# Patient Record
Sex: FEMALE | Race: WHITE | NOT HISPANIC OR LATINO | URBAN - METROPOLITAN AREA
[De-identification: names, ages, dates, MRNs, and addresses within clinical notes are randomized per-mention and may not be internally consistent; named-entity substitution may affect disease eponyms.]

---

## 2017-02-02 ENCOUNTER — OUTPATIENT (OUTPATIENT)
Dept: OUTPATIENT SERVICES | Facility: HOSPITAL | Age: 72
LOS: 1 days | Discharge: HOME | End: 2017-02-02

## 2017-03-07 ENCOUNTER — INPATIENT (INPATIENT)
Facility: HOSPITAL | Age: 72
LOS: 9 days | Discharge: NURSING, REHAB OR LONG-TERM CARE | End: 2017-03-17

## 2017-03-24 ENCOUNTER — APPOINTMENT (OUTPATIENT)
Dept: CARDIOLOGY | Facility: CLINIC | Age: 72
End: 2017-03-24

## 2017-03-24 VITALS — HEIGHT: 64 IN | WEIGHT: 114.9 LBS | BODY MASS INDEX: 19.62 KG/M2

## 2017-03-24 VITALS — SYSTOLIC BLOOD PRESSURE: 99 MMHG | HEART RATE: 103 BPM | DIASTOLIC BLOOD PRESSURE: 66 MMHG | OXYGEN SATURATION: 95 %

## 2017-03-24 VITALS — HEIGHT: 64 IN | BODY MASS INDEX: 19.62 KG/M2 | WEIGHT: 114.9 LBS

## 2017-03-24 DIAGNOSIS — Z00.00 ENCOUNTER FOR GENERAL ADULT MEDICAL EXAMINATION W/OUT ABNORMAL FINDINGS: ICD-10-CM

## 2017-03-24 DIAGNOSIS — Z87.891 PERSONAL HISTORY OF NICOTINE DEPENDENCE: ICD-10-CM

## 2017-03-24 DIAGNOSIS — Z78.9 OTHER SPECIFIED HEALTH STATUS: ICD-10-CM

## 2017-03-24 DIAGNOSIS — F15.90 OTHER STIMULANT USE, UNSPECIFIED, UNCOMPLICATED: ICD-10-CM

## 2017-03-24 DIAGNOSIS — I31.3 PERICARDIAL EFFUSION (NONINFLAMMATORY): ICD-10-CM

## 2017-03-24 RX ORDER — ROSUVASTATIN CALCIUM 5 MG/1
5 TABLET, FILM COATED ORAL
Qty: 90 | Refills: 0 | Status: ACTIVE | COMMUNITY
Start: 2016-07-08

## 2017-03-24 RX ORDER — CLONAZEPAM 0.5 MG/1
0.5 TABLET ORAL
Qty: 90 | Refills: 0 | Status: ACTIVE | COMMUNITY
Start: 2016-11-22

## 2017-03-24 RX ORDER — TIOTROPIUM BROMIDE INHALATION SPRAY 3.12 UG/1
2.5 SPRAY, METERED RESPIRATORY (INHALATION)
Qty: 4 | Refills: 0 | Status: ACTIVE | COMMUNITY
Start: 2017-03-21

## 2017-04-05 ENCOUNTER — OUTPATIENT (OUTPATIENT)
Dept: OUTPATIENT SERVICES | Facility: HOSPITAL | Age: 72
LOS: 1 days | Discharge: HOME | End: 2017-04-05

## 2017-04-08 ENCOUNTER — TRANSCRIPTION ENCOUNTER (OUTPATIENT)
Age: 72
End: 2017-04-08

## 2017-04-28 ENCOUNTER — APPOINTMENT (OUTPATIENT)
Dept: CARDIOLOGY | Facility: CLINIC | Age: 72
End: 2017-04-28

## 2017-04-28 VITALS — HEART RATE: 87 BPM | DIASTOLIC BLOOD PRESSURE: 74 MMHG | OXYGEN SATURATION: 96 % | SYSTOLIC BLOOD PRESSURE: 119 MMHG

## 2017-05-05 ENCOUNTER — INPATIENT (INPATIENT)
Facility: HOSPITAL | Age: 72
LOS: 4 days | Discharge: HOME | End: 2017-05-10

## 2017-06-08 ENCOUNTER — OUTPATIENT (OUTPATIENT)
Dept: OUTPATIENT SERVICES | Facility: HOSPITAL | Age: 72
LOS: 1 days | Discharge: HOME | End: 2017-06-08

## 2017-06-08 DIAGNOSIS — I33.0 ACUTE AND SUBACUTE INFECTIVE ENDOCARDITIS: ICD-10-CM

## 2017-06-08 DIAGNOSIS — I48.91 UNSPECIFIED ATRIAL FIBRILLATION: ICD-10-CM

## 2017-06-08 DIAGNOSIS — J44.1 CHRONIC OBSTRUCTIVE PULMONARY DISEASE WITH (ACUTE) EXACERBATION: ICD-10-CM

## 2017-06-08 DIAGNOSIS — I31.3 PERICARDIAL EFFUSION (NONINFLAMMATORY): ICD-10-CM

## 2017-06-08 DIAGNOSIS — R78.81 BACTEREMIA: ICD-10-CM

## 2017-06-22 ENCOUNTER — OUTPATIENT (OUTPATIENT)
Dept: OUTPATIENT SERVICES | Facility: HOSPITAL | Age: 72
LOS: 1 days | Discharge: HOME | End: 2017-06-22

## 2017-06-22 DIAGNOSIS — J44.1 CHRONIC OBSTRUCTIVE PULMONARY DISEASE WITH (ACUTE) EXACERBATION: ICD-10-CM

## 2017-06-22 DIAGNOSIS — R78.81 BACTEREMIA: ICD-10-CM

## 2017-06-22 DIAGNOSIS — I31.3 PERICARDIAL EFFUSION (NONINFLAMMATORY): ICD-10-CM

## 2017-06-22 DIAGNOSIS — I33.0 ACUTE AND SUBACUTE INFECTIVE ENDOCARDITIS: ICD-10-CM

## 2017-06-22 DIAGNOSIS — I48.91 UNSPECIFIED ATRIAL FIBRILLATION: ICD-10-CM

## 2017-06-28 DIAGNOSIS — I48.91 UNSPECIFIED ATRIAL FIBRILLATION: ICD-10-CM

## 2017-06-28 DIAGNOSIS — Z79.01 LONG TERM (CURRENT) USE OF ANTICOAGULANTS: ICD-10-CM

## 2017-07-06 ENCOUNTER — OUTPATIENT (OUTPATIENT)
Dept: OUTPATIENT SERVICES | Facility: HOSPITAL | Age: 72
LOS: 1 days | Discharge: HOME | End: 2017-07-06

## 2017-07-06 DIAGNOSIS — J44.1 CHRONIC OBSTRUCTIVE PULMONARY DISEASE WITH (ACUTE) EXACERBATION: ICD-10-CM

## 2017-07-06 DIAGNOSIS — I33.0 ACUTE AND SUBACUTE INFECTIVE ENDOCARDITIS: ICD-10-CM

## 2017-07-06 DIAGNOSIS — I31.3 PERICARDIAL EFFUSION (NONINFLAMMATORY): ICD-10-CM

## 2017-07-06 DIAGNOSIS — I48.91 UNSPECIFIED ATRIAL FIBRILLATION: ICD-10-CM

## 2017-07-06 DIAGNOSIS — R78.81 BACTEREMIA: ICD-10-CM

## 2017-07-06 DIAGNOSIS — Z79.01 LONG TERM (CURRENT) USE OF ANTICOAGULANTS: ICD-10-CM

## 2017-07-11 DIAGNOSIS — J44.1 CHRONIC OBSTRUCTIVE PULMONARY DISEASE WITH (ACUTE) EXACERBATION: ICD-10-CM

## 2017-07-11 DIAGNOSIS — Z87.891 PERSONAL HISTORY OF NICOTINE DEPENDENCE: ICD-10-CM

## 2017-07-11 DIAGNOSIS — Z79.01 LONG TERM (CURRENT) USE OF ANTICOAGULANTS: ICD-10-CM

## 2017-07-11 DIAGNOSIS — J96.10 CHRONIC RESPIRATORY FAILURE, UNSPECIFIED WHETHER WITH HYPOXIA OR HYPERCAPNIA: ICD-10-CM

## 2017-07-11 DIAGNOSIS — Z85.118 PERSONAL HISTORY OF OTHER MALIGNANT NEOPLASM OF BRONCHUS AND LUNG: ICD-10-CM

## 2017-07-11 DIAGNOSIS — I48.1 PERSISTENT ATRIAL FIBRILLATION: ICD-10-CM

## 2017-07-11 DIAGNOSIS — F41.9 ANXIETY DISORDER, UNSPECIFIED: ICD-10-CM

## 2017-07-11 DIAGNOSIS — Z95.0 PRESENCE OF CARDIAC PACEMAKER: ICD-10-CM

## 2017-07-11 DIAGNOSIS — I31.3 PERICARDIAL EFFUSION (NONINFLAMMATORY): ICD-10-CM

## 2017-07-11 DIAGNOSIS — E78.5 HYPERLIPIDEMIA, UNSPECIFIED: ICD-10-CM

## 2017-07-14 DIAGNOSIS — I08.0 RHEUMATIC DISORDERS OF BOTH MITRAL AND AORTIC VALVES: ICD-10-CM

## 2017-07-14 DIAGNOSIS — E78.5 HYPERLIPIDEMIA, UNSPECIFIED: ICD-10-CM

## 2017-07-14 DIAGNOSIS — Z85.118 PERSONAL HISTORY OF OTHER MALIGNANT NEOPLASM OF BRONCHUS AND LUNG: ICD-10-CM

## 2017-07-14 DIAGNOSIS — K21.9 GASTRO-ESOPHAGEAL REFLUX DISEASE WITHOUT ESOPHAGITIS: ICD-10-CM

## 2017-07-14 DIAGNOSIS — K29.70 GASTRITIS, UNSPECIFIED, WITHOUT BLEEDING: ICD-10-CM

## 2017-07-14 DIAGNOSIS — Z00.6 ENCOUNTER FOR EXAMINATION FOR NORMAL COMPARISON AND CONTROL IN CLINICAL RESEARCH PROGRAM: ICD-10-CM

## 2017-07-14 DIAGNOSIS — D15.1 BENIGN NEOPLASM OF HEART: ICD-10-CM

## 2017-07-14 DIAGNOSIS — J44.9 CHRONIC OBSTRUCTIVE PULMONARY DISEASE, UNSPECIFIED: ICD-10-CM

## 2017-07-14 DIAGNOSIS — Z92.21 PERSONAL HISTORY OF ANTINEOPLASTIC CHEMOTHERAPY: ICD-10-CM

## 2017-07-14 DIAGNOSIS — Z87.891 PERSONAL HISTORY OF NICOTINE DEPENDENCE: ICD-10-CM

## 2017-07-14 DIAGNOSIS — I48.91 UNSPECIFIED ATRIAL FIBRILLATION: ICD-10-CM

## 2017-07-14 DIAGNOSIS — I31.3 PERICARDIAL EFFUSION (NONINFLAMMATORY): ICD-10-CM

## 2017-07-14 DIAGNOSIS — Z79.01 LONG TERM (CURRENT) USE OF ANTICOAGULANTS: ICD-10-CM

## 2017-07-14 DIAGNOSIS — F41.9 ANXIETY DISORDER, UNSPECIFIED: ICD-10-CM

## 2017-07-14 DIAGNOSIS — Z92.3 PERSONAL HISTORY OF IRRADIATION: ICD-10-CM

## 2017-07-14 DIAGNOSIS — Z66 DO NOT RESUSCITATE: ICD-10-CM

## 2017-07-14 DIAGNOSIS — Z90.2 ACQUIRED ABSENCE OF LUNG [PART OF]: ICD-10-CM

## 2017-07-20 ENCOUNTER — OUTPATIENT (OUTPATIENT)
Dept: OUTPATIENT SERVICES | Facility: HOSPITAL | Age: 72
LOS: 1 days | Discharge: HOME | End: 2017-07-20

## 2017-07-20 DIAGNOSIS — I48.91 UNSPECIFIED ATRIAL FIBRILLATION: ICD-10-CM

## 2017-07-20 DIAGNOSIS — I31.3 PERICARDIAL EFFUSION (NONINFLAMMATORY): ICD-10-CM

## 2017-07-20 DIAGNOSIS — I33.0 ACUTE AND SUBACUTE INFECTIVE ENDOCARDITIS: ICD-10-CM

## 2017-07-20 DIAGNOSIS — R78.81 BACTEREMIA: ICD-10-CM

## 2017-07-20 DIAGNOSIS — Z79.01 LONG TERM (CURRENT) USE OF ANTICOAGULANTS: ICD-10-CM

## 2017-07-20 DIAGNOSIS — J44.1 CHRONIC OBSTRUCTIVE PULMONARY DISEASE WITH (ACUTE) EXACERBATION: ICD-10-CM

## 2017-08-03 ENCOUNTER — OUTPATIENT (OUTPATIENT)
Dept: OUTPATIENT SERVICES | Facility: HOSPITAL | Age: 72
LOS: 1 days | Discharge: HOME | End: 2017-08-03

## 2017-08-03 DIAGNOSIS — I48.91 UNSPECIFIED ATRIAL FIBRILLATION: ICD-10-CM

## 2017-08-03 DIAGNOSIS — J44.1 CHRONIC OBSTRUCTIVE PULMONARY DISEASE WITH (ACUTE) EXACERBATION: ICD-10-CM

## 2017-08-03 DIAGNOSIS — I31.3 PERICARDIAL EFFUSION (NONINFLAMMATORY): ICD-10-CM

## 2017-08-03 DIAGNOSIS — I33.0 ACUTE AND SUBACUTE INFECTIVE ENDOCARDITIS: ICD-10-CM

## 2017-08-03 DIAGNOSIS — Z79.01 LONG TERM (CURRENT) USE OF ANTICOAGULANTS: ICD-10-CM

## 2017-08-03 DIAGNOSIS — R78.81 BACTEREMIA: ICD-10-CM

## 2017-08-23 ENCOUNTER — OUTPATIENT (OUTPATIENT)
Dept: OUTPATIENT SERVICES | Facility: HOSPITAL | Age: 72
LOS: 1 days | Discharge: HOME | End: 2017-08-23

## 2017-08-23 DIAGNOSIS — R78.81 BACTEREMIA: ICD-10-CM

## 2017-08-23 DIAGNOSIS — I33.0 ACUTE AND SUBACUTE INFECTIVE ENDOCARDITIS: ICD-10-CM

## 2017-08-23 DIAGNOSIS — I48.91 UNSPECIFIED ATRIAL FIBRILLATION: ICD-10-CM

## 2017-08-23 DIAGNOSIS — I31.3 PERICARDIAL EFFUSION (NONINFLAMMATORY): ICD-10-CM

## 2017-08-23 DIAGNOSIS — J44.1 CHRONIC OBSTRUCTIVE PULMONARY DISEASE WITH (ACUTE) EXACERBATION: ICD-10-CM

## 2017-08-23 DIAGNOSIS — Z79.01 LONG TERM (CURRENT) USE OF ANTICOAGULANTS: ICD-10-CM

## 2017-08-25 ENCOUNTER — APPOINTMENT (OUTPATIENT)
Dept: CARDIOLOGY | Facility: CLINIC | Age: 72
End: 2017-08-25

## 2017-08-25 ENCOUNTER — INPATIENT (INPATIENT)
Facility: HOSPITAL | Age: 72
LOS: 7 days | Discharge: HOME | End: 2017-09-02
Attending: HOSPITALIST

## 2017-08-25 VITALS
HEART RATE: 138 BPM | DIASTOLIC BLOOD PRESSURE: 76 MMHG | SYSTOLIC BLOOD PRESSURE: 116 MMHG | OXYGEN SATURATION: 87 % | WEIGHT: 114 LBS | HEIGHT: 64 IN | BODY MASS INDEX: 19.46 KG/M2

## 2017-08-25 DIAGNOSIS — J44.1 CHRONIC OBSTRUCTIVE PULMONARY DISEASE WITH (ACUTE) EXACERBATION: ICD-10-CM

## 2017-08-25 DIAGNOSIS — R00.1 BRADYCARDIA, UNSPECIFIED: ICD-10-CM

## 2017-08-25 DIAGNOSIS — I48.91 UNSPECIFIED ATRIAL FIBRILLATION: ICD-10-CM

## 2017-08-25 DIAGNOSIS — I33.0 ACUTE AND SUBACUTE INFECTIVE ENDOCARDITIS: ICD-10-CM

## 2017-08-25 DIAGNOSIS — R78.81 BACTEREMIA: ICD-10-CM

## 2017-08-25 DIAGNOSIS — I31.3 PERICARDIAL EFFUSION (NONINFLAMMATORY): ICD-10-CM

## 2017-08-25 RX ORDER — WARFARIN SODIUM 1 MG/1
1 TABLET ORAL
Qty: 180 | Refills: 0 | Status: ACTIVE | COMMUNITY
Start: 2017-01-18

## 2017-09-04 ENCOUNTER — INPATIENT (INPATIENT)
Facility: HOSPITAL | Age: 72
LOS: 3 days | Discharge: LEFT BEFORE DISCHARGE | End: 2017-09-08
Attending: INTERNAL MEDICINE

## 2017-09-04 DIAGNOSIS — J44.1 CHRONIC OBSTRUCTIVE PULMONARY DISEASE WITH (ACUTE) EXACERBATION: ICD-10-CM

## 2017-09-04 DIAGNOSIS — I48.91 UNSPECIFIED ATRIAL FIBRILLATION: ICD-10-CM

## 2017-09-04 DIAGNOSIS — R78.81 BACTEREMIA: ICD-10-CM

## 2017-09-04 DIAGNOSIS — I33.0 ACUTE AND SUBACUTE INFECTIVE ENDOCARDITIS: ICD-10-CM

## 2017-09-04 DIAGNOSIS — I31.3 PERICARDIAL EFFUSION (NONINFLAMMATORY): ICD-10-CM

## 2017-09-05 DIAGNOSIS — T38.0X5A ADVERSE EFFECT OF GLUCOCORTICOIDS AND SYNTHETIC ANALOGUES, INITIAL ENCOUNTER: ICD-10-CM

## 2017-09-05 DIAGNOSIS — R91.1 SOLITARY PULMONARY NODULE: ICD-10-CM

## 2017-09-05 DIAGNOSIS — F41.9 ANXIETY DISORDER, UNSPECIFIED: ICD-10-CM

## 2017-09-05 DIAGNOSIS — E11.65 TYPE 2 DIABETES MELLITUS WITH HYPERGLYCEMIA: ICD-10-CM

## 2017-09-05 DIAGNOSIS — I48.91 UNSPECIFIED ATRIAL FIBRILLATION: ICD-10-CM

## 2017-09-05 DIAGNOSIS — Z79.01 LONG TERM (CURRENT) USE OF ANTICOAGULANTS: ICD-10-CM

## 2017-09-05 DIAGNOSIS — Z85.118 PERSONAL HISTORY OF OTHER MALIGNANT NEOPLASM OF BRONCHUS AND LUNG: ICD-10-CM

## 2017-09-05 DIAGNOSIS — Z99.81 DEPENDENCE ON SUPPLEMENTAL OXYGEN: ICD-10-CM

## 2017-09-05 DIAGNOSIS — Z87.891 PERSONAL HISTORY OF NICOTINE DEPENDENCE: ICD-10-CM

## 2017-09-05 DIAGNOSIS — F32.9 MAJOR DEPRESSIVE DISORDER, SINGLE EPISODE, UNSPECIFIED: ICD-10-CM

## 2017-09-05 DIAGNOSIS — I44.2 ATRIOVENTRICULAR BLOCK, COMPLETE: ICD-10-CM

## 2017-09-05 DIAGNOSIS — J44.9 CHRONIC OBSTRUCTIVE PULMONARY DISEASE, UNSPECIFIED: ICD-10-CM

## 2017-09-05 DIAGNOSIS — Z95.0 PRESENCE OF CARDIAC PACEMAKER: ICD-10-CM

## 2017-09-05 DIAGNOSIS — J96.10 CHRONIC RESPIRATORY FAILURE, UNSPECIFIED WHETHER WITH HYPOXIA OR HYPERCAPNIA: ICD-10-CM

## 2017-09-05 DIAGNOSIS — E78.5 HYPERLIPIDEMIA, UNSPECIFIED: ICD-10-CM

## 2017-09-05 DIAGNOSIS — Z79.4 LONG TERM (CURRENT) USE OF INSULIN: ICD-10-CM

## 2017-09-05 DIAGNOSIS — Z90.2 ACQUIRED ABSENCE OF LUNG [PART OF]: ICD-10-CM

## 2017-09-05 DIAGNOSIS — I48.0 PAROXYSMAL ATRIAL FIBRILLATION: ICD-10-CM

## 2017-09-10 ENCOUNTER — INPATIENT (INPATIENT)
Facility: HOSPITAL | Age: 72
LOS: 2 days | Discharge: HOME | End: 2017-09-13
Attending: INTERNAL MEDICINE

## 2017-09-10 DIAGNOSIS — I31.3 PERICARDIAL EFFUSION (NONINFLAMMATORY): ICD-10-CM

## 2017-09-10 DIAGNOSIS — I33.0 ACUTE AND SUBACUTE INFECTIVE ENDOCARDITIS: ICD-10-CM

## 2017-09-10 DIAGNOSIS — J44.1 CHRONIC OBSTRUCTIVE PULMONARY DISEASE WITH (ACUTE) EXACERBATION: ICD-10-CM

## 2017-09-10 DIAGNOSIS — I48.91 UNSPECIFIED ATRIAL FIBRILLATION: ICD-10-CM

## 2017-09-10 DIAGNOSIS — R78.81 BACTEREMIA: ICD-10-CM

## 2017-09-11 DIAGNOSIS — E78.5 HYPERLIPIDEMIA, UNSPECIFIED: ICD-10-CM

## 2017-09-11 DIAGNOSIS — Z87.891 PERSONAL HISTORY OF NICOTINE DEPENDENCE: ICD-10-CM

## 2017-09-11 DIAGNOSIS — I31.3 PERICARDIAL EFFUSION (NONINFLAMMATORY): ICD-10-CM

## 2017-09-11 DIAGNOSIS — I49.3 VENTRICULAR PREMATURE DEPOLARIZATION: ICD-10-CM

## 2017-09-11 DIAGNOSIS — J44.9 CHRONIC OBSTRUCTIVE PULMONARY DISEASE, UNSPECIFIED: ICD-10-CM

## 2017-09-11 DIAGNOSIS — I48.91 UNSPECIFIED ATRIAL FIBRILLATION: ICD-10-CM

## 2017-09-11 DIAGNOSIS — F41.9 ANXIETY DISORDER, UNSPECIFIED: ICD-10-CM

## 2017-09-11 DIAGNOSIS — Z95.0 PRESENCE OF CARDIAC PACEMAKER: ICD-10-CM

## 2017-09-11 DIAGNOSIS — R07.9 CHEST PAIN, UNSPECIFIED: ICD-10-CM

## 2017-09-11 DIAGNOSIS — Z98.890 OTHER SPECIFIED POSTPROCEDURAL STATES: ICD-10-CM

## 2017-09-11 DIAGNOSIS — E11.9 TYPE 2 DIABETES MELLITUS WITHOUT COMPLICATIONS: ICD-10-CM

## 2017-09-11 DIAGNOSIS — Z90.2 ACQUIRED ABSENCE OF LUNG [PART OF]: ICD-10-CM

## 2017-09-11 DIAGNOSIS — R78.81 BACTEREMIA: ICD-10-CM

## 2017-09-11 DIAGNOSIS — R79.89 OTHER SPECIFIED ABNORMAL FINDINGS OF BLOOD CHEMISTRY: ICD-10-CM

## 2017-09-11 DIAGNOSIS — Z85.118 PERSONAL HISTORY OF OTHER MALIGNANT NEOPLASM OF BRONCHUS AND LUNG: ICD-10-CM

## 2017-09-11 DIAGNOSIS — B95.2 ENTEROCOCCUS AS THE CAUSE OF DISEASES CLASSIFIED ELSEWHERE: ICD-10-CM

## 2017-09-11 DIAGNOSIS — I38 ENDOCARDITIS, VALVE UNSPECIFIED: ICD-10-CM

## 2017-09-11 DIAGNOSIS — Z79.01 LONG TERM (CURRENT) USE OF ANTICOAGULANTS: ICD-10-CM

## 2017-09-14 DIAGNOSIS — Z88.2 ALLERGY STATUS TO SULFONAMIDES: ICD-10-CM

## 2017-09-14 DIAGNOSIS — D68.9 COAGULATION DEFECT, UNSPECIFIED: ICD-10-CM

## 2017-09-14 DIAGNOSIS — Z95.0 PRESENCE OF CARDIAC PACEMAKER: ICD-10-CM

## 2017-09-14 DIAGNOSIS — R78.81 BACTEREMIA: ICD-10-CM

## 2017-09-14 DIAGNOSIS — E78.5 HYPERLIPIDEMIA, UNSPECIFIED: ICD-10-CM

## 2017-09-14 DIAGNOSIS — E11.9 TYPE 2 DIABETES MELLITUS WITHOUT COMPLICATIONS: ICD-10-CM

## 2017-09-14 DIAGNOSIS — F41.9 ANXIETY DISORDER, UNSPECIFIED: ICD-10-CM

## 2017-09-14 DIAGNOSIS — J96.11 CHRONIC RESPIRATORY FAILURE WITH HYPOXIA: ICD-10-CM

## 2017-09-14 DIAGNOSIS — Z90.2 ACQUIRED ABSENCE OF LUNG [PART OF]: ICD-10-CM

## 2017-09-14 DIAGNOSIS — Z88.8 ALLERGY STATUS TO OTHER DRUGS, MEDICAMENTS AND BIOLOGICAL SUBSTANCES: ICD-10-CM

## 2017-09-14 DIAGNOSIS — Z79.01 LONG TERM (CURRENT) USE OF ANTICOAGULANTS: ICD-10-CM

## 2017-09-14 DIAGNOSIS — Z85.118 PERSONAL HISTORY OF OTHER MALIGNANT NEOPLASM OF BRONCHUS AND LUNG: ICD-10-CM

## 2017-09-14 DIAGNOSIS — J96.10 CHRONIC RESPIRATORY FAILURE, UNSPECIFIED WHETHER WITH HYPOXIA OR HYPERCAPNIA: ICD-10-CM

## 2017-09-14 DIAGNOSIS — J44.9 CHRONIC OBSTRUCTIVE PULMONARY DISEASE, UNSPECIFIED: ICD-10-CM

## 2017-09-20 DIAGNOSIS — B95.2 ENTEROCOCCUS AS THE CAUSE OF DISEASES CLASSIFIED ELSEWHERE: ICD-10-CM

## 2017-09-20 DIAGNOSIS — Z87.891 PERSONAL HISTORY OF NICOTINE DEPENDENCE: ICD-10-CM

## 2017-09-21 ENCOUNTER — OUTPATIENT (OUTPATIENT)
Dept: OUTPATIENT SERVICES | Facility: HOSPITAL | Age: 72
LOS: 1 days | Discharge: HOME | End: 2017-09-21

## 2017-09-21 DIAGNOSIS — I31.3 PERICARDIAL EFFUSION (NONINFLAMMATORY): ICD-10-CM

## 2017-09-21 DIAGNOSIS — I48.91 UNSPECIFIED ATRIAL FIBRILLATION: ICD-10-CM

## 2017-09-21 DIAGNOSIS — R78.81 BACTEREMIA: ICD-10-CM

## 2017-09-21 DIAGNOSIS — Z79.01 LONG TERM (CURRENT) USE OF ANTICOAGULANTS: ICD-10-CM

## 2017-09-21 DIAGNOSIS — I33.0 ACUTE AND SUBACUTE INFECTIVE ENDOCARDITIS: ICD-10-CM

## 2017-09-21 DIAGNOSIS — J44.1 CHRONIC OBSTRUCTIVE PULMONARY DISEASE WITH (ACUTE) EXACERBATION: ICD-10-CM

## 2017-09-25 ENCOUNTER — OUTPATIENT (OUTPATIENT)
Dept: OUTPATIENT SERVICES | Facility: HOSPITAL | Age: 72
LOS: 1 days | Discharge: HOME | End: 2017-09-25

## 2017-09-25 DIAGNOSIS — R74.8 ABNORMAL LEVELS OF OTHER SERUM ENZYMES: ICD-10-CM

## 2017-09-25 DIAGNOSIS — I31.3 PERICARDIAL EFFUSION (NONINFLAMMATORY): ICD-10-CM

## 2017-09-25 DIAGNOSIS — J44.1 CHRONIC OBSTRUCTIVE PULMONARY DISEASE WITH (ACUTE) EXACERBATION: ICD-10-CM

## 2017-09-25 DIAGNOSIS — R68.89 OTHER GENERAL SYMPTOMS AND SIGNS: ICD-10-CM

## 2017-09-25 DIAGNOSIS — E78.4 OTHER HYPERLIPIDEMIA: ICD-10-CM

## 2017-09-25 DIAGNOSIS — Z02.89 ENCOUNTER FOR OTHER ADMINISTRATIVE EXAMINATIONS: ICD-10-CM

## 2017-09-25 DIAGNOSIS — R78.81 BACTEREMIA: ICD-10-CM

## 2017-09-25 DIAGNOSIS — E55.9 VITAMIN D DEFICIENCY, UNSPECIFIED: ICD-10-CM

## 2017-09-25 DIAGNOSIS — R79.82 ELEVATED C-REACTIVE PROTEIN (CRP): ICD-10-CM

## 2017-09-25 DIAGNOSIS — I48.91 UNSPECIFIED ATRIAL FIBRILLATION: ICD-10-CM

## 2017-09-25 DIAGNOSIS — Z79.01 LONG TERM (CURRENT) USE OF ANTICOAGULANTS: ICD-10-CM

## 2017-09-25 DIAGNOSIS — I33.0 ACUTE AND SUBACUTE INFECTIVE ENDOCARDITIS: ICD-10-CM

## 2017-09-25 DIAGNOSIS — R73.09 OTHER ABNORMAL GLUCOSE: ICD-10-CM

## 2017-09-25 DIAGNOSIS — E78.1 PURE HYPERGLYCERIDEMIA: ICD-10-CM

## 2017-10-05 ENCOUNTER — OUTPATIENT (OUTPATIENT)
Dept: OUTPATIENT SERVICES | Facility: HOSPITAL | Age: 72
LOS: 1 days | Discharge: HOME | End: 2017-10-05

## 2017-10-05 DIAGNOSIS — I33.0 ACUTE AND SUBACUTE INFECTIVE ENDOCARDITIS: ICD-10-CM

## 2017-10-05 DIAGNOSIS — Z79.01 LONG TERM (CURRENT) USE OF ANTICOAGULANTS: ICD-10-CM

## 2017-10-05 DIAGNOSIS — I31.3 PERICARDIAL EFFUSION (NONINFLAMMATORY): ICD-10-CM

## 2017-10-05 DIAGNOSIS — R78.81 BACTEREMIA: ICD-10-CM

## 2017-10-05 DIAGNOSIS — J44.1 CHRONIC OBSTRUCTIVE PULMONARY DISEASE WITH (ACUTE) EXACERBATION: ICD-10-CM

## 2017-10-05 DIAGNOSIS — I48.91 UNSPECIFIED ATRIAL FIBRILLATION: ICD-10-CM

## 2017-10-05 DIAGNOSIS — B97.89 OTHER VIRAL AGENTS AS THE CAUSE OF DISEASES CLASSIFIED ELSEWHERE: ICD-10-CM

## 2017-10-20 ENCOUNTER — OUTPATIENT (OUTPATIENT)
Dept: OUTPATIENT SERVICES | Facility: HOSPITAL | Age: 72
LOS: 1 days | Discharge: HOME | End: 2017-10-20

## 2017-10-20 DIAGNOSIS — I31.3 PERICARDIAL EFFUSION (NONINFLAMMATORY): ICD-10-CM

## 2017-10-20 DIAGNOSIS — I33.0 ACUTE AND SUBACUTE INFECTIVE ENDOCARDITIS: ICD-10-CM

## 2017-10-20 DIAGNOSIS — I48.91 UNSPECIFIED ATRIAL FIBRILLATION: ICD-10-CM

## 2017-10-20 DIAGNOSIS — R78.81 BACTEREMIA: ICD-10-CM

## 2017-10-20 DIAGNOSIS — Z79.01 LONG TERM (CURRENT) USE OF ANTICOAGULANTS: ICD-10-CM

## 2017-10-20 DIAGNOSIS — J44.1 CHRONIC OBSTRUCTIVE PULMONARY DISEASE WITH (ACUTE) EXACERBATION: ICD-10-CM

## 2017-10-30 ENCOUNTER — OUTPATIENT (OUTPATIENT)
Dept: OUTPATIENT SERVICES | Facility: HOSPITAL | Age: 72
LOS: 1 days | Discharge: HOME | End: 2017-10-30

## 2017-10-30 DIAGNOSIS — I31.3 PERICARDIAL EFFUSION (NONINFLAMMATORY): ICD-10-CM

## 2017-10-30 DIAGNOSIS — R78.81 BACTEREMIA: ICD-10-CM

## 2017-10-30 DIAGNOSIS — I33.0 ACUTE AND SUBACUTE INFECTIVE ENDOCARDITIS: ICD-10-CM

## 2017-10-30 DIAGNOSIS — J44.1 CHRONIC OBSTRUCTIVE PULMONARY DISEASE WITH (ACUTE) EXACERBATION: ICD-10-CM

## 2017-10-30 DIAGNOSIS — I48.91 UNSPECIFIED ATRIAL FIBRILLATION: ICD-10-CM

## 2017-10-30 DIAGNOSIS — Z79.01 LONG TERM (CURRENT) USE OF ANTICOAGULANTS: ICD-10-CM

## 2017-11-03 ENCOUNTER — OUTPATIENT (OUTPATIENT)
Dept: OUTPATIENT SERVICES | Facility: HOSPITAL | Age: 72
LOS: 1 days | Discharge: HOME | End: 2017-11-03

## 2017-11-03 DIAGNOSIS — I48.91 UNSPECIFIED ATRIAL FIBRILLATION: ICD-10-CM

## 2017-11-03 DIAGNOSIS — R78.81 BACTEREMIA: ICD-10-CM

## 2017-11-03 DIAGNOSIS — J44.1 CHRONIC OBSTRUCTIVE PULMONARY DISEASE WITH (ACUTE) EXACERBATION: ICD-10-CM

## 2017-11-03 DIAGNOSIS — Z79.01 LONG TERM (CURRENT) USE OF ANTICOAGULANTS: ICD-10-CM

## 2017-11-03 DIAGNOSIS — I31.3 PERICARDIAL EFFUSION (NONINFLAMMATORY): ICD-10-CM

## 2017-11-03 DIAGNOSIS — I33.0 ACUTE AND SUBACUTE INFECTIVE ENDOCARDITIS: ICD-10-CM

## 2017-11-09 ENCOUNTER — APPOINTMENT (OUTPATIENT)
Dept: CARDIOLOGY | Facility: CLINIC | Age: 72
End: 2017-11-09

## 2017-11-09 VITALS
WEIGHT: 130 LBS | OXYGEN SATURATION: 89 % | DIASTOLIC BLOOD PRESSURE: 71 MMHG | SYSTOLIC BLOOD PRESSURE: 126 MMHG | BODY MASS INDEX: 22.2 KG/M2 | HEART RATE: 80 BPM | HEIGHT: 64 IN

## 2017-11-17 ENCOUNTER — OUTPATIENT (OUTPATIENT)
Dept: OUTPATIENT SERVICES | Facility: HOSPITAL | Age: 72
LOS: 1 days | Discharge: HOME | End: 2017-11-17

## 2017-11-17 DIAGNOSIS — I31.3 PERICARDIAL EFFUSION (NONINFLAMMATORY): ICD-10-CM

## 2017-11-17 DIAGNOSIS — J44.1 CHRONIC OBSTRUCTIVE PULMONARY DISEASE WITH (ACUTE) EXACERBATION: ICD-10-CM

## 2017-11-17 DIAGNOSIS — I48.91 UNSPECIFIED ATRIAL FIBRILLATION: ICD-10-CM

## 2017-11-17 DIAGNOSIS — R78.81 BACTEREMIA: ICD-10-CM

## 2017-11-17 DIAGNOSIS — Z79.01 LONG TERM (CURRENT) USE OF ANTICOAGULANTS: ICD-10-CM

## 2017-11-17 DIAGNOSIS — I33.0 ACUTE AND SUBACUTE INFECTIVE ENDOCARDITIS: ICD-10-CM

## 2017-12-06 ENCOUNTER — OUTPATIENT (OUTPATIENT)
Dept: OUTPATIENT SERVICES | Facility: HOSPITAL | Age: 72
LOS: 1 days | Discharge: HOME | End: 2017-12-06

## 2017-12-06 DIAGNOSIS — I31.3 PERICARDIAL EFFUSION (NONINFLAMMATORY): ICD-10-CM

## 2017-12-06 DIAGNOSIS — I48.91 UNSPECIFIED ATRIAL FIBRILLATION: ICD-10-CM

## 2017-12-06 DIAGNOSIS — J44.1 CHRONIC OBSTRUCTIVE PULMONARY DISEASE WITH (ACUTE) EXACERBATION: ICD-10-CM

## 2017-12-06 DIAGNOSIS — R78.81 BACTEREMIA: ICD-10-CM

## 2017-12-06 DIAGNOSIS — I33.0 ACUTE AND SUBACUTE INFECTIVE ENDOCARDITIS: ICD-10-CM

## 2017-12-06 DIAGNOSIS — Z79.01 LONG TERM (CURRENT) USE OF ANTICOAGULANTS: ICD-10-CM

## 2017-12-29 ENCOUNTER — OUTPATIENT (OUTPATIENT)
Dept: OUTPATIENT SERVICES | Facility: HOSPITAL | Age: 72
LOS: 1 days | Discharge: HOME | End: 2017-12-29

## 2017-12-29 DIAGNOSIS — R78.81 BACTEREMIA: ICD-10-CM

## 2017-12-29 DIAGNOSIS — I33.0 ACUTE AND SUBACUTE INFECTIVE ENDOCARDITIS: ICD-10-CM

## 2017-12-29 DIAGNOSIS — I48.91 UNSPECIFIED ATRIAL FIBRILLATION: ICD-10-CM

## 2017-12-29 DIAGNOSIS — J44.1 CHRONIC OBSTRUCTIVE PULMONARY DISEASE WITH (ACUTE) EXACERBATION: ICD-10-CM

## 2017-12-29 DIAGNOSIS — Z79.01 LONG TERM (CURRENT) USE OF ANTICOAGULANTS: ICD-10-CM

## 2017-12-29 DIAGNOSIS — I31.3 PERICARDIAL EFFUSION (NONINFLAMMATORY): ICD-10-CM

## 2018-01-17 ENCOUNTER — OUTPATIENT (OUTPATIENT)
Dept: OUTPATIENT SERVICES | Facility: HOSPITAL | Age: 73
LOS: 1 days | Discharge: HOME | End: 2018-01-17

## 2018-01-17 DIAGNOSIS — Z79.01 LONG TERM (CURRENT) USE OF ANTICOAGULANTS: ICD-10-CM

## 2018-01-17 DIAGNOSIS — I48.91 UNSPECIFIED ATRIAL FIBRILLATION: ICD-10-CM

## 2018-01-17 DIAGNOSIS — I33.0 ACUTE AND SUBACUTE INFECTIVE ENDOCARDITIS: ICD-10-CM

## 2018-01-17 DIAGNOSIS — J44.1 CHRONIC OBSTRUCTIVE PULMONARY DISEASE WITH (ACUTE) EXACERBATION: ICD-10-CM

## 2018-01-17 DIAGNOSIS — R78.81 BACTEREMIA: ICD-10-CM

## 2018-01-17 DIAGNOSIS — I31.3 PERICARDIAL EFFUSION (NONINFLAMMATORY): ICD-10-CM

## 2018-02-05 ENCOUNTER — OUTPATIENT (OUTPATIENT)
Dept: OUTPATIENT SERVICES | Facility: HOSPITAL | Age: 73
LOS: 1 days | Discharge: HOME | End: 2018-02-05

## 2018-02-05 DIAGNOSIS — I48.91 UNSPECIFIED ATRIAL FIBRILLATION: ICD-10-CM

## 2018-02-05 DIAGNOSIS — Z79.01 LONG TERM (CURRENT) USE OF ANTICOAGULANTS: ICD-10-CM

## 2018-02-22 ENCOUNTER — INPATIENT (INPATIENT)
Facility: HOSPITAL | Age: 73
LOS: 1 days | Discharge: HOME | End: 2018-02-24
Attending: INTERNAL MEDICINE

## 2018-02-22 VITALS
SYSTOLIC BLOOD PRESSURE: 174 MMHG | TEMPERATURE: 96 F | RESPIRATION RATE: 20 BRPM | DIASTOLIC BLOOD PRESSURE: 93 MMHG | HEART RATE: 66 BPM | OXYGEN SATURATION: 96 %

## 2018-02-22 RX ORDER — SODIUM CHLORIDE 9 MG/ML
3 INJECTION INTRAMUSCULAR; INTRAVENOUS; SUBCUTANEOUS EVERY 8 HOURS
Qty: 0 | Refills: 0 | Status: DISCONTINUED | OUTPATIENT
Start: 2018-02-22 | End: 2018-02-24

## 2018-02-22 RX ORDER — SODIUM CHLORIDE 9 MG/ML
1000 INJECTION INTRAMUSCULAR; INTRAVENOUS; SUBCUTANEOUS
Qty: 0 | Refills: 0 | Status: DISCONTINUED | OUTPATIENT
Start: 2018-02-22 | End: 2018-02-24

## 2018-02-22 NOTE — ED ADULT NURSE NOTE - OBJECTIVE STATEMENT
Pt reports one episode of confusion earlier, unable to read the magazine. Pt reports she is able to read, but having trouble speaking and coming up with words. Pt denies any numbness/tingling/weakness in any extremities. Facial droop symmetrical. Pt also reports Nausea and abdominal discomfort.

## 2018-02-22 NOTE — ED ADULT NURSE NOTE - PMH
Anxiety    High cholesterol    Pacemaker Afib    Anxiety    COPD (chronic obstructive pulmonary disease)    High cholesterol    Lung cancer    Pacemaker

## 2018-02-22 NOTE — ED ADULT TRIAGE NOTE - CHIEF COMPLAINT QUOTE
CATHIE with complaints of brief period of confusion, unable to comprehend what she's reading, now resolved

## 2018-02-23 DIAGNOSIS — Z95.0 PRESENCE OF CARDIAC PACEMAKER: Chronic | ICD-10-CM

## 2018-02-23 DIAGNOSIS — Z90.2 ACQUIRED ABSENCE OF LUNG [PART OF]: Chronic | ICD-10-CM

## 2018-02-23 DIAGNOSIS — Z98.890 OTHER SPECIFIED POSTPROCEDURAL STATES: Chronic | ICD-10-CM

## 2018-02-23 DIAGNOSIS — Z90.49 ACQUIRED ABSENCE OF OTHER SPECIFIED PARTS OF DIGESTIVE TRACT: Chronic | ICD-10-CM

## 2018-02-23 LAB
ANION GAP SERPL CALC-SCNC: 10 MMOL/L — SIGNIFICANT CHANGE UP (ref 7–14)
APTT BLD: 43.9 SEC — HIGH (ref 27–39.2)
BASOPHILS # BLD AUTO: 0.03 K/UL — SIGNIFICANT CHANGE UP (ref 0–0.2)
BASOPHILS NFR BLD AUTO: 0.4 % — SIGNIFICANT CHANGE UP (ref 0–1)
BUN SERPL-MCNC: 13 MG/DL — SIGNIFICANT CHANGE UP (ref 10–20)
CALCIUM SERPL-MCNC: 9.1 MG/DL — SIGNIFICANT CHANGE UP (ref 8.5–10.1)
CHLORIDE SERPL-SCNC: 102 MMOL/L — SIGNIFICANT CHANGE UP (ref 98–110)
CO2 SERPL-SCNC: 27 MMOL/L — SIGNIFICANT CHANGE UP (ref 17–32)
CREAT SERPL-MCNC: 0.9 MG/DL — SIGNIFICANT CHANGE UP (ref 0.7–1.5)
EOSINOPHIL # BLD AUTO: 0.17 K/UL — SIGNIFICANT CHANGE UP (ref 0–0.7)
EOSINOPHIL NFR BLD AUTO: 2.5 % — SIGNIFICANT CHANGE UP (ref 0–8)
GLUCOSE SERPL-MCNC: 134 MG/DL — HIGH (ref 70–110)
HCT VFR BLD CALC: 32.7 % — LOW (ref 37–47)
HGB BLD-MCNC: 10.6 G/DL — LOW (ref 14–18)
IMM GRANULOCYTES NFR BLD AUTO: 0.3 % — SIGNIFICANT CHANGE UP (ref 0.1–0.3)
INR BLD: 4.13 RATIO — HIGH (ref 0.65–1.3)
LYMPHOCYTES # BLD AUTO: 1.14 K/UL — LOW (ref 1.2–3.4)
LYMPHOCYTES # BLD AUTO: 17 % — LOW (ref 20.5–51.1)
MCHC RBC-ENTMCNC: 26.3 PG — LOW (ref 27–31)
MCHC RBC-ENTMCNC: 32.4 G/DL — SIGNIFICANT CHANGE UP (ref 32–37)
MCV RBC AUTO: 81.1 FL — SIGNIFICANT CHANGE UP (ref 81–91)
MONOCYTES # BLD AUTO: 0.49 K/UL — SIGNIFICANT CHANGE UP (ref 0.1–0.6)
MONOCYTES NFR BLD AUTO: 7.3 % — SIGNIFICANT CHANGE UP (ref 1.7–9.3)
NEUTROPHILS # BLD AUTO: 4.84 K/UL — SIGNIFICANT CHANGE UP (ref 1.4–6.5)
NEUTROPHILS NFR BLD AUTO: 72.5 % — SIGNIFICANT CHANGE UP (ref 42.2–75.2)
NRBC # BLD: 0 /100 WBCS — SIGNIFICANT CHANGE UP (ref 0–0)
PLATELET # BLD AUTO: 195 K/UL — SIGNIFICANT CHANGE UP (ref 130–400)
POTASSIUM SERPL-MCNC: 3.7 MMOL/L — SIGNIFICANT CHANGE UP (ref 3.5–5)
POTASSIUM SERPL-SCNC: 3.7 MMOL/L — SIGNIFICANT CHANGE UP (ref 3.5–5)
PROTHROM AB SERPL-ACNC: >40 SEC — HIGH (ref 9.95–12.87)
RBC # BLD: 4.03 M/UL — LOW (ref 4.2–5.4)
RBC # FLD: 14.7 % — HIGH (ref 11.5–14.5)
SODIUM SERPL-SCNC: 139 MMOL/L — SIGNIFICANT CHANGE UP (ref 135–146)
WBC # BLD: 6.69 K/UL — SIGNIFICANT CHANGE UP (ref 4.8–10.8)
WBC # FLD AUTO: 6.69 K/UL — SIGNIFICANT CHANGE UP (ref 4.8–10.8)

## 2018-02-23 RX ORDER — PANTOPRAZOLE SODIUM 20 MG/1
TABLET, DELAYED RELEASE ORAL
Qty: 0 | Refills: 0 | Status: DISCONTINUED | OUTPATIENT
Start: 2018-02-23 | End: 2018-02-24

## 2018-02-23 RX ORDER — CLONAZEPAM 1 MG
0 TABLET ORAL
Qty: 0 | Refills: 0 | COMMUNITY

## 2018-02-23 RX ORDER — ACETAMINOPHEN 500 MG
650 TABLET ORAL EVERY 6 HOURS
Qty: 0 | Refills: 0 | Status: DISCONTINUED | OUTPATIENT
Start: 2018-02-23 | End: 2018-02-24

## 2018-02-23 RX ORDER — METOCLOPRAMIDE HCL 10 MG
10 TABLET ORAL ONCE
Qty: 0 | Refills: 0 | Status: COMPLETED | OUTPATIENT
Start: 2018-02-23 | End: 2018-02-23

## 2018-02-23 RX ORDER — PANTOPRAZOLE SODIUM 20 MG/1
40 TABLET, DELAYED RELEASE ORAL EVERY 12 HOURS
Qty: 0 | Refills: 0 | Status: DISCONTINUED | OUTPATIENT
Start: 2018-02-23 | End: 2018-02-24

## 2018-02-23 RX ORDER — HEPARIN SODIUM 5000 [USP'U]/ML
5000 INJECTION INTRAVENOUS; SUBCUTANEOUS EVERY 8 HOURS
Qty: 0 | Refills: 0 | Status: DISCONTINUED | OUTPATIENT
Start: 2018-02-23 | End: 2018-02-23

## 2018-02-23 RX ORDER — PROCHLORPERAZINE MALEATE 5 MG
5 TABLET ORAL
Qty: 0 | Refills: 0 | Status: DISCONTINUED | OUTPATIENT
Start: 2018-02-23 | End: 2018-02-24

## 2018-02-23 RX ORDER — PANTOPRAZOLE SODIUM 20 MG/1
40 TABLET, DELAYED RELEASE ORAL ONCE
Qty: 0 | Refills: 0 | Status: COMPLETED | OUTPATIENT
Start: 2018-02-23 | End: 2018-02-23

## 2018-02-23 RX ORDER — ATORVASTATIN CALCIUM 80 MG/1
20 TABLET, FILM COATED ORAL AT BEDTIME
Qty: 0 | Refills: 0 | Status: DISCONTINUED | OUTPATIENT
Start: 2018-02-23 | End: 2018-02-24

## 2018-02-23 RX ORDER — TIOTROPIUM BROMIDE 18 UG/1
1 CAPSULE ORAL; RESPIRATORY (INHALATION) DAILY
Qty: 0 | Refills: 0 | Status: DISCONTINUED | OUTPATIENT
Start: 2018-02-23 | End: 2018-02-24

## 2018-02-23 RX ORDER — CLONAZEPAM 1 MG
0.5 TABLET ORAL
Qty: 0 | Refills: 0 | Status: DISCONTINUED | OUTPATIENT
Start: 2018-02-23 | End: 2018-02-24

## 2018-02-23 RX ORDER — ONDANSETRON 8 MG/1
4 TABLET, FILM COATED ORAL ONCE
Qty: 0 | Refills: 0 | Status: COMPLETED | OUTPATIENT
Start: 2018-02-23 | End: 2018-02-23

## 2018-02-23 RX ORDER — METOCLOPRAMIDE HCL 10 MG
5 TABLET ORAL EVERY 8 HOURS
Qty: 0 | Refills: 0 | Status: DISCONTINUED | OUTPATIENT
Start: 2018-02-23 | End: 2018-02-23

## 2018-02-23 RX ADMIN — Medication 10 MILLIGRAM(S): at 14:53

## 2018-02-23 RX ADMIN — PANTOPRAZOLE SODIUM 40 MILLIGRAM(S): 20 TABLET, DELAYED RELEASE ORAL at 14:54

## 2018-02-23 RX ADMIN — SODIUM CHLORIDE 75 MILLILITER(S): 9 INJECTION INTRAMUSCULAR; INTRAVENOUS; SUBCUTANEOUS at 00:07

## 2018-02-23 RX ADMIN — SODIUM CHLORIDE 3 MILLILITER(S): 9 INJECTION INTRAMUSCULAR; INTRAVENOUS; SUBCUTANEOUS at 14:51

## 2018-02-23 RX ADMIN — TIOTROPIUM BROMIDE 1 CAPSULE(S): 18 CAPSULE ORAL; RESPIRATORY (INHALATION) at 22:45

## 2018-02-23 RX ADMIN — ONDANSETRON 4 MILLIGRAM(S): 8 TABLET, FILM COATED ORAL at 09:38

## 2018-02-23 RX ADMIN — PANTOPRAZOLE SODIUM 40 MILLIGRAM(S): 20 TABLET, DELAYED RELEASE ORAL at 21:28

## 2018-02-23 RX ADMIN — SODIUM CHLORIDE 75 MILLILITER(S): 9 INJECTION INTRAMUSCULAR; INTRAVENOUS; SUBCUTANEOUS at 12:21

## 2018-02-23 RX ADMIN — SODIUM CHLORIDE 3 MILLILITER(S): 9 INJECTION INTRAMUSCULAR; INTRAVENOUS; SUBCUTANEOUS at 00:06

## 2018-02-23 RX ADMIN — Medication 5 MILLIGRAM(S): at 16:35

## 2018-02-23 RX ADMIN — SODIUM CHLORIDE 75 MILLILITER(S): 9 INJECTION INTRAMUSCULAR; INTRAVENOUS; SUBCUTANEOUS at 22:45

## 2018-02-23 RX ADMIN — Medication 0.5 MILLIGRAM(S): at 12:21

## 2018-02-23 RX ADMIN — ATORVASTATIN CALCIUM 20 MILLIGRAM(S): 80 TABLET, FILM COATED ORAL at 21:28

## 2018-02-23 RX ADMIN — Medication 0.5 MILLIGRAM(S): at 21:28

## 2018-02-23 RX ADMIN — SODIUM CHLORIDE 3 MILLILITER(S): 9 INJECTION INTRAMUSCULAR; INTRAVENOUS; SUBCUTANEOUS at 21:25

## 2018-02-23 NOTE — CONSULT NOTE ADULT - ASSESSMENT
71 yo right handed female with PMH of afib on coumadin ,PPM, benign colon polyps, copd, lung ca 8/22/11 s/p chemo and radiation anxiety depression p.w acute onset transient Alexia.  The entire episode lasted for approx 10min duration. Denies associated motor sensory  visual deficits . PPM was interrogated 2/22 which was a scheduled appointment pta to ed, showed no events rate was adjusted.    -Repeat HCT in 12-24 hrs  -follow up INR   -ECHO  -Carotid duplex

## 2018-02-23 NOTE — H&P ADULT - NSHPLABSRESULTS_GEN_ALL_CORE
10.6   6.69  )-----------( 195      ( 23 Feb 2018 00:12 )             32.7         139  |  102  |  13  ----------------------------<  134<H>  3.7   |  27  |  0.9    Ca    9.1            - PT/INR - ( 23 Feb 2018 00:12 )   PT: >40.00 sec;   INR: 4.13 ratio         - PTT - ( 23 Feb 2018 00:12 )  PTT:43.9 sec      - 12 Lead ECG:   ms  Ventricular-paced rhythm     - CTH:  No CT evidence of acute intracranial pathology.  Chronic microvascular ischemic changes and chronic left basal ganglia   lacunar infarcts..    - Xray Chest  No radiographic evidence of acute cardiopulmonary disease. Stable left   hemithorax and mediastinal changes.

## 2018-02-23 NOTE — CONSULT NOTE ADULT - SUBJECTIVE AND OBJECTIVE BOX
CC: transient problems reading    HPI:71 yo right handed female with PMH of afib on coumadin ,PPM, benign colon polyps, copd, lung ca 8/22/11 s/p chemo and radiation anxiety depression p.w acute onset transient alexia. Patient states that she was having a usual day and around 5pm she was reading a magazine when she suddenly was unable to read ,she  states  that she tried several times but was unable. Family called 911 and patient was baseline on arrival to ed. The entire episode lasted for approx 10min duration. Denies associated motor sensory  visual deficits ,,headache n/v or dizziness. PPM was interrogated 2/22 which was a scheduled appointment pta to ed, showed no events rate was adjusted.    Home medication  -crestor 5 mg hs  -coumadin   -protonix  -spiriva  -klonopin 0.5mgprn  -biotin  -vitamin d3    social history  -ex- smoker quit 4/15/05 (40 pack yr )  -denies alcohol and drug abuse      Neuro Exam:  Orientation: oriented to person, place time and situation  Language: no difficulty naming common objects, no difficulty repeating a phrase, no difficulty writing a sentence, fluency intact, comprehension intact and reading intact.   Cranial Nerves: visual acuity intact bilaterally, visual fields full to confrontation, pupils equal round and reactive to light, extraocular motion intact, facial sensation intact symmetrically, face symmetrical, hearing was intact bilaterally, tongue and palate midline, head turning and shoulder shrug symmetric and there was no tongue deviation with protrusion reading intact   Motor: muscle 5/5 throughout   Sensory exam: intact and symmetric   Coordination:. no dysmetria or limb ataxia  gait: deferred    NIHSS: 0     Allergies    bacitracin (Other)  carboplatin (Other)  sulfa drugs (Unknown)  sulfonamides (Unknown)  Xanax (Other)    Intolerances      MEDICATIONS  (STANDING):  sodium chloride 0.9% lock flush 3 milliLiter(s) IV Push every 8 hours  sodium chloride 0.9%. 1000 milliLiter(s) (75 mL/Hr) IV Continuous <Continuous>    MEDICATIONS  (PRN):      LABS:                        10.6   6.69  )-----------( 195      ( 23 Feb 2018 00:12 )             32.7     02-23    139  |  102  |  13  ----------------------------<  134<H>  3.7   |  27  |  0.9    Ca    9.1      23 Feb 2018 00:12      PT/INR - ( 23 Feb 2018 00:12 )   PT: >40.00 sec;   INR: 4.13 ratio         PTT - ( 23 Feb 2018 00:12 )  PTT:43.9 sec        Neuro Imaging:  < from: CT Head No Cont (02.23.18 @ 02:09) >  No CT evidence of acute intracranial pathology.    Chronic microvascular ischemic changes.      < end of copied text >      EEG:     Echo:   Carotid Doppler: N/A  Telemetry:

## 2018-02-23 NOTE — ED PROVIDER NOTE - PHYSICAL EXAMINATION
VITAL SIGNS: I have reviewed nursing notes and confirm.  CONSTITUTIONAL: Well-developed; well-nourished; in no acute distress.  SKIN: Skin exam is warm and dry, no acute rash.  HEAD: Normocephalic; atraumatic.  EYES: PERRL, EOM intact; conjunctiva and sclera clear.  ENT: No nasal discharge; airway clear. TMs clear.  NECK: Supple; non tender.  CARD: S1, S2 normal; no murmurs, gallops, or rubs. Regular rate and rhythm.  RESP: No wheezes, rales or rhonchi.  ABD: Normal bowel sounds; soft; non-distended; non-tender; no hepatosplenomegaly.  EXT: Normal ROM. No clubbing, cyanosis or edema.  LYMPH: No acute cervical adenopathy.  NEURO: Alert, oriented. Grossly unremarkable. No focal deficits.  nml coord, finger to nose, romberg, gait. nml CNs.  PSYCH: Cooperative, appropriate.

## 2018-02-23 NOTE — ED PROVIDER NOTE - PMH
Afib    Anxiety    COPD (chronic obstructive pulmonary disease)    High cholesterol    Lung cancer    Pacemaker

## 2018-02-23 NOTE — H&P ADULT - ASSESSMENT
71 yo F with PMHx of Afib on coumadin s/p failed ablation, PPM for cardiac pauses, benign colon polyps, copd, lung ca in remission s/p chemo and radiation, anxiety and depression p/w acute onset transient alexia     # Transient Alexia  likely 2/2 TIA vs anxiety  tele monitoring   CTH negative for acute changes  fu repeat CTH on 2/24  fu carotid duplex and 2d echo as recommended by neurology 73 yo F with PMHx of Afib on coumadin s/p failed ablation, PPM for cardiac pauses, benign colon polyps, copd, lung ca in remission s/p chemo and radiation, anxiety and depression p/w acute onset transient alexia     # Transient Alexia  likely 2/2 TIA vs anxiety  tele monitoring   CTH negative for acute changes  fu repeat CTH on 2/24  fu carotid duplex and 2d echo as recommended by neurology  PPM interrogation  fu neurology recs     # Persistent nausea and reflux symptoms  likely 2/2 gastritis   PPI BID for now  fu GI recommendations (dr vásquez)  nausea control with compazine (prolonged QTc)  bland anti reflux diet     # Afib/ sinus sick syndrome  s/p PPM          s/p failed ablation ( 2x in 2017)  Hold coumadin for tonight. INR supratherapeutic. no active bleeding  recheck PT in am and dose coumadin accordingly     # COPD and lung Ca s/p resection (in remission)  no active wheezing  c/w spiriva     # Anxiety  c/w clonazepam bid.     # GI/ DVT ppx  protonix as mentioned above/ on coumadin     # full code   fully functional    from home

## 2018-02-23 NOTE — CONSULT NOTE ADULT - ATTENDING COMMENTS
Patient seen and examined agree with above except as noted.  Patient does not recall episode from yesterday about not being able ot read.  She is very focused on her severe nausea which has been ongoing with few months that she is following with Dr. Chavis.    Currently she can read and is able to name and repeat  No focal findings on exam except for loss of vibration in b/l LE and absent reflexes in legs (likely related to chemotherapy 6 years ago)      A/P  Patient with transient episode of inability to read and almost continuous nausea for few months.  Unclear if episode was related to TIA or medical condition.  Given the fact she has chronic lacunar infarcts will work her up for possible TIA  1. Repeat CTH  2. Carotid dopplers or obtain CTA of H+N  3. ECHO  4. GI evaluation  5. Send LFT's, albumin, TP, u/a u/cx  6. Call with qeustions

## 2018-02-23 NOTE — ED PROVIDER NOTE - PROGRESS NOTE DETAILS
pt with ?tia. no indication for tpa. sx minimal and resolved pta. NIHSS 0 on arrival. case dajuan Barrera from neuro, rec tele and they will eval pt.   case endorsed to Dr Corado.

## 2018-02-23 NOTE — H&P ADULT - HISTORY OF PRESENT ILLNESS
71 yo F with PMHx of Afib on coumadin s/p failed ablation, PPM for cardiac pauses, benign colon polyps, copd, lung ca in remission s/p chemo and radiation, anxiety and depression p/w acute onset transient alexia. Patient states that she was having a usual day yesterday and around 5pm she was reading a magazine when she suddenly was unable to understand what she was reading. The episode lasted for 10-15 minutes and then self resolved. pt denies any associated palpitations, focal weakness, blurry vision, dizziness, abnormal sensations or aphasia.   pt also complains of heartburn, persistent nausea and bitter taste in her mouth for past few months. this issue has been addressed multiple times with Dr Langford who performed an EGD 2 months ago and was consistent with gastritis. pt reports no improvement with pepcid/ protonix. she is so anxious about her symptoms and requesting GI evaluation  To note PPM was interrogated 1 day PTP and no events were noted as per pt.

## 2018-02-23 NOTE — ED PROVIDER NOTE - OBJECTIVE STATEMENT
pt sts about 5pm today, she was home reading a magazine and felt like the words didn't make sense and she couldn't comprehend what she was reading. it lasted for a few minutes and she thought it was just her anxiety.  denies weakness, vis or speech changes, numbness, imbalance, dizziness, lightheadedness. feels back to normal now. no cp, palps.

## 2018-02-23 NOTE — H&P ADULT - PSH
Artificial cardiac pacemaker    H/O atrioventricular kacie ablation    History of tonsillectomy    S/P appendectomy    S/P lobectomy of lung  left

## 2018-02-24 ENCOUNTER — TRANSCRIPTION ENCOUNTER (OUTPATIENT)
Age: 73
End: 2018-02-24

## 2018-02-24 VITALS
HEART RATE: 71 BPM | RESPIRATION RATE: 18 BRPM | SYSTOLIC BLOOD PRESSURE: 116 MMHG | DIASTOLIC BLOOD PRESSURE: 58 MMHG | TEMPERATURE: 96 F

## 2018-02-24 LAB
ALBUMIN SERPL ELPH-MCNC: 3.2 G/DL — SIGNIFICANT CHANGE UP (ref 3–5.5)
ALP SERPL-CCNC: 63 U/L — SIGNIFICANT CHANGE UP (ref 30–115)
ALT FLD-CCNC: 12 U/L — SIGNIFICANT CHANGE UP (ref 0–41)
ANION GAP SERPL CALC-SCNC: 5 MMOL/L — LOW (ref 7–14)
AST SERPL-CCNC: 16 U/L — SIGNIFICANT CHANGE UP (ref 0–41)
BASOPHILS # BLD AUTO: 0.03 K/UL — SIGNIFICANT CHANGE UP (ref 0–0.2)
BASOPHILS NFR BLD AUTO: 0.6 % — SIGNIFICANT CHANGE UP (ref 0–1)
BILIRUB SERPL-MCNC: 0.8 MG/DL — SIGNIFICANT CHANGE UP (ref 0.2–1.2)
BUN SERPL-MCNC: 7 MG/DL — LOW (ref 10–20)
CALCIUM SERPL-MCNC: 8.8 MG/DL — SIGNIFICANT CHANGE UP (ref 8.5–10.1)
CHLORIDE SERPL-SCNC: 105 MMOL/L — SIGNIFICANT CHANGE UP (ref 98–110)
CO2 SERPL-SCNC: 31 MMOL/L — SIGNIFICANT CHANGE UP (ref 17–32)
CREAT SERPL-MCNC: 0.9 MG/DL — SIGNIFICANT CHANGE UP (ref 0.7–1.5)
EOSINOPHIL # BLD AUTO: 0.23 K/UL — SIGNIFICANT CHANGE UP (ref 0–0.7)
EOSINOPHIL NFR BLD AUTO: 4.9 % — SIGNIFICANT CHANGE UP (ref 0–8)
GLUCOSE SERPL-MCNC: 131 MG/DL — HIGH (ref 70–110)
HCT VFR BLD CALC: 32.4 % — LOW (ref 37–47)
HGB BLD-MCNC: 10 G/DL — LOW (ref 14–18)
IMM GRANULOCYTES NFR BLD AUTO: 0.4 % — HIGH (ref 0.1–0.3)
INR BLD: 3.3 RATIO — HIGH (ref 0.65–1.3)
LYMPHOCYTES # BLD AUTO: 0.7 K/UL — LOW (ref 1.2–3.4)
LYMPHOCYTES # BLD AUTO: 14.9 % — LOW (ref 20.5–51.1)
MCHC RBC-ENTMCNC: 26 PG — LOW (ref 27–31)
MCHC RBC-ENTMCNC: 30.9 G/DL — LOW (ref 32–37)
MCV RBC AUTO: 84.2 FL — SIGNIFICANT CHANGE UP (ref 81–91)
MONOCYTES # BLD AUTO: 0.33 K/UL — SIGNIFICANT CHANGE UP (ref 0.1–0.6)
MONOCYTES NFR BLD AUTO: 7 % — SIGNIFICANT CHANGE UP (ref 1.7–9.3)
NEUTROPHILS # BLD AUTO: 3.4 K/UL — SIGNIFICANT CHANGE UP (ref 1.4–6.5)
NEUTROPHILS NFR BLD AUTO: 72.2 % — SIGNIFICANT CHANGE UP (ref 42.2–75.2)
PLATELET # BLD AUTO: 162 K/UL — SIGNIFICANT CHANGE UP (ref 130–400)
POTASSIUM SERPL-MCNC: 4.3 MMOL/L — SIGNIFICANT CHANGE UP (ref 3.5–5)
POTASSIUM SERPL-SCNC: 4.3 MMOL/L — SIGNIFICANT CHANGE UP (ref 3.5–5)
PROT SERPL-MCNC: 5.5 G/DL — LOW (ref 6–8)
PROTHROM AB SERPL-ACNC: 36.5 SEC — HIGH (ref 9.95–12.87)
RBC # BLD: 3.85 M/UL — LOW (ref 4.2–5.4)
RBC # FLD: 14.7 % — HIGH (ref 11.5–14.5)
SODIUM SERPL-SCNC: 141 MMOL/L — SIGNIFICANT CHANGE UP (ref 135–146)
WBC # BLD: 4.71 K/UL — LOW (ref 4.8–10.8)
WBC # FLD AUTO: 4.71 K/UL — LOW (ref 4.8–10.8)

## 2018-02-24 RX ORDER — ACETAMINOPHEN 500 MG
2 TABLET ORAL
Qty: 0 | Refills: 0 | DISCHARGE
Start: 2018-02-24

## 2018-02-24 RX ORDER — WARFARIN SODIUM 2.5 MG/1
1 TABLET ORAL
Qty: 0 | Refills: 0 | COMMUNITY

## 2018-02-24 RX ORDER — ACETAMINOPHEN 500 MG
2 TABLET ORAL
Qty: 0 | Refills: 0 | COMMUNITY
Start: 2018-02-24

## 2018-02-24 RX ADMIN — Medication 5 MILLIGRAM(S): at 11:17

## 2018-02-24 RX ADMIN — Medication 5 MILLIGRAM(S): at 06:17

## 2018-02-24 RX ADMIN — SODIUM CHLORIDE 3 MILLILITER(S): 9 INJECTION INTRAMUSCULAR; INTRAVENOUS; SUBCUTANEOUS at 14:47

## 2018-02-24 RX ADMIN — PANTOPRAZOLE SODIUM 40 MILLIGRAM(S): 20 TABLET, DELAYED RELEASE ORAL at 05:16

## 2018-02-24 RX ADMIN — Medication 5 MILLIGRAM(S): at 03:58

## 2018-02-24 RX ADMIN — SODIUM CHLORIDE 3 MILLILITER(S): 9 INJECTION INTRAMUSCULAR; INTRAVENOUS; SUBCUTANEOUS at 05:18

## 2018-02-24 RX ADMIN — TIOTROPIUM BROMIDE 1 CAPSULE(S): 18 CAPSULE ORAL; RESPIRATORY (INHALATION) at 08:02

## 2018-02-24 RX ADMIN — SODIUM CHLORIDE 75 MILLILITER(S): 9 INJECTION INTRAMUSCULAR; INTRAVENOUS; SUBCUTANEOUS at 05:16

## 2018-02-24 NOTE — CONSULT NOTE ADULT - SUBJECTIVE AND OBJECTIVE BOX
Patient is a 72y old  Female who presents with a chief complaint of confusion and nausea    HPI:  73 yo F with PMHx of Afib on coumadin s/p failed ablation 2 x in 2017, PPM for cardiac pauses, benign colon polyps, copd, lung ca in remission s/p chemo and radiation, anxiety and depression p/w acute onset transient dyslexia for 10-15 minutes and then self resolved. To note PPM was interrogated 1 day PTP and no events were noted as per pt.     pt also complains of heartburn, persistent nausea and bitter taste in her mouth for past few months. this issue has been addressed multiple times with Dr Langford who performed an EGD 2 months ago and was consistent with gastritis. pt reports no improvement with pepcid/ protonix.       Colono 2016 by Dr Chavis for surveillance (prior colono 3 yrs prio) good prep, int hemorrhoids, mod diverticulosis. 8 multiple flat 5-10 mm polyps AC, DC, sigmoid and rectum (3 TA, 2 hyperplastic)    ROS wnl      PAST MEDICAL & SURGICAL HISTORY:  COPD (chronic obstructive pulmonary disease)  Afib  Lung cancer  Pacemaker  Anxiety  High cholesterol  S/P appendectomy  History of tonsillectomy  S/P lobectomy of lung: left  H/O atrioventricular kacie ablation  Artificial cardiac pacemaker      Home Medications:  biotin 5000 mcg oral tablet, disintegratin tab(s) orally once a day (2018 13:38)  Coumadin 1 mg oral tablet: 1 tab(s) orally once a day (2018 13:38)  Crestor 5 mg oral tablet: 1 tab(s) orally once a day (at bedtime) (2018 13:38)  KlonoPIN 0.5 mg oral tablet: 1  orally 2 times a day, As Needed (2018 13:38)  Protonix 40 mg oral delayed release tablet: 1 tab(s) orally once a day (2018 13:38)  Spiriva 18 mcg inhalation capsule: 1 cap(s) inhaled once a day (2018 13:38)  Vitamin D3 2000 intl units oral tablet: 1 tab(s) orally once a day (2018 13:38)      MEDICATIONS  (STANDING):  atorvastatin 20 milliGRAM(s) Oral at bedtime  pantoprazole  Injectable      pantoprazole  Injectable 40 milliGRAM(s) IV Push every 12 hours  sodium chloride 0.9% lock flush 3 milliLiter(s) IV Push every 8 hours  sodium chloride 0.9%. 1000 milliLiter(s) (75 mL/Hr) IV Continuous <Continuous>  tiotropium 18 MICROgram(s) Capsule 1 Capsule(s) Inhalation daily    MEDICATIONS  (PRN):  acetaminophen   Tablet. 650 milliGRAM(s) Oral every 6 hours PRN Mild Pain (1 - 3)  clonazePAM Tablet 0.5 milliGRAM(s) Oral two times a day PRN anxiety  prochlorperazine   Tablet 5 milliGRAM(s) Oral four times a day PRN nausea      Allergies    bacitracin (Other)  carboplatin (Other)  sulfa drugs (Unknown)  sulfonamides (Unknown)  Xanax (Other)    Intolerances    FAMILY HISTORY:  No pertinent family history in first degree relatives      SOCIAL    REVIEW OF SYSTEMS    Vital Signs Last 24 Hrs  T(C): 35.7 (2018 06:10), Max: 35.8 (2018 20:19)  T(F): 96.3 (2018 06:10), Max: 96.4 (2018 20:19)  HR: 68 (2018 06:10) (68 - 90)  BP: 125/61 (2018 06:10) (120/58 - 125/61)  BP(mean): --  RR: 18 (2018 06:10) (18 - 18)  SpO2: 96% (2018 20:19) (96% - 96%)    GENERAL:  no distress  HEENT:  NC/AT,  anicteric  CHEST:   no increased effort, breath sounds clear  HEART:  Regular rhythm  ABDOMEN:  Soft, non-tender, non-distended, normoactive bowel sounds,  no masses ,no hepato-splenomegaly, no signs of chronic liver disease  EXTEREMITIES:  no cyanosis      CBC Full  -  ( 2018 06:38 )  WBC Count : 4.71 K/uL  Hemoglobin : 10.0 g/dL  Hematocrit : 32.4 %  Platelet Count - Automated : 162 K/uL  Mean Cell Volume : 84.2 fL  Mean Cell Hemoglobin : 26.0 pg  Mean Cell Hemoglobin Concentration : 30.9 g/dL  Auto Neutrophil # : 3.40 K/uL  Auto Lymphocyte # : 0.70 K/uL  Auto Monocyte # : 0.33 K/uL  Auto Eosinophil # : 0.23 K/uL  Auto Basophil # : 0.03 K/uL  Auto Neutrophil % : 72.2 %  Auto Lymphocyte % : 14.9 %  Auto Monocyte % : 7.0 %  Auto Eosinophil % : 4.9 %  Auto Basophil % : 0.6 %      Aspartate Aminotransferase (AST/SGOT): 16 U/L (18 @ 06:38)  Bilirubin Total, Serum: 0.8 mg/dL (18 @ 06:38)  Alanine Aminotransferase (ALT/SGPT): 12 U/L (18 @ 06:38)  Alkaline Phosphatase, Serum: 63 U/L (18 @ 06:38)  Hemoglobin: 10.0 g/dL (18 @ 06:38)  INR: 3.30 ratio (18 @ 06:38)  Hemoglobin: 10.6 g/dL (18 @ 00:12)  INR: 4.13 ratio (18 @ 00:12)      PT/INR - ( 2018 06:38 )   PT: 36.50 sec;   INR: 3.30 ratio         PTT - ( 2018 00:12 )  PTT:43.9 sec        141  |  105  |  7<L>  ----------------------------<  131<H>  4.3   |  31  |  0.9    Ca    8.8      2018 06:38    TPro  5.5<L>  /  Alb  3.2  /  TBili  0.8  /  DBili  x   /  AST  16  /  ALT  12  /  AlkPhos  63                RADIOLOGY Patient is a 72y old  Female who presents with a chief complaint of confusion and nausea    HPI:  73 yo F with PMHx of Afib on coumadin s/p failed ablation 2 x in 2017, PPM for cardiac pauses, benign colon polyps, copd, lung ca in remission s/p chemo and radiation, anxiety and depression p/w acute onset transient dyslexia for 10-15 minutes and then self resolved. To note PPM was interrogated 1 day PTP and no events were noted as per pt.     pt also complains persistent nausea and bitter taste in her mouth for past year. this issue has been addressed multiple times with Dr Langford who performed an EGD 2 months ago and was consistent with gastritis as per patient. pt reports no improvement with pepcid/ protonix but improved partially on zofran / tigan. She notes that angelika nausea is continuous, awakening from sleep, occurring even in early morning.     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      Colono 2016 by Dr Chavis for surveillance (prior colono 3 yrs prio) good prep, int hemorrhoids, mod diverticulosis. 8 multiple flat 5-10 mm polyps AC, DC, sigmoid and rectum (3 TA, 2 hyperplastic)    ROS wnl      PAST MEDICAL & SURGICAL HISTORY:  COPD (chronic obstructive pulmonary disease)  Afib  Lung cancer  Pacemaker  Anxiety  High cholesterol  S/P appendectomy  History of tonsillectomy  S/P lobectomy of lung: left  H/O atrioventricular kacie ablation  Artificial cardiac pacemaker      Home Medications:  biotin 5000 mcg oral tablet, disintegratin tab(s) orally once a day (2018 13:38)  Coumadin 1 mg oral tablet: 1 tab(s) orally once a day (2018 13:38)  Crestor 5 mg oral tablet: 1 tab(s) orally once a day (at bedtime) (2018 13:38)  KlonoPIN 0.5 mg oral tablet: 1  orally 2 times a day, As Needed (2018 13:38)  Protonix 40 mg oral delayed release tablet: 1 tab(s) orally once a day (2018 13:38)  Spiriva 18 mcg inhalation capsule: 1 cap(s) inhaled once a day (2018 13:38)  Vitamin D3 2000 intl units oral tablet: 1 tab(s) orally once a day (2018 13:38)      MEDICATIONS  (STANDING):  atorvastatin 20 milliGRAM(s) Oral at bedtime  pantoprazole  Injectable      pantoprazole  Injectable 40 milliGRAM(s) IV Push every 12 hours  sodium chloride 0.9% lock flush 3 milliLiter(s) IV Push every 8 hours  sodium chloride 0.9%. 1000 milliLiter(s) (75 mL/Hr) IV Continuous <Continuous>  tiotropium 18 MICROgram(s) Capsule 1 Capsule(s) Inhalation daily    MEDICATIONS  (PRN):  acetaminophen   Tablet. 650 milliGRAM(s) Oral every 6 hours PRN Mild Pain (1 - 3)  clonazePAM Tablet 0.5 milliGRAM(s) Oral two times a day PRN anxiety  prochlorperazine   Tablet 5 milliGRAM(s) Oral four times a day PRN nausea      Allergies    bacitracin (Other)  carboplatin (Other)  sulfa drugs (Unknown)  sulfonamides (Unknown)  Xanax (Other)    Intolerances    FAMILY HISTORY:  No pertinent family history in first degree relatives      SOCIAL    REVIEW OF SYSTEMS    Vital Signs Last 24 Hrs  T(C): 35.7 (2018 06:10), Max: 35.8 (2018 20:19)  T(F): 96.3 (2018 06:10), Max: 96.4 (2018 20:19)  HR: 68 (2018 06:10) (68 - 90)  BP: 125/61 (2018 06:10) (120/58 - 125/61)  BP(mean): --  RR: 18 (2018 06:10) (18 - 18)  SpO2: 96% (2018 20:19) (96% - 96%)    GENERAL:  no distress  HEENT:  NC/AT,  anicteric  CHEST:   no increased effort, breath sounds clear  HEART:  Regular rhythm  ABDOMEN:  Soft, non-tender, non-distended, normoactive bowel sounds,  no masses ,no hepato-splenomegaly, no signs of chronic liver disease  EXTEREMITIES:  no cyanosis      CBC Full  -  ( 2018 06:38 )  WBC Count : 4.71 K/uL  Hemoglobin : 10.0 g/dL  Hematocrit : 32.4 %  Platelet Count - Automated : 162 K/uL  Mean Cell Volume : 84.2 fL  Mean Cell Hemoglobin : 26.0 pg  Mean Cell Hemoglobin Concentration : 30.9 g/dL  Auto Neutrophil # : 3.40 K/uL  Auto Lymphocyte # : 0.70 K/uL  Auto Monocyte # : 0.33 K/uL  Auto Eosinophil # : 0.23 K/uL  Auto Basophil # : 0.03 K/uL  Auto Neutrophil % : 72.2 %  Auto Lymphocyte % : 14.9 %  Auto Monocyte % : 7.0 %  Auto Eosinophil % : 4.9 %  Auto Basophil % : 0.6 %      Aspartate Aminotransferase (AST/SGOT): 16 U/L (18 @ 06:38)  Bilirubin Total, Serum: 0.8 mg/dL (18 @ 06:38)  Alanine Aminotransferase (ALT/SGPT): 12 U/L (18 @ 06:38)  Alkaline Phosphatase, Serum: 63 U/L (18 @ 06:38)  Hemoglobin: 10.0 g/dL (18 @ 06:38)  INR: 3.30 ratio (18 @ 06:38)  Hemoglobin: 10.6 g/dL (18 @ 00:12)  INR: 4.13 ratio (18 @ 00:12)      PT/INR - ( 2018 06:38 )   PT: 36.50 sec;   INR: 3.30 ratio         PTT - ( 2018 00:12 )  PTT:43.9 sec        141  |  105  |  7<L>  ----------------------------<  131<H>  4.3   |  31  |  0.9    Ca    8.8      2018 06:38    TPro  5.5<L>  /  Alb  3.2  /  TBili  0.8  /  DBili  x   /  AST  16  /  ALT  12  /  AlkPhos  63                RADIOLOGY

## 2018-02-24 NOTE — PROGRESS NOTE ADULT - SUBJECTIVE AND OBJECTIVE BOX
SUBJECTIVE:    Patient is a 72y old  Female who presents with a chief complaint of confusion and nausea (23 Feb 2018 13:21)    Currently admitted to medicine with the primary diagnosis of TIA (transient ischemic attack)     Today is hospital day 1d. This morning she is resting comfortably in bed and reports no new issues or overnight events.     PAST MEDICAL & SURGICAL HISTORY  PAST MEDICAL & SURGICAL HISTORY:  COPD (chronic obstructive pulmonary disease)  Afib  Lung cancer  Pacemaker  Anxiety  High cholesterol  S/P appendectomy  History of tonsillectomy  S/P lobectomy of lung: left  H/O atrioventricular kacie ablation  Artificial cardiac pacemaker    SOCIAL HISTORY:    ALLERGIES:  bacitracin (Other)  carboplatin (Other)  sulfa drugs (Unknown)  sulfonamides (Unknown)  Xanax (Other)    MEDICATIONS:  STANDING MEDICATIONS  atorvastatin 20 milliGRAM(s) Oral at bedtime  pantoprazole  Injectable      pantoprazole  Injectable 40 milliGRAM(s) IV Push every 12 hours  sodium chloride 0.9% lock flush 3 milliLiter(s) IV Push every 8 hours  sodium chloride 0.9%. 1000 milliLiter(s) IV Continuous <Continuous>  tiotropium 18 MICROgram(s) Capsule 1 Capsule(s) Inhalation daily    PRN MEDICATIONS  acetaminophen   Tablet. 650 milliGRAM(s) Oral every 6 hours PRN  clonazePAM Tablet 0.5 milliGRAM(s) Oral two times a day PRN  prochlorperazine   Tablet 5 milliGRAM(s) Oral four times a day PRN    VITALS:   T(F): 96.3  HR: 68  BP: 125/61  RR: 18  SpO2: 96%    LABS:                        10.6   6.69  )-----------( 195      ( 23 Feb 2018 00:12 )             32.7     02-23    139  |  102  |  13  ----------------------------<  134<H>  3.7   |  27  |  0.9    Ca    9.1      23 Feb 2018 00:12      PT/INR - ( 23 Feb 2018 00:12 )   PT: >40.00 sec;   INR: 4.13 ratio         PTT - ( 23 Feb 2018 00:12 )  PTT:43.9 sec              RADIOLOGY:  No CT evidence of acute intracranial pathology.     Chronic microvascular ischemic changes and chronic left basal ganglia   lacunar infarcts..  CT 2/23/2018  repeat CTH       PHYSICAL EXAM:    neuro: AAOx3  Resp: CTA b/l  abd: NT, ND, +bowel sounds in all 4 quandrants  general appearance: WNWD F NAD  ext: no edema SUBJECTIVE:    Patient is a 72y old  Female who presents with a chief complaint of confusion and nausea (23 Feb 2018 13:21)    Currently admitted to medicine with the primary diagnosis of TIA (transient ischemic attack)     Today is hospital day 1d. This morning she is resting comfortably in bed and reports no new issues or overnight events.     pt is tolerating diet but has reflux symptoms    PAST MEDICAL & SURGICAL HISTORY  PAST MEDICAL & SURGICAL HISTORY:  COPD (chronic obstructive pulmonary disease)  Afib  Lung cancer  Pacemaker  Anxiety  High cholesterol  S/P appendectomy  History of tonsillectomy  S/P lobectomy of lung: left  H/O atrioventricular kacie ablation  Artificial cardiac pacemaker    SOCIAL HISTORY:    ALLERGIES:  bacitracin (Other)  carboplatin (Other)  sulfa drugs (Unknown)  sulfonamides (Unknown)  Xanax (Other)    MEDICATIONS:  STANDING MEDICATIONS  atorvastatin 20 milliGRAM(s) Oral at bedtime  pantoprazole  Injectable      pantoprazole  Injectable 40 milliGRAM(s) IV Push every 12 hours  sodium chloride 0.9% lock flush 3 milliLiter(s) IV Push every 8 hours  sodium chloride 0.9%. 1000 milliLiter(s) IV Continuous <Continuous>  tiotropium 18 MICROgram(s) Capsule 1 Capsule(s) Inhalation daily    PRN MEDICATIONS  acetaminophen   Tablet. 650 milliGRAM(s) Oral every 6 hours PRN  clonazePAM Tablet 0.5 milliGRAM(s) Oral two times a day PRN  prochlorperazine   Tablet 5 milliGRAM(s) Oral four times a day PRN    VITALS:   T(F): 96.3  HR: 68  BP: 125/61  RR: 18  SpO2: 96%    LABS:                        10.6   6.69  )-----------( 195      ( 23 Feb 2018 00:12 )             32.7     02-23    139  |  102  |  13  ----------------------------<  134<H>  3.7   |  27  |  0.9    Ca    9.1      23 Feb 2018 00:12      PT/INR - ( 23 Feb 2018 00:12 )   PT: >40.00 sec;   INR: 4.13 ratio         PTT - ( 23 Feb 2018 00:12 )  PTT:43.9 sec              RADIOLOGY:  No CT evidence of acute intracranial pathology.     Chronic microvascular ischemic changes and chronic left basal ganglia   lacunar infarcts..  [facility] 2/23/2018  repeat CTH       PHYSICAL EXAM:    neuro: AAOx3  Resp: CTA b/l  abd: NT, ND, +bowel sounds in all 4 quandrants  general appearance: WNWD F NAD  ext: no edema

## 2018-02-24 NOTE — DISCHARGE NOTE ADULT - MEDICATION SUMMARY - MEDICATIONS TO TAKE
I will START or STAY ON the medications listed below when I get home from the hospital:    acetaminophen 325 mg oral tablet  -- 2 tab(s) by mouth every 6 hours, As needed, Mild Pain (1 - 3)  -- Indication: For Pain    Coumadin 1 mg oral tablet  -- Please hold today (sat) & tomorrow (sun) dose. Check your INR at coumadin clinic on Mon and based on that INR dose coumadin accordingly  -- Indication: For Afib    KlonoPIN 0.5 mg oral tablet  -- 1  by mouth 2 times a day, As Needed  -- Indication: For Anxiety    Tigan 300 mg oral capsule  -- 1 cap(s) by mouth 3 times a day, As Needed  -- Indication: For Nausea     Crestor 5 mg oral tablet  -- 1 tab(s) by mouth once a day (at bedtime)  -- Indication: For High cholesterol    Spiriva 18 mcg inhalation capsule  -- 1 cap(s) inhaled once a day  -- Indication: For COPD (chronic obstructive pulmonary disease)    Protonix 40 mg oral delayed release tablet  -- 1 tab(s) by mouth once a day  -- Indication: For Gastritis     Vitamin D3 2000 intl units oral tablet  -- 1 tab(s) by mouth once a day  -- Indication: For General    biotin 5000 mcg oral tablet, disintegrating  -- 1 tab(s) by mouth once a day  -- Indication: For General

## 2018-02-24 NOTE — DISCHARGE NOTE ADULT - PATIENT PORTAL LINK FT
You can access the [facility] Patient Portal, offered by [facility], by registering with the following website: [email or web address]

## 2018-02-24 NOTE — DISCHARGE NOTE ADULT - CARE PROVIDER_API CALL
JENNIFER Rubio,   Barbara  Phone: (   )    -  Fax: (   )    -    Rivrea Chavis), Gastroenterology; Internal Medicine  [address] [place] [address]  Phone: [phone]  Fax: [phone]

## 2018-02-24 NOTE — DISCHARGE NOTE ADULT - PROVIDER TOKENS
FREE:[LAST:[JENNIFER Rubio],PHONE:[(   [phone]    -],FAX:[(   [phone]    -],ADDRESS:[[place]]],TOKEN:'[id number]'

## 2018-02-24 NOTE — DISCHARGE NOTE ADULT - CARE PLAN
Principal Discharge DX:	Alexia and dyslexia  Goal:	Rule out TIA  Assessment and plan of treatment:	NO signs and symptoms of acute CVA , continue with meds as prescribed and instructed patient to come back to ER if symptoms recur  Secondary Diagnosis:	Gastritis without bleeding, unspecified chronicity, unspecified gastritis type  Goal:	Resolution of symptoms  Assessment and plan of treatment:	rec continue to take protonix every day in the morning 30 minutes before breakfast, rec antireflux, bland diet. follow up with   Secondary Diagnosis:	Atrial fibrillation, unspecified type  Goal:	Maintain INR in therapeutic range  Assessment and plan of treatment:	Instructed patient to hold coumadin today and tomorrow and get INR checked on monday , dose coumadin then accordingly.

## 2018-02-24 NOTE — DISCHARGE NOTE ADULT - SECONDARY DIAGNOSIS.
Gastritis without bleeding, unspecified chronicity, unspecified gastritis type Atrial fibrillation, unspecified type

## 2018-02-24 NOTE — PROGRESS NOTE ADULT - ASSESSMENT
71 yo F with PMHx of Afib on coumadin s/p failed ablation, PPM for cardiac pauses, benign colon polyps, copd, lung ca in remission s/p chemo and radiation, anxiety and depression p/w acute onset transient alexia     # Transient Alexia  likely 2/2 TIA vs anxiety  tele monitoring   CTH negative for acute changes, f/u repeat CTH results from radiology which was performed yesterday  carotid duplex done but not read, echo not done yet  PPM interrogation  f/u neurology      # Persistent nausea and reflux symptoms  likely 2/2 gastritis   PPI BID for now  fu GI recommendations   nausea control with compazine (prolonged QTc)  bland anti reflux diet     # Afib/ sinus sick syndrome  s/p PPM s/p failed ablation ( 2x in 2017)   INR supratherapeutic. no active bleeding, coumadin held yesterday  recheck PT in am and dose coumadin accordingly     # COPD and lung Ca s/p resection (in remission)  no active wheezing  c/w spiriva     # Anxiety  c/w clonazepam bid.     # GI/ DVT ppx  protonix as mentioned above/ on coumadin     # full code   fully functional    from home

## 2018-02-24 NOTE — PROGRESS NOTE ADULT - ATTENDING COMMENTS
Patient seen and examined independently. Agree with resident note with exceptions as follows. repeat Ct unremarkable for acute findings only shows chronic abnormality. pt has no signs of her TIA but still has reflux symptoms that is her main concern.  advised her regarding her reflux and avoiding lying down after eating .   pt's work up regarding her possible TIA is negative, rest of work up can be done out pt. May dc pt home. pt agrees. spoke with chief resident , he will dc pt.

## 2018-02-24 NOTE — CONSULT NOTE ADULT - ASSESSMENT
73 yo F with PMHx of Afib on coumadin s/p failed ablation 2 x in 2017, PPM for cardiac pauses, benign colon polyps, copd, lung ca in remission s/p chemo and radiation, anxiety and depression p/w transient dyslexia likely tia vs anxiety c/o uncontrolled gerd     *Uncontrolled gerd   egd 2 months ago by dr oliver as per pt   anti reflux diet  lifestyle modifications   PPI q12h 73 yo F with PMHx of Afib on coumadin s/p failed ablation 2 x in 2017, PPM for cardiac pauses, benign colon polyps, copd, lung ca in remission s/p chemo and radiation, anxiety and depression p/w transient dyslexia likely tia vs anxiety c/o persistent nausea     *Persistent nausea   not food related   likely to be central not gi related   egd 2 months ago by dr chavis as per pt   c/w tigan prn   Consider Brain MRI / cns investigations     *Chronic normocytic anemia  hb 12 in 2016  check iron studies for now   Get records from Dr Chavis s office

## 2018-02-24 NOTE — DISCHARGE NOTE ADULT - HOSPITAL COURSE
73 yo right handed female with PMH of afib on coumadin ,PPM, benign colon polyps, copd, lung ca 8/22/11 s/p chemo and radiation, anxiety depression initially came to ER with acute onset transient alexia. she suddenly was unable to read ,she  states  that she tried several times but was unable. The entire episode lasted for approx 10min.  By the time she came to ER her CNS symptoms got resolved and she is back to her baseline. She also complained of severe nausea which has been ongoing with few months that she is following with Dr. Chavis. Her initial CT head resulted as < from: CT Head No Cont (02.23.18 @ 02:09) >  No CT evidence of acute intracranial pathology.Chronic microvascular ischemic changes and chronic left basal ganglia lacunar infarcts..< end of copied text >  She was seen by neurology and given the fact she has chronic lacunar infarcts she was worked her up for possible TIA. Repeat CTH resulted as < from: CT Head No Cont (02.23.18 @ 18:26) >  1.  No evidence of acute intracranial hemorrhage or large territory   infarct. Stable exam since earlier the same day.  2.  Stable mild chronic microvascular changes and left basal ganglia   lacunar infarcts which are likely chronic.  < end of copied text >  Her carotid doppler resulted as  < from: [facility] Duplex Carotid, Bilat (02.23.18 @ 16:12) >  20-39% stenosis of the right internal carotid artery.  20-39% stenosis of the left internal carotid artery.  < end of copied text >     Thus acute CVA was ruled out but her GI symptoms , nausea and decreased PO intake persisted , she was seen by GI who rec continuing Protonix and tigan for gastritis assc with nausea.    Since admission her INR was supratherapeutic  with no signs and symptoms of bleeding so coumadin was held and on the day of discharge her INR was 3.4 so rec pt to hold her coumadin today and tmrw , get INR checked on monday and dose coumadin accordingly.    Thus pt will be discharged in stable condition.

## 2018-02-24 NOTE — DISCHARGE NOTE ADULT - PLAN OF CARE
Rule out TIA NO signs and symptoms of acute CVA , continue with meds as prescribed and instructed patient to come back to ER if symptoms recur Resolution of symptoms rec continue to take protonix every day in the morning 30 minutes before breakfast, rec antireflux, bland diet. follow up with  Maintain INR in therapeutic range Instructed patient to hold coumadin today and tomorrow and get INR checked on monday , dose coumadin then accordingly.

## 2018-02-27 ENCOUNTER — OUTPATIENT (OUTPATIENT)
Dept: OUTPATIENT SERVICES | Facility: HOSPITAL | Age: 73
LOS: 1 days | Discharge: HOME | End: 2018-02-27

## 2018-02-27 DIAGNOSIS — Z98.890 OTHER SPECIFIED POSTPROCEDURAL STATES: Chronic | ICD-10-CM

## 2018-02-27 DIAGNOSIS — Z90.49 ACQUIRED ABSENCE OF OTHER SPECIFIED PARTS OF DIGESTIVE TRACT: Chronic | ICD-10-CM

## 2018-02-27 DIAGNOSIS — I48.91 UNSPECIFIED ATRIAL FIBRILLATION: ICD-10-CM

## 2018-02-27 DIAGNOSIS — Z95.0 PRESENCE OF CARDIAC PACEMAKER: Chronic | ICD-10-CM

## 2018-02-27 DIAGNOSIS — Z90.2 ACQUIRED ABSENCE OF LUNG [PART OF]: Chronic | ICD-10-CM

## 2018-02-27 DIAGNOSIS — Z79.01 LONG TERM (CURRENT) USE OF ANTICOAGULANTS: ICD-10-CM

## 2018-02-28 DIAGNOSIS — R48.0 DYSLEXIA AND ALEXIA: ICD-10-CM

## 2018-02-28 DIAGNOSIS — E78.5 HYPERLIPIDEMIA, UNSPECIFIED: ICD-10-CM

## 2018-02-28 DIAGNOSIS — F41.9 ANXIETY DISORDER, UNSPECIFIED: ICD-10-CM

## 2018-02-28 DIAGNOSIS — K29.70 GASTRITIS, UNSPECIFIED, WITHOUT BLEEDING: ICD-10-CM

## 2018-02-28 DIAGNOSIS — Z90.2 ACQUIRED ABSENCE OF LUNG [PART OF]: ICD-10-CM

## 2018-02-28 DIAGNOSIS — Z79.01 LONG TERM (CURRENT) USE OF ANTICOAGULANTS: ICD-10-CM

## 2018-02-28 DIAGNOSIS — I49.5 SICK SINUS SYNDROME: ICD-10-CM

## 2018-02-28 DIAGNOSIS — I48.2 CHRONIC ATRIAL FIBRILLATION: ICD-10-CM

## 2018-02-28 DIAGNOSIS — J44.9 CHRONIC OBSTRUCTIVE PULMONARY DISEASE, UNSPECIFIED: ICD-10-CM

## 2018-02-28 DIAGNOSIS — F32.9 MAJOR DEPRESSIVE DISORDER, SINGLE EPISODE, UNSPECIFIED: ICD-10-CM

## 2018-02-28 DIAGNOSIS — Z92.21 PERSONAL HISTORY OF ANTINEOPLASTIC CHEMOTHERAPY: ICD-10-CM

## 2018-02-28 DIAGNOSIS — Z85.118 PERSONAL HISTORY OF OTHER MALIGNANT NEOPLASM OF BRONCHUS AND LUNG: ICD-10-CM

## 2018-02-28 DIAGNOSIS — Z92.3 PERSONAL HISTORY OF IRRADIATION: ICD-10-CM

## 2018-02-28 DIAGNOSIS — Z87.891 PERSONAL HISTORY OF NICOTINE DEPENDENCE: ICD-10-CM

## 2018-02-28 DIAGNOSIS — Z95.0 PRESENCE OF CARDIAC PACEMAKER: ICD-10-CM

## 2018-02-28 DIAGNOSIS — K63.5 POLYP OF COLON: ICD-10-CM

## 2018-02-28 PROBLEM — I48.91 UNSPECIFIED ATRIAL FIBRILLATION: Chronic | Status: ACTIVE | Noted: 2018-02-23

## 2018-02-28 PROBLEM — E78.00 PURE HYPERCHOLESTEROLEMIA, UNSPECIFIED: Chronic | Status: ACTIVE | Noted: 2018-02-22

## 2018-02-28 PROBLEM — C34.90 MALIGNANT NEOPLASM OF UNSPECIFIED PART OF UNSPECIFIED BRONCHUS OR LUNG: Chronic | Status: ACTIVE | Noted: 2018-02-23

## 2018-03-06 ENCOUNTER — OUTPATIENT (OUTPATIENT)
Dept: OUTPATIENT SERVICES | Facility: HOSPITAL | Age: 73
LOS: 1 days | Discharge: HOME | End: 2018-03-06

## 2018-03-06 DIAGNOSIS — Z95.0 PRESENCE OF CARDIAC PACEMAKER: Chronic | ICD-10-CM

## 2018-03-06 DIAGNOSIS — Z98.890 OTHER SPECIFIED POSTPROCEDURAL STATES: Chronic | ICD-10-CM

## 2018-03-06 DIAGNOSIS — Z90.2 ACQUIRED ABSENCE OF LUNG [PART OF]: Chronic | ICD-10-CM

## 2018-03-06 DIAGNOSIS — I48.91 UNSPECIFIED ATRIAL FIBRILLATION: ICD-10-CM

## 2018-03-06 DIAGNOSIS — Z79.01 LONG TERM (CURRENT) USE OF ANTICOAGULANTS: ICD-10-CM

## 2018-03-06 DIAGNOSIS — Z90.49 ACQUIRED ABSENCE OF OTHER SPECIFIED PARTS OF DIGESTIVE TRACT: Chronic | ICD-10-CM

## 2018-03-20 ENCOUNTER — OUTPATIENT (OUTPATIENT)
Dept: OUTPATIENT SERVICES | Facility: HOSPITAL | Age: 73
LOS: 1 days | Discharge: HOME | End: 2018-03-20

## 2018-03-20 DIAGNOSIS — Z95.0 PRESENCE OF CARDIAC PACEMAKER: Chronic | ICD-10-CM

## 2018-03-20 DIAGNOSIS — Z90.49 ACQUIRED ABSENCE OF OTHER SPECIFIED PARTS OF DIGESTIVE TRACT: Chronic | ICD-10-CM

## 2018-03-20 DIAGNOSIS — Z98.890 OTHER SPECIFIED POSTPROCEDURAL STATES: Chronic | ICD-10-CM

## 2018-03-20 DIAGNOSIS — Z90.2 ACQUIRED ABSENCE OF LUNG [PART OF]: Chronic | ICD-10-CM

## 2018-03-20 DIAGNOSIS — Z79.01 LONG TERM (CURRENT) USE OF ANTICOAGULANTS: ICD-10-CM

## 2018-03-20 DIAGNOSIS — I48.91 UNSPECIFIED ATRIAL FIBRILLATION: ICD-10-CM

## 2018-04-05 ENCOUNTER — OUTPATIENT (OUTPATIENT)
Dept: OUTPATIENT SERVICES | Facility: HOSPITAL | Age: 73
LOS: 1 days | Discharge: HOME | End: 2018-04-05

## 2018-04-05 DIAGNOSIS — Z98.890 OTHER SPECIFIED POSTPROCEDURAL STATES: Chronic | ICD-10-CM

## 2018-04-05 DIAGNOSIS — I48.91 UNSPECIFIED ATRIAL FIBRILLATION: ICD-10-CM

## 2018-04-05 DIAGNOSIS — Z79.01 LONG TERM (CURRENT) USE OF ANTICOAGULANTS: ICD-10-CM

## 2018-04-05 DIAGNOSIS — Z95.0 PRESENCE OF CARDIAC PACEMAKER: Chronic | ICD-10-CM

## 2018-04-05 DIAGNOSIS — Z90.2 ACQUIRED ABSENCE OF LUNG [PART OF]: Chronic | ICD-10-CM

## 2018-04-05 DIAGNOSIS — Z90.49 ACQUIRED ABSENCE OF OTHER SPECIFIED PARTS OF DIGESTIVE TRACT: Chronic | ICD-10-CM

## 2018-04-30 ENCOUNTER — OUTPATIENT (OUTPATIENT)
Dept: OUTPATIENT SERVICES | Facility: HOSPITAL | Age: 73
LOS: 1 days | Discharge: HOME | End: 2018-04-30

## 2018-04-30 DIAGNOSIS — Z90.2 ACQUIRED ABSENCE OF LUNG [PART OF]: Chronic | ICD-10-CM

## 2018-04-30 DIAGNOSIS — Z95.0 PRESENCE OF CARDIAC PACEMAKER: Chronic | ICD-10-CM

## 2018-04-30 DIAGNOSIS — Z98.890 OTHER SPECIFIED POSTPROCEDURAL STATES: Chronic | ICD-10-CM

## 2018-04-30 DIAGNOSIS — Z79.01 LONG TERM (CURRENT) USE OF ANTICOAGULANTS: ICD-10-CM

## 2018-04-30 DIAGNOSIS — I48.91 UNSPECIFIED ATRIAL FIBRILLATION: ICD-10-CM

## 2018-04-30 DIAGNOSIS — Z90.49 ACQUIRED ABSENCE OF OTHER SPECIFIED PARTS OF DIGESTIVE TRACT: Chronic | ICD-10-CM

## 2018-05-10 ENCOUNTER — OUTPATIENT (OUTPATIENT)
Dept: OUTPATIENT SERVICES | Facility: HOSPITAL | Age: 73
LOS: 1 days | Discharge: HOME | End: 2018-05-10

## 2018-05-10 DIAGNOSIS — Z98.890 OTHER SPECIFIED POSTPROCEDURAL STATES: Chronic | ICD-10-CM

## 2018-05-10 DIAGNOSIS — F33.2 MAJOR DEPRESSIVE DISORDER, RECURRENT SEVERE WITHOUT PSYCHOTIC FEATURES: ICD-10-CM

## 2018-05-10 DIAGNOSIS — Z90.2 ACQUIRED ABSENCE OF LUNG [PART OF]: Chronic | ICD-10-CM

## 2018-05-10 DIAGNOSIS — Z90.49 ACQUIRED ABSENCE OF OTHER SPECIFIED PARTS OF DIGESTIVE TRACT: Chronic | ICD-10-CM

## 2018-05-10 DIAGNOSIS — Z95.0 PRESENCE OF CARDIAC PACEMAKER: Chronic | ICD-10-CM

## 2018-05-10 DIAGNOSIS — F41.1 GENERALIZED ANXIETY DISORDER: ICD-10-CM

## 2018-05-22 ENCOUNTER — OUTPATIENT (OUTPATIENT)
Dept: OUTPATIENT SERVICES | Facility: HOSPITAL | Age: 73
LOS: 1 days | Discharge: HOME | End: 2018-05-22

## 2018-05-22 DIAGNOSIS — Z95.0 PRESENCE OF CARDIAC PACEMAKER: Chronic | ICD-10-CM

## 2018-05-22 DIAGNOSIS — I48.91 UNSPECIFIED ATRIAL FIBRILLATION: ICD-10-CM

## 2018-05-22 DIAGNOSIS — Z90.2 ACQUIRED ABSENCE OF LUNG [PART OF]: Chronic | ICD-10-CM

## 2018-05-22 DIAGNOSIS — Z79.01 LONG TERM (CURRENT) USE OF ANTICOAGULANTS: ICD-10-CM

## 2018-05-22 DIAGNOSIS — Z90.49 ACQUIRED ABSENCE OF OTHER SPECIFIED PARTS OF DIGESTIVE TRACT: Chronic | ICD-10-CM

## 2018-05-22 DIAGNOSIS — Z98.890 OTHER SPECIFIED POSTPROCEDURAL STATES: Chronic | ICD-10-CM

## 2018-05-31 ENCOUNTER — OUTPATIENT (OUTPATIENT)
Dept: OUTPATIENT SERVICES | Facility: HOSPITAL | Age: 73
LOS: 1 days | Discharge: HOME | End: 2018-05-31

## 2018-05-31 DIAGNOSIS — Z90.49 ACQUIRED ABSENCE OF OTHER SPECIFIED PARTS OF DIGESTIVE TRACT: Chronic | ICD-10-CM

## 2018-05-31 DIAGNOSIS — Z98.890 OTHER SPECIFIED POSTPROCEDURAL STATES: Chronic | ICD-10-CM

## 2018-05-31 DIAGNOSIS — Z95.0 PRESENCE OF CARDIAC PACEMAKER: Chronic | ICD-10-CM

## 2018-05-31 DIAGNOSIS — I48.91 UNSPECIFIED ATRIAL FIBRILLATION: ICD-10-CM

## 2018-05-31 DIAGNOSIS — Z90.2 ACQUIRED ABSENCE OF LUNG [PART OF]: Chronic | ICD-10-CM

## 2018-05-31 DIAGNOSIS — Z79.01 LONG TERM (CURRENT) USE OF ANTICOAGULANTS: ICD-10-CM

## 2018-06-04 ENCOUNTER — OUTPATIENT (OUTPATIENT)
Dept: OUTPATIENT SERVICES | Facility: HOSPITAL | Age: 73
LOS: 1 days | Discharge: HOME | End: 2018-06-04

## 2018-06-04 DIAGNOSIS — Z95.0 PRESENCE OF CARDIAC PACEMAKER: Chronic | ICD-10-CM

## 2018-06-04 DIAGNOSIS — Z90.49 ACQUIRED ABSENCE OF OTHER SPECIFIED PARTS OF DIGESTIVE TRACT: Chronic | ICD-10-CM

## 2018-06-04 DIAGNOSIS — Z98.890 OTHER SPECIFIED POSTPROCEDURAL STATES: Chronic | ICD-10-CM

## 2018-06-04 DIAGNOSIS — F41.1 GENERALIZED ANXIETY DISORDER: ICD-10-CM

## 2018-06-04 DIAGNOSIS — Z90.2 ACQUIRED ABSENCE OF LUNG [PART OF]: Chronic | ICD-10-CM

## 2018-06-04 DIAGNOSIS — F33.2 MAJOR DEPRESSIVE DISORDER, RECURRENT SEVERE WITHOUT PSYCHOTIC FEATURES: ICD-10-CM

## 2018-06-08 ENCOUNTER — OUTPATIENT (OUTPATIENT)
Dept: OUTPATIENT SERVICES | Facility: HOSPITAL | Age: 73
LOS: 1 days | Discharge: HOME | End: 2018-06-08

## 2018-06-08 DIAGNOSIS — Z90.49 ACQUIRED ABSENCE OF OTHER SPECIFIED PARTS OF DIGESTIVE TRACT: Chronic | ICD-10-CM

## 2018-06-08 DIAGNOSIS — Z79.01 LONG TERM (CURRENT) USE OF ANTICOAGULANTS: ICD-10-CM

## 2018-06-08 DIAGNOSIS — Z95.0 PRESENCE OF CARDIAC PACEMAKER: Chronic | ICD-10-CM

## 2018-06-08 DIAGNOSIS — Z98.890 OTHER SPECIFIED POSTPROCEDURAL STATES: Chronic | ICD-10-CM

## 2018-06-08 DIAGNOSIS — Z90.2 ACQUIRED ABSENCE OF LUNG [PART OF]: Chronic | ICD-10-CM

## 2018-06-08 DIAGNOSIS — R79.1 ABNORMAL COAGULATION PROFILE: ICD-10-CM

## 2018-06-22 ENCOUNTER — OUTPATIENT (OUTPATIENT)
Dept: OUTPATIENT SERVICES | Facility: HOSPITAL | Age: 73
LOS: 1 days | Discharge: HOME | End: 2018-06-22

## 2018-06-22 DIAGNOSIS — Z98.890 OTHER SPECIFIED POSTPROCEDURAL STATES: Chronic | ICD-10-CM

## 2018-06-22 DIAGNOSIS — Z95.0 PRESENCE OF CARDIAC PACEMAKER: Chronic | ICD-10-CM

## 2018-06-22 DIAGNOSIS — Z90.49 ACQUIRED ABSENCE OF OTHER SPECIFIED PARTS OF DIGESTIVE TRACT: Chronic | ICD-10-CM

## 2018-06-22 DIAGNOSIS — Z90.2 ACQUIRED ABSENCE OF LUNG [PART OF]: Chronic | ICD-10-CM

## 2018-06-22 DIAGNOSIS — R79.1 ABNORMAL COAGULATION PROFILE: ICD-10-CM

## 2018-06-22 DIAGNOSIS — Z79.01 LONG TERM (CURRENT) USE OF ANTICOAGULANTS: ICD-10-CM

## 2018-07-10 ENCOUNTER — OUTPATIENT (OUTPATIENT)
Dept: OUTPATIENT SERVICES | Facility: HOSPITAL | Age: 73
LOS: 1 days | Discharge: HOME | End: 2018-07-10

## 2018-07-10 DIAGNOSIS — Z98.890 OTHER SPECIFIED POSTPROCEDURAL STATES: Chronic | ICD-10-CM

## 2018-07-10 DIAGNOSIS — Z90.49 ACQUIRED ABSENCE OF OTHER SPECIFIED PARTS OF DIGESTIVE TRACT: Chronic | ICD-10-CM

## 2018-07-10 DIAGNOSIS — Z90.2 ACQUIRED ABSENCE OF LUNG [PART OF]: Chronic | ICD-10-CM

## 2018-07-10 DIAGNOSIS — R79.1 ABNORMAL COAGULATION PROFILE: ICD-10-CM

## 2018-07-10 DIAGNOSIS — Z79.01 LONG TERM (CURRENT) USE OF ANTICOAGULANTS: ICD-10-CM

## 2018-07-10 DIAGNOSIS — Z95.0 PRESENCE OF CARDIAC PACEMAKER: Chronic | ICD-10-CM

## 2018-07-19 ENCOUNTER — OUTPATIENT (OUTPATIENT)
Dept: OUTPATIENT SERVICES | Facility: HOSPITAL | Age: 73
LOS: 1 days | Discharge: HOME | End: 2018-07-19

## 2018-07-19 DIAGNOSIS — F41.1 GENERALIZED ANXIETY DISORDER: ICD-10-CM

## 2018-07-19 DIAGNOSIS — Z90.49 ACQUIRED ABSENCE OF OTHER SPECIFIED PARTS OF DIGESTIVE TRACT: Chronic | ICD-10-CM

## 2018-07-19 DIAGNOSIS — Z90.2 ACQUIRED ABSENCE OF LUNG [PART OF]: Chronic | ICD-10-CM

## 2018-07-19 DIAGNOSIS — Z98.890 OTHER SPECIFIED POSTPROCEDURAL STATES: Chronic | ICD-10-CM

## 2018-07-19 DIAGNOSIS — F33.2 MAJOR DEPRESSIVE DISORDER, RECURRENT SEVERE WITHOUT PSYCHOTIC FEATURES: ICD-10-CM

## 2018-07-19 DIAGNOSIS — Z95.0 PRESENCE OF CARDIAC PACEMAKER: Chronic | ICD-10-CM

## 2018-07-24 ENCOUNTER — OUTPATIENT (OUTPATIENT)
Dept: OUTPATIENT SERVICES | Facility: HOSPITAL | Age: 73
LOS: 1 days | Discharge: HOME | End: 2018-07-24

## 2018-07-24 DIAGNOSIS — Z90.49 ACQUIRED ABSENCE OF OTHER SPECIFIED PARTS OF DIGESTIVE TRACT: Chronic | ICD-10-CM

## 2018-07-24 DIAGNOSIS — Z79.01 LONG TERM (CURRENT) USE OF ANTICOAGULANTS: ICD-10-CM

## 2018-07-24 DIAGNOSIS — Z98.890 OTHER SPECIFIED POSTPROCEDURAL STATES: Chronic | ICD-10-CM

## 2018-07-24 DIAGNOSIS — Z95.0 PRESENCE OF CARDIAC PACEMAKER: Chronic | ICD-10-CM

## 2018-07-24 DIAGNOSIS — R79.1 ABNORMAL COAGULATION PROFILE: ICD-10-CM

## 2018-07-24 DIAGNOSIS — Z90.2 ACQUIRED ABSENCE OF LUNG [PART OF]: Chronic | ICD-10-CM

## 2018-07-28 PROBLEM — Z78.9 ALCOHOL USE: Status: ACTIVE | Noted: 2017-03-24

## 2018-08-13 ENCOUNTER — INPATIENT (INPATIENT)
Facility: HOSPITAL | Age: 73
LOS: 6 days | Discharge: NURSING, REHAB OR LONG-TERM CARE | End: 2018-08-20
Attending: HOSPITALIST | Admitting: HOSPITALIST

## 2018-08-13 VITALS
RESPIRATION RATE: 18 BRPM | SYSTOLIC BLOOD PRESSURE: 119 MMHG | DIASTOLIC BLOOD PRESSURE: 64 MMHG | OXYGEN SATURATION: 98 % | HEART RATE: 72 BPM | TEMPERATURE: 99 F

## 2018-08-13 DIAGNOSIS — Z98.890 OTHER SPECIFIED POSTPROCEDURAL STATES: Chronic | ICD-10-CM

## 2018-08-13 DIAGNOSIS — Z95.0 PRESENCE OF CARDIAC PACEMAKER: Chronic | ICD-10-CM

## 2018-08-13 DIAGNOSIS — Z90.49 ACQUIRED ABSENCE OF OTHER SPECIFIED PARTS OF DIGESTIVE TRACT: Chronic | ICD-10-CM

## 2018-08-13 DIAGNOSIS — Z90.2 ACQUIRED ABSENCE OF LUNG [PART OF]: Chronic | ICD-10-CM

## 2018-08-13 LAB
ALBUMIN SERPL ELPH-MCNC: 4.6 G/DL — SIGNIFICANT CHANGE UP (ref 3.5–5.2)
ALP SERPL-CCNC: 104 U/L — SIGNIFICANT CHANGE UP (ref 30–115)
ALT FLD-CCNC: 13 U/L — SIGNIFICANT CHANGE UP (ref 0–41)
ANION GAP SERPL CALC-SCNC: 12 MMOL/L — SIGNIFICANT CHANGE UP (ref 7–14)
APTT BLD: 41.4 SEC — HIGH (ref 27–39.2)
AST SERPL-CCNC: 21 U/L — SIGNIFICANT CHANGE UP (ref 0–41)
BASOPHILS # BLD AUTO: 0.03 K/UL — SIGNIFICANT CHANGE UP (ref 0–0.2)
BASOPHILS NFR BLD AUTO: 0.4 % — SIGNIFICANT CHANGE UP (ref 0–1)
BILIRUB SERPL-MCNC: 0.3 MG/DL — SIGNIFICANT CHANGE UP (ref 0.2–1.2)
BUN SERPL-MCNC: 16 MG/DL — SIGNIFICANT CHANGE UP (ref 10–20)
CALCIUM SERPL-MCNC: 10.2 MG/DL — HIGH (ref 8.5–10.1)
CHLORIDE SERPL-SCNC: 99 MMOL/L — SIGNIFICANT CHANGE UP (ref 98–110)
CO2 SERPL-SCNC: 30 MMOL/L — SIGNIFICANT CHANGE UP (ref 17–32)
CREAT SERPL-MCNC: 0.9 MG/DL — SIGNIFICANT CHANGE UP (ref 0.7–1.5)
EOSINOPHIL # BLD AUTO: 0.1 K/UL — SIGNIFICANT CHANGE UP (ref 0–0.7)
EOSINOPHIL NFR BLD AUTO: 1.3 % — SIGNIFICANT CHANGE UP (ref 0–8)
GLUCOSE SERPL-MCNC: 155 MG/DL — HIGH (ref 70–99)
HCT VFR BLD CALC: 33.8 % — LOW (ref 37–47)
HGB BLD-MCNC: 11.1 G/DL — LOW (ref 12–16)
IMM GRANULOCYTES NFR BLD AUTO: 0.4 % — HIGH (ref 0.1–0.3)
INR BLD: 2.48 RATIO — HIGH (ref 0.65–1.3)
LACTATE SERPL-SCNC: 1.2 MMOL/L — SIGNIFICANT CHANGE UP (ref 0.5–2.2)
LYMPHOCYTES # BLD AUTO: 0.7 K/UL — LOW (ref 1.2–3.4)
LYMPHOCYTES # BLD AUTO: 9 % — LOW (ref 20.5–51.1)
MAGNESIUM SERPL-MCNC: 1.8 MG/DL — SIGNIFICANT CHANGE UP (ref 1.8–2.4)
MCHC RBC-ENTMCNC: 27 PG — SIGNIFICANT CHANGE UP (ref 27–31)
MCHC RBC-ENTMCNC: 32.8 G/DL — SIGNIFICANT CHANGE UP (ref 32–37)
MCV RBC AUTO: 82.2 FL — SIGNIFICANT CHANGE UP (ref 81–99)
MONOCYTES # BLD AUTO: 0.37 K/UL — SIGNIFICANT CHANGE UP (ref 0.1–0.6)
MONOCYTES NFR BLD AUTO: 4.7 % — SIGNIFICANT CHANGE UP (ref 1.7–9.3)
NEUTROPHILS # BLD AUTO: 6.58 K/UL — HIGH (ref 1.4–6.5)
NEUTROPHILS NFR BLD AUTO: 84.2 % — HIGH (ref 42.2–75.2)
NRBC # BLD: 0 /100 WBCS — SIGNIFICANT CHANGE UP (ref 0–0)
PLATELET # BLD AUTO: 211 K/UL — SIGNIFICANT CHANGE UP (ref 130–400)
POTASSIUM SERPL-MCNC: 4.6 MMOL/L — SIGNIFICANT CHANGE UP (ref 3.5–5)
POTASSIUM SERPL-SCNC: 4.6 MMOL/L — SIGNIFICANT CHANGE UP (ref 3.5–5)
PROT SERPL-MCNC: 7.1 G/DL — SIGNIFICANT CHANGE UP (ref 6–8)
PROTHROM AB SERPL-ACNC: 26.5 SEC — HIGH (ref 9.95–12.87)
RBC # BLD: 4.11 M/UL — LOW (ref 4.2–5.4)
RBC # FLD: 13.7 % — SIGNIFICANT CHANGE UP (ref 11.5–14.5)
SODIUM SERPL-SCNC: 141 MMOL/L — SIGNIFICANT CHANGE UP (ref 135–146)
WBC # BLD: 7.81 K/UL — SIGNIFICANT CHANGE UP (ref 4.8–10.8)
WBC # FLD AUTO: 7.81 K/UL — SIGNIFICANT CHANGE UP (ref 4.8–10.8)

## 2018-08-13 RX ORDER — ACETAMINOPHEN 500 MG
650 TABLET ORAL ONCE
Qty: 0 | Refills: 0 | Status: COMPLETED | OUTPATIENT
Start: 2018-08-13 | End: 2018-08-13

## 2018-08-13 RX ORDER — MORPHINE SULFATE 50 MG/1
2 CAPSULE, EXTENDED RELEASE ORAL
Qty: 0 | Refills: 0 | Status: DISCONTINUED | OUTPATIENT
Start: 2018-08-13 | End: 2018-08-15

## 2018-08-13 RX ORDER — ACETAMINOPHEN 500 MG
650 TABLET ORAL EVERY 6 HOURS
Qty: 0 | Refills: 0 | Status: DISCONTINUED | OUTPATIENT
Start: 2018-08-13 | End: 2018-08-15

## 2018-08-13 RX ORDER — CLONAZEPAM 1 MG
0.5 TABLET ORAL
Qty: 0 | Refills: 0 | Status: DISCONTINUED | OUTPATIENT
Start: 2018-08-13 | End: 2018-08-15

## 2018-08-13 RX ORDER — DOCUSATE SODIUM 100 MG
100 CAPSULE ORAL THREE TIMES A DAY
Qty: 0 | Refills: 0 | Status: DISCONTINUED | OUTPATIENT
Start: 2018-08-13 | End: 2018-08-15

## 2018-08-13 RX ORDER — SENNA PLUS 8.6 MG/1
2 TABLET ORAL AT BEDTIME
Qty: 0 | Refills: 0 | Status: DISCONTINUED | OUTPATIENT
Start: 2018-08-13 | End: 2018-08-15

## 2018-08-13 RX ORDER — TIOTROPIUM BROMIDE 18 UG/1
1 CAPSULE ORAL; RESPIRATORY (INHALATION) DAILY
Qty: 0 | Refills: 0 | Status: DISCONTINUED | OUTPATIENT
Start: 2018-08-13 | End: 2018-08-15

## 2018-08-13 RX ORDER — PANTOPRAZOLE SODIUM 20 MG/1
40 TABLET, DELAYED RELEASE ORAL
Qty: 0 | Refills: 0 | Status: DISCONTINUED | OUTPATIENT
Start: 2018-08-13 | End: 2018-08-15

## 2018-08-13 RX ORDER — ONDANSETRON 8 MG/1
4 TABLET, FILM COATED ORAL EVERY 6 HOURS
Qty: 0 | Refills: 0 | Status: DISCONTINUED | OUTPATIENT
Start: 2018-08-13 | End: 2018-08-15

## 2018-08-13 RX ORDER — ATORVASTATIN CALCIUM 80 MG/1
20 TABLET, FILM COATED ORAL AT BEDTIME
Qty: 0 | Refills: 0 | Status: DISCONTINUED | OUTPATIENT
Start: 2018-08-13 | End: 2018-08-15

## 2018-08-13 RX ADMIN — Medication 650 MILLIGRAM(S): at 23:58

## 2018-08-13 RX ADMIN — Medication 0.5 MILLIGRAM(S): at 21:17

## 2018-08-13 RX ADMIN — Medication 650 MILLIGRAM(S): at 22:19

## 2018-08-13 RX ADMIN — MORPHINE SULFATE 2 MILLIGRAM(S): 50 CAPSULE, EXTENDED RELEASE ORAL at 23:48

## 2018-08-13 RX ADMIN — Medication 100 MILLIGRAM(S): at 21:17

## 2018-08-13 RX ADMIN — ATORVASTATIN CALCIUM 20 MILLIGRAM(S): 80 TABLET, FILM COATED ORAL at 21:17

## 2018-08-13 RX ADMIN — Medication 650 MILLIGRAM(S): at 14:29

## 2018-08-13 NOTE — ED ADULT TRIAGE NOTE - CHIEF COMPLAINT QUOTE
BIBA fell from standing position, c/o right leg/hip/groin pain, denies any LOC or head trauma on coumadin

## 2018-08-13 NOTE — CONSULT NOTE ADULT - ASSESSMENT
72 F with right VI FN fx    medicine/cardio evaluation for possible surgery tomorrow  NPO after MN  hold coumadin tonight  repeat inr, cbc tomorrow  bedrest  pain control  dvt ppx  w/d with att

## 2018-08-13 NOTE — ED PROVIDER NOTE - PHYSICAL EXAMINATION
CONSTITUTIONAL: Well-developed; well-nourished; in no acute distress.   SKIN: Ecchymoses over R knee and R posterior thigh.   HEAD: Normocephalic; atraumatic.  EYES: PERRL, EOMI, normal sclera and conjunctiva   ENT: No nasal discharge; airway clear.  NECK: Supple; non tender. No midline c spine tenderness.   CARD: S1, S2 normal; no murmurs, gallops, or rubs. Regular rate and rhythm.   RESP: No wheezes, rales or rhonchi.  ABD: soft ntnd  EXT: Not able to ambulate or bear weight, but able to range R hip. TTP over R hip. Full ROM of R shoulder with ttp diffusely over R shoulder. No pain over L side or L extremities.   LYMPH: No acute cervical adenopathy.  NEURO: Alert, oriented, grossly unremarkable  PSYCH: Cooperative, appropriate.

## 2018-08-13 NOTE — ED ADULT NURSE NOTE - NSIMPLEMENTINTERV_GEN_ALL_ED
Implemented All Fall with Harm Risk Interventions:  [place] to call system. Call bell, personal items and telephone within reach. Instruct patient to call for assistance. Room bathroom lighting operational. Non-slip footwear when patient is off stretcher. Physically safe environment: no spills, clutter or unnecessary equipment. Stretcher in lowest position, wheels locked, appropriate side rails in place. Provide visual cue, wrist band, yellow gown, etc. Monitor gait and stability. Monitor for mental status changes and reorient to person, place, and time. Review medications for side effects contributing to fall risk. Reinforce activity limits and safety measures with patient and family. Provide visual clues: red socks.

## 2018-08-13 NOTE — H&P ADULT - NSHPPHYSICALEXAM_GEN_ALL_CORE
T(F): 98.8  HR: 72  BP: 119/64  RR: 18  SpO2: 98%      PHYSICAL EXAM:  GENERAL: NAD, well-developed  HEAD:  Atraumatic, Normocephalic  EYES: EOMI, PERRLA, conjunctiva and sclera clear  NECK: Supple, No JVD  CHEST/LUNG: Clear to auscultation bilaterally; No wheeze  HEART: Regular rate and rhythm; No murmurs, rubs, or gallops  ABDOMEN: Soft, Nontender, Nondistended; Bowel sounds present  EXTREMITIES:  2+ Peripheral Pulses, No clubbing, cyanosis, or edema  PSYCH: AAOx3  NEUROLOGY: non-focal  SKIN: No rashes or lesions

## 2018-08-13 NOTE — H&P ADULT - ATTENDING COMMENTS
73 y/o F with PMH of DLD, Atrial fibrillation on coumadin, GERD, COPD on home O2 2.5-3L, Lung Cancer s/p left upper lobectomy in 2011, chemo and radiation, complicated by radiation scarring of lungs presented with right hip and shoulder pain after mechanical fall today. Pt had the first mechanical fall 2 weeks ago after pt become out of balance while kicking the oxygen tubing and fell down hard with her right side on floor. Since then, pt was having throbbing pain on right leg which travels down the leg worse with movement, has been taking tylenol and NSAIDs to relieve the pain associated with limping of right leg. Pt is independently ambulatory prior to this event. On the day of presentation, pt lost balance while using cane and fell on her right knee which worsened the pain. Pt states that the initial fall is likely cause of the injury. Otherwise, pt denies head trauma or LOC, fever, chills, chest pain, palpitations, cough, sputum production, wheezing, abdominal pain, diarrhea, dysuria but admits to chronic shortness of breath.    ROS: All other review of systems negative, except as noted in HPI.    PE:  GENERAL: NAD, well-groomed, well-developed.  HEAD:  Atraumatic, Normocephalic.  EYES: EOMI, PERRLA, conjunctiva and sclera clear.  ENMT: Moist mucous membranes, No lesions.  NERVOUS SYSTEM:  Alert & Oriented X3, Good concentration; No limb weakness or numbness.   RESPIRATORY: Clear to percussion bilaterally; No rales, rhonchi, wheezing, or rubs.  CARDIOVASCULAR: Irregular. No murmur.  GASTROINTESTINAL: Soft, Nontender, Nondistended; Bowel sounds present.  MUSCULOSKELETAL: Move all extremities. Limited right leg movement 2/2 pain.   EXTREMITIES: No clubbing, cyanosis, or edema.  LYMPH: No lymphadenopathy noted.  SKIN: No rashes or lesions.    # Subcapital right femoral neck fracture  - NPO after midnight for possible OR, follow up Ortho  - pain control, bowel regimen    # Cardiac Pulmonary risk assessment for orthopedic procedure  * Pt denies prior history of heart attacks, MI or other arrythmia other than Afib. Pt had h/o atrial fibrillation uncontrolled on medications, therefore, pt subsequently, had wireless pacemaker known as micra transcatheter pacing system placed and AV kacie ablation done in 2017.   * Pt was diagnosed with lung cancer in 2011, underwent left upper lobectomy followed by chemotherapy and radiation until 2011. Pt also has known CT evidence of radiation injury to lung. At baseline, pt uses 2.5-3L oxygen via nasal cannula and pulse oxygen usually runs at 98%. Otherwise, pt's activity level is limited due to chronic shortness of breath.  * Previous old medical records reviewed noted, 3/2/2016 Cardiac Cath - normal coronaries, Last echo 09/12/2017 EF 50-60%, mild MVR, mild-mod TVR, mildly elevated pulmonary artery pressure  - METS<4, moderate cardiac risk, however, pt has high pulmonary risk for complications due to underlying low lung reserve; otherwise, currently optimized medically, stable cardio and pulmonary status  - use of spirometry, restart anticoagulation post-procedure promptly when cleared by ortho, start DVT prophylaxis for INR<2  - follow up further Pulmonary and Cardio input regarding medical kyle-operative management    # Atrial fibrillation s/p AV kacie ablation and ventricular paced, rate controlled  - will hold coumadin tonight for possible OR in AM  - check INR in am    # DLD   - c/w statin    #COPD, stable  - c/w oxygen supplementation  - c/w spiriva    # Anxiety  - c/w klonopin prn, hold on the day of surgery    # GERD  - c/w protonix    # DVT  - currently therapeutic INR    All labs, radiologic studies, vitals, orders and medications list reviewed. Patient is seen and examined at bedside. Case discussed with medical team. 73 y/o F with PMH of DLD, Atrial fibrillation on coumadin, GERD, COPD on home O2 2.5-3L, Lung Cancer s/p left upper lobectomy in 2011, chemo and radiation, complicated by radiation scarring of lungs presented with right hip and shoulder pain after mechanical fall today. Pt had the first mechanical fall 2 weeks ago after pt become out of balance while kicking the oxygen tubing and fell down hard with her right side on floor. Since then, pt was having throbbing pain on right leg which travels down the leg worse with movement, has been taking tylenol and NSAIDs to relieve the pain associated with limping of right leg. Pt is independently ambulatory prior to this event. On the day of presentation, pt lost balance while using cane and fell on her right knee which worsened the pain. Pt states that the initial fall is likely cause of the injury. Otherwise, pt denies head trauma or LOC, fever, chills, chest pain, palpitations, cough, sputum production, wheezing, abdominal pain, diarrhea, dysuria but admits to chronic shortness of breath.    ROS: All other review of systems negative, except as noted in HPI.    PE:  GENERAL: NAD, well-groomed, well-developed.  HEAD:  Atraumatic, Normocephalic.  EYES: EOMI, PERRLA, conjunctiva and sclera clear.  ENMT: Moist mucous membranes, No lesions.  NERVOUS SYSTEM:  Alert & Oriented X3, Good concentration; No limb weakness or numbness.   RESPIRATORY: Clear to percussion bilaterally; No rales, rhonchi, wheezing, or rubs.  CARDIOVASCULAR: Irregular. No murmur.  GASTROINTESTINAL: Soft, Nontender, Nondistended; Bowel sounds present.  MUSCULOSKELETAL: Move all extremities. Limited right leg movement 2/2 pain.   EXTREMITIES: No clubbing, cyanosis, or edema.  LYMPH: No lymphadenopathy noted.  SKIN: No rashes or lesions.    # Subcapital right femoral neck fracture  - NPO after midnight for possible OR, follow up Ortho  - pain control, bowel regimen    # Cardiac Pulmonary risk assessment for orthopedic procedure  * Pt denies prior history of heart attacks, MI or other arrythmia other than Afib. Pt had h/o atrial fibrillation uncontrolled on medications, therefore, pt subsequently, had wireless pacemaker known as micra transcatheter pacing system placed and AV kacie ablation done in 2017. Since then, she has no further symptoms or issues with device.  * Pt was diagnosed with lung cancer in 2011, underwent left upper lobectomy followed by chemotherapy and radiation until 2011. Pt also has known CT evidence of radiation injury to lung. At baseline, pt uses 2.5-3L oxygen via nasal cannula and pulse oxygen usually runs at 98%. Otherwise, pt's activity level is limited due to chronic shortness of breath.  * Previous old medical records reviewed noted, 3/2/2016 Cardiac Cath - normal coronaries, Last echo 09/12/2017 EF 50-60%, mild MVR, mild-mod TVR, mildly elevated pulmonary artery pressure  - METS<4, moderate cardiac risk, however, pt has high pulmonary risk for complications due to underlying low lung reserve; otherwise, currently optimized medically, stable cardio and pulmonary status  - use of spirometry, restart anticoagulation post-procedure promptly when cleared by ortho, start DVT prophylaxis for INR<2  - follow up further Pulmonary and Cardio input regarding medical kyle-operative management such as need for demand pacing?    # Atrial fibrillation s/p AV kacie ablation and ventricular paced at a rate of 70, rate controlled  - will hold coumadin tonight for possible OR in AM  - check INR in am    # DLD   - c/w statin    # COPD, stable  - c/w oxygen supplementation  - c/w spiriva    # Anxiety  - c/w klonopin prn, hold on the day of surgery    # GERD  - c/w protonix    # DVT  - currently therapeutic INR    All labs, radiologic studies, vitals, orders and medications list reviewed. Patient is seen and examined at bedside. Case discussed with medical team. 73 y/o F with PMH of DLD, Atrial fibrillation on coumadin, GERD, COPD on home O2 2.5-3L, Lung Cancer s/p left upper lobectomy in 2011, chemo and radiation, complicated by radiation scarring of lungs presented with right hip and shoulder pain after mechanical fall today. Pt had the first mechanical fall 2 weeks ago after pt become out of balance while kicking the oxygen tubing and fell down hard with her right side on floor. Since then, pt was having throbbing pain on right leg which travels down the leg worse with movement, has been taking tylenol and NSAIDs to relieve the pain associated with limping of right leg. Pt is independently ambulatory prior to this event. On the day of presentation, pt lost balance while using cane and fell on her right knee which worsened the pain. Pt states that the initial fall is likely cause of the injury. Otherwise, pt denies head trauma or LOC, fever, chills, chest pain, palpitations, cough, sputum production, wheezing, abdominal pain, diarrhea, dysuria but admits to chronic shortness of breath.    ROS: All other review of systems negative, except as noted in HPI.    PE:  GENERAL: NAD, well-groomed, well-developed.  HEAD:  Atraumatic, Normocephalic.  EYES: EOMI, PERRLA, conjunctiva and sclera clear.  ENMT: Moist mucous membranes, No lesions.  NERVOUS SYSTEM:  Alert & Oriented X3, Good concentration; No limb weakness or numbness.   RESPIRATORY: Clear to percussion bilaterally; No rales, rhonchi, wheezing, or rubs.  CARDIOVASCULAR: Irregular. No murmur.  GASTROINTESTINAL: Soft, Nontender, Nondistended; Bowel sounds present.  MUSCULOSKELETAL: Move all extremities. Limited right leg movement 2/2 pain.   EXTREMITIES: No clubbing, cyanosis, or edema.  LYMPH: No lymphadenopathy noted.  SKIN: No rashes or lesions.    # Subcapital right femoral neck fracture  - NPO after midnight for possible OR, follow up Ortho  - pain control, bowel regimen    # Cardiac Pulmonary risk assessment for orthopedic procedure  * Pt denies prior history of heart attacks, MI or other arrythmia other than Afib. Pt had h/o atrial fibrillation uncontrolled on medications, therefore, pt subsequently, had wireless pacemaker known as micra transcatheter pacing system placed and AV kacie ablation done in 2017. Since then, she has no further symptoms or issues with device.  * Pt was diagnosed with lung cancer in 2011, underwent left upper lobectomy followed by chemotherapy and radiation until 2012. Pt also has known CT evidence of radiation injury to lung. At baseline, pt uses 2.5-3L oxygen via nasal cannula and pulse oxygen usually runs at 98%. Otherwise, pt's activity level is limited due to chronic shortness of breath.  * Previous old medical records reviewed noted, 3/2/2016 Cardiac Cath - normal coronaries, Last echo 09/12/2017 EF 50-60%, mild MVR, mild-mod TVR, mildly elevated pulmonary artery pressure  - METS<4, moderate cardiac risk, however, pt has high pulmonary risk for complications due to underlying low lung reserve; otherwise, currently optimized medically, stable cardio and pulmonary status  - use of spirometry, restart anticoagulation post-procedure promptly when cleared by ortho, start DVT prophylaxis for INR<2  - follow up further Pulmonary and Cardio input regarding medical kyle-operative management such as need for demand pacing?    # Atrial fibrillation s/p AV kacie ablation and ventricular paced at a rate of 70, rate controlled  - will hold coumadin tonight for possible OR in AM  - check INR in am    # DLD   - c/w statin    # COPD, stable  - c/w oxygen supplementation  - c/w spiriva    # Anxiety  - c/w klonopin prn, hold on the day of surgery    # GERD  - c/w protonix    # DVT  - currently therapeutic INR    All labs, radiologic studies, vitals, orders and medications list reviewed. Patient is seen and examined at bedside. Case discussed with medical team.

## 2018-08-13 NOTE — ED ADULT NURSE NOTE - OBJECTIVE STATEMENT
pt biba s/p fall at home. -LOC, denies hitting head. pt also reports fall 2 weeks ago fell on right knee and right side trying to move oxygen extension. hx lung ca and copd. on 3L @ home.

## 2018-08-13 NOTE — H&P ADULT - NSHPSOCIALHISTORY_GEN_ALL_CORE
Former smoker , no h/o alcohol or drugs use .  Lives at home , ambulates using a cane. Former smoker 40 pack years, quit 4/14/2005 , no h/o alcohol or drugs use .  Lives at home, ambulate independently prior to first fall

## 2018-08-13 NOTE — CONSULT NOTE ADULT - SUBJECTIVE AND OBJECTIVE BOX
72 F presents with right hip fx s/p fall, she fell 2 weeks ago, was able to minimally ambulate with pain and fell again today. Pt was not able to bear weight after fall.    PAST MEDICAL & SURGICAL HISTORY:  COPD (chronic obstructive pulmonary disease)  Afib  Lung cancer  Pacemaker  Anxiety  High cholesterol  S/P appendectomy  History of tonsillectomy  S/P lobectomy of lung: left  H/O atrioventricular kacie ablation  Artificial cardiac pacemaker    Home Medications:  acetaminophen 325 mg oral tablet: 2 tab(s) orally every 6 hours, As needed, Mild Pain (1 - 3) (2018 12:51)  biotin 5000 mcg oral tablet, disintegratin tab(s) orally once a day (2018 13:38)  Coumadin 1 mg oral tablet: Please hold today (sat) &amp; tomorrow (sun) dose. Check your INR at coumadin clinic on Mon and based on that INR dose coumadin accordingly (2018 12:51)  Crestor 5 mg oral tablet: 1 tab(s) orally once a day (at bedtime) (2018 13:38)  KlonoPIN 0.5 mg oral tablet: 1  orally 2 times a day, As Needed (2018 13:38)  Protonix 40 mg oral delayed release tablet: 1 tab(s) orally once a day (2018 13:38)  Spiriva 18 mcg inhalation capsule: 1 cap(s) inhaled once a day (2018 13:38)  Tigan 300 mg oral capsule: 1 cap(s) orally 3 times a day, As Needed (2018 12:54)  Vitamin D3 2000 intl units oral tablet: 1 tab(s) orally once a day (2018 13:38)      Allergies    bacitracin (Other)  carboplatin (Other)  sulfa drugs (Unknown)  sulfonamides (Unknown)  Xanax (Other)    Intolerances        Pt s/e at bedside    Vital Signs Last 24 Hrs  T(C): 37.1 (13 Aug 2018 13:41), Max: 37.1 (13 Aug 2018 13:41)  T(F): 98.8 (13 Aug 2018 13:41), Max: 98.8 (13 Aug 2018 13:41)  HR: 72 (13 Aug 2018 13:41) (72 - 72)  BP: 119/64 (13 Aug 2018 13:41) (119/64 - 119/64)  BP(mean): --  RR: 18 (13 Aug 2018 13:41) (18 - 18)  SpO2: 98% (13 Aug 2018 13:41) (98% - 98%)      PE: right groin ttp, pain on straight leg raise, no pain on log roll or axial load, ankle/foot motor function intact, sensation grossly intact, well perfused, palpable pulses                          11.1   7.81  )-----------( 211      ( 13 Aug 2018 14:00 )             33.8     08    141  |  99  |  16  ----------------------------<  155<H>  4.6   |  30  |  0.9    Ca    10.2<H>      13 Aug 2018 14:00  Mg     1.8         TPro  7.1  /  Alb  4.6  /  TBili  0.3  /  DBili  x   /  AST  21  /  ALT  13  /  AlkPhos  104  08-13      PT/INR - ( 13 Aug 2018 14:00 )   PT: 26.50 sec;   INR: 2.48 ratio         PTT - ( 13 Aug 2018 14:00 )  PTT:41.4 sec    xray- valgus impacted R femoral neck fx

## 2018-08-13 NOTE — ED PROVIDER NOTE - NS ED ROS FT
Eyes:  No visual changes, eye pain or discharge.  ENMT:  No hearing changes, pain, discharge or infections. No neck pain or stiffness.  Cardiac:  No chest pain, SOB or edema.   Respiratory:  No cough or respiratory distress.   GI:  No nausea, vomiting, diarrhea or abdominal pain.  :  No dysuria, frequency or burning.  MS:  R hip and R shoulder pain. No myalgia, muscle weakness, or back pain.  Neuro:  No headache or weakness.  No LOC.  Skin:  Ecchymoses over R knee and R posterior thigh.   Endocrine: No history of thyroid disease or diabetes.

## 2018-08-13 NOTE — H&P ADULT - ASSESSMENT
72 Y O f with pmh of DLD , afib on coumadin , COPD on home O2 , GERD , and lung ca s/p lobectomy in 2011 presented to ER s/p fall this morning.     # RIGHT FEMORAL NECK FRACTURE    - admit to medicine.  - Ortho eval appreciated .  - medicine , cardiology and pulmonary clearance for OR .  - NPO after midnight for possible OR in am.  - pain control .  - bowel regimen.       # AFIB :  - will hold coumadin tonight , as pt might go to  OR .  - check INR in am.     # DLD ;    - will c/w home meds.    #COPD:    - stable , c/w O2 by NC , c/w spiriva.  - pulmonary eval .    # DVT :  - ppx not indicated at this moment as pt has therapeutic INR .    # DISPO:    - Full code from home.

## 2018-08-13 NOTE — ED PROVIDER NOTE - PROGRESS NOTE DETAILS
dajuan diaz who will assess patient + r hip fx 2/2 mechanical fall. will admit, patient agreeable to plan

## 2018-08-13 NOTE — H&P ADULT - HISTORY OF PRESENT ILLNESS
72 Y O f with pmh of DLD , afib on coumadin , COPD on home O2 , GERD , and lung ca s/p lobectomy in 2011 presented to ER s/p fall this morning. As per pt she had 2 falls , first was 2 wks ago when she fell on her right side hitting right side of her body , shoulder and hip. Pt reports that she had excruciating pain after that , no head trauma or LOC reported . Pt reports that she took alleve and motrin afterwards for pain , and stopped taking her coumadin for few days . Pt reports that she fell again today while trying to get her cane , again on same side but reports she did not hit her head or hip , just right shoulder this time. Again no trauma or LOC reported . Pt was c/o pain afterwards so was brought to ER . In ER pt was found to have fracture of right hip.

## 2018-08-13 NOTE — H&P ADULT - FAMILY HISTORY
No pertinent family history in first degree relatives Father  Still living? Unknown  No significant family history, Age at diagnosis: Age Unknown     Mother  Still living? Unknown  No significant family history, Age at diagnosis: Age Unknown     Sibling  Still living? Unknown  No significant family history, Age at diagnosis: Age Unknown

## 2018-08-13 NOTE — H&P ADULT - NSHPLABSRESULTS_GEN_ALL_CORE
11.1   7.81  )-----------( 211      ( 13 Aug 2018 14:00 )             33.8       08-13    141  |  99  |  16  ----------------------------<  155<H>  4.6   |  30  |  0.9    Ca    10.2<H>      13 Aug 2018 14:00  Mg     1.8     08-13    TPro  7.1  /  Alb  4.6  /  TBili  0.3  /  DBili  x   /  AST  21  /  ALT  13  /  AlkPhos  104  08-13                  PT/INR - ( 13 Aug 2018 14:00 )   PT: 26.50 sec;   INR: 2.48 ratio         PTT - ( 13 Aug 2018 14:00 )  PTT:41.4 sec    Lactate Trend  08-13 @ 14:00 Lactate:1.2             CAPILLARY BLOOD GLUCOSE

## 2018-08-13 NOTE — ED PROVIDER NOTE - ATTENDING CONTRIBUTION TO CARE
72y f hx COPD on home O2, afib on coumadin, pacemaker, HLD, GERD presents w 2 falls. First one 2 weeks ago, mechanical fall, hit R shoulder, no head injury, no LOC, able to ambulate subsequently. Fell again today, tripped while walking with cane, hit R hip, now unable to ambulate. No head injury, no LOC. No neck pain. No CP, SOB. No abdominal pain. No n/v. No numbness or paresthesias. Exam: WDWN NAD comfortable appearing and conversing appropriately. NCAT neck FROM no midline ttp, back no ttp. pelvis stable, + R posterior thigh ecchymosis with overlying ttp, no gross bony abnormality. FROM of R hip, knee, ankle. Normal strength and sensation. 2+ DP pulses. Skin warm and dry. HEENT WNL, MMM. S1S2 RRR, equal pulses b/l, lungs CTAB, no w/r/r, abdomen soft NTND, no CVAT, no suprapubic ttp. A&O, normal strength and sensation, neuro nonfocal. A/P - fall on coumadin, unable to ambulate - labs, imaging, analgesia, reassess. 72y f hx COPD on home O2, afib on coumadin, pacemaker, HLD, GERD presents w 2 falls. First one 2 weeks ago, mechanical fall, hit R shoulder, no head injury, no LOC, able to ambulate subsequently. Fell again today, tripped while walking with cane, hit R hip, now unable to ambulate. No head injury, no LOC. No neck pain. No CP, SOB. No abdominal pain. No n/v. No numbness or paresthesias. Exam: WDWN NAD comfortable appearing and conversing appropriately. NCAT neck FROM no midline ttp, back no ttp. pelvis stable, + R posterior thigh ecchymosis with overlying ttp, no expanding hematoma, no gross bony abnormality. FROM of R hip, knee, ankle. Normal strength and sensation. 2+ DP pulses. Skin warm and dry. HEENT WNL, MMM. S1S2 RRR, equal pulses b/l, lungs CTAB, no w/r/r, abdomen soft NTND, no CVAT, no suprapubic ttp. A&O, normal strength and sensation, neuro nonfocal. A/P - fall on coumadin, unable to ambulate - labs, imaging, analgesia, reassess.

## 2018-08-13 NOTE — ED PROVIDER NOTE - OBJECTIVE STATEMENT
72 y f pmh copd on home o2, afib on coumadin, lung CA not currently chemo, anxiety, pacemaker, hpl pw fall. Mechanical fall onto her R side. Currently c/o R hip pain, R shoulder pain. No head trauma, no LOC. Denies neck pain, back pain, LLE pain, LUE pain, cp, sob, palpitations, dizziness.

## 2018-08-14 LAB
ANION GAP SERPL CALC-SCNC: 10 MMOL/L — SIGNIFICANT CHANGE UP (ref 7–14)
BASE EXCESS BLDA CALC-SCNC: 5.5 MMOL/L — HIGH (ref -2–2)
BASOPHILS # BLD AUTO: 0.04 K/UL — SIGNIFICANT CHANGE UP (ref 0–0.2)
BASOPHILS NFR BLD AUTO: 0.8 % — SIGNIFICANT CHANGE UP (ref 0–1)
BUN SERPL-MCNC: 16 MG/DL — SIGNIFICANT CHANGE UP (ref 10–20)
CALCIUM SERPL-MCNC: 9.1 MG/DL — SIGNIFICANT CHANGE UP (ref 8.5–10.1)
CHLORIDE SERPL-SCNC: 100 MMOL/L — SIGNIFICANT CHANGE UP (ref 98–110)
CO2 SERPL-SCNC: 32 MMOL/L — SIGNIFICANT CHANGE UP (ref 17–32)
CREAT SERPL-MCNC: 0.8 MG/DL — SIGNIFICANT CHANGE UP (ref 0.7–1.5)
EOSINOPHIL # BLD AUTO: 0.22 K/UL — SIGNIFICANT CHANGE UP (ref 0–0.7)
EOSINOPHIL NFR BLD AUTO: 4.4 % — SIGNIFICANT CHANGE UP (ref 0–8)
GLUCOSE SERPL-MCNC: 118 MG/DL — HIGH (ref 70–99)
HCO3 BLDA-SCNC: 30 MMOL/L — HIGH (ref 23–27)
HCT VFR BLD CALC: 33 % — LOW (ref 37–47)
HGB BLD-MCNC: 10.6 G/DL — LOW (ref 12–16)
HOROWITZ INDEX BLDA+IHG-RTO: 27 — SIGNIFICANT CHANGE UP
IMM GRANULOCYTES NFR BLD AUTO: 0.4 % — HIGH (ref 0.1–0.3)
INR BLD: 2.54 RATIO — HIGH (ref 0.65–1.3)
LYMPHOCYTES # BLD AUTO: 0.9 K/UL — LOW (ref 1.2–3.4)
LYMPHOCYTES # BLD AUTO: 17.9 % — LOW (ref 20.5–51.1)
MCHC RBC-ENTMCNC: 27 PG — SIGNIFICANT CHANGE UP (ref 27–31)
MCHC RBC-ENTMCNC: 32.1 G/DL — SIGNIFICANT CHANGE UP (ref 32–37)
MCV RBC AUTO: 84.2 FL — SIGNIFICANT CHANGE UP (ref 81–99)
MONOCYTES # BLD AUTO: 0.36 K/UL — SIGNIFICANT CHANGE UP (ref 0.1–0.6)
MONOCYTES NFR BLD AUTO: 7.1 % — SIGNIFICANT CHANGE UP (ref 1.7–9.3)
NEUTROPHILS # BLD AUTO: 3.5 K/UL — SIGNIFICANT CHANGE UP (ref 1.4–6.5)
NEUTROPHILS NFR BLD AUTO: 69.4 % — SIGNIFICANT CHANGE UP (ref 42.2–75.2)
PCO2 BLDA: 45 MMHG — HIGH (ref 38–42)
PH BLDA: 7.44 — HIGH (ref 7.38–7.42)
PLATELET # BLD AUTO: 186 K/UL — SIGNIFICANT CHANGE UP (ref 130–400)
PO2 BLDA: 112 MMHG — HIGH (ref 78–95)
POTASSIUM SERPL-MCNC: 4.5 MMOL/L — SIGNIFICANT CHANGE UP (ref 3.5–5)
POTASSIUM SERPL-SCNC: 4.5 MMOL/L — SIGNIFICANT CHANGE UP (ref 3.5–5)
PROTHROM AB SERPL-ACNC: 27.2 SEC — HIGH (ref 9.95–12.87)
RBC # BLD: 3.92 M/UL — LOW (ref 4.2–5.4)
RBC # FLD: 13.7 % — SIGNIFICANT CHANGE UP (ref 11.5–14.5)
SAO2 % BLDA: 99 % — HIGH (ref 94–98)
SODIUM SERPL-SCNC: 142 MMOL/L — SIGNIFICANT CHANGE UP (ref 135–146)
WBC # BLD: 5.04 K/UL — SIGNIFICANT CHANGE UP (ref 4.8–10.8)
WBC # FLD AUTO: 5.04 K/UL — SIGNIFICANT CHANGE UP (ref 4.8–10.8)

## 2018-08-14 RX ORDER — GABAPENTIN 400 MG/1
100 CAPSULE ORAL THREE TIMES A DAY
Qty: 0 | Refills: 0 | Status: DISCONTINUED | OUTPATIENT
Start: 2018-08-14 | End: 2018-08-15

## 2018-08-14 RX ORDER — CHLORHEXIDINE GLUCONATE 213 G/1000ML
1 SOLUTION TOPICAL
Qty: 0 | Refills: 0 | Status: DISCONTINUED | OUTPATIENT
Start: 2018-08-14 | End: 2018-08-15

## 2018-08-14 RX ORDER — PHYTONADIONE (VIT K1) 5 MG
2.5 TABLET ORAL AT BEDTIME
Qty: 0 | Refills: 0 | Status: COMPLETED | OUTPATIENT
Start: 2018-08-14 | End: 2018-08-14

## 2018-08-14 RX ADMIN — PANTOPRAZOLE SODIUM 40 MILLIGRAM(S): 20 TABLET, DELAYED RELEASE ORAL at 04:31

## 2018-08-14 RX ADMIN — Medication 100 MILLIGRAM(S): at 22:05

## 2018-08-14 RX ADMIN — ATORVASTATIN CALCIUM 20 MILLIGRAM(S): 80 TABLET, FILM COATED ORAL at 22:05

## 2018-08-14 RX ADMIN — GABAPENTIN 100 MILLIGRAM(S): 400 CAPSULE ORAL at 22:05

## 2018-08-14 RX ADMIN — MORPHINE SULFATE 2 MILLIGRAM(S): 50 CAPSULE, EXTENDED RELEASE ORAL at 00:05

## 2018-08-14 RX ADMIN — Medication 40 MILLIGRAM(S): at 17:28

## 2018-08-14 RX ADMIN — Medication 2.5 MILLIGRAM(S): at 16:23

## 2018-08-14 RX ADMIN — GABAPENTIN 100 MILLIGRAM(S): 400 CAPSULE ORAL at 16:23

## 2018-08-14 RX ADMIN — TIOTROPIUM BROMIDE 1 CAPSULE(S): 18 CAPSULE ORAL; RESPIRATORY (INHALATION) at 09:24

## 2018-08-14 RX ADMIN — Medication 0.5 MILLIGRAM(S): at 10:55

## 2018-08-14 NOTE — CONSULT NOTE ADULT - SUBJECTIVE AND OBJECTIVE BOX
Patient is a 72y old  Female who presents with a chief complaint of Right hip pain (13 Aug 2018 23:16)      HPI:  72 Y O f with pmh of DLD , afib on coumadin , COPD on home O2 , GERD , and lung ca s/p lobectomy in 2011 presented to ER s/p fall this morning. As per pt she had 2 falls , first was 2 wks ago when she fell on her right side hitting right side of her body , shoulder and hip. Pt reports that she had excruciating pain after that , no head trauma or LOC reported . Pt reports that she took alleve and motrin afterwards for pain , and stopped taking her coumadin for few days . Pt reports that she fell again today while trying to get her cane , again on same side but reports she did not hit her head or hip , just right shoulder this time. Again no trauma or LOC reported . Pt was c/o pain afterwards so was brought to ER . In ER pt was found to have fracture of right hip. (13 Aug 2018 19:14)      PAST MEDICAL & SURGICAL HISTORY:  COPD (chronic obstructive pulmonary disease)  Afib  Lung cancer  Pacemaker  Anxiety  High cholesterol  S/P appendectomy  History of tonsillectomy  S/P lobectomy of lung: left  H/O atrioventricular kacie ablation  Artificial cardiac pacemaker      SOCIAL HX:   Smoking - 1 PPD x 40 yrs, quit 13 yrs ago                  ETOH                            Other    FAMILY HISTORY:  No significant family history (Father, Mother, Sibling): known to patient as patient was adopted  .  No cardiovascular or pulmonary family history     Review of System:  See HPI    Allergies    bacitracin (Other)  carboplatin (Other)  sulfa drugs (Unknown)  sulfonamides (Unknown)  Xanax (Other)    Intolerances      PHYSICAL EXAM  Vital Signs Last 24 Hrs  T(C): 36.6 (14 Aug 2018 07:40), Max: 37.1 (13 Aug 2018 13:41)  T(F): 97.8 (14 Aug 2018 07:40), Max: 98.8 (13 Aug 2018 13:41)  HR: 69 (14 Aug 2018 07:40) (69 - 72)  BP: 125/74 (14 Aug 2018 07:40) (113/57 - 135/70)  BP(mean): --  RR: 18 (14 Aug 2018 07:40) (18 - 18)  SpO2: 97% (14 Aug 2018 04:01) (97% - 99%)    General: In NAD  HEENT: LUZMARIA             Lymphatic system: No cervical LN     Lungs: Decreased L sided BS. No wheezes, rales.  Cardiovascular: Regular  Gastrointestinal: Soft.  + BS   Musculoskeletal: No Clubbing.  Full range of motion.. Moves all extremities  Skin: Warm.  Intact  Neurological: No motor or sensory deficit       LABS:                          10.6   5.04  )-----------( 186      ( 14 Aug 2018 08:09 )             33.0                                               08-14    142  |  100  |  16  ----------------------------<  118<H>  4.5   |  32  |  0.8    Ca    9.1      14 Aug 2018 08:09  Mg     1.8     08-13    TPro  7.1  /  Alb  4.6  /  TBili  0.3  /  DBili  x   /  AST  21  /  ALT  13  /  AlkPhos  104  08-13      PT/INR - ( 14 Aug 2018 08:09 )   PT: 27.20 sec;   INR: 2.54 ratio         PTT - ( 13 Aug 2018 14:00 )  PTT:41.4 sec                                                                                     LIVER FUNCTIONS - ( 13 Aug 2018 14:00 )  Alb: 4.6 g/dL / Pro: 7.1 g/dL / ALK PHOS: 104 U/L / ALT: 13 U/L / AST: 21 U/L / GGT: x                                                                                                MEDICATIONS  (STANDING):  atorvastatin 20 milliGRAM(s) Oral at bedtime  docusate sodium 100 milliGRAM(s) Oral three times a day  pantoprazole    Tablet 40 milliGRAM(s) Oral before breakfast  tiotropium 18 MICROgram(s) Capsule 1 Capsule(s) Inhalation daily    MEDICATIONS  (PRN):  acetaminophen   Tablet. 650 milliGRAM(s) Oral every 6 hours PRN Moderate Pain (4 - 6)  aluminum hydroxide/magnesium hydroxide/simethicone Suspension 30 milliLiter(s) Oral every 4 hours PRN Dyspepsia  chlorhexidine 4% Liquid 1 Application(s) Topical <User Schedule> PRN as needed  clonazePAM  Oral Tab/Cap - Peds 0.5 milliGRAM(s) Oral two times a day PRN anxiety  morphine  - Injectable 2 milliGRAM(s) IV Push four times a day PRN Severe Pain (7 - 10)  ondansetron Injectable 4 milliGRAM(s) IV Push every 6 hours PRN Nausea  senna 2 Tablet(s) Oral at bedtime PRN Constipation

## 2018-08-14 NOTE — PROGRESS NOTE ADULT - SUBJECTIVE AND OBJECTIVE BOX
CHIEF COMPLAINT:    Patient is a 72y old Female who presents with a chief complaint of Right hip pain (13 Aug 2018 23:16)    Currently admitted to medicine with the primary diagnosis of Hip fracture     Today is hospital day 1d. This morning she is resting comfortably in bed and reports no new issues or overnight events.     PAST MEDICAL & SURGICAL HISTORY  COPD (chronic obstructive pulmonary disease)  Afib  Lung cancer  Pacemaker  Anxiety  High cholesterol  S/P appendectomy  History of tonsillectomy  S/P lobectomy of lung: left  H/O atrioventricular kacie ablation  Artificial cardiac pacemaker    SOCIAL HISTORY:  Negative for smoking/alcohol/drug use.     ALLERGIES:  bacitracin (Other)  carboplatin (Other)  sulfa drugs (Unknown)  sulfonamides (Unknown)  Xanax (Other)    MEDICATIONS:  STANDING MEDICATIONS  atorvastatin 20 milliGRAM(s) Oral at bedtime  docusate sodium 100 milliGRAM(s) Oral three times a day  gabapentin 100 milliGRAM(s) Oral three times a day  pantoprazole    Tablet 40 milliGRAM(s) Oral before breakfast  tiotropium 18 MICROgram(s) Capsule 1 Capsule(s) Inhalation daily    PRN MEDICATIONS  acetaminophen   Tablet. 650 milliGRAM(s) Oral every 6 hours PRN  aluminum hydroxide/magnesium hydroxide/simethicone Suspension 30 milliLiter(s) Oral every 4 hours PRN  chlorhexidine 4% Liquid 1 Application(s) Topical <User Schedule> PRN  clonazePAM  Oral Tab/Cap - Peds 0.5 milliGRAM(s) Oral two times a day PRN  morphine  - Injectable 2 milliGRAM(s) IV Push four times a day PRN  ondansetron Injectable 4 milliGRAM(s) IV Push every 6 hours PRN  senna 2 Tablet(s) Oral at bedtime PRN    VITALS:   T(F): 97.8  HR: 69  BP: 125/74  RR: 18  SpO2: 97%    LABS:                        10.6   5.04  )-----------( 186      ( 14 Aug 2018 08:09 )             33.0     08-14    142  |  100  |  16  ----------------------------<  118<H>  4.5   |  32  |  0.8    Ca    9.1      14 Aug 2018 08:09  Mg     1.8     08-13    TPro  7.1  /  Alb  4.6  /  TBili  0.3  /  DBili  x   /  AST  21  /  ALT  13  /  AlkPhos  104  08-13    PT/INR - ( 14 Aug 2018 08:09 )   PT: 27.20 sec;   INR: 2.54 ratio    PTT - ( 13 Aug 2018 14:00 )  PTT:41.4 sec    Lactate, Blood: 1.2 mmol/L (08-13-18 @ 14:00)    RADIOLOGY:  < from: CT Pelvis Bony Only No Cont (08.13.18 @ 16:21) >  1. Valgus impacted subcapital right femoral neck fracture.  2. Degenerative change as above.  < end of copied text >    < from: CT Head No Cont (08.13.18 @ 15:43) >  1.  No evidence of acute intracranial pathology.  Stable exam since 2/23/2018.    2.  Stable moderate chronic microvascular changes and chronic lacunar infarcts within the left basal ganglia.  < end of copied text >    < from: Xray Shoulder 2 Views, Right (08.13.18 @ 14:56) >  No acute fracture with glenohumeral and AC joint degenerative change. Greater tubercle sclerosis. No soft tissue calcifications  < end of copied text >    < from: Xray Chest 1 View AP/PA (08.13.18 @ 14:55) >  No radiographic evidence of acute cardiopulmonary disease. Stable left hemithorax and mediastinal changes.  < end of copied text >    < from: Xray Knee 3 Views, Right (08.13.18 @ 16:54) >  No evidence of acute fracture or dislocation. Osteoarthritis involving patellofemoral joint and medial articular joint. There is no evidence of suprapatellar effusion.  < end of copied text >            PHYSICAL EXAM:  GEN: No acute distress  PULM: Clear to auscultation bilaterally   CARD: S1/S2 present. RRR.   GI: Soft, non-tender, non-distended. Bowel sounds present  MSK: NC/NC/NE/2+PP/BOONE  NEURO: [id number]

## 2018-08-14 NOTE — CONSULT NOTE ADULT - SUBJECTIVE AND OBJECTIVE BOX
Patient is a 72y old  Female who presents with a chief complaint of Right hip pain     HPI: Patient is a 73 y/o F with PMH of atrial fibrillation s/p //V node ablation s/p Micratrans cathether placement on coumadin, DLD, COPD on 2.5 L - 3L home O2 , GERD , and lung ca s// lobectomy, chemo and radiation in 2011 presented to ER 08/13/18 s/p fall. Per pt she had 2 falls , first was 2 wks ago when she fell on her right side hitting right side of her body , shoulder and hip with subsequent excruciating pain, treated with aleve and motrin, and stopped taking coumadin for a few days - denied head trauma or LOC and stopped taking coumadin. Pt fell again 08/13/18 while trying to get her cane (which she started using after the first fall), injuring her right shoulder (denied trauma or LOC).     In ED, XR of R femur showed R sided subcapital fracture of the femur - mid and distal femur is unremarkable. The femoral head is slightly rotated externally. Patient is scheduled for a R hip closed reduction percutaneous pinning.     Patient denies hx of MI, CHF, DM or CKD. Hx of symptomatic atrial fib (palpitations, SOB) with AV node ablation and micratrans placement 2017 - reports marked improvement of symptoms since then. At baseline patient uses 2.5 L - 3L of home oxygen, which she only removes intermittently, and can walk half a block at best without shortness of breath, which she attributes to her COPD and lobectomy. Denies current or prior chest pain. Patient had a cath 2016 which showed normal coronary arteries. EF 09/2017 - 50-60%    PAST MEDICAL & SURGICAL HISTORY:  COPD (chronic obstructive pulmonary disease) on 2.5-3L home oxygen  Afib s/p AV kacie ablation and pacemaker (2017)  Lung cancer - s/p ARABELLA lobectomy, chemo and radiation  Pacemaker  Anxiety  High cholesterol  S/P appendectomy  History of tonsillectomy  S/P lobectomy of lung: left  H/O atrioventricular kacie ablation  Artificial cardiac pacemaker      MEDICATIONS  (STANDING):  atorvastatin 20 milliGRAM(s) Oral at bedtime  docusate sodium 100 milliGRAM(s) Oral three times a day  gabapentin 100 milliGRAM(s) Oral three times a day  pantoprazole    Tablet 40 milliGRAM(s) Oral before breakfast  tiotropium 18 MICROgram(s) Capsule 1 Capsule(s) Inhalation daily    MEDICATIONS  (PRN):  acetaminophen   Tablet. 650 milliGRAM(s) Oral every 6 hours PRN Moderate Pain (4 - 6)  aluminum hydroxide/magnesium hydroxide/simethicone Suspension 30 milliLiter(s) Oral every 4 hours PRN Dyspepsia  chlorhexidine 4% Liquid 1 Application(s) Topical <User Schedule> PRN as needed/  clonazePAM  Oral Tab/Cap - Peds 0.5 milliGRAM(s) Oral two times a day PRN anxiety  morphine  - Injectable 2 milliGRAM(s) IV Push four times a day PRN Severe Pain (7 - 10)  ondansetron Injectable 4 milliGRAM(s) IV Push every 6 hours PRN Nausea  senna 2 Tablet(s) Oral at bedtime PRN Constipation    SH: previous 40 pack year smoker - quit 2005.     FH: Non contributory    ICU Vital Signs Last 24 Hrs  T(C): 36.6 (14 Aug 2018 07:40), Max: 36.6 (14 Aug 2018 07:40)  T(F): 97.8 (14 Aug 2018 07:40), Max: 97.8 (14 Aug 2018 07:40)  HR: 69 (14 Aug 2018 07:40) (69 - 72)  BP: 125/74 (14 Aug 2018 07:40) (113/57 - 135/70)  BP(mean): --  ABP: --  ABP(mean): --  RR: 18 (14 Aug 2018 07:40) (18 - 18)  SpO2: 97% (14 Aug 2018 04:01) (97% - 99%)    PHYSICAL EXAM:    Constitutional: Sitting comfortably in bed in no acute distress  Eyes: No conjunctival pallor, no scleral icterus  Neck: Supple. No JVD  Respiratory: No increased work of breathing. Good air entry bilaterally. Bibasilar wheezes  Cardiovascular: Regular rate and rhythm, no murmurs, gallops, rubs. S1, S2.  Gastrointestinal: Soft, non tender non distended. +BS  Extremities: No leg edema bilaterally.  Neurological: A&O X3  Musculoskeletal: +5/5 in upper and lower extremities.  Psychiatric: Appropriate mood and affect.      08-14    142  |  100  |  16  ----------------------------<  118<H>  4.5   |  32  |  0.8    Ca    9.1      14 Aug 2018 08:09  Mg     1.8     08-13    TPro  7.1  /  Alb  4.6  /  TBili  0.3  /  DBili  x   /  AST  21  /  ALT  13  /  AlkPhos  104  08-13                            10.6   5.04  )-----------( 186      ( 14 Aug 2018 08:09 )             33.0   PT/INR - ( 14 Aug 2018 08:09 )   PT: 27.20 sec;   INR: 2.54 ratio         PTT - ( 13 Aug 2018 14:00 )  PTT:41.4 sec Patient is a 72y old  Female who presents with a chief complaint of Right hip pain     HPI: Patient is a 73 y/o F with PMH of atrial fibrillation s/p AV node ablation s/p Micratrans cathether pacemaker placement on coumadin, DLD, COPD on 2.5 L - 3L home O2 , GERD , and lung ca s// lobectomy, chemo and radiation in 2011 presented to ER 08/13/18 s/p fall. Per pt she had 2 falls , first was 2 wks ago when she fell on her right side hitting right side of her body , shoulder and hip with subsequent excruciating pain, treated with aleve and motrin, and stopped taking coumadin for a few days - denied head trauma or LOC and stopped taking coumadin. Pt fell again 08/13/18 while trying to get her cane (which she started using after the first fall), injuring her right shoulder (denied trauma or LOC).     In ED, XR of R femur showed R sided subcapital fracture of the femur - mid and distal femur is unremarkable, the femoral head is slightly rotated externally. Patient is scheduled for a R hip closed reduction percutaneous pinning.     Patient denies hx of MI, CHF, DM or CKD. Hx of symptomatic atrial fib (palpitations, SOB) with AV node ablation and micratrans placement 2017 - reports marked improvement of symptoms since then. At baseline patient uses 2.5 L - 3L of home oxygen, which she only removes intermittently, and can walk half a block at best without shortness of breath, which she attributes to her COPD and lobectomy. Denies current or prior chest pain. Patient had a cath 2016 which showed normal coronary arteries. EF 09/2017 - 50-60%,  mild MVR, mild-mod TVR, mildly elevated pulmonary artery pressure. Not an active smoker.    PAST MEDICAL & SURGICAL HISTORY:  COPD (chronic obstructive pulmonary disease) on 2.5-3L home oxygen  Afib s/p AV kacie ablation and pacemaker (2017)  Lung cancer - s/p ARABELLA lobectomy, chemo and radiation  Pacemaker  Anxiety  High cholesterol  S/P appendectomy  History of tonsillectomy  S/P lobectomy of lung: left  H/O atrioventricular kacie ablation  Artificial cardiac pacemaker      MEDICATIONS  (STANDING):  atorvastatin 20 milliGRAM(s) Oral at bedtime  docusate sodium 100 milliGRAM(s) Oral three times a day  gabapentin 100 milliGRAM(s) Oral three times a day  pantoprazole    Tablet 40 milliGRAM(s) Oral before breakfast  tiotropium 18 MICROgram(s) Capsule 1 Capsule(s) Inhalation daily    MEDICATIONS  (PRN):  acetaminophen   Tablet. 650 milliGRAM(s) Oral every 6 hours PRN Moderate Pain (4 - 6)  aluminum hydroxide/magnesium hydroxide/simethicone Suspension 30 milliLiter(s) Oral every 4 hours PRN Dyspepsia  chlorhexidine 4% Liquid 1 Application(s) Topical <User Schedule> PRN as needed/  clonazePAM  Oral Tab/Cap - Peds 0.5 milliGRAM(s) Oral two times a day PRN anxiety  morphine  - Injectable 2 milliGRAM(s) IV Push four times a day PRN Severe Pain (7 - 10)  ondansetron Injectable 4 milliGRAM(s) IV Push every 6 hours PRN Nausea  senna 2 Tablet(s) Oral at bedtime PRN Constipation    SH: previous 40 pack year smoker - quit 2005.     FH: Non contributory    ICU Vital Signs Last 24 Hrs  T(C): 36.6 (14 Aug 2018 07:40), Max: 36.6 (14 Aug 2018 07:40)  T(F): 97.8 (14 Aug 2018 07:40), Max: 97.8 (14 Aug 2018 07:40)  HR: 69 (14 Aug 2018 07:40) (69 - 72)  BP: 125/74 (14 Aug 2018 07:40) (113/57 - 135/70)  BP(mean): --  ABP: --  ABP(mean): --  RR: 18 (14 Aug 2018 07:40) (18 - 18)  SpO2: 97% (14 Aug 2018 04:01) (97% - 99%)    PHYSICAL EXAM:    Constitutional: Sitting comfortably in bed in no acute distress  Eyes: No conjunctival pallor, no scleral icterus  Neck: Supple. No JVD  Respiratory: No increased work of breathing. Good air entry bilaterally. Bibasilar wheezes  Cardiovascular: Regular rate and rhythm, no murmurs, gallops, rubs. S1, S2.  Gastrointestinal: Soft, non tender non distended. +BS  Extremities: No leg edema bilaterally.  Neurological: A&O X3  Musculoskeletal: +5/5 in upper and lower extremities.  Psychiatric: Appropriate mood and affect.      08-14    142  |  100  |  16  ----------------------------<  118<H>  4.5   |  32  |  0.8    Ca    9.1      14 Aug 2018 08:09  Mg     1.8     08-13    TPro  7.1  /  Alb  4.6  /  TBili  0.3  /  DBili  x   /  AST  21  /  ALT  13  /  AlkPhos  104  08-13                            10.6   5.04  )-----------( 186      ( 14 Aug 2018 08:09 )             33.0   PT/INR - ( 14 Aug 2018 08:09 )   PT: 27.20 sec;   INR: 2.54 ratio         PTT - ( 13 Aug 2018 14:00 )  PTT:41.4 sec

## 2018-08-14 NOTE — CONSULT NOTE ADULT - ASSESSMENT
#Cardiac clearance  - low to intermittent risk procedure  - No prior hx of MI, CHF, DM, CKD (Cr 0.8)  - Hx of A. fib s/p AV kacie ablation s/p pacemaker - ECG shows ventricular paced rhythm.  -METS <4  -Intermittent risk patient. #Cardiac clearance  - low to intermittent risk procedure  - No prior hx of MI, CHF, DM, CKD (Cr 0.8)  - Hx of A. fib s/p AV kacie ablation s/p micratrans catheter pacemaker on Coumadin- symptoms controlled - ECG shows ventricular paced rhythm.  - METS <4      COPD on 2.5-3L home oxygen  -Intermittent risk patient.    -Intermittent risk for intra and post op cardiac events.  -Continue medical management

## 2018-08-14 NOTE — CONSULT NOTE ADULT - ASSESSMENT
71 y/o F with PMHx of DLD, afib on coumadin, GERD, COPD on home O2, and lung ca s/p lobectomy in 2011 presented to ER s/p fall. Pt found to have R hip fx.    IMPRESSION:  - low risk of post-op pulmonary complications    PLAN:  - Sx with ortho tomorrow

## 2018-08-14 NOTE — PROGRESS NOTE ADULT - ASSESSMENT
71 y/o F with PMH of DLD, Atrial fibrillation on coumadin, GERD, COPD on home O2 2.5-3L, Lung Cancer s/p left upper lobectomy in 2011, chemo and radiation, complicated by radiation scarring of lungs presented with right hip and shoulder pain after mechanical fall today. Pt had the first mechanical fall 2 weeks ago after pt become out of balance while kicking the oxygen tubing and fell down hard with her right side on floor. Since then, pt was having throbbing pain on right leg which travels down the leg worse with movement, has been taking tylenol and NSAIDs to relieve the pain associated with limping of right leg. Pt is independently ambulatory prior to this event. On the day of presentation, pt lost balance while using cane and fell on her right knee which worsened the pain. Pt states that the initial fall is likely cause of the injury. Otherwise, pt denies head trauma or LOC, fever, chills, chest pain, palpitations, cough, sputum production, wheezing, abdominal pain, diarrhea, dysuria but admits to chronic shortness of breath.    # Subcapital right femoral neck fracture  - NPO after midnight for possible OR, follow up Ortho - delayed due to high INR and awaiting clearance  - pain control, bowel regimen    # Cardiac Pulmonary risk assessment for orthopedic procedure  * Pt denies prior history of heart attacks, MI or other arrythmia other than Afib. Pt had h/o atrial fibrillation uncontrolled on medications, therefore, pt subsequently, had wireless pacemaker known as micra transcatheter pacing system placed and AV kacie ablation done in 2017. Since then, she has no further symptoms or issues with device.  * Pt was diagnosed with lung cancer in 2011, underwent left upper lobectomy followed by chemotherapy and radiation until 2012. Pt also has known CT evidence of radiation injury to lung. At baseline, pt uses 2.5-3L oxygen via nasal cannula and pulse oxygen usually runs at 98%. Otherwise, pt's activity level is limited due to chronic shortness of breath.  * Previous old medical records reviewed noted, 3/2/2016 Cardiac Cath - normal coronaries, Last echo 09/12/2017 EF 50-60%, mild MVR, mild-mod TVR, mildly elevated pulmonary artery pressure  - METS<4, moderate cardiac risk, however, pt has high pulmonary risk for complications due to underlying low lung reserve; otherwise, currently optimized medically, stable cardio and pulmonary status  - use of spirometry, restart anticoagulation post-procedure promptly when cleared by ortho, start DVT prophylaxis for INR<2  - follow up further Pulmonary and Cardio input regarding medical kyle-operative management    # Atrial fibrillation s/p AV kacie ablation and ventricular paced at a rate of 70, rate controlled  - will hold coumadin tonight for possible OR in AM  - check INR in am    # DLD   - c/w statin    # COPD, stable  - c/w oxygen supplementation  - c/w spiriva    # Anxiety  - c/w klonopin prn, hold on the day of surgery    # GERD  - c/w protonix    # DVT  - currently therapeutic INR

## 2018-08-14 NOTE — CONSULT NOTE ADULT - ASSESSMENT
71 y/o F with PMHx of DLD, afib on coumadin, GERD, COPD on home O2, and lung ca s/p lobectomy in 2011 presented to ER s/p fall. Pt found to have R hip fx.    IMPRESSION:  - low risk of post-op pulmonary complications    PLAN:  - Sx with ortho tomorrow  - solumedrol 40 IV x 2 dose  - check ABG to evaluate for hypercapnia IMPRESSION:  - COPD stable  - RO hypercapnia  - Chronic hypoxemic respiratory failure on home O2  - At the present time from the pulmonary stand point, the patient is stable for her surgery.     PLAN:  - solumedrol 40 IV QD x 2 doses  - check ABG to evaluate for hypercapnia  - Albuterol PRN  - General pulmonary post operative care in particular aggressive DVT prophylaxis   - Continue Spiriva

## 2018-08-15 LAB
ANION GAP SERPL CALC-SCNC: 13 MMOL/L — SIGNIFICANT CHANGE UP (ref 7–14)
APTT BLD: 31.4 SEC — SIGNIFICANT CHANGE UP (ref 27–39.2)
BLD GP AB SCN SERPL QL: SIGNIFICANT CHANGE UP
BUN SERPL-MCNC: 19 MG/DL — SIGNIFICANT CHANGE UP (ref 10–20)
CALCIUM SERPL-MCNC: 9.7 MG/DL — SIGNIFICANT CHANGE UP (ref 8.5–10.1)
CHLORIDE SERPL-SCNC: 98 MMOL/L — SIGNIFICANT CHANGE UP (ref 98–110)
CO2 SERPL-SCNC: 29 MMOL/L — SIGNIFICANT CHANGE UP (ref 17–32)
CREAT SERPL-MCNC: 0.8 MG/DL — SIGNIFICANT CHANGE UP (ref 0.7–1.5)
GLUCOSE SERPL-MCNC: 182 MG/DL — HIGH (ref 70–99)
HCT VFR BLD CALC: 33.1 % — LOW (ref 37–47)
HGB BLD-MCNC: 10.8 G/DL — LOW (ref 12–16)
INR BLD: 1.38 RATIO — HIGH (ref 0.65–1.3)
MCHC RBC-ENTMCNC: 26.9 PG — LOW (ref 27–31)
MCHC RBC-ENTMCNC: 32.6 G/DL — SIGNIFICANT CHANGE UP (ref 32–37)
MCV RBC AUTO: 82.5 FL — SIGNIFICANT CHANGE UP (ref 81–99)
NRBC # BLD: 0 /100 WBCS — SIGNIFICANT CHANGE UP (ref 0–0)
PLATELET # BLD AUTO: 217 K/UL — SIGNIFICANT CHANGE UP (ref 130–400)
POTASSIUM SERPL-MCNC: 4.8 MMOL/L — SIGNIFICANT CHANGE UP (ref 3.5–5)
POTASSIUM SERPL-SCNC: 4.8 MMOL/L — SIGNIFICANT CHANGE UP (ref 3.5–5)
PROTHROM AB SERPL-ACNC: 14.9 SEC — HIGH (ref 9.95–12.87)
RBC # BLD: 4.01 M/UL — LOW (ref 4.2–5.4)
RBC # FLD: 13.6 % — SIGNIFICANT CHANGE UP (ref 11.5–14.5)
SODIUM SERPL-SCNC: 140 MMOL/L — SIGNIFICANT CHANGE UP (ref 135–146)
TYPE + AB SCN PNL BLD: SIGNIFICANT CHANGE UP
WBC # BLD: 6.89 K/UL — SIGNIFICANT CHANGE UP (ref 4.8–10.8)
WBC # FLD AUTO: 6.89 K/UL — SIGNIFICANT CHANGE UP (ref 4.8–10.8)

## 2018-08-15 RX ORDER — GABAPENTIN 400 MG/1
100 CAPSULE ORAL THREE TIMES A DAY
Qty: 0 | Refills: 0 | Status: DISCONTINUED | OUTPATIENT
Start: 2018-08-15 | End: 2018-08-20

## 2018-08-15 RX ORDER — MORPHINE SULFATE 50 MG/1
2 CAPSULE, EXTENDED RELEASE ORAL
Qty: 0 | Refills: 0 | Status: DISCONTINUED | OUTPATIENT
Start: 2018-08-15 | End: 2018-08-20

## 2018-08-15 RX ORDER — CLONAZEPAM 1 MG
0.5 TABLET ORAL
Qty: 0 | Refills: 0 | Status: DISCONTINUED | OUTPATIENT
Start: 2018-08-15 | End: 2018-08-19

## 2018-08-15 RX ORDER — DOCUSATE SODIUM 100 MG
100 CAPSULE ORAL THREE TIMES A DAY
Qty: 0 | Refills: 0 | Status: DISCONTINUED | OUTPATIENT
Start: 2018-08-15 | End: 2018-08-20

## 2018-08-15 RX ORDER — ENOXAPARIN SODIUM 100 MG/ML
40 INJECTION SUBCUTANEOUS DAILY
Qty: 0 | Refills: 0 | Status: DISCONTINUED | OUTPATIENT
Start: 2018-08-15 | End: 2018-08-16

## 2018-08-15 RX ORDER — TIOTROPIUM BROMIDE 18 UG/1
1 CAPSULE ORAL; RESPIRATORY (INHALATION) DAILY
Qty: 0 | Refills: 0 | Status: DISCONTINUED | OUTPATIENT
Start: 2018-08-15 | End: 2018-08-20

## 2018-08-15 RX ORDER — ENOXAPARIN SODIUM 100 MG/ML
40 INJECTION SUBCUTANEOUS DAILY
Qty: 0 | Refills: 0 | Status: CANCELLED | OUTPATIENT
Start: 2018-08-16 | End: 2018-08-15

## 2018-08-15 RX ORDER — PANTOPRAZOLE SODIUM 20 MG/1
40 TABLET, DELAYED RELEASE ORAL
Qty: 0 | Refills: 0 | Status: DISCONTINUED | OUTPATIENT
Start: 2018-08-15 | End: 2018-08-20

## 2018-08-15 RX ORDER — SODIUM CHLORIDE 9 MG/ML
1000 INJECTION, SOLUTION INTRAVENOUS
Qty: 0 | Refills: 0 | Status: DISCONTINUED | OUTPATIENT
Start: 2018-08-15 | End: 2018-08-15

## 2018-08-15 RX ORDER — SENNA PLUS 8.6 MG/1
2 TABLET ORAL AT BEDTIME
Qty: 0 | Refills: 0 | Status: DISCONTINUED | OUTPATIENT
Start: 2018-08-15 | End: 2018-08-20

## 2018-08-15 RX ORDER — ATORVASTATIN CALCIUM 80 MG/1
20 TABLET, FILM COATED ORAL AT BEDTIME
Qty: 0 | Refills: 0 | Status: DISCONTINUED | OUTPATIENT
Start: 2018-08-15 | End: 2018-08-20

## 2018-08-15 RX ORDER — ONDANSETRON 8 MG/1
4 TABLET, FILM COATED ORAL EVERY 6 HOURS
Qty: 0 | Refills: 0 | Status: DISCONTINUED | OUTPATIENT
Start: 2018-08-15 | End: 2018-08-20

## 2018-08-15 RX ORDER — ACETAMINOPHEN 500 MG
650 TABLET ORAL EVERY 6 HOURS
Qty: 0 | Refills: 0 | Status: DISCONTINUED | OUTPATIENT
Start: 2018-08-15 | End: 2018-08-20

## 2018-08-15 RX ORDER — MEPERIDINE HYDROCHLORIDE 50 MG/ML
12.5 INJECTION INTRAMUSCULAR; INTRAVENOUS; SUBCUTANEOUS
Qty: 0 | Refills: 0 | Status: DISCONTINUED | OUTPATIENT
Start: 2018-08-15 | End: 2018-08-15

## 2018-08-15 RX ORDER — SODIUM CHLORIDE 9 MG/ML
1000 INJECTION, SOLUTION INTRAVENOUS
Qty: 0 | Refills: 0 | Status: DISCONTINUED | OUTPATIENT
Start: 2018-08-15 | End: 2018-08-16

## 2018-08-15 RX ORDER — MORPHINE SULFATE 50 MG/1
4 CAPSULE, EXTENDED RELEASE ORAL
Qty: 0 | Refills: 0 | Status: DISCONTINUED | OUTPATIENT
Start: 2018-08-15 | End: 2018-08-15

## 2018-08-15 RX ORDER — CEFAZOLIN SODIUM 1 G
1000 VIAL (EA) INJECTION EVERY 8 HOURS
Qty: 0 | Refills: 0 | Status: COMPLETED | OUTPATIENT
Start: 2018-08-15 | End: 2018-08-16

## 2018-08-15 RX ORDER — ONDANSETRON 8 MG/1
4 TABLET, FILM COATED ORAL ONCE
Qty: 0 | Refills: 0 | Status: DISCONTINUED | OUTPATIENT
Start: 2018-08-15 | End: 2018-08-15

## 2018-08-15 RX ORDER — CHLORHEXIDINE GLUCONATE 213 G/1000ML
1 SOLUTION TOPICAL
Qty: 0 | Refills: 0 | Status: DISCONTINUED | OUTPATIENT
Start: 2018-08-15 | End: 2018-08-20

## 2018-08-15 RX ORDER — ENOXAPARIN SODIUM 100 MG/ML
40 INJECTION SUBCUTANEOUS ONCE
Qty: 0 | Refills: 0 | Status: COMPLETED | OUTPATIENT
Start: 2018-08-15 | End: 2018-08-15

## 2018-08-15 RX ADMIN — SODIUM CHLORIDE 100 MILLILITER(S): 9 INJECTION, SOLUTION INTRAVENOUS at 14:37

## 2018-08-15 RX ADMIN — Medication 40 MILLIGRAM(S): at 05:58

## 2018-08-15 RX ADMIN — MORPHINE SULFATE 4 MILLIGRAM(S): 50 CAPSULE, EXTENDED RELEASE ORAL at 15:22

## 2018-08-15 RX ADMIN — MORPHINE SULFATE 4 MILLIGRAM(S): 50 CAPSULE, EXTENDED RELEASE ORAL at 15:40

## 2018-08-15 RX ADMIN — Medication 100 MILLIGRAM(S): at 21:40

## 2018-08-15 RX ADMIN — Medication 650 MILLIGRAM(S): at 02:06

## 2018-08-15 RX ADMIN — MORPHINE SULFATE 2 MILLIGRAM(S): 50 CAPSULE, EXTENDED RELEASE ORAL at 21:15

## 2018-08-15 RX ADMIN — SODIUM CHLORIDE 75 MILLILITER(S): 9 INJECTION, SOLUTION INTRAVENOUS at 04:31

## 2018-08-15 RX ADMIN — GABAPENTIN 100 MILLIGRAM(S): 400 CAPSULE ORAL at 05:58

## 2018-08-15 RX ADMIN — Medication 100 MILLIGRAM(S): at 05:58

## 2018-08-15 RX ADMIN — PANTOPRAZOLE SODIUM 40 MILLIGRAM(S): 20 TABLET, DELAYED RELEASE ORAL at 05:58

## 2018-08-15 RX ADMIN — ENOXAPARIN SODIUM 40 MILLIGRAM(S): 100 INJECTION SUBCUTANEOUS at 11:13

## 2018-08-15 RX ADMIN — GABAPENTIN 100 MILLIGRAM(S): 400 CAPSULE ORAL at 21:40

## 2018-08-15 RX ADMIN — MORPHINE SULFATE 4 MILLIGRAM(S): 50 CAPSULE, EXTENDED RELEASE ORAL at 16:12

## 2018-08-15 RX ADMIN — MORPHINE SULFATE 4 MILLIGRAM(S): 50 CAPSULE, EXTENDED RELEASE ORAL at 16:25

## 2018-08-15 RX ADMIN — Medication 0.5 MILLIGRAM(S): at 21:52

## 2018-08-15 RX ADMIN — Medication 1 TABLET(S): at 21:43

## 2018-08-15 RX ADMIN — MORPHINE SULFATE 2 MILLIGRAM(S): 50 CAPSULE, EXTENDED RELEASE ORAL at 21:42

## 2018-08-15 RX ADMIN — Medication 0.5 MILLIGRAM(S): at 02:04

## 2018-08-15 RX ADMIN — SODIUM CHLORIDE 75 MILLILITER(S): 9 INJECTION, SOLUTION INTRAVENOUS at 16:00

## 2018-08-15 RX ADMIN — ATORVASTATIN CALCIUM 20 MILLIGRAM(S): 80 TABLET, FILM COATED ORAL at 21:40

## 2018-08-15 NOTE — PROGRESS NOTE ADULT - ASSESSMENT
73 y/o F with PMH of DLD, Atrial fibrillation on coumadin, GERD, COPD on home O2 2.5-3L, Lung Cancer s/p left upper lobectomy in 2011, chemo and radiation, complicated by radiation scarring of lungs presented with right hip and shoulder pain after mechanical fall today. Pt had the first mechanical fall 2 weeks ago after pt become out of balance while kicking the oxygen tubing and fell down hard with her right side on floor. Since then, pt was having throbbing pain on right leg which travels down the leg worse with movement, has been taking tylenol and NSAIDs to relieve the pain associated with limping of right leg. Pt is independently ambulatory prior to this event. On the day of presentation, pt lost balance while using cane and fell on her right knee which worsened the pain. Pt states that the initial fall is likely cause of the injury. Otherwise, pt denies head trauma or LOC, fever, chills, chest pain, palpitations, cough, sputum production, wheezing, abdominal pain, diarrhea, dysuria but admits to chronic shortness of breath.    # Subcapital right femoral neck fracture. Cleared by card and pulm.  - NPO for OR, per ortho, procedure today  - pain control, bowel regimen    # Atrial fibrillation s/p AV kacie ablation and ventricular paced at a rate of 70, rate controlled  - will hold coumadin tonight for possible OR in AM  - check INR in am    # DLD   - c/w statin    # COPD, stable. s/p solumedrol 40 IV QD x 2 doses. ABG results most likely consistent with compensated respiratory acidosis. On home O2 2.5-3L.  - c/w oxygen supplementation  - c/w spiriva    # Anxiety  - c/w klonopin prn, hold on the day of surgery    # GERD  - c/w protonix    # DVT  - restart following procedure 73 y/o F with PMH of DLD, Atrial fibrillation on coumadin, GERD, COPD on home O2 2.5-3L, Lung Cancer s/p left upper lobectomy in 2011, chemo and radiation, complicated by radiation scarring of lungs presented with right hip and shoulder pain after mechanical fall today. Pt had the first mechanical fall 2 weeks ago after pt become out of balance while kicking the oxygen tubing and fell down hard with her right side on floor. Since then, pt was having throbbing pain on right leg which travels down the leg worse with movement, has been taking tylenol and NSAIDs to relieve the pain associated with limping of right leg. Pt is independently ambulatory prior to this event. On the day of presentation, pt lost balance while using cane and fell on her right knee which worsened the pain. Pt states that the initial fall is likely cause of the injury. Otherwise, pt denies head trauma or LOC, fever, chills, chest pain, palpitations, cough, sputum production, wheezing, abdominal pain, diarrhea, dysuria but admits to chronic shortness of breath.    # Subcapital right femoral neck fracture. Cleared by card and pulm.  - NPO for OR, per ortho, procedure today  - pain control, bowel regimen    # Atrial fibrillation s/p AV kacie ablation and ventricular paced at a rate of 70, rate controlled  - will hold coumadin tonight for OR  - restart following    # DLD   - c/w statin    # COPD, stable. s/p solumedrol 40 IV QD x 2 doses. ABG results most likely consistent with compensated respiratory acidosis. On home O2 2.5-3L.  - c/w oxygen supplementation  - c/w spiriva    # Anxiety  - c/w klonopin prn, hold on the day of surgery    # GERD  - c/w protonix    # DVT  - lovenox qd

## 2018-08-15 NOTE — PROGRESS NOTE ADULT - ASSESSMENT
IMPRESSION:  - COPD stable  - Chronic hypercapnic/hypoxic respiratory failure on home oxygen  - At the present time from the pulmonary stand point, the patient is stable for her surgery.     PLAN:  - solumedrol 40 IV QD x 2 doses  - Albuterol PRN  - General pulmonary post operative care in particular aggressive DVT prophylaxis   - Continue Spiriva   - bipap post-op and qhs IMPRESSION:  - COPD on home oxygen - stable  - ABG no evidence of hypercapnia - Metabolic alkalosis with respiratory compensation  - At the present time from the pulmonary stand point, the patient is stable for her surgery.     PLAN:  - solumedrol 40 IV QD x 2 doses  - Albuterol PRN  - General pulmonary post operative care in particular aggressive DVT prophylaxis   - Continue Spiriva   - no need for post op or qhs bipap IMPRESSION:  - COPD on home oxygen - stable  - At the present time from the pulmonary stand point, the patient remains stable for her surgery.     PLAN:  - solumedrol 40 IV QD x 2 doses  - Albuterol PRN  - General pulmonary post operative care in particular aggressive DVT prophylaxis   - Continue Spiriva   - Recall PRN p

## 2018-08-15 NOTE — PROGRESS NOTE ADULT - SUBJECTIVE AND OBJECTIVE BOX
CHIEF COMPLAINT:    Patient is a 72y old Female who presents with a chief complaint of Right hip pain (13 Aug 2018 23:16)    Currently admitted to medicine with the primary diagnosis of Hip fracture     Today is hospital day 2d. This morning she is resting comfortably in bed and reports no new issues or overnight events.     PAST MEDICAL & SURGICAL HISTORY  COPD (chronic obstructive pulmonary disease)  Afib  Lung cancer  Pacemaker  Anxiety  High cholesterol  S/P appendectomy  History of tonsillectomy  S/P lobectomy of lung: left  H/O atrioventricular kacie ablation  Artificial cardiac pacemaker    SOCIAL HISTORY:  Negative for smoking/alcohol/drug use.     ALLERGIES:  bacitracin (Other)  carboplatin (Other)  sulfa drugs (Unknown)  sulfonamides (Unknown)  Xanax (Other)    MEDICATIONS:  STANDING MEDICATIONS  atorvastatin 20 milliGRAM(s) Oral at bedtime  dextrose 5% + sodium chloride 0.9%. 1000 milliLiter(s) IV Continuous <Continuous>  docusate sodium 100 milliGRAM(s) Oral three times a day  gabapentin 100 milliGRAM(s) Oral three times a day  pantoprazole    Tablet 40 milliGRAM(s) Oral before breakfast  tiotropium 18 MICROgram(s) Capsule 1 Capsule(s) Inhalation daily    PRN MEDICATIONS  acetaminophen   Tablet. 650 milliGRAM(s) Oral every 6 hours PRN  aluminum hydroxide/magnesium hydroxide/simethicone Suspension 30 milliLiter(s) Oral every 4 hours PRN  chlorhexidine 4% Liquid 1 Application(s) Topical <User Schedule> PRN  clonazePAM  Oral Tab/Cap - Peds 0.5 milliGRAM(s) Oral two times a day PRN  morphine  - Injectable 2 milliGRAM(s) IV Push four times a day PRN  ondansetron Injectable 4 milliGRAM(s) IV Push every 6 hours PRN  senna 2 Tablet(s) Oral at bedtime PRN    VITALS:   T(F): 97.8  HR: 71  BP: 107/61  RR: 18  SpO2: --    LABS:                 10.8   6.89  )-----------( 217      ( 15 Aug 2018 06:53 )             33.1     08-15    140  |  98  |  19  ----------------------------<  182<H>  4.8   |  29  |  0.8    Ca    9.7      15 Aug 2018 06:53  Mg     1.8     08-13    TPro  7.1  /  Alb  4.6  /  TBili  0.3  /  DBili  x   /  AST  21  /  ALT  13  /  AlkPhos  104  08-13    PT/INR - ( 15 Aug 2018 06:53 )   PT: 14.90 sec;   INR: 1.38 ratio    PTT - ( 15 Aug 2018 06:53 )  PTT:31.4 sec    ABG - ( 14 Aug 2018 19:18 )  pH, Arterial: 7.44  pH, Blood: x     /  pCO2: 45    /  pO2: 112   / HCO3: 30    / Base Excess: 5.5   /  SaO2: 99        RADIOLOGY:  < from: CT Pelvis Bony Only No Cont (08.13.18 @ 16:21) >  1. Valgus impacted subcapital right femoral neck fracture.  2. Degenerative change as above.  < end of copied text >    < from: CT Head No Cont (08.13.18 @ 15:43) >  1.  No evidence of acute intracranial pathology.  Stable exam since 2/23/2018.    2.  Stable moderate chronic microvascular changes and chronic lacunar infarcts within the left basal ganglia.  < end of copied text >    < from: Xray Shoulder 2 Views, Right (08.13.18 @ 14:56) >  No acute fracture with glenohumeral and AC joint degenerative change. Greater tubercle sclerosis. No soft tissue calcifications  < end of copied text >    < from: Xray Chest 1 View AP/PA (08.13.18 @ 14:55) >  No radiographic evidence of acute cardiopulmonary disease. Stable left hemithorax and mediastinal changes.  < end of copied text >    < from: Xray Knee 3 Views, Right (08.13.18 @ 16:54) >  No evidence of acute fracture or dislocation. Osteoarthritis involving patellofemoral joint and medial articular joint. There is no evidence of suprapatellar effusion.  < end of copied text >      PHYSICAL EXAM:  GEN: No acute distress  PULM: Clear to auscultation bilaterally   CARD: S1/S2 present. RRR.   GI: Soft, non-tender, non-distended. Bowel sounds present  NEURO: AAOX3

## 2018-08-15 NOTE — BRIEF OPERATIVE NOTE - OPERATION/FINDINGS
valgus impacted right subcapital femur fracture;  post op - WBAT; ABx and DVT and ppx per protocol  Dict:  Implants: Synthes 3 x 7.3mm cannulated screws valgus impacted right subcapital femur fracture;  post op - WBAT; ABx and DVT and ppx per protocol  Dict:[id number]  Implants: Synthes 3 x 7.3mm cannulated screws

## 2018-08-15 NOTE — PROGRESS NOTE ADULT - SUBJECTIVE AND OBJECTIVE BOX
Patient is a 72y old  Female who presents with a chief complaint of Right hip pain (13 Aug 2018 23:16)        SUBJECTIVE:      REVIEW OF SYSTEMS:  See HPI    PHYSICAL EXAM  Vital Signs Last 24 Hrs  T(C): 37.1 (15 Aug 2018 00:11), Max: 37.1 (15 Aug 2018 00:11)  T(F): 98.8 (15 Aug 2018 00:11), Max: 98.8 (15 Aug 2018 00:11)  HR: 70 (15 Aug 2018 00:11) (69 - 70)  BP: 106/62 (15 Aug 2018 00:11) (102/56 - 125/74)  BP(mean): --  RR: 18 (15 Aug 2018 00:11) (18 - 18)  SpO2: --    General: In NAD  HEENT: LUZMARIA               Lymphatic system: No Cervical LN    Respiratory: Petey BS  Cardiovascular: Regular  Gastrointestinal: Soft. + BS  Musculoskeletal: No clubbing.  moves all extremities.  Full range of motion    Skin: Warm.  Intact  Neurological: No motor or sensory deficit      08-14-18 @ 07:01  -  08-15-18 @ 07:00  --------------------------------------------------------  IN:    Oral Fluid: 260 mL  Total IN: 260 mL    OUT:    Voided: 500 mL  Total OUT: 500 mL    Total NET: -240 mL          LABS:                          10.6   5.04  )-----------( 186      ( 14 Aug 2018 08:09 )             33.0                                               08-14    142  |  100  |  16  ----------------------------<  118<H>  4.5   |  32  |  0.8    Ca    9.1      14 Aug 2018 08:09  Mg     1.8     08-13    TPro  7.1  /  Alb  4.6  /  TBili  0.3  /  DBili  x   /  AST  21  /  ALT  13  /  AlkPhos  104  08-13      PT/INR - ( 14 Aug 2018 08:09 )   PT: 27.20 sec;   INR: 2.54 ratio         PTT - ( 13 Aug 2018 14:00 )  PTT:41.4 sec                                                                                     LIVER FUNCTIONS - ( 13 Aug 2018 14:00 )  Alb: 4.6 g/dL / Pro: 7.1 g/dL / ALK PHOS: 104 U/L / ALT: 13 U/L / AST: 21 U/L / GGT: x                                                                                            ABG - ( 14 Aug 2018 19:18 )  pH, Arterial: 7.44  pH, Blood: x     /  pCO2: 45    /  pO2: 112   / HCO3: 30    / Base Excess: 5.5   /  SaO2: 99                  MEDICATIONS  (STANDING):  atorvastatin 20 milliGRAM(s) Oral at bedtime  dextrose 5% + sodium chloride 0.9%. 1000 milliLiter(s) (75 mL/Hr) IV Continuous <Continuous>  docusate sodium 100 milliGRAM(s) Oral three times a day  gabapentin 100 milliGRAM(s) Oral three times a day  pantoprazole    Tablet 40 milliGRAM(s) Oral before breakfast  tiotropium 18 MICROgram(s) Capsule 1 Capsule(s) Inhalation daily    MEDICATIONS  (PRN):  acetaminophen   Tablet. 650 milliGRAM(s) Oral every 6 hours PRN Moderate Pain (4 - 6)  aluminum hydroxide/magnesium hydroxide/simethicone Suspension 30 milliLiter(s) Oral every 4 hours PRN Dyspepsia  chlorhexidine 4% Liquid 1 Application(s) Topical <User Schedule> PRN as needed  clonazePAM  Oral Tab/Cap - Peds 0.5 milliGRAM(s) Oral two times a day PRN anxiety  morphine  - Injectable 2 milliGRAM(s) IV Push four times a day PRN Severe Pain (7 - 10)  ondansetron Injectable 4 milliGRAM(s) IV Push every 6 hours PRN Nausea  senna 2 Tablet(s) Oral at bedtime PRN Constipation Patient is a 72y old  Female who presents with a chief complaint of Right hip pain (13 Aug 2018 23:16)        SUBJECTIVE:    no overnight issues, no complaints this morning      PHYSICAL EXAM  Vital Signs Last 24 Hrs  T(C): 37.1 (15 Aug 2018 00:11), Max: 37.1 (15 Aug 2018 00:11)  T(F): 98.8 (15 Aug 2018 00:11), Max: 98.8 (15 Aug 2018 00:11)  HR: 70 (15 Aug 2018 00:11) (69 - 70)  BP: 106/62 (15 Aug 2018 00:11) (102/56 - 125/74)  BP(mean): --  RR: 18 (15 Aug 2018 00:11) (18 - 18)  SpO2: --    General: In NAD  HEENT: LUZMARIA               Lymphatic system: No Cervical LN    Respiratory: Petey BS no wheeze or crackles  Cardiovascular: Regular RR  Gastrointestinal: Soft. + BS  Musculoskeletal: No clubbing.  moves all extremities.  Full range of motion  no leg edema  Skin: Warm.  Intact  Neurological: No motor or sensory deficit      08-14-18 @ 07:01  -  08-15-18 @ 07:00  --------------------------------------------------------  IN:    Oral Fluid: 260 mL  Total IN: 260 mL    OUT:    Voided: 500 mL  Total OUT: 500 mL    Total NET: -240 mL          LABS:                          10.6   5.04  )-----------( 186      ( 14 Aug 2018 08:09 )             33.0                                               08-14    142  |  100  |  16  ----------------------------<  118<H>  4.5   |  32  |  0.8    Ca    9.1      14 Aug 2018 08:09  Mg     1.8     08-13    TPro  7.1  /  Alb  4.6  /  TBili  0.3  /  DBili  x   /  AST  21  /  ALT  13  /  AlkPhos  104  08-13      PT/INR - ( 14 Aug 2018 08:09 )   PT: 27.20 sec;   INR: 2.54 ratio         PTT - ( 13 Aug 2018 14:00 )  PTT:41.4 sec                                                                                     LIVER FUNCTIONS - ( 13 Aug 2018 14:00 )  Alb: 4.6 g/dL / Pro: 7.1 g/dL / ALK PHOS: 104 U/L / ALT: 13 U/L / AST: 21 U/L / GGT: x                                                                                            ABG - ( 14 Aug 2018 19:18 )  pH, Arterial: 7.44  pH, Blood: x     /  pCO2: 45    /  pO2: 112   / HCO3: 30    / Base Excess: 5.5   /  SaO2: 99                  MEDICATIONS  (STANDING):  atorvastatin 20 milliGRAM(s) Oral at bedtime  dextrose 5% + sodium chloride 0.9%. 1000 milliLiter(s) (75 mL/Hr) IV Continuous <Continuous>  docusate sodium 100 milliGRAM(s) Oral three times a day  gabapentin 100 milliGRAM(s) Oral three times a day  pantoprazole    Tablet 40 milliGRAM(s) Oral before breakfast  tiotropium 18 MICROgram(s) Capsule 1 Capsule(s) Inhalation daily    MEDICATIONS  (PRN):  acetaminophen   Tablet. 650 milliGRAM(s) Oral every 6 hours PRN Moderate Pain (4 - 6)  aluminum hydroxide/magnesium hydroxide/simethicone Suspension 30 milliLiter(s) Oral every 4 hours PRN Dyspepsia  chlorhexidine 4% Liquid 1 Application(s) Topical <User Schedule> PRN as needed  clonazePAM  Oral Tab/Cap - Peds 0.5 milliGRAM(s) Oral two times a day PRN anxiety  morphine  - Injectable 2 milliGRAM(s) IV Push four times a day PRN Severe Pain (7 - 10)  ondansetron Injectable 4 milliGRAM(s) IV Push every 6 hours PRN Nausea  senna 2 Tablet(s) Oral at bedtime PRN Constipation

## 2018-08-15 NOTE — PRE-OP CHECKLIST - PATIENT SENT TO
Problem: Patient Care Overview  Goal: Plan of Care/Patient Progress Review  Outcome: Completed Date Met: 02/03/18  VSS, lungs clear, HR regular, pain well controlled with tylenol and PRN hydrocodone, right hip incision dressing CDI, tolerating PO well, voiding spontaneously, planning to discharge to TCU at 1 PM this afternoon.  IV removed.       operating room

## 2018-08-15 NOTE — BRIEF OPERATIVE NOTE - PROCEDURE
<<-----Click on this checkbox to enter Procedure Closed reduction and internal fixation (CRIF) of fracture of hip  08/15/2018  CPT code 73757  Active  [email or web address]

## 2018-08-15 NOTE — PRE-ANESTHESIA EVALUATION ADULT - NSANTHOSAYNRD_GEN_A_CORE
No. MARIMAR screening performed.  STOP BANG Legend: 0-2 = LOW Risk; 3-4 = INTERMEDIATE Risk; 5-8 = HIGH Risk

## 2018-08-15 NOTE — CHART NOTE - NSCHARTNOTEFT_GEN_A_CORE
PACU ANESTHESIA ADMISSION NOTE      Procedure: Closed reduction and internal fixation (CRIF) of fracture of hip: CPT code 65059    Post op diagnosis:  Closed subcapital fracture of right femur, initial encounter      ____  Intubated  TV:______       Rate: ______      FiO2: ______    __x__  Patent Airway    __x  Full return of protective reflexes    ____  Full recovery from anesthesia / back to baseline     Vitals:   T:   98.5        R:         18         BP:       109/58           Sat:     99              P: 70      Mental Status:  __x__ Awake   _____ Alert   _____ Drowsy   _____ Sedated    Nausea/Vomiting:  x____ NO  ______Yes,   See Post - Op Orders          Pain Scale (0-10):  __5___    Treatment: _yes _ None    ____ See Post - Op/PCA Orders    Post - Operative Fluids:   ____ Oral   _x___ See Post - Op Orders    Plan: Discharge:   ____Home       __x___Floor     _____Critical Care    _____  Other:_________________    Comments: Pt awake, VS stable, no anesthesia complications.

## 2018-08-16 LAB
ANION GAP SERPL CALC-SCNC: 12 MMOL/L — SIGNIFICANT CHANGE UP (ref 7–14)
BUN SERPL-MCNC: 16 MG/DL — SIGNIFICANT CHANGE UP (ref 10–20)
CALCIUM SERPL-MCNC: 9.9 MG/DL — SIGNIFICANT CHANGE UP (ref 8.5–10.1)
CHLORIDE SERPL-SCNC: 98 MMOL/L — SIGNIFICANT CHANGE UP (ref 98–110)
CO2 SERPL-SCNC: 30 MMOL/L — SIGNIFICANT CHANGE UP (ref 17–32)
CREAT SERPL-MCNC: 0.8 MG/DL — SIGNIFICANT CHANGE UP (ref 0.7–1.5)
GLUCOSE SERPL-MCNC: 145 MG/DL — HIGH (ref 70–99)
HCT VFR BLD CALC: 30.3 % — LOW (ref 37–47)
HGB BLD-MCNC: 9.6 G/DL — LOW (ref 12–16)
MCHC RBC-ENTMCNC: 27.1 PG — SIGNIFICANT CHANGE UP (ref 27–31)
MCHC RBC-ENTMCNC: 31.7 G/DL — LOW (ref 32–37)
MCV RBC AUTO: 85.6 FL — SIGNIFICANT CHANGE UP (ref 81–99)
NRBC # BLD: 0 /100 WBCS — SIGNIFICANT CHANGE UP (ref 0–0)
PLATELET # BLD AUTO: 215 K/UL — SIGNIFICANT CHANGE UP (ref 130–400)
POTASSIUM SERPL-MCNC: 4.7 MMOL/L — SIGNIFICANT CHANGE UP (ref 3.5–5)
POTASSIUM SERPL-SCNC: 4.7 MMOL/L — SIGNIFICANT CHANGE UP (ref 3.5–5)
RBC # BLD: 3.54 M/UL — LOW (ref 4.2–5.4)
RBC # FLD: 13.8 % — SIGNIFICANT CHANGE UP (ref 11.5–14.5)
SODIUM SERPL-SCNC: 140 MMOL/L — SIGNIFICANT CHANGE UP (ref 135–146)
WBC # BLD: 11.45 K/UL — HIGH (ref 4.8–10.8)
WBC # FLD AUTO: 11.45 K/UL — HIGH (ref 4.8–10.8)

## 2018-08-16 RX ORDER — WARFARIN SODIUM 2.5 MG/1
1 TABLET ORAL ONCE
Qty: 0 | Refills: 0 | Status: COMPLETED | OUTPATIENT
Start: 2018-08-16 | End: 2018-08-16

## 2018-08-16 RX ORDER — ENOXAPARIN SODIUM 100 MG/ML
40 INJECTION SUBCUTANEOUS
Qty: 0 | Refills: 0 | Status: DISCONTINUED | OUTPATIENT
Start: 2018-08-16 | End: 2018-08-20

## 2018-08-16 RX ADMIN — Medication 1 TABLET(S): at 21:47

## 2018-08-16 RX ADMIN — MORPHINE SULFATE 2 MILLIGRAM(S): 50 CAPSULE, EXTENDED RELEASE ORAL at 09:20

## 2018-08-16 RX ADMIN — MORPHINE SULFATE 2 MILLIGRAM(S): 50 CAPSULE, EXTENDED RELEASE ORAL at 06:41

## 2018-08-16 RX ADMIN — GABAPENTIN 100 MILLIGRAM(S): 400 CAPSULE ORAL at 21:47

## 2018-08-16 RX ADMIN — GABAPENTIN 100 MILLIGRAM(S): 400 CAPSULE ORAL at 14:17

## 2018-08-16 RX ADMIN — SODIUM CHLORIDE 75 MILLILITER(S): 9 INJECTION, SOLUTION INTRAVENOUS at 06:39

## 2018-08-16 RX ADMIN — Medication 1 TABLET(S): at 05:44

## 2018-08-16 RX ADMIN — ATORVASTATIN CALCIUM 20 MILLIGRAM(S): 80 TABLET, FILM COATED ORAL at 21:47

## 2018-08-16 RX ADMIN — Medication 100 MILLIGRAM(S): at 21:47

## 2018-08-16 RX ADMIN — PANTOPRAZOLE SODIUM 40 MILLIGRAM(S): 20 TABLET, DELAYED RELEASE ORAL at 05:44

## 2018-08-16 RX ADMIN — Medication 100 MILLIGRAM(S): at 05:45

## 2018-08-16 RX ADMIN — Medication 1 TABLET(S): at 15:27

## 2018-08-16 RX ADMIN — Medication 100 MILLIGRAM(S): at 14:17

## 2018-08-16 RX ADMIN — MORPHINE SULFATE 2 MILLIGRAM(S): 50 CAPSULE, EXTENDED RELEASE ORAL at 14:16

## 2018-08-16 RX ADMIN — Medication 1 TABLET(S): at 12:09

## 2018-08-16 RX ADMIN — Medication 100 MILLIGRAM(S): at 12:09

## 2018-08-16 RX ADMIN — WARFARIN SODIUM 1 MILLIGRAM(S): 2.5 TABLET ORAL at 21:46

## 2018-08-16 RX ADMIN — ENOXAPARIN SODIUM 40 MILLIGRAM(S): 100 INJECTION SUBCUTANEOUS at 17:38

## 2018-08-16 RX ADMIN — Medication 100 MILLIGRAM(S): at 05:44

## 2018-08-16 RX ADMIN — GABAPENTIN 100 MILLIGRAM(S): 400 CAPSULE ORAL at 05:44

## 2018-08-16 NOTE — CONSULT NOTE ADULT - ASSESSMENT
IMPRESSION: Rehab of R Hip Fx [facility]    PRECAUTIONS: [   x ] Cardiac  [  x  ] Respiratory  [    ] Seizures [    ] Contact Isolation  [    ] Droplet Isolation  [    ] Other    Weight Bearing Status: WBAT RLE    RECOMMENDATION:    Out of Bed to Chair     DVT/Decubiti Prophylaxis    REHAB PLAN:     [    x ] Bedside P/T 3-5 times a week   [     ] Bedside O/T  2-3 times a week   [     ] No Rehab Therapy Indicated   [     ]  Speech Therapy   Conditioning/ROM                                 ADL  Bed Mobility                                            Conditioning/ROM  Transfers                                                  Bed Mobility  Sitting /Standing Balance                      Transfers                                        Gait Training                                            Sitting/Standing Balance  Stair Training [   ]Applicable                 Home equipment Eval                                                                     Splinting  [   ] Only      GOALS:   ADL   [    ]   Independent         Transfers  [  x  ] Independent            Ambulation  [  x   ] Independent     [ x    ] With device                            [    ]  CG                                               [    ]  CG                                                    [     ] CG                            [    ] Min A                                          [    ] Min A                                                [     ] Min  A          DISCHARGE PLAN:   [     ]  Good candidate for Intensive Rehabilitation/Hospital based-4A [facility]                                             Will tolerate 3hrs Intensive Rehab Daily                                       [    x  ]  Short Term Rehab in Skilled Nursing Facility                              VS                                     [ x     ]  Home with Outpatient or VN services                                         [      ]  Possible Candidate for Intensive Hospital based Rehab

## 2018-08-16 NOTE — PROGRESS NOTE ADULT - SUBJECTIVE AND OBJECTIVE BOX
CHIEF COMPLAINT:    Patient is a 72y old Female who presents with a chief complaint of Right hip pain (13 Aug 2018 23:16)    Currently admitted to medicine with the primary diagnosis of Hip fracture     Today is hospital day 3d. This morning she is resting comfortably in bed and reports no new issues or overnight events.     PAST MEDICAL & SURGICAL HISTORY  COPD (chronic obstructive pulmonary disease)  Afib  Lung cancer  Pacemaker  Anxiety  High cholesterol  S/P appendectomy  History of tonsillectomy  S/P lobectomy of lung: left  H/O atrioventricular kacie ablation  Artificial cardiac pacemaker    SOCIAL HISTORY:  Negative for smoking/alcohol/drug use.     ALLERGIES:  bacitracin (Other)  carboplatin (Other)  sulfa drugs (Unknown)  sulfonamides (Unknown)  Xanax (Other)    MEDICATIONS:  STANDING MEDICATIONS  atorvastatin 20 milliGRAM(s) Oral at bedtime  calcium carbonate 1250 mG  + Vitamin D (OsCal 500 + D) 1 Tablet(s) Oral three times a day  ceFAZolin   IVPB 1000 milliGRAM(s) IV Intermittent every 8 hours  docusate sodium 100 milliGRAM(s) Oral three times a day  enoxaparin Injectable 40 milliGRAM(s) SubCutaneous two times a day  gabapentin 100 milliGRAM(s) Oral three times a day  lactated ringers. 1000 milliLiter(s) IV Continuous <Continuous>  multivitamin 1 Tablet(s) Oral daily  pantoprazole    Tablet 40 milliGRAM(s) Oral before breakfast  tiotropium 18 MICROgram(s) Capsule 1 Capsule(s) Inhalation daily  warfarin 1 milliGRAM(s) Oral once    PRN MEDICATIONS  acetaminophen   Tablet. 650 milliGRAM(s) Oral every 6 hours PRN  aluminum hydroxide/magnesium hydroxide/simethicone Suspension 30 milliLiter(s) Oral every 4 hours PRN  chlorhexidine 4% Liquid 1 Application(s) Topical <User Schedule> PRN  clonazePAM  Oral Tab/Cap - Peds 0.5 milliGRAM(s) Oral two times a day PRN  morphine  - Injectable 2 milliGRAM(s) IV Push four times a day PRN  ondansetron Injectable 4 milliGRAM(s) IV Push every 6 hours PRN  senna 2 Tablet(s) Oral at bedtime PRN    VITALS:   T(F): 96  HR: 69  BP: 102/58  RR: 18  SpO2: 98%    LABS:                        9.6    11.45 )-----------( 215      ( 16 Aug 2018 05:57 )             30.3     08-16    140  |  98  |  16  ----------------------------<  145<H>  4.7   |  30  |  0.8    Ca    9.9      16 Aug 2018 05:57    PT/INR - ( 15 Aug 2018 06:53 )   PT: 14.90 sec;   INR: 1.38 ratio    PTT - ( 15 Aug 2018 06:53 )  PTT:31.4 sec    ABG - ( 14 Aug 2018 19:18 )  pH, Arterial: 7.44  pH, Blood: x     /  pCO2: 45    /  pO2: 112   / HCO3: 30    / Base Excess: 5.5   /  SaO2: 99        RADIOLOGY:  < from: CT Pelvis Bony Only No Cont (08.13.18 @ 16:21) >  1. Valgus impacted subcapital right femoral neck fracture.  2. Degenerative change as above.  < end of copied text >    < from: CT Head No Cont (08.13.18 @ 15:43) >  1.  No evidence of acute intracranial pathology.  Stable exam since 2/23/2018.    2.  Stable moderate chronic microvascular changes and chronic lacunar infarcts within the left basal ganglia.  < end of copied text >    < from: Xray Shoulder 2 Views, Right (08.13.18 @ 14:56) >  No acute fracture with glenohumeral and AC joint degenerative change. Greater tubercle sclerosis. No soft tissue calcifications  < end of copied text >    < from: Xray Chest 1 View AP/PA (08.13.18 @ 14:55) >  No radiographic evidence of acute cardiopulmonary disease. Stable left hemithorax and mediastinal changes.  < end of copied text >    < from: Xray Knee 3 Views, Right (08.13.18 @ 16:54) >  No evidence of acute fracture or dislocation. Osteoarthritis involving patellofemoral joint and medial articular joint. There is no evidence of suprapatellar effusion.  < end of copied text >    PHYSICAL EXAM:  GEN: No acute distress  PULM: Clear to auscultation bilaterally   CARD: S1/S2 present. RRR.   GI: Soft, non-tender, non-distended. Bowel sounds present  MSK: NC/NC/NE/2+PP/BOONE  NEURO: AAOX3

## 2018-08-16 NOTE — OCCUPATIONAL THERAPY INITIAL EVALUATION ADULT - GENERAL OBSERVATIONS, REHAB EVAL
pt received seated in b/s chair in NAD agreeable to OT evaluation, + 2L O2 via NC (PRN per RN), + IV lock, left seated in b/s chair

## 2018-08-16 NOTE — PROGRESS NOTE ADULT - ASSESSMENT
73 y/o F with PMH of DLD, Atrial fibrillation on coumadin, GERD, COPD on home O2 2.5-3L, Lung Cancer s/p left upper lobectomy in 2011, chemo and radiation, complicated by radiation scarring of lungs presented with right hip and shoulder pain after mechanical fall today. Pt had the first mechanical fall 2 weeks ago after pt become out of balance while kicking the oxygen tubing and fell down hard with her right side on floor. Since then, pt was having throbbing pain on right leg which travels down the leg worse with movement, has been taking tylenol and NSAIDs to relieve the pain associated with limping of right leg. Pt is independently ambulatory prior to this event. On the day of presentation, pt lost balance while using cane and fell on her right knee which worsened the pain. Pt states that the initial fall is likely cause of the injury. Otherwise, pt denies head trauma or LOC, fever, chills, chest pain, palpitations, cough, sputum production, wheezing, abdominal pain, diarrhea, dysuria but admits to chronic shortness of breath.    # Subcapital right femoral neck fracture. Cleared by card and pulm. POD#1 Closed Reduction and Percutaneous Pinning.  - ambulate as tolerated  - encourage  incentive spyrometry  - f/u PT/Rehab  - f/u ortho recs    # Atrial fibrillation s/p AV kacie ablation and ventricular paced at a rate of 70, rate controlled  - lovenox for bridging  - restarting coumadin  - f/u am INR    # DLD   - c/w statin    # COPD, stable. s/p solumedrol 40 IV QD x 2 doses. ABG results most likely consistent with compensated respiratory acidosis. On home O2 2.5-3L.  - c/w oxygen supplementation  - c/w spiriva    # Anxiety  - c/w klonopin prn, hold on the day of surgery    # GERD  - c/w protonix    # DVT  - lovenox qd

## 2018-08-16 NOTE — OCCUPATIONAL THERAPY INITIAL EVALUATION ADULT - PLANNED THERAPY INTERVENTIONS, OT EVAL
ADL retraining/bed mobility training/ROM/strengthening/stretching/balance training/parent/caregiver training.../transfer training

## 2018-08-16 NOTE — PROGRESS NOTE ADULT - SUBJECTIVE AND OBJECTIVE BOX
ORTHOPEDIC POST OP CHECK      POD0 s/p crpp after R femoral neck fx.  S/e at bedside.  Doing well, pain controlled. Denies f/ch, n/t, n/v. No other complaints.      ICU Vital Signs Last 24 Hrs  T(C): 36.1 (16 Aug 2018 00:00), Max: 36.9 (15 Aug 2018 14:37)  T(F): 97 (16 Aug 2018 00:00), Max: 98.5 (15 Aug 2018 14:37)  HR: 72 (16 Aug 2018 00:00) (68 - 72)  BP: 125/60 (16 Aug 2018 00:00) (92/53 - 125/60)  BP(mean): --  ABP: --  ABP(mean): --  RR: 18 (16 Aug 2018 00:00) (14 - 29)  SpO2: 98% (15 Aug 2018 16:35) (97% - 100%)      NAD, AAO x3, unlabored breathing  RLE  dressing c/d/i  SILT throughout   motor intact   compartments soft and compressible  DP palpable, toes wwp, cap ref wnl                               10.8   6.89  )-----------( 217      ( 15 Aug 2018 06:53 )             33.1     08-15    140  |  98  |  19  ----------------------------<  182<H>  4.8   |  29  |  0.8    Ca    9.7      15 Aug 2018 06:53    INR    72F POD0 s/p crpp after R femoral neck fx doing well.  - RLE WBAT  - pain control  - postop abx  - chem DVT prophylaxis and SCD  - IS encouraged  - AM labs  - PT/rehab  - D/c planning

## 2018-08-16 NOTE — PHYSICAL THERAPY INITIAL EVALUATION ADULT - GENERAL OBSERVATIONS, REHAB EVAL
9:40-10:00; Pt encountered in bed, agreeable to PT. IVL by HUBER Santana. Pt rec;d pain meds prior to PT. + O2

## 2018-08-16 NOTE — CONSULT NOTE ADULT - SUBJECTIVE AND OBJECTIVE BOX
Patient is a 72y old  Female who presents with a chief complaint of Right hip pain (13 Aug 2018 23:16)    HPI:  72 Y O f with pmh of DLD , afib on coumadin , COPD on home O2 , GERD , and lung ca s/p lobectomy in 2011 presented to ER s/p fall this morning. As per pt she had 2 falls , first was 2 wks ago when she fell on her right side hitting right side of her body , shoulder and hip. Pt reports that she had excruciating pain after that , no head trauma or LOC reported . Pt reports that she took alleve and motrin afterwards for pain , and stopped taking her coumadin for few days . Pt reports that she fell again today while trying to get her cane , again on same side but reports she did not hit her head or hip , just right shoulder this time. Again no trauma or LOC reported . Pt was c/o pain afterwards so was brought to ER . In ER pt was found to have fracture of right hip. (13 Aug 2018 19:14)      PAST MEDICAL & SURGICAL HISTORY:  COPD (chronic obstructive pulmonary disease)  Afib  Lung cancer  Pacemaker  Anxiety  High cholesterol  S/P appendectomy  History of tonsillectomy  S/P lobectomy of lung: left  H/O atrioventricular kacie ablation  Artificial cardiac pacemaker      Hospital Course: seen by Ortho [facility] on 8/15    TODAY'S SUBJECTIVE & REVIEW OF SYMPTOMS:     Constitutional WNL   Cardio WNL   Resp Sob   GI WNL  Heme WNL  Endo WNL  Skin WNL  MSK WNL  Neuro WNL  Cognitive WNL  Psych WNL      MEDICATIONS  (STANDING):  atorvastatin 20 milliGRAM(s) Oral at bedtime  calcium carbonate 1250 mG  + Vitamin D (OsCal 500 + D) 1 Tablet(s) Oral three times a day  docusate sodium 100 milliGRAM(s) Oral three times a day  enoxaparin Injectable 40 milliGRAM(s) SubCutaneous two times a day  gabapentin 100 milliGRAM(s) Oral three times a day  multivitamin 1 Tablet(s) Oral daily  pantoprazole    Tablet 40 milliGRAM(s) Oral before breakfast  tiotropium 18 MICROgram(s) Capsule 1 Capsule(s) Inhalation daily  warfarin 1 milliGRAM(s) Oral once    MEDICATIONS  (PRN):  acetaminophen   Tablet. 650 milliGRAM(s) Oral every 6 hours PRN Moderate Pain (4 - 6)  aluminum hydroxide/magnesium hydroxide/simethicone Suspension 30 milliLiter(s) Oral every 4 hours PRN Dyspepsia  chlorhexidine 4% Liquid 1 Application(s) Topical <User Schedule> PRN as needed  clonazePAM  Oral Tab/Cap - Peds 0.5 milliGRAM(s) Oral two times a day PRN anxiety  morphine  - Injectable 2 milliGRAM(s) IV Push four times a day PRN Severe Pain (7 - 10)  ondansetron Injectable 4 milliGRAM(s) IV Push every 6 hours PRN Nausea  senna 2 Tablet(s) Oral at bedtime PRN Constipation      FAMILY HISTORY:  No significant family history (Father, Mother, Sibling): known to patient as patient was adopted      Allergies    bacitracin (Other)  carboplatin (Other)  sulfa drugs (Unknown)  sulfonamides (Unknown)  Xanax (Other)    Intolerances        SOCIAL HISTORY:    [    ] Etoh  [    ] Smoking  [    ] Substance abuse     Home Environment:  [    ] Home Alone  [   x ] Lives with Family  [    ] Home Health Aid    Dwelling:  [    ] Apartment  [  x  ] Private House  [    ] Adult Home  [    ] Skilled Nursing Facility      [    ] Short Term  [    ] Long Term  [  x  ] Stairs     outside                      [    ] Elevator     FUNCTIONAL STATUS PTA: (Check all that apply)  Ambulation: [ x    ]Independent    [    ] Dependent     [    ] Non-Ambulatory  Assistive Device: [    ] SA Cane  [    ]  Q Cane  [    ] Walker  [    ]  Wheelchair  ADL : [   x ] Independent  [    ]  Dependent   o2 dependent    Vital Signs Last 24 Hrs  T(C): 35.6 (16 Aug 2018 07:50), Max: 36.9 (15 Aug 2018 14:37)  T(F): 96 (16 Aug 2018 07:50), Max: 98.5 (15 Aug 2018 14:37)  HR: 69 (16 Aug 2018 07:50) (68 - 72)  BP: 102/58 (16 Aug 2018 07:50) (92/53 - 125/60)  BP(mean): --  RR: 18 (16 Aug 2018 07:50) (14 - 29)  SpO2: 98% (15 Aug 2018 16:35) (98% - 100%)      PHYSICAL EXAM: Alert & Oriented X3  GENERAL: NAD, well-groomed, well-developed  HEAD:  Atraumatic, Normocephalic  EYES: EOMI, PERRLA, conjunctiva and sclera clear  NECK: Supple, No JVD, Normal thyroid  CHEST/LUNG: diminished BS Petey  HEART: S1s2 RRR  ABDOMEN: Soft, Nontender, Nondistended; Bowel sounds present  EXTREMITIES:  2+ Peripheral Pulses, No clubbing, cyanosis, or edema  R hip incision bandaged  NERVOUS SYSTEM:  Cranial Nerves 2-12 intact [   x ] Abnormal  [    ]  ROM: WFL all extremities [  x  ]  Abnormal [     ]  Motor Strength: WFL all extremities  [   x ]  Abnormal [    ]  Sensation: intact to light touch [  x  ] Abnormal [    ]  Minimal pain R hip    FUNCTIONAL STATUS:  Bed Mobility: [   ]  Independent [    ]  Supervision [ x   ]  Needs Assistance [  ]  N/A  Transfers: [    ]  Independent [    ]  Supervision [ x   ]  Needs Assistance [    ]  N/A    Ambulation:  [    ]  Independent [    ]  Supervision [  x  ]  Needs Assistance [    ]  N/A   ADL:  [    ]   Independent [  x  ] Requires Assistance [    ] N/A   AMBULATED ONLY 5' WITH PT BUT WENT TO BATHROOM WITH NURSING STAFF USING WALKER  CLAIMS FUNCTIONING BETTER NOW THAN PAST TWO WEEKS WHEN FALL OCCURRED    LABS:                        9.6    11.45 )-----------( 215      ( 16 Aug 2018 05:57 )             30.3     08-16    140  |  98  |  16  ----------------------------<  145<H>  4.7   |  30  |  0.8    Ca    9.9      16 Aug 2018 05:57      PT/INR - ( 15 Aug 2018 06:53 )   PT: 14.90 sec;   INR: 1.38 ratio         PTT - ( 15 Aug 2018 06:53 )  PTT:31.4 sec      RADIOLOGY & ADDITIONAL STUDIES:

## 2018-08-17 LAB
ANION GAP SERPL CALC-SCNC: 6 MMOL/L — LOW (ref 7–14)
APTT BLD: 31.9 SEC — SIGNIFICANT CHANGE UP (ref 27–39.2)
BUN SERPL-MCNC: 16 MG/DL — SIGNIFICANT CHANGE UP (ref 10–20)
CALCIUM SERPL-MCNC: 9.7 MG/DL — SIGNIFICANT CHANGE UP (ref 8.5–10.1)
CHLORIDE SERPL-SCNC: 99 MMOL/L — SIGNIFICANT CHANGE UP (ref 98–110)
CO2 SERPL-SCNC: 35 MMOL/L — HIGH (ref 17–32)
CREAT SERPL-MCNC: 0.8 MG/DL — SIGNIFICANT CHANGE UP (ref 0.7–1.5)
GLUCOSE SERPL-MCNC: 119 MG/DL — HIGH (ref 70–99)
HCT VFR BLD CALC: 26.6 % — LOW (ref 37–47)
HGB BLD-MCNC: 8.3 G/DL — LOW (ref 12–16)
INR BLD: 1.24 RATIO — SIGNIFICANT CHANGE UP (ref 0.65–1.3)
MCHC RBC-ENTMCNC: 26.9 PG — LOW (ref 27–31)
MCHC RBC-ENTMCNC: 31.2 G/DL — LOW (ref 32–37)
MCV RBC AUTO: 86.1 FL — SIGNIFICANT CHANGE UP (ref 81–99)
NRBC # BLD: 0 /100 WBCS — SIGNIFICANT CHANGE UP (ref 0–0)
PLATELET # BLD AUTO: 169 K/UL — SIGNIFICANT CHANGE UP (ref 130–400)
POTASSIUM SERPL-MCNC: 4.5 MMOL/L — SIGNIFICANT CHANGE UP (ref 3.5–5)
POTASSIUM SERPL-SCNC: 4.5 MMOL/L — SIGNIFICANT CHANGE UP (ref 3.5–5)
PROTHROM AB SERPL-ACNC: 13.4 SEC — HIGH (ref 9.95–12.87)
RBC # BLD: 3.09 M/UL — LOW (ref 4.2–5.4)
RBC # FLD: 14.2 % — SIGNIFICANT CHANGE UP (ref 11.5–14.5)
SODIUM SERPL-SCNC: 140 MMOL/L — SIGNIFICANT CHANGE UP (ref 135–146)
WBC # BLD: 6.38 K/UL — SIGNIFICANT CHANGE UP (ref 4.8–10.8)
WBC # FLD AUTO: 6.38 K/UL — SIGNIFICANT CHANGE UP (ref 4.8–10.8)

## 2018-08-17 RX ORDER — WARFARIN SODIUM 2.5 MG/1
2 TABLET ORAL ONCE
Qty: 0 | Refills: 0 | Status: COMPLETED | OUTPATIENT
Start: 2018-08-17 | End: 2018-08-17

## 2018-08-17 RX ADMIN — GABAPENTIN 100 MILLIGRAM(S): 400 CAPSULE ORAL at 14:51

## 2018-08-17 RX ADMIN — ONDANSETRON 4 MILLIGRAM(S): 8 TABLET, FILM COATED ORAL at 12:49

## 2018-08-17 RX ADMIN — MORPHINE SULFATE 2 MILLIGRAM(S): 50 CAPSULE, EXTENDED RELEASE ORAL at 05:41

## 2018-08-17 RX ADMIN — GABAPENTIN 100 MILLIGRAM(S): 400 CAPSULE ORAL at 21:15

## 2018-08-17 RX ADMIN — Medication 100 MILLIGRAM(S): at 05:35

## 2018-08-17 RX ADMIN — Medication 650 MILLIGRAM(S): at 07:51

## 2018-08-17 RX ADMIN — ATORVASTATIN CALCIUM 20 MILLIGRAM(S): 80 TABLET, FILM COATED ORAL at 21:15

## 2018-08-17 RX ADMIN — Medication 1 TABLET(S): at 12:49

## 2018-08-17 RX ADMIN — WARFARIN SODIUM 2 MILLIGRAM(S): 2.5 TABLET ORAL at 21:15

## 2018-08-17 RX ADMIN — Medication 1 TABLET(S): at 05:35

## 2018-08-17 RX ADMIN — MORPHINE SULFATE 2 MILLIGRAM(S): 50 CAPSULE, EXTENDED RELEASE ORAL at 06:01

## 2018-08-17 RX ADMIN — PANTOPRAZOLE SODIUM 40 MILLIGRAM(S): 20 TABLET, DELAYED RELEASE ORAL at 07:54

## 2018-08-17 RX ADMIN — MORPHINE SULFATE 2 MILLIGRAM(S): 50 CAPSULE, EXTENDED RELEASE ORAL at 16:16

## 2018-08-17 RX ADMIN — ENOXAPARIN SODIUM 40 MILLIGRAM(S): 100 INJECTION SUBCUTANEOUS at 05:36

## 2018-08-17 RX ADMIN — GABAPENTIN 100 MILLIGRAM(S): 400 CAPSULE ORAL at 05:36

## 2018-08-17 RX ADMIN — MORPHINE SULFATE 2 MILLIGRAM(S): 50 CAPSULE, EXTENDED RELEASE ORAL at 21:55

## 2018-08-17 RX ADMIN — Medication 650 MILLIGRAM(S): at 07:52

## 2018-08-17 RX ADMIN — Medication 100 MILLIGRAM(S): at 14:51

## 2018-08-17 RX ADMIN — Medication 1 TABLET(S): at 21:16

## 2018-08-17 RX ADMIN — Medication 1 TABLET(S): at 14:52

## 2018-08-17 RX ADMIN — Medication 100 MILLIGRAM(S): at 21:16

## 2018-08-17 RX ADMIN — ENOXAPARIN SODIUM 40 MILLIGRAM(S): 100 INJECTION SUBCUTANEOUS at 18:06

## 2018-08-17 RX ADMIN — MORPHINE SULFATE 2 MILLIGRAM(S): 50 CAPSULE, EXTENDED RELEASE ORAL at 21:35

## 2018-08-17 NOTE — PROGRESS NOTE ADULT - ASSESSMENT
71 y/o F with PMH of DLD, Atrial fibrillation on coumadin, GERD, COPD on home O2 2.5-3L, Lung Cancer s/p left upper lobectomy in 2011, chemo and radiation, complicated by radiation scarring of lungs presented with right hip and shoulder pain after mechanical fall today. Pt had the first mechanical fall 2 weeks ago after pt become out of balance while kicking the oxygen tubing and fell down hard with her right side on floor. Since then, pt was having throbbing pain on right leg which travels down the leg worse with movement, has been taking tylenol and NSAIDs to relieve the pain associated with limping of right leg. Pt is independently ambulatory prior to this event. On the day of presentation, pt lost balance while using cane and fell on her right knee which worsened the pain. Pt states that the initial fall is likely cause of the injury. Otherwise, pt denies head trauma or LOC, fever, chills, chest pain, palpitations, cough, sputum production, wheezing, abdominal pain, diarrhea, dysuria but admits to chronic shortness of breath.    # Subcapital right femoral neck fracture. Cleared by card and pulm. POD#2 Closed Reduction and Percutaneous Pinning.  - ambulate as tolerated  - incentive spyrometry  - PT/Rehab => to SNF vs VNS  - f/u ortho recs    # Atrial fibrillation s/p AV kacie ablation and ventricular paced at a rate of 70, rate controlled  - c/w coumadin    # DLD   - c/w statin    # COPD, stable. s/p solumedrol 40 IV QD x 2 doses. ABG results most likely consistent with compensated respiratory acidosis. On home O2 2.5-3L.  - c/w oxygen supplementation  - c/w spiriva    # Anxiety  - c/w klonopin prn, hold on the day of surgery    # GERD  - c/w protonix    # DVT  - lovenox qd    Dispo: Anticipated.

## 2018-08-17 NOTE — PROGRESS NOTE ADULT - SUBJECTIVE AND OBJECTIVE BOX
Patient is a 72y old  Female who presents with a chief complaint of Right hip pain (13 Aug 2018 23:16)        Over Night Events: SP ORIF.  Did well.  No SOB.  Resting Comfortable in bed        ROS:  See HPI    PHYSICAL EXAM    ICU Vital Signs Last 24 Hrs  T(C): 36.6 (17 Aug 2018 07:50), Max: 36.9 (16 Aug 2018 15:55)  T(F): 97.8 (17 Aug 2018 07:50), Max: 98.5 (16 Aug 2018 15:55)  HR: 79 (17 Aug 2018 07:50) (72 - 91)  BP: 105/55 (17 Aug 2018 07:50) (105/55 - 128/75)  BP(mean): --  ABP: --  ABP(mean): --  RR: 18 (17 Aug 2018 07:50) (18 - 18)  SpO2: --      General: in NAD   HEENT: LUZMARIA             Lymphatic system: No cervical LN   Lungs: Bilateral BS  Cardiovascular: Regular   Gastrointestinal: Soft, Positive BS  Extremities: No clubbing.  Moves extremities.  Full Range of motion   Skin: Warm, intact  Neurological: No motor or sensory deficit       08-16-18 @ 07:01  -  08-17-18 @ 07:00  --------------------------------------------------------  IN:    Oral Fluid: 500 mL  Total IN: 500 mL    OUT:  Total OUT: 0 mL    Total NET: 500 mL          LABS:                            8.3    6.38  )-----------( 169      ( 17 Aug 2018 07:12 )             26.6                                               08-17    140  |  99  |  16  ----------------------------<  119<H>  4.5   |  35<H>  |  0.8    Ca    9.7      17 Aug 2018 07:12        PT/INR - ( 17 Aug 2018 07:12 )   PT: 13.40 sec;   INR: 1.24 ratio         PTT - ( 17 Aug 2018 07:12 )  PTT:31.9 sec                                                                                                                                                                                                                   MEDICATIONS  (STANDING):  atorvastatin 20 milliGRAM(s) Oral at bedtime  calcium carbonate 1250 mG  + Vitamin D (OsCal 500 + D) 1 Tablet(s) Oral three times a day  docusate sodium 100 milliGRAM(s) Oral three times a day  enoxaparin Injectable 40 milliGRAM(s) SubCutaneous two times a day  gabapentin 100 milliGRAM(s) Oral three times a day  multivitamin 1 Tablet(s) Oral daily  pantoprazole    Tablet 40 milliGRAM(s) Oral before breakfast  tiotropium 18 MICROgram(s) Capsule 1 Capsule(s) Inhalation daily    MEDICATIONS  (PRN):  acetaminophen   Tablet. 650 milliGRAM(s) Oral every 6 hours PRN Moderate Pain (4 - 6)  aluminum hydroxide/magnesium hydroxide/simethicone Suspension 30 milliLiter(s) Oral every 4 hours PRN Dyspepsia  chlorhexidine 4% Liquid 1 Application(s) Topical <User Schedule> PRN as needed  clonazePAM  Oral Tab/Cap - Peds 0.5 milliGRAM(s) Oral two times a day PRN anxiety  morphine  - Injectable 2 milliGRAM(s) IV Push four times a day PRN Severe Pain (7 - 10)  ondansetron Injectable 4 milliGRAM(s) IV Push every 6 hours PRN Nausea  senna 2 Tablet(s) Oral at bedtime PRN Constipation      Xrays:                                                                                     ECHO

## 2018-08-17 NOTE — PROGRESS NOTE ADULT - ASSESSMENT
IMPRESSION:  - COPD on home oxygen - stable  - SP ORIF.     PLAN:    - Albuterol PRN  - Continue General pulmonary post operative care   - Continue Spiriva   - Recall PRN

## 2018-08-17 NOTE — PROGRESS NOTE ADULT - SUBJECTIVE AND OBJECTIVE BOX
PM&R ATTENDING PROGRESS NOTE    Patient now agreeable to 4a intensive Rehab  On Nasal O2    Min/ Mod A LE ADL  Min A Transfers with impaired balance  Ambulates 3-5' RW min A with PT    Good 4a candidate- will tolerate 3 hrs intensive rehab daily

## 2018-08-17 NOTE — PROGRESS NOTE ADULT - SUBJECTIVE AND OBJECTIVE BOX
ORTHO PROGRESS NOTE       72yFemale POD #   2   S/P right hip crpp    Patient seen and examined at bedside . The patient is awake and alert in NAD. No complaints of chest pain, SOB, N/V.    Vital Signs Last 24 Hrs  T(C): 36.9 (16 Aug 2018 23:27), Max: 36.9 (16 Aug 2018 15:55)  T(F): 98.4 (16 Aug 2018 23:27), Max: 98.5 (16 Aug 2018 15:55)  HR: 72 (16 Aug 2018 23:27) (72 - 91)  BP: 109/55 (16 Aug 2018 23:27) (109/55 - 128/75)  BP(mean): --  RR: 18 (16 Aug 2018 23:27) (18 - 18)  SpO2: --                          8.3    6.38  )-----------( 169      ( 17 Aug 2018 07:12 )             26.6     08-17    140  |  99  |  16  ----------------------------<  119<H>  4.5   |  35<H>  |  0.8    Ca    9.7      17 Aug 2018 07:12      PT/INR - ( 17 Aug 2018 07:12 )   PT: 13.40 sec;   INR: 1.24 ratio         PTT - ( 17 Aug 2018 07:12 )  PTT:31.9 sec      DVT ppx : lovenox    MEDICATIONS  (STANDING):  atorvastatin 20 milliGRAM(s) Oral at bedtime  calcium carbonate 1250 mG  + Vitamin D (OsCal 500 + D) 1 Tablet(s) Oral three times a day  docusate sodium 100 milliGRAM(s) Oral three times a day  enoxaparin Injectable 40 milliGRAM(s) SubCutaneous two times a day  gabapentin 100 milliGRAM(s) Oral three times a day  multivitamin 1 Tablet(s) Oral daily  pantoprazole    Tablet 40 milliGRAM(s) Oral before breakfast  tiotropium 18 MICROgram(s) Capsule 1 Capsule(s) Inhalation daily    MEDICATIONS  (PRN):  acetaminophen   Tablet. 650 milliGRAM(s) Oral every 6 hours PRN Moderate Pain (4 - 6)  aluminum hydroxide/magnesium hydroxide/simethicone Suspension 30 milliLiter(s) Oral every 4 hours PRN Dyspepsia  chlorhexidine 4% Liquid 1 Application(s) Topical <User Schedule> PRN as needed  clonazePAM  Oral Tab/Cap - Peds 0.5 milliGRAM(s) Oral two times a day PRN anxiety  morphine  - Injectable 2 milliGRAM(s) IV Push four times a day PRN Severe Pain (7 - 10)  ondansetron Injectable 4 milliGRAM(s) IV Push every 6 hours PRN Nausea  senna 2 Tablet(s) Oral at bedtime PRN Constipation      PE:   right hip dressing C/D/I          Compartments soft         NVI, SILT           A/P: 72yFemale POD # 2    S/P right hip crpp            OOB to Chair            Physical Therapy           Pain control            Incentive Spirometry            DVT Prophylaxis            Discharge planning

## 2018-08-18 ENCOUNTER — TRANSCRIPTION ENCOUNTER (OUTPATIENT)
Age: 73
End: 2018-08-18

## 2018-08-18 LAB
ANION GAP SERPL CALC-SCNC: 7 MMOL/L — SIGNIFICANT CHANGE UP (ref 7–14)
APTT BLD: 35 SEC — SIGNIFICANT CHANGE UP (ref 27–39.2)
BUN SERPL-MCNC: 17 MG/DL — SIGNIFICANT CHANGE UP (ref 10–20)
CALCIUM SERPL-MCNC: 9.3 MG/DL — SIGNIFICANT CHANGE UP (ref 8.5–10.1)
CHLORIDE SERPL-SCNC: 96 MMOL/L — LOW (ref 98–110)
CO2 SERPL-SCNC: 36 MMOL/L — HIGH (ref 17–32)
CREAT SERPL-MCNC: 0.9 MG/DL — SIGNIFICANT CHANGE UP (ref 0.7–1.5)
GLUCOSE SERPL-MCNC: 171 MG/DL — HIGH (ref 70–99)
HCT VFR BLD CALC: 25.9 % — LOW (ref 37–47)
HGB BLD-MCNC: 8.2 G/DL — LOW (ref 12–16)
INR BLD: 1.33 RATIO — HIGH (ref 0.65–1.3)
MCHC RBC-ENTMCNC: 27 PG — SIGNIFICANT CHANGE UP (ref 27–31)
MCHC RBC-ENTMCNC: 31.7 G/DL — LOW (ref 32–37)
MCV RBC AUTO: 85.2 FL — SIGNIFICANT CHANGE UP (ref 81–99)
NRBC # BLD: 0 /100 WBCS — SIGNIFICANT CHANGE UP (ref 0–0)
PLATELET # BLD AUTO: 167 K/UL — SIGNIFICANT CHANGE UP (ref 130–400)
POTASSIUM SERPL-MCNC: 4.2 MMOL/L — SIGNIFICANT CHANGE UP (ref 3.5–5)
POTASSIUM SERPL-SCNC: 4.2 MMOL/L — SIGNIFICANT CHANGE UP (ref 3.5–5)
PROTHROM AB SERPL-ACNC: 14.3 SEC — HIGH (ref 9.95–12.87)
RBC # BLD: 3.04 M/UL — LOW (ref 4.2–5.4)
RBC # FLD: 14.1 % — SIGNIFICANT CHANGE UP (ref 11.5–14.5)
SODIUM SERPL-SCNC: 139 MMOL/L — SIGNIFICANT CHANGE UP (ref 135–146)
WBC # BLD: 6.93 K/UL — SIGNIFICANT CHANGE UP (ref 4.8–10.8)
WBC # FLD AUTO: 6.93 K/UL — SIGNIFICANT CHANGE UP (ref 4.8–10.8)

## 2018-08-18 RX ORDER — TRAMADOL HYDROCHLORIDE 50 MG/1
50 TABLET ORAL EVERY 6 HOURS
Qty: 0 | Refills: 0 | Status: DISCONTINUED | OUTPATIENT
Start: 2018-08-18 | End: 2018-08-20

## 2018-08-18 RX ORDER — WARFARIN SODIUM 2.5 MG/1
5 TABLET ORAL ONCE
Qty: 0 | Refills: 0 | Status: COMPLETED | OUTPATIENT
Start: 2018-08-18 | End: 2018-08-18

## 2018-08-18 RX ORDER — WARFARIN SODIUM 2.5 MG/1
1 TABLET ORAL
Qty: 0 | Refills: 0 | COMMUNITY

## 2018-08-18 RX ORDER — CLONAZEPAM 1 MG
0.5 TABLET ORAL AT BEDTIME
Qty: 0 | Refills: 0 | Status: DISCONTINUED | OUTPATIENT
Start: 2018-08-18 | End: 2018-08-20

## 2018-08-18 RX ADMIN — Medication 1 TABLET(S): at 11:44

## 2018-08-18 RX ADMIN — GABAPENTIN 100 MILLIGRAM(S): 400 CAPSULE ORAL at 05:05

## 2018-08-18 RX ADMIN — MORPHINE SULFATE 2 MILLIGRAM(S): 50 CAPSULE, EXTENDED RELEASE ORAL at 11:11

## 2018-08-18 RX ADMIN — Medication 100 MILLIGRAM(S): at 21:30

## 2018-08-18 RX ADMIN — Medication 0.5 MILLIGRAM(S): at 01:56

## 2018-08-18 RX ADMIN — ENOXAPARIN SODIUM 40 MILLIGRAM(S): 100 INJECTION SUBCUTANEOUS at 05:05

## 2018-08-18 RX ADMIN — TRAMADOL HYDROCHLORIDE 50 MILLIGRAM(S): 50 TABLET ORAL at 23:54

## 2018-08-18 RX ADMIN — Medication 1 TABLET(S): at 14:10

## 2018-08-18 RX ADMIN — Medication 100 MILLIGRAM(S): at 14:10

## 2018-08-18 RX ADMIN — TRAMADOL HYDROCHLORIDE 50 MILLIGRAM(S): 50 TABLET ORAL at 15:57

## 2018-08-18 RX ADMIN — ENOXAPARIN SODIUM 40 MILLIGRAM(S): 100 INJECTION SUBCUTANEOUS at 19:18

## 2018-08-18 RX ADMIN — TRAMADOL HYDROCHLORIDE 50 MILLIGRAM(S): 50 TABLET ORAL at 16:18

## 2018-08-18 RX ADMIN — MORPHINE SULFATE 2 MILLIGRAM(S): 50 CAPSULE, EXTENDED RELEASE ORAL at 07:29

## 2018-08-18 RX ADMIN — Medication 1 TABLET(S): at 21:30

## 2018-08-18 RX ADMIN — MORPHINE SULFATE 2 MILLIGRAM(S): 50 CAPSULE, EXTENDED RELEASE ORAL at 07:18

## 2018-08-18 RX ADMIN — ATORVASTATIN CALCIUM 20 MILLIGRAM(S): 80 TABLET, FILM COATED ORAL at 21:30

## 2018-08-18 RX ADMIN — Medication 1 TABLET(S): at 05:05

## 2018-08-18 RX ADMIN — PANTOPRAZOLE SODIUM 40 MILLIGRAM(S): 20 TABLET, DELAYED RELEASE ORAL at 05:05

## 2018-08-18 RX ADMIN — MORPHINE SULFATE 2 MILLIGRAM(S): 50 CAPSULE, EXTENDED RELEASE ORAL at 02:15

## 2018-08-18 RX ADMIN — Medication 100 MILLIGRAM(S): at 05:05

## 2018-08-18 RX ADMIN — Medication 0.5 MILLIGRAM(S): at 21:30

## 2018-08-18 RX ADMIN — WARFARIN SODIUM 5 MILLIGRAM(S): 2.5 TABLET ORAL at 21:30

## 2018-08-18 RX ADMIN — MORPHINE SULFATE 2 MILLIGRAM(S): 50 CAPSULE, EXTENDED RELEASE ORAL at 01:55

## 2018-08-18 RX ADMIN — GABAPENTIN 100 MILLIGRAM(S): 400 CAPSULE ORAL at 21:30

## 2018-08-18 RX ADMIN — GABAPENTIN 100 MILLIGRAM(S): 400 CAPSULE ORAL at 14:10

## 2018-08-18 RX ADMIN — TRAMADOL HYDROCHLORIDE 50 MILLIGRAM(S): 50 TABLET ORAL at 23:34

## 2018-08-18 NOTE — DISCHARGE NOTE ADULT - PATIENT PORTAL LINK FT
You can access the [email or web address], offered by [facility], by registering with the following website: [email or web address]

## 2018-08-18 NOTE — DISCHARGE NOTE ADULT - CARE PLAN
Principal Discharge DX:	Closed fracture of neck of right femur, initial encounter  Goal:	Surgical management and outpatient follow-up  Assessment and plan of treatment:	Please continue to take medication as prescribed. Arrange follow-up with your orthopedic surgeon for within two weeks. If you experience any worrying symptoms such as difficulty breathing, chest pain, syncope, fevers, or chills, please contact emergency personnel as soon as possible.  Secondary Diagnosis:	Atrial fibrillation, unspecified type  Goal:	Medical management and outpatient follow-up  Assessment and plan of treatment:	Please continue to take medication as prescribed. Take 5mg of coumadin tonight (8/18). Arrange follow-up with the coumadin clinic and your primary care provider for within two weeks. Principal Discharge DX:	Closed fracture of neck of right femur, initial encounter  Goal:	Surgical management and outpatient follow-up  Assessment and plan of treatment:	Please continue to take medication as prescribed. Arrange follow-up with your orthopedic surgeon for within two weeks. If you experience any worrying symptoms such as difficulty breathing, chest pain, syncope, fevers, or chills, please contact emergency personnel as soon as possible.  Secondary Diagnosis:	Atrial fibrillation, unspecified type  Goal:	Medical management and outpatient follow-up  Assessment and plan of treatment:	Please continue to take medication as prescribed. Arrange follow-up with the coumadin clinic and your primary care provider for within two weeks.

## 2018-08-18 NOTE — DISCHARGE NOTE ADULT - CARE PROVIDER_API CALL
Jace Kaba), Orthopaedic Surgery; Sports Medicine  [address] [place] [address]  Phone: [phone]  Fax: [phone]    Carmine Spencer), Cardiovascular Disease; Interventional Cardiology  [address] [place] [address]  Phone: [phone]  Fax: [phone]    Ky Sinha), Geriatric Medicine; Internal Medicine; Pulmonary Disease  [address] [place] [address]  Phone: [phone]  Fax: [phone]

## 2018-08-18 NOTE — DISCHARGE NOTE ADULT - HOSPITAL COURSE
71 y/o F with PMH of DLD, Atrial fibrillation on coumadin, GERD, COPD on home O2 2.5-3L, Lung Cancer s/p left upper lobectomy in 2011, chemo and radiation, complicated by radiation scarring of lungs presented with right hip and shoulder pain after mechanical fall today. Pt had the first mechanical fall 2 weeks ago after pt become out of balance while kicking the oxygen tubing and fell down hard with her right side on floor. Since then, pt was having throbbing pain on right leg which travels down the leg worse with movement, has been taking tylenol and NSAIDs to relieve the pain associated with limping of right leg. Pt is independently ambulatory prior to this event. On the day of presentation, pt lost balance while using cane and fell on her right knee which worsened the pain. Pt states that the initial fall is likely cause of the injury. Otherwise, pt denies head trauma or LOC, fever, chills, chest pain, palpitations, cough, sputum production, wheezing, abdominal pain, diarrhea, dysuria but admits to chronic shortness of breath.    On admission, XR showed subcapital right femoral neck fracture. Was cleared by cardiology and pulmonology for OR. Underwent Closed Reduction and Percutaneous Pinning. Uncomplicated stay and discharged for rehab.

## 2018-08-18 NOTE — DISCHARGE NOTE ADULT - CARE PROVIDERS DIRECT ADDRESSES
[email or web address],DirectAddress_Unknown

## 2018-08-18 NOTE — DISCHARGE NOTE ADULT - PLAN OF CARE
Surgical management and outpatient follow-up Please continue to take medication as prescribed. Arrange follow-up with your orthopedic surgeon for within two weeks. If you experience any worrying symptoms such as difficulty breathing, chest pain, syncope, fevers, or chills, please contact emergency personnel as soon as possible. Medical management and outpatient follow-up Please continue to take medication as prescribed. Take 5mg of coumadin tonight (8/18). Arrange follow-up with the coumadin clinic and your primary care provider for within two weeks. Please continue to take medication as prescribed. Arrange follow-up with the coumadin clinic and your primary care provider for within two weeks.

## 2018-08-18 NOTE — PROGRESS NOTE ADULT - ATTENDING COMMENTS
Orthopedic Attending    Patient seen and examined.  Laboratory work-up and consults reviewed.   Agree with findings, assessment and plan.
Patient seen and examined and d/w house staff.   Stable to D/C to SNF or Acute rehab
Patient seen and examined at the bed side. Not in distress.   Agree with above note.   patient is clinically and hemodynamically stable to d/c to rehab
Patient seen and examined independently.

## 2018-08-18 NOTE — PROGRESS NOTE ADULT - ASSESSMENT
73 y/o F with PMH of DLD, Atrial fibrillation on coumadin, GERD, COPD on home O2 2.5-3L, Lung Cancer s/p left upper lobectomy in 2011, chemo and radiation, complicated by radiation scarring of lungs presented with right hip and shoulder pain after mechanical fall today. Pt had the first mechanical fall 2 weeks ago after pt become out of balance while kicking the oxygen tubing and fell down hard with her right side on floor. Since then, pt was having throbbing pain on right leg which travels down the leg worse with movement, has been taking tylenol and NSAIDs to relieve the pain associated with limping of right leg. Pt is independently ambulatory prior to this event. On the day of presentation, pt lost balance while using cane and fell on her right knee which worsened the pain. Pt states that the initial fall is likely cause of the injury. Otherwise, pt denies head trauma or LOC, fever, chills, chest pain, palpitations, cough, sputum production, wheezing, abdominal pain, diarrhea, dysuria but admits to chronic shortness of breath.    # Subcapital right femoral neck fracture. Cleared by card and pulm. POD#3. Closed Reduction and Percutaneous Pinning.  - pain control with tramadol q6h PRN  - ambulate as tolerated  - incentive spyrometry  - PT/Rehab => to 4A  - f/u ortho recs    # Atrial fibrillation s/p AV kacie ablation and ventricular paced at a rate of 70, rate controlled  - c/w coumadin  - f/u AM PT/INR and adjust accordingly    # DLD   - c/w statin    # COPD, stable. s/p solumedrol 40 IV QD x 2 doses. ABG results most likely consistent with compensated respiratory acidosis. On home O2 2.5-3L.  - c/w oxygen supplementation  - c/w spiriva    # Anxiety  - c/w klonopin prn    # GERD  - c/w protonix    # DVT  - lovenox qd    Dispo: Anticipated. 73 y/o F with PMH of DLD, Atrial fibrillation on coumadin, GERD, COPD on home O2 2.5-3L, Lung Cancer s/p left upper lobectomy in 2011, chemo and radiation, complicated by radiation scarring of lungs presented with right hip and shoulder pain after mechanical fall today. Pt had the first mechanical fall 2 weeks ago after pt become out of balance while kicking the oxygen tubing and fell down hard with her right side on floor. Since then, pt was having throbbing pain on right leg which travels down the leg worse with movement, has been taking tylenol and NSAIDs to relieve the pain associated with limping of right leg. Pt is independently ambulatory prior to this event. On the day of presentation, pt lost balance while using cane and fell on her right knee which worsened the pain. Pt states that the initial fall is likely cause of the injury. Otherwise, pt denies head trauma or LOC, fever, chills, chest pain, palpitations, cough, sputum production, wheezing, abdominal pain, diarrhea, dysuria but admits to chronic shortness of breath.    # Subcapital right femoral neck fracture. POD#3. Closed Reduction and Percutaneous Pinning.  - pain control with tramadol q6h PRN  - ambulate as tolerated  - incentive spyrometry  - PT/Rehab => to SNF  - f/u ortho recs    # Atrial fibrillation s/p AV kacie ablation and ventricular paced at a rate of 70, rate controlled  - c/w coumadin  - f/u AM PT/INR and adjust accordingly    # DLD   - c/w statin    # chronic respiratory failure- stable. s/p solumedrol 40 IV QD x 2 doses. ABG results most likely consistent with compensated respiratory acidosis. On home O2 2.5-3L.  - c/w oxygen supplementation  - c/w spiriva    # Anxiety  - c/w klonopin prn    # GERD  - c/w protonix    # DVT  - lovenox qd    Dispo: Anticipated.

## 2018-08-18 NOTE — DISCHARGE NOTE ADULT - OTHER SIGNIFICANT FINDINGS
< from: CT Pelvis Bony Only No Cont (08.13.18 @ 16:21) >  1. Valgus impacted subcapital right femoral neck fracture.  2. Degenerative change as above.  < end of copied text >    < from: CT Head No Cont (08.13.18 @ 15:43) >  1.  No evidence of acute intracranial pathology.  Stable exam since 2/23/2018.    2.  Stable moderate chronic microvascular changes and chronic lacunar infarcts within the left basal ganglia.  < end of copied text >    < from: Xray Shoulder 2 Views, Right (08.13.18 @ 14:56) >  No acute fracture with glenohumeral and AC joint degenerative change. Greater tubercle sclerosis. No soft tissue calcifications  < end of copied text >    < from: Xray Chest 1 View AP/PA (08.13.18 @ 14:55) >  No radiographic evidence of acute cardiopulmonary disease. Stable left hemithorax and mediastinal changes.  < end of copied text >    < from: Xray Knee 3 Views, Right (08.13.18 @ 16:54) >  No evidence of acute fracture or dislocation. Osteoarthritis involving patellofemoral joint and medial articular joint. There is no evidence of suprapatellar effusion.  < end of copied text >

## 2018-08-18 NOTE — PROGRESS NOTE ADULT - SUBJECTIVE AND OBJECTIVE BOX
ORTHO PROGRESS NOTE       72yFemale POD #   3   S/P right hip crpp    Patient seen and examined at bedside . The patient is awake and alert in NAD. No complaints of chest pain, SOB, N/V.    Vital Signs Last 24 Hrs    T(C): 36.9 (18 Aug 2018 07:54), Max: 37.8 (18 Aug 2018 00:00)  T(F): 98.4 (18 Aug 2018 07:54), Max: 100 (18 Aug 2018 00:00)  HR: 69 (18 Aug 2018 07:54) (69 - 89)  BP: 95/63 (18 Aug 2018 07:54) (95/63 - 124/60)  BP(mean): --                              8.3    6.38  )-----------( 169      ( 17 Aug 2018 07:12 )             26.6     08-17    140  |  99  |  16  ----------------------------<  119<H>  4.5   |  35<H>  |  0.8    Ca    9.7      17 Aug 2018 07:12      PT/INR - ( 17 Aug 2018 07:12 )   PT: 13.40 sec;   INR: 1.24 ratio         PTT - ( 17 Aug 2018 07:12 )  PTT:31.9 sec      DVT ppx : lovenox        PE:   right thigh dressing C/D/I          Compartments soft         NVI, SILT           A/P: 72yFemale POD # 3    S/P right hip crpp            OOB to Chair            Physical Therapy           Pain control            Incentive Spirometry            DVT Prophylaxis            Discharge planning

## 2018-08-18 NOTE — DISCHARGE NOTE ADULT - MEDICATION SUMMARY - MEDICATIONS TO TAKE
I will START or STAY ON the medications listed below when I get home from the hospital:    acetaminophen 325 mg oral tablet  -- 2 tab(s) by mouth every 6 hours, As needed, Mild Pain (1 - 3)  -- Indication: For pain    Coumadin 1 mg oral tablet  -- Take 5mg tonight (8/18) and check PT/INR tomorrow am  -- Indication: For afib    KlonoPIN 0.5 mg oral tablet  -- 1  by mouth 2 times a day, As Needed  -- Indication: For anxiety    Tigan 300 mg oral capsule  -- 1 cap(s) by mouth 3 times a day, As Needed  -- Indication: For vertigo    Crestor 5 mg oral tablet  -- 1 tab(s) by mouth once a day (at bedtime)  -- Indication: For DLD    Spiriva 18 mcg inhalation capsule  -- 1 cap(s) inhaled once a day  -- Indication: For COPD    Protonix 40 mg oral delayed release tablet  -- 1 tab(s) by mouth once a day  -- Indication: For GERD    Vitamin D3 2000 intl units oral tablet  -- 1 tab(s) by mouth once a day  -- Indication: For Health    biotin 5000 mcg oral tablet, disintegrating  -- 1 tab(s) by mouth once a day  -- Indication: For Health I will START or STAY ON the medications listed below when I get home from the hospital:    acetaminophen 325 mg oral tablet  -- 2 tab(s) by mouth every 6 hours, As needed, Mild Pain (1 - 3)  -- Indication: For pain    traMADol 50 mg oral tablet  -- 1 tab(s) by mouth every 6 hours, As needed, Moderate Pain (4 - 6)  -- Indication: For pain    Coumadin 1 mg oral tablet  -- 1 tab(s) by mouth once a day (at bedtime)  -- Indication: For Afib    KlonoPIN 0.5 mg oral tablet  -- 1  by mouth 2 times a day, As Needed  -- Indication: For anxiety    Tigan 300 mg oral capsule  -- 1 cap(s) by mouth 3 times a day, As Needed  -- Indication: For vertigo    Crestor 5 mg oral tablet  -- 1 tab(s) by mouth once a day (at bedtime)  -- Indication: For DLD    Spiriva 18 mcg inhalation capsule  -- 1 cap(s) inhaled once a day  -- Indication: For COPD    Protonix 40 mg oral delayed release tablet  -- 1 tab(s) by mouth once a day  -- Indication: For GERD    Vitamin D3 2000 intl units oral tablet  -- 1 tab(s) by mouth once a day  -- Indication: For Health    biotin 5000 mcg oral tablet, disintegrating  -- 1 tab(s) by mouth once a day  -- Indication: For Health

## 2018-08-18 NOTE — PROGRESS NOTE ADULT - SUBJECTIVE AND OBJECTIVE BOX
CHIEF COMPLAINT:  Patient is a 72y old Female who presents with a chief complaint of Right hip pain (18 Aug 2018 10:52)    Currently admitted to medicine with the primary diagnosis of Closed fracture of neck of right femur, initial encounter     Today is hospital day 5d. This morning she is resting comfortably in bed and reports no new issues or overnight events.     PAST MEDICAL & SURGICAL HISTORY  COPD (chronic obstructive pulmonary disease)  Afib  Lung cancer  Pacemaker  Anxiety  High cholesterol  S/P appendectomy  History of tonsillectomy  S/P lobectomy of lung: left  H/O atrioventricular kacie ablation  Artificial cardiac pacemaker    SOCIAL HISTORY:  Negative for smoking/alcohol/drug use.     ALLERGIES:  bacitracin (Other)  carboplatin (Other)  sulfa drugs (Unknown)  sulfonamides (Unknown)  Xanax (Other)    MEDICATIONS:  STANDING MEDICATIONS  atorvastatin 20 milliGRAM(s) Oral at bedtime  calcium carbonate 1250 mG  + Vitamin D (OsCal 500 + D) 1 Tablet(s) Oral three times a day  docusate sodium 100 milliGRAM(s) Oral three times a day  enoxaparin Injectable 40 milliGRAM(s) SubCutaneous two times a day  gabapentin 100 milliGRAM(s) Oral three times a day  multivitamin 1 Tablet(s) Oral daily  pantoprazole    Tablet 40 milliGRAM(s) Oral before breakfast  tiotropium 18 MICROgram(s) Capsule 1 Capsule(s) Inhalation daily  warfarin 5 milliGRAM(s) Oral once    PRN MEDICATIONS  acetaminophen   Tablet. 650 milliGRAM(s) Oral every 6 hours PRN  aluminum hydroxide/magnesium hydroxide/simethicone Suspension 30 milliLiter(s) Oral every 4 hours PRN  chlorhexidine 4% Liquid 1 Application(s) Topical <User Schedule> PRN  clonazePAM  Oral Tab/Cap - Peds 0.5 milliGRAM(s) Oral two times a day PRN  morphine  - Injectable 2 milliGRAM(s) IV Push four times a day PRN  ondansetron Injectable 4 milliGRAM(s) IV Push every 6 hours PRN  senna 2 Tablet(s) Oral at bedtime PRN  traMADol 50 milliGRAM(s) Oral every 6 hours PRN    VITALS:   T(F): 98.4  HR: 69  BP: 95/63  RR: 18  SpO2: 99%    LABS:                        8.2    6.93  )-----------( 167      ( 18 Aug 2018 09:46 )             25.9     08-18    139  |  96<L>  |  17  ----------------------------<  171<H>  4.2   |  36<H>  |  0.9    Ca    9.3      18 Aug 2018 09:46    PT/INR - ( 18 Aug 2018 09:46 )   PT: 14.30 sec;   INR: 1.33 ratio    PTT - ( 18 Aug 2018 09:46 )  PTT:35.0 sec    RADIOLOGY:  < from: CT Pelvis Bony Only No Cont (08.13.18 @ 16:21) >  1. Valgus impacted subcapital right femoral neck fracture.  2. Degenerative change as above.  < end of copied text >    < from: CT Head No Cont (08.13.18 @ 15:43) >  1.  No evidence of acute intracranial pathology.  Stable exam since 2/23/2018.    2.  Stable moderate chronic microvascular changes and chronic lacunar infarcts within the left basal ganglia.  < end of copied text >    < from: Xray Shoulder 2 Views, Right (08.13.18 @ 14:56) >  No acute fracture with glenohumeral and AC joint degenerative change. Greater tubercle sclerosis. No soft tissue calcifications  < end of copied text >    < from: Xray Chest 1 View AP/PA (08.13.18 @ 14:55) >  No radiographic evidence of acute cardiopulmonary disease. Stable left hemithorax and mediastinal changes.  < end of copied text >    < from: Xray Knee 3 Views, Right (08.13.18 @ 16:54) >  No evidence of acute fracture or dislocation. Osteoarthritis involving patellofemoral joint and medial articular joint. There is no evidence of suprapatellar effusion.  < end of copied text >    PHYSICAL EXAM:  GEN: No acute distress  PULM: Clear to auscultation bilaterally   CARD: S1/S2 present. RRR.   GI: Soft, non-tender, non-distended. Bowel sounds present  SKIN: Surgical access clean and dry  NEURO: [id number] CHIEF COMPLAINT:  Patient is a 72y old Female who presents with a chief complaint of Right hip pain (18 Aug 2018 10:52)    Currently admitted to medicine with the primary diagnosis of Closed fracture of neck of right femur, initial encounter     Today is hospital day 5d. This morning she is resting comfortably in bed and reports no new issues or overnight events.     PAST MEDICAL & SURGICAL HISTORY  COPD (chronic obstructive pulmonary disease)  Afib  Lung cancer  Pacemaker  Anxiety  High cholesterol  S/P appendectomy  History of tonsillectomy  S/P lobectomy of lung: left  H/O atrioventricular kacie ablation  Artificial cardiac pacemaker      ALLERGIES:  bacitracin (Other)  carboplatin (Other)  sulfa drugs (Unknown)  sulfonamides (Unknown)  Xanax (Other)    MEDICATIONS:  STANDING MEDICATIONS  atorvastatin 20 milliGRAM(s) Oral at bedtime  calcium carbonate 1250 mG  + Vitamin D (OsCal 500 + D) 1 Tablet(s) Oral three times a day  docusate sodium 100 milliGRAM(s) Oral three times a day  enoxaparin Injectable 40 milliGRAM(s) SubCutaneous two times a day  gabapentin 100 milliGRAM(s) Oral three times a day  multivitamin 1 Tablet(s) Oral daily  pantoprazole    Tablet 40 milliGRAM(s) Oral before breakfast  tiotropium 18 MICROgram(s) Capsule 1 Capsule(s) Inhalation daily  warfarin 5 milliGRAM(s) Oral once    PRN MEDICATIONS  acetaminophen   Tablet. 650 milliGRAM(s) Oral every 6 hours PRN  aluminum hydroxide/magnesium hydroxide/simethicone Suspension 30 milliLiter(s) Oral every 4 hours PRN  chlorhexidine 4% Liquid 1 Application(s) Topical <User Schedule> PRN  clonazePAM  Oral Tab/Cap - Peds 0.5 milliGRAM(s) Oral two times a day PRN  morphine  - Injectable 2 milliGRAM(s) IV Push four times a day PRN  ondansetron Injectable 4 milliGRAM(s) IV Push every 6 hours PRN  senna 2 Tablet(s) Oral at bedtime PRN  traMADol 50 milliGRAM(s) Oral every 6 hours PRN    VITALS:   T(F): 98.4  HR: 69  BP: 95/63  RR: 18  SpO2: 99%    LABS:                        8.2    6.93  )-----------( 167      ( 18 Aug 2018 09:46 )             25.9     08-18    139  |  96<L>  |  17  ----------------------------<  171<H>  4.2   |  36<H>  |  0.9    Ca    9.3      18 Aug 2018 09:46    PT/INR - ( 18 Aug 2018 09:46 )   PT: 14.30 sec;   INR: 1.33 ratio    PTT - ( 18 Aug 2018 09:46 )  PTT:35.0 sec    RADIOLOGY:  < from: CT Pelvis Bony Only No Cont (08.13.18 @ 16:21) >  1. Valgus impacted subcapital right femoral neck fracture.  2. Degenerative change as above.  < end of copied text >    < from: CT Head No Cont (08.13.18 @ 15:43) >  1.  No evidence of acute intracranial pathology.  Stable exam since 2/23/2018.    2.  Stable moderate chronic microvascular changes and chronic lacunar infarcts within the left basal ganglia.  < end of copied text >    < from: Xray Shoulder 2 Views, Right (08.13.18 @ 14:56) >  No acute fracture with glenohumeral and AC joint degenerative change. Greater tubercle sclerosis. No soft tissue calcifications  < end of copied text >    < from: Xray Chest 1 View AP/PA (08.13.18 @ 14:55) >  No radiographic evidence of acute cardiopulmonary disease. Stable left hemithorax and mediastinal changes.  < end of copied text >    < from: Xray Knee 3 Views, Right (08.13.18 @ 16:54) >  No evidence of acute fracture or dislocation. Osteoarthritis involving patellofemoral joint and medial articular joint. There is no evidence of suprapatellar effusion.  < end of copied text >    PHYSICAL EXAM:  GEN: No acute distress  PULM: Clear to auscultation bilaterally   CARD: S1/S2 present. RRR.   GI: Soft, non-tender, non-distended. Bowel sounds present  SKIN: Surgical access clean and dry  NEURO: AAOX3

## 2018-08-19 LAB
ANION GAP SERPL CALC-SCNC: 13 MMOL/L — SIGNIFICANT CHANGE UP (ref 7–14)
APTT BLD: 34.7 SEC — SIGNIFICANT CHANGE UP (ref 27–39.2)
BUN SERPL-MCNC: 17 MG/DL — SIGNIFICANT CHANGE UP (ref 10–20)
CALCIUM SERPL-MCNC: 9.3 MG/DL — SIGNIFICANT CHANGE UP (ref 8.5–10.1)
CHLORIDE SERPL-SCNC: 93 MMOL/L — LOW (ref 98–110)
CO2 SERPL-SCNC: 32 MMOL/L — SIGNIFICANT CHANGE UP (ref 17–32)
CREAT SERPL-MCNC: 0.7 MG/DL — SIGNIFICANT CHANGE UP (ref 0.7–1.5)
GLUCOSE SERPL-MCNC: 119 MG/DL — HIGH (ref 70–99)
HCT VFR BLD CALC: 25 % — LOW (ref 37–47)
HGB BLD-MCNC: 8.2 G/DL — LOW (ref 12–16)
INR BLD: 1.52 RATIO — HIGH (ref 0.65–1.3)
MCHC RBC-ENTMCNC: 27.5 PG — SIGNIFICANT CHANGE UP (ref 27–31)
MCHC RBC-ENTMCNC: 32.8 G/DL — SIGNIFICANT CHANGE UP (ref 32–37)
MCV RBC AUTO: 83.9 FL — SIGNIFICANT CHANGE UP (ref 81–99)
NRBC # BLD: 0 /100 WBCS — SIGNIFICANT CHANGE UP (ref 0–0)
PLATELET # BLD AUTO: 171 K/UL — SIGNIFICANT CHANGE UP (ref 130–400)
POTASSIUM SERPL-MCNC: 4.3 MMOL/L — SIGNIFICANT CHANGE UP (ref 3.5–5)
POTASSIUM SERPL-SCNC: 4.3 MMOL/L — SIGNIFICANT CHANGE UP (ref 3.5–5)
PROTHROM AB SERPL-ACNC: 16.3 SEC — HIGH (ref 9.95–12.87)
RBC # BLD: 2.98 M/UL — LOW (ref 4.2–5.4)
RBC # FLD: 13.8 % — SIGNIFICANT CHANGE UP (ref 11.5–14.5)
SODIUM SERPL-SCNC: 138 MMOL/L — SIGNIFICANT CHANGE UP (ref 135–146)
WBC # BLD: 6.49 K/UL — SIGNIFICANT CHANGE UP (ref 4.8–10.8)
WBC # FLD AUTO: 6.49 K/UL — SIGNIFICANT CHANGE UP (ref 4.8–10.8)

## 2018-08-19 RX ORDER — WARFARIN SODIUM 2.5 MG/1
2 TABLET ORAL ONCE
Qty: 0 | Refills: 0 | Status: COMPLETED | OUTPATIENT
Start: 2018-08-19 | End: 2018-08-19

## 2018-08-19 RX ADMIN — ENOXAPARIN SODIUM 40 MILLIGRAM(S): 100 INJECTION SUBCUTANEOUS at 05:32

## 2018-08-19 RX ADMIN — TRAMADOL HYDROCHLORIDE 50 MILLIGRAM(S): 50 TABLET ORAL at 11:30

## 2018-08-19 RX ADMIN — Medication 0.5 MILLIGRAM(S): at 21:08

## 2018-08-19 RX ADMIN — TRAMADOL HYDROCHLORIDE 50 MILLIGRAM(S): 50 TABLET ORAL at 21:27

## 2018-08-19 RX ADMIN — Medication 1 TABLET(S): at 21:06

## 2018-08-19 RX ADMIN — TRAMADOL HYDROCHLORIDE 50 MILLIGRAM(S): 50 TABLET ORAL at 10:12

## 2018-08-19 RX ADMIN — Medication 100 MILLIGRAM(S): at 13:58

## 2018-08-19 RX ADMIN — TRAMADOL HYDROCHLORIDE 50 MILLIGRAM(S): 50 TABLET ORAL at 21:07

## 2018-08-19 RX ADMIN — Medication 1 TABLET(S): at 13:58

## 2018-08-19 RX ADMIN — PANTOPRAZOLE SODIUM 40 MILLIGRAM(S): 20 TABLET, DELAYED RELEASE ORAL at 05:32

## 2018-08-19 RX ADMIN — GABAPENTIN 100 MILLIGRAM(S): 400 CAPSULE ORAL at 21:05

## 2018-08-19 RX ADMIN — GABAPENTIN 100 MILLIGRAM(S): 400 CAPSULE ORAL at 05:32

## 2018-08-19 RX ADMIN — Medication 100 MILLIGRAM(S): at 05:32

## 2018-08-19 RX ADMIN — TRAMADOL HYDROCHLORIDE 50 MILLIGRAM(S): 50 TABLET ORAL at 06:31

## 2018-08-19 RX ADMIN — Medication 1 TABLET(S): at 05:32

## 2018-08-19 RX ADMIN — ATORVASTATIN CALCIUM 20 MILLIGRAM(S): 80 TABLET, FILM COATED ORAL at 21:06

## 2018-08-19 RX ADMIN — Medication 100 MILLIGRAM(S): at 21:05

## 2018-08-19 RX ADMIN — GABAPENTIN 100 MILLIGRAM(S): 400 CAPSULE ORAL at 13:58

## 2018-08-19 RX ADMIN — Medication 1 TABLET(S): at 12:15

## 2018-08-19 RX ADMIN — ONDANSETRON 4 MILLIGRAM(S): 8 TABLET, FILM COATED ORAL at 06:53

## 2018-08-19 RX ADMIN — TIOTROPIUM BROMIDE 1 CAPSULE(S): 18 CAPSULE ORAL; RESPIRATORY (INHALATION) at 14:04

## 2018-08-19 RX ADMIN — ENOXAPARIN SODIUM 40 MILLIGRAM(S): 100 INJECTION SUBCUTANEOUS at 17:51

## 2018-08-19 RX ADMIN — TRAMADOL HYDROCHLORIDE 50 MILLIGRAM(S): 50 TABLET ORAL at 05:35

## 2018-08-19 RX ADMIN — WARFARIN SODIUM 2 MILLIGRAM(S): 2.5 TABLET ORAL at 21:06

## 2018-08-19 NOTE — PROGRESS NOTE ADULT - SUBJECTIVE AND OBJECTIVE BOX
72yFemale POD #   4   S/P right hip crpp    Patient seen and examined at bedside . The patient is awake and alert in NAD. No complaints of chest pain, SOB, N/V.    Vital Signs Last 24 Hrs  T(C): 36.2 (19 Aug 2018 15:00), Max: 37.4 (19 Aug 2018 00:22)  T(F): 97.1 (19 Aug 2018 15:00), Max: 99.3 (19 Aug 2018 00:22)  HR: 71 (19 Aug 2018 15:00) (69 - 87)  BP: 110/59 (19 Aug 2018 15:00) (108/55 - 142/66)  BP(mean): --  RR: 18 (19 Aug 2018 15:00) (18 - 18)  SpO2: --                            8.2    6.49  )-----------( 171      ( 19 Aug 2018 07:01 )             25.0       08-19    138  |  93<L>  |  17  ----------------------------<  119<H>  4.3   |  32  |  0.7    Ca    9.3      19 Aug 2018 07:01        DVT ppx : lovenox        PE:   right thigh dressing C/D/I          Compartments soft         NVI, SILT         A/P: 72yFemale POD # 4 S/P right hip crpp.           OOB to Chair            Physical Therapy           Pain control            Incentive Spirometry            DVT Prophylaxis            PT recs SNF

## 2018-08-19 NOTE — PROGRESS NOTE ADULT - SUBJECTIVE AND OBJECTIVE BOX
Patient is a 72y old  Female who presents with a chief complaint of Right hip pain (18 Aug 2018 10:52)    Patient was seen and examined.  Right hip pain well controlled on oral pain meds  Denies any complaints    PAST MEDICAL & SURGICAL HISTORY:  COPD (chronic obstructive pulmonary disease)  Afib  Lung cancer  Pacemaker  Anxiety  High cholesterol  S/P appendectomy  History of tonsillectomy  S/P lobectomy of lung: left  H/O atrioventricular kacie ablation  Artificial cardiac pacemaker    Allergies  bacitracin (Other)  carboplatin (Other)  sulfa drugs (Unknown)  sulfonamides (Unknown)  Xanax (Other)    MEDICATIONS  (STANDING):  atorvastatin 20 milliGRAM(s) Oral at bedtime  calcium carbonate 1250 mG  + Vitamin D (OsCal 500 + D) 1 Tablet(s) Oral three times a day  docusate sodium 100 milliGRAM(s) Oral three times a day  enoxaparin Injectable 40 milliGRAM(s) SubCutaneous two times a day  gabapentin 100 milliGRAM(s) Oral three times a day  multivitamin 1 Tablet(s) Oral daily  pantoprazole    Tablet 40 milliGRAM(s) Oral before breakfast  tiotropium 18 MICROgram(s) Capsule 1 Capsule(s) Inhalation daily  warfarin 2 milliGRAM(s) Oral once    MEDICATIONS  (PRN):  acetaminophen   Tablet. 650 milliGRAM(s) Oral every 6 hours PRN Moderate Pain (4 - 6)  aluminum hydroxide/magnesium hydroxide/simethicone Suspension 30 milliLiter(s) Oral every 4 hours PRN Dyspepsia  chlorhexidine 4% Liquid 1 Application(s) Topical <User Schedule> PRN as needed  clonazePAM Tablet 0.5 milliGRAM(s) Oral at bedtime PRN anxiety  morphine  - Injectable 2 milliGRAM(s) IV Push four times a day PRN Severe Pain (7 - 10)  ondansetron Injectable 4 milliGRAM(s) IV Push every 6 hours PRN Nausea  senna 2 Tablet(s) Oral at bedtime PRN Constipation  traMADol 50 milliGRAM(s) Oral every 6 hours PRN Moderate Pain (4 - 6)    Vital Signs Last 24 Hrs  T(C): 36.2  T(F): 97.1  HR: 71  BP: 110/59  BP(mean): --  RR: 18  SpO2: --  O/E:  Awake, alert, not in distress.  HEENT: atraumatic, EOMI.  Chest: clear.  CVS: SIS2 +, no murmur.  P/A: Soft, BS+  CNS: non focal.  Ext: no edema feet.  Skin: no rash, no ulcers.  All systems reviewed positive findings as above.                                8.2<L>  6.49  )-----------( 171      ( 19 Aug 2018 07:01 )             25.0<L>                        8.2<L>  6.93  )-----------( 167      ( 18 Aug 2018 09:46 )             25.9<L>    08-19    138  |  93<L>  |  17  ----------------------------<  119<H>  4.3   |  32  |  0.7  08-18    139  |  96<L>  |  17  ----------------------------<  171<H>  4.2   |  36<H>  |  0.9    Ca    9.3      19 Aug 2018 07:01  Ca    9.3      18 Aug 2018 09:46            PT/INR - ( 19 Aug 2018 07:01 )   PT: 16.30 sec;   INR: 1.52 ratio         PTT - ( 19 Aug 2018 07:01 )  PTT:34.7 sec

## 2018-08-19 NOTE — PROGRESS NOTE ADULT - ASSESSMENT
73 y/o F with PMH of DLD, Atrial fibrillation on coumadin, GERD, COPD on home O2 2.5-3L, Lung Cancer s/p left upper lobectomy in 2011, chemo and radiation, complicated by radiation scarring of lungs presented with right hip and shoulder pain after mechanical fall today. Pt had the first mechanical fall 2 weeks ago after pt become out of balance while kicking the oxygen tubing and fell down hard with her right side on floor. Since then, pt was having throbbing pain on right leg which travels down the leg worse with movement, has been taking tylenol and NSAIDs to relieve the pain associated with limping of right leg. Pt is independently ambulatory prior to this event. On the day of presentation, pt lost balance while using cane and fell on her right knee which worsened the pain. Pt states that the initial fall is likely cause of the injury. Otherwise, pt denies head trauma or LOC, fever, chills, chest pain, palpitations, cough, sputum production, wheezing, abdominal pain, diarrhea, dysuria but admits to chronic shortness of breath.    # Subcapital right femoral neck fracture.  - S/p  Closed Reduction and Percutaneous Pinning.  - pain control with tramadol q6h PRN  - ambulate as tolerated  - incentive spirometry  - PT/Rehab => to SNF  - f/u ortho recs    # Atrial fibrillation s/p AV kacie ablation and ventricular paced at a rate of 70, rate controlled  - c/w coumadin  - f/u AM PT/INR and adjust accordingly    # DLD   - c/w statin    # chronic respiratory failure- stable. s/p solumedrol 40 IV QD x 2 doses. ABG results most likely consistent with compensated respiratory acidosis. On home O2 2.5-3L.  - c/w oxygen supplementation  - c/w spiriva    # Anxiety  - c/w klonopin prn    # GERD  - c/w protonix    # DVT  - lovenox qd    Pt planned for discharge to SNF/ she is requesting 4A -  [place].

## 2018-08-20 ENCOUNTER — INPATIENT (INPATIENT)
Facility: HOSPITAL | Age: 73
LOS: 15 days | Discharge: HOME | End: 2018-09-05
Attending: PHYSICAL MEDICINE & REHABILITATION | Admitting: PHYSICAL MEDICINE & REHABILITATION

## 2018-08-20 VITALS
RESPIRATION RATE: 18 BRPM | HEART RATE: 70 BPM | TEMPERATURE: 97 F | SYSTOLIC BLOOD PRESSURE: 104 MMHG | DIASTOLIC BLOOD PRESSURE: 57 MMHG

## 2018-08-20 DIAGNOSIS — Z90.49 ACQUIRED ABSENCE OF OTHER SPECIFIED PARTS OF DIGESTIVE TRACT: Chronic | ICD-10-CM

## 2018-08-20 DIAGNOSIS — Z95.0 PRESENCE OF CARDIAC PACEMAKER: Chronic | ICD-10-CM

## 2018-08-20 DIAGNOSIS — Z98.890 OTHER SPECIFIED POSTPROCEDURAL STATES: Chronic | ICD-10-CM

## 2018-08-20 DIAGNOSIS — Z90.2 ACQUIRED ABSENCE OF LUNG [PART OF]: Chronic | ICD-10-CM

## 2018-08-20 LAB
ANION GAP SERPL CALC-SCNC: 11 MMOL/L — SIGNIFICANT CHANGE UP (ref 7–14)
APTT BLD: 33.8 SEC — SIGNIFICANT CHANGE UP (ref 27–39.2)
BUN SERPL-MCNC: 16 MG/DL — SIGNIFICANT CHANGE UP (ref 10–20)
CALCIUM SERPL-MCNC: 9.3 MG/DL — SIGNIFICANT CHANGE UP (ref 8.5–10.1)
CHLORIDE SERPL-SCNC: 90 MMOL/L — LOW (ref 98–110)
CO2 SERPL-SCNC: 35 MMOL/L — HIGH (ref 17–32)
CREAT SERPL-MCNC: 0.8 MG/DL — SIGNIFICANT CHANGE UP (ref 0.7–1.5)
GLUCOSE SERPL-MCNC: 168 MG/DL — HIGH (ref 70–99)
HCT VFR BLD CALC: 25.7 % — LOW (ref 37–47)
HGB BLD-MCNC: 8.4 G/DL — LOW (ref 12–16)
INR BLD: 2.21 RATIO — HIGH (ref 0.65–1.3)
MCHC RBC-ENTMCNC: 27.5 PG — SIGNIFICANT CHANGE UP (ref 27–31)
MCHC RBC-ENTMCNC: 32.7 G/DL — SIGNIFICANT CHANGE UP (ref 32–37)
MCV RBC AUTO: 84 FL — SIGNIFICANT CHANGE UP (ref 81–99)
NRBC # BLD: 0 /100 WBCS — SIGNIFICANT CHANGE UP (ref 0–0)
PLATELET # BLD AUTO: 212 K/UL — SIGNIFICANT CHANGE UP (ref 130–400)
POTASSIUM SERPL-MCNC: 4.3 MMOL/L — SIGNIFICANT CHANGE UP (ref 3.5–5)
POTASSIUM SERPL-SCNC: 4.3 MMOL/L — SIGNIFICANT CHANGE UP (ref 3.5–5)
PROTHROM AB SERPL-ACNC: 23.7 SEC — HIGH (ref 9.95–12.87)
RBC # BLD: 3.06 M/UL — LOW (ref 4.2–5.4)
RBC # FLD: 13.9 % — SIGNIFICANT CHANGE UP (ref 11.5–14.5)
SODIUM SERPL-SCNC: 136 MMOL/L — SIGNIFICANT CHANGE UP (ref 135–146)
WBC # BLD: 7.79 K/UL — SIGNIFICANT CHANGE UP (ref 4.8–10.8)
WBC # FLD AUTO: 7.79 K/UL — SIGNIFICANT CHANGE UP (ref 4.8–10.8)

## 2018-08-20 RX ORDER — IPRATROPIUM/ALBUTEROL SULFATE 18-103MCG
3 AEROSOL WITH ADAPTER (GRAM) INHALATION EVERY 6 HOURS
Qty: 0 | Refills: 0 | Status: DISCONTINUED | OUTPATIENT
Start: 2018-08-20 | End: 2018-09-03

## 2018-08-20 RX ORDER — DOCUSATE SODIUM 100 MG
100 CAPSULE ORAL THREE TIMES A DAY
Qty: 0 | Refills: 0 | Status: DISCONTINUED | OUTPATIENT
Start: 2018-08-20 | End: 2018-09-05

## 2018-08-20 RX ORDER — MAGNESIUM HYDROXIDE 400 MG/1
30 TABLET, CHEWABLE ORAL ONCE
Qty: 0 | Refills: 0 | Status: COMPLETED | OUTPATIENT
Start: 2018-08-20 | End: 2018-08-20

## 2018-08-20 RX ORDER — MAGNESIUM HYDROXIDE 400 MG/1
30 TABLET, CHEWABLE ORAL DAILY
Qty: 0 | Refills: 0 | Status: DISCONTINUED | OUTPATIENT
Start: 2018-08-20 | End: 2018-09-05

## 2018-08-20 RX ORDER — WARFARIN SODIUM 2.5 MG/1
1 TABLET ORAL
Qty: 0 | Refills: 0 | COMMUNITY

## 2018-08-20 RX ORDER — TRAMADOL HYDROCHLORIDE 50 MG/1
1 TABLET ORAL
Qty: 0 | Refills: 0 | COMMUNITY
Start: 2018-08-20

## 2018-08-20 RX ORDER — TRAMADOL HYDROCHLORIDE 50 MG/1
50 TABLET ORAL EVERY 6 HOURS
Qty: 0 | Refills: 0 | Status: DISCONTINUED | OUTPATIENT
Start: 2018-08-20 | End: 2018-08-24

## 2018-08-20 RX ORDER — CLONAZEPAM 1 MG
0.5 TABLET ORAL AT BEDTIME
Qty: 0 | Refills: 0 | Status: DISCONTINUED | OUTPATIENT
Start: 2018-08-20 | End: 2018-08-21

## 2018-08-20 RX ORDER — WARFARIN SODIUM 2.5 MG/1
1 TABLET ORAL ONCE
Qty: 0 | Refills: 0 | Status: COMPLETED | OUTPATIENT
Start: 2018-08-20 | End: 2018-08-20

## 2018-08-20 RX ORDER — PANTOPRAZOLE SODIUM 20 MG/1
40 TABLET, DELAYED RELEASE ORAL
Qty: 0 | Refills: 0 | Status: DISCONTINUED | OUTPATIENT
Start: 2018-08-20 | End: 2018-08-27

## 2018-08-20 RX ORDER — SENNA PLUS 8.6 MG/1
2 TABLET ORAL AT BEDTIME
Qty: 0 | Refills: 0 | Status: DISCONTINUED | OUTPATIENT
Start: 2018-08-20 | End: 2018-09-05

## 2018-08-20 RX ORDER — POLYETHYLENE GLYCOL 3350 17 G/17G
17 POWDER, FOR SOLUTION ORAL DAILY
Qty: 0 | Refills: 0 | Status: DISCONTINUED | OUTPATIENT
Start: 2018-08-20 | End: 2018-08-29

## 2018-08-20 RX ORDER — TIOTROPIUM BROMIDE 18 UG/1
1 CAPSULE ORAL; RESPIRATORY (INHALATION) DAILY
Qty: 0 | Refills: 0 | Status: DISCONTINUED | OUTPATIENT
Start: 2018-08-20 | End: 2018-09-05

## 2018-08-20 RX ORDER — ACETAMINOPHEN 500 MG
650 TABLET ORAL EVERY 4 HOURS
Qty: 0 | Refills: 0 | Status: DISCONTINUED | OUTPATIENT
Start: 2018-08-20 | End: 2018-09-05

## 2018-08-20 RX ORDER — ATORVASTATIN CALCIUM 80 MG/1
20 TABLET, FILM COATED ORAL AT BEDTIME
Qty: 0 | Refills: 0 | Status: DISCONTINUED | OUTPATIENT
Start: 2018-08-20 | End: 2018-09-05

## 2018-08-20 RX ORDER — GABAPENTIN 400 MG/1
100 CAPSULE ORAL EVERY 8 HOURS
Qty: 0 | Refills: 0 | Status: DISCONTINUED | OUTPATIENT
Start: 2018-08-20 | End: 2018-09-05

## 2018-08-20 RX ORDER — WARFARIN SODIUM 2.5 MG/1
1 TABLET ORAL ONCE
Qty: 0 | Refills: 0 | Status: DISCONTINUED | OUTPATIENT
Start: 2018-08-20 | End: 2018-08-20

## 2018-08-20 RX ADMIN — TRAMADOL HYDROCHLORIDE 50 MILLIGRAM(S): 50 TABLET ORAL at 22:02

## 2018-08-20 RX ADMIN — Medication 0.5 MILLIGRAM(S): at 22:02

## 2018-08-20 RX ADMIN — ATORVASTATIN CALCIUM 20 MILLIGRAM(S): 80 TABLET, FILM COATED ORAL at 21:59

## 2018-08-20 RX ADMIN — TRAMADOL HYDROCHLORIDE 50 MILLIGRAM(S): 50 TABLET ORAL at 05:43

## 2018-08-20 RX ADMIN — TIOTROPIUM BROMIDE 1 CAPSULE(S): 18 CAPSULE ORAL; RESPIRATORY (INHALATION) at 08:06

## 2018-08-20 RX ADMIN — SENNA PLUS 2 TABLET(S): 8.6 TABLET ORAL at 21:58

## 2018-08-20 RX ADMIN — GABAPENTIN 100 MILLIGRAM(S): 400 CAPSULE ORAL at 14:21

## 2018-08-20 RX ADMIN — GABAPENTIN 100 MILLIGRAM(S): 400 CAPSULE ORAL at 05:35

## 2018-08-20 RX ADMIN — Medication 100 MILLIGRAM(S): at 14:21

## 2018-08-20 RX ADMIN — Medication 1 TABLET(S): at 14:21

## 2018-08-20 RX ADMIN — Medication 100 MILLIGRAM(S): at 21:59

## 2018-08-20 RX ADMIN — ENOXAPARIN SODIUM 40 MILLIGRAM(S): 100 INJECTION SUBCUTANEOUS at 05:43

## 2018-08-20 RX ADMIN — PANTOPRAZOLE SODIUM 40 MILLIGRAM(S): 20 TABLET, DELAYED RELEASE ORAL at 05:35

## 2018-08-20 RX ADMIN — Medication 1 TABLET(S): at 21:59

## 2018-08-20 RX ADMIN — Medication 1 TABLET(S): at 14:23

## 2018-08-20 RX ADMIN — Medication 1 TABLET(S): at 05:42

## 2018-08-20 RX ADMIN — TRAMADOL HYDROCHLORIDE 50 MILLIGRAM(S): 50 TABLET ORAL at 06:39

## 2018-08-20 RX ADMIN — Medication 100 MILLIGRAM(S): at 05:35

## 2018-08-20 RX ADMIN — WARFARIN SODIUM 1 MILLIGRAM(S): 2.5 TABLET ORAL at 21:58

## 2018-08-20 RX ADMIN — GABAPENTIN 100 MILLIGRAM(S): 400 CAPSULE ORAL at 21:59

## 2018-08-20 NOTE — PROGRESS NOTE ADULT - ASSESSMENT
A/P: 72yFemale POD # 5 S/P right hip crpp.           OOB to Chair            WBAT           Physical Therapy           Pain control            Incentive Spirometry            DVT Prophylaxis            PT recs SNF

## 2018-08-20 NOTE — DIETITIAN INITIAL EVALUATION ADULT. - MD RECOMMEND
Please order ENSURE ENLIVE q12hr to current DASH/TLC diet order. (2) Consider adjusting for BOWEL REGIMEN, pt c/o no BM for 7 days.

## 2018-08-20 NOTE — PROGRESS NOTE ADULT - SUBJECTIVE AND OBJECTIVE BOX
Patient is a 72y old  Female who presents with a chief complaint of Right hip pain (18 Aug 2018 10:52)    Patient was seen and examined.  Right hip pain well controlled on oral pain meds  Denies any complaints    PAST MEDICAL & SURGICAL HISTORY:  COPD (chronic obstructive pulmonary disease)  Afib  Lung cancer  Pacemaker  Anxiety  High cholesterol  S/P appendectomy  History of tonsillectomy  S/P lobectomy of lung: left  H/O atrioventricular kacie ablation  Artificial cardiac pacemaker    Allergies  bacitracin (Other)  carboplatin (Other)  sulfa drugs (Unknown)  sulfonamides (Unknown)  Xanax (Other)    MEDICATIONS  (STANDING):  atorvastatin 20 milliGRAM(s) Oral at bedtime  calcium carbonate 1250 mG  + Vitamin D (OsCal 500 + D) 1 Tablet(s) Oral three times a day  docusate sodium 100 milliGRAM(s) Oral three times a day  enoxaparin Injectable 40 milliGRAM(s) SubCutaneous two times a day  gabapentin 100 milliGRAM(s) Oral three times a day  multivitamin 1 Tablet(s) Oral daily  pantoprazole    Tablet 40 milliGRAM(s) Oral before breakfast  tiotropium 18 MICROgram(s) Capsule 1 Capsule(s) Inhalation daily  warfarin 2 milliGRAM(s) Oral once    MEDICATIONS  (PRN):  acetaminophen   Tablet. 650 milliGRAM(s) Oral every 6 hours PRN Moderate Pain (4 - 6)  aluminum hydroxide/magnesium hydroxide/simethicone Suspension 30 milliLiter(s) Oral every 4 hours PRN Dyspepsia  chlorhexidine 4% Liquid 1 Application(s) Topical <User Schedule> PRN as needed  clonazePAM Tablet 0.5 milliGRAM(s) Oral at bedtime PRN anxiety  morphine  - Injectable 2 milliGRAM(s) IV Push four times a day PRN Severe Pain (7 - 10)  ondansetron Injectable 4 milliGRAM(s) IV Push every 6 hours PRN Nausea  senna 2 Tablet(s) Oral at bedtime PRN Constipation  traMADol 50 milliGRAM(s) Oral every 6 hours PRN Moderate Pain (4 - 6)    Vital Signs Last 24 Hrs  Vital Signs Last 24 Hrs  T(C): 37.2 (20 Aug 2018 07:30), Max: 37.3 (20 Aug 2018 00:01)  T(F): 99 (20 Aug 2018 07:30), Max: 99.2 (20 Aug 2018 00:01)  HR: 81 (20 Aug 2018 07:30) (71 - 81)  BP: 110/53 (20 Aug 2018 07:30) (101/55 - 110/59)    RR: 18 (20 Aug 2018 07:30) (18 - 18)  SpO2: 77% (20 Aug 2018 08:57) (77% - 94%)  O/E:  Awake, alert, not in distress.      HEENT: atraumatic, EOMI.  Chest: clear.  CVS: SIS2 +, no murmur.  P/A: Soft, BS+  CNS: non focal.  Ext: no edema feet.  Skin: no rash, no ulcers.  All systems reviewed positive findings as above.                                8.2<L>  6.49  )-----------( 171      ( 19 Aug 2018 07:01 )             25.0<L>                        8.2<L>  6.93  )-----------( 167      ( 18 Aug 2018 09:46 )             25.9<L>    08-19    138  |  93<L>  |  17  ----------------------------<  119<H>  4.3   |  32  |  0.7  08-18    139  |  96<L>  |  17  ----------------------------<  171<H>  4.2   |  36<H>  |  0.9    Ca    9.3      19 Aug 2018 07:01  Ca    9.3      18 Aug 2018 09:46            PT/INR - ( 19 Aug 2018 07:01 )   PT: 16.30 sec;   INR: 1.52 ratio         PTT - ( 19 Aug 2018 07:01 )  PTT:34.7 sec

## 2018-08-20 NOTE — DIETITIAN INITIAL EVALUATION ADULT. - DIET TYPE
Pt reported eating 50% of meals. Sometimes she eats way more if she likes the food. and vice versa. Agreed for a supplement/DASH/TLC (sodium and cholesterol restricted diet)

## 2018-08-20 NOTE — PROGRESS NOTE ADULT - PROVIDER SPECIALTY LIST ADULT
Hospitalist
Hospitalist
Internal Medicine
Orthopedics
Physiatry
Physiatry
Pulmonology
Pulmonology
Internal Medicine

## 2018-08-20 NOTE — DIETITIAN INITIAL EVALUATION ADULT. - ORAL INTAKE PTA
good/PTA pt eats well, tries to eat healthy. No supplement but take biotin, d3, ensure vanilla at home. NKFA.

## 2018-08-20 NOTE — DIETITIAN INITIAL EVALUATION ADULT. - PERTINENT MEDS FT
tramadol, acetaminophen, atorvastatin, ashley carbonate, docusate, morphine, MVI, ondansetron, protonix, senna

## 2018-08-20 NOTE — DIETITIAN INITIAL EVALUATION ADULT. - PHYSICAL APPEARANCE
well nourished/BMI is 23.4 (UBW reported to be around 135#. Suspecting some weight loss since she has been stress about her health lately.

## 2018-08-20 NOTE — PROGRESS NOTE ADULT - ASSESSMENT
71 y/o F with PMH of DLD, Atrial fibrillation on coumadin, GERD, COPD on home O2 2.5-3L, Lung Cancer s/p left upper lobectomy in 2011, chemo and radiation, complicated by radiation scarring of lungs presented with right hip and shoulder pain after mechanical fall today. Pt had the first mechanical fall 2 weeks ago after pt become out of balance while kicking the oxygen tubing and fell down hard with her right side on floor. Since then, pt was having throbbing pain on right leg which travels down the leg worse with movement, has been taking tylenol and NSAIDs to relieve the pain associated with limping of right leg. Pt is independently ambulatory prior to this event. On the day of presentation, pt lost balance while using cane and fell on her right knee which worsened the pain. Pt states that the initial fall is likely cause of the injury. Otherwise, pt denies head trauma or LOC, fever, chills, chest pain, palpitations, cough, sputum production, wheezing, abdominal pain, diarrhea, dysuria but admits to chronic shortness of breath.    # Subcapital right femoral neck fracture.  - S/p  Closed Reduction and Percutaneous Pinning.  - pain control with tramadol q6h PRN  - ambulate as tolerated  - incentive spirometry  - PT/Rehab => to SNF  - f/u ortho recs    # Atrial fibrillation s/p AV kacie ablation and ventricular paced at a rate of 70, rate controlled  - c/w coumadin  - f/u AM PT/INR and adjust accordingly    # DLD   - c/w statin    # chronic respiratory failure- stable. s/p solumedrol 40 IV QD x 2 doses. ABG results most likely consistent with compensated respiratory acidosis. On home O2 2.5-3L.  - c/w oxygen supplementation  - c/w spiriva    # Anxiety  - c/w klonopin prn    # GERD  - c/w protonix    # DVT  - lovenox qd    Pt planned for discharge to SNF/ she is requesting [facility] -  [facility].  Patient is medically stable to d/c

## 2018-08-20 NOTE — DIETITIAN INITIAL EVALUATION ADULT. - PT NOT SOURCE
other (specify)/LOS- pt was previously reported with good PO. Today pt c/o not having BM for 7 days. Eating about 50% of the tray, sometimes she likes and dislike the foods. No edema. sugical incision.

## 2018-08-20 NOTE — PROGRESS NOTE ADULT - SUBJECTIVE AND OBJECTIVE BOX
72yFemale POD #   5   S/P right hip crpp    PT rec for SNF.   Patient seen and examined at bedside . The patient is awake and alert in NAD. No complaints of chest pain, SOB, N/V.  Upset that she is recommended for SNF vs acute rehab    Vital Signs Last 24 Hrs  ICU Vital Signs Last 24 Hrs  T(C): 37.2 (20 Aug 2018 07:30), Max: 37.3 (20 Aug 2018 00:01)  T(F): 99 (20 Aug 2018 07:30), Max: 99.2 (20 Aug 2018 00:01)  HR: 81 (20 Aug 2018 07:30) (71 - 81)  BP: 110/53 (20 Aug 2018 07:30) (101/55 - 110/59)                            8.4    7.79  )-----------( 212      ( 20 Aug 2018 06:36 )             25.7           DVT ppx : lovenox        PE:   right thigh dressing C/D/I          Compartments soft         NVI, SILT

## 2018-08-21 LAB
ALBUMIN SERPL ELPH-MCNC: 3.6 G/DL — SIGNIFICANT CHANGE UP (ref 3.5–5.2)
ALP SERPL-CCNC: 82 U/L — SIGNIFICANT CHANGE UP (ref 30–115)
ALT FLD-CCNC: 7 U/L — SIGNIFICANT CHANGE UP (ref 0–41)
ANION GAP SERPL CALC-SCNC: 11 MMOL/L — SIGNIFICANT CHANGE UP (ref 7–14)
AST SERPL-CCNC: 15 U/L — SIGNIFICANT CHANGE UP (ref 0–41)
BILIRUB SERPL-MCNC: 0.7 MG/DL — SIGNIFICANT CHANGE UP (ref 0.2–1.2)
BUN SERPL-MCNC: 17 MG/DL — SIGNIFICANT CHANGE UP (ref 10–20)
CALCIUM SERPL-MCNC: 9.2 MG/DL — SIGNIFICANT CHANGE UP (ref 8.5–10.1)
CHLORIDE SERPL-SCNC: 95 MMOL/L — LOW (ref 98–110)
CO2 SERPL-SCNC: 33 MMOL/L — HIGH (ref 17–32)
CREAT SERPL-MCNC: 0.8 MG/DL — SIGNIFICANT CHANGE UP (ref 0.7–1.5)
GLUCOSE SERPL-MCNC: 146 MG/DL — HIGH (ref 70–99)
INR BLD: 2.25 RATIO — HIGH (ref 0.65–1.3)
MAGNESIUM SERPL-MCNC: 1.6 MG/DL — LOW (ref 1.8–2.4)
POTASSIUM SERPL-MCNC: 4.3 MMOL/L — SIGNIFICANT CHANGE UP (ref 3.5–5)
POTASSIUM SERPL-SCNC: 4.3 MMOL/L — SIGNIFICANT CHANGE UP (ref 3.5–5)
PROT SERPL-MCNC: 5.9 G/DL — LOW (ref 6–8)
PROTHROM AB SERPL-ACNC: 24.1 SEC — HIGH (ref 9.95–12.87)
SODIUM SERPL-SCNC: 139 MMOL/L — SIGNIFICANT CHANGE UP (ref 135–146)

## 2018-08-21 RX ORDER — LACTULOSE 10 G/15ML
20 SOLUTION ORAL
Qty: 0 | Refills: 0 | Status: DISCONTINUED | OUTPATIENT
Start: 2018-08-23 | End: 2018-09-05

## 2018-08-21 RX ORDER — CLONAZEPAM 1 MG
0.5 TABLET ORAL AT BEDTIME
Qty: 0 | Refills: 0 | Status: DISCONTINUED | OUTPATIENT
Start: 2018-08-21 | End: 2018-08-28

## 2018-08-21 RX ORDER — SALICYLIC ACID 0.5 %
1 CLEANSER (GRAM) TOPICAL
Qty: 0 | Refills: 0 | Status: DISCONTINUED | OUTPATIENT
Start: 2018-08-21 | End: 2018-09-05

## 2018-08-21 RX ORDER — METHYLPREDNISOLONE 4 MG
500 TABLET ORAL
Qty: 0 | Refills: 0 | Status: COMPLETED | OUTPATIENT
Start: 2018-08-21 | End: 2018-08-28

## 2018-08-21 RX ORDER — FERROUS SULFATE 325(65) MG
325 TABLET ORAL DAILY
Qty: 0 | Refills: 0 | Status: DISCONTINUED | OUTPATIENT
Start: 2018-08-21 | End: 2018-08-29

## 2018-08-21 RX ORDER — CLONAZEPAM 1 MG
0.5 TABLET ORAL DAILY
Qty: 0 | Refills: 0 | Status: DISCONTINUED | OUTPATIENT
Start: 2018-08-21 | End: 2018-08-26

## 2018-08-21 RX ORDER — WARFARIN SODIUM 2.5 MG/1
2 TABLET ORAL ONCE
Qty: 0 | Refills: 0 | Status: COMPLETED | OUTPATIENT
Start: 2018-08-21 | End: 2018-08-21

## 2018-08-21 RX ORDER — LACTULOSE 10 G/15ML
20 SOLUTION ORAL
Qty: 0 | Refills: 0 | Status: COMPLETED | OUTPATIENT
Start: 2018-08-21 | End: 2018-08-23

## 2018-08-21 RX ORDER — ASCORBIC ACID 60 MG
500 TABLET,CHEWABLE ORAL DAILY
Qty: 0 | Refills: 0 | Status: DISCONTINUED | OUTPATIENT
Start: 2018-08-21 | End: 2018-08-29

## 2018-08-21 RX ORDER — WARFARIN SODIUM 2.5 MG/1
1 TABLET ORAL
Qty: 0 | Refills: 0 | COMMUNITY

## 2018-08-21 RX ADMIN — Medication 650 MILLIGRAM(S): at 21:15

## 2018-08-21 RX ADMIN — PANTOPRAZOLE SODIUM 40 MILLIGRAM(S): 20 TABLET, DELAYED RELEASE ORAL at 06:25

## 2018-08-21 RX ADMIN — SENNA PLUS 2 TABLET(S): 8.6 TABLET ORAL at 21:02

## 2018-08-21 RX ADMIN — GABAPENTIN 100 MILLIGRAM(S): 400 CAPSULE ORAL at 11:37

## 2018-08-21 RX ADMIN — GABAPENTIN 100 MILLIGRAM(S): 400 CAPSULE ORAL at 06:25

## 2018-08-21 RX ADMIN — Medication 1 TABLET(S): at 11:37

## 2018-08-21 RX ADMIN — WARFARIN SODIUM 2 MILLIGRAM(S): 2.5 TABLET ORAL at 21:02

## 2018-08-21 RX ADMIN — ATORVASTATIN CALCIUM 20 MILLIGRAM(S): 80 TABLET, FILM COATED ORAL at 21:02

## 2018-08-21 RX ADMIN — POLYETHYLENE GLYCOL 3350 17 GRAM(S): 17 POWDER, FOR SOLUTION ORAL at 11:37

## 2018-08-21 RX ADMIN — Medication 30 MILLILITER(S): at 16:34

## 2018-08-21 RX ADMIN — Medication 100 MILLIGRAM(S): at 21:02

## 2018-08-21 RX ADMIN — Medication 1 TABLET(S): at 11:38

## 2018-08-21 RX ADMIN — Medication 1 TABLET(S): at 06:25

## 2018-08-21 RX ADMIN — GABAPENTIN 100 MILLIGRAM(S): 400 CAPSULE ORAL at 21:02

## 2018-08-21 RX ADMIN — Medication 3 MILLILITER(S): at 20:12

## 2018-08-21 RX ADMIN — Medication 0.5 MILLIGRAM(S): at 21:12

## 2018-08-21 RX ADMIN — LACTULOSE 20 GRAM(S): 10 SOLUTION ORAL at 17:38

## 2018-08-21 RX ADMIN — Medication 100 MILLIGRAM(S): at 06:25

## 2018-08-21 RX ADMIN — Medication 1 APPLICATION(S): at 17:38

## 2018-08-21 RX ADMIN — Medication 100 MILLIGRAM(S): at 11:37

## 2018-08-21 RX ADMIN — TRAMADOL HYDROCHLORIDE 50 MILLIGRAM(S): 50 TABLET ORAL at 14:45

## 2018-08-22 LAB
24R-OH-CALCIDIOL SERPL-MCNC: 45 NG/ML — SIGNIFICANT CHANGE UP (ref 30–80)
CALCIUM SERPL-MCNC: 9.1 MG/DL — SIGNIFICANT CHANGE UP (ref 8.4–10.5)
INR BLD: 2.44 RATIO — HIGH (ref 0.65–1.3)
PROTHROM AB SERPL-ACNC: 26.1 SEC — HIGH (ref 9.95–12.87)
PTH-INTACT FLD-MCNC: 33 PG/ML — SIGNIFICANT CHANGE UP (ref 15–65)
TSH SERPL-MCNC: 1.62 UIU/ML — SIGNIFICANT CHANGE UP (ref 0.27–4.2)
VIT D25+D1,25 OH+D1,25 PNL SERPL-MCNC: 24.8 PG/ML — SIGNIFICANT CHANGE UP (ref 19.9–79.3)

## 2018-08-22 RX ORDER — WARFARIN SODIUM 2.5 MG/1
2 TABLET ORAL ONCE
Qty: 0 | Refills: 0 | Status: COMPLETED | OUTPATIENT
Start: 2018-08-22 | End: 2018-08-22

## 2018-08-22 RX ADMIN — ATORVASTATIN CALCIUM 20 MILLIGRAM(S): 80 TABLET, FILM COATED ORAL at 21:07

## 2018-08-22 RX ADMIN — Medication 325 MILLIGRAM(S): at 12:04

## 2018-08-22 RX ADMIN — Medication 0.5 MILLIGRAM(S): at 09:53

## 2018-08-22 RX ADMIN — Medication 0.5 MILLIGRAM(S): at 21:07

## 2018-08-22 RX ADMIN — Medication 1 APPLICATION(S): at 05:13

## 2018-08-22 RX ADMIN — Medication 100 MILLIGRAM(S): at 05:13

## 2018-08-22 RX ADMIN — GABAPENTIN 100 MILLIGRAM(S): 400 CAPSULE ORAL at 21:07

## 2018-08-22 RX ADMIN — LACTULOSE 20 GRAM(S): 10 SOLUTION ORAL at 05:13

## 2018-08-22 RX ADMIN — GABAPENTIN 100 MILLIGRAM(S): 400 CAPSULE ORAL at 12:04

## 2018-08-22 RX ADMIN — Medication 3 MILLILITER(S): at 13:33

## 2018-08-22 RX ADMIN — SENNA PLUS 2 TABLET(S): 8.6 TABLET ORAL at 21:09

## 2018-08-22 RX ADMIN — WARFARIN SODIUM 2 MILLIGRAM(S): 2.5 TABLET ORAL at 22:44

## 2018-08-22 RX ADMIN — TRAMADOL HYDROCHLORIDE 50 MILLIGRAM(S): 50 TABLET ORAL at 21:07

## 2018-08-22 RX ADMIN — PANTOPRAZOLE SODIUM 40 MILLIGRAM(S): 20 TABLET, DELAYED RELEASE ORAL at 06:41

## 2018-08-22 RX ADMIN — Medication 500 MILLIGRAM(S): at 12:04

## 2018-08-22 RX ADMIN — GABAPENTIN 100 MILLIGRAM(S): 400 CAPSULE ORAL at 05:13

## 2018-08-22 RX ADMIN — TRAMADOL HYDROCHLORIDE 50 MILLIGRAM(S): 50 TABLET ORAL at 21:15

## 2018-08-22 RX ADMIN — Medication 100 MILLIGRAM(S): at 12:04

## 2018-08-22 RX ADMIN — Medication 1 TABLET(S): at 12:04

## 2018-08-22 RX ADMIN — Medication 100 MILLIGRAM(S): at 21:07

## 2018-08-22 RX ADMIN — POLYETHYLENE GLYCOL 3350 17 GRAM(S): 17 POWDER, FOR SOLUTION ORAL at 12:04

## 2018-08-22 RX ADMIN — Medication 500 MILLIGRAM(S): at 17:20

## 2018-08-22 RX ADMIN — Medication 1 APPLICATION(S): at 17:20

## 2018-08-23 LAB
INR BLD: 2.85 RATIO — HIGH (ref 0.65–1.3)
PROTHROM AB SERPL-ACNC: 30.5 SEC — HIGH (ref 9.95–12.87)

## 2018-08-23 RX ORDER — WARFARIN SODIUM 2.5 MG/1
1 TABLET ORAL ONCE
Qty: 0 | Refills: 0 | Status: COMPLETED | OUTPATIENT
Start: 2018-08-23 | End: 2018-08-23

## 2018-08-23 RX ADMIN — GABAPENTIN 100 MILLIGRAM(S): 400 CAPSULE ORAL at 04:20

## 2018-08-23 RX ADMIN — Medication 1 APPLICATION(S): at 17:06

## 2018-08-23 RX ADMIN — Medication 1 TABLET(S): at 12:38

## 2018-08-23 RX ADMIN — TRAMADOL HYDROCHLORIDE 50 MILLIGRAM(S): 50 TABLET ORAL at 12:37

## 2018-08-23 RX ADMIN — Medication 325 MILLIGRAM(S): at 12:38

## 2018-08-23 RX ADMIN — GABAPENTIN 100 MILLIGRAM(S): 400 CAPSULE ORAL at 22:03

## 2018-08-23 RX ADMIN — Medication 3 MILLILITER(S): at 13:11

## 2018-08-23 RX ADMIN — Medication 0.5 MILLIGRAM(S): at 22:03

## 2018-08-23 RX ADMIN — TRAMADOL HYDROCHLORIDE 50 MILLIGRAM(S): 50 TABLET ORAL at 13:30

## 2018-08-23 RX ADMIN — WARFARIN SODIUM 1 MILLIGRAM(S): 2.5 TABLET ORAL at 22:03

## 2018-08-23 RX ADMIN — Medication 500 MILLIGRAM(S): at 14:33

## 2018-08-23 RX ADMIN — Medication 1 APPLICATION(S): at 04:20

## 2018-08-23 RX ADMIN — GABAPENTIN 100 MILLIGRAM(S): 400 CAPSULE ORAL at 12:40

## 2018-08-23 RX ADMIN — ATORVASTATIN CALCIUM 20 MILLIGRAM(S): 80 TABLET, FILM COATED ORAL at 22:03

## 2018-08-23 RX ADMIN — Medication 500 MILLIGRAM(S): at 12:38

## 2018-08-23 RX ADMIN — TIOTROPIUM BROMIDE 1 CAPSULE(S): 18 CAPSULE ORAL; RESPIRATORY (INHALATION) at 08:31

## 2018-08-23 RX ADMIN — PANTOPRAZOLE SODIUM 40 MILLIGRAM(S): 20 TABLET, DELAYED RELEASE ORAL at 07:38

## 2018-08-23 RX ADMIN — Medication 100 MILLIGRAM(S): at 22:03

## 2018-08-23 RX ADMIN — TRAMADOL HYDROCHLORIDE 50 MILLIGRAM(S): 50 TABLET ORAL at 22:05

## 2018-08-23 RX ADMIN — Medication 100 MILLIGRAM(S): at 04:20

## 2018-08-23 RX ADMIN — TRAMADOL HYDROCHLORIDE 50 MILLIGRAM(S): 50 TABLET ORAL at 04:19

## 2018-08-23 RX ADMIN — Medication 500 MILLIGRAM(S): at 17:06

## 2018-08-23 RX ADMIN — TRAMADOL HYDROCHLORIDE 50 MILLIGRAM(S): 50 TABLET ORAL at 22:04

## 2018-08-23 RX ADMIN — TRAMADOL HYDROCHLORIDE 50 MILLIGRAM(S): 50 TABLET ORAL at 04:21

## 2018-08-23 RX ADMIN — Medication 100 MILLIGRAM(S): at 12:40

## 2018-08-24 LAB
ANION GAP SERPL CALC-SCNC: 11 MMOL/L — SIGNIFICANT CHANGE UP (ref 7–14)
BASOPHILS # BLD AUTO: 0.01 K/UL — SIGNIFICANT CHANGE UP (ref 0–0.2)
BASOPHILS NFR BLD AUTO: 0.2 % — SIGNIFICANT CHANGE UP (ref 0–1)
BUN SERPL-MCNC: 14 MG/DL — SIGNIFICANT CHANGE UP (ref 10–20)
CALCIUM SERPL-MCNC: 9.1 MG/DL — SIGNIFICANT CHANGE UP (ref 8.5–10.1)
CHLORIDE SERPL-SCNC: 97 MMOL/L — LOW (ref 98–110)
CO2 SERPL-SCNC: 31 MMOL/L — SIGNIFICANT CHANGE UP (ref 17–32)
CREAT SERPL-MCNC: 0.7 MG/DL — SIGNIFICANT CHANGE UP (ref 0.7–1.5)
EOSINOPHIL # BLD AUTO: 0.26 K/UL — SIGNIFICANT CHANGE UP (ref 0–0.7)
EOSINOPHIL NFR BLD AUTO: 5.2 % — SIGNIFICANT CHANGE UP (ref 0–8)
GLUCOSE SERPL-MCNC: 127 MG/DL — HIGH (ref 70–99)
HCT VFR BLD CALC: 26.9 % — LOW (ref 37–47)
HGB BLD-MCNC: 8.3 G/DL — LOW (ref 12–16)
IMM GRANULOCYTES NFR BLD AUTO: 0.2 % — SIGNIFICANT CHANGE UP (ref 0.1–0.3)
INR BLD: 2.97 RATIO — HIGH (ref 0.65–1.3)
LYMPHOCYTES # BLD AUTO: 0.77 K/UL — LOW (ref 1.2–3.4)
LYMPHOCYTES # BLD AUTO: 15.4 % — LOW (ref 20.5–51.1)
MAGNESIUM SERPL-MCNC: 1.9 MG/DL — SIGNIFICANT CHANGE UP (ref 1.8–2.4)
MCHC RBC-ENTMCNC: 26.9 PG — LOW (ref 27–31)
MCHC RBC-ENTMCNC: 30.9 G/DL — LOW (ref 32–37)
MCV RBC AUTO: 87.3 FL — SIGNIFICANT CHANGE UP (ref 81–99)
MONOCYTES # BLD AUTO: 0.37 K/UL — SIGNIFICANT CHANGE UP (ref 0.1–0.6)
MONOCYTES NFR BLD AUTO: 7.4 % — SIGNIFICANT CHANGE UP (ref 1.7–9.3)
NEUTROPHILS # BLD AUTO: 3.58 K/UL — SIGNIFICANT CHANGE UP (ref 1.4–6.5)
NEUTROPHILS NFR BLD AUTO: 71.6 % — SIGNIFICANT CHANGE UP (ref 42.2–75.2)
NRBC # BLD: 0 /100 WBCS — SIGNIFICANT CHANGE UP (ref 0–0)
PLATELET # BLD AUTO: 214 K/UL — SIGNIFICANT CHANGE UP (ref 130–400)
POTASSIUM SERPL-MCNC: 4.3 MMOL/L — SIGNIFICANT CHANGE UP (ref 3.5–5)
POTASSIUM SERPL-SCNC: 4.3 MMOL/L — SIGNIFICANT CHANGE UP (ref 3.5–5)
PROTHROM AB SERPL-ACNC: 31.7 SEC — HIGH (ref 9.95–12.87)
RBC # BLD: 3.08 M/UL — LOW (ref 4.2–5.4)
RBC # FLD: 14.7 % — HIGH (ref 11.5–14.5)
SODIUM SERPL-SCNC: 139 MMOL/L — SIGNIFICANT CHANGE UP (ref 135–146)
WBC # BLD: 5 K/UL — SIGNIFICANT CHANGE UP (ref 4.8–10.8)
WBC # FLD AUTO: 5 K/UL — SIGNIFICANT CHANGE UP (ref 4.8–10.8)

## 2018-08-24 RX ORDER — TRAMADOL HYDROCHLORIDE 50 MG/1
50 TABLET ORAL EVERY 6 HOURS
Qty: 0 | Refills: 0 | Status: DISCONTINUED | OUTPATIENT
Start: 2018-08-27 | End: 2018-08-31

## 2018-08-24 RX ORDER — WARFARIN SODIUM 2.5 MG/1
1 TABLET ORAL ONCE
Qty: 0 | Refills: 0 | Status: COMPLETED | OUTPATIENT
Start: 2018-08-24 | End: 2018-08-24

## 2018-08-24 RX ORDER — ONDANSETRON 8 MG/1
4 TABLET, FILM COATED ORAL EVERY 8 HOURS
Qty: 0 | Refills: 0 | Status: DISCONTINUED | OUTPATIENT
Start: 2018-08-24 | End: 2018-08-29

## 2018-08-24 RX ADMIN — GABAPENTIN 100 MILLIGRAM(S): 400 CAPSULE ORAL at 05:46

## 2018-08-24 RX ADMIN — PANTOPRAZOLE SODIUM 40 MILLIGRAM(S): 20 TABLET, DELAYED RELEASE ORAL at 07:55

## 2018-08-24 RX ADMIN — Medication 1 TABLET(S): at 07:56

## 2018-08-24 RX ADMIN — SENNA PLUS 2 TABLET(S): 8.6 TABLET ORAL at 22:12

## 2018-08-24 RX ADMIN — Medication 325 MILLIGRAM(S): at 07:56

## 2018-08-24 RX ADMIN — Medication 1 APPLICATION(S): at 17:22

## 2018-08-24 RX ADMIN — Medication 500 MILLIGRAM(S): at 17:21

## 2018-08-24 RX ADMIN — Medication 0.5 MILLIGRAM(S): at 22:13

## 2018-08-24 RX ADMIN — Medication 100 MILLIGRAM(S): at 22:13

## 2018-08-24 RX ADMIN — GABAPENTIN 100 MILLIGRAM(S): 400 CAPSULE ORAL at 13:30

## 2018-08-24 RX ADMIN — TRAMADOL HYDROCHLORIDE 50 MILLIGRAM(S): 50 TABLET ORAL at 21:29

## 2018-08-24 RX ADMIN — Medication 0.5 MILLIGRAM(S): at 13:35

## 2018-08-24 RX ADMIN — Medication 1 APPLICATION(S): at 06:09

## 2018-08-24 RX ADMIN — Medication 500 MILLIGRAM(S): at 07:56

## 2018-08-24 RX ADMIN — Medication 100 MILLIGRAM(S): at 05:46

## 2018-08-24 RX ADMIN — ATORVASTATIN CALCIUM 20 MILLIGRAM(S): 80 TABLET, FILM COATED ORAL at 22:13

## 2018-08-24 RX ADMIN — TIOTROPIUM BROMIDE 1 CAPSULE(S): 18 CAPSULE ORAL; RESPIRATORY (INHALATION) at 08:02

## 2018-08-24 RX ADMIN — GABAPENTIN 100 MILLIGRAM(S): 400 CAPSULE ORAL at 22:13

## 2018-08-24 RX ADMIN — WARFARIN SODIUM 1 MILLIGRAM(S): 2.5 TABLET ORAL at 22:12

## 2018-08-24 RX ADMIN — Medication 3 MILLILITER(S): at 14:52

## 2018-08-24 RX ADMIN — ONDANSETRON 4 MILLIGRAM(S): 8 TABLET, FILM COATED ORAL at 23:31

## 2018-08-25 LAB
INR BLD: 2.91 RATIO — HIGH (ref 0.65–1.3)
PROTHROM AB SERPL-ACNC: 31.1 SEC — HIGH (ref 9.95–12.87)

## 2018-08-25 RX ORDER — WARFARIN SODIUM 2.5 MG/1
1 TABLET ORAL ONCE
Qty: 0 | Refills: 0 | Status: COMPLETED | OUTPATIENT
Start: 2018-08-25 | End: 2018-08-25

## 2018-08-25 RX ADMIN — Medication 500 MILLIGRAM(S): at 17:55

## 2018-08-25 RX ADMIN — Medication 650 MILLIGRAM(S): at 10:55

## 2018-08-25 RX ADMIN — Medication 100 MILLIGRAM(S): at 21:41

## 2018-08-25 RX ADMIN — Medication 1 APPLICATION(S): at 03:58

## 2018-08-25 RX ADMIN — Medication 1 APPLICATION(S): at 17:56

## 2018-08-25 RX ADMIN — GABAPENTIN 100 MILLIGRAM(S): 400 CAPSULE ORAL at 21:42

## 2018-08-25 RX ADMIN — ATORVASTATIN CALCIUM 20 MILLIGRAM(S): 80 TABLET, FILM COATED ORAL at 21:41

## 2018-08-25 RX ADMIN — WARFARIN SODIUM 1 MILLIGRAM(S): 2.5 TABLET ORAL at 21:41

## 2018-08-25 RX ADMIN — Medication 0.5 MILLIGRAM(S): at 21:40

## 2018-08-25 RX ADMIN — Medication 1 TABLET(S): at 10:55

## 2018-08-25 RX ADMIN — Medication 3 MILLILITER(S): at 15:11

## 2018-08-25 RX ADMIN — Medication 650 MILLIGRAM(S): at 03:57

## 2018-08-25 RX ADMIN — PANTOPRAZOLE SODIUM 40 MILLIGRAM(S): 20 TABLET, DELAYED RELEASE ORAL at 10:56

## 2018-08-25 RX ADMIN — Medication 1 TABLET(S): at 10:56

## 2018-08-25 RX ADMIN — Medication 500 MILLIGRAM(S): at 10:56

## 2018-08-25 RX ADMIN — GABAPENTIN 100 MILLIGRAM(S): 400 CAPSULE ORAL at 14:21

## 2018-08-25 RX ADMIN — Medication 500 MILLIGRAM(S): at 10:55

## 2018-08-25 RX ADMIN — GABAPENTIN 100 MILLIGRAM(S): 400 CAPSULE ORAL at 04:00

## 2018-08-25 RX ADMIN — ONDANSETRON 4 MILLIGRAM(S): 8 TABLET, FILM COATED ORAL at 23:18

## 2018-08-26 LAB
INR BLD: 2.83 RATIO — HIGH (ref 0.65–1.3)
PROTHROM AB SERPL-ACNC: 30.3 SEC — HIGH (ref 9.95–12.87)

## 2018-08-26 RX ORDER — WARFARIN SODIUM 2.5 MG/1
1 TABLET ORAL ONCE
Qty: 0 | Refills: 0 | Status: COMPLETED | OUTPATIENT
Start: 2018-08-26 | End: 2018-08-26

## 2018-08-26 RX ADMIN — Medication 1 APPLICATION(S): at 06:10

## 2018-08-26 RX ADMIN — GABAPENTIN 100 MILLIGRAM(S): 400 CAPSULE ORAL at 21:48

## 2018-08-26 RX ADMIN — SENNA PLUS 2 TABLET(S): 8.6 TABLET ORAL at 21:48

## 2018-08-26 RX ADMIN — Medication 100 MILLIGRAM(S): at 21:48

## 2018-08-26 RX ADMIN — TIOTROPIUM BROMIDE 1 CAPSULE(S): 18 CAPSULE ORAL; RESPIRATORY (INHALATION) at 12:45

## 2018-08-26 RX ADMIN — Medication 1 TABLET(S): at 13:44

## 2018-08-26 RX ADMIN — Medication 0.5 MILLIGRAM(S): at 13:44

## 2018-08-26 RX ADMIN — Medication 500 MILLIGRAM(S): at 18:46

## 2018-08-26 RX ADMIN — GABAPENTIN 100 MILLIGRAM(S): 400 CAPSULE ORAL at 06:10

## 2018-08-26 RX ADMIN — Medication 1 APPLICATION(S): at 18:47

## 2018-08-26 RX ADMIN — GABAPENTIN 100 MILLIGRAM(S): 400 CAPSULE ORAL at 13:45

## 2018-08-26 RX ADMIN — Medication 500 MILLIGRAM(S): at 13:45

## 2018-08-26 RX ADMIN — Medication 1 TABLET(S): at 13:45

## 2018-08-26 RX ADMIN — Medication 0.5 MILLIGRAM(S): at 21:48

## 2018-08-26 RX ADMIN — Medication 500 MILLIGRAM(S): at 13:44

## 2018-08-26 RX ADMIN — ATORVASTATIN CALCIUM 20 MILLIGRAM(S): 80 TABLET, FILM COATED ORAL at 21:48

## 2018-08-27 LAB
ALBUMIN SERPL ELPH-MCNC: 3.7 G/DL — SIGNIFICANT CHANGE UP (ref 3.5–5.2)
ALP SERPL-CCNC: 96 U/L — SIGNIFICANT CHANGE UP (ref 30–115)
ALT FLD-CCNC: 12 U/L — SIGNIFICANT CHANGE UP (ref 0–41)
ANION GAP SERPL CALC-SCNC: 11 MMOL/L — SIGNIFICANT CHANGE UP (ref 7–14)
APPEARANCE UR: ABNORMAL
AST SERPL-CCNC: 16 U/L — SIGNIFICANT CHANGE UP (ref 0–41)
BACTERIA # UR AUTO: ABNORMAL /HPF
BASOPHILS # BLD AUTO: 0.02 K/UL — SIGNIFICANT CHANGE UP (ref 0–0.2)
BASOPHILS NFR BLD AUTO: 0.4 % — SIGNIFICANT CHANGE UP (ref 0–1)
BILIRUB SERPL-MCNC: 0.4 MG/DL — SIGNIFICANT CHANGE UP (ref 0.2–1.2)
BILIRUB UR-MCNC: NEGATIVE — SIGNIFICANT CHANGE UP
BUN SERPL-MCNC: 13 MG/DL — SIGNIFICANT CHANGE UP (ref 10–20)
CALCIUM SERPL-MCNC: 9.3 MG/DL — SIGNIFICANT CHANGE UP (ref 8.5–10.1)
CHLORIDE SERPL-SCNC: 100 MMOL/L — SIGNIFICANT CHANGE UP (ref 98–110)
CO2 SERPL-SCNC: 32 MMOL/L — SIGNIFICANT CHANGE UP (ref 17–32)
COLOR SPEC: YELLOW — SIGNIFICANT CHANGE UP
CREAT SERPL-MCNC: 0.8 MG/DL — SIGNIFICANT CHANGE UP (ref 0.7–1.5)
DIFF PNL FLD: NEGATIVE — SIGNIFICANT CHANGE UP
EOSINOPHIL # BLD AUTO: 0.34 K/UL — SIGNIFICANT CHANGE UP (ref 0–0.7)
EOSINOPHIL NFR BLD AUTO: 6 % — SIGNIFICANT CHANGE UP (ref 0–8)
EPI CELLS # UR: ABNORMAL /HPF
GLUCOSE SERPL-MCNC: 113 MG/DL — HIGH (ref 70–99)
GLUCOSE UR QL: NEGATIVE MG/DL — SIGNIFICANT CHANGE UP
HCT VFR BLD CALC: 27 % — LOW (ref 37–47)
HGB BLD-MCNC: 8.3 G/DL — LOW (ref 12–16)
IMM GRANULOCYTES NFR BLD AUTO: 0.4 % — HIGH (ref 0.1–0.3)
INR BLD: 2.52 RATIO — HIGH (ref 0.65–1.3)
KETONES UR-MCNC: NEGATIVE — SIGNIFICANT CHANGE UP
LEUKOCYTE ESTERASE UR-ACNC: NEGATIVE — SIGNIFICANT CHANGE UP
LYMPHOCYTES # BLD AUTO: 0.85 K/UL — LOW (ref 1.2–3.4)
LYMPHOCYTES # BLD AUTO: 15.1 % — LOW (ref 20.5–51.1)
MAGNESIUM SERPL-MCNC: 1.9 MG/DL — SIGNIFICANT CHANGE UP (ref 1.8–2.4)
MCHC RBC-ENTMCNC: 27 PG — SIGNIFICANT CHANGE UP (ref 27–31)
MCHC RBC-ENTMCNC: 30.7 G/DL — LOW (ref 32–37)
MCV RBC AUTO: 87.9 FL — SIGNIFICANT CHANGE UP (ref 81–99)
MONOCYTES # BLD AUTO: 0.41 K/UL — SIGNIFICANT CHANGE UP (ref 0.1–0.6)
MONOCYTES NFR BLD AUTO: 7.3 % — SIGNIFICANT CHANGE UP (ref 1.7–9.3)
NEUTROPHILS # BLD AUTO: 3.98 K/UL — SIGNIFICANT CHANGE UP (ref 1.4–6.5)
NEUTROPHILS NFR BLD AUTO: 70.8 % — SIGNIFICANT CHANGE UP (ref 42.2–75.2)
NITRITE UR-MCNC: NEGATIVE — SIGNIFICANT CHANGE UP
NRBC # BLD: 0 /100 WBCS — SIGNIFICANT CHANGE UP (ref 0–0)
PH UR: 8 — SIGNIFICANT CHANGE UP (ref 5–8)
PLATELET # BLD AUTO: 218 K/UL — SIGNIFICANT CHANGE UP (ref 130–400)
POTASSIUM SERPL-MCNC: 4.6 MMOL/L — SIGNIFICANT CHANGE UP (ref 3.5–5)
POTASSIUM SERPL-SCNC: 4.6 MMOL/L — SIGNIFICANT CHANGE UP (ref 3.5–5)
PROT SERPL-MCNC: 6 G/DL — SIGNIFICANT CHANGE UP (ref 6–8)
PROT UR-MCNC: ABNORMAL MG/DL
PROTHROM AB SERPL-ACNC: 27 SEC — HIGH (ref 9.95–12.87)
RBC # BLD: 3.07 M/UL — LOW (ref 4.2–5.4)
RBC # FLD: 15.5 % — HIGH (ref 11.5–14.5)
SODIUM SERPL-SCNC: 143 MMOL/L — SIGNIFICANT CHANGE UP (ref 135–146)
SP GR SPEC: 1.01 — SIGNIFICANT CHANGE UP (ref 1.01–1.03)
UROBILINOGEN FLD QL: 0.2 MG/DL — SIGNIFICANT CHANGE UP (ref 0.2–0.2)
WBC # BLD: 5.62 K/UL — SIGNIFICANT CHANGE UP (ref 4.8–10.8)
WBC # FLD AUTO: 5.62 K/UL — SIGNIFICANT CHANGE UP (ref 4.8–10.8)
WBC UR QL: SIGNIFICANT CHANGE UP /HPF

## 2018-08-27 RX ORDER — WARFARIN SODIUM 2.5 MG/1
2 TABLET ORAL ONCE
Qty: 0 | Refills: 0 | Status: COMPLETED | OUTPATIENT
Start: 2018-08-27 | End: 2018-08-27

## 2018-08-27 RX ORDER — CLONAZEPAM 1 MG
0.5 TABLET ORAL AT BEDTIME
Qty: 0 | Refills: 0 | Status: DISCONTINUED | OUTPATIENT
Start: 2018-08-29 | End: 2018-09-03

## 2018-08-27 RX ORDER — PANTOPRAZOLE SODIUM 20 MG/1
40 TABLET, DELAYED RELEASE ORAL
Qty: 0 | Refills: 0 | Status: DISCONTINUED | OUTPATIENT
Start: 2018-08-27 | End: 2018-08-31

## 2018-08-27 RX ORDER — TAMSULOSIN HYDROCHLORIDE 0.4 MG/1
0.4 CAPSULE ORAL AT BEDTIME
Qty: 0 | Refills: 0 | Status: DISCONTINUED | OUTPATIENT
Start: 2018-08-27 | End: 2018-09-05

## 2018-08-27 RX ORDER — CIPROFLOXACIN LACTATE 400MG/40ML
500 VIAL (ML) INTRAVENOUS EVERY 12 HOURS
Qty: 0 | Refills: 0 | Status: DISCONTINUED | OUTPATIENT
Start: 2018-08-27 | End: 2018-08-28

## 2018-08-27 RX ORDER — CLONAZEPAM 1 MG
0.5 TABLET ORAL DAILY
Qty: 0 | Refills: 0 | Status: DISCONTINUED | OUTPATIENT
Start: 2018-08-29 | End: 2018-09-04

## 2018-08-27 RX ADMIN — TAMSULOSIN HYDROCHLORIDE 0.4 MILLIGRAM(S): 0.4 CAPSULE ORAL at 21:57

## 2018-08-27 RX ADMIN — Medication 500 MILLIGRAM(S): at 13:01

## 2018-08-27 RX ADMIN — TIOTROPIUM BROMIDE 1 CAPSULE(S): 18 CAPSULE ORAL; RESPIRATORY (INHALATION) at 07:20

## 2018-08-27 RX ADMIN — Medication 325 MILLIGRAM(S): at 13:01

## 2018-08-27 RX ADMIN — GABAPENTIN 100 MILLIGRAM(S): 400 CAPSULE ORAL at 21:57

## 2018-08-27 RX ADMIN — PANTOPRAZOLE SODIUM 40 MILLIGRAM(S): 20 TABLET, DELAYED RELEASE ORAL at 21:57

## 2018-08-27 RX ADMIN — GABAPENTIN 100 MILLIGRAM(S): 400 CAPSULE ORAL at 13:01

## 2018-08-27 RX ADMIN — PANTOPRAZOLE SODIUM 40 MILLIGRAM(S): 20 TABLET, DELAYED RELEASE ORAL at 06:00

## 2018-08-27 RX ADMIN — Medication 1 APPLICATION(S): at 05:59

## 2018-08-27 RX ADMIN — WARFARIN SODIUM 1 MILLIGRAM(S): 2.5 TABLET ORAL at 00:58

## 2018-08-27 RX ADMIN — Medication 100 MILLIGRAM(S): at 13:01

## 2018-08-27 RX ADMIN — Medication 1 TABLET(S): at 13:01

## 2018-08-27 RX ADMIN — GABAPENTIN 100 MILLIGRAM(S): 400 CAPSULE ORAL at 05:58

## 2018-08-27 RX ADMIN — Medication 100 MILLIGRAM(S): at 21:57

## 2018-08-27 RX ADMIN — Medication 100 MILLIGRAM(S): at 05:59

## 2018-08-27 RX ADMIN — Medication 500 MILLIGRAM(S): at 17:24

## 2018-08-27 RX ADMIN — Medication 1 APPLICATION(S): at 17:23

## 2018-08-27 RX ADMIN — ATORVASTATIN CALCIUM 20 MILLIGRAM(S): 80 TABLET, FILM COATED ORAL at 21:57

## 2018-08-27 RX ADMIN — WARFARIN SODIUM 2 MILLIGRAM(S): 2.5 TABLET ORAL at 21:58

## 2018-08-27 RX ADMIN — Medication 0.5 MILLIGRAM(S): at 22:03

## 2018-08-27 RX ADMIN — Medication 500 MILLIGRAM(S): at 17:23

## 2018-08-28 LAB
INR BLD: 3.4 RATIO — HIGH (ref 0.65–1.3)
PROTHROM AB SERPL-ACNC: 36.3 SEC — HIGH (ref 9.95–12.87)

## 2018-08-28 RX ORDER — CIPROFLOXACIN LACTATE 400MG/40ML
500 VIAL (ML) INTRAVENOUS
Qty: 0 | Refills: 0 | Status: COMPLETED | OUTPATIENT
Start: 2018-08-28 | End: 2018-09-03

## 2018-08-28 RX ORDER — SUCRALFATE 1 G
1 TABLET ORAL
Qty: 0 | Refills: 0 | Status: DISCONTINUED | OUTPATIENT
Start: 2018-08-28 | End: 2018-09-05

## 2018-08-28 RX ADMIN — PANTOPRAZOLE SODIUM 40 MILLIGRAM(S): 20 TABLET, DELAYED RELEASE ORAL at 06:30

## 2018-08-28 RX ADMIN — Medication 325 MILLIGRAM(S): at 12:31

## 2018-08-28 RX ADMIN — PANTOPRAZOLE SODIUM 40 MILLIGRAM(S): 20 TABLET, DELAYED RELEASE ORAL at 22:23

## 2018-08-28 RX ADMIN — Medication 100 MILLIGRAM(S): at 12:31

## 2018-08-28 RX ADMIN — Medication 500 MILLIGRAM(S): at 22:22

## 2018-08-28 RX ADMIN — GABAPENTIN 100 MILLIGRAM(S): 400 CAPSULE ORAL at 12:33

## 2018-08-28 RX ADMIN — Medication 500 MILLIGRAM(S): at 17:32

## 2018-08-28 RX ADMIN — Medication 500 MILLIGRAM(S): at 12:32

## 2018-08-28 RX ADMIN — ATORVASTATIN CALCIUM 20 MILLIGRAM(S): 80 TABLET, FILM COATED ORAL at 22:23

## 2018-08-28 RX ADMIN — TAMSULOSIN HYDROCHLORIDE 0.4 MILLIGRAM(S): 0.4 CAPSULE ORAL at 22:24

## 2018-08-28 RX ADMIN — ONDANSETRON 4 MILLIGRAM(S): 8 TABLET, FILM COATED ORAL at 07:27

## 2018-08-28 RX ADMIN — Medication 100 MILLIGRAM(S): at 05:39

## 2018-08-28 RX ADMIN — TIOTROPIUM BROMIDE 1 CAPSULE(S): 18 CAPSULE ORAL; RESPIRATORY (INHALATION) at 08:06

## 2018-08-28 RX ADMIN — Medication 1 TABLET(S): at 12:32

## 2018-08-28 RX ADMIN — GABAPENTIN 100 MILLIGRAM(S): 400 CAPSULE ORAL at 22:23

## 2018-08-28 RX ADMIN — Medication 100 MILLIGRAM(S): at 22:23

## 2018-08-28 RX ADMIN — Medication 1 GRAM(S): at 22:23

## 2018-08-28 RX ADMIN — Medication 1 APPLICATION(S): at 05:38

## 2018-08-28 RX ADMIN — GABAPENTIN 100 MILLIGRAM(S): 400 CAPSULE ORAL at 05:39

## 2018-08-28 RX ADMIN — Medication 500 MILLIGRAM(S): at 05:39

## 2018-08-28 RX ADMIN — Medication 1 TABLET(S): at 12:31

## 2018-08-28 RX ADMIN — Medication 1 APPLICATION(S): at 17:32

## 2018-08-28 RX ADMIN — Medication 0.5 MILLIGRAM(S): at 10:46

## 2018-08-29 LAB
-  AMIKACIN: SIGNIFICANT CHANGE UP
-  AMOXICILLIN/CLAVULANIC ACID: SIGNIFICANT CHANGE UP
-  AMPICILLIN/SULBACTAM: SIGNIFICANT CHANGE UP
-  AMPICILLIN: SIGNIFICANT CHANGE UP
-  AZTREONAM: SIGNIFICANT CHANGE UP
-  CEFAZOLIN: SIGNIFICANT CHANGE UP
-  CEFEPIME: SIGNIFICANT CHANGE UP
-  CEFOXITIN: SIGNIFICANT CHANGE UP
-  CEFTRIAXONE: SIGNIFICANT CHANGE UP
-  CIPROFLOXACIN: SIGNIFICANT CHANGE UP
-  ERTAPENEM: SIGNIFICANT CHANGE UP
-  GENTAMICIN: SIGNIFICANT CHANGE UP
-  IMIPENEM: SIGNIFICANT CHANGE UP
-  LEVOFLOXACIN: SIGNIFICANT CHANGE UP
-  MEROPENEM: SIGNIFICANT CHANGE UP
-  NITROFURANTOIN: SIGNIFICANT CHANGE UP
-  PIPERACILLIN/TAZOBACTAM: SIGNIFICANT CHANGE UP
-  TIGECYCLINE: SIGNIFICANT CHANGE UP
-  TOBRAMYCIN: SIGNIFICANT CHANGE UP
-  TRIMETHOPRIM/SULFAMETHOXAZOLE: SIGNIFICANT CHANGE UP
CULTURE RESULTS: SIGNIFICANT CHANGE UP
INR BLD: 3.28 RATIO — HIGH (ref 0.65–1.3)
METHOD TYPE: SIGNIFICANT CHANGE UP
ORGANISM # SPEC MICROSCOPIC CNT: SIGNIFICANT CHANGE UP
ORGANISM # SPEC MICROSCOPIC CNT: SIGNIFICANT CHANGE UP
PROTHROM AB SERPL-ACNC: 35 SEC — HIGH (ref 9.95–12.87)
SPECIMEN SOURCE: SIGNIFICANT CHANGE UP

## 2018-08-29 RX ORDER — POLYETHYLENE GLYCOL 3350 17 G/17G
17 POWDER, FOR SOLUTION ORAL DAILY
Qty: 0 | Refills: 0 | Status: DISCONTINUED | OUTPATIENT
Start: 2018-08-29 | End: 2018-09-05

## 2018-08-29 RX ORDER — FERROUS SULFATE 325(65) MG
325 TABLET ORAL
Qty: 0 | Refills: 0 | Status: DISCONTINUED | OUTPATIENT
Start: 2018-08-29 | End: 2018-09-05

## 2018-08-29 RX ORDER — ONDANSETRON 8 MG/1
4 TABLET, FILM COATED ORAL EVERY 6 HOURS
Qty: 0 | Refills: 0 | Status: DISCONTINUED | OUTPATIENT
Start: 2018-08-29 | End: 2018-09-05

## 2018-08-29 RX ORDER — ASCORBIC ACID 60 MG
500 TABLET,CHEWABLE ORAL
Qty: 0 | Refills: 0 | Status: DISCONTINUED | OUTPATIENT
Start: 2018-08-29 | End: 2018-09-05

## 2018-08-29 RX ADMIN — GABAPENTIN 100 MILLIGRAM(S): 400 CAPSULE ORAL at 21:28

## 2018-08-29 RX ADMIN — Medication 1 TABLET(S): at 12:05

## 2018-08-29 RX ADMIN — GABAPENTIN 100 MILLIGRAM(S): 400 CAPSULE ORAL at 13:43

## 2018-08-29 RX ADMIN — Medication 1 GRAM(S): at 12:04

## 2018-08-29 RX ADMIN — Medication 500 MILLIGRAM(S): at 09:46

## 2018-08-29 RX ADMIN — TAMSULOSIN HYDROCHLORIDE 0.4 MILLIGRAM(S): 0.4 CAPSULE ORAL at 21:27

## 2018-08-29 RX ADMIN — SENNA PLUS 2 TABLET(S): 8.6 TABLET ORAL at 21:27

## 2018-08-29 RX ADMIN — Medication 1 GRAM(S): at 05:34

## 2018-08-29 RX ADMIN — Medication 1 GRAM(S): at 21:28

## 2018-08-29 RX ADMIN — Medication 100 MILLIGRAM(S): at 13:43

## 2018-08-29 RX ADMIN — GABAPENTIN 100 MILLIGRAM(S): 400 CAPSULE ORAL at 05:34

## 2018-08-29 RX ADMIN — Medication 500 MILLIGRAM(S): at 17:35

## 2018-08-29 RX ADMIN — ONDANSETRON 4 MILLIGRAM(S): 8 TABLET, FILM COATED ORAL at 12:11

## 2018-08-29 RX ADMIN — Medication 1 GRAM(S): at 17:36

## 2018-08-29 RX ADMIN — Medication 500 MILLIGRAM(S): at 21:27

## 2018-08-29 RX ADMIN — TIOTROPIUM BROMIDE 1 CAPSULE(S): 18 CAPSULE ORAL; RESPIRATORY (INHALATION) at 07:10

## 2018-08-29 RX ADMIN — PANTOPRAZOLE SODIUM 40 MILLIGRAM(S): 20 TABLET, DELAYED RELEASE ORAL at 21:27

## 2018-08-29 RX ADMIN — Medication 325 MILLIGRAM(S): at 21:27

## 2018-08-29 RX ADMIN — Medication 0.5 MILLIGRAM(S): at 13:43

## 2018-08-29 RX ADMIN — ATORVASTATIN CALCIUM 20 MILLIGRAM(S): 80 TABLET, FILM COATED ORAL at 21:27

## 2018-08-29 RX ADMIN — Medication 100 MILLIGRAM(S): at 21:28

## 2018-08-29 RX ADMIN — Medication 1 APPLICATION(S): at 05:33

## 2018-08-29 RX ADMIN — Medication 0.5 MILLIGRAM(S): at 21:31

## 2018-08-29 RX ADMIN — PANTOPRAZOLE SODIUM 40 MILLIGRAM(S): 20 TABLET, DELAYED RELEASE ORAL at 11:07

## 2018-08-29 RX ADMIN — Medication 1 APPLICATION(S): at 17:37

## 2018-08-30 DIAGNOSIS — J96.11 CHRONIC RESPIRATORY FAILURE WITH HYPOXIA: ICD-10-CM

## 2018-08-30 DIAGNOSIS — F41.9 ANXIETY DISORDER, UNSPECIFIED: ICD-10-CM

## 2018-08-30 DIAGNOSIS — Z88.2 ALLERGY STATUS TO SULFONAMIDES: ICD-10-CM

## 2018-08-30 DIAGNOSIS — K21.9 GASTRO-ESOPHAGEAL REFLUX DISEASE WITHOUT ESOPHAGITIS: ICD-10-CM

## 2018-08-30 DIAGNOSIS — Z79.01 LONG TERM (CURRENT) USE OF ANTICOAGULANTS: ICD-10-CM

## 2018-08-30 DIAGNOSIS — J44.9 CHRONIC OBSTRUCTIVE PULMONARY DISEASE, UNSPECIFIED: ICD-10-CM
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
## 2018-08-30 DIAGNOSIS — Z85.118 PERSONAL HISTORY OF OTHER MALIGNANT NEOPLASM OF BRONCHUS AND LUNG: ICD-10-CM
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
## 2018-08-30 DIAGNOSIS — I48.91 UNSPECIFIED ATRIAL FIBRILLATION: ICD-10-CM

## 2018-08-30 DIAGNOSIS — E78.00 PURE HYPERCHOLESTEROLEMIA, UNSPECIFIED: ICD-10-CM

## 2018-08-30 DIAGNOSIS — Z88.8 ALLERGY STATUS TO OTHER DRUGS, MEDICAMENTS AND BIOLOGICAL SUBSTANCES STATUS: ICD-10-CM

## 2018-08-30 DIAGNOSIS — M25.551 PAIN IN RIGHT HIP: ICD-10-CM
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
## 2018-08-30 DIAGNOSIS — S72.011A UNSPECIFIED INTRACAPSULAR FRACTURE OF RIGHT FEMUR, INITIAL ENCOUNTER FOR CLOSED FRACTURE: ICD-10-CM

## 2018-08-30 DIAGNOSIS — Z99.81 DEPENDENCE ON SUPPLEMENTAL OXYGEN: ICD-10-CM

## 2018-08-30 LAB
INR BLD: 2.56 RATIO — HIGH (ref 0.65–1.3)
PROTHROM AB SERPL-ACNC: 27.4 SEC — HIGH (ref 9.95–12.87)

## 2018-08-30 RX ORDER — WARFARIN SODIUM 2.5 MG/1
1 TABLET ORAL ONCE
Qty: 0 | Refills: 0 | Status: COMPLETED | OUTPATIENT
Start: 2018-08-30 | End: 2018-08-30

## 2018-08-30 RX ADMIN — TIOTROPIUM BROMIDE 1 CAPSULE(S): 18 CAPSULE ORAL; RESPIRATORY (INHALATION) at 09:01

## 2018-08-30 RX ADMIN — ATORVASTATIN CALCIUM 20 MILLIGRAM(S): 80 TABLET, FILM COATED ORAL at 21:15

## 2018-08-30 RX ADMIN — ONDANSETRON 4 MILLIGRAM(S): 8 TABLET, FILM COATED ORAL at 09:26

## 2018-08-30 RX ADMIN — Medication 1 GRAM(S): at 06:13

## 2018-08-30 RX ADMIN — PANTOPRAZOLE SODIUM 40 MILLIGRAM(S): 20 TABLET, DELAYED RELEASE ORAL at 06:13

## 2018-08-30 RX ADMIN — Medication 1 APPLICATION(S): at 18:36

## 2018-08-30 RX ADMIN — GABAPENTIN 100 MILLIGRAM(S): 400 CAPSULE ORAL at 14:28

## 2018-08-30 RX ADMIN — Medication 100 MILLIGRAM(S): at 21:13

## 2018-08-30 RX ADMIN — WARFARIN SODIUM 1 MILLIGRAM(S): 2.5 TABLET ORAL at 21:17

## 2018-08-30 RX ADMIN — Medication 1 GRAM(S): at 21:14

## 2018-08-30 RX ADMIN — SENNA PLUS 2 TABLET(S): 8.6 TABLET ORAL at 21:16

## 2018-08-30 RX ADMIN — Medication 500 MILLIGRAM(S): at 09:25

## 2018-08-30 RX ADMIN — PANTOPRAZOLE SODIUM 40 MILLIGRAM(S): 20 TABLET, DELAYED RELEASE ORAL at 21:14

## 2018-08-30 RX ADMIN — Medication 500 MILLIGRAM(S): at 18:36

## 2018-08-30 RX ADMIN — GABAPENTIN 100 MILLIGRAM(S): 400 CAPSULE ORAL at 21:17

## 2018-08-30 RX ADMIN — Medication 0.5 MILLIGRAM(S): at 09:30

## 2018-08-30 RX ADMIN — Medication 1 APPLICATION(S): at 06:11

## 2018-08-30 RX ADMIN — TAMSULOSIN HYDROCHLORIDE 0.4 MILLIGRAM(S): 0.4 CAPSULE ORAL at 21:16

## 2018-08-30 RX ADMIN — Medication 0.5 MILLIGRAM(S): at 21:13

## 2018-08-30 RX ADMIN — Medication 1 GRAM(S): at 14:29

## 2018-08-30 RX ADMIN — Medication 1 GRAM(S): at 18:34

## 2018-08-30 RX ADMIN — ONDANSETRON 4 MILLIGRAM(S): 8 TABLET, FILM COATED ORAL at 18:37

## 2018-08-30 RX ADMIN — Medication 100 MILLIGRAM(S): at 06:13

## 2018-08-30 RX ADMIN — Medication 1 TABLET(S): at 14:28

## 2018-08-30 RX ADMIN — GABAPENTIN 100 MILLIGRAM(S): 400 CAPSULE ORAL at 06:13

## 2018-08-30 RX ADMIN — Medication 325 MILLIGRAM(S): at 18:36

## 2018-08-31 LAB
ANION GAP SERPL CALC-SCNC: 7 MMOL/L — SIGNIFICANT CHANGE UP (ref 7–14)
BASOPHILS # BLD AUTO: 0.03 K/UL — SIGNIFICANT CHANGE UP (ref 0–0.2)
BASOPHILS NFR BLD AUTO: 0.6 % — SIGNIFICANT CHANGE UP (ref 0–1)
BUN SERPL-MCNC: 12 MG/DL — SIGNIFICANT CHANGE UP (ref 10–20)
CALCIUM SERPL-MCNC: 9.3 MG/DL — SIGNIFICANT CHANGE UP (ref 8.5–10.1)
CHLORIDE SERPL-SCNC: 99 MMOL/L — SIGNIFICANT CHANGE UP (ref 98–110)
CO2 SERPL-SCNC: 35 MMOL/L — HIGH (ref 17–32)
CREAT SERPL-MCNC: 0.9 MG/DL — SIGNIFICANT CHANGE UP (ref 0.7–1.5)
EOSINOPHIL # BLD AUTO: 0.34 K/UL — SIGNIFICANT CHANGE UP (ref 0–0.7)
EOSINOPHIL NFR BLD AUTO: 6.4 % — SIGNIFICANT CHANGE UP (ref 0–8)
GLUCOSE SERPL-MCNC: 140 MG/DL — HIGH (ref 70–99)
HCT VFR BLD CALC: 29.4 % — LOW (ref 37–47)
HGB BLD-MCNC: 9.2 G/DL — LOW (ref 12–16)
IMM GRANULOCYTES NFR BLD AUTO: 0.4 % — HIGH (ref 0.1–0.3)
INR BLD: 1.76 RATIO — HIGH (ref 0.65–1.3)
LYMPHOCYTES # BLD AUTO: 0.82 K/UL — LOW (ref 1.2–3.4)
LYMPHOCYTES # BLD AUTO: 15.5 % — LOW (ref 20.5–51.1)
MAGNESIUM SERPL-MCNC: 1.9 MG/DL — SIGNIFICANT CHANGE UP (ref 1.8–2.4)
MCHC RBC-ENTMCNC: 27.5 PG — SIGNIFICANT CHANGE UP (ref 27–31)
MCHC RBC-ENTMCNC: 31.3 G/DL — LOW (ref 32–37)
MCV RBC AUTO: 88 FL — SIGNIFICANT CHANGE UP (ref 81–99)
MONOCYTES # BLD AUTO: 0.38 K/UL — SIGNIFICANT CHANGE UP (ref 0.1–0.6)
MONOCYTES NFR BLD AUTO: 7.2 % — SIGNIFICANT CHANGE UP (ref 1.7–9.3)
NEUTROPHILS # BLD AUTO: 3.71 K/UL — SIGNIFICANT CHANGE UP (ref 1.4–6.5)
NEUTROPHILS NFR BLD AUTO: 69.9 % — SIGNIFICANT CHANGE UP (ref 42.2–75.2)
NRBC # BLD: 0 /100 WBCS — SIGNIFICANT CHANGE UP (ref 0–0)
PLATELET # BLD AUTO: 201 K/UL — SIGNIFICANT CHANGE UP (ref 130–400)
POTASSIUM SERPL-MCNC: 4.4 MMOL/L — SIGNIFICANT CHANGE UP (ref 3.5–5)
POTASSIUM SERPL-SCNC: 4.4 MMOL/L — SIGNIFICANT CHANGE UP (ref 3.5–5)
PROTHROM AB SERPL-ACNC: 18.9 SEC — HIGH (ref 9.95–12.87)
RBC # BLD: 3.34 M/UL — LOW (ref 4.2–5.4)
RBC # FLD: 15.9 % — HIGH (ref 11.5–14.5)
SODIUM SERPL-SCNC: 141 MMOL/L — SIGNIFICANT CHANGE UP (ref 135–146)
WBC # BLD: 5.3 K/UL — SIGNIFICANT CHANGE UP (ref 4.8–10.8)
WBC # FLD AUTO: 5.3 K/UL — SIGNIFICANT CHANGE UP (ref 4.8–10.8)

## 2018-08-31 RX ORDER — WARFARIN SODIUM 2.5 MG/1
2 TABLET ORAL ONCE
Qty: 0 | Refills: 0 | Status: COMPLETED | OUTPATIENT
Start: 2018-08-31 | End: 2018-08-31

## 2018-08-31 RX ORDER — TRAMADOL HYDROCHLORIDE 50 MG/1
50 TABLET ORAL EVERY 6 HOURS
Qty: 0 | Refills: 0 | Status: DISCONTINUED | OUTPATIENT
Start: 2018-08-31 | End: 2018-09-05

## 2018-08-31 RX ORDER — PANTOPRAZOLE SODIUM 20 MG/1
80 TABLET, DELAYED RELEASE ORAL
Qty: 0 | Refills: 0 | Status: DISCONTINUED | OUTPATIENT
Start: 2018-09-01 | End: 2018-09-05

## 2018-08-31 RX ORDER — PANTOPRAZOLE SODIUM 20 MG/1
40 TABLET, DELAYED RELEASE ORAL ONCE
Qty: 0 | Refills: 0 | Status: COMPLETED | OUTPATIENT
Start: 2018-08-31 | End: 2018-08-31

## 2018-08-31 RX ORDER — TRAMADOL HYDROCHLORIDE 50 MG/1
50 TABLET ORAL EVERY 6 HOURS
Qty: 0 | Refills: 0 | Status: DISCONTINUED | OUTPATIENT
Start: 2018-09-04 | End: 2018-09-05

## 2018-08-31 RX ADMIN — Medication 325 MILLIGRAM(S): at 17:15

## 2018-08-31 RX ADMIN — Medication 1 GRAM(S): at 12:16

## 2018-08-31 RX ADMIN — Medication 1 TABLET(S): at 12:17

## 2018-08-31 RX ADMIN — Medication 500 MILLIGRAM(S): at 17:15

## 2018-08-31 RX ADMIN — Medication 1 APPLICATION(S): at 17:16

## 2018-08-31 RX ADMIN — PANTOPRAZOLE SODIUM 40 MILLIGRAM(S): 20 TABLET, DELAYED RELEASE ORAL at 05:58

## 2018-08-31 RX ADMIN — WARFARIN SODIUM 2 MILLIGRAM(S): 2.5 TABLET ORAL at 22:57

## 2018-08-31 RX ADMIN — GABAPENTIN 100 MILLIGRAM(S): 400 CAPSULE ORAL at 13:44

## 2018-08-31 RX ADMIN — PANTOPRAZOLE SODIUM 40 MILLIGRAM(S): 20 TABLET, DELAYED RELEASE ORAL at 17:15

## 2018-08-31 RX ADMIN — GABAPENTIN 100 MILLIGRAM(S): 400 CAPSULE ORAL at 22:57

## 2018-08-31 RX ADMIN — Medication 100 MILLIGRAM(S): at 05:56

## 2018-08-31 RX ADMIN — ATORVASTATIN CALCIUM 20 MILLIGRAM(S): 80 TABLET, FILM COATED ORAL at 22:56

## 2018-08-31 RX ADMIN — Medication 0.5 MILLIGRAM(S): at 12:16

## 2018-08-31 RX ADMIN — Medication 1 GRAM(S): at 17:15

## 2018-08-31 RX ADMIN — GABAPENTIN 100 MILLIGRAM(S): 400 CAPSULE ORAL at 05:58

## 2018-08-31 RX ADMIN — Medication 1 APPLICATION(S): at 05:57

## 2018-08-31 RX ADMIN — Medication 1 GRAM(S): at 05:56

## 2018-08-31 RX ADMIN — Medication 1 GRAM(S): at 22:57

## 2018-08-31 RX ADMIN — TAMSULOSIN HYDROCHLORIDE 0.4 MILLIGRAM(S): 0.4 CAPSULE ORAL at 22:57

## 2018-08-31 RX ADMIN — TIOTROPIUM BROMIDE 1 CAPSULE(S): 18 CAPSULE ORAL; RESPIRATORY (INHALATION) at 07:15

## 2018-08-31 RX ADMIN — Medication 100 MILLIGRAM(S): at 22:56

## 2018-08-31 RX ADMIN — Medication 500 MILLIGRAM(S): at 07:32

## 2018-08-31 RX ADMIN — ONDANSETRON 4 MILLIGRAM(S): 8 TABLET, FILM COATED ORAL at 07:32

## 2018-08-31 RX ADMIN — Medication 650 MILLIGRAM(S): at 20:37

## 2018-09-01 LAB
INR BLD: 1.5 RATIO — HIGH (ref 0.65–1.3)
PROTHROM AB SERPL-ACNC: 16.1 SEC — HIGH (ref 9.95–12.87)

## 2018-09-01 RX ORDER — WARFARIN SODIUM 2.5 MG/1
2.5 TABLET ORAL ONCE
Qty: 0 | Refills: 0 | Status: COMPLETED | OUTPATIENT
Start: 2018-09-01 | End: 2018-09-01

## 2018-09-01 RX ADMIN — ATORVASTATIN CALCIUM 20 MILLIGRAM(S): 80 TABLET, FILM COATED ORAL at 21:17

## 2018-09-01 RX ADMIN — PANTOPRAZOLE SODIUM 80 MILLIGRAM(S): 20 TABLET, DELAYED RELEASE ORAL at 18:29

## 2018-09-01 RX ADMIN — Medication 1 GRAM(S): at 18:30

## 2018-09-01 RX ADMIN — Medication 1 APPLICATION(S): at 18:33

## 2018-09-01 RX ADMIN — Medication 1 APPLICATION(S): at 06:11

## 2018-09-01 RX ADMIN — Medication 1 TABLET(S): at 12:54

## 2018-09-01 RX ADMIN — Medication 500 MILLIGRAM(S): at 18:29

## 2018-09-01 RX ADMIN — GABAPENTIN 100 MILLIGRAM(S): 400 CAPSULE ORAL at 06:11

## 2018-09-01 RX ADMIN — GABAPENTIN 100 MILLIGRAM(S): 400 CAPSULE ORAL at 12:54

## 2018-09-01 RX ADMIN — Medication 100 MILLIGRAM(S): at 12:53

## 2018-09-01 RX ADMIN — TIOTROPIUM BROMIDE 1 CAPSULE(S): 18 CAPSULE ORAL; RESPIRATORY (INHALATION) at 08:03

## 2018-09-01 RX ADMIN — Medication 100 MILLIGRAM(S): at 21:17

## 2018-09-01 RX ADMIN — Medication 500 MILLIGRAM(S): at 08:34

## 2018-09-01 RX ADMIN — Medication 1 GRAM(S): at 12:54

## 2018-09-01 RX ADMIN — WARFARIN SODIUM 2.5 MILLIGRAM(S): 2.5 TABLET ORAL at 21:17

## 2018-09-01 RX ADMIN — Medication 1 GRAM(S): at 06:12

## 2018-09-01 RX ADMIN — GABAPENTIN 100 MILLIGRAM(S): 400 CAPSULE ORAL at 21:17

## 2018-09-01 RX ADMIN — Medication 0.5 MILLIGRAM(S): at 00:05

## 2018-09-01 RX ADMIN — TAMSULOSIN HYDROCHLORIDE 0.4 MILLIGRAM(S): 0.4 CAPSULE ORAL at 21:17

## 2018-09-01 RX ADMIN — Medication 100 MILLIGRAM(S): at 06:11

## 2018-09-01 RX ADMIN — ONDANSETRON 4 MILLIGRAM(S): 8 TABLET, FILM COATED ORAL at 08:37

## 2018-09-01 RX ADMIN — Medication 0.5 MILLIGRAM(S): at 21:18

## 2018-09-01 RX ADMIN — Medication 650 MILLIGRAM(S): at 23:36

## 2018-09-01 RX ADMIN — Medication 325 MILLIGRAM(S): at 18:29

## 2018-09-01 RX ADMIN — SENNA PLUS 2 TABLET(S): 8.6 TABLET ORAL at 21:19

## 2018-09-01 RX ADMIN — Medication 1 GRAM(S): at 21:17

## 2018-09-02 LAB
INR BLD: 1.72 RATIO — HIGH (ref 0.65–1.3)
PROTHROM AB SERPL-ACNC: 18.5 SEC — HIGH (ref 9.95–12.87)

## 2018-09-02 RX ORDER — WARFARIN SODIUM 2.5 MG/1
2.5 TABLET ORAL ONCE
Qty: 0 | Refills: 0 | Status: COMPLETED | OUTPATIENT
Start: 2018-09-02 | End: 2018-09-02

## 2018-09-02 RX ORDER — ENOXAPARIN SODIUM 100 MG/ML
60 INJECTION SUBCUTANEOUS
Qty: 0 | Refills: 0 | Status: DISCONTINUED | OUTPATIENT
Start: 2018-09-02 | End: 2018-09-03

## 2018-09-02 RX ADMIN — Medication 500 MILLIGRAM(S): at 08:10

## 2018-09-02 RX ADMIN — ENOXAPARIN SODIUM 60 MILLIGRAM(S): 100 INJECTION SUBCUTANEOUS at 17:12

## 2018-09-02 RX ADMIN — ONDANSETRON 4 MILLIGRAM(S): 8 TABLET, FILM COATED ORAL at 08:17

## 2018-09-02 RX ADMIN — Medication 500 MILLIGRAM(S): at 16:45

## 2018-09-02 RX ADMIN — Medication 0.5 MILLIGRAM(S): at 21:52

## 2018-09-02 RX ADMIN — Medication 100 MILLIGRAM(S): at 21:48

## 2018-09-02 RX ADMIN — TAMSULOSIN HYDROCHLORIDE 0.4 MILLIGRAM(S): 0.4 CAPSULE ORAL at 21:48

## 2018-09-02 RX ADMIN — Medication 1 APPLICATION(S): at 05:26

## 2018-09-02 RX ADMIN — Medication 0.5 MILLIGRAM(S): at 10:35

## 2018-09-02 RX ADMIN — Medication 100 MILLIGRAM(S): at 05:26

## 2018-09-02 RX ADMIN — GABAPENTIN 100 MILLIGRAM(S): 400 CAPSULE ORAL at 05:26

## 2018-09-02 RX ADMIN — PANTOPRAZOLE SODIUM 80 MILLIGRAM(S): 20 TABLET, DELAYED RELEASE ORAL at 16:41

## 2018-09-02 RX ADMIN — GABAPENTIN 100 MILLIGRAM(S): 400 CAPSULE ORAL at 21:53

## 2018-09-02 RX ADMIN — Medication 325 MILLIGRAM(S): at 16:47

## 2018-09-02 RX ADMIN — Medication 1 APPLICATION(S): at 18:30

## 2018-09-02 RX ADMIN — Medication 1 GRAM(S): at 10:36

## 2018-09-02 RX ADMIN — Medication 1 GRAM(S): at 21:47

## 2018-09-02 RX ADMIN — Medication 1 TABLET(S): at 16:39

## 2018-09-02 RX ADMIN — Medication 500 MILLIGRAM(S): at 18:32

## 2018-09-02 RX ADMIN — SENNA PLUS 2 TABLET(S): 8.6 TABLET ORAL at 21:48

## 2018-09-02 RX ADMIN — Medication 1 GRAM(S): at 05:26

## 2018-09-02 RX ADMIN — GABAPENTIN 100 MILLIGRAM(S): 400 CAPSULE ORAL at 16:39

## 2018-09-02 RX ADMIN — ATORVASTATIN CALCIUM 20 MILLIGRAM(S): 80 TABLET, FILM COATED ORAL at 21:48

## 2018-09-02 RX ADMIN — Medication 1 GRAM(S): at 16:40

## 2018-09-02 RX ADMIN — Medication 100 MILLIGRAM(S): at 16:38

## 2018-09-02 RX ADMIN — WARFARIN SODIUM 2.5 MILLIGRAM(S): 2.5 TABLET ORAL at 21:48

## 2018-09-02 RX ADMIN — ONDANSETRON 4 MILLIGRAM(S): 8 TABLET, FILM COATED ORAL at 18:33

## 2018-09-03 LAB
ALBUMIN SERPL ELPH-MCNC: 3.8 G/DL — SIGNIFICANT CHANGE UP (ref 3.5–5.2)
ALP SERPL-CCNC: 125 U/L — HIGH (ref 30–115)
ALT FLD-CCNC: 11 U/L — SIGNIFICANT CHANGE UP (ref 0–41)
ANION GAP SERPL CALC-SCNC: 14 MMOL/L — SIGNIFICANT CHANGE UP (ref 7–14)
AST SERPL-CCNC: 17 U/L — SIGNIFICANT CHANGE UP (ref 0–41)
BASOPHILS # BLD AUTO: 0.03 K/UL — SIGNIFICANT CHANGE UP (ref 0–0.2)
BASOPHILS NFR BLD AUTO: 0.6 % — SIGNIFICANT CHANGE UP (ref 0–1)
BILIRUB SERPL-MCNC: 0.3 MG/DL — SIGNIFICANT CHANGE UP (ref 0.2–1.2)
BUN SERPL-MCNC: 12 MG/DL — SIGNIFICANT CHANGE UP (ref 10–20)
CALCIUM SERPL-MCNC: 9.2 MG/DL — SIGNIFICANT CHANGE UP (ref 8.5–10.1)
CHLORIDE SERPL-SCNC: 97 MMOL/L — LOW (ref 98–110)
CO2 SERPL-SCNC: 28 MMOL/L — SIGNIFICANT CHANGE UP (ref 17–32)
CREAT SERPL-MCNC: 0.9 MG/DL — SIGNIFICANT CHANGE UP (ref 0.7–1.5)
EOSINOPHIL # BLD AUTO: 0.19 K/UL — SIGNIFICANT CHANGE UP (ref 0–0.7)
EOSINOPHIL NFR BLD AUTO: 3.5 % — SIGNIFICANT CHANGE UP (ref 0–8)
GLUCOSE SERPL-MCNC: 122 MG/DL — HIGH (ref 70–99)
HCT VFR BLD CALC: 29.1 % — LOW (ref 37–47)
HGB BLD-MCNC: 9.2 G/DL — LOW (ref 12–16)
IMM GRANULOCYTES NFR BLD AUTO: 0.6 % — HIGH (ref 0.1–0.3)
INR BLD: 2.07 RATIO — HIGH (ref 0.65–1.3)
LYMPHOCYTES # BLD AUTO: 0.78 K/UL — LOW (ref 1.2–3.4)
LYMPHOCYTES # BLD AUTO: 14.4 % — LOW (ref 20.5–51.1)
MAGNESIUM SERPL-MCNC: 1.9 MG/DL — SIGNIFICANT CHANGE UP (ref 1.8–2.4)
MCHC RBC-ENTMCNC: 28 PG — SIGNIFICANT CHANGE UP (ref 27–31)
MCHC RBC-ENTMCNC: 31.6 G/DL — LOW (ref 32–37)
MCV RBC AUTO: 88.4 FL — SIGNIFICANT CHANGE UP (ref 81–99)
MONOCYTES # BLD AUTO: 0.41 K/UL — SIGNIFICANT CHANGE UP (ref 0.1–0.6)
MONOCYTES NFR BLD AUTO: 7.6 % — SIGNIFICANT CHANGE UP (ref 1.7–9.3)
NEUTROPHILS # BLD AUTO: 3.99 K/UL — SIGNIFICANT CHANGE UP (ref 1.4–6.5)
NEUTROPHILS NFR BLD AUTO: 73.3 % — SIGNIFICANT CHANGE UP (ref 42.2–75.2)
NRBC # BLD: 0 /100 WBCS — SIGNIFICANT CHANGE UP (ref 0–0)
PLATELET # BLD AUTO: 185 K/UL — SIGNIFICANT CHANGE UP (ref 130–400)
POTASSIUM SERPL-MCNC: 4.5 MMOL/L — SIGNIFICANT CHANGE UP (ref 3.5–5)
POTASSIUM SERPL-SCNC: 4.5 MMOL/L — SIGNIFICANT CHANGE UP (ref 3.5–5)
PROT SERPL-MCNC: 5.8 G/DL — LOW (ref 6–8)
PROTHROM AB SERPL-ACNC: 22.2 SEC — HIGH (ref 9.95–12.87)
RBC # BLD: 3.29 M/UL — LOW (ref 4.2–5.4)
RBC # FLD: 16.2 % — HIGH (ref 11.5–14.5)
SODIUM SERPL-SCNC: 139 MMOL/L — SIGNIFICANT CHANGE UP (ref 135–146)
WBC # BLD: 5.43 K/UL — SIGNIFICANT CHANGE UP (ref 4.8–10.8)
WBC # FLD AUTO: 5.43 K/UL — SIGNIFICANT CHANGE UP (ref 4.8–10.8)

## 2018-09-03 RX ORDER — WARFARIN SODIUM 2.5 MG/1
2.5 TABLET ORAL ONCE
Qty: 0 | Refills: 0 | Status: COMPLETED | OUTPATIENT
Start: 2018-09-03 | End: 2018-09-03

## 2018-09-03 RX ADMIN — Medication 100 MILLIGRAM(S): at 21:11

## 2018-09-03 RX ADMIN — ONDANSETRON 4 MILLIGRAM(S): 8 TABLET, FILM COATED ORAL at 12:23

## 2018-09-03 RX ADMIN — Medication 500 MILLIGRAM(S): at 17:15

## 2018-09-03 RX ADMIN — Medication 0.5 MILLIGRAM(S): at 21:11

## 2018-09-03 RX ADMIN — ENOXAPARIN SODIUM 60 MILLIGRAM(S): 100 INJECTION SUBCUTANEOUS at 05:35

## 2018-09-03 RX ADMIN — WARFARIN SODIUM 2.5 MILLIGRAM(S): 2.5 TABLET ORAL at 21:11

## 2018-09-03 RX ADMIN — Medication 1 TABLET(S): at 12:31

## 2018-09-03 RX ADMIN — ATORVASTATIN CALCIUM 20 MILLIGRAM(S): 80 TABLET, FILM COATED ORAL at 21:11

## 2018-09-03 RX ADMIN — TAMSULOSIN HYDROCHLORIDE 0.4 MILLIGRAM(S): 0.4 CAPSULE ORAL at 21:12

## 2018-09-03 RX ADMIN — Medication 1 APPLICATION(S): at 17:16

## 2018-09-03 RX ADMIN — GABAPENTIN 100 MILLIGRAM(S): 400 CAPSULE ORAL at 21:12

## 2018-09-03 RX ADMIN — PANTOPRAZOLE SODIUM 80 MILLIGRAM(S): 20 TABLET, DELAYED RELEASE ORAL at 17:16

## 2018-09-03 RX ADMIN — TIOTROPIUM BROMIDE 1 CAPSULE(S): 18 CAPSULE ORAL; RESPIRATORY (INHALATION) at 07:27

## 2018-09-03 RX ADMIN — Medication 500 MILLIGRAM(S): at 12:21

## 2018-09-03 RX ADMIN — Medication 100 MILLIGRAM(S): at 05:35

## 2018-09-03 RX ADMIN — Medication 1 GRAM(S): at 21:11

## 2018-09-03 RX ADMIN — GABAPENTIN 100 MILLIGRAM(S): 400 CAPSULE ORAL at 14:49

## 2018-09-03 RX ADMIN — Medication 650 MILLIGRAM(S): at 04:09

## 2018-09-03 RX ADMIN — GABAPENTIN 100 MILLIGRAM(S): 400 CAPSULE ORAL at 06:14

## 2018-09-03 RX ADMIN — Medication 1 GRAM(S): at 17:15

## 2018-09-03 RX ADMIN — Medication 325 MILLIGRAM(S): at 17:15

## 2018-09-03 RX ADMIN — SENNA PLUS 2 TABLET(S): 8.6 TABLET ORAL at 21:12

## 2018-09-03 RX ADMIN — Medication 1 GRAM(S): at 05:34

## 2018-09-03 RX ADMIN — Medication 1 APPLICATION(S): at 05:36

## 2018-09-04 ENCOUNTER — TRANSCRIPTION ENCOUNTER (OUTPATIENT)
Age: 73
End: 2018-09-04

## 2018-09-04 LAB
INR BLD: 2.48 RATIO — HIGH (ref 0.65–1.3)
PROTHROM AB SERPL-ACNC: 26.5 SEC — HIGH (ref 9.95–12.87)

## 2018-09-04 RX ORDER — CLONAZEPAM 1 MG
0.5 TABLET ORAL AT BEDTIME
Qty: 0 | Refills: 0 | Status: DISCONTINUED | OUTPATIENT
Start: 2018-09-06 | End: 2018-09-05

## 2018-09-04 RX ORDER — WARFARIN SODIUM 2.5 MG/1
2 TABLET ORAL ONCE
Qty: 0 | Refills: 0 | Status: COMPLETED | OUTPATIENT
Start: 2018-09-04 | End: 2018-09-04

## 2018-09-04 RX ORDER — METOCLOPRAMIDE HCL 10 MG
5 TABLET ORAL
Qty: 0 | Refills: 0 | Status: DISCONTINUED | OUTPATIENT
Start: 2018-09-04 | End: 2018-09-05

## 2018-09-04 RX ORDER — CLONAZEPAM 1 MG
0.5 TABLET ORAL DAILY
Qty: 0 | Refills: 0 | Status: DISCONTINUED | OUTPATIENT
Start: 2018-09-06 | End: 2018-09-05

## 2018-09-04 RX ADMIN — Medication 0.5 MILLIGRAM(S): at 19:41

## 2018-09-04 RX ADMIN — SENNA PLUS 2 TABLET(S): 8.6 TABLET ORAL at 21:19

## 2018-09-04 RX ADMIN — Medication 1 GRAM(S): at 10:54

## 2018-09-04 RX ADMIN — WARFARIN SODIUM 2 MILLIGRAM(S): 2.5 TABLET ORAL at 21:19

## 2018-09-04 RX ADMIN — Medication 1 GRAM(S): at 06:30

## 2018-09-04 RX ADMIN — Medication 100 MILLIGRAM(S): at 13:53

## 2018-09-04 RX ADMIN — TAMSULOSIN HYDROCHLORIDE 0.4 MILLIGRAM(S): 0.4 CAPSULE ORAL at 21:19

## 2018-09-04 RX ADMIN — Medication 1 GRAM(S): at 17:04

## 2018-09-04 RX ADMIN — GABAPENTIN 100 MILLIGRAM(S): 400 CAPSULE ORAL at 06:30

## 2018-09-04 RX ADMIN — Medication 100 MILLIGRAM(S): at 21:19

## 2018-09-04 RX ADMIN — Medication 325 MILLIGRAM(S): at 17:03

## 2018-09-04 RX ADMIN — Medication 5 MILLIGRAM(S): at 17:03

## 2018-09-04 RX ADMIN — GABAPENTIN 100 MILLIGRAM(S): 400 CAPSULE ORAL at 21:20

## 2018-09-04 RX ADMIN — Medication 5 MILLIGRAM(S): at 10:54

## 2018-09-04 RX ADMIN — Medication 500 MILLIGRAM(S): at 17:03

## 2018-09-04 RX ADMIN — GABAPENTIN 100 MILLIGRAM(S): 400 CAPSULE ORAL at 13:54

## 2018-09-04 RX ADMIN — Medication 1 GRAM(S): at 21:20

## 2018-09-04 RX ADMIN — ATORVASTATIN CALCIUM 20 MILLIGRAM(S): 80 TABLET, FILM COATED ORAL at 21:19

## 2018-09-04 RX ADMIN — Medication 1 APPLICATION(S): at 06:21

## 2018-09-04 RX ADMIN — Medication 1 TABLET(S): at 13:34

## 2018-09-04 RX ADMIN — PANTOPRAZOLE SODIUM 80 MILLIGRAM(S): 20 TABLET, DELAYED RELEASE ORAL at 17:04

## 2018-09-04 RX ADMIN — TIOTROPIUM BROMIDE 1 CAPSULE(S): 18 CAPSULE ORAL; RESPIRATORY (INHALATION) at 07:36

## 2018-09-04 RX ADMIN — Medication 100 MILLIGRAM(S): at 06:30

## 2018-09-04 NOTE — DISCHARGE NOTE ADULT - CARE PLAN
Principal Discharge DX:	Hip fracture, right  Goal:	healing of injury, regain independent function without pain or discomfort  Assessment and plan of treatment:	Continue physical therapy at home. Follow up with Dr. Lynch (the orthopedic surgeon) in two weeks. Do not drive until Dr. Lynch says that you are able to drive again.  Secondary Diagnosis:	Afib  Goal:	healthy heart  Assessment and plan of treatment:	Continue your medications and follow heart healthy diet. The lab will come to your home to draw PT/INR and Dr. Kidd will monitor the level and Coumadin doses, until you are well enough to travel to the Coumadin clinic again.  Secondary Diagnosis:	COPD (chronic obstructive pulmonary disease)  Goal:	be able to breathe easily  Assessment and plan of treatment:	Continue oxygen and Spiriva, follow up with Dr. Sinha. Avoid smoke, including second hand smoke, and avoid people who have respiratory infections.  Secondary Diagnosis:	Lung cancer  Goal:	remain in remission  Assessment and plan of treatment:	Follow up regularly with your oncologist and pulmonary doctors, avoid smoking, including second-hand smoke  Secondary Diagnosis:	GERD (gastroesophageal reflux disease)  Goal:	to be symptom-free  Assessment and plan of treatment:	Continue diet and medications; never lie flat, wait at least 3 hours before lying down after meals; avoid foods that aggravate your symptoms; follow up with Dr. Chavis.  Secondary Diagnosis:	UTI (urinary tract infection)  Goal:	prevent recurrence  Assessment and plan of treatment:	You had a mays catheter for retention, which was removed and you are taking flomax (tamsulosin) to help you pass urine. You were treated with Cipro for the urinary tract infection. Keep well-hydrated and empty your bladder often to help prevent another infection. Also keep the periurethral area clean by showering daily and always wipe from front to back. Follow up with the urologist Dr. Osuna in 1 to 2 weeks. Principal Discharge DX:	Hip fracture, right  Goal:	healing of injury, regain independent function without pain or discomfort  Assessment and plan of treatment:	Continue physical therapy at home. Follow up with Dr. Lynch (the orthopedic surgeon) in two weeks. Do not drive until Dr. Lynch says that you are able to drive again.  Secondary Diagnosis:	Afib  Goal:	healthy heart, prevent a stroke  Assessment and plan of treatment:	Continue your medications and follow heart healthy diet. The lab will come to your home to draw PT/INR and Dr. Kidd will monitor the level and Coumadin doses, until you are well enough to travel to the Coumadin clinic again. Also follow up regularly with your cardiologist Dr. Spencer, and with your EPS cardiologist Dr. Garsia, for pacemaker interrogations.  Secondary Diagnosis:	COPD (chronic obstructive pulmonary disease)  Goal:	be able to breathe easily  Assessment and plan of treatment:	Continue oxygen and Spiriva, follow up with Dr. Sinha. Avoid smoke, including second hand smoke, and avoid people who have respiratory infections.  Secondary Diagnosis:	Lung cancer  Goal:	remain in remission  Assessment and plan of treatment:	Follow up regularly with your oncologist and pulmonary doctors, avoid smoking, including second-hand smoke  Secondary Diagnosis:	GERD (gastroesophageal reflux disease)  Goal:	to be symptom-free  Assessment and plan of treatment:	Continue diet (it might be better to eat 5 or 6 small meals instead of 3 large meals) and medications; never lie flat, wait at least 3 hours before lying down after meals; avoid foods that aggravate your symptoms; follow up with Dr. Chavis.  Secondary Diagnosis:	UTI (urinary tract infection)  Goal:	prevent recurrence  Assessment and plan of treatment:	You had a mays catheter for retention, which was removed and you are taking flomax (tamsulosin) to help you pass urine. You were treated with Cipro for the urinary tract infection. Keep well-hydrated and empty your bladder often (every 2 to 3 hours) to help prevent accidents and another infection. Also keep the periurethral area clean by showering daily and always wipe from front to back. Follow up with the urologist Dr. Osuna in 1 to 2 weeks.

## 2018-09-04 NOTE — DISCHARGE NOTE ADULT - REASON FOR ADMISSION
71 yo lady with a fib (on Coumadin 2mg q M, Tu, Wed and 1mg q Th, Fri, Sat, Sun; goes to Coumadin clinic [facility]), PPM (was interrogated this admission), left lung ca dx 2011 (surgery x2 -partial left lobectomy + chemo +RT, oncologist f/up at [facility]), former smoker quit in 2005, she fell at home and fx right hip, her  called 911. She underwent CRPP right hip on 8/15/18 by Dr. Lynch-[facility] RLE. She needed a mays catheter from 8/27 due to retention, she also had a UTI (Proteus vulgaris) and was successfully treated with po Cipro. She was also started on flomax as per  recommendation. She passed the voiding trial on 9/2/18. She has COPD--is O2 dependent, uses spiriva, the other inhalers never helped her, and she also suffers from GERD, which is exacerbated due to prior radiation to her chest for the lung cancer. Protonix was increased and carafate was added, she was seen by the hospital GI physicians (Dr. Chavis is on vacation), and will need to follow up with Dr. Chavis when he returns, for a possible EGD. Last EGD was about one year ago, showed esophagitis/gastritis as per the patient. She is being discharged home with home care on 9/5/18. She usually goes to Coumadin clinic, but since she is having difficulty getting around due to the hip fracture, she will have home blood draws arranged by her PCP, Dr. Taty Kidd, for the next few weeks, until she is cleared by Dr. Lynch to drive again. In addition to her PCP, Orthopedist, and GI doctors, she will also follow up with her Pulmonary, oncology, cardiology and EPS physicians.

## 2018-09-04 NOTE — DISCHARGE NOTE ADULT - PATIENT PORTAL LINK FT
You can access the [facility] Patient Portal, offered by [facility], by registering with the following website: [email or web address]

## 2018-09-04 NOTE — DISCHARGE NOTE ADULT - CARE PROVIDER_API CALL
Ky Sinha), Geriatric Medicine; Internal Medicine; Pulmonary Disease  [address]  [address]  [place] [address]  Phone: [phone]  Fax: [phone]    Carmine Spencer), Cardiovascular Disease; Interventional Cardiology  [address]  [address]  [place] [address]  Phone: [phone]  Fax: [phone]    Ravindra Osuna), Urology  [address] [place] [address] [address]  [place] [address]  Phone: [phone]  Fax: [phone]    Roque Lynch), Orthopaedic Surgery  [address] [place] [address]  Phone: [phone]  Fax: [phone]

## 2018-09-04 NOTE — DISCHARGE NOTE ADULT - INSTRUCTIONS
Heart healthy anti-reflux (low fat low cholesterol no added salt; avoid fatty, fried foods, avoid cigarettes, alcohol, caffeine, peppermint, and any other foods that aggravate your symptoms); you may find it helps to eat 5 or 6 smaller meals instead of 3 large meals Heart healthy anti-reflux (low fat low cholesterol no added salt, high fiber; avoid fatty, fried foods, avoid cigarettes, alcohol, caffeine, peppermint, and any other foods that aggravate your symptoms); you may find it helps to eat 5 or 6 smaller meals instead of 3 large meals

## 2018-09-04 NOTE — DISCHARGE NOTE ADULT - MEDICATION SUMMARY - MEDICATIONS TO TAKE
I will START or STAY ON the medications listed below when I get home from the hospital:    acetaminophen 325 mg oral tablet  -- 2 tab(s) by mouth every 6 hours, As needed, for pain or fever  -- Indication: For (Tylenol) take if needed for pain or fever    aluminum hydroxide-magnesium hydroxide 200 mg-200 mg/5 mL oral suspension  -- 30 milliliter(s) by mouth every 6 hours, As needed, Dyspepsia  -- Indication: For (Maalox or Mylanta) take if needed for indigestion, heartburn, upset stomach    tamsulosin 0.4 mg oral capsule  -- 1 cap(s) by mouth once a day (at bedtime)  -- Indication: For (Flomax) helps you to pass urine and empty your bladder; do not stop this medication unless Dr. Osuna tells you to stop it    warfarin 1 mg oral tablet  -- 1 tab(s) by mouth once a day (at bedtime)  -- Indication: For (Coumadin) blood-thinner     gabapentin 100 mg oral capsule  -- 1 cap(s) by mouth every 8 hours (3 times a day)  -- Indication: For Helps to relieve neuropathy pain    KlonoPIN 0.5 mg oral tablet  -- 1  by mouth 2 times a day, As Needed  -- Indication: For (clonazepam) take if needed for anxiety    ondansetron 4 mg oral tablet, disintegrating  -- 1 or 2  tab(s) orally, dissolve on tongue, every 6 hours, As Needed -Nausea and/or Vomiting   -- Indication: For (Zofran) dissolve 1 or 2 tablets on your tongue if needed to relieve nausea    Crestor 5 mg oral tablet  -- 1 tab(s) by mouth once a day (at bedtime)  -- Indication: For Lowers your cholesterol    Spiriva 18 mcg inhalation capsule  -- 1 cap(s) inhaled once a day  -- Indication: For maintenance inhaler for COPD (lungs)    ferrous sulfate 325 mg (65 mg elemental iron) oral tablet  -- 1  by mouth once a day (before a meal), with dinner  -- Indication: For iron supplement to treat post-op anemia, take with vitamin C 500mg to help absorb the iron and help it to work more effectively    polyethylene glycol 3350 oral powder for reconstitution  -- 17 gram(s) by mouth once a day, As needed, Constipation  -- Indication: For (MIraLax) dissolve in 8 ounces of liquid and take daily if needed for constipation    Protonix 40 mg oral delayed release tablet  -- 2 tab(s) by mouth once a day (at bedtime)  -- Indication: For pantoprazole; to relieve GERD symptoms    Multiple Vitamins oral tablet  -- 1 tab(s) by mouth once a day  -- Indication: For vitamins    ascorbic acid 500 mg oral tablet  -- 1 tab(s) by mouth once a day, take together with iron (ferrous sulfate)  -- Indication: For vitamin C, take together with iron to help the iron to work more effectively    Vitamin D3 2000 intl units oral tablet  -- 1 tab(s) by mouth once a day  -- Indication: For vitamin D supplement    biotin 5000 mcg oral tablet, disintegrating  -- 1 tab(s) by mouth once a day  -- Indication: For vitamin B supplement

## 2018-09-04 NOTE — DISCHARGE NOTE ADULT - SECONDARY DIAGNOSIS.
Afib COPD (chronic obstructive pulmonary disease) Lung cancer GERD (gastroesophageal reflux disease) UTI (urinary tract infection)

## 2018-09-04 NOTE — DISCHARGE NOTE ADULT - PLAN OF CARE
healing of injury, regain independent function without pain or discomfort Continue physical therapy at home. Follow up with Dr. Lynch (the orthopedic surgeon) in two weeks. Do not drive until Dr. Lynch says that you are able to drive again. healthy heart Continue your medications and follow heart healthy diet. The lab will come to your home to draw PT/INR and Dr. Kidd will monitor the level and Coumadin doses, until you are well enough to travel to the Coumadin clinic again. be able to breathe easily Continue oxygen and Spiriva, follow up with Dr. Sinha. Avoid smoke, including second hand smoke, and avoid people who have respiratory infections. remain in remission Follow up regularly with your oncologist and pulmonary doctors, avoid smoking, including second-hand smoke to be symptom-free Continue diet and medications; never lie flat, wait at least 3 hours before lying down after meals; avoid foods that aggravate your symptoms; follow up with Dr. Chavis. prevent recurrence You had a mays catheter for retention, which was removed and you are taking flomax (tamsulosin) to help you pass urine. You were treated with Cipro for the urinary tract infection. Keep well-hydrated and empty your bladder often to help prevent another infection. Also keep the periurethral area clean by showering daily and always wipe from front to back. Follow up with the urologist Dr. Osuna in 1 to 2 weeks. healthy heart, prevent a stroke Continue your medications and follow heart healthy diet. The lab will come to your home to draw PT/INR and Dr. Kidd will monitor the level and Coumadin doses, until you are well enough to travel to the Coumadin clinic again. Also follow up regularly with your cardiologist Dr. Spencer, and with your EPS cardiologist Dr. Garsia, for pacemaker interrogations. Continue diet (it might be better to eat 5 or 6 small meals instead of 3 large meals) and medications; never lie flat, wait at least 3 hours before lying down after meals; avoid foods that aggravate your symptoms; follow up with Dr. Chavis. You had a mays catheter for retention, which was removed and you are taking flomax (tamsulosin) to help you pass urine. You were treated with Cipro for the urinary tract infection. Keep well-hydrated and empty your bladder often (every 2 to 3 hours) to help prevent accidents and another infection. Also keep the periurethral area clean by showering daily and always wipe from front to back. Follow up with the urologist Dr. Osuna in 1 to 2 weeks.

## 2018-09-04 NOTE — DISCHARGE NOTE ADULT - CARE PROVIDERS DIRECT ADDRESSES
,DirectAddress_Unknown,DirectAddress_Unknown,DirectAddress_Unknown,[email or web address]

## 2018-09-04 NOTE — DISCHARGE NOTE ADULT - ADDITIONAL INSTRUCTIONS
**Please show these discharge papers to all doctors and caregivers**    **Follow up with your PCP (primary care provider) Dr. Taty Kidd, in 1 to 2 weeks, also with Dr. Chavis, and with the orthopedic surgeon, Dr. Lynch, in 2 weeks; and with your Cardiologist Dr. Spencer, EPS Dr. Garsia, Pulmonary Dr. Sinha, Urologist Dr. Osuna, and your oncologist.

## 2018-09-04 NOTE — DISCHARGE NOTE ADULT - NS AS ACTIVITY OBS
No Heavy lifting/straining/Do not drive or operate machinery/Ambulate as tolerated with rolling walker, weight-bearing as tolerated right lower extremity, ambulate with supervision for your safety/Showering allowed

## 2018-09-05 LAB
INR BLD: 3 RATIO — HIGH (ref 0.65–1.3)
PROTHROM AB SERPL-ACNC: 32.1 SEC — HIGH (ref 9.95–12.87)

## 2018-09-05 RX ORDER — TAMSULOSIN HYDROCHLORIDE 0.4 MG/1
1 CAPSULE ORAL
Qty: 30 | Refills: 0
Start: 2018-09-05 | End: 2018-10-04

## 2018-09-05 RX ORDER — WARFARIN SODIUM 2.5 MG/1
1 TABLET ORAL ONCE
Qty: 0 | Refills: 0 | Status: DISCONTINUED | OUTPATIENT
Start: 2018-09-05 | End: 2018-09-05

## 2018-09-05 RX ORDER — ONDANSETRON 8 MG/1
1 TABLET, FILM COATED ORAL
Qty: 28 | Refills: 0
Start: 2018-09-05 | End: 2018-09-11

## 2018-09-05 RX ORDER — PANTOPRAZOLE SODIUM 20 MG/1
2 TABLET, DELAYED RELEASE ORAL
Qty: 60 | Refills: 0
Start: 2018-09-05 | End: 2018-10-04

## 2018-09-05 RX ORDER — GABAPENTIN 400 MG/1
1 CAPSULE ORAL
Qty: 90 | Refills: 0
Start: 2018-09-05 | End: 2018-10-04

## 2018-09-05 RX ORDER — ASCORBIC ACID 60 MG
1 TABLET,CHEWABLE ORAL
Qty: 0 | Refills: 0 | DISCHARGE
Start: 2018-09-05

## 2018-09-05 RX ORDER — POLYETHYLENE GLYCOL 3350 17 G/17G
17 POWDER, FOR SOLUTION ORAL
Qty: 0 | Refills: 0 | DISCHARGE
Start: 2018-09-05

## 2018-09-05 RX ORDER — PANTOPRAZOLE SODIUM 20 MG/1
1 TABLET, DELAYED RELEASE ORAL
Qty: 0 | Refills: 0 | COMMUNITY

## 2018-09-05 RX ORDER — FERROUS SULFATE 325(65) MG
1 TABLET ORAL
Qty: 30 | Refills: 0
Start: 2018-09-05 | End: 2018-10-04

## 2018-09-05 RX ORDER — WARFARIN SODIUM 2.5 MG/1
1 TABLET ORAL
Qty: 0 | Refills: 0 | DISCHARGE
Start: 2018-09-05

## 2018-09-05 RX ADMIN — Medication 1 APPLICATION(S): at 06:08

## 2018-09-05 RX ADMIN — Medication 100 MILLIGRAM(S): at 06:10

## 2018-09-05 RX ADMIN — GABAPENTIN 100 MILLIGRAM(S): 400 CAPSULE ORAL at 12:17

## 2018-09-05 RX ADMIN — GABAPENTIN 100 MILLIGRAM(S): 400 CAPSULE ORAL at 06:10

## 2018-09-05 RX ADMIN — ONDANSETRON 4 MILLIGRAM(S): 8 TABLET, FILM COATED ORAL at 06:50

## 2018-09-05 RX ADMIN — Medication 1 TABLET(S): at 12:17

## 2018-09-05 RX ADMIN — TIOTROPIUM BROMIDE 1 CAPSULE(S): 18 CAPSULE ORAL; RESPIRATORY (INHALATION) at 07:55

## 2018-09-05 RX ADMIN — Medication 100 MILLIGRAM(S): at 12:17

## 2018-09-08 DIAGNOSIS — S72.011D UNSPECIFIED INTRACAPSULAR FRACTURE OF RIGHT FEMUR, SUBSEQUENT ENCOUNTER FOR CLOSED FRACTURE WITH ROUTINE HEALING: ICD-10-CM

## 2018-09-08 DIAGNOSIS — Z95.0 PRESENCE OF CARDIAC PACEMAKER: ICD-10-CM

## 2018-09-08 DIAGNOSIS — Z92.3 PERSONAL HISTORY OF IRRADIATION: ICD-10-CM

## 2018-09-08 DIAGNOSIS — J44.9 CHRONIC OBSTRUCTIVE PULMONARY DISEASE, UNSPECIFIED: ICD-10-CM

## 2018-09-08 DIAGNOSIS — I48.91 UNSPECIFIED ATRIAL FIBRILLATION: ICD-10-CM

## 2018-09-08 DIAGNOSIS — Z85.118 PERSONAL HISTORY OF OTHER MALIGNANT NEOPLASM OF BRONCHUS AND LUNG: ICD-10-CM

## 2018-09-08 DIAGNOSIS — R26.89 OTHER ABNORMALITIES OF GAIT AND MOBILITY: ICD-10-CM

## 2018-09-08 DIAGNOSIS — R33.8 OTHER RETENTION OF URINE: ICD-10-CM
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
## 2018-09-08 DIAGNOSIS — Z90.2 ACQUIRED ABSENCE OF LUNG [PART OF]: ICD-10-CM

## 2018-09-08 DIAGNOSIS — K21.9 GASTRO-ESOPHAGEAL REFLUX DISEASE WITHOUT ESOPHAGITIS: ICD-10-CM

## 2018-09-08 DIAGNOSIS — N39.0 URINARY TRACT INFECTION, SITE NOT SPECIFIED: ICD-10-CM

## 2018-09-08 DIAGNOSIS — D62 ACUTE POSTHEMORRHAGIC ANEMIA: ICD-10-CM

## 2018-09-08 DIAGNOSIS — Z92.21 PERSONAL HISTORY OF ANTINEOPLASTIC CHEMOTHERAPY: ICD-10-CM

## 2018-09-08 DIAGNOSIS — Z79.01 LONG TERM (CURRENT) USE OF ANTICOAGULANTS: ICD-10-CM

## 2018-09-08 DIAGNOSIS — F41.9 ANXIETY DISORDER, UNSPECIFIED: ICD-10-CM

## 2018-09-08 DIAGNOSIS — Z87.891 PERSONAL HISTORY OF NICOTINE DEPENDENCE: ICD-10-CM

## 2018-09-08 DIAGNOSIS — Z98.890 OTHER SPECIFIED POSTPROCEDURAL STATES: ICD-10-CM

## 2018-09-08 DIAGNOSIS — Z99.81 DEPENDENCE ON SUPPLEMENTAL OXYGEN: ICD-10-CM
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
## 2018-09-13 DIAGNOSIS — I48.0 PAROXYSMAL ATRIAL FIBRILLATION: ICD-10-CM

## 2018-09-13 DIAGNOSIS — Z51.89 ENCOUNTER FOR OTHER SPECIFIED AFTERCARE: ICD-10-CM

## 2018-09-13 DIAGNOSIS — E78.5 HYPERLIPIDEMIA, UNSPECIFIED: ICD-10-CM

## 2018-09-13 DIAGNOSIS — D64.9 ANEMIA, UNSPECIFIED: ICD-10-CM

## 2018-10-16 ENCOUNTER — OUTPATIENT (OUTPATIENT)
Dept: OUTPATIENT SERVICES | Facility: HOSPITAL | Age: 73
LOS: 1 days | Discharge: HOME | End: 2018-10-16

## 2018-10-16 DIAGNOSIS — Z98.890 OTHER SPECIFIED POSTPROCEDURAL STATES: Chronic | ICD-10-CM

## 2018-10-16 DIAGNOSIS — Z79.01 LONG TERM (CURRENT) USE OF ANTICOAGULANTS: ICD-10-CM

## 2018-10-16 DIAGNOSIS — Z95.0 PRESENCE OF CARDIAC PACEMAKER: Chronic | ICD-10-CM

## 2018-10-16 DIAGNOSIS — Z90.2 ACQUIRED ABSENCE OF LUNG [PART OF]: Chronic | ICD-10-CM

## 2018-10-16 DIAGNOSIS — Z90.49 ACQUIRED ABSENCE OF OTHER SPECIFIED PARTS OF DIGESTIVE TRACT: Chronic | ICD-10-CM

## 2018-10-16 DIAGNOSIS — R79.1 ABNORMAL COAGULATION PROFILE: ICD-10-CM

## 2018-10-16 LAB
POCT INR: 1.8 RATIO — HIGH (ref 0.9–1.2)
POCT PT: 21.1 SEC — HIGH (ref 10–13.4)

## 2018-11-01 ENCOUNTER — OUTPATIENT (OUTPATIENT)
Dept: OUTPATIENT SERVICES | Facility: HOSPITAL | Age: 73
LOS: 1 days | Discharge: HOME | End: 2018-11-01

## 2018-11-01 DIAGNOSIS — R79.1 ABNORMAL COAGULATION PROFILE: ICD-10-CM

## 2018-11-01 DIAGNOSIS — Z90.2 ACQUIRED ABSENCE OF LUNG [PART OF]: Chronic | ICD-10-CM

## 2018-11-01 DIAGNOSIS — Z79.01 LONG TERM (CURRENT) USE OF ANTICOAGULANTS: ICD-10-CM

## 2018-11-01 DIAGNOSIS — Z90.49 ACQUIRED ABSENCE OF OTHER SPECIFIED PARTS OF DIGESTIVE TRACT: Chronic | ICD-10-CM

## 2018-11-01 DIAGNOSIS — Z95.0 PRESENCE OF CARDIAC PACEMAKER: Chronic | ICD-10-CM

## 2018-11-01 DIAGNOSIS — Z98.890 OTHER SPECIFIED POSTPROCEDURAL STATES: Chronic | ICD-10-CM

## 2018-11-01 LAB
POCT INR: 1.3 RATIO — HIGH (ref 0.9–1.2)
POCT PT: 15.6 SEC — HIGH (ref 10–13.4)

## 2018-11-08 ENCOUNTER — OUTPATIENT (OUTPATIENT)
Dept: OUTPATIENT SERVICES | Facility: HOSPITAL | Age: 73
LOS: 1 days | Discharge: HOME | End: 2018-11-08

## 2018-11-08 DIAGNOSIS — Z98.890 OTHER SPECIFIED POSTPROCEDURAL STATES: Chronic | ICD-10-CM

## 2018-11-08 DIAGNOSIS — Z95.0 PRESENCE OF CARDIAC PACEMAKER: Chronic | ICD-10-CM

## 2018-11-08 DIAGNOSIS — R79.1 ABNORMAL COAGULATION PROFILE: ICD-10-CM

## 2018-11-08 DIAGNOSIS — Z90.49 ACQUIRED ABSENCE OF OTHER SPECIFIED PARTS OF DIGESTIVE TRACT: Chronic | ICD-10-CM

## 2018-11-08 DIAGNOSIS — Z79.01 LONG TERM (CURRENT) USE OF ANTICOAGULANTS: ICD-10-CM

## 2018-11-08 DIAGNOSIS — Z90.2 ACQUIRED ABSENCE OF LUNG [PART OF]: Chronic | ICD-10-CM

## 2018-11-08 LAB
POCT INR: 1.8 RATIO — HIGH (ref 0.9–1.2)
POCT PT: 21.1 SEC — HIGH (ref 10–13.4)

## 2018-11-20 ENCOUNTER — OUTPATIENT (OUTPATIENT)
Dept: OUTPATIENT SERVICES | Facility: HOSPITAL | Age: 73
LOS: 1 days | Discharge: HOME | End: 2018-11-20

## 2018-11-20 DIAGNOSIS — Z90.2 ACQUIRED ABSENCE OF LUNG [PART OF]: Chronic | ICD-10-CM

## 2018-11-20 DIAGNOSIS — R79.1 ABNORMAL COAGULATION PROFILE: ICD-10-CM

## 2018-11-20 DIAGNOSIS — Z98.890 OTHER SPECIFIED POSTPROCEDURAL STATES: Chronic | ICD-10-CM

## 2018-11-20 DIAGNOSIS — Z90.49 ACQUIRED ABSENCE OF OTHER SPECIFIED PARTS OF DIGESTIVE TRACT: Chronic | ICD-10-CM

## 2018-11-20 DIAGNOSIS — Z79.01 LONG TERM (CURRENT) USE OF ANTICOAGULANTS: ICD-10-CM

## 2018-11-20 DIAGNOSIS — Z95.0 PRESENCE OF CARDIAC PACEMAKER: Chronic | ICD-10-CM

## 2018-11-20 LAB
POCT INR: 2.3 RATIO — HIGH (ref 0.9–1.2)
POCT PT: 27.2 SEC — HIGH (ref 10–13.4)

## 2018-12-06 ENCOUNTER — OUTPATIENT (OUTPATIENT)
Dept: OUTPATIENT SERVICES | Facility: HOSPITAL | Age: 73
LOS: 1 days | Discharge: HOME | End: 2018-12-06

## 2018-12-06 DIAGNOSIS — Z90.49 ACQUIRED ABSENCE OF OTHER SPECIFIED PARTS OF DIGESTIVE TRACT: Chronic | ICD-10-CM

## 2018-12-06 DIAGNOSIS — Z90.2 ACQUIRED ABSENCE OF LUNG [PART OF]: Chronic | ICD-10-CM

## 2018-12-06 DIAGNOSIS — Z79.01 LONG TERM (CURRENT) USE OF ANTICOAGULANTS: ICD-10-CM

## 2018-12-06 DIAGNOSIS — Z95.0 PRESENCE OF CARDIAC PACEMAKER: Chronic | ICD-10-CM

## 2018-12-06 DIAGNOSIS — Z98.890 OTHER SPECIFIED POSTPROCEDURAL STATES: Chronic | ICD-10-CM

## 2018-12-06 DIAGNOSIS — R79.1 ABNORMAL COAGULATION PROFILE: ICD-10-CM

## 2018-12-06 LAB
POCT INR: 2.2 RATIO — HIGH (ref 0.9–1.2)
POCT PT: 26.1 SEC — HIGH (ref 10–13.4)

## 2018-12-27 ENCOUNTER — OUTPATIENT (OUTPATIENT)
Dept: OUTPATIENT SERVICES | Facility: HOSPITAL | Age: 73
LOS: 1 days | Discharge: HOME | End: 2018-12-27

## 2018-12-27 DIAGNOSIS — Z95.0 PRESENCE OF CARDIAC PACEMAKER: Chronic | ICD-10-CM

## 2018-12-27 DIAGNOSIS — Z90.49 ACQUIRED ABSENCE OF OTHER SPECIFIED PARTS OF DIGESTIVE TRACT: Chronic | ICD-10-CM

## 2018-12-27 DIAGNOSIS — Z98.890 OTHER SPECIFIED POSTPROCEDURAL STATES: Chronic | ICD-10-CM

## 2018-12-27 DIAGNOSIS — R79.1 ABNORMAL COAGULATION PROFILE: ICD-10-CM

## 2018-12-27 DIAGNOSIS — Z90.2 ACQUIRED ABSENCE OF LUNG [PART OF]: Chronic | ICD-10-CM

## 2018-12-27 DIAGNOSIS — Z79.01 LONG TERM (CURRENT) USE OF ANTICOAGULANTS: ICD-10-CM

## 2018-12-27 LAB
POCT INR: 2.7 RATIO — HIGH (ref 0.9–1.2)
POCT PT: 32.5 SEC — HIGH (ref 10–13.4)

## 2019-01-17 ENCOUNTER — OUTPATIENT (OUTPATIENT)
Dept: OUTPATIENT SERVICES | Facility: HOSPITAL | Age: 74
LOS: 1 days | Discharge: HOME | End: 2019-01-17

## 2019-01-17 DIAGNOSIS — R79.1 ABNORMAL COAGULATION PROFILE: ICD-10-CM

## 2019-01-17 DIAGNOSIS — Z90.49 ACQUIRED ABSENCE OF OTHER SPECIFIED PARTS OF DIGESTIVE TRACT: Chronic | ICD-10-CM

## 2019-01-17 DIAGNOSIS — Z98.890 OTHER SPECIFIED POSTPROCEDURAL STATES: Chronic | ICD-10-CM

## 2019-01-17 DIAGNOSIS — Z79.01 LONG TERM (CURRENT) USE OF ANTICOAGULANTS: ICD-10-CM

## 2019-01-17 DIAGNOSIS — Z90.2 ACQUIRED ABSENCE OF LUNG [PART OF]: Chronic | ICD-10-CM

## 2019-01-17 DIAGNOSIS — Z95.0 PRESENCE OF CARDIAC PACEMAKER: Chronic | ICD-10-CM

## 2019-01-17 LAB
POCT INR: 2.3 RATIO — HIGH (ref 0.9–1.2)
POCT PT: 27 SEC — HIGH (ref 10–13.4)

## 2019-01-29 ENCOUNTER — OUTPATIENT (OUTPATIENT)
Dept: OUTPATIENT SERVICES | Facility: HOSPITAL | Age: 74
LOS: 1 days | Discharge: HOME | End: 2019-01-29

## 2019-01-29 DIAGNOSIS — J43.0 UNILATERAL PULMONARY EMPHYSEMA [MACLEOD'S SYNDROME]: ICD-10-CM

## 2019-01-29 DIAGNOSIS — Z90.2 ACQUIRED ABSENCE OF LUNG [PART OF]: Chronic | ICD-10-CM

## 2019-01-29 DIAGNOSIS — Z98.890 OTHER SPECIFIED POSTPROCEDURAL STATES: Chronic | ICD-10-CM

## 2019-01-29 DIAGNOSIS — Z90.49 ACQUIRED ABSENCE OF OTHER SPECIFIED PARTS OF DIGESTIVE TRACT: Chronic | ICD-10-CM

## 2019-01-29 DIAGNOSIS — Z95.0 PRESENCE OF CARDIAC PACEMAKER: Chronic | ICD-10-CM

## 2019-02-15 ENCOUNTER — OUTPATIENT (OUTPATIENT)
Dept: OUTPATIENT SERVICES | Facility: HOSPITAL | Age: 74
LOS: 1 days | Discharge: HOME | End: 2019-02-15

## 2019-02-15 DIAGNOSIS — Z79.01 LONG TERM (CURRENT) USE OF ANTICOAGULANTS: ICD-10-CM

## 2019-02-15 DIAGNOSIS — Z98.890 OTHER SPECIFIED POSTPROCEDURAL STATES: Chronic | ICD-10-CM

## 2019-02-15 DIAGNOSIS — Z95.0 PRESENCE OF CARDIAC PACEMAKER: Chronic | ICD-10-CM

## 2019-02-15 DIAGNOSIS — Z90.2 ACQUIRED ABSENCE OF LUNG [PART OF]: Chronic | ICD-10-CM

## 2019-02-15 DIAGNOSIS — Z90.49 ACQUIRED ABSENCE OF OTHER SPECIFIED PARTS OF DIGESTIVE TRACT: Chronic | ICD-10-CM

## 2019-02-15 DIAGNOSIS — R79.1 ABNORMAL COAGULATION PROFILE: ICD-10-CM

## 2019-02-28 ENCOUNTER — OUTPATIENT (OUTPATIENT)
Dept: OUTPATIENT SERVICES | Facility: HOSPITAL | Age: 74
LOS: 1 days | Discharge: HOME | End: 2019-02-28

## 2019-02-28 DIAGNOSIS — Z98.890 OTHER SPECIFIED POSTPROCEDURAL STATES: Chronic | ICD-10-CM

## 2019-02-28 DIAGNOSIS — Z79.01 LONG TERM (CURRENT) USE OF ANTICOAGULANTS: ICD-10-CM

## 2019-02-28 DIAGNOSIS — Z95.0 PRESENCE OF CARDIAC PACEMAKER: Chronic | ICD-10-CM

## 2019-02-28 DIAGNOSIS — R79.1 ABNORMAL COAGULATION PROFILE: ICD-10-CM

## 2019-02-28 DIAGNOSIS — Z90.49 ACQUIRED ABSENCE OF OTHER SPECIFIED PARTS OF DIGESTIVE TRACT: Chronic | ICD-10-CM

## 2019-02-28 DIAGNOSIS — Z90.2 ACQUIRED ABSENCE OF LUNG [PART OF]: Chronic | ICD-10-CM

## 2019-03-27 ENCOUNTER — OUTPATIENT (OUTPATIENT)
Dept: OUTPATIENT SERVICES | Facility: HOSPITAL | Age: 74
LOS: 1 days | Discharge: HOME | End: 2019-03-27

## 2019-03-27 DIAGNOSIS — Z98.890 OTHER SPECIFIED POSTPROCEDURAL STATES: Chronic | ICD-10-CM

## 2019-03-27 DIAGNOSIS — Z90.49 ACQUIRED ABSENCE OF OTHER SPECIFIED PARTS OF DIGESTIVE TRACT: Chronic | ICD-10-CM

## 2019-03-27 DIAGNOSIS — Z79.01 LONG TERM (CURRENT) USE OF ANTICOAGULANTS: ICD-10-CM

## 2019-03-27 DIAGNOSIS — R79.1 ABNORMAL COAGULATION PROFILE: ICD-10-CM

## 2019-03-27 DIAGNOSIS — Z90.2 ACQUIRED ABSENCE OF LUNG [PART OF]: Chronic | ICD-10-CM

## 2019-03-27 DIAGNOSIS — Z95.0 PRESENCE OF CARDIAC PACEMAKER: Chronic | ICD-10-CM

## 2019-03-27 LAB
POCT INR: 2.1 RATIO — HIGH (ref 0.9–1.2)
POCT PT: 24.8 SEC — HIGH (ref 10–13.4)

## 2019-04-24 ENCOUNTER — OUTPATIENT (OUTPATIENT)
Dept: OUTPATIENT SERVICES | Facility: HOSPITAL | Age: 74
LOS: 1 days | Discharge: HOME | End: 2019-04-24

## 2019-04-24 DIAGNOSIS — Z95.0 PRESENCE OF CARDIAC PACEMAKER: Chronic | ICD-10-CM

## 2019-04-24 DIAGNOSIS — R79.1 ABNORMAL COAGULATION PROFILE: ICD-10-CM

## 2019-04-24 DIAGNOSIS — Z90.2 ACQUIRED ABSENCE OF LUNG [PART OF]: Chronic | ICD-10-CM

## 2019-04-24 DIAGNOSIS — Z98.890 OTHER SPECIFIED POSTPROCEDURAL STATES: Chronic | ICD-10-CM

## 2019-04-24 DIAGNOSIS — Z90.49 ACQUIRED ABSENCE OF OTHER SPECIFIED PARTS OF DIGESTIVE TRACT: Chronic | ICD-10-CM

## 2019-04-24 DIAGNOSIS — Z79.01 LONG TERM (CURRENT) USE OF ANTICOAGULANTS: ICD-10-CM

## 2019-04-24 LAB
POCT INR: 2.4 RATIO — HIGH (ref 0.9–1.2)
POCT PT: 29 SEC — HIGH (ref 10–13.4)

## 2019-05-24 ENCOUNTER — OUTPATIENT (OUTPATIENT)
Dept: OUTPATIENT SERVICES | Facility: HOSPITAL | Age: 74
LOS: 1 days | Discharge: HOME | End: 2019-05-24

## 2019-05-24 DIAGNOSIS — Z98.890 OTHER SPECIFIED POSTPROCEDURAL STATES: Chronic | ICD-10-CM

## 2019-05-24 DIAGNOSIS — Z90.49 ACQUIRED ABSENCE OF OTHER SPECIFIED PARTS OF DIGESTIVE TRACT: Chronic | ICD-10-CM

## 2019-05-24 DIAGNOSIS — R79.1 ABNORMAL COAGULATION PROFILE: ICD-10-CM

## 2019-05-24 DIAGNOSIS — Z95.0 PRESENCE OF CARDIAC PACEMAKER: Chronic | ICD-10-CM

## 2019-05-24 DIAGNOSIS — Z79.01 LONG TERM (CURRENT) USE OF ANTICOAGULANTS: ICD-10-CM

## 2019-05-24 DIAGNOSIS — Z90.2 ACQUIRED ABSENCE OF LUNG [PART OF]: Chronic | ICD-10-CM

## 2019-06-26 ENCOUNTER — OUTPATIENT (OUTPATIENT)
Dept: OUTPATIENT SERVICES | Facility: HOSPITAL | Age: 74
LOS: 1 days | Discharge: HOME | End: 2019-06-26

## 2019-06-26 DIAGNOSIS — R79.1 ABNORMAL COAGULATION PROFILE: ICD-10-CM

## 2019-06-26 DIAGNOSIS — Z98.890 OTHER SPECIFIED POSTPROCEDURAL STATES: Chronic | ICD-10-CM

## 2019-06-26 DIAGNOSIS — Z90.2 ACQUIRED ABSENCE OF LUNG [PART OF]: Chronic | ICD-10-CM

## 2019-06-26 DIAGNOSIS — Z95.0 PRESENCE OF CARDIAC PACEMAKER: Chronic | ICD-10-CM

## 2019-06-26 DIAGNOSIS — Z79.01 LONG TERM (CURRENT) USE OF ANTICOAGULANTS: ICD-10-CM

## 2019-06-26 DIAGNOSIS — Z90.49 ACQUIRED ABSENCE OF OTHER SPECIFIED PARTS OF DIGESTIVE TRACT: Chronic | ICD-10-CM

## 2019-06-26 LAB
POCT INR: 4 RATIO — HIGH (ref 0.9–1.2)
POCT PT: 47.4 SEC — HIGH (ref 10–13.4)

## 2019-06-28 ENCOUNTER — OUTPATIENT (OUTPATIENT)
Dept: OUTPATIENT SERVICES | Facility: HOSPITAL | Age: 74
LOS: 1 days | Discharge: HOME | End: 2019-06-28

## 2019-06-28 VITALS
DIASTOLIC BLOOD PRESSURE: 66 MMHG | SYSTOLIC BLOOD PRESSURE: 117 MMHG | HEIGHT: 64 IN | WEIGHT: 128.09 LBS | TEMPERATURE: 96 F | RESPIRATION RATE: 17 BRPM | HEART RATE: 70 BPM | OXYGEN SATURATION: 97 %

## 2019-06-28 VITALS
DIASTOLIC BLOOD PRESSURE: 63 MMHG | RESPIRATION RATE: 18 BRPM | HEART RATE: 70 BPM | OXYGEN SATURATION: 96 % | SYSTOLIC BLOOD PRESSURE: 125 MMHG

## 2019-06-28 DIAGNOSIS — Z90.49 ACQUIRED ABSENCE OF OTHER SPECIFIED PARTS OF DIGESTIVE TRACT: Chronic | ICD-10-CM

## 2019-06-28 DIAGNOSIS — Z98.890 OTHER SPECIFIED POSTPROCEDURAL STATES: Chronic | ICD-10-CM

## 2019-06-28 DIAGNOSIS — Z90.2 ACQUIRED ABSENCE OF LUNG [PART OF]: Chronic | ICD-10-CM

## 2019-06-28 DIAGNOSIS — Z95.0 PRESENCE OF CARDIAC PACEMAKER: Chronic | ICD-10-CM

## 2019-06-28 RX ORDER — ACETAMINOPHEN 500 MG
650 TABLET ORAL ONCE
Refills: 0 | Status: DISCONTINUED | OUTPATIENT
Start: 2019-06-28 | End: 2019-07-13

## 2019-06-28 RX ORDER — ONDANSETRON 8 MG/1
4 TABLET, FILM COATED ORAL ONCE
Refills: 0 | Status: DISCONTINUED | OUTPATIENT
Start: 2019-06-28 | End: 2019-07-13

## 2019-07-01 ENCOUNTER — OUTPATIENT (OUTPATIENT)
Dept: OUTPATIENT SERVICES | Facility: HOSPITAL | Age: 74
LOS: 1 days | Discharge: HOME | End: 2019-07-01

## 2019-07-01 DIAGNOSIS — R79.1 ABNORMAL COAGULATION PROFILE: ICD-10-CM

## 2019-07-01 DIAGNOSIS — Z95.0 PRESENCE OF CARDIAC PACEMAKER: Chronic | ICD-10-CM

## 2019-07-01 DIAGNOSIS — Z98.890 OTHER SPECIFIED POSTPROCEDURAL STATES: Chronic | ICD-10-CM

## 2019-07-01 DIAGNOSIS — Z90.2 ACQUIRED ABSENCE OF LUNG [PART OF]: Chronic | ICD-10-CM

## 2019-07-01 DIAGNOSIS — Z90.49 ACQUIRED ABSENCE OF OTHER SPECIFIED PARTS OF DIGESTIVE TRACT: Chronic | ICD-10-CM

## 2019-07-01 DIAGNOSIS — Z79.01 LONG TERM (CURRENT) USE OF ANTICOAGULANTS: ICD-10-CM

## 2019-07-01 LAB
POCT INR: 1.1 RATIO — SIGNIFICANT CHANGE UP (ref 0.9–1.2)
POCT PT: 13.5 SEC — HIGH (ref 10–13.4)

## 2019-07-02 DIAGNOSIS — Z98.890 OTHER SPECIFIED POSTPROCEDURAL STATES: ICD-10-CM

## 2019-07-02 DIAGNOSIS — Z90.49 ACQUIRED ABSENCE OF OTHER SPECIFIED PARTS OF DIGESTIVE TRACT: ICD-10-CM

## 2019-07-02 DIAGNOSIS — I48.91 UNSPECIFIED ATRIAL FIBRILLATION: ICD-10-CM

## 2019-07-02 DIAGNOSIS — C34.90 MALIGNANT NEOPLASM OF UNSPECIFIED PART OF UNSPECIFIED BRONCHUS OR LUNG: ICD-10-CM

## 2019-07-02 DIAGNOSIS — E78.00 PURE HYPERCHOLESTEROLEMIA, UNSPECIFIED: ICD-10-CM

## 2019-07-02 DIAGNOSIS — J44.9 CHRONIC OBSTRUCTIVE PULMONARY DISEASE, UNSPECIFIED: ICD-10-CM

## 2019-07-02 DIAGNOSIS — F41.9 ANXIETY DISORDER, UNSPECIFIED: ICD-10-CM

## 2019-07-02 DIAGNOSIS — Z90.2 ACQUIRED ABSENCE OF LUNG [PART OF]: ICD-10-CM

## 2019-07-02 DIAGNOSIS — H25.11 AGE-RELATED NUCLEAR CATARACT, RIGHT EYE: ICD-10-CM

## 2019-07-02 DIAGNOSIS — Z95.0 PRESENCE OF CARDIAC PACEMAKER: ICD-10-CM

## 2019-07-02 DIAGNOSIS — Z88.2 ALLERGY STATUS TO SULFONAMIDES: ICD-10-CM

## 2019-07-05 ENCOUNTER — OUTPATIENT (OUTPATIENT)
Dept: OUTPATIENT SERVICES | Facility: HOSPITAL | Age: 74
LOS: 1 days | Discharge: HOME | End: 2019-07-05

## 2019-07-05 VITALS
DIASTOLIC BLOOD PRESSURE: 70 MMHG | OXYGEN SATURATION: 96 % | HEART RATE: 74 BPM | RESPIRATION RATE: 15 BRPM | WEIGHT: 128.09 LBS | TEMPERATURE: 96 F | HEIGHT: 64 IN | SYSTOLIC BLOOD PRESSURE: 147 MMHG

## 2019-07-05 VITALS — RESPIRATION RATE: 17 BRPM | DIASTOLIC BLOOD PRESSURE: 70 MMHG | HEART RATE: 63 BPM | SYSTOLIC BLOOD PRESSURE: 125 MMHG

## 2019-07-05 DIAGNOSIS — Z90.49 ACQUIRED ABSENCE OF OTHER SPECIFIED PARTS OF DIGESTIVE TRACT: Chronic | ICD-10-CM

## 2019-07-05 DIAGNOSIS — Z98.890 OTHER SPECIFIED POSTPROCEDURAL STATES: Chronic | ICD-10-CM

## 2019-07-05 DIAGNOSIS — Z90.2 ACQUIRED ABSENCE OF LUNG [PART OF]: Chronic | ICD-10-CM

## 2019-07-05 DIAGNOSIS — Z95.0 PRESENCE OF CARDIAC PACEMAKER: Chronic | ICD-10-CM

## 2019-07-05 DIAGNOSIS — H26.9 UNSPECIFIED CATARACT: Chronic | ICD-10-CM

## 2019-07-05 NOTE — ASU PATIENT PROFILE, ADULT - PSH
Artificial cardiac pacemaker    Cataract  RIGHT  6/17 AND  LEFT  7/5/19  H/O atrioventricular kacie ablation    History of tonsillectomy    S/P appendectomy    S/P lobectomy of lung  left

## 2019-07-08 DIAGNOSIS — H25.12 AGE-RELATED NUCLEAR CATARACT, LEFT EYE: ICD-10-CM

## 2019-07-10 ENCOUNTER — OUTPATIENT (OUTPATIENT)
Dept: OUTPATIENT SERVICES | Facility: HOSPITAL | Age: 74
LOS: 1 days | Discharge: HOME | End: 2019-07-10

## 2019-07-10 DIAGNOSIS — Z95.0 PRESENCE OF CARDIAC PACEMAKER: Chronic | ICD-10-CM

## 2019-07-10 DIAGNOSIS — Z98.890 OTHER SPECIFIED POSTPROCEDURAL STATES: Chronic | ICD-10-CM

## 2019-07-10 DIAGNOSIS — R79.1 ABNORMAL COAGULATION PROFILE: ICD-10-CM

## 2019-07-10 DIAGNOSIS — Z90.49 ACQUIRED ABSENCE OF OTHER SPECIFIED PARTS OF DIGESTIVE TRACT: Chronic | ICD-10-CM

## 2019-07-10 DIAGNOSIS — Z79.01 LONG TERM (CURRENT) USE OF ANTICOAGULANTS: ICD-10-CM

## 2019-07-10 DIAGNOSIS — H26.9 UNSPECIFIED CATARACT: Chronic | ICD-10-CM

## 2019-07-10 DIAGNOSIS — Z90.2 ACQUIRED ABSENCE OF LUNG [PART OF]: Chronic | ICD-10-CM

## 2019-07-10 LAB
POCT INR: 2.3 RATIO — HIGH (ref 0.9–1.2)
POCT PT: 27.6 SEC — HIGH (ref 10–13.4)

## 2019-07-29 ENCOUNTER — OUTPATIENT (OUTPATIENT)
Dept: OUTPATIENT SERVICES | Facility: HOSPITAL | Age: 74
LOS: 1 days | Discharge: HOME | End: 2019-07-29

## 2019-07-29 DIAGNOSIS — R79.1 ABNORMAL COAGULATION PROFILE: ICD-10-CM

## 2019-07-29 DIAGNOSIS — Z90.2 ACQUIRED ABSENCE OF LUNG [PART OF]: Chronic | ICD-10-CM

## 2019-07-29 DIAGNOSIS — Z90.49 ACQUIRED ABSENCE OF OTHER SPECIFIED PARTS OF DIGESTIVE TRACT: Chronic | ICD-10-CM

## 2019-07-29 DIAGNOSIS — Z79.01 LONG TERM (CURRENT) USE OF ANTICOAGULANTS: ICD-10-CM

## 2019-07-29 DIAGNOSIS — Z98.890 OTHER SPECIFIED POSTPROCEDURAL STATES: Chronic | ICD-10-CM

## 2019-07-29 DIAGNOSIS — H26.9 UNSPECIFIED CATARACT: Chronic | ICD-10-CM

## 2019-07-29 DIAGNOSIS — Z95.0 PRESENCE OF CARDIAC PACEMAKER: Chronic | ICD-10-CM

## 2019-07-29 LAB
POCT INR: 2.3 RATIO — HIGH (ref 0.9–1.2)
POCT PT: 27.6 SEC — HIGH (ref 10–13.4)

## 2019-08-07 ENCOUNTER — OUTPATIENT (OUTPATIENT)
Dept: OUTPATIENT SERVICES | Facility: HOSPITAL | Age: 74
LOS: 1 days | Discharge: HOME | End: 2019-08-07
Payer: MEDICARE

## 2019-08-07 DIAGNOSIS — Z90.2 ACQUIRED ABSENCE OF LUNG [PART OF]: Chronic | ICD-10-CM

## 2019-08-07 DIAGNOSIS — Z95.0 PRESENCE OF CARDIAC PACEMAKER: Chronic | ICD-10-CM

## 2019-08-07 DIAGNOSIS — Z98.890 OTHER SPECIFIED POSTPROCEDURAL STATES: Chronic | ICD-10-CM

## 2019-08-07 DIAGNOSIS — H26.9 UNSPECIFIED CATARACT: Chronic | ICD-10-CM

## 2019-08-07 DIAGNOSIS — Z90.49 ACQUIRED ABSENCE OF OTHER SPECIFIED PARTS OF DIGESTIVE TRACT: Chronic | ICD-10-CM

## 2019-08-07 DIAGNOSIS — Z12.31 ENCOUNTER FOR SCREENING MAMMOGRAM FOR MALIGNANT NEOPLASM OF BREAST: ICD-10-CM

## 2019-08-07 PROCEDURE — 77063 BREAST TOMOSYNTHESIS BI: CPT | Mod: 26

## 2019-08-07 PROCEDURE — 77067 SCR MAMMO BI INCL CAD: CPT | Mod: 26

## 2019-08-08 DIAGNOSIS — M89.9 DISORDER OF BONE, UNSPECIFIED: ICD-10-CM

## 2019-08-08 DIAGNOSIS — Z87.310 PERSONAL HISTORY OF (HEALED) OSTEOPOROSIS FRACTURE: ICD-10-CM

## 2019-08-08 DIAGNOSIS — Z13.820 ENCOUNTER FOR SCREENING FOR OSTEOPOROSIS: ICD-10-CM

## 2019-08-08 DIAGNOSIS — Z78.0 ASYMPTOMATIC MENOPAUSAL STATE: ICD-10-CM

## 2019-08-20 ENCOUNTER — OUTPATIENT (OUTPATIENT)
Dept: OUTPATIENT SERVICES | Facility: HOSPITAL | Age: 74
LOS: 1 days | Discharge: HOME | End: 2019-08-20
Payer: MEDICARE

## 2019-08-20 ENCOUNTER — OUTPATIENT (OUTPATIENT)
Dept: OUTPATIENT SERVICES | Facility: HOSPITAL | Age: 74
LOS: 1 days | Discharge: HOME | End: 2019-08-20

## 2019-08-20 DIAGNOSIS — Z98.890 OTHER SPECIFIED POSTPROCEDURAL STATES: Chronic | ICD-10-CM

## 2019-08-20 DIAGNOSIS — H26.9 UNSPECIFIED CATARACT: Chronic | ICD-10-CM

## 2019-08-20 DIAGNOSIS — R79.1 ABNORMAL COAGULATION PROFILE: ICD-10-CM

## 2019-08-20 DIAGNOSIS — Z90.2 ACQUIRED ABSENCE OF LUNG [PART OF]: Chronic | ICD-10-CM

## 2019-08-20 DIAGNOSIS — Z95.0 PRESENCE OF CARDIAC PACEMAKER: Chronic | ICD-10-CM

## 2019-08-20 DIAGNOSIS — Z90.49 ACQUIRED ABSENCE OF OTHER SPECIFIED PARTS OF DIGESTIVE TRACT: Chronic | ICD-10-CM

## 2019-08-20 DIAGNOSIS — R92.2 INCONCLUSIVE MAMMOGRAM: ICD-10-CM

## 2019-08-20 DIAGNOSIS — Z79.01 LONG TERM (CURRENT) USE OF ANTICOAGULANTS: ICD-10-CM

## 2019-08-20 LAB
POCT INR: 4.4 RATIO — HIGH (ref 0.9–1.2)
POCT PT: 52.7 SEC — HIGH (ref 10–13.4)

## 2019-08-20 PROCEDURE — 76641 ULTRASOUND BREAST COMPLETE: CPT | Mod: 26,50

## 2019-08-28 ENCOUNTER — OUTPATIENT (OUTPATIENT)
Dept: OUTPATIENT SERVICES | Facility: HOSPITAL | Age: 74
LOS: 1 days | Discharge: HOME | End: 2019-08-28

## 2019-08-28 DIAGNOSIS — Z79.01 LONG TERM (CURRENT) USE OF ANTICOAGULANTS: ICD-10-CM

## 2019-08-28 DIAGNOSIS — Z95.0 PRESENCE OF CARDIAC PACEMAKER: Chronic | ICD-10-CM

## 2019-08-28 DIAGNOSIS — Z90.49 ACQUIRED ABSENCE OF OTHER SPECIFIED PARTS OF DIGESTIVE TRACT: Chronic | ICD-10-CM

## 2019-08-28 DIAGNOSIS — Z98.890 OTHER SPECIFIED POSTPROCEDURAL STATES: Chronic | ICD-10-CM

## 2019-08-28 DIAGNOSIS — R79.1 ABNORMAL COAGULATION PROFILE: ICD-10-CM

## 2019-08-28 DIAGNOSIS — H26.9 UNSPECIFIED CATARACT: Chronic | ICD-10-CM

## 2019-08-28 DIAGNOSIS — Z90.2 ACQUIRED ABSENCE OF LUNG [PART OF]: Chronic | ICD-10-CM

## 2019-08-28 LAB
POCT INR: 2.3 RATIO — HIGH (ref 0.9–1.2)
POCT PT: 27.8 SEC — HIGH (ref 10–13.4)

## 2019-09-11 ENCOUNTER — OUTPATIENT (OUTPATIENT)
Dept: OUTPATIENT SERVICES | Facility: HOSPITAL | Age: 74
LOS: 1 days | Discharge: HOME | End: 2019-09-11

## 2019-09-11 DIAGNOSIS — R79.1 ABNORMAL COAGULATION PROFILE: ICD-10-CM

## 2019-09-11 DIAGNOSIS — Z98.890 OTHER SPECIFIED POSTPROCEDURAL STATES: Chronic | ICD-10-CM

## 2019-09-11 DIAGNOSIS — Z95.0 PRESENCE OF CARDIAC PACEMAKER: Chronic | ICD-10-CM

## 2019-09-11 DIAGNOSIS — Z90.49 ACQUIRED ABSENCE OF OTHER SPECIFIED PARTS OF DIGESTIVE TRACT: Chronic | ICD-10-CM

## 2019-09-11 DIAGNOSIS — Z79.01 LONG TERM (CURRENT) USE OF ANTICOAGULANTS: ICD-10-CM

## 2019-09-11 DIAGNOSIS — H26.9 UNSPECIFIED CATARACT: Chronic | ICD-10-CM

## 2019-09-11 DIAGNOSIS — Z90.2 ACQUIRED ABSENCE OF LUNG [PART OF]: Chronic | ICD-10-CM

## 2019-09-11 LAB
POCT INR: 3.5 RATIO — HIGH (ref 0.9–1.2)
POCT PT: 41.8 SEC — HIGH (ref 10–13.4)

## 2019-09-25 ENCOUNTER — OUTPATIENT (OUTPATIENT)
Dept: OUTPATIENT SERVICES | Facility: HOSPITAL | Age: 74
LOS: 1 days | Discharge: HOME | End: 2019-09-25

## 2019-09-25 DIAGNOSIS — Z90.49 ACQUIRED ABSENCE OF OTHER SPECIFIED PARTS OF DIGESTIVE TRACT: Chronic | ICD-10-CM

## 2019-09-25 DIAGNOSIS — Z98.890 OTHER SPECIFIED POSTPROCEDURAL STATES: Chronic | ICD-10-CM

## 2019-09-25 DIAGNOSIS — R79.1 ABNORMAL COAGULATION PROFILE: ICD-10-CM

## 2019-09-25 DIAGNOSIS — Z95.0 PRESENCE OF CARDIAC PACEMAKER: Chronic | ICD-10-CM

## 2019-09-25 DIAGNOSIS — H26.9 UNSPECIFIED CATARACT: Chronic | ICD-10-CM

## 2019-09-25 DIAGNOSIS — Z79.01 LONG TERM (CURRENT) USE OF ANTICOAGULANTS: ICD-10-CM

## 2019-09-25 DIAGNOSIS — Z90.2 ACQUIRED ABSENCE OF LUNG [PART OF]: Chronic | ICD-10-CM

## 2019-09-25 LAB
POCT INR: 2.6 RATIO — HIGH (ref 0.9–1.2)
POCT PT: 31.2 SEC — HIGH (ref 10–13.4)

## 2019-10-17 ENCOUNTER — OUTPATIENT (OUTPATIENT)
Dept: OUTPATIENT SERVICES | Facility: HOSPITAL | Age: 74
LOS: 1 days | Discharge: HOME | End: 2019-10-17

## 2019-10-17 DIAGNOSIS — R79.1 ABNORMAL COAGULATION PROFILE: ICD-10-CM

## 2019-10-17 DIAGNOSIS — Z95.0 PRESENCE OF CARDIAC PACEMAKER: Chronic | ICD-10-CM

## 2019-10-17 DIAGNOSIS — Z79.01 LONG TERM (CURRENT) USE OF ANTICOAGULANTS: ICD-10-CM

## 2019-10-17 DIAGNOSIS — Z98.890 OTHER SPECIFIED POSTPROCEDURAL STATES: Chronic | ICD-10-CM

## 2019-10-17 DIAGNOSIS — Z90.49 ACQUIRED ABSENCE OF OTHER SPECIFIED PARTS OF DIGESTIVE TRACT: Chronic | ICD-10-CM

## 2019-10-17 DIAGNOSIS — Z90.2 ACQUIRED ABSENCE OF LUNG [PART OF]: Chronic | ICD-10-CM

## 2019-10-17 DIAGNOSIS — H26.9 UNSPECIFIED CATARACT: Chronic | ICD-10-CM

## 2019-10-17 LAB
POCT INR: 2.7 RATIO — HIGH (ref 0.9–1.2)
POCT PT: 32.4 SEC — HIGH (ref 10–13.4)

## 2019-11-25 ENCOUNTER — OUTPATIENT (OUTPATIENT)
Dept: OUTPATIENT SERVICES | Facility: HOSPITAL | Age: 74
LOS: 1 days | Discharge: HOME | End: 2019-11-25

## 2019-11-25 DIAGNOSIS — R79.1 ABNORMAL COAGULATION PROFILE: ICD-10-CM

## 2019-11-25 DIAGNOSIS — Z98.890 OTHER SPECIFIED POSTPROCEDURAL STATES: Chronic | ICD-10-CM

## 2019-11-25 DIAGNOSIS — H26.9 UNSPECIFIED CATARACT: Chronic | ICD-10-CM

## 2019-11-25 DIAGNOSIS — Z95.0 PRESENCE OF CARDIAC PACEMAKER: Chronic | ICD-10-CM

## 2019-11-25 DIAGNOSIS — Z90.49 ACQUIRED ABSENCE OF OTHER SPECIFIED PARTS OF DIGESTIVE TRACT: Chronic | ICD-10-CM

## 2019-11-25 DIAGNOSIS — Z90.2 ACQUIRED ABSENCE OF LUNG [PART OF]: Chronic | ICD-10-CM

## 2019-11-25 DIAGNOSIS — Z79.01 LONG TERM (CURRENT) USE OF ANTICOAGULANTS: ICD-10-CM

## 2019-11-25 LAB
POCT INR: 2 RATIO — HIGH (ref 0.9–1.2)
POCT PT: 23.7 SEC — HIGH (ref 10–13.4)

## 2019-12-30 ENCOUNTER — OUTPATIENT (OUTPATIENT)
Dept: OUTPATIENT SERVICES | Facility: HOSPITAL | Age: 74
LOS: 1 days | Discharge: HOME | End: 2019-12-30

## 2019-12-30 DIAGNOSIS — Z90.2 ACQUIRED ABSENCE OF LUNG [PART OF]: Chronic | ICD-10-CM

## 2019-12-30 DIAGNOSIS — H26.9 UNSPECIFIED CATARACT: Chronic | ICD-10-CM

## 2019-12-30 DIAGNOSIS — Z98.890 OTHER SPECIFIED POSTPROCEDURAL STATES: Chronic | ICD-10-CM

## 2019-12-30 DIAGNOSIS — Z95.0 PRESENCE OF CARDIAC PACEMAKER: Chronic | ICD-10-CM

## 2019-12-30 DIAGNOSIS — R79.1 ABNORMAL COAGULATION PROFILE: ICD-10-CM

## 2019-12-30 DIAGNOSIS — Z79.01 LONG TERM (CURRENT) USE OF ANTICOAGULANTS: ICD-10-CM

## 2019-12-30 DIAGNOSIS — Z90.49 ACQUIRED ABSENCE OF OTHER SPECIFIED PARTS OF DIGESTIVE TRACT: Chronic | ICD-10-CM

## 2019-12-30 LAB
POCT INR: 2.2 RATIO — HIGH (ref 0.9–1.2)
POCT PT: 26.8 SEC — HIGH (ref 10–13.4)

## 2020-01-01 ENCOUNTER — APPOINTMENT (OUTPATIENT)
Dept: MEDICATION MANAGEMENT | Facility: CLINIC | Age: 75
End: 2020-01-01

## 2020-01-01 ENCOUNTER — OUTPATIENT (OUTPATIENT)
Dept: OUTPATIENT SERVICES | Facility: HOSPITAL | Age: 75
LOS: 1 days | Discharge: HOME | End: 2020-01-01

## 2020-01-01 VITALS — OXYGEN SATURATION: 98 % | HEART RATE: 68 BPM | RESPIRATION RATE: 16 BRPM

## 2020-01-01 VITALS
RESPIRATION RATE: 16 BRPM | OXYGEN SATURATION: 96 % | HEART RATE: 72 BPM | RESPIRATION RATE: 16 BRPM | OXYGEN SATURATION: 99 % | HEART RATE: 72 BPM

## 2020-01-01 VITALS — HEART RATE: 68 BPM | RESPIRATION RATE: 16 BRPM | OXYGEN SATURATION: 98 %

## 2020-01-01 VITALS — OXYGEN SATURATION: 98 % | RESPIRATION RATE: 16 BRPM | HEART RATE: 66 BPM

## 2020-01-01 DIAGNOSIS — Z98.890 OTHER SPECIFIED POSTPROCEDURAL STATES: Chronic | ICD-10-CM

## 2020-01-01 DIAGNOSIS — R79.1 ABNORMAL COAGULATION PROFILE: ICD-10-CM

## 2020-01-01 DIAGNOSIS — Z90.49 ACQUIRED ABSENCE OF OTHER SPECIFIED PARTS OF DIGESTIVE TRACT: ICD-10-CM

## 2020-01-01 DIAGNOSIS — Z95.0 PRESENCE OF CARDIAC PACEMAKER: Chronic | ICD-10-CM

## 2020-01-01 DIAGNOSIS — Z90.2 ACQUIRED ABSENCE OF LUNG [PART OF]: Chronic | ICD-10-CM

## 2020-01-01 DIAGNOSIS — Z79.01 LONG TERM (CURRENT) USE OF ANTICOAGULANTS: ICD-10-CM

## 2020-01-01 DIAGNOSIS — Z90.49 ACQUIRED ABSENCE OF OTHER SPECIFIED PARTS OF DIGESTIVE TRACT: Chronic | ICD-10-CM

## 2020-01-01 DIAGNOSIS — H26.9 UNSPECIFIED CATARACT: Chronic | ICD-10-CM

## 2020-01-01 LAB
INR PPP: 2.1 RATIO
INR PPP: 2.4 RATIO
INR PPP: 2.7 RATIO
INR PPP: 2.9 RATIO
INR PPP: 3 RATIO
INR PPP: 5.4 RATIO
POCT-PROTHROMBIN TIME: 24.5 SECS
POCT-PROTHROMBIN TIME: 28.3 SECS
POCT-PROTHROMBIN TIME: 32.3 SECS
POCT-PROTHROMBIN TIME: 34.5 SECS
POCT-PROTHROMBIN TIME: 35.4 SECS
POCT-PROTHROMBIN TIME: 64.7 SECS
QUALITY CONTROL: YES

## 2020-01-06 ENCOUNTER — OUTPATIENT (OUTPATIENT)
Dept: OUTPATIENT SERVICES | Facility: HOSPITAL | Age: 75
LOS: 1 days | Discharge: HOME | End: 2020-01-06

## 2020-01-06 DIAGNOSIS — Z95.0 PRESENCE OF CARDIAC PACEMAKER: Chronic | ICD-10-CM

## 2020-01-06 DIAGNOSIS — Z90.49 ACQUIRED ABSENCE OF OTHER SPECIFIED PARTS OF DIGESTIVE TRACT: Chronic | ICD-10-CM

## 2020-01-06 DIAGNOSIS — Z98.890 OTHER SPECIFIED POSTPROCEDURAL STATES: Chronic | ICD-10-CM

## 2020-01-06 DIAGNOSIS — Z90.2 ACQUIRED ABSENCE OF LUNG [PART OF]: Chronic | ICD-10-CM

## 2020-01-06 DIAGNOSIS — Z79.01 LONG TERM (CURRENT) USE OF ANTICOAGULANTS: ICD-10-CM

## 2020-01-06 DIAGNOSIS — R79.1 ABNORMAL COAGULATION PROFILE: ICD-10-CM

## 2020-01-06 DIAGNOSIS — H26.9 UNSPECIFIED CATARACT: Chronic | ICD-10-CM

## 2020-01-06 LAB
POCT INR: 3.2 RATIO — HIGH (ref 0.9–1.2)
POCT PT: 38.3 SEC — HIGH (ref 10–13.4)

## 2020-01-28 ENCOUNTER — OUTPATIENT (OUTPATIENT)
Dept: OUTPATIENT SERVICES | Facility: HOSPITAL | Age: 75
LOS: 1 days | Discharge: HOME | End: 2020-01-28

## 2020-01-28 DIAGNOSIS — Z90.2 ACQUIRED ABSENCE OF LUNG [PART OF]: Chronic | ICD-10-CM

## 2020-01-28 DIAGNOSIS — Z98.890 OTHER SPECIFIED POSTPROCEDURAL STATES: Chronic | ICD-10-CM

## 2020-01-28 DIAGNOSIS — Z95.0 PRESENCE OF CARDIAC PACEMAKER: Chronic | ICD-10-CM

## 2020-01-28 DIAGNOSIS — Z79.01 LONG TERM (CURRENT) USE OF ANTICOAGULANTS: ICD-10-CM

## 2020-01-28 DIAGNOSIS — H26.9 UNSPECIFIED CATARACT: Chronic | ICD-10-CM

## 2020-01-28 DIAGNOSIS — R79.1 ABNORMAL COAGULATION PROFILE: ICD-10-CM

## 2020-01-28 DIAGNOSIS — Z90.49 ACQUIRED ABSENCE OF OTHER SPECIFIED PARTS OF DIGESTIVE TRACT: Chronic | ICD-10-CM

## 2020-01-28 LAB
INR PPP: 2.4 RATIO
POCT INR: 2.4 RATIO — HIGH (ref 0.9–1.2)
POCT PT: 28.4 SEC — HIGH (ref 10–13.4)
POCT-PROTHROMBIN TIME: 28.4 SECS

## 2020-02-18 ENCOUNTER — OUTPATIENT (OUTPATIENT)
Dept: OUTPATIENT SERVICES | Facility: HOSPITAL | Age: 75
LOS: 1 days | Discharge: HOME | End: 2020-02-18

## 2020-02-18 DIAGNOSIS — H26.9 UNSPECIFIED CATARACT: Chronic | ICD-10-CM

## 2020-02-18 DIAGNOSIS — Z90.2 ACQUIRED ABSENCE OF LUNG [PART OF]: Chronic | ICD-10-CM

## 2020-02-18 DIAGNOSIS — Z90.49 ACQUIRED ABSENCE OF OTHER SPECIFIED PARTS OF DIGESTIVE TRACT: Chronic | ICD-10-CM

## 2020-02-18 DIAGNOSIS — Z79.01 LONG TERM (CURRENT) USE OF ANTICOAGULANTS: ICD-10-CM

## 2020-02-18 DIAGNOSIS — Z98.890 OTHER SPECIFIED POSTPROCEDURAL STATES: Chronic | ICD-10-CM

## 2020-02-18 DIAGNOSIS — Z95.0 PRESENCE OF CARDIAC PACEMAKER: Chronic | ICD-10-CM

## 2020-02-18 DIAGNOSIS — R79.1 ABNORMAL COAGULATION PROFILE: ICD-10-CM

## 2020-02-18 LAB
INR PPP: 2 RATIO
POCT INR: 2 RATIO — HIGH (ref 0.9–1.2)
POCT PT: 23.7 SEC — HIGH (ref 10–13.4)
POCT-PROTHROMBIN TIME: 23.7 SECS

## 2020-03-16 ENCOUNTER — OUTPATIENT (OUTPATIENT)
Dept: OUTPATIENT SERVICES | Facility: HOSPITAL | Age: 75
LOS: 1 days | Discharge: HOME | End: 2020-03-16

## 2020-03-16 DIAGNOSIS — Z79.01 LONG TERM (CURRENT) USE OF ANTICOAGULANTS: ICD-10-CM

## 2020-03-16 DIAGNOSIS — H26.9 UNSPECIFIED CATARACT: Chronic | ICD-10-CM

## 2020-03-16 DIAGNOSIS — Z95.0 PRESENCE OF CARDIAC PACEMAKER: Chronic | ICD-10-CM

## 2020-03-16 DIAGNOSIS — R79.1 ABNORMAL COAGULATION PROFILE: ICD-10-CM

## 2020-03-16 DIAGNOSIS — Z90.49 ACQUIRED ABSENCE OF OTHER SPECIFIED PARTS OF DIGESTIVE TRACT: Chronic | ICD-10-CM

## 2020-03-16 DIAGNOSIS — Z90.2 ACQUIRED ABSENCE OF LUNG [PART OF]: Chronic | ICD-10-CM

## 2020-03-16 DIAGNOSIS — Z98.890 OTHER SPECIFIED POSTPROCEDURAL STATES: Chronic | ICD-10-CM

## 2020-03-16 LAB
INR PPP: 3.4 RATIO
POCT INR: 3.4 RATIO — HIGH (ref 0.9–1.2)
POCT PT: 40.5 SEC — HIGH (ref 10–13.4)
POCT-PROTHROMBIN TIME: 40.5 SECS

## 2020-04-22 ENCOUNTER — OUTPATIENT (OUTPATIENT)
Dept: OUTPATIENT SERVICES | Facility: HOSPITAL | Age: 75
LOS: 1 days | Discharge: HOME | End: 2020-04-22

## 2020-04-22 DIAGNOSIS — Z98.890 OTHER SPECIFIED POSTPROCEDURAL STATES: Chronic | ICD-10-CM

## 2020-04-22 DIAGNOSIS — Z79.01 LONG TERM (CURRENT) USE OF ANTICOAGULANTS: ICD-10-CM

## 2020-04-22 DIAGNOSIS — Z90.2 ACQUIRED ABSENCE OF LUNG [PART OF]: Chronic | ICD-10-CM

## 2020-04-22 DIAGNOSIS — H26.9 UNSPECIFIED CATARACT: Chronic | ICD-10-CM

## 2020-04-22 DIAGNOSIS — Z90.49 ACQUIRED ABSENCE OF OTHER SPECIFIED PARTS OF DIGESTIVE TRACT: Chronic | ICD-10-CM

## 2020-04-22 DIAGNOSIS — R79.1 ABNORMAL COAGULATION PROFILE: ICD-10-CM

## 2020-04-22 DIAGNOSIS — Z95.0 PRESENCE OF CARDIAC PACEMAKER: Chronic | ICD-10-CM

## 2020-04-22 LAB
INR PPP: 1.7 RATIO
POCT INR: 1.7 RATIO — HIGH (ref 0.9–1.2)
POCT PT: 20.6 SEC — HIGH (ref 10–13.4)
POCT-PROTHROMBIN TIME: 20.6 SECS
QUALITY CONTROL: YES

## 2020-05-24 ENCOUNTER — INPATIENT (INPATIENT)
Facility: HOSPITAL | Age: 75
LOS: 2 days | Discharge: HOME HEALTH | End: 2020-05-27
Attending: INTERNAL MEDICINE | Admitting: INTERNAL MEDICINE
Payer: MEDICARE

## 2020-05-24 VITALS
DIASTOLIC BLOOD PRESSURE: 62 MMHG | TEMPERATURE: 97 F | SYSTOLIC BLOOD PRESSURE: 129 MMHG | HEART RATE: 73 BPM | RESPIRATION RATE: 18 BRPM | OXYGEN SATURATION: 100 %

## 2020-05-24 DIAGNOSIS — Z95.0 PRESENCE OF CARDIAC PACEMAKER: Chronic | ICD-10-CM

## 2020-05-24 DIAGNOSIS — Z98.890 OTHER SPECIFIED POSTPROCEDURAL STATES: Chronic | ICD-10-CM

## 2020-05-24 DIAGNOSIS — Z90.2 ACQUIRED ABSENCE OF LUNG [PART OF]: Chronic | ICD-10-CM

## 2020-05-24 DIAGNOSIS — H26.9 UNSPECIFIED CATARACT: Chronic | ICD-10-CM

## 2020-05-24 DIAGNOSIS — Z90.49 ACQUIRED ABSENCE OF OTHER SPECIFIED PARTS OF DIGESTIVE TRACT: Chronic | ICD-10-CM

## 2020-05-24 PROCEDURE — 99285 EMERGENCY DEPT VISIT HI MDM: CPT

## 2020-05-24 PROCEDURE — 93010 ELECTROCARDIOGRAM REPORT: CPT

## 2020-05-24 NOTE — ED PROVIDER NOTE - OBJECTIVE STATEMENT
73 y/o F with PMH of anxiety, COPD, AFib on Coumadin with pacemaker in place, Lung CA s/p chemo radiation, L lobectomy 10 years ago, anxiety, and HLD presents to ED c/o intermittent, moderate, SOB and palpitations, worse with exertion x 3 mo. States she was intermittently on 3L of Home O2 but, since SX have worsened, has been on it daily without improvement; additionally notes taking .5gm of Klonopin without improvement. SX worsened over the last few days prompting evaluation. Additionally notes some urinary frequency over this time. Denies fever, cough, CP, syncope, n/v, black/bloody stools, leg pain/swelling, or HX of DVT/PE.

## 2020-05-24 NOTE — ED PROVIDER NOTE - CLINICAL SUMMARY MEDICAL DECISION MAKING FREE TEXT BOX
Pt with h/o COPD, in ER with c/o worsening SOB/WHITAKER for few months, now with O2 sats dropping to 80's with exertion. decreased bs b/l.  cxr - s/p lobectomy. labs reviewed.  trop neg.  bnp 1K.  d-dimer neg. covid swab sent.  pt given steroids, albuterol inhaler.  admitted for continued care.

## 2020-05-24 NOTE — ED PROVIDER NOTE - CARE PLAN
Principal Discharge DX:	Shortness of breath  Secondary Diagnosis:	Elevated brain natriuretic peptide (BNP) level

## 2020-05-24 NOTE — ED PROVIDER NOTE - PHYSICAL EXAMINATION
VITALS:  I have reviewed the initial vital signs.  GENERAL: Well-developed, well-nourished, in no acute distress. Nontoxic.  HEENT: Sclera clear. No conjunctival pallor or injection. EOMI, PERRLA. Mucous membranes moist, oropharynx nonerythematous without swelling or lesions. Tonsils 2+ bilaterally and w/o exudate. Uvula midline and without edema. TM's clear b/l without bulging or erythema. Nasal turbinates clear and w/o discharge. No sinus ttp.  NECK: supple w FROM. No cervical adenopathy. No JVD. No nuchal rigidity or meningismus. No paraspinal muscle ttp. No midline cervical spinous tenderness, step offs, or deformity.  CARDIO: RRR, nl S1 and S2. No murmurs, rubs, or gallops. No peripheral edema. 2+ radial and pedal pulses bilaterally. No chest wall tenderness.  PULM: Speaking in full sentences without difficulty. Normal effort. No tachypnea or retractions. CTA b/l without wheezes, rales, or rhonchi.  MSK: No leg pain, swelling, warmth or tenderness  : No CVA tenderness b/l.  SKIN: Warm, dry. No pallor, jaundice, or rashes. Capillary refill <2 seconds.  NEURO: A&Ox3. Speech clear. CN II-XII intact. 5/5 strength to upper and lower extremities b/l. Sensation intact and equal throughout. No pronator drift. Finger to nose intact. Normal heel to shin.   PSYCH: Normal affect, judgment, and thought content. Calm and cooperative. VITALS:  I have reviewed the initial vital signs.  GENERAL: Well-developed, well-nourished, in no acute distress. Nontoxic.  HEENT: Sclera clear. No conjunctival pallor or injection. EOMI, PERRLA. Mucous membranes moist.  NECK: supple w FROM. No cervical adenopathy. No JVD.   CARDIO: RRR, nl S1 and S2. No murmurs, rubs, or gallops. No peripheral edema. 2+ radial and pedal pulses bilaterally. No chest wall tenderness.  PULM: Speaking in full sentences without difficulty. Normal effort. No tachypnea or retractions. CTA b/l without wheezes, rales, or rhonchi.  MSK: No leg pain, swelling, warmth or tenderness  : No CVA tenderness b/l.  SKIN: Warm, dry. No pallor or rash. Capillary refill <2 seconds.  NEURO: A&Ox3. Speech clear. No gross motor/sensory deficits.  PSYCH: Calm and cooperative.

## 2020-05-24 NOTE — ED PROVIDER NOTE - NS ED MD DISPO ISOLATION TYPES
"Chief Complaint   Patient presents with     RECHECK     Panel Management     Mychart, pneumovax, flu, statin on med list, eye exam, foot exam       Initial /84 (BP Location: Right arm, Patient Position: Chair, Cuff Size: Adult Regular)  Pulse 75  Temp 98.2  F (36.8  C) (Oral)  Resp 18  Ht 5' 9\" (1.753 m)  Wt 255 lb (115.7 kg)  SpO2 98%  BMI 37.66 kg/m2 Estimated body mass index is 37.66 kg/(m^2) as calculated from the following:    Height as of this encounter: 5' 9\" (1.753 m).    Weight as of this encounter: 255 lb (115.7 kg).  Medication Reconciliation: complete   Missy Chi CMA (AAMA)      " Airborne/Contact

## 2020-05-24 NOTE — ED PROVIDER NOTE - NS ED ROS FT
CONSTITUTIONAL: (-) fevers, (-) chills, (-) fatigue, (-) malaise, (-) unintentional weight change, (-) decreased appetite, (-) diaphoresis  EYES: (-) vision changes, (-) blurry vision, (-) double vision, (-) photophobia, (-) eye pain, (-) eye redness, (-) eye discharge, (-) tearing  ENT: (-) tinnitus, (-) ear pulling, (-) ear pain, (-) ear drainage, (-) congestion, (-) rhinorrhea, (-) sinus pain, (-) sore throat, (-) difficulty swallowing, (-) tooth pain, (-) facial swelling  NECK: (-) neck pain, (-) neck stiffness, (-) lymphadenopathy  CARDIO: (-) chest pain, (+) palpitations, (-) edema, (-) cyanosis  PULM: (-) cough, (-) sputum, (+) shortness of breath, (-) wheezing, (-) hemoptysis, (-) stridor  GI: (-) nausea, (-) vomiting, (-) diarrhea, (-) constipation, (-) abdominal pain, (-) melena, (-) hematochezia, (-) rectal pain, (-) rectal bleeding, (-) bowel incontinence/retention  : (-) dysuria, (-) hematuria, (+) frequency, (-) urgency, (-) incontinence, (-) difficulty urinating, (-) urinary retention, (-) flank pain, (-) vaginal/penile discharge, (-) abnormal vaginal bleeding, (-) dyspareunia  ENDO: (-) polyuria, (-) polydipsia, (-) polyphagia  HEME: (-) easy bruising, (-) easy bleeding  MSK: (-) back pain, (-) myalgias, (-) joint swelling, (-) joint stiffness, (-) gait difficulty  SKIN: (-) rashes, (-) wounds, (-) pallor, (-) ecchymosis, (-) jaundice  NEURO: (-) headache, (-) head injury, (-) LOC, (-) dizziness, (-) lightheadedness, (-) syncope, (-) speech changes, (-) confusion, (-) numbness, (-) weakness, (-) paresthesias, (-) seizures  PSYCH: (-) suicidal ideation, (-) homicidal ideation, (-) depression, (-) anxiety, (-) visual hallucinations, (-) auditory hallucinations, (-) agitation    *all other systems negative except as documented above and in the HPI* CONSTITUTIONAL: (-) fevers, (-) chills  ENT: (-) congestion, (-) rhinorrhea, (-) sore throat  CARDIO: (-) chest pain, (+) palpitations, (-) edema  PULM: (-) cough, (-) sputum, (+) shortness of breath, (-) wheezing, (-) hemoptysis, (-) stridor  GI: (-) nausea, (-) vomiting, (-) diarrhea, (-) constipation, (-) abdominal pain, (-) melena, (-) hematochezia, (-) rectal bleeding  : (-) dysuria, (-) hematuria, (+) frequency, (-) urgency, (-) flank pain  MSK: (-) back pain, (-) myalgias  SKIN: (-) rashes, (-) pallor  NEURO: (-) headache, (-) dizziness, (-) lightheadedness, (-) syncope, (-) weakness    *all other systems negative except as documented above and in the HPI*

## 2020-05-24 NOTE — ED ADULT TRIAGE NOTE - CHIEF COMPLAINT QUOTE
I feel SOB and heart pounding, I'm having anxiety - patient    Patient on home oxygen, denies COVID symptoms or exposure

## 2020-05-24 NOTE — ED PROVIDER NOTE - ATTENDING CONTRIBUTION TO CARE
73 y/o female with h/o COPD on prn home O2, afib on coumadin, htn, hld, lung CA 2011 s/o lobectomy/chemo/radiation, in ER with c/o SOB.  Pt states she has chronic SOB, but for the past few months - weeks has been getting worse.  hasn't wanted to come to hospital due to covid concerns.  Pt states she's been feeling very anxious about getting covid-19, wakes up at night extremely anxious, has been taking klonopin. States for past week or so her WHITAKER getting worse, her O2 sat drops to 80's with exertion.  No CP. no f/c.  no cough.  no known covid contacts, states she hasn't left her house in ~ 6 weeks.  no abd pain.  no n/v/d.  no le pain/swelling. no ha/dizziness/syncope/near syncope.   PE - nad, nc/at, eomi, perrl, op - clear, mmm, neck supple, no resp distress, speaking full sentences, + decreased bs b/l, no w/r/r, rrr, abd- soft, nt/nd, nabs, from x 4, no LE swelling/tenderness, A&O x 3, no focal neuro deficits.  -albuterol, steroids, check labs, cxr

## 2020-05-24 NOTE — ED PROVIDER NOTE - CHPI ED SYMPTOMS NEG
no cough/no vomiting/no fever/no nausea/no syncope/no chills/no back pain/no chest pain/no dizziness/no diaphoresis

## 2020-05-25 LAB
ALBUMIN SERPL ELPH-MCNC: 3.9 G/DL — SIGNIFICANT CHANGE UP (ref 3.5–5.2)
ALBUMIN SERPL ELPH-MCNC: 4.3 G/DL — SIGNIFICANT CHANGE UP (ref 3.5–5.2)
ALP SERPL-CCNC: 101 U/L — SIGNIFICANT CHANGE UP (ref 30–115)
ALP SERPL-CCNC: 90 U/L — SIGNIFICANT CHANGE UP (ref 30–115)
ALT FLD-CCNC: 14 U/L — SIGNIFICANT CHANGE UP (ref 0–41)
ALT FLD-CCNC: 15 U/L — SIGNIFICANT CHANGE UP (ref 0–41)
ANION GAP SERPL CALC-SCNC: 10 MMOL/L — SIGNIFICANT CHANGE UP (ref 7–14)
ANION GAP SERPL CALC-SCNC: 14 MMOL/L — SIGNIFICANT CHANGE UP (ref 7–14)
APTT BLD: 34.6 SEC — SIGNIFICANT CHANGE UP (ref 27–39.2)
AST SERPL-CCNC: 21 U/L — SIGNIFICANT CHANGE UP (ref 0–41)
AST SERPL-CCNC: 21 U/L — SIGNIFICANT CHANGE UP (ref 0–41)
BASOPHILS # BLD AUTO: 0.01 K/UL — SIGNIFICANT CHANGE UP (ref 0–0.2)
BASOPHILS # BLD AUTO: 0.04 K/UL — SIGNIFICANT CHANGE UP (ref 0–0.2)
BASOPHILS NFR BLD AUTO: 0.2 % — SIGNIFICANT CHANGE UP (ref 0–1)
BASOPHILS NFR BLD AUTO: 0.6 % — SIGNIFICANT CHANGE UP (ref 0–1)
BILIRUB SERPL-MCNC: 0.3 MG/DL — SIGNIFICANT CHANGE UP (ref 0.2–1.2)
BILIRUB SERPL-MCNC: 0.6 MG/DL — SIGNIFICANT CHANGE UP (ref 0.2–1.2)
BUN SERPL-MCNC: 13 MG/DL — SIGNIFICANT CHANGE UP (ref 10–20)
BUN SERPL-MCNC: 13 MG/DL — SIGNIFICANT CHANGE UP (ref 10–20)
CALCIUM SERPL-MCNC: 9.1 MG/DL — SIGNIFICANT CHANGE UP (ref 8.5–10.1)
CALCIUM SERPL-MCNC: 9.3 MG/DL — SIGNIFICANT CHANGE UP (ref 8.5–10.1)
CHLORIDE SERPL-SCNC: 102 MMOL/L — SIGNIFICANT CHANGE UP (ref 98–110)
CHLORIDE SERPL-SCNC: 98 MMOL/L — SIGNIFICANT CHANGE UP (ref 98–110)
CO2 SERPL-SCNC: 26 MMOL/L — SIGNIFICANT CHANGE UP (ref 17–32)
CO2 SERPL-SCNC: 29 MMOL/L — SIGNIFICANT CHANGE UP (ref 17–32)
CREAT SERPL-MCNC: 0.8 MG/DL — SIGNIFICANT CHANGE UP (ref 0.7–1.5)
CREAT SERPL-MCNC: 0.9 MG/DL — SIGNIFICANT CHANGE UP (ref 0.7–1.5)
D DIMER BLD IA.RAPID-MCNC: 144 NG/ML DDU — SIGNIFICANT CHANGE UP (ref 0–230)
D DIMER BLD IA.RAPID-MCNC: 9 NG/ML DDU — SIGNIFICANT CHANGE UP (ref 0–230)
EOSINOPHIL # BLD AUTO: 0 K/UL — SIGNIFICANT CHANGE UP (ref 0–0.7)
EOSINOPHIL # BLD AUTO: 0.11 K/UL — SIGNIFICANT CHANGE UP (ref 0–0.7)
EOSINOPHIL NFR BLD AUTO: 0 % — SIGNIFICANT CHANGE UP (ref 0–8)
EOSINOPHIL NFR BLD AUTO: 1.7 % — SIGNIFICANT CHANGE UP (ref 0–8)
GLUCOSE SERPL-MCNC: 157 MG/DL — HIGH (ref 70–99)
GLUCOSE SERPL-MCNC: 279 MG/DL — HIGH (ref 70–99)
HCT VFR BLD CALC: 33 % — LOW (ref 37–47)
HCT VFR BLD CALC: 34.6 % — LOW (ref 37–47)
HGB BLD-MCNC: 10.2 G/DL — LOW (ref 12–16)
HGB BLD-MCNC: 11.3 G/DL — LOW (ref 12–16)
IMM GRANULOCYTES NFR BLD AUTO: 0.3 % — SIGNIFICANT CHANGE UP (ref 0.1–0.3)
IMM GRANULOCYTES NFR BLD AUTO: 0.6 % — HIGH (ref 0.1–0.3)
INR BLD: 1.7 RATIO — HIGH (ref 0.65–1.3)
INR BLD: 1.77 RATIO — HIGH (ref 0.65–1.3)
IRON SATN MFR SERPL: 22 % — SIGNIFICANT CHANGE UP (ref 15–50)
IRON SATN MFR SERPL: 70 UG/DL — SIGNIFICANT CHANGE UP (ref 35–150)
LYMPHOCYTES # BLD AUTO: 0.23 K/UL — LOW (ref 1.2–3.4)
LYMPHOCYTES # BLD AUTO: 0.54 K/UL — LOW (ref 1.2–3.4)
LYMPHOCYTES # BLD AUTO: 4.6 % — LOW (ref 20.5–51.1)
LYMPHOCYTES # BLD AUTO: 8.4 % — LOW (ref 20.5–51.1)
MAGNESIUM SERPL-MCNC: 1.7 MG/DL — LOW (ref 1.8–2.4)
MCHC RBC-ENTMCNC: 26.8 PG — LOW (ref 27–31)
MCHC RBC-ENTMCNC: 28.3 PG — SIGNIFICANT CHANGE UP (ref 27–31)
MCHC RBC-ENTMCNC: 30.9 G/DL — LOW (ref 32–37)
MCHC RBC-ENTMCNC: 32.7 G/DL — SIGNIFICANT CHANGE UP (ref 32–37)
MCV RBC AUTO: 86.5 FL — SIGNIFICANT CHANGE UP (ref 81–99)
MCV RBC AUTO: 86.6 FL — SIGNIFICANT CHANGE UP (ref 81–99)
MONOCYTES # BLD AUTO: 0.02 K/UL — LOW (ref 0.1–0.6)
MONOCYTES # BLD AUTO: 0.32 K/UL — SIGNIFICANT CHANGE UP (ref 0.1–0.6)
MONOCYTES NFR BLD AUTO: 0.4 % — LOW (ref 1.7–9.3)
MONOCYTES NFR BLD AUTO: 5 % — SIGNIFICANT CHANGE UP (ref 1.7–9.3)
NEUTROPHILS # BLD AUTO: 4.66 K/UL — SIGNIFICANT CHANGE UP (ref 1.4–6.5)
NEUTROPHILS # BLD AUTO: 5.43 K/UL — SIGNIFICANT CHANGE UP (ref 1.4–6.5)
NEUTROPHILS NFR BLD AUTO: 84 % — HIGH (ref 42.2–75.2)
NEUTROPHILS NFR BLD AUTO: 94.2 % — HIGH (ref 42.2–75.2)
NRBC # BLD: 0 /100 WBCS — SIGNIFICANT CHANGE UP (ref 0–0)
NRBC # BLD: 0 /100 WBCS — SIGNIFICANT CHANGE UP (ref 0–0)
NT-PROBNP SERPL-SCNC: 1049 PG/ML — HIGH (ref 0–300)
PLATELET # BLD AUTO: 160 K/UL — SIGNIFICANT CHANGE UP (ref 130–400)
PLATELET # BLD AUTO: 162 K/UL — SIGNIFICANT CHANGE UP (ref 130–400)
POTASSIUM SERPL-MCNC: 3.9 MMOL/L — SIGNIFICANT CHANGE UP (ref 3.5–5)
POTASSIUM SERPL-MCNC: 4.2 MMOL/L — SIGNIFICANT CHANGE UP (ref 3.5–5)
POTASSIUM SERPL-SCNC: 3.9 MMOL/L — SIGNIFICANT CHANGE UP (ref 3.5–5)
POTASSIUM SERPL-SCNC: 4.2 MMOL/L — SIGNIFICANT CHANGE UP (ref 3.5–5)
PROT SERPL-MCNC: 6.1 G/DL — SIGNIFICANT CHANGE UP (ref 6–8)
PROT SERPL-MCNC: 6.7 G/DL — SIGNIFICANT CHANGE UP (ref 6–8)
PROTHROM AB SERPL-ACNC: 19.6 SEC — HIGH (ref 9.95–12.87)
PROTHROM AB SERPL-ACNC: 20.4 SEC — HIGH (ref 9.95–12.87)
RBC # BLD: 3.81 M/UL — LOW (ref 4.2–5.4)
RBC # BLD: 4 M/UL — LOW (ref 4.2–5.4)
RBC # FLD: 13.7 % — SIGNIFICANT CHANGE UP (ref 11.5–14.5)
RBC # FLD: 13.9 % — SIGNIFICANT CHANGE UP (ref 11.5–14.5)
SARS-COV-2 RNA SPEC QL NAA+PROBE: SIGNIFICANT CHANGE UP
SODIUM SERPL-SCNC: 138 MMOL/L — SIGNIFICANT CHANGE UP (ref 135–146)
SODIUM SERPL-SCNC: 141 MMOL/L — SIGNIFICANT CHANGE UP (ref 135–146)
TIBC SERPL-MCNC: 324 UG/DL — SIGNIFICANT CHANGE UP (ref 220–430)
TROPONIN T SERPL-MCNC: <0.01 NG/ML — SIGNIFICANT CHANGE UP
UIBC SERPL-MCNC: 254 UG/DL — SIGNIFICANT CHANGE UP (ref 110–370)
WBC # BLD: 4.95 K/UL — SIGNIFICANT CHANGE UP (ref 4.8–10.8)
WBC # BLD: 6.46 K/UL — SIGNIFICANT CHANGE UP (ref 4.8–10.8)
WBC # FLD AUTO: 4.95 K/UL — SIGNIFICANT CHANGE UP (ref 4.8–10.8)
WBC # FLD AUTO: 6.46 K/UL — SIGNIFICANT CHANGE UP (ref 4.8–10.8)

## 2020-05-25 PROCEDURE — 99223 1ST HOSP IP/OBS HIGH 75: CPT | Mod: AI

## 2020-05-25 PROCEDURE — 71045 X-RAY EXAM CHEST 1 VIEW: CPT | Mod: 26

## 2020-05-25 RX ORDER — ALBUTEROL 90 UG/1
2 AEROSOL, METERED ORAL ONCE
Refills: 0 | Status: COMPLETED | OUTPATIENT
Start: 2020-05-25 | End: 2020-05-25

## 2020-05-25 RX ORDER — PANTOPRAZOLE SODIUM 20 MG/1
40 TABLET, DELAYED RELEASE ORAL
Refills: 0 | Status: DISCONTINUED | OUTPATIENT
Start: 2020-05-25 | End: 2020-05-27

## 2020-05-25 RX ORDER — TIOTROPIUM BROMIDE 18 UG/1
1 CAPSULE ORAL; RESPIRATORY (INHALATION) DAILY
Refills: 0 | Status: DISCONTINUED | OUTPATIENT
Start: 2020-05-25 | End: 2020-05-27

## 2020-05-25 RX ORDER — ATORVASTATIN CALCIUM 80 MG/1
20 TABLET, FILM COATED ORAL AT BEDTIME
Refills: 0 | Status: DISCONTINUED | OUTPATIENT
Start: 2020-05-25 | End: 2020-05-27

## 2020-05-25 RX ORDER — CLONAZEPAM 1 MG
0.5 TABLET ORAL
Refills: 0 | Status: DISCONTINUED | OUTPATIENT
Start: 2020-05-25 | End: 2020-05-27

## 2020-05-25 RX ORDER — BUDESONIDE AND FORMOTEROL FUMARATE DIHYDRATE 160; 4.5 UG/1; UG/1
2 AEROSOL RESPIRATORY (INHALATION)
Refills: 0 | Status: DISCONTINUED | OUTPATIENT
Start: 2020-05-25 | End: 2020-05-27

## 2020-05-25 RX ORDER — ALBUTEROL 90 UG/1
2 AEROSOL, METERED ORAL EVERY 6 HOURS
Refills: 0 | Status: DISCONTINUED | OUTPATIENT
Start: 2020-05-25 | End: 2020-05-26

## 2020-05-25 RX ORDER — CHLORHEXIDINE GLUCONATE 213 G/1000ML
1 SOLUTION TOPICAL
Refills: 0 | Status: DISCONTINUED | OUTPATIENT
Start: 2020-05-25 | End: 2020-05-27

## 2020-05-25 RX ORDER — GABAPENTIN 400 MG/1
200 CAPSULE ORAL
Refills: 0 | Status: DISCONTINUED | OUTPATIENT
Start: 2020-05-25 | End: 2020-05-27

## 2020-05-25 RX ORDER — WARFARIN SODIUM 2.5 MG/1
2 TABLET ORAL ONCE
Refills: 0 | Status: COMPLETED | OUTPATIENT
Start: 2020-05-25 | End: 2020-05-25

## 2020-05-25 RX ADMIN — Medication 0.5 MILLIGRAM(S): at 17:37

## 2020-05-25 RX ADMIN — Medication 110 MILLIGRAM(S): at 06:42

## 2020-05-25 RX ADMIN — Medication 40 MILLIGRAM(S): at 06:42

## 2020-05-25 RX ADMIN — WARFARIN SODIUM 2 MILLIGRAM(S): 2.5 TABLET ORAL at 06:44

## 2020-05-25 RX ADMIN — Medication 125 MILLIGRAM(S): at 01:27

## 2020-05-25 RX ADMIN — Medication 0.5 MILLIGRAM(S): at 04:06

## 2020-05-25 RX ADMIN — BUDESONIDE AND FORMOTEROL FUMARATE DIHYDRATE 2 PUFF(S): 160; 4.5 AEROSOL RESPIRATORY (INHALATION) at 23:15

## 2020-05-25 RX ADMIN — ALBUTEROL 2 PUFF(S): 90 AEROSOL, METERED ORAL at 01:27

## 2020-05-25 RX ADMIN — ALBUTEROL 2 PUFF(S): 90 AEROSOL, METERED ORAL at 06:48

## 2020-05-25 RX ADMIN — PANTOPRAZOLE SODIUM 40 MILLIGRAM(S): 20 TABLET, DELAYED RELEASE ORAL at 06:42

## 2020-05-25 RX ADMIN — ATORVASTATIN CALCIUM 20 MILLIGRAM(S): 80 TABLET, FILM COATED ORAL at 21:29

## 2020-05-25 RX ADMIN — Medication 40 MILLIGRAM(S): at 17:36

## 2020-05-25 RX ADMIN — Medication 110 MILLIGRAM(S): at 17:31

## 2020-05-25 RX ADMIN — GABAPENTIN 200 MILLIGRAM(S): 400 CAPSULE ORAL at 06:42

## 2020-05-25 RX ADMIN — GABAPENTIN 200 MILLIGRAM(S): 400 CAPSULE ORAL at 17:36

## 2020-05-25 NOTE — ED ADULT NURSE NOTE - NSIMPLEMENTINTERV_GEN_ALL_ED
Implemented All Universal Safety Interventions:  Freer to call system. Call bell, personal items and telephone within reach. Instruct patient to call for assistance. Room bathroom lighting operational. Non-slip footwear when patient is off stretcher. Physically safe environment: no spills, clutter or unnecessary equipment. Stretcher in lowest position, wheels locked, appropriate side rails in place.

## 2020-05-25 NOTE — CHART NOTE - NSCHARTNOTEFT_GEN_A_CORE
day hospitalist follow up  ICU Vital Signs Last 24 Hrs  T(C): 36.9 (25 May 2020 09:41), Max: 36.9 (25 May 2020 09:41)  T(F): 98.4 (25 May 2020 09:41), Max: 98.4 (25 May 2020 09:41)  HR: 69 (25 May 2020 09:41) (69 - 73)  BP: 149/72 (25 May 2020 09:41) (129/62 - 149/72)  BP(mean): --  ABP: --  ABP(mean): --  RR: 22 (25 May 2020 09:41) (18 - 22)  SpO2: 98% (25 May 2020 09:41) (98% - 100%)  reviewed chart , covid neg , will continue with treatment for chf exacerbation

## 2020-05-25 NOTE — H&P ADULT - NSICDXPASTSURGICALHX_GEN_ALL_CORE_FT
PAST SURGICAL HISTORY:  Artificial cardiac pacemaker     Cataract RIGHT  6/17 AND  LEFT  7/5/19    H/O atrioventricular kacie ablation     History of tonsillectomy     S/P appendectomy     S/P lobectomy of lung left

## 2020-05-25 NOTE — H&P ADULT - HISTORY OF PRESENT ILLNESS
Ms. Joseph is a 75 yo female patient with PMH of Afib (on Coumadin) s/p ablation and Micra PPM (was interrogated this admission), COPD on home O2 as needed, left lung ca diagnosed in 2011 (surgery x2 -partial left lobectomy + chemo +RT, oncologist f/up at [facility]), former smoker quit in 2005, anxiety, presents to ED tonight complaining of intermittent, moderate, SOB and palpitations, worse with exertion x 3 mo.    Patient states she was intermittently on 3L of Home O2 but, since her symptoms have worsened, has been on it daily without improvement; additionally notes taking 0.5gm of Klonopin without improvement. Her symptoms worsened over the last few days prompting evaluation. Additionally notes some urinary frequency over this time. Denies fever, cough, chest pain, syncope, nausea, vomiting, black/bloody stools, leg pain/swelling.    In ED, patient is afebrile, saturating low 80s initially on room air. Chest Xray showed, left blunting of costophrenic angle, s/p left upper lobectomy, no consolidation. COVID PCR sent. Patient was given methylprednisolone 120 mg once and albuterol once. Patient is saturating 100% on 3L nasal canula. Patient will be admitted to medicine for management. Ms. Joseph is a 73 yo female patient with PMH of Afib (on Coumadin) s/p ablation and Micra PPM, COPD on home O2 as needed, left lung ca diagnosed in 2011 (surgery x2 -partial left lobectomy + chemo +RT, oncologist f/up at [facility]), former smoker quit in 2005, anxiety, presents to ED tonight complaining of intermittent, moderate, SOB and palpitations, worse with exertion x 3 mo.    Patient states she was intermittently on 3L of Home O2 but, since her symptoms have worsened, has been on it daily without improvement; additionally notes taking 0.5gm of Klonopin without improvement. Her symptoms worsened over the last few days prompting evaluation. Additionally notes some urinary frequency over this time. Denies fever, cough, chest pain, syncope, nausea, vomiting, black/bloody stools, leg pain/swelling.    In ED, patient is afebrile, saturating low 80s initially on room air. Chest Xray showed, left blunting of costophrenic angle, s/p left upper lobectomy, no consolidation. COVID PCR sent. Patient was given methylprednisolone 120 mg once and albuterol once. Patient is saturating 100% on 3L nasal canula. Patient will be admitted to medicine for management.

## 2020-05-25 NOTE — H&P ADULT - ASSESSMENT
Ms. Joseph is a 75 yo female patient with PMH of Afib (on Coumadin) s/p ablation and Micra PPM (was interrogated this admission), COPD on home O2 as needed, left lung ca diagnosed in 2011 (surgery x2 -partial left lobectomy + chemo +RT, oncologist f/up at [facility]), former smoker quit in 2005, anxiety, presents to ED tonight complaining of intermittent, moderate, SOB and palpitations, worse with exertion x 3 mo. Ms. Joseph is a 73 yo female patient with PMH of Afib (on Coumadin) s/p ablation and Micra PPM , COPD on home O2 as needed, left lung ca diagnosed in 2011 (surgery x2 -partial left lobectomy + chemo +RT, oncologist f/up at [facility]), former smoker quit in 2005, anxiety, presents to ED tonight complaining of intermittent, moderate, SOB and palpitations, worse with exertion x 3 mo.    # Suspected COPD exacerbation (wheezing on exam)  # Suspected exacerbation of heart failure (elevated BNP)  # History of lung cancer diagnosed in 2011  - Desaturation on room air, saturation 100% on 3L./min nasal canula  - Symbicort + albuterol + spiriva  - methylprednisolone 40 mg IV Q 12 hours  - Doxycycline 100 mg IV Q 12 hours ordered.   - COVID PCR sent.   - TTE ordered.   - Patient is not in volume overload at this time. Holding off Lasix pending TTE  - Evaluation by Dr. Sinha requested     # Afib s/p ablation and Micra PPM  - ECG: LBBB, rate 70  - On warfarin 1 mg daily, except once per week 2 mg  - INR subtherapeutic, will give 2 mg once    # Anxiety  - On Clonopin 0.5 mg BID PRN

## 2020-05-25 NOTE — PROGRESS NOTE ADULT - SUBJECTIVE AND OBJECTIVE BOX
Patient is a 74y old  Female who presents with a chief complaint of Shortness of breath (25 May 2020 04:02)      HPI:  Ms. Joseph is a 75 yo female patient with PMH of Afib (on Coumadin) s/p ablation and Micra PPM, COPD on home O2 as needed, left lung ca diagnosed in 2011 (surgery x2 -partial left lobectomy + chemo +RT, oncologist f/up at [facility]), former smoker quit in 2005, anxiety, presents to ED tonight complaining of intermittent, moderate, SOB and palpitations, worse with exertion x 3 mo.    Patient states she was intermittently on 3L of Home O2 but, since her symptoms have worsened, has been on it daily without improvement; additionally notes taking 0.5gm of Klonopin without improvement. Her symptoms worsened over the last few days prompting evaluation. Additionally notes some urinary frequency over this time. Denies fever, cough, chest pain, syncope, nausea, vomiting, black/bloody stools, leg pain/swelling.    In ED, patient is afebrile, saturating low 80s initially on room air. Chest Xray showed, left blunting of costophrenic angle, s/p left upper lobectomy, no consolidation. COVID PCR sent. Patient was given methylprednisolone 120 mg once and albuterol once. Patient is saturating 100% on 3L nasal canula. Patient will be admitted to medicine for management. (25 May 2020 04:02)    Currently pt is in mild distress, wheezing. Off supllemental O2 saturating 86 on RA. Minimal cough. No sick contacts   at home is on symbicort and spiriva.       PAST MEDICAL & SURGICAL HISTORY:  COPD (chronic obstructive pulmonary disease)  Afib  Lung cancer  Pacemaker  Anxiety  High cholesterol  Cataract: RIGHT  6/17 AND  LEFT  7/5/19  S/P appendectomy  History of tonsillectomy  S/P lobectomy of lung: left  H/O atrioventricular kacie ablation  Artificial cardiac pacemaker      SOCIAL HX:   Smoking     quit in 2005                    ETOH                            Other    FAMILY HISTORY:  No significant family history (Father, Mother, Sibling): known to patient as patient was adopted      Allergies    bacitracin (Other)  carboplatin (Other)  sulfa drugs (Unknown)  sulfonamides (Unknown)  Xanax (Other)    Intolerances          PHYSICAL EXAM  Vital Signs Last 24 Hrs  T(C): 36.9 (25 May 2020 09:41), Max: 36.9 (25 May 2020 09:41)  T(F): 98.4 (25 May 2020 09:41), Max: 98.4 (25 May 2020 09:41)  HR: 69 (25 May 2020 09:41) (69 - 73)  BP: 149/72 (25 May 2020 09:41) (129/62 - 149/72)  BP(mean): --  RR: 22 (25 May 2020 09:41) (18 - 22)  SpO2: 98% (25 May 2020 09:41) (98% - 100%)  I&O's Summary      General: Comfortable in bed  HEENT:  On NC  Lymph node: No palpable LN             Lungs: CTA, bilateral expiratory wheezing   Cardiovascular: RRR, S1S2  Abdomen: BS+ve; soft non tender  Extremities: No LE edema  Skin:  No evident Rash  Neurological:  AAOx3; No focal deficit      LABS:                          10.2   6.46  )-----------( 162      ( 24 May 2020 23:32 )             33.0                                               05-24    141  |  102  |  13  ----------------------------<  157<H>  3.9   |  29  |  0.9    Ca    9.1      24 May 2020 23:32    TPro  6.1  /  Alb  3.9  /  TBili  0.3  /  DBili  x   /  AST  21  /  ALT  14  /  AlkPhos  90  05-24      PT/INR - ( 24 May 2020 23:32 )   PT: 19.60 sec;   INR: 1.70 ratio         PTT - ( 24 May 2020 23:32 )  PTT:34.6 sec                                           CARDIAC MARKERS ( 24 May 2020 23:32 )  x     / <0.01 ng/mL / x     / x     / x                                                LIVER FUNCTIONS - ( 24 May 2020 23:32 )  Alb: 3.9 g/dL / Pro: 6.1 g/dL / ALK PHOS: 90 U/L / ALT: 14 U/L / AST: 21 U/L / GGT: x                                                                                                Serum Pro-Brain Natriuretic Peptide: 1049 pg/mL (05-24-20 @ 23:32)          MEDICATIONS  (STANDING):  atorvastatin 20 milliGRAM(s) Oral at bedtime  budesonide 160 MICROgram(s)/formoterol 4.5 MICROgram(s) Inhaler 2 Puff(s) Inhalation two times a day  chlorhexidine 4% Liquid 1 Application(s) Topical <User Schedule>  doxycycline IVPB 100 milliGRAM(s) IV Intermittent every 12 hours  gabapentin 200 milliGRAM(s) Oral two times a day  methylPREDNISolone sodium succinate Injectable 40 milliGRAM(s) IV Push every 12 hours  pantoprazole    Tablet 40 milliGRAM(s) Oral before breakfast  tiotropium 18 MICROgram(s) Capsule 1 Capsule(s) Inhalation daily    MEDICATIONS  (PRN):  ALBUTerol    90 MICROgram(s) HFA Inhaler 2 Puff(s) Inhalation every 6 hours PRN Shortness of Breath and/or Wheezing  clonazePAM  Tablet 0.5 milliGRAM(s) Oral two times a day PRN anxiety        CXR : No infiltrate, emphysematous changes, Left upper lobe lobectomy

## 2020-05-25 NOTE — H&P ADULT - NSICDXFAMILYHX_GEN_ALL_CORE_FT
FAMILY HISTORY:  Father  Still living? Unknown  No significant family history, Age at diagnosis: Age Unknown    Mother  Still living? Unknown  No significant family history, Age at diagnosis: Age Unknown    Sibling  Still living? Unknown  No significant family history, Age at diagnosis: Age Unknown

## 2020-05-25 NOTE — H&P ADULT - NSICDXPASTMEDICALHX_GEN_ALL_CORE_FT
PAST MEDICAL HISTORY:  Afib     Anxiety     COPD (chronic obstructive pulmonary disease)     High cholesterol     Lung cancer     Pacemaker

## 2020-05-25 NOTE — PROGRESS NOTE ADULT - ASSESSMENT
Impression  >COPD exacerbation  >R/O covid   >Lung cancer s/p resection     Recommendations    Oxygen supplementation Keep sats > 92%   Albuterol ipratropium MDI if COVID +ve, if not nebulization   Solumedrol 60 bid   F/u COVID   Ceftriaxone + Azithromycin   Check FLU A/B/RSV  Check Duplex   If COVID, rapiflu -ve  and duplex -ve : CTA (INR subtherapeutic on presentation)  Continue Coumadin   Will follow     Thank you for the consult.

## 2020-05-25 NOTE — H&P ADULT - NSHPSOCIALHISTORY_GEN_ALL_CORE
Former smoker 40 pack years, quit 4/14/2005 , no h/o alcohol or drugs use .  Lives at home, ambulate independently prior to first fall

## 2020-05-25 NOTE — H&P ADULT - NSHPPHYSICALEXAM_GEN_ALL_CORE
Vital Signs Last 24 Hrs  T(C): 36.1 (24 May 2020 22:42), Max: 36.1 (24 May 2020 22:42)  T(F): 97 (24 May 2020 22:42), Max: 97 (24 May 2020 22:42)  HR: 73 (24 May 2020 22:42) (73 - 73)  BP: 129/62 (24 May 2020 22:42) (129/62 - 129/62)  RR: 18 (24 May 2020 22:42) (18 - 18)  SpO2: 100% (24 May 2020 22:42) (100% - 100%)      PHYSICAL EXAM:  GENERAL: NAD, speaks in full sentences, no signs of respiratory distress, on nasal canula., anxious  EYES: Conjunctiva and sclera clear  NECK: Supple  CHEST/LUNG: Clear to auscultation bilaterally; Mild expiratory wheezes, No accessory muscles used  HEART: Regular rate and rhythm;  ABDOMEN: Soft, Nontender, Nondistended; No guarding  EXTREMITIES:  2+ Peripheral Pulses, No cyanosis or edema  PSYCH: AAOx3  NEUROLOGY: non-focal  SKIN: No rashes or lesions

## 2020-05-25 NOTE — H&P ADULT - ATTENDING COMMENTS
73 YO F with a PMH of Afib (Coumadin) s/p ablation and Micra PPM, COPD on home O2 as needed, left lung ca diagnosed in 2011 (surgery x2 -partial left lobectomy + chemo +RT, oncologist f/up at [facility]), and anxiety who presents to the hospital tonight with a c/o intermittent SOB and palpitations for the past x 3 months. Worsened with exertion. Denies any fevers/chills, CP, sore throat, N/V/D, or ABD pain. In the ED, Chest X-Ray with left upper lobe lobectomy, otherwise negative. Given IV steroids and albuterol.     Physical exam shows pt in NAD. VSS, afebrile, not hypoxic on RA. A&Ox3. Non-focal neuro exam. Muscle strength/sensation intact. Decreased breath sounds B/L. RRR, no M/G/R. ABD is soft and non-tender, normoactive BSs. LEs without swelling. No rashes. Labs and radiology as above.     Progressively worsening WHITAKER, rule out viral respiratory syndrome vs new-onset HF vs pulmonary HTN. Echo. Send COVID19 swab. Duonebs standing and PRN. IV steroids and transition to PO. TSH. Supplemental O2 PRN.     Diabetes mellitus with hyperglycemia. A1c. FSs. Insulin PRN.     Hx of Afib (Coumadin), COPD, and anxiety. Restart home meds. GI and DVT PPX. Inform PCP of pt's admission to hospital. Rest as per above note.

## 2020-05-25 NOTE — H&P ADULT - NSHPLABSRESULTS_GEN_ALL_CORE
Labs:                          10.2   6.46  )-----------( 162      ( 24 May 2020 23:32 )             33.0       05-24    141  |  102  |  13  ----------------------------<  157<H>  3.9   |  29  |  0.9    Ca    9.1      24 May 2020 23:32    TPro  6.1  /  Alb  3.9  /  TBili  0.3  /  DBili  x   /  AST  21  /  ALT  14  /  AlkPhos  90  05-24          PT/INR - ( 24 May 2020 23:32 )   PT: 19.60 sec;   INR: 1.70 ratio         PTT - ( 24 May 2020 23:32 )  PTT:34.6 sec    Lactate Trend      CARDIAC MARKERS ( 24 May 2020 23:32 )  x     / <0.01 ng/mL / x     / x     / x          Chest Xray (unofficial): s/p left lobectomy, blunting of the left costophrenic angle

## 2020-05-26 LAB
ALBUMIN SERPL ELPH-MCNC: 4.2 G/DL — SIGNIFICANT CHANGE UP (ref 3.5–5.2)
ALP SERPL-CCNC: 87 U/L — SIGNIFICANT CHANGE UP (ref 30–115)
ALT FLD-CCNC: 13 U/L — SIGNIFICANT CHANGE UP (ref 0–41)
ANION GAP SERPL CALC-SCNC: 12 MMOL/L — SIGNIFICANT CHANGE UP (ref 7–14)
APPEARANCE UR: CLEAR — SIGNIFICANT CHANGE UP
APTT BLD: 34 SEC — SIGNIFICANT CHANGE UP (ref 27–39.2)
AST SERPL-CCNC: 18 U/L — SIGNIFICANT CHANGE UP (ref 0–41)
BASOPHILS # BLD AUTO: 0 K/UL — SIGNIFICANT CHANGE UP (ref 0–0.2)
BASOPHILS NFR BLD AUTO: 0 % — SIGNIFICANT CHANGE UP (ref 0–1)
BILIRUB SERPL-MCNC: 0.3 MG/DL — SIGNIFICANT CHANGE UP (ref 0.2–1.2)
BILIRUB UR-MCNC: NEGATIVE — SIGNIFICANT CHANGE UP
BUN SERPL-MCNC: 15 MG/DL — SIGNIFICANT CHANGE UP (ref 10–20)
CALCIUM SERPL-MCNC: 9.7 MG/DL — SIGNIFICANT CHANGE UP (ref 8.5–10.1)
CHLORIDE SERPL-SCNC: 97 MMOL/L — LOW (ref 98–110)
CO2 SERPL-SCNC: 30 MMOL/L — SIGNIFICANT CHANGE UP (ref 17–32)
COLOR SPEC: SIGNIFICANT CHANGE UP
CREAT SERPL-MCNC: 0.8 MG/DL — SIGNIFICANT CHANGE UP (ref 0.7–1.5)
CRP SERPL-MCNC: 0.73 MG/DL — HIGH (ref 0–0.4)
DIFF PNL FLD: NEGATIVE — SIGNIFICANT CHANGE UP
EOSINOPHIL # BLD AUTO: 0 K/UL — SIGNIFICANT CHANGE UP (ref 0–0.7)
EOSINOPHIL NFR BLD AUTO: 0 % — SIGNIFICANT CHANGE UP (ref 0–8)
FERRITIN SERPL-MCNC: 42 NG/ML — SIGNIFICANT CHANGE UP (ref 15–150)
GLUCOSE SERPL-MCNC: 291 MG/DL — HIGH (ref 70–99)
GLUCOSE UR QL: ABNORMAL
HCT VFR BLD CALC: 33.6 % — LOW (ref 37–47)
HCV AB S/CO SERPL IA: 0.04 COI — SIGNIFICANT CHANGE UP
HCV AB SERPL-IMP: SIGNIFICANT CHANGE UP
HGB BLD-MCNC: 11 G/DL — LOW (ref 12–16)
IMM GRANULOCYTES NFR BLD AUTO: 0.4 % — HIGH (ref 0.1–0.3)
INR BLD: 2.83 RATIO — HIGH (ref 0.65–1.3)
KETONES UR-MCNC: NEGATIVE — SIGNIFICANT CHANGE UP
LEUKOCYTE ESTERASE UR-ACNC: NEGATIVE — SIGNIFICANT CHANGE UP
LYMPHOCYTES # BLD AUTO: 0.2 K/UL — LOW (ref 1.2–3.4)
LYMPHOCYTES # BLD AUTO: 2.9 % — LOW (ref 20.5–51.1)
MAGNESIUM SERPL-MCNC: 1.8 MG/DL — SIGNIFICANT CHANGE UP (ref 1.8–2.4)
MCHC RBC-ENTMCNC: 28.4 PG — SIGNIFICANT CHANGE UP (ref 27–31)
MCHC RBC-ENTMCNC: 32.7 G/DL — SIGNIFICANT CHANGE UP (ref 32–37)
MCV RBC AUTO: 86.6 FL — SIGNIFICANT CHANGE UP (ref 81–99)
MONOCYTES # BLD AUTO: 0.17 K/UL — SIGNIFICANT CHANGE UP (ref 0.1–0.6)
MONOCYTES NFR BLD AUTO: 2.5 % — SIGNIFICANT CHANGE UP (ref 1.7–9.3)
NEUTROPHILS # BLD AUTO: 6.38 K/UL — SIGNIFICANT CHANGE UP (ref 1.4–6.5)
NEUTROPHILS NFR BLD AUTO: 94.2 % — HIGH (ref 42.2–75.2)
NITRITE UR-MCNC: NEGATIVE — SIGNIFICANT CHANGE UP
NRBC # BLD: 0 /100 WBCS — SIGNIFICANT CHANGE UP (ref 0–0)
PH UR: 6 — SIGNIFICANT CHANGE UP (ref 5–8)
PLATELET # BLD AUTO: 170 K/UL — SIGNIFICANT CHANGE UP (ref 130–400)
POTASSIUM SERPL-MCNC: 4.5 MMOL/L — SIGNIFICANT CHANGE UP (ref 3.5–5)
POTASSIUM SERPL-SCNC: 4.5 MMOL/L — SIGNIFICANT CHANGE UP (ref 3.5–5)
PROCALCITONIN SERPL-MCNC: 0.28 NG/ML — HIGH (ref 0.02–0.1)
PROT SERPL-MCNC: 6.5 G/DL — SIGNIFICANT CHANGE UP (ref 6–8)
PROT UR-MCNC: NEGATIVE — SIGNIFICANT CHANGE UP
PROTHROM AB SERPL-ACNC: 32.6 SEC — HIGH (ref 9.95–12.87)
RBC # BLD: 3.88 M/UL — LOW (ref 4.2–5.4)
RBC # FLD: 13.8 % — SIGNIFICANT CHANGE UP (ref 11.5–14.5)
SODIUM SERPL-SCNC: 139 MMOL/L — SIGNIFICANT CHANGE UP (ref 135–146)
SP GR SPEC: 1.01 — SIGNIFICANT CHANGE UP (ref 1.01–1.02)
UROBILINOGEN FLD QL: SIGNIFICANT CHANGE UP
WBC # BLD: 6.78 K/UL — SIGNIFICANT CHANGE UP (ref 4.8–10.8)
WBC # FLD AUTO: 6.78 K/UL — SIGNIFICANT CHANGE UP (ref 4.8–10.8)

## 2020-05-26 PROCEDURE — 93970 EXTREMITY STUDY: CPT | Mod: 26

## 2020-05-26 PROCEDURE — 99233 SBSQ HOSP IP/OBS HIGH 50: CPT

## 2020-05-26 PROCEDURE — 93306 TTE W/DOPPLER COMPLETE: CPT | Mod: 26

## 2020-05-26 RX ORDER — AZITHROMYCIN 500 MG/1
500 TABLET, FILM COATED ORAL EVERY 24 HOURS
Refills: 0 | Status: DISCONTINUED | OUTPATIENT
Start: 2020-05-26 | End: 2020-05-26

## 2020-05-26 RX ORDER — IPRATROPIUM/ALBUTEROL SULFATE 18-103MCG
3 AEROSOL WITH ADAPTER (GRAM) INHALATION EVERY 6 HOURS
Refills: 0 | Status: DISCONTINUED | OUTPATIENT
Start: 2020-05-26 | End: 2020-05-27

## 2020-05-26 RX ORDER — WARFARIN SODIUM 2.5 MG/1
1 TABLET ORAL ONCE
Refills: 0 | Status: COMPLETED | OUTPATIENT
Start: 2020-05-26 | End: 2020-05-26

## 2020-05-26 RX ORDER — CEFTRIAXONE 500 MG/1
1000 INJECTION, POWDER, FOR SOLUTION INTRAMUSCULAR; INTRAVENOUS EVERY 24 HOURS
Refills: 0 | Status: DISCONTINUED | OUTPATIENT
Start: 2020-05-26 | End: 2020-05-26

## 2020-05-26 RX ADMIN — PANTOPRAZOLE SODIUM 40 MILLIGRAM(S): 20 TABLET, DELAYED RELEASE ORAL at 06:20

## 2020-05-26 RX ADMIN — Medication 40 MILLIGRAM(S): at 05:39

## 2020-05-26 RX ADMIN — Medication 110 MILLIGRAM(S): at 05:36

## 2020-05-26 RX ADMIN — ATORVASTATIN CALCIUM 20 MILLIGRAM(S): 80 TABLET, FILM COATED ORAL at 21:40

## 2020-05-26 RX ADMIN — Medication 0.5 MILLIGRAM(S): at 09:03

## 2020-05-26 RX ADMIN — CHLORHEXIDINE GLUCONATE 1 APPLICATION(S): 213 SOLUTION TOPICAL at 05:39

## 2020-05-26 RX ADMIN — TIOTROPIUM BROMIDE 1 CAPSULE(S): 18 CAPSULE ORAL; RESPIRATORY (INHALATION) at 07:46

## 2020-05-26 RX ADMIN — GABAPENTIN 200 MILLIGRAM(S): 400 CAPSULE ORAL at 05:39

## 2020-05-26 RX ADMIN — BUDESONIDE AND FORMOTEROL FUMARATE DIHYDRATE 2 PUFF(S): 160; 4.5 AEROSOL RESPIRATORY (INHALATION) at 07:46

## 2020-05-26 RX ADMIN — Medication 0.5 MILLIGRAM(S): at 21:40

## 2020-05-26 RX ADMIN — BUDESONIDE AND FORMOTEROL FUMARATE DIHYDRATE 2 PUFF(S): 160; 4.5 AEROSOL RESPIRATORY (INHALATION) at 21:41

## 2020-05-26 RX ADMIN — WARFARIN SODIUM 1 MILLIGRAM(S): 2.5 TABLET ORAL at 21:41

## 2020-05-26 RX ADMIN — GABAPENTIN 200 MILLIGRAM(S): 400 CAPSULE ORAL at 17:42

## 2020-05-26 NOTE — PROGRESS NOTE ADULT - SUBJECTIVE AND OBJECTIVE BOX
Hospital Day:  1d    Chief Complaint: Patient is a 74y old Female with a history of AFib (on coumadin, s/p ablation and micra ppm), COPD (on home O2 prn), left lung cancer (s/p partial left lobectomy + chemo/RT) former smoker, and anxiety who presents with a chief complaint of Shortness of breath    24 hour events:  -No acute events overnight  -Saturating 98% on 2L NC at rest, 95% on ambulation    Subjective:  -endorsing continued dyspnea on exertion and palpitations  -Denies CP, fevers/chills, abdominal pain, nausea/vomiting, diarrhea     Past Medical Hx:   COPD (chronic obstructive pulmonary disease)  Afib  Lung cancer  Pacemaker  Anxiety  High cholesterol    Past Sx:  Cataract  S/P appendectomy  History of tonsillectomy  S/P lobectomy of lung  H/O atrioventricular kacie ablation  Artificial cardiac pacemaker    Allergies:  bacitracin (Other)  carboplatin (Other)  sulfa drugs (Unknown)  sulfonamides (Unknown)  Xanax (Other)    Current Meds:   Standing Meds:  albuterol/ipratropium for Nebulization 3 milliLiter(s) Nebulizer every 6 hours  atorvastatin 20 milliGRAM(s) Oral at bedtime  azithromycin  IVPB 500 milliGRAM(s) IV Intermittent every 24 hours  budesonide 160 MICROgram(s)/formoterol 4.5 MICROgram(s) Inhaler 2 Puff(s) Inhalation two times a day  cefTRIAXone   IVPB 1000 milliGRAM(s) IV Intermittent every 24 hours  chlorhexidine 4% Liquid 1 Application(s) Topical <User Schedule>  gabapentin 200 milliGRAM(s) Oral two times a day  methylPREDNISolone sodium succinate Injectable 40 milliGRAM(s) IV Push every 12 hours  pantoprazole    Tablet 40 milliGRAM(s) Oral before breakfast  tiotropium 18 MICROgram(s) Capsule 1 Capsule(s) Inhalation daily  warfarin 1 milliGRAM(s) Oral once    PRN Meds:  clonazePAM  Tablet 0.5 milliGRAM(s) Oral two times a day PRN anxiety    HOME MEDICATIONS:  Crestor 5 mg oral tablet: 1 tab(s) orally once a day (at bedtime)  gabapentin extended release: 200  orally 2 times a day  KlonoPIN 0.5 mg oral tablet: 1  orally 2 times a day, As Needed  prochlorperazine 10 mg oral tablet: 1 tab(s) orally 3 times a day  Spiriva 18 mcg inhalation capsule: 1 cap(s) inhaled once a day  Symbicort:   Vitamin D3 2000 intl units oral tablet: 1 tab(s) orally once a day  warfarin 1 mg oral tablet: 1 tab(s) orally once a day (at bedtime)  Zantac 150 oral tablet: 1 tab(s) orally 2 times a day      Vital Signs:   T(F): 97.5 (20 @ 06:58), Max: 97.5 (20 @ 06:58)  HR: 69 (20 @ 06:58) (69 - 73)  BP: 115/55 (20 @ 06:58) (115/55 - 159/75)  RR: 19 (20 @ 06:58) (18 - 19)  SpO2: 95% (20 @ 23:51) (91% - 98%)      20 @ 07:01  -  20 @ 07:00  --------------------------------------------------------  IN: 100 mL / OUT: 100 mL / NET: 0 mL    20 @ 07:01  -  20 @ 13:19  --------------------------------------------------------  IN: 30 mL / OUT: 100 mL / NET: -70 mL    Physical Exam:   GENERAL: NAD, NC in place  HEENT: NCAT  CHEST/LUNG: CTAB  HEART: Regular rate and rhythm. No murmurs, rubs, or gallops  ABDOMEN: soft, non-tender, non-distended  EXTREMITIES: No LE edema b/l  NERVOUS SYSTEM: Alert and oriented x 3    Labs:                         11.0   6.78  )-----------( 170      ( 26 May 2020 11:09 )             33.6     Neutophil% 94.2, Lymphocyte% 2.9, Monocyte% 2.5, Bands% 0.4 20 @ 11:09    26 May 2020 11:09    139    |  97     |  15     ----------------------------<  291    4.5     |  30     |  0.8      Ca    9.7        26 May 2020 11:09  Mg     1.8       26 May 2020 11:09    TPro  6.5    /  Alb  4.2    /  TBili  0.3    /  DBili  x      /  AST  18     /  ALT  13     /  AlkPhos  87     26 May 2020 11:09       pTT    34.0             ----< 2.83 INR  (20 @ 11:09)    32.60        PT    Serum Pro-Brain Natriuretic Peptide: 1049 pg/mL (20 @ 23:32)    Troponin <0.01, CKMB --, CK -- 20 @ 23:32    Iron 70, TIBC 324, %Sat 22 Ferritin 42 20 @ 11:45      Urinalysis Basic - ( 26 May 2020 06:40 )    Color: Light Yellow / Appearance: Clear / S.011 / pH: x  Gluc: x / Ketone: Negative  / Bili: Negative / Urobili: <2 mg/dL   Blood: x / Protein: Negative / Nitrite: Negative   Leuk Esterase: Negative / RBC: x / WBC x   Sq Epi: x / Non Sq Epi: x / Bacteria: x    Procalcitonin, Serum: 0.28 ng/mL [0.02 - 0.10] ()  Ferritin, Serum: 42 ng/mL [15 - 150] ()  COVID-19 PCR: NotDetec ()  Troponin T, Serum: <0.01 ng/mL ()      Radiology:   < from: [facility] Duplex Lower Ext Vein Scan, Bilat (20 @ 10:07) >  Impression:    No evidence of deep venous thrombosis in the bilateral lower extremities.    < end of copied text >    Assessment and Plan:   74y old Female with a history of AFib (on coumadin, s/p ablation and micra ppm), COPD (on home O2 prn), left lung cancer (s/p partial left lobectomy + chemo/RT) former smoker, and anxiety who presents with a chief complaint of Shortness of breath    #Dyspnea on exertion: ddx either COPD exacerbation vs HF exacerbation (BNP 1049 on admission)  -lower extremity duplex negative for DVt  -check TTE  -pulm recs noted: nebs q6 standing, solumedrol 40mg IV bid, azithromycin, rocephin  -continue with symbicort  -wean O2 as tolerated to maintain SpO2 88-92%   -check ambulatory pulse ox  -COVID PCR negative     #AFib s/p ablation and micra  -EPS consult for PPM interrogation  -warfarin 1mg today   -daily INR    #anxiety  -continue with klonopin 0.5mg bid prn  _____________________________________________  DVT ppx: coumadin  GI ppx: protonix po  Activity: AAT  Diet: DASH/TLC  Code Status: full code    DISPO: pending improvement in respiratory status and pulmonary follow-up. From home Hospital Day:  1d    Chief Complaint: Patient is a 74y old Female with a history of AFib (on coumadin, s/p ablation and micra ppm), COPD (on home O2 prn), left lung cancer (s/p partial left lobectomy + chemo/RT) former smoker, and anxiety who presents with a chief complaint of Shortness of breath    24 hour events:  -No acute events overnight  -Saturating 98% on 2L NC at rest, 95% on ambulation    Subjective:  -endorsing continued dyspnea on exertion and palpitations  -Denies CP, fevers/chills, abdominal pain, nausea/vomiting, diarrhea     Past Medical Hx:   COPD (chronic obstructive pulmonary disease)  Afib  Lung cancer  Pacemaker  Anxiety  High cholesterol    Past Sx:  Cataract  S/P appendectomy  History of tonsillectomy  S/P lobectomy of lung  H/O atrioventricular kacie ablation  Artificial cardiac pacemaker    Allergies:  bacitracin (Other)  carboplatin (Other)  sulfa drugs (Unknown)  sulfonamides (Unknown)  Xanax (Other)    Current Meds:   Standing Meds:  albuterol/ipratropium for Nebulization 3 milliLiter(s) Nebulizer every 6 hours  atorvastatin 20 milliGRAM(s) Oral at bedtime  azithromycin  IVPB 500 milliGRAM(s) IV Intermittent every 24 hours  budesonide 160 MICROgram(s)/formoterol 4.5 MICROgram(s) Inhaler 2 Puff(s) Inhalation two times a day  cefTRIAXone   IVPB 1000 milliGRAM(s) IV Intermittent every 24 hours  chlorhexidine 4% Liquid 1 Application(s) Topical <User Schedule>  gabapentin 200 milliGRAM(s) Oral two times a day  methylPREDNISolone sodium succinate Injectable 40 milliGRAM(s) IV Push every 12 hours  pantoprazole    Tablet 40 milliGRAM(s) Oral before breakfast  tiotropium 18 MICROgram(s) Capsule 1 Capsule(s) Inhalation daily  warfarin 1 milliGRAM(s) Oral once    PRN Meds:  clonazePAM  Tablet 0.5 milliGRAM(s) Oral two times a day PRN anxiety    HOME MEDICATIONS:  Crestor 5 mg oral tablet: 1 tab(s) orally once a day (at bedtime)  gabapentin extended release: 200  orally 2 times a day  KlonoPIN 0.5 mg oral tablet: 1  orally 2 times a day, As Needed  prochlorperazine 10 mg oral tablet: 1 tab(s) orally 3 times a day  Spiriva 18 mcg inhalation capsule: 1 cap(s) inhaled once a day  Symbicort:   Vitamin D3 2000 intl units oral tablet: 1 tab(s) orally once a day  warfarin 1 mg oral tablet: 1 tab(s) orally once a day (at bedtime)  Zantac 150 oral tablet: 1 tab(s) orally 2 times a day      Vital Signs:   T(F): 97.5 (20 @ 06:58), Max: 97.5 (20 @ 06:58)  HR: 69 (20 @ 06:58) (69 - 73)  BP: 115/55 (20 @ 06:58) (115/55 - 159/75)  RR: 19 (20 @ 06:58) (18 - 19)  SpO2: 95% (20 @ 23:51) (91% - 98%)      20 @ 07:01  -  20 @ 07:00  --------------------------------------------------------  IN: 100 mL / OUT: 100 mL / NET: 0 mL    20 @ 07:01  -  20 @ 13:19  --------------------------------------------------------  IN: 30 mL / OUT: 100 mL / NET: -70 mL    Physical Exam:   GENERAL: NAD, NC in place  HEENT: NCAT  CHEST/LUNG: CTAB  HEART: Regular rate and rhythm. No murmurs, rubs, or gallops  ABDOMEN: soft, non-tender, non-distended  EXTREMITIES: No LE edema b/l  NERVOUS SYSTEM: Alert and oriented x 3    Labs:                         11.0   6.78  )-----------( 170      ( 26 May 2020 11:09 )             33.6     Neutophil% 94.2, Lymphocyte% 2.9, Monocyte% 2.5, Bands% 0.4 20 @ 11:09    26 May 2020 11:09    139    |  97     |  15     ----------------------------<  291    4.5     |  30     |  0.8      Ca    9.7        26 May 2020 11:09  Mg     1.8       26 May 2020 11:09    TPro  6.5    /  Alb  4.2    /  TBili  0.3    /  DBili  x      /  AST  18     /  ALT  13     /  AlkPhos  87     26 May 2020 11:09       pTT    34.0             ----< 2.83 INR  (20 @ 11:09)    32.60        PT    Serum Pro-Brain Natriuretic Peptide: 1049 pg/mL (20 @ 23:32)    Troponin <0.01, CKMB --, CK -- 20 @ 23:32    Iron 70, TIBC 324, %Sat 22 Ferritin 42 20 @ 11:45      Urinalysis Basic - ( 26 May 2020 06:40 )    Color: Light Yellow / Appearance: Clear / S.011 / pH: x  Gluc: x / Ketone: Negative  / Bili: Negative / Urobili: <2 mg/dL   Blood: x / Protein: Negative / Nitrite: Negative   Leuk Esterase: Negative / RBC: x / WBC x   Sq Epi: x / Non Sq Epi: x / Bacteria: x    Procalcitonin, Serum: 0.28 ng/mL [0.02 - 0.10] ()  Ferritin, Serum: 42 ng/mL [15 - 150] ()  COVID-19 PCR: NotDetec ()  Troponin T, Serum: <0.01 ng/mL ()      Radiology:   < from: [facility] Duplex Lower Ext Vein Scan, Bilat (20 @ 10:07) >  Impression:    No evidence of deep venous thrombosis in the bilateral lower extremities.    < end of copied text >    Assessment and Plan:   74y old Female with a history of AFib (on coumadin, s/p ablation and micra ppm), COPD (on home O2 prn), left lung cancer (s/p partial left lobectomy + chemo/RT) former smoker, and anxiety who presents with a chief complaint of Shortness of breath    #Dyspnea on exertion: ddx either COPD exacerbation vs HF exacerbation (BNP 1049 on admission)  -lower extremity duplex negative for DVt  -check TTE  -pulm recs noted: nebs q6 standing, solumedrol 40mg IV bid, azithromycin, rocephin  -continue with symbicort  -wean O2 as tolerated to maintain SpO2 88-92%   -check ambulatory pulse ox  -COVID PCR negative     #Chronic AFib s/p ablation and micra  -EPS consult for PPM interrogation  -warfarin 1mg today   -daily INR    #anxiety  -continue with klonopin 0.5mg bid prn  _____________________________________________  DVT ppx: coumadin  GI ppx: protonix po  Activity: AAT  Diet: DASH/TLC  Code Status: full code    DISPO: pending improvement in respiratory status and pulmonary follow-up. From home

## 2020-05-26 NOTE — PROGRESS NOTE ADULT - ATTENDING COMMENTS
patient seen and examined , agree with pgy 3 assesment and plan except as indicated above,  #Dyspnea secondary to chronic respiratory failure secondary to copd exacerbation versus unspecified chf   echo to classify if chf present pending   on solumderol can change to prednisone in am   #PT   #CKD 2     Progress Note Handoff    Pending:  echo , transition to prednisone in am   Family discussion: patient verbalized understanding and agreeable to plan of care     Disposition: Home___

## 2020-05-27 ENCOUNTER — TRANSCRIPTION ENCOUNTER (OUTPATIENT)
Age: 75
End: 2020-05-27

## 2020-05-27 VITALS — WEIGHT: 133.6 LBS | HEIGHT: 64 IN

## 2020-05-27 LAB
ALBUMIN SERPL ELPH-MCNC: 3.8 G/DL — SIGNIFICANT CHANGE UP (ref 3.5–5.2)
ALP SERPL-CCNC: 79 U/L — SIGNIFICANT CHANGE UP (ref 30–115)
ALT FLD-CCNC: 14 U/L — SIGNIFICANT CHANGE UP (ref 0–41)
ANION GAP SERPL CALC-SCNC: 9 MMOL/L — SIGNIFICANT CHANGE UP (ref 7–14)
APTT BLD: 34.5 SEC — SIGNIFICANT CHANGE UP (ref 27–39.2)
AST SERPL-CCNC: 19 U/L — SIGNIFICANT CHANGE UP (ref 0–41)
BASOPHILS # BLD AUTO: 0.01 K/UL — SIGNIFICANT CHANGE UP (ref 0–0.2)
BASOPHILS NFR BLD AUTO: 0.1 % — SIGNIFICANT CHANGE UP (ref 0–1)
BILIRUB SERPL-MCNC: 0.3 MG/DL — SIGNIFICANT CHANGE UP (ref 0.2–1.2)
BUN SERPL-MCNC: 18 MG/DL — SIGNIFICANT CHANGE UP (ref 10–20)
CALCIUM SERPL-MCNC: 9.7 MG/DL — SIGNIFICANT CHANGE UP (ref 8.5–10.1)
CHLORIDE SERPL-SCNC: 102 MMOL/L — SIGNIFICANT CHANGE UP (ref 98–110)
CO2 SERPL-SCNC: 31 MMOL/L — SIGNIFICANT CHANGE UP (ref 17–32)
CREAT SERPL-MCNC: 0.8 MG/DL — SIGNIFICANT CHANGE UP (ref 0.7–1.5)
CULTURE RESULTS: SIGNIFICANT CHANGE UP
EOSINOPHIL # BLD AUTO: 0.02 K/UL — SIGNIFICANT CHANGE UP (ref 0–0.7)
EOSINOPHIL NFR BLD AUTO: 0.3 % — SIGNIFICANT CHANGE UP (ref 0–8)
GLUCOSE SERPL-MCNC: 116 MG/DL — HIGH (ref 70–99)
HCT VFR BLD CALC: 34.1 % — LOW (ref 37–47)
HGB BLD-MCNC: 11 G/DL — LOW (ref 12–16)
IMM GRANULOCYTES NFR BLD AUTO: 0.3 % — SIGNIFICANT CHANGE UP (ref 0.1–0.3)
INR BLD: 2.82 RATIO — HIGH (ref 0.65–1.3)
LYMPHOCYTES # BLD AUTO: 0.92 K/UL — LOW (ref 1.2–3.4)
LYMPHOCYTES # BLD AUTO: 13.8 % — LOW (ref 20.5–51.1)
MAGNESIUM SERPL-MCNC: 1.8 MG/DL — SIGNIFICANT CHANGE UP (ref 1.8–2.4)
MCHC RBC-ENTMCNC: 28.4 PG — SIGNIFICANT CHANGE UP (ref 27–31)
MCHC RBC-ENTMCNC: 32.3 G/DL — SIGNIFICANT CHANGE UP (ref 32–37)
MCV RBC AUTO: 87.9 FL — SIGNIFICANT CHANGE UP (ref 81–99)
MONOCYTES # BLD AUTO: 0.5 K/UL — SIGNIFICANT CHANGE UP (ref 0.1–0.6)
MONOCYTES NFR BLD AUTO: 7.5 % — SIGNIFICANT CHANGE UP (ref 1.7–9.3)
NEUTROPHILS # BLD AUTO: 5.22 K/UL — SIGNIFICANT CHANGE UP (ref 1.4–6.5)
NEUTROPHILS NFR BLD AUTO: 78 % — HIGH (ref 42.2–75.2)
NRBC # BLD: 0 /100 WBCS — SIGNIFICANT CHANGE UP (ref 0–0)
PLATELET # BLD AUTO: 154 K/UL — SIGNIFICANT CHANGE UP (ref 130–400)
POTASSIUM SERPL-MCNC: 4.1 MMOL/L — SIGNIFICANT CHANGE UP (ref 3.5–5)
POTASSIUM SERPL-SCNC: 4.1 MMOL/L — SIGNIFICANT CHANGE UP (ref 3.5–5)
PROT SERPL-MCNC: 6.1 G/DL — SIGNIFICANT CHANGE UP (ref 6–8)
PROTHROM AB SERPL-ACNC: 32.4 SEC — HIGH (ref 9.95–12.87)
RBC # BLD: 3.88 M/UL — LOW (ref 4.2–5.4)
RBC # FLD: 13.7 % — SIGNIFICANT CHANGE UP (ref 11.5–14.5)
SODIUM SERPL-SCNC: 142 MMOL/L — SIGNIFICANT CHANGE UP (ref 135–146)
SPECIMEN SOURCE: SIGNIFICANT CHANGE UP
WBC # BLD: 6.69 K/UL — SIGNIFICANT CHANGE UP (ref 4.8–10.8)
WBC # FLD AUTO: 6.69 K/UL — SIGNIFICANT CHANGE UP (ref 4.8–10.8)

## 2020-05-27 PROCEDURE — 71045 X-RAY EXAM CHEST 1 VIEW: CPT | Mod: 26

## 2020-05-27 PROCEDURE — 99239 HOSP IP/OBS DSCHRG MGMT >30: CPT

## 2020-05-27 RX ORDER — WARFARIN SODIUM 2.5 MG/1
1 TABLET ORAL ONCE
Refills: 0 | Status: DISCONTINUED | OUTPATIENT
Start: 2020-05-27 | End: 2020-05-27

## 2020-05-27 RX ORDER — METHOCARBAMOL 500 MG/1
500 TABLET, FILM COATED ORAL ONCE
Refills: 0 | Status: COMPLETED | OUTPATIENT
Start: 2020-05-27 | End: 2020-05-27

## 2020-05-27 RX ORDER — BUDESONIDE AND FORMOTEROL FUMARATE DIHYDRATE 160; 4.5 UG/1; UG/1
1 AEROSOL RESPIRATORY (INHALATION)
Qty: 0 | Refills: 0 | DISCHARGE
Start: 2020-05-27

## 2020-05-27 RX ORDER — PROCHLORPERAZINE MALEATE 5 MG
1 TABLET ORAL
Qty: 0 | Refills: 0 | DISCHARGE

## 2020-05-27 RX ORDER — BUDESONIDE AND FORMOTEROL FUMARATE DIHYDRATE 160; 4.5 UG/1; UG/1
0 AEROSOL RESPIRATORY (INHALATION)
Qty: 0 | Refills: 0 | DISCHARGE

## 2020-05-27 RX ADMIN — PANTOPRAZOLE SODIUM 40 MILLIGRAM(S): 20 TABLET, DELAYED RELEASE ORAL at 06:10

## 2020-05-27 RX ADMIN — TIOTROPIUM BROMIDE 1 CAPSULE(S): 18 CAPSULE ORAL; RESPIRATORY (INHALATION) at 08:57

## 2020-05-27 RX ADMIN — Medication 40 MILLIGRAM(S): at 06:10

## 2020-05-27 RX ADMIN — Medication 0.5 MILLIGRAM(S): at 08:57

## 2020-05-27 RX ADMIN — BUDESONIDE AND FORMOTEROL FUMARATE DIHYDRATE 2 PUFF(S): 160; 4.5 AEROSOL RESPIRATORY (INHALATION) at 08:57

## 2020-05-27 RX ADMIN — GABAPENTIN 200 MILLIGRAM(S): 400 CAPSULE ORAL at 06:10

## 2020-05-27 RX ADMIN — GABAPENTIN 200 MILLIGRAM(S): 400 CAPSULE ORAL at 17:15

## 2020-05-27 NOTE — PROGRESS NOTE ADULT - SUBJECTIVE AND OBJECTIVE BOX
Pt seen and examined independently. No new complaints. Feeling better.    Gen: NAD, AAOx3  CV: S1 S2  Resp: Decreased BS b/l, good air movement, no wheeze or crackles  GI: NT/ND/S +BS  MS: neg c/c/e  Neuro: nonfocal    Discharge instructions discussed and patient knows when to seek immediate medical attention.  Patient has proper follow up.  All results discussed and patient aware they may require further work up.  Stressed importance of proper follow up.  Medications prescribed and changes discussed.  All questions and concerns from patient addressed. Understanding of instructions verbalized.    Time spent in completing discharge process and coordinating care 40 minutes.    Discussed with housestaff, nursing, social work, PT

## 2020-05-27 NOTE — DISCHARGE NOTE PROVIDER - PROVIDER TOKENS
PROVIDER:[TOKEN:[[id number]],FOLLOWUP:[1 week],ESTABLISHEDPATIENT:[T]],PROVIDER:[TOKEN:[[id number]],FOLLOWUP:[1 week],ESTABLISHEDPATIENT:[T]]

## 2020-05-27 NOTE — DISCHARGE NOTE PROVIDER - NSDCCPCAREPLAN_GEN_ALL_CORE_FT
PRINCIPAL DISCHARGE DIAGNOSIS  Diagnosis: COPD exacerbation  Assessment and Plan of Treatment: Chronic Obstructive Pulmonary Disease  Chronic obstructive pulmonary disease (COPD) is a lung condition in which airflow from the lungs is limited. Causes include smoking, secondhand smoke exposure, genetics, or recurrent infections. Take all medicines (inhaled or pills) as directed by your health care provider. Avoid exposure to irritants such as smoke, chemicals, and fumes that aggravate your breathing.  If you are a smoker, the most important thing that you can do is stop smoking. Continuing to smoke will cause further lung damage and breathing trouble. Ask your health care provider for help with quitting smoking.  SEEK IMMEDIATE MEDICAL CARE IF YOU HAVE ANY OF THE FOLLOWING SYMPTOMS: shortness of breath at rest or when talking, bluish discoloration of lips, skin, fever, worsening cough, unexplained chest pain, or lightheadedness/dizziness. PRINCIPAL DISCHARGE DIAGNOSIS  Diagnosis: COPD exacerbation  Assessment and Plan of Treatment: Chronic Obstructive Pulmonary Disease  Chronic obstructive pulmonary disease (COPD) is a lung condition in which airflow from the lungs is limited. Causes include smoking, secondhand smoke exposure, genetics, or recurrent infections. Take all medicines (inhaled or pills) as directed by your health care provider. Avoid exposure to irritants such as smoke, chemicals, and fumes that aggravate your breathing.  Please follow up with your PMD and pulmonologist in 1-2wks  If you are a smoker, the most important thing that you can do is stop smoking. Continuing to smoke will cause further lung damage and breathing trouble. Ask your health care provider for help with quitting smoking.  SEEK IMMEDIATE MEDICAL CARE IF YOU HAVE ANY OF THE FOLLOWING SYMPTOMS: shortness of breath at rest or when talking, bluish discoloration of lips, skin, fever, worsening cough, unexplained chest pain, or lightheadedness/dizziness.      SECONDARY DISCHARGE DIAGNOSES  Diagnosis: Anxiety  Assessment and Plan of Treatment: discuss with PMD need for medications to help control anxiety other then Klonopin. Consider referral to psychologist for psychotherapy to help control anxiety symptoms.

## 2020-05-27 NOTE — DISCHARGE NOTE PROVIDER - CARE PROVIDER_API CALL
Ky Sinha  INTERNAL MEDICINE  [address]  [place] [address]  Phone: [phone]  Fax: [phone]  Established Patient  Follow Up Time: 1 week    EDIN BRIGGS  Internal Medicine  [address] [place] [address]  Phone: [phone]  Fax: [phone]  Established Patient  Follow Up Time: 1 week

## 2020-05-27 NOTE — DISCHARGE NOTE PROVIDER - NSDCMRMEDTOKEN_GEN_ALL_CORE_FT
Crestor 5 mg oral tablet: 1 tab(s) orally once a day (at bedtime)  gabapentin extended release: 200  orally 2 times a day  KlonoPIN 0.5 mg oral tablet: 1  orally 2 times a day, As Needed  prochlorperazine 10 mg oral tablet: 1 tab(s) orally 3 times a day  Spiriva 18 mcg inhalation capsule: 1 cap(s) inhaled once a day  Symbicort:   Vitamin D3 2000 intl units oral tablet: 1 tab(s) orally once a day  warfarin 1 mg oral tablet: 1 tab(s) orally once a day (at bedtime)  Zantac 150 oral tablet: 1 tab(s) orally 2 times a day albuterol 1.25 mg/3 mL (0.042%) inhalation solution: 3 milliliter(s) inhaled 4 times a day  budesonide-formoterol 160 mcg-4.5 mcg/inh inhalation aerosol: 1 puff(s) inhaled 2 times a day  Crestor 5 mg oral tablet: 1 tab(s) orally once a day (at bedtime)  gabapentin extended release: 200  orally 2 times a day  KlonoPIN 0.5 mg oral tablet: 1  orally 2 times a day, As Needed  levoFLOXacin 500 mg oral tablet: 1 tab(s) orally every 24 hours  predniSONE 10 mg oral tablet: 4 tab(s) orally once a day for 2 days, then 3 tabs orally for 2 days, then 2 tabs orally for 2 days, then 1 tab orally for 2 days, then stop  prochlorperazine 10 mg oral tablet: 1 tab(s) orally 3 times a day, As Needed  Spiriva 18 mcg inhalation capsule: 1 cap(s) inhaled once a day  Vitamin D3 2000 intl units oral tablet: 1 tab(s) orally once a day  warfarin 1 mg oral tablet: 1 tab(s) orally once a day (at bedtime)  Zantac 150 oral tablet: 1 tab(s) orally 2 times a day

## 2020-05-27 NOTE — DISCHARGE NOTE PROVIDER - HOSPITAL COURSE
Ms. Joseph is a 75 yo female patient with PMH of Afib (on Coumadin) s/p ablation and Micra PPM, COPD on home O2 as needed, left lung ca diagnosed in 2011 (surgery x2 -partial left lobectomy + chemo +RT, oncologist f/up at [facility]), former smoker quit in 2005, anxiety, presents to ED tonight complaining of intermittent, moderate, SOB and palpitations, worse with exertion x 3 mo.        Patient states she was intermittently on 3L of Home O2 but, since her symptoms have worsened, has been on it daily without improvement; additionally notes taking 0.5gm of Klonopin without improvement. Her symptoms worsened over the last few days prompting evaluation. Additionally notes some urinary frequency over this time. Denies fever, cough, chest pain, syncope, nausea, vomiting, black/bloody stools, leg pain/swelling.        In ED, patient is afebrile, saturating low 80s initially on room air. Chest Xray showed, left blunting of costophrenic angle, s/p left upper lobectomy, no consolidation. COVID PCR sent. Patient was given methylprednisolone 120 mg once and albuterol once. Patient is saturating 100% on 3L nasal canula. Patient will be admitted to medicine for management.         On the floor, patient was treated for a COPD exacerbation with IV steroids and antibiotics, and nebulizers. Pulmonology was consulted, recommended LE duplex which was negative for DVT. She had a TTE done for evaluation of possible HF; EF was 62% with mild MR, TR, AS, and AR. Her oxygen requirements returned to her baseline of 2L NC. At this time, she is stable for discharge home.

## 2020-05-27 NOTE — PHYSICAL THERAPY INITIAL EVALUATION ADULT - DIAGNOSIS, PT EVAL
[facility]  Pulmonary, Critical Care, and Sleep Medicine      Chief complaint:  COPD chronic respiratory failure SHERRIE    HPI:    Latonya Robert    returns the office today for a follow-up visit relates she is recently been released from the hospital COPD and continues on Incruse Advair albuterol supplemental oxygen and Zithromax times 90 days. This patient relates her cough is improved and her shortness of breath is significant with exertion but stable. She denies chest pain leg swelling and states she is using her CPAP faithfully      Allergies   Allergen Reactions    Ancef [Cefazolin] Hives    Contrast Agent [Iodine] Anaphylaxis, Shortness of Breath and Swelling     Needs pre-medication for IV contrast with Benadryl, Solu-Medrol    Fish Containing Products Anaphylaxis     Pt states she had a severe allergic reaction at 10 y/o.  Metformin Other (comments)     Abdominal pain, diarrhea.  Codeine Other (comments)     Altered mental status     Current Outpatient Medications   Medication Sig    azithromycin (ZITHROMAX) 250 mg tablet Take 1 Tab by mouth daily for 84 days.  albuterol sulfate 90 mcg/actuation aepb Take 1 Puff by inhalation every four (4) hours.  insulin glargine (LANTUS,BASAGLAR) 100 unit/mL (3 mL) inpn 30 Units by SubCUTAneous route nightly. Indications: type 2 diabetes mellitus    NOVOLOG FLEXPEN U-100 INSULIN 100 unit/mL inpn Continue home Sliding scale insulin as prior to admission    fluticasone-salmeterol (ADVAIR DISKUS) 100-50 mcg/dose diskus inhaler Take 1 Puff by inhalation every twelve (12) hours.  umeclidinium (INCRUSE ELLIPTA) 62.5 mcg/actuation inhaler Take 1 Puff by inhalation daily. Indications: BRONCHOSPASM PREVENTION WITH COPD    ipratropium (ATROVENT) 0.02 % soln 2.5 mL by Nebulization route four (4) times daily as needed.  Indications: BRONCHOSPASM PREVENTION WITH COPD    albuterol (PROVENTIL VENTOLIN) 2.5 mg /3 mL (0.083 %) nebulizer solution 3 mL by Nebulization route every four (4) hours as needed for Wheezing. Indications: BRONCHOSPASM PREVENTION, Chronic Obstructive Pulmonary Disease    omeprazole (PRILOSEC) 40 mg capsule Take 40 mg by mouth daily. Indications: gastroesophageal reflux disease    lisinopril (PRINIVIL, ZESTRIL) 40 mg tablet Take 20 mg by mouth two (2) times a day. Indications: hypertension    simethicone (MYLICON) 80 mg chewable tablet Take 80 mg by mouth every six (6) hours as needed for Flatulence.  cpap machine kit by Does Not Apply route nightly.  OXYGEN-AIR DELIVERY SYSTEMS 2 L by Nasal route continuous. [facility]    aspirin delayed-release 81 mg tablet Take 81 mg by mouth daily.  famotidine (PEPCID) 20 mg tablet Take 20 mg by mouth two (2) times daily as needed.  albuterol (PROVENTIL HFA, VENTOLIN HFA, PROAIR HFA) 90 mcg/actuation inhaler Take 2 Puffs by inhalation every four (4) hours as needed for Wheezing. For the next 2 days after hospital discharge, use your inhaler 4 times a day.  fluticasone (FLONASE) 50 mcg/actuation nasal spray 2 Sprays by Both Nostrils route daily.  montelukast (SINGULAIR) 10 mg tablet Take 10 mg by mouth nightly. No current facility-administered medications for this visit.       Past Medical History:   Diagnosis Date    Asthma     Chronic lung disease     COPD     Cystocele, midline     Diabetes mellitus ([facility] [address]     GERD (gastroesophageal reflux disease)     Hidradenitis suppurativa     Hyperlipidemia     Hypertension     SHERRIE on CPAP     CPAP    Stress incontinence      Past Surgical History:   Procedure Laterality Date    BREAST SURGERY PROCEDURE UNLISTED      Right breast benign tumor removal    HX APPENDECTOMY      HX CHOLECYSTECTOMY      HX DILATION AND CURETTAGE      Dysfunctional uterine bleeding, thought 2/2 fibroids    HX TUBAL LIGATION       Social History     Socioeconomic History    Marital status: LEGALLY      Spouse name: Not on file    Number of children: Not on file    Years of education: Not on file    Highest education level: Not on file   Social Needs    Financial resource strain: Not on file    Food insecurity - worry: Not on file    Food insecurity - inability: Not on file    Transportation needs - medical: Not on file    Transportation needs - non-medical: Not on file   Occupational History    Not on file   Tobacco Use    Smoking status: Former Smoker     Packs/day: 1.00     Years: 30.00     Pack years: 30.00     Types: Cigarettes     Start date: 1966     Last attempt to quit: 2006     Years since quittin.1    Smokeless tobacco: Never Used    Tobacco comment: 1-1.5 packs per day   Substance and Sexual Activity    Alcohol use: No    Drug use: No    Sexual activity: No   Other Topics Concern    Not on file   Social History Narrative    Not on file     Family History   Problem Relation Age of Onset    Hypertension Mother     Stroke Mother     Breast Cancer Mother         Bilateral mastectomies    Cancer Mother         ovarian and breast    Heart Failure Mother     Heart Attack Father         2011    Heart Surgery Father         CABG    Heart Failure Father     COPD Sister         Heavy smoker    Cancer Sister         ovarian    Heart Failure Sister     Lung Disease Sister     Asthma Child     Cancer Maternal Aunt         pancreatic    Cancer Maternal Grandfather         stomach       Review of systems:  She denies fever chills poor appetite or weight loss    Physical Exam:  Visit Vitals  /78 (BP 1 Location: Left arm, BP Patient Position: Sitting)   Pulse 87   Temp 97.6 °F (36.4 °C) (Oral)   Resp 20   Ht 5' 3\" (1.6 m)   Wt 115.2 kg (254 lb)   SpO2 95%   BMI 44.99 kg/m²       Well-developed obese  HEENT: WNL  Lymph node exam: Supraclavicular cervical lymph nodes negative  Chest: Equal symmetrical expansion no dullness no wheezes rales rubs  Heart: Regular rhythm no gallop no murmur no JVD no peripheral edema  Extremities: No cyanosis clubbing or calf tenderness  Neurological: Alert and oriented    Labs:    O2 sat room air at rest 92%  O2 sat room air with walking 300 feet O2 sat remained at   92%  Impression:     COPD chronic respiratory failure stable by history physical exam  SHERRIE stable by history  Plan:   Continue Incruse Advair as needed albuterol Zithromax  With follow-up in 3 months but will reassess at that time however because the patient has recurrent respiratory congestion bronchitis symptoms with her exacerbations will consider adding Daliresp in future  Oxygen with long walking distances and with sleep with her CPAP  Spirometry to qualify for pulmonary rehab    Mirza Person MD , [facility]    CC: Mario Solitario MD     [address], [address] [facility] [address]     P: [phone]     F: [phone] Karina

## 2020-05-27 NOTE — DISCHARGE NOTE NURSING/CASE MANAGEMENT/SOCIAL WORK - PATIENT PORTAL LINK FT
You can access the FollowMyHealth Patient Portal offered by [facility] by registering at the following website: [email or web address]. By joining [facility]’s FollowMyHealth portal, you will also be able to view your health information using other applications (apps) compatible with our system.

## 2020-05-27 NOTE — PHYSICAL THERAPY INITIAL EVALUATION ADULT - GENERAL OBSERVATIONS, REHAB EVAL
9:06-9:57.  Pt. in bed. A & O x 4. On 2 L  O2 via n.c. Pulse O2 at 97%. P.T. Eval done. Pt. O2 level steady w/  O2 , but lowers when O2 off.Encouraged to keep O2 -as discussed w/ Dr. Wilburn( pt. says she doesn't really use it at home).   Pt. wanted to go back to bed after P.T.-  back in bed. No apparent distress. Call bell in place. Reported to HUBER Yao and Dr. Shelton.  See rest of eval for particulars of functional status. 9:06-9:57.  Pt. in bed. A & O x 4. On 2 L  O2 via n.c. Pulse O2 at 96%. P.T. Eval done. Pt. O2 level steady w/  O2 , but lowers when O2 off.Encouraged to keep O2 -as discussed w/ Dr. Wilburn( pt. says she doesn't really use it at home).   Pt. wanted to go back to bed after P.T.-  back in bed. No apparent distress. Call bell in place. Reported to HUBER Yao and Dr. Shelton.  See rest of eval for particulars of functional status.

## 2020-06-09 DIAGNOSIS — R06.02 SHORTNESS OF BREATH: ICD-10-CM

## 2020-06-09 DIAGNOSIS — E78.00 PURE HYPERCHOLESTEROLEMIA, UNSPECIFIED: ICD-10-CM

## 2020-06-09 DIAGNOSIS — Z88.8 ALLERGY STATUS TO OTHER DRUGS, MEDICAMENTS AND BIOLOGICAL SUBSTANCES STATUS: ICD-10-CM

## 2020-06-09 DIAGNOSIS — J44.1 CHRONIC OBSTRUCTIVE PULMONARY DISEASE WITH (ACUTE) EXACERBATION: ICD-10-CM

## 2020-06-09 DIAGNOSIS — Z11.59 ENCOUNTER FOR SCREENING FOR OTHER VIRAL DISEASES: ICD-10-CM

## 2020-06-09 DIAGNOSIS — Z95.0 PRESENCE OF CARDIAC PACEMAKER: ICD-10-CM

## 2020-06-09 DIAGNOSIS — Z99.81 DEPENDENCE ON SUPPLEMENTAL OXYGEN: ICD-10-CM

## 2020-06-09 DIAGNOSIS — J96.10 CHRONIC RESPIRATORY FAILURE, UNSPECIFIED WHETHER WITH HYPOXIA OR HYPERCAPNIA: ICD-10-CM

## 2020-06-09 DIAGNOSIS — Z85.118 PERSONAL HISTORY OF OTHER MALIGNANT NEOPLASM OF BRONCHUS AND LUNG: ICD-10-CM

## 2020-06-09 DIAGNOSIS — Z88.2 ALLERGY STATUS TO SULFONAMIDES: ICD-10-CM

## 2020-06-09 DIAGNOSIS — I48.20 CHRONIC ATRIAL FIBRILLATION, UNSPECIFIED: ICD-10-CM

## 2020-06-09 DIAGNOSIS — Z90.2 ACQUIRED ABSENCE OF LUNG [PART OF]: ICD-10-CM

## 2020-06-09 DIAGNOSIS — Z92.21 PERSONAL HISTORY OF ANTINEOPLASTIC CHEMOTHERAPY: ICD-10-CM

## 2020-06-09 DIAGNOSIS — Z92.3 PERSONAL HISTORY OF IRRADIATION: ICD-10-CM

## 2020-06-09 DIAGNOSIS — F41.9 ANXIETY DISORDER, UNSPECIFIED: ICD-10-CM

## 2020-06-09 DIAGNOSIS — Z79.01 LONG TERM (CURRENT) USE OF ANTICOAGULANTS: ICD-10-CM

## 2020-06-09 DIAGNOSIS — N18.2 CHRONIC KIDNEY DISEASE, STAGE 2 (MILD): ICD-10-CM

## 2020-06-09 DIAGNOSIS — Z87.891 PERSONAL HISTORY OF NICOTINE DEPENDENCE: ICD-10-CM

## 2020-07-01 ENCOUNTER — OUTPATIENT (OUTPATIENT)
Dept: OUTPATIENT SERVICES | Facility: HOSPITAL | Age: 75
LOS: 1 days | Discharge: HOME | End: 2020-07-01

## 2020-07-01 DIAGNOSIS — Z95.0 PRESENCE OF CARDIAC PACEMAKER: Chronic | ICD-10-CM

## 2020-07-01 DIAGNOSIS — Z98.890 OTHER SPECIFIED POSTPROCEDURAL STATES: Chronic | ICD-10-CM

## 2020-07-01 DIAGNOSIS — Z90.2 ACQUIRED ABSENCE OF LUNG [PART OF]: Chronic | ICD-10-CM

## 2020-07-01 DIAGNOSIS — R79.1 ABNORMAL COAGULATION PROFILE: ICD-10-CM

## 2020-07-01 DIAGNOSIS — Z90.49 ACQUIRED ABSENCE OF OTHER SPECIFIED PARTS OF DIGESTIVE TRACT: Chronic | ICD-10-CM

## 2020-07-01 DIAGNOSIS — H26.9 UNSPECIFIED CATARACT: Chronic | ICD-10-CM

## 2020-07-01 DIAGNOSIS — Z79.01 LONG TERM (CURRENT) USE OF ANTICOAGULANTS: ICD-10-CM

## 2020-07-01 LAB
POCT INR: 1.5 RATIO — HIGH (ref 0.9–1.2)
POCT PT: 18.4 SEC — HIGH (ref 10–13.4)

## 2020-07-15 ENCOUNTER — OUTPATIENT (OUTPATIENT)
Dept: OUTPATIENT SERVICES | Facility: HOSPITAL | Age: 75
LOS: 1 days | Discharge: HOME | End: 2020-07-15

## 2020-07-15 DIAGNOSIS — Z90.2 ACQUIRED ABSENCE OF LUNG [PART OF]: Chronic | ICD-10-CM

## 2020-07-15 DIAGNOSIS — H26.9 UNSPECIFIED CATARACT: Chronic | ICD-10-CM

## 2020-07-15 DIAGNOSIS — Z98.890 OTHER SPECIFIED POSTPROCEDURAL STATES: Chronic | ICD-10-CM

## 2020-07-15 DIAGNOSIS — Z79.01 LONG TERM (CURRENT) USE OF ANTICOAGULANTS: ICD-10-CM

## 2020-07-15 DIAGNOSIS — Z95.0 PRESENCE OF CARDIAC PACEMAKER: Chronic | ICD-10-CM

## 2020-07-15 DIAGNOSIS — R79.1 ABNORMAL COAGULATION PROFILE: ICD-10-CM

## 2020-07-15 DIAGNOSIS — Z90.49 ACQUIRED ABSENCE OF OTHER SPECIFIED PARTS OF DIGESTIVE TRACT: Chronic | ICD-10-CM

## 2020-07-15 LAB
INR PPP: 2.1 RATIO
POCT INR: 2.1 RATIO — HIGH (ref 0.9–1.2)
POCT PT: 25 SEC — HIGH (ref 10–13.4)
POCT-PROTHROMBIN TIME: 25 SECS
QUALITY CONTROL: YES

## 2020-07-30 ENCOUNTER — RECORD ABSTRACTING (OUTPATIENT)
Age: 75
End: 2020-07-30

## 2020-07-30 DIAGNOSIS — Z92.3 PERSONAL HISTORY OF IRRADIATION: ICD-10-CM

## 2020-07-30 DIAGNOSIS — Z92.21 PERSONAL HISTORY OF ANTINEOPLASTIC CHEMOTHERAPY: ICD-10-CM

## 2020-07-30 DIAGNOSIS — R79.1 ABNORMAL COAGULATION PROFILE: ICD-10-CM

## 2020-07-30 RX ORDER — WARFARIN SODIUM 1 MG/1
1 TABLET ORAL
Refills: 0 | Status: ACTIVE | COMMUNITY

## 2020-07-30 RX ORDER — GABAPENTIN 400 MG
400 TABLET ORAL
Refills: 0 | Status: ACTIVE | COMMUNITY

## 2020-07-30 RX ORDER — CHOLECALCIFEROL (VITAMIN D3) 25 MCG
TABLET ORAL
Refills: 0 | Status: ACTIVE | COMMUNITY

## 2020-07-30 RX ORDER — FAMOTIDINE 40 MG/1
40 TABLET, FILM COATED ORAL DAILY
Refills: 0 | Status: ACTIVE | COMMUNITY

## 2020-07-30 RX ORDER — PANTOPRAZOLE SODIUM 20 MG/1
TABLET, DELAYED RELEASE ORAL
Refills: 0 | Status: ACTIVE | COMMUNITY

## 2020-07-30 RX ORDER — ANTIARTHRITIC COMBINATION NO.2 900 MG
5000 TABLET ORAL
Refills: 0 | Status: ACTIVE | COMMUNITY

## 2020-08-11 ENCOUNTER — APPOINTMENT (OUTPATIENT)
Dept: MEDICATION MANAGEMENT | Facility: CLINIC | Age: 75
End: 2020-08-11

## 2020-08-11 ENCOUNTER — OUTPATIENT (OUTPATIENT)
Dept: OUTPATIENT SERVICES | Facility: HOSPITAL | Age: 75
LOS: 1 days | Discharge: HOME | End: 2020-08-11

## 2020-08-11 VITALS — RESPIRATION RATE: 16 BRPM | HEART RATE: 68 BPM | OXYGEN SATURATION: 98 %

## 2020-08-11 DIAGNOSIS — Z79.01 ENCOUNTER FOR THERAPEUTIC DRUG LVL MONITORING: ICD-10-CM

## 2020-08-11 DIAGNOSIS — Z95.0 PRESENCE OF CARDIAC PACEMAKER: Chronic | ICD-10-CM

## 2020-08-11 DIAGNOSIS — Z90.49 ACQUIRED ABSENCE OF OTHER SPECIFIED PARTS OF DIGESTIVE TRACT: Chronic | ICD-10-CM

## 2020-08-11 DIAGNOSIS — Z90.2 ACQUIRED ABSENCE OF LUNG [PART OF]: Chronic | ICD-10-CM

## 2020-08-11 DIAGNOSIS — Z98.890 OTHER SPECIFIED POSTPROCEDURAL STATES: Chronic | ICD-10-CM

## 2020-08-11 DIAGNOSIS — Z51.81 ENCOUNTER FOR THERAPEUTIC DRUG LVL MONITORING: ICD-10-CM

## 2020-08-11 DIAGNOSIS — R79.1 ABNORMAL COAGULATION PROFILE: ICD-10-CM

## 2020-08-11 DIAGNOSIS — H26.9 UNSPECIFIED CATARACT: Chronic | ICD-10-CM

## 2020-08-11 DIAGNOSIS — Z79.01 LONG TERM (CURRENT) USE OF ANTICOAGULANTS: ICD-10-CM

## 2020-08-11 LAB
INR PPP: 2 RATIO
POCT-PROTHROMBIN TIME: 23.7 SECS
QUALITY CONTROL: YES

## 2020-08-18 ENCOUNTER — OUTPATIENT (OUTPATIENT)
Dept: OUTPATIENT SERVICES | Facility: HOSPITAL | Age: 75
LOS: 1 days | Discharge: HOME | End: 2020-08-18
Payer: MEDICARE

## 2020-08-18 DIAGNOSIS — Z95.0 PRESENCE OF CARDIAC PACEMAKER: Chronic | ICD-10-CM

## 2020-08-18 DIAGNOSIS — N64.4 MASTODYNIA: ICD-10-CM

## 2020-08-18 DIAGNOSIS — Z90.49 ACQUIRED ABSENCE OF OTHER SPECIFIED PARTS OF DIGESTIVE TRACT: Chronic | ICD-10-CM

## 2020-08-18 DIAGNOSIS — Z98.890 OTHER SPECIFIED POSTPROCEDURAL STATES: Chronic | ICD-10-CM

## 2020-08-18 DIAGNOSIS — Z90.2 ACQUIRED ABSENCE OF LUNG [PART OF]: Chronic | ICD-10-CM

## 2020-08-18 DIAGNOSIS — H26.9 UNSPECIFIED CATARACT: Chronic | ICD-10-CM

## 2020-08-18 PROCEDURE — G0279: CPT | Mod: 26

## 2020-08-18 PROCEDURE — 76641 ULTRASOUND BREAST COMPLETE: CPT | Mod: 26,LT

## 2020-08-18 PROCEDURE — 77066 DX MAMMO INCL CAD BI: CPT | Mod: 26

## 2020-10-07 PROBLEM — Z90.49 HISTORY OF APPENDECTOMY: Status: RESOLVED | Noted: 2020-01-01 | Resolved: 2020-01-01

## 2021-01-01 ENCOUNTER — OUTPATIENT (OUTPATIENT)
Dept: OUTPATIENT SERVICES | Facility: HOSPITAL | Age: 76
LOS: 1 days | Discharge: HOME | End: 2021-01-01

## 2021-01-01 ENCOUNTER — TRANSCRIPTION ENCOUNTER (OUTPATIENT)
Age: 76
End: 2021-01-01

## 2021-01-01 ENCOUNTER — INPATIENT (INPATIENT)
Facility: HOSPITAL | Age: 76
LOS: 3 days | Discharge: HOME HEALTH | End: 2021-07-16
Attending: PSYCHIATRY & NEUROLOGY | Admitting: PSYCHIATRY & NEUROLOGY
Payer: MEDICARE

## 2021-01-01 ENCOUNTER — RESULT REVIEW (OUTPATIENT)
Age: 76
End: 2021-01-01

## 2021-01-01 ENCOUNTER — INPATIENT (INPATIENT)
Facility: HOSPITAL | Age: 76
LOS: 8 days | End: 2021-09-03
Attending: INTERNAL MEDICINE | Admitting: INTERNAL MEDICINE
Payer: MEDICARE

## 2021-01-01 ENCOUNTER — EMERGENCY (EMERGENCY)
Facility: HOSPITAL | Age: 76
LOS: 0 days | Discharge: HOME | End: 2021-06-09
Attending: STUDENT IN AN ORGANIZED HEALTH CARE EDUCATION/TRAINING PROGRAM | Admitting: STUDENT IN AN ORGANIZED HEALTH CARE EDUCATION/TRAINING PROGRAM
Payer: MEDICARE

## 2021-01-01 ENCOUNTER — RX RENEWAL (OUTPATIENT)
Age: 76
End: 2021-01-01

## 2021-01-01 ENCOUNTER — NON-APPOINTMENT (OUTPATIENT)
Age: 76
End: 2021-01-01

## 2021-01-01 ENCOUNTER — APPOINTMENT (OUTPATIENT)
Dept: MEDICATION MANAGEMENT | Facility: CLINIC | Age: 76
End: 2021-01-01

## 2021-01-01 ENCOUNTER — APPOINTMENT (OUTPATIENT)
Age: 76
End: 2021-01-01
Payer: MEDICARE

## 2021-01-01 ENCOUNTER — EMERGENCY (EMERGENCY)
Facility: HOSPITAL | Age: 76
LOS: 0 days | Discharge: HOME | End: 2021-06-11
Attending: EMERGENCY MEDICINE | Admitting: EMERGENCY MEDICINE
Payer: MEDICARE

## 2021-01-01 ENCOUNTER — INPATIENT (INPATIENT)
Facility: HOSPITAL | Age: 76
LOS: 0 days | Discharge: HOSPICE | End: 2021-06-06
Attending: STUDENT IN AN ORGANIZED HEALTH CARE EDUCATION/TRAINING PROGRAM | Admitting: STUDENT IN AN ORGANIZED HEALTH CARE EDUCATION/TRAINING PROGRAM
Payer: MEDICARE

## 2021-01-01 ENCOUNTER — INPATIENT (INPATIENT)
Facility: HOSPITAL | Age: 76
LOS: 2 days | Discharge: HOME HEALTH | End: 2021-06-30
Attending: INTERNAL MEDICINE | Admitting: INTERNAL MEDICINE
Payer: MEDICARE

## 2021-01-01 ENCOUNTER — EMERGENCY (EMERGENCY)
Facility: HOSPITAL | Age: 76
LOS: 0 days | Discharge: HOME | End: 2021-06-27
Attending: STUDENT IN AN ORGANIZED HEALTH CARE EDUCATION/TRAINING PROGRAM | Admitting: STUDENT IN AN ORGANIZED HEALTH CARE EDUCATION/TRAINING PROGRAM
Payer: MEDICARE

## 2021-01-01 ENCOUNTER — APPOINTMENT (OUTPATIENT)
Age: 76
End: 2021-01-01

## 2021-01-01 ENCOUNTER — INPATIENT (INPATIENT)
Facility: HOSPITAL | Age: 76
LOS: 1 days | Discharge: HOME | End: 2021-03-24
Attending: STUDENT IN AN ORGANIZED HEALTH CARE EDUCATION/TRAINING PROGRAM | Admitting: STUDENT IN AN ORGANIZED HEALTH CARE EDUCATION/TRAINING PROGRAM
Payer: MEDICARE

## 2021-01-01 ENCOUNTER — INPATIENT (INPATIENT)
Facility: HOSPITAL | Age: 76
LOS: 10 days | Discharge: NURSING, REHAB OR LONG-TERM CARE | End: 2021-08-03
Attending: STUDENT IN AN ORGANIZED HEALTH CARE EDUCATION/TRAINING PROGRAM | Admitting: STUDENT IN AN ORGANIZED HEALTH CARE EDUCATION/TRAINING PROGRAM
Payer: MEDICARE

## 2021-01-01 ENCOUNTER — INPATIENT (INPATIENT)
Facility: HOSPITAL | Age: 76
LOS: 5 days | Discharge: NURSING, REHAB OR LONG-TERM CARE | End: 2021-07-12
Attending: INTERNAL MEDICINE | Admitting: INTERNAL MEDICINE
Payer: MEDICARE

## 2021-01-01 ENCOUNTER — INPATIENT (INPATIENT)
Facility: HOSPITAL | Age: 76
LOS: 4 days | Discharge: HOME | End: 2021-04-28
Attending: INTERNAL MEDICINE | Admitting: INTERNAL MEDICINE
Payer: MEDICARE

## 2021-01-01 ENCOUNTER — INPATIENT (INPATIENT)
Facility: HOSPITAL | Age: 76
LOS: 1 days | Discharge: HOME | End: 2021-06-26
Attending: HOSPITALIST | Admitting: HOSPITALIST
Payer: MEDICARE

## 2021-01-01 ENCOUNTER — INPATIENT (INPATIENT)
Facility: HOSPITAL | Age: 76
LOS: 3 days | Discharge: HOSPICE | End: 2021-05-21
Attending: INTERNAL MEDICINE | Admitting: INTERNAL MEDICINE
Payer: MEDICARE

## 2021-01-01 ENCOUNTER — EMERGENCY (EMERGENCY)
Facility: HOSPITAL | Age: 76
LOS: 0 days | Discharge: HOME | End: 2021-06-20
Attending: EMERGENCY MEDICINE | Admitting: EMERGENCY MEDICINE
Payer: MEDICARE

## 2021-01-01 VITALS
HEART RATE: 72 BPM | RESPIRATION RATE: 18 BRPM | OXYGEN SATURATION: 100 % | DIASTOLIC BLOOD PRESSURE: 67 MMHG | SYSTOLIC BLOOD PRESSURE: 148 MMHG | TEMPERATURE: 96 F

## 2021-01-01 VITALS
RESPIRATION RATE: 18 BRPM | OXYGEN SATURATION: 95 % | SYSTOLIC BLOOD PRESSURE: 113 MMHG | DIASTOLIC BLOOD PRESSURE: 58 MMHG | HEART RATE: 77 BPM

## 2021-01-01 VITALS
SYSTOLIC BLOOD PRESSURE: 129 MMHG | DIASTOLIC BLOOD PRESSURE: 60 MMHG | RESPIRATION RATE: 18 BRPM | TEMPERATURE: 96 F | HEART RATE: 68 BPM

## 2021-01-01 VITALS — RESPIRATION RATE: 16 BRPM | OXYGEN SATURATION: 95 % | HEART RATE: 68 BPM

## 2021-01-01 VITALS
HEART RATE: 70 BPM | TEMPERATURE: 97 F | RESPIRATION RATE: 18 BRPM | DIASTOLIC BLOOD PRESSURE: 65 MMHG | OXYGEN SATURATION: 99 % | SYSTOLIC BLOOD PRESSURE: 145 MMHG

## 2021-01-01 VITALS
WEIGHT: 134.92 LBS | RESPIRATION RATE: 22 BRPM | SYSTOLIC BLOOD PRESSURE: 136 MMHG | OXYGEN SATURATION: 100 % | HEART RATE: 76 BPM | HEIGHT: 61 IN | TEMPERATURE: 98 F | DIASTOLIC BLOOD PRESSURE: 71 MMHG

## 2021-01-01 VITALS
OXYGEN SATURATION: 98 % | HEART RATE: 72 BPM | DIASTOLIC BLOOD PRESSURE: 60 MMHG | SYSTOLIC BLOOD PRESSURE: 132 MMHG | WEIGHT: 102.96 LBS | RESPIRATION RATE: 16 BRPM | HEIGHT: 64 IN | TEMPERATURE: 96 F

## 2021-01-01 VITALS — HEIGHT: 61 IN | OXYGEN SATURATION: 100 %

## 2021-01-01 VITALS
WEIGHT: 119.93 LBS | RESPIRATION RATE: 18 BRPM | DIASTOLIC BLOOD PRESSURE: 71 MMHG | SYSTOLIC BLOOD PRESSURE: 150 MMHG | TEMPERATURE: 98 F | HEART RATE: 72 BPM | OXYGEN SATURATION: 100 % | HEIGHT: 61 IN

## 2021-01-01 VITALS
TEMPERATURE: 98 F | SYSTOLIC BLOOD PRESSURE: 112 MMHG | RESPIRATION RATE: 16 BRPM | HEART RATE: 74 BPM | DIASTOLIC BLOOD PRESSURE: 72 MMHG

## 2021-01-01 VITALS
DIASTOLIC BLOOD PRESSURE: 70 MMHG | SYSTOLIC BLOOD PRESSURE: 150 MMHG | TEMPERATURE: 98 F | HEIGHT: 64 IN | OXYGEN SATURATION: 100 % | HEART RATE: 87 BPM | RESPIRATION RATE: 18 BRPM

## 2021-01-01 VITALS
OXYGEN SATURATION: 100 % | HEART RATE: 71 BPM | SYSTOLIC BLOOD PRESSURE: 137 MMHG | DIASTOLIC BLOOD PRESSURE: 60 MMHG | HEIGHT: 61 IN | TEMPERATURE: 97 F | RESPIRATION RATE: 20 BRPM

## 2021-01-01 VITALS
OXYGEN SATURATION: 100 % | SYSTOLIC BLOOD PRESSURE: 143 MMHG | WEIGHT: 119.93 LBS | HEART RATE: 70 BPM | HEIGHT: 64 IN | DIASTOLIC BLOOD PRESSURE: 67 MMHG | RESPIRATION RATE: 18 BRPM | TEMPERATURE: 98 F

## 2021-01-01 VITALS
OXYGEN SATURATION: 100 % | DIASTOLIC BLOOD PRESSURE: 58 MMHG | RESPIRATION RATE: 15 BRPM | HEIGHT: 64 IN | SYSTOLIC BLOOD PRESSURE: 121 MMHG | TEMPERATURE: 98 F | HEART RATE: 72 BPM

## 2021-01-01 VITALS
RESPIRATION RATE: 18 BRPM | HEIGHT: 64 IN | HEART RATE: 72 BPM | SYSTOLIC BLOOD PRESSURE: 142 MMHG | DIASTOLIC BLOOD PRESSURE: 68 MMHG | TEMPERATURE: 98 F | OXYGEN SATURATION: 100 %

## 2021-01-01 VITALS
WEIGHT: 145.06 LBS | RESPIRATION RATE: 20 BRPM | DIASTOLIC BLOOD PRESSURE: 80 MMHG | HEIGHT: 64 IN | HEART RATE: 80 BPM | TEMPERATURE: 98 F | OXYGEN SATURATION: 100 % | SYSTOLIC BLOOD PRESSURE: 145 MMHG

## 2021-01-01 VITALS
DIASTOLIC BLOOD PRESSURE: 83 MMHG | RESPIRATION RATE: 18 BRPM | SYSTOLIC BLOOD PRESSURE: 152 MMHG | HEART RATE: 70 BPM | TEMPERATURE: 98 F

## 2021-01-01 VITALS — WEIGHT: 127.65 LBS | HEIGHT: 61 IN

## 2021-01-01 VITALS
WEIGHT: 110.01 LBS | DIASTOLIC BLOOD PRESSURE: 63 MMHG | TEMPERATURE: 98 F | HEIGHT: 64 IN | HEART RATE: 72 BPM | RESPIRATION RATE: 18 BRPM | SYSTOLIC BLOOD PRESSURE: 151 MMHG | OXYGEN SATURATION: 100 %

## 2021-01-01 VITALS
DIASTOLIC BLOOD PRESSURE: 68 MMHG | OXYGEN SATURATION: 99 % | RESPIRATION RATE: 18 BRPM | TEMPERATURE: 97 F | SYSTOLIC BLOOD PRESSURE: 142 MMHG | HEART RATE: 69 BPM

## 2021-01-01 VITALS
RESPIRATION RATE: 20 BRPM | SYSTOLIC BLOOD PRESSURE: 127 MMHG | TEMPERATURE: 97 F | HEART RATE: 95 BPM | DIASTOLIC BLOOD PRESSURE: 66 MMHG

## 2021-01-01 VITALS
OXYGEN SATURATION: 91 % | SYSTOLIC BLOOD PRESSURE: 138 MMHG | TEMPERATURE: 100 F | DIASTOLIC BLOOD PRESSURE: 70 MMHG | HEART RATE: 68 BPM | RESPIRATION RATE: 26 BRPM

## 2021-01-01 VITALS
HEART RATE: 77 BPM | TEMPERATURE: 99 F | DIASTOLIC BLOOD PRESSURE: 63 MMHG | RESPIRATION RATE: 18 BRPM | SYSTOLIC BLOOD PRESSURE: 139 MMHG

## 2021-01-01 VITALS — HEIGHT: 64 IN

## 2021-01-01 VITALS
RESPIRATION RATE: 12 BRPM | OXYGEN SATURATION: 92 % | DIASTOLIC BLOOD PRESSURE: 68 MMHG | HEART RATE: 76 BPM | SYSTOLIC BLOOD PRESSURE: 110 MMHG

## 2021-01-01 VITALS — HEART RATE: 77 BPM | RESPIRATION RATE: 19 BRPM | OXYGEN SATURATION: 99 %

## 2021-01-01 VITALS
DIASTOLIC BLOOD PRESSURE: 65 MMHG | SYSTOLIC BLOOD PRESSURE: 140 MMHG | OXYGEN SATURATION: 98 % | HEART RATE: 67 BPM | TEMPERATURE: 96 F

## 2021-01-01 VITALS — HEIGHT: 64 IN | WEIGHT: 100.75 LBS

## 2021-01-01 VITALS — TEMPERATURE: 97 F

## 2021-01-01 DIAGNOSIS — Z98.890 OTHER SPECIFIED POSTPROCEDURAL STATES: Chronic | ICD-10-CM

## 2021-01-01 DIAGNOSIS — Z87.891 PERSONAL HISTORY OF NICOTINE DEPENDENCE: ICD-10-CM

## 2021-01-01 DIAGNOSIS — Z98.41 CATARACT EXTRACTION STATUS, RIGHT EYE: ICD-10-CM

## 2021-01-01 DIAGNOSIS — Z88.8 ALLERGY STATUS TO OTHER DRUGS, MEDICAMENTS AND BIOLOGICAL SUBSTANCES STATUS: ICD-10-CM

## 2021-01-01 DIAGNOSIS — J18.9 PNEUMONIA, UNSPECIFIED ORGANISM: ICD-10-CM

## 2021-01-01 DIAGNOSIS — Z90.2 ACQUIRED ABSENCE OF LUNG [PART OF]: Chronic | ICD-10-CM

## 2021-01-01 DIAGNOSIS — J96.22 ACUTE AND CHRONIC RESPIRATORY FAILURE WITH HYPERCAPNIA: ICD-10-CM

## 2021-01-01 DIAGNOSIS — J44.1 CHRONIC OBSTRUCTIVE PULMONARY DISEASE WITH (ACUTE) EXACERBATION: ICD-10-CM

## 2021-01-01 DIAGNOSIS — R06.02 SHORTNESS OF BREATH: ICD-10-CM

## 2021-01-01 DIAGNOSIS — F41.9 ANXIETY DISORDER, UNSPECIFIED: ICD-10-CM

## 2021-01-01 DIAGNOSIS — Z95.0 PRESENCE OF CARDIAC PACEMAKER: Chronic | ICD-10-CM

## 2021-01-01 DIAGNOSIS — Z90.49 ACQUIRED ABSENCE OF OTHER SPECIFIED PARTS OF DIGESTIVE TRACT: Chronic | ICD-10-CM

## 2021-01-01 DIAGNOSIS — E78.00 PURE HYPERCHOLESTEROLEMIA, UNSPECIFIED: ICD-10-CM

## 2021-01-01 DIAGNOSIS — Z95.0 PRESENCE OF CARDIAC PACEMAKER: ICD-10-CM

## 2021-01-01 DIAGNOSIS — J96.91 RESPIRATORY FAILURE, UNSPECIFIED WITH HYPOXIA: ICD-10-CM

## 2021-01-01 DIAGNOSIS — Z92.21 PERSONAL HISTORY OF ANTINEOPLASTIC CHEMOTHERAPY: ICD-10-CM

## 2021-01-01 DIAGNOSIS — D63.8 ANEMIA IN OTHER CHRONIC DISEASES CLASSIFIED ELSEWHERE: ICD-10-CM

## 2021-01-01 DIAGNOSIS — Z71.89 OTHER SPECIFIED COUNSELING: ICD-10-CM

## 2021-01-01 DIAGNOSIS — R55 SYNCOPE AND COLLAPSE: ICD-10-CM

## 2021-01-01 DIAGNOSIS — Z88.1 ALLERGY STATUS TO OTHER ANTIBIOTIC AGENTS STATUS: ICD-10-CM

## 2021-01-01 DIAGNOSIS — I27.20 PULMONARY HYPERTENSION, UNSPECIFIED: ICD-10-CM

## 2021-01-01 DIAGNOSIS — Z79.01 LONG TERM (CURRENT) USE OF ANTICOAGULANTS: ICD-10-CM

## 2021-01-01 DIAGNOSIS — C34.92 MALIGNANT NEOPLASM OF UNSPECIFIED PART OF LEFT BRONCHUS OR LUNG: ICD-10-CM

## 2021-01-01 DIAGNOSIS — I49.5 SICK SINUS SYNDROME: ICD-10-CM

## 2021-01-01 DIAGNOSIS — H26.9 UNSPECIFIED CATARACT: Chronic | ICD-10-CM

## 2021-01-01 DIAGNOSIS — Z92.3 PERSONAL HISTORY OF IRRADIATION: ICD-10-CM

## 2021-01-01 DIAGNOSIS — Z90.2 ACQUIRED ABSENCE OF LUNG [PART OF]: ICD-10-CM

## 2021-01-01 DIAGNOSIS — J44.9 CHRONIC OBSTRUCTIVE PULMONARY DISEASE, UNSPECIFIED: ICD-10-CM

## 2021-01-01 DIAGNOSIS — Z98.890 OTHER SPECIFIED POSTPROCEDURAL STATES: ICD-10-CM

## 2021-01-01 DIAGNOSIS — R64 CACHEXIA: ICD-10-CM
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
## 2021-01-01 DIAGNOSIS — I48.20 CHRONIC ATRIAL FIBRILLATION, UNSPECIFIED: ICD-10-CM

## 2021-01-01 DIAGNOSIS — I48.91 UNSPECIFIED ATRIAL FIBRILLATION: ICD-10-CM

## 2021-01-01 DIAGNOSIS — R73.9 HYPERGLYCEMIA, UNSPECIFIED: ICD-10-CM

## 2021-01-01 DIAGNOSIS — E78.5 HYPERLIPIDEMIA, UNSPECIFIED: ICD-10-CM

## 2021-01-01 DIAGNOSIS — R06.00 DYSPNEA, UNSPECIFIED: ICD-10-CM

## 2021-01-01 DIAGNOSIS — T50.901A POISONING BY UNSPECIFIED DRUGS, MEDICAMENTS AND BIOLOGICAL SUBSTANCES, ACCIDENTAL (UNINTENTIONAL), INITIAL ENCOUNTER: ICD-10-CM

## 2021-01-01 DIAGNOSIS — Z85.118 PERSONAL HISTORY OF OTHER MALIGNANT NEOPLASM OF BRONCHUS AND LUNG: ICD-10-CM

## 2021-01-01 DIAGNOSIS — Z66 DO NOT RESUSCITATE: ICD-10-CM

## 2021-01-01 DIAGNOSIS — Z51.5 ENCOUNTER FOR PALLIATIVE CARE: ICD-10-CM

## 2021-01-01 DIAGNOSIS — J96.21 ACUTE AND CHRONIC RESPIRATORY FAILURE WITH HYPOXIA: ICD-10-CM

## 2021-01-01 DIAGNOSIS — Z79.891 LONG TERM (CURRENT) USE OF OPIATE ANALGESIC: ICD-10-CM

## 2021-01-01 DIAGNOSIS — T42.4X2A POISONING BY BENZODIAZEPINES, INTENTIONAL SELF-HARM, INITIAL ENCOUNTER: ICD-10-CM

## 2021-01-01 DIAGNOSIS — C34.90 MALIGNANT NEOPLASM OF UNSPECIFIED PART OF UNSPECIFIED BRONCHUS OR LUNG: ICD-10-CM

## 2021-01-01 DIAGNOSIS — I27.23 PULMONARY HYPERTENSION DUE TO LUNG DISEASES AND HYPOXIA: ICD-10-CM

## 2021-01-01 DIAGNOSIS — K21.9 GASTRO-ESOPHAGEAL REFLUX DISEASE WITHOUT ESOPHAGITIS: ICD-10-CM

## 2021-01-01 DIAGNOSIS — Z88.2 ALLERGY STATUS TO SULFONAMIDES: ICD-10-CM

## 2021-01-01 DIAGNOSIS — Z88.8 ALLERGY STATUS TO OTHER DRUGS, MEDICAMENTS AND BIOLOGICAL SUBSTANCES: ICD-10-CM

## 2021-01-01 DIAGNOSIS — G92 TOXIC ENCEPHALOPATHY: ICD-10-CM

## 2021-01-01 DIAGNOSIS — Z99.81 DEPENDENCE ON SUPPLEMENTAL OXYGEN: ICD-10-CM

## 2021-01-01 DIAGNOSIS — Z91.09 OTHER ALLERGY STATUS, OTHER THAN TO DRUGS AND BIOLOGICAL SUBSTANCES: ICD-10-CM

## 2021-01-01 DIAGNOSIS — Z88.6 ALLERGY STATUS TO ANALGESIC AGENT: ICD-10-CM

## 2021-01-01 DIAGNOSIS — F32.9 MAJOR DEPRESSIVE DISORDER, SINGLE EPISODE, UNSPECIFIED: ICD-10-CM

## 2021-01-01 DIAGNOSIS — E83.42 HYPOMAGNESEMIA: ICD-10-CM

## 2021-01-01 DIAGNOSIS — S09.90XA UNSPECIFIED INJURY OF HEAD, INITIAL ENCOUNTER: ICD-10-CM

## 2021-01-01 DIAGNOSIS — D72.829 ELEVATED WHITE BLOOD CELL COUNT, UNSPECIFIED: ICD-10-CM

## 2021-01-01 DIAGNOSIS — J96.12 CHRONIC RESPIRATORY FAILURE WITH HYPERCAPNIA: ICD-10-CM

## 2021-01-01 DIAGNOSIS — J94.2 HEMOTHORAX: ICD-10-CM

## 2021-01-01 DIAGNOSIS — T38.0X5A ADVERSE EFFECT OF GLUCOCORTICOIDS AND SYNTHETIC ANALOGUES, INITIAL ENCOUNTER: ICD-10-CM

## 2021-01-01 DIAGNOSIS — I10 ESSENTIAL (PRIMARY) HYPERTENSION: ICD-10-CM

## 2021-01-01 DIAGNOSIS — Z59.7 INSUFFICIENT SOCIAL INSURANCE AND WELFARE SUPPORT: ICD-10-CM

## 2021-01-01 DIAGNOSIS — Z98.42 CATARACT EXTRACTION STATUS, LEFT EYE: ICD-10-CM

## 2021-01-01 DIAGNOSIS — D64.9 ANEMIA, UNSPECIFIED: ICD-10-CM
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
## 2021-01-01 DIAGNOSIS — Z79.51 LONG TERM (CURRENT) USE OF INHALED STEROIDS: ICD-10-CM

## 2021-01-01 DIAGNOSIS — J44.0 CHRONIC OBSTRUCTIVE PULMONARY DISEASE WITH (ACUTE) LOWER RESPIRATORY INFECTION: ICD-10-CM

## 2021-01-01 DIAGNOSIS — Z53.29 PROCEDURE AND TREATMENT NOT CARRIED OUT BECAUSE OF PATIENT'S DECISION FOR OTHER REASONS: ICD-10-CM

## 2021-01-01 DIAGNOSIS — Z90.49 ACQUIRED ABSENCE OF OTHER SPECIFIED PARTS OF DIGESTIVE TRACT: ICD-10-CM

## 2021-01-01 DIAGNOSIS — K59.00 CONSTIPATION, UNSPECIFIED: ICD-10-CM
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
## 2021-01-01 DIAGNOSIS — R25.1 TREMOR, UNSPECIFIED: ICD-10-CM

## 2021-01-01 DIAGNOSIS — R53.1 WEAKNESS: ICD-10-CM

## 2021-01-01 DIAGNOSIS — J90 PLEURAL EFFUSION, NOT ELSEWHERE CLASSIFIED: ICD-10-CM

## 2021-01-01 DIAGNOSIS — J96.11 CHRONIC RESPIRATORY FAILURE WITH HYPOXIA: ICD-10-CM

## 2021-01-01 DIAGNOSIS — F40.240 CLAUSTROPHOBIA: ICD-10-CM

## 2021-01-01 DIAGNOSIS — R04.2 HEMOPTYSIS: ICD-10-CM
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
## 2021-01-01 DIAGNOSIS — J96.20 ACUTE AND CHRONIC RESPIRATORY FAILURE, UNSPECIFIED WHETHER WITH HYPOXIA OR HYPERCAPNIA: ICD-10-CM

## 2021-01-01 DIAGNOSIS — Z20.822 CONTACT WITH AND (SUSPECTED) EXPOSURE TO COVID-19: ICD-10-CM

## 2021-01-01 DIAGNOSIS — Z79.899 OTHER LONG TERM (CURRENT) DRUG THERAPY: ICD-10-CM

## 2021-01-01 DIAGNOSIS — J96.10 CHRONIC RESPIRATORY FAILURE, UNSPECIFIED WHETHER WITH HYPOXIA OR HYPERCAPNIA: ICD-10-CM

## 2021-01-01 DIAGNOSIS — R63.8 OTHER SYMPTOMS AND SIGNS CONCERNING FOOD AND FLUID INTAKE: ICD-10-CM

## 2021-01-01 DIAGNOSIS — I27.81 COR PULMONALE (CHRONIC): ICD-10-CM

## 2021-01-01 DIAGNOSIS — H26.9 UNSPECIFIED CATARACT: ICD-10-CM

## 2021-01-01 DIAGNOSIS — E09.65 DRUG OR CHEMICAL INDUCED DIABETES MELLITUS WITH HYPERGLYCEMIA: ICD-10-CM

## 2021-01-01 DIAGNOSIS — Z79.52 LONG TERM (CURRENT) USE OF SYSTEMIC STEROIDS: ICD-10-CM

## 2021-01-01 DIAGNOSIS — R29.6 REPEATED FALLS: ICD-10-CM
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
## 2021-01-01 DIAGNOSIS — R53.2 FUNCTIONAL QUADRIPLEGIA: ICD-10-CM

## 2021-01-01 DIAGNOSIS — T50.902A POISONING BY UNSPECIFIED DRUGS, MEDICAMENTS AND BIOLOGICAL SUBSTANCES, INTENTIONAL SELF-HARM, INITIAL ENCOUNTER: ICD-10-CM

## 2021-01-01 DIAGNOSIS — F41.1 GENERALIZED ANXIETY DISORDER: ICD-10-CM
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
## 2021-01-01 DIAGNOSIS — Z85.9 PERSONAL HISTORY OF MALIGNANT NEOPLASM, UNSPECIFIED: ICD-10-CM

## 2021-01-01 DIAGNOSIS — N39.0 URINARY TRACT INFECTION, SITE NOT SPECIFIED: ICD-10-CM

## 2021-01-01 DIAGNOSIS — L89.302 PRESSURE ULCER OF UNSPECIFIED BUTTOCK, STAGE 2: ICD-10-CM

## 2021-01-01 DIAGNOSIS — R79.1 ABNORMAL COAGULATION PROFILE: ICD-10-CM
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
## 2021-01-01 DIAGNOSIS — Z88.5 ALLERGY STATUS TO NARCOTIC AGENT: ICD-10-CM

## 2021-01-01 DIAGNOSIS — Z92.81 PERSONAL HISTORY OF EXTRACORPOREAL MEMBRANE OXYGENATION (ECMO): ICD-10-CM

## 2021-01-01 DIAGNOSIS — E87.2 ACIDOSIS: ICD-10-CM

## 2021-01-01 DIAGNOSIS — I31.3 PERICARDIAL EFFUSION (NONINFLAMMATORY): ICD-10-CM

## 2021-01-01 LAB
-  AMPICILLIN: SIGNIFICANT CHANGE UP
-  CIPROFLOXACIN: SIGNIFICANT CHANGE UP
-  LEVOFLOXACIN: SIGNIFICANT CHANGE UP
-  NITROFURANTOIN: SIGNIFICANT CHANGE UP
-  TETRACYCLINE: SIGNIFICANT CHANGE UP
-  VANCOMYCIN: SIGNIFICANT CHANGE UP
A1C WITH ESTIMATED AVERAGE GLUCOSE RESULT: 7.2 % — HIGH (ref 4–5.6)
A1C WITH ESTIMATED AVERAGE GLUCOSE RESULT: 7.3 % — HIGH (ref 4–5.6)
ALBUMIN SERPL ELPH-MCNC: 2.9 G/DL — LOW (ref 3.5–5.2)
ALBUMIN SERPL ELPH-MCNC: 3.5 G/DL — SIGNIFICANT CHANGE UP (ref 3.5–5.2)
ALBUMIN SERPL ELPH-MCNC: 3.5 G/DL — SIGNIFICANT CHANGE UP (ref 3.5–5.2)
ALBUMIN SERPL ELPH-MCNC: 3.7 G/DL — SIGNIFICANT CHANGE UP (ref 3.5–5.2)
ALBUMIN SERPL ELPH-MCNC: 3.8 G/DL — SIGNIFICANT CHANGE UP (ref 3.5–5.2)
ALBUMIN SERPL ELPH-MCNC: 3.9 G/DL — SIGNIFICANT CHANGE UP (ref 3.5–5.2)
ALBUMIN SERPL ELPH-MCNC: 4 G/DL — SIGNIFICANT CHANGE UP (ref 3.5–5.2)
ALBUMIN SERPL ELPH-MCNC: 4.1 G/DL — SIGNIFICANT CHANGE UP (ref 3.5–5.2)
ALBUMIN SERPL ELPH-MCNC: 4.2 G/DL — SIGNIFICANT CHANGE UP (ref 3.5–5.2)
ALBUMIN SERPL ELPH-MCNC: 4.2 G/DL — SIGNIFICANT CHANGE UP (ref 3.5–5.2)
ALBUMIN SERPL ELPH-MCNC: 4.3 G/DL — SIGNIFICANT CHANGE UP (ref 3.5–5.2)
ALBUMIN SERPL ELPH-MCNC: 4.3 G/DL — SIGNIFICANT CHANGE UP (ref 3.5–5.2)
ALBUMIN SERPL ELPH-MCNC: 4.4 G/DL — SIGNIFICANT CHANGE UP (ref 3.5–5.2)
ALP SERPL-CCNC: 103 U/L — SIGNIFICANT CHANGE UP (ref 30–115)
ALP SERPL-CCNC: 63 U/L — SIGNIFICANT CHANGE UP (ref 30–115)
ALP SERPL-CCNC: 67 U/L — SIGNIFICANT CHANGE UP (ref 30–115)
ALP SERPL-CCNC: 68 U/L — SIGNIFICANT CHANGE UP (ref 30–115)
ALP SERPL-CCNC: 71 U/L — SIGNIFICANT CHANGE UP (ref 30–115)
ALP SERPL-CCNC: 72 U/L — SIGNIFICANT CHANGE UP (ref 30–115)
ALP SERPL-CCNC: 73 U/L — SIGNIFICANT CHANGE UP (ref 30–115)
ALP SERPL-CCNC: 73 U/L — SIGNIFICANT CHANGE UP (ref 30–115)
ALP SERPL-CCNC: 74 U/L — SIGNIFICANT CHANGE UP (ref 30–115)
ALP SERPL-CCNC: 75 U/L — SIGNIFICANT CHANGE UP (ref 30–115)
ALP SERPL-CCNC: 75 U/L — SIGNIFICANT CHANGE UP (ref 30–115)
ALP SERPL-CCNC: 76 U/L — SIGNIFICANT CHANGE UP (ref 30–115)
ALP SERPL-CCNC: 78 U/L — SIGNIFICANT CHANGE UP (ref 30–115)
ALP SERPL-CCNC: 79 U/L — SIGNIFICANT CHANGE UP (ref 30–115)
ALP SERPL-CCNC: 80 U/L — SIGNIFICANT CHANGE UP (ref 30–115)
ALP SERPL-CCNC: 80 U/L — SIGNIFICANT CHANGE UP (ref 30–115)
ALP SERPL-CCNC: 83 U/L — SIGNIFICANT CHANGE UP (ref 30–115)
ALP SERPL-CCNC: 84 U/L — SIGNIFICANT CHANGE UP (ref 30–115)
ALP SERPL-CCNC: 84 U/L — SIGNIFICANT CHANGE UP (ref 30–115)
ALP SERPL-CCNC: 85 U/L — SIGNIFICANT CHANGE UP (ref 30–115)
ALP SERPL-CCNC: 86 U/L — SIGNIFICANT CHANGE UP (ref 30–115)
ALP SERPL-CCNC: 88 U/L — SIGNIFICANT CHANGE UP (ref 30–115)
ALP SERPL-CCNC: 98 U/L — SIGNIFICANT CHANGE UP (ref 30–115)
ALP SERPL-CCNC: 98 U/L — SIGNIFICANT CHANGE UP (ref 30–115)
ALT FLD-CCNC: 11 U/L — SIGNIFICANT CHANGE UP (ref 0–41)
ALT FLD-CCNC: 12 U/L — SIGNIFICANT CHANGE UP (ref 0–41)
ALT FLD-CCNC: 12 U/L — SIGNIFICANT CHANGE UP (ref 0–41)
ALT FLD-CCNC: 13 U/L — SIGNIFICANT CHANGE UP (ref 0–41)
ALT FLD-CCNC: 14 U/L — SIGNIFICANT CHANGE UP (ref 0–41)
ALT FLD-CCNC: 15 U/L — SIGNIFICANT CHANGE UP (ref 0–41)
ALT FLD-CCNC: 16 U/L — SIGNIFICANT CHANGE UP (ref 0–41)
ALT FLD-CCNC: 17 U/L — SIGNIFICANT CHANGE UP (ref 0–41)
ALT FLD-CCNC: 18 U/L — SIGNIFICANT CHANGE UP (ref 0–41)
ALT FLD-CCNC: 19 U/L — SIGNIFICANT CHANGE UP (ref 0–41)
ALT FLD-CCNC: 21 U/L — SIGNIFICANT CHANGE UP (ref 0–41)
ALT FLD-CCNC: 7 U/L — SIGNIFICANT CHANGE UP (ref 0–41)
ALT FLD-CCNC: 7 U/L — SIGNIFICANT CHANGE UP (ref 0–41)
ALT FLD-CCNC: 8 U/L — SIGNIFICANT CHANGE UP (ref 0–41)
ALT FLD-CCNC: 9 U/L — SIGNIFICANT CHANGE UP (ref 0–41)
ALT FLD-CCNC: 9 U/L — SIGNIFICANT CHANGE UP (ref 0–41)
AMPHET UR-MCNC: NEGATIVE — SIGNIFICANT CHANGE UP
ANION GAP SERPL CALC-SCNC: 10 MMOL/L — SIGNIFICANT CHANGE UP (ref 7–14)
ANION GAP SERPL CALC-SCNC: 11 MMOL/L — SIGNIFICANT CHANGE UP (ref 7–14)
ANION GAP SERPL CALC-SCNC: 12 MMOL/L — SIGNIFICANT CHANGE UP (ref 7–14)
ANION GAP SERPL CALC-SCNC: 13 MMOL/L — SIGNIFICANT CHANGE UP (ref 7–14)
ANION GAP SERPL CALC-SCNC: 13 MMOL/L — SIGNIFICANT CHANGE UP (ref 7–14)
ANION GAP SERPL CALC-SCNC: 15 MMOL/L — HIGH (ref 7–14)
ANION GAP SERPL CALC-SCNC: 15 MMOL/L — HIGH (ref 7–14)
ANION GAP SERPL CALC-SCNC: 19 MMOL/L — HIGH (ref 7–14)
ANION GAP SERPL CALC-SCNC: 5 MMOL/L — LOW (ref 7–14)
ANION GAP SERPL CALC-SCNC: 5 MMOL/L — LOW (ref 7–14)
ANION GAP SERPL CALC-SCNC: 6 MMOL/L — LOW (ref 7–14)
ANION GAP SERPL CALC-SCNC: 7 MMOL/L — SIGNIFICANT CHANGE UP (ref 7–14)
ANION GAP SERPL CALC-SCNC: 8 MMOL/L — SIGNIFICANT CHANGE UP (ref 7–14)
ANION GAP SERPL CALC-SCNC: 9 MMOL/L — SIGNIFICANT CHANGE UP (ref 7–14)
ANISOCYTOSIS BLD QL: SIGNIFICANT CHANGE UP
ANISOCYTOSIS BLD QL: SLIGHT — SIGNIFICANT CHANGE UP
APAP SERPL-MCNC: <5 UG/ML — LOW (ref 10–30)
APPEARANCE UR: CLEAR — SIGNIFICANT CHANGE UP
APTT BLD: 28 SEC — SIGNIFICANT CHANGE UP (ref 27–39.2)
APTT BLD: 28.4 SEC — SIGNIFICANT CHANGE UP (ref 27–39.2)
APTT BLD: 29 SEC — SIGNIFICANT CHANGE UP (ref 27–39.2)
APTT BLD: 31.8 SEC — SIGNIFICANT CHANGE UP (ref 27–39.2)
APTT BLD: 34.2 SEC — SIGNIFICANT CHANGE UP (ref 27–39.2)
APTT BLD: 35 SEC — SIGNIFICANT CHANGE UP (ref 27–39.2)
APTT BLD: 40.2 SEC — HIGH (ref 27–39.2)
APTT BLD: 42 SEC — HIGH (ref 27–39.2)
APTT BLD: 44.2 SEC — HIGH (ref 27–39.2)
APTT BLD: 45.5 SEC — HIGH (ref 27–39.2)
AST SERPL-CCNC: 12 U/L — SIGNIFICANT CHANGE UP (ref 0–41)
AST SERPL-CCNC: 13 U/L — SIGNIFICANT CHANGE UP (ref 0–41)
AST SERPL-CCNC: 13 U/L — SIGNIFICANT CHANGE UP (ref 0–41)
AST SERPL-CCNC: 14 U/L — SIGNIFICANT CHANGE UP (ref 0–41)
AST SERPL-CCNC: 14 U/L — SIGNIFICANT CHANGE UP (ref 0–41)
AST SERPL-CCNC: 15 U/L — SIGNIFICANT CHANGE UP (ref 0–41)
AST SERPL-CCNC: 16 U/L — SIGNIFICANT CHANGE UP (ref 0–41)
AST SERPL-CCNC: 16 U/L — SIGNIFICANT CHANGE UP (ref 0–41)
AST SERPL-CCNC: 17 U/L — SIGNIFICANT CHANGE UP (ref 0–41)
AST SERPL-CCNC: 18 U/L — SIGNIFICANT CHANGE UP (ref 0–41)
AST SERPL-CCNC: 19 U/L — SIGNIFICANT CHANGE UP (ref 0–41)
AST SERPL-CCNC: 20 U/L — SIGNIFICANT CHANGE UP (ref 0–41)
AST SERPL-CCNC: 20 U/L — SIGNIFICANT CHANGE UP (ref 0–41)
AST SERPL-CCNC: 21 U/L — SIGNIFICANT CHANGE UP (ref 0–41)
AST SERPL-CCNC: 22 U/L — SIGNIFICANT CHANGE UP (ref 0–41)
AST SERPL-CCNC: 22 U/L — SIGNIFICANT CHANGE UP (ref 0–41)
AST SERPL-CCNC: 25 U/L — SIGNIFICANT CHANGE UP (ref 0–41)
AST SERPL-CCNC: 32 U/L — SIGNIFICANT CHANGE UP (ref 0–41)
BACTERIA # UR AUTO: NEGATIVE — SIGNIFICANT CHANGE UP
BARBITURATES UR SCN-MCNC: NEGATIVE — SIGNIFICANT CHANGE UP
BASE EXCESS BLDA CALC-SCNC: 4.5 MMOL/L — HIGH (ref -2–2)
BASE EXCESS BLDA CALC-SCNC: 7.2 MMOL/L — HIGH (ref -2–2)
BASE EXCESS BLDA CALC-SCNC: 9.3 MMOL/L — HIGH (ref -2–2)
BASE EXCESS BLDV CALC-SCNC: 11.3 MMOL/L — HIGH (ref -2–2)
BASE EXCESS BLDV CALC-SCNC: 12.6 MMOL/L — HIGH (ref -2–2)
BASE EXCESS BLDV CALC-SCNC: 6.2 MMOL/L — HIGH (ref -2–2)
BASE EXCESS BLDV CALC-SCNC: 6.5 MMOL/L — HIGH (ref -2–2)
BASE EXCESS BLDV CALC-SCNC: 7.8 MMOL/L — HIGH (ref -2–2)
BASE EXCESS BLDV CALC-SCNC: 8.8 MMOL/L — HIGH (ref -2–2)
BASE EXCESS BLDV CALC-SCNC: 9.3 MMOL/L — HIGH (ref -2–3)
BASOPHILS # BLD AUTO: 0 K/UL — SIGNIFICANT CHANGE UP (ref 0–0.2)
BASOPHILS # BLD AUTO: 0.01 K/UL — SIGNIFICANT CHANGE UP (ref 0–0.2)
BASOPHILS # BLD AUTO: 0.02 K/UL — SIGNIFICANT CHANGE UP (ref 0–0.2)
BASOPHILS # BLD AUTO: 0.02 K/UL — SIGNIFICANT CHANGE UP (ref 0–0.2)
BASOPHILS # BLD AUTO: 0.03 K/UL — SIGNIFICANT CHANGE UP (ref 0–0.2)
BASOPHILS NFR BLD AUTO: 0 % — SIGNIFICANT CHANGE UP (ref 0–1)
BASOPHILS NFR BLD AUTO: 0.1 % — SIGNIFICANT CHANGE UP (ref 0–1)
BASOPHILS NFR BLD AUTO: 0.2 % — SIGNIFICANT CHANGE UP (ref 0–1)
BASOPHILS NFR BLD AUTO: 0.4 % — SIGNIFICANT CHANGE UP (ref 0–1)
BASOPHILS NFR BLD AUTO: 0.6 % — SIGNIFICANT CHANGE UP (ref 0–1)
BASOPHILS NFR BLD AUTO: 0.6 % — SIGNIFICANT CHANGE UP (ref 0–1)
BENZODIAZ UR-MCNC: POSITIVE
BILIRUB SERPL-MCNC: 0.3 MG/DL — SIGNIFICANT CHANGE UP (ref 0.2–1.2)
BILIRUB SERPL-MCNC: 0.4 MG/DL — SIGNIFICANT CHANGE UP (ref 0.2–1.2)
BILIRUB SERPL-MCNC: 0.5 MG/DL — SIGNIFICANT CHANGE UP (ref 0.2–1.2)
BILIRUB SERPL-MCNC: 0.6 MG/DL — SIGNIFICANT CHANGE UP (ref 0.2–1.2)
BILIRUB SERPL-MCNC: 0.7 MG/DL — SIGNIFICANT CHANGE UP (ref 0.2–1.2)
BILIRUB SERPL-MCNC: 0.8 MG/DL — SIGNIFICANT CHANGE UP (ref 0.2–1.2)
BILIRUB UR-MCNC: NEGATIVE — SIGNIFICANT CHANGE UP
BLD GP AB SCN SERPL QL: SIGNIFICANT CHANGE UP
BUN SERPL-MCNC: 11 MG/DL — SIGNIFICANT CHANGE UP (ref 10–20)
BUN SERPL-MCNC: 12 MG/DL — SIGNIFICANT CHANGE UP (ref 10–20)
BUN SERPL-MCNC: 12 MG/DL — SIGNIFICANT CHANGE UP (ref 10–20)
BUN SERPL-MCNC: 13 MG/DL — SIGNIFICANT CHANGE UP (ref 10–20)
BUN SERPL-MCNC: 14 MG/DL — SIGNIFICANT CHANGE UP (ref 10–20)
BUN SERPL-MCNC: 14 MG/DL — SIGNIFICANT CHANGE UP (ref 10–20)
BUN SERPL-MCNC: 15 MG/DL — SIGNIFICANT CHANGE UP (ref 10–20)
BUN SERPL-MCNC: 15 MG/DL — SIGNIFICANT CHANGE UP (ref 10–20)
BUN SERPL-MCNC: 16 MG/DL — SIGNIFICANT CHANGE UP (ref 10–20)
BUN SERPL-MCNC: 16 MG/DL — SIGNIFICANT CHANGE UP (ref 10–20)
BUN SERPL-MCNC: 18 MG/DL — SIGNIFICANT CHANGE UP (ref 10–20)
BUN SERPL-MCNC: 18 MG/DL — SIGNIFICANT CHANGE UP (ref 10–20)
BUN SERPL-MCNC: 19 MG/DL — SIGNIFICANT CHANGE UP (ref 10–20)
BUN SERPL-MCNC: 20 MG/DL — SIGNIFICANT CHANGE UP (ref 10–20)
BUN SERPL-MCNC: 21 MG/DL — HIGH (ref 10–20)
BUN SERPL-MCNC: 21 MG/DL — HIGH (ref 10–20)
BUN SERPL-MCNC: 24 MG/DL — HIGH (ref 10–20)
BUN SERPL-MCNC: 25 MG/DL — HIGH (ref 10–20)
BUN SERPL-MCNC: 29 MG/DL — HIGH (ref 10–20)
BUN SERPL-MCNC: 30 MG/DL — HIGH (ref 10–20)
BUN SERPL-MCNC: 8 MG/DL — LOW (ref 10–20)
BUN SERPL-MCNC: 9 MG/DL — LOW (ref 10–20)
CA-I SERPL-SCNC: 1.21 MMOL/L — SIGNIFICANT CHANGE UP (ref 1.12–1.3)
CA-I SERPL-SCNC: 1.23 MMOL/L — SIGNIFICANT CHANGE UP (ref 1.12–1.3)
CA-I SERPL-SCNC: 1.23 MMOL/L — SIGNIFICANT CHANGE UP (ref 1.12–1.3)
CA-I SERPL-SCNC: 1.26 MMOL/L — SIGNIFICANT CHANGE UP (ref 1.12–1.3)
CA-I SERPL-SCNC: 1.27 MMOL/L — SIGNIFICANT CHANGE UP (ref 1.12–1.3)
CALCIUM SERPL-MCNC: 10.5 MG/DL — HIGH (ref 8.5–10.1)
CALCIUM SERPL-MCNC: 7.9 MG/DL — LOW (ref 8.5–10.1)
CALCIUM SERPL-MCNC: 8.4 MG/DL — LOW (ref 8.5–10.1)
CALCIUM SERPL-MCNC: 8.7 MG/DL — SIGNIFICANT CHANGE UP (ref 8.5–10.1)
CALCIUM SERPL-MCNC: 8.8 MG/DL — SIGNIFICANT CHANGE UP (ref 8.5–10.1)
CALCIUM SERPL-MCNC: 8.9 MG/DL — SIGNIFICANT CHANGE UP (ref 8.5–10.1)
CALCIUM SERPL-MCNC: 8.9 MG/DL — SIGNIFICANT CHANGE UP (ref 8.5–10.1)
CALCIUM SERPL-MCNC: 9.1 MG/DL — SIGNIFICANT CHANGE UP (ref 8.5–10.1)
CALCIUM SERPL-MCNC: 9.1 MG/DL — SIGNIFICANT CHANGE UP (ref 8.5–10.1)
CALCIUM SERPL-MCNC: 9.2 MG/DL — SIGNIFICANT CHANGE UP (ref 8.5–10.1)
CALCIUM SERPL-MCNC: 9.3 MG/DL — SIGNIFICANT CHANGE UP (ref 8.5–10.1)
CALCIUM SERPL-MCNC: 9.3 MG/DL — SIGNIFICANT CHANGE UP (ref 8.5–10.1)
CALCIUM SERPL-MCNC: 9.4 MG/DL — SIGNIFICANT CHANGE UP (ref 8.5–10.1)
CALCIUM SERPL-MCNC: 9.5 MG/DL — SIGNIFICANT CHANGE UP (ref 8.5–10.1)
CALCIUM SERPL-MCNC: 9.6 MG/DL — SIGNIFICANT CHANGE UP (ref 8.5–10.1)
CALCIUM SERPL-MCNC: 9.6 MG/DL — SIGNIFICANT CHANGE UP (ref 8.5–10.1)
CALCIUM SERPL-MCNC: 9.8 MG/DL — SIGNIFICANT CHANGE UP (ref 8.5–10.1)
CHLORIDE SERPL-SCNC: 100 MMOL/L — SIGNIFICANT CHANGE UP (ref 98–110)
CHLORIDE SERPL-SCNC: 101 MMOL/L — SIGNIFICANT CHANGE UP (ref 98–110)
CHLORIDE SERPL-SCNC: 102 MMOL/L — SIGNIFICANT CHANGE UP (ref 98–110)
CHLORIDE SERPL-SCNC: 103 MMOL/L — SIGNIFICANT CHANGE UP (ref 98–110)
CHLORIDE SERPL-SCNC: 103 MMOL/L — SIGNIFICANT CHANGE UP (ref 98–110)
CHLORIDE SERPL-SCNC: 95 MMOL/L — LOW (ref 98–110)
CHLORIDE SERPL-SCNC: 96 MMOL/L — LOW (ref 98–110)
CHLORIDE SERPL-SCNC: 97 MMOL/L — LOW (ref 98–110)
CHLORIDE SERPL-SCNC: 98 MMOL/L — SIGNIFICANT CHANGE UP (ref 98–110)
CHLORIDE SERPL-SCNC: 99 MMOL/L — SIGNIFICANT CHANGE UP (ref 98–110)
CHOLEST SERPL-MCNC: 163 MG/DL — SIGNIFICANT CHANGE UP
CK SERPL-CCNC: 102 U/L — SIGNIFICANT CHANGE UP (ref 0–225)
CK SERPL-CCNC: 31 U/L — SIGNIFICANT CHANGE UP (ref 0–225)
CK SERPL-CCNC: 50 U/L — SIGNIFICANT CHANGE UP (ref 0–225)
CO2 SERPL-SCNC: 23 MMOL/L — SIGNIFICANT CHANGE UP (ref 17–32)
CO2 SERPL-SCNC: 23 MMOL/L — SIGNIFICANT CHANGE UP (ref 17–32)
CO2 SERPL-SCNC: 27 MMOL/L — SIGNIFICANT CHANGE UP (ref 17–32)
CO2 SERPL-SCNC: 28 MMOL/L — SIGNIFICANT CHANGE UP (ref 17–32)
CO2 SERPL-SCNC: 29 MMOL/L — SIGNIFICANT CHANGE UP (ref 17–32)
CO2 SERPL-SCNC: 30 MMOL/L — SIGNIFICANT CHANGE UP (ref 17–32)
CO2 SERPL-SCNC: 31 MMOL/L — SIGNIFICANT CHANGE UP (ref 17–32)
CO2 SERPL-SCNC: 32 MMOL/L — SIGNIFICANT CHANGE UP (ref 17–32)
CO2 SERPL-SCNC: 33 MMOL/L — HIGH (ref 17–32)
CO2 SERPL-SCNC: 33 MMOL/L — HIGH (ref 17–32)
CO2 SERPL-SCNC: 34 MMOL/L — HIGH (ref 17–32)
CO2 SERPL-SCNC: 35 MMOL/L — HIGH (ref 17–32)
CO2 SERPL-SCNC: 35 MMOL/L — HIGH (ref 17–32)
CO2 SERPL-SCNC: 36 MMOL/L — HIGH (ref 17–32)
CO2 SERPL-SCNC: 37 MMOL/L — HIGH (ref 17–32)
COCAINE METAB.OTHER UR-MCNC: NEGATIVE — SIGNIFICANT CHANGE UP
COLOR SPEC: SIGNIFICANT CHANGE UP
COLOR SPEC: SIGNIFICANT CHANGE UP
COLOR SPEC: YELLOW — SIGNIFICANT CHANGE UP
COVID-19 SPIKE DOMAIN AB INTERP: POSITIVE
COVID-19 SPIKE DOMAIN ANTIBODY RESULT: 186 U/ML — HIGH
COVID-19 SPIKE DOMAIN ANTIBODY RESULT: 84.8 U/ML — HIGH
COVID-19 SPIKE DOMAIN ANTIBODY RESULT: >250 U/ML — HIGH
CREAT SERPL-MCNC: 0.6 MG/DL — LOW (ref 0.7–1.5)
CREAT SERPL-MCNC: 0.7 MG/DL — SIGNIFICANT CHANGE UP (ref 0.7–1.5)
CREAT SERPL-MCNC: 0.8 MG/DL — SIGNIFICANT CHANGE UP (ref 0.7–1.5)
CREAT SERPL-MCNC: 0.9 MG/DL — SIGNIFICANT CHANGE UP (ref 0.7–1.5)
CREAT SERPL-MCNC: 1 MG/DL — SIGNIFICANT CHANGE UP (ref 0.7–1.5)
CULTURE RESULTS: SIGNIFICANT CHANGE UP
D DIMER BLD IA.RAPID-MCNC: 49 NG/ML DDU — SIGNIFICANT CHANGE UP (ref 0–230)
D DIMER BLD IA.RAPID-MCNC: 51 NG/ML DDU — SIGNIFICANT CHANGE UP (ref 0–230)
D DIMER BLD IA.RAPID-MCNC: 55 NG/ML DDU — SIGNIFICANT CHANGE UP (ref 0–230)
DIFF PNL FLD: NEGATIVE — SIGNIFICANT CHANGE UP
EOSINOPHIL # BLD AUTO: 0 K/UL — SIGNIFICANT CHANGE UP (ref 0–0.7)
EOSINOPHIL # BLD AUTO: 0.01 K/UL — SIGNIFICANT CHANGE UP (ref 0–0.7)
EOSINOPHIL # BLD AUTO: 0.02 K/UL — SIGNIFICANT CHANGE UP (ref 0–0.7)
EOSINOPHIL # BLD AUTO: 0.03 K/UL — SIGNIFICANT CHANGE UP (ref 0–0.7)
EOSINOPHIL # BLD AUTO: 0.03 K/UL — SIGNIFICANT CHANGE UP (ref 0–0.7)
EOSINOPHIL # BLD AUTO: 0.04 K/UL — SIGNIFICANT CHANGE UP (ref 0–0.7)
EOSINOPHIL # BLD AUTO: 0.06 K/UL — SIGNIFICANT CHANGE UP (ref 0–0.7)
EOSINOPHIL # BLD AUTO: 0.07 K/UL — SIGNIFICANT CHANGE UP (ref 0–0.7)
EOSINOPHIL # BLD AUTO: 0.07 K/UL — SIGNIFICANT CHANGE UP (ref 0–0.7)
EOSINOPHIL # BLD AUTO: 0.1 K/UL — SIGNIFICANT CHANGE UP (ref 0–0.7)
EOSINOPHIL # BLD AUTO: 0.1 K/UL — SIGNIFICANT CHANGE UP (ref 0–0.7)
EOSINOPHIL # BLD AUTO: 0.12 K/UL — SIGNIFICANT CHANGE UP (ref 0–0.7)
EOSINOPHIL # BLD AUTO: 0.16 K/UL — SIGNIFICANT CHANGE UP (ref 0–0.7)
EOSINOPHIL NFR BLD AUTO: 0 % — SIGNIFICANT CHANGE UP (ref 0–8)
EOSINOPHIL NFR BLD AUTO: 0.1 % — SIGNIFICANT CHANGE UP (ref 0–8)
EOSINOPHIL NFR BLD AUTO: 0.2 % — SIGNIFICANT CHANGE UP (ref 0–8)
EOSINOPHIL NFR BLD AUTO: 0.3 % — SIGNIFICANT CHANGE UP (ref 0–8)
EOSINOPHIL NFR BLD AUTO: 0.3 % — SIGNIFICANT CHANGE UP (ref 0–8)
EOSINOPHIL NFR BLD AUTO: 0.4 % — SIGNIFICANT CHANGE UP (ref 0–8)
EOSINOPHIL NFR BLD AUTO: 0.5 % — SIGNIFICANT CHANGE UP (ref 0–8)
EOSINOPHIL NFR BLD AUTO: 0.6 % — SIGNIFICANT CHANGE UP (ref 0–8)
EOSINOPHIL NFR BLD AUTO: 0.7 % — SIGNIFICANT CHANGE UP (ref 0–8)
EOSINOPHIL NFR BLD AUTO: 0.7 % — SIGNIFICANT CHANGE UP (ref 0–8)
EOSINOPHIL NFR BLD AUTO: 0.8 % — SIGNIFICANT CHANGE UP (ref 0–8)
EOSINOPHIL NFR BLD AUTO: 0.9 % — SIGNIFICANT CHANGE UP (ref 0–8)
EOSINOPHIL NFR BLD AUTO: 1 % — SIGNIFICANT CHANGE UP (ref 0–8)
EOSINOPHIL NFR BLD AUTO: 1.1 % — SIGNIFICANT CHANGE UP (ref 0–8)
EOSINOPHIL NFR BLD AUTO: 2.2 % — SIGNIFICANT CHANGE UP (ref 0–8)
EOSINOPHIL NFR BLD AUTO: 2.3 % — SIGNIFICANT CHANGE UP (ref 0–8)
EOSINOPHIL NFR BLD AUTO: 3 % — SIGNIFICANT CHANGE UP (ref 0–8)
EPI CELLS # UR: 5 /HPF — SIGNIFICANT CHANGE UP (ref 0–5)
ESTIMATED AVERAGE GLUCOSE: 160 MG/DL — HIGH (ref 68–114)
ESTIMATED AVERAGE GLUCOSE: 163 MG/DL — HIGH (ref 68–114)
ETHANOL SERPL-MCNC: <10 MG/DL — SIGNIFICANT CHANGE UP
ETHANOL SERPL-MCNC: <10 MG/DL — SIGNIFICANT CHANGE UP
FLUAV AG NPH QL: NEGATIVE COUNTS — SIGNIFICANT CHANGE UP
FLUBV AG NPH QL: NEGATIVE COUNTS — SIGNIFICANT CHANGE UP
FUNGITELL: 61 PG/ML — SIGNIFICANT CHANGE UP
GAS PNL BLDA: SIGNIFICANT CHANGE UP
GAS PNL BLDV: 138 MMOL/L — SIGNIFICANT CHANGE UP (ref 136–145)
GAS PNL BLDV: 142 MMOL/L — SIGNIFICANT CHANGE UP (ref 136–145)
GAS PNL BLDV: 143 MMOL/L — SIGNIFICANT CHANGE UP (ref 136–145)
GAS PNL BLDV: SIGNIFICANT CHANGE UP
GIANT PLATELETS BLD QL SMEAR: PRESENT — SIGNIFICANT CHANGE UP
GLUCOSE BLDC GLUCOMTR-MCNC: 100 MG/DL — HIGH (ref 70–99)
GLUCOSE BLDC GLUCOMTR-MCNC: 104 MG/DL — HIGH (ref 70–99)
GLUCOSE BLDC GLUCOMTR-MCNC: 105 MG/DL — HIGH (ref 70–99)
GLUCOSE BLDC GLUCOMTR-MCNC: 106 MG/DL — HIGH (ref 70–99)
GLUCOSE BLDC GLUCOMTR-MCNC: 108 MG/DL — HIGH (ref 70–99)
GLUCOSE BLDC GLUCOMTR-MCNC: 113 MG/DL — HIGH (ref 70–99)
GLUCOSE BLDC GLUCOMTR-MCNC: 114 MG/DL — HIGH (ref 70–99)
GLUCOSE BLDC GLUCOMTR-MCNC: 118 MG/DL — HIGH (ref 70–99)
GLUCOSE BLDC GLUCOMTR-MCNC: 125 MG/DL — HIGH (ref 70–99)
GLUCOSE BLDC GLUCOMTR-MCNC: 126 MG/DL — HIGH (ref 70–99)
GLUCOSE BLDC GLUCOMTR-MCNC: 130 MG/DL — HIGH (ref 70–99)
GLUCOSE BLDC GLUCOMTR-MCNC: 131 MG/DL — HIGH (ref 70–99)
GLUCOSE BLDC GLUCOMTR-MCNC: 135 MG/DL — HIGH (ref 70–99)
GLUCOSE BLDC GLUCOMTR-MCNC: 140 MG/DL — HIGH (ref 70–99)
GLUCOSE BLDC GLUCOMTR-MCNC: 141 MG/DL — HIGH (ref 70–99)
GLUCOSE BLDC GLUCOMTR-MCNC: 143 MG/DL — HIGH (ref 70–99)
GLUCOSE BLDC GLUCOMTR-MCNC: 145 MG/DL — HIGH (ref 70–99)
GLUCOSE BLDC GLUCOMTR-MCNC: 149 MG/DL — HIGH (ref 70–99)
GLUCOSE BLDC GLUCOMTR-MCNC: 153 MG/DL — HIGH (ref 70–99)
GLUCOSE BLDC GLUCOMTR-MCNC: 155 MG/DL — HIGH (ref 70–99)
GLUCOSE BLDC GLUCOMTR-MCNC: 156 MG/DL — HIGH (ref 70–99)
GLUCOSE BLDC GLUCOMTR-MCNC: 159 MG/DL — HIGH (ref 70–99)
GLUCOSE BLDC GLUCOMTR-MCNC: 163 MG/DL — HIGH (ref 70–99)
GLUCOSE BLDC GLUCOMTR-MCNC: 164 MG/DL — HIGH (ref 70–99)
GLUCOSE BLDC GLUCOMTR-MCNC: 166 MG/DL — HIGH (ref 70–99)
GLUCOSE BLDC GLUCOMTR-MCNC: 170 MG/DL — HIGH (ref 70–99)
GLUCOSE BLDC GLUCOMTR-MCNC: 172 MG/DL — HIGH (ref 70–99)
GLUCOSE BLDC GLUCOMTR-MCNC: 175 MG/DL — HIGH (ref 70–99)
GLUCOSE BLDC GLUCOMTR-MCNC: 176 MG/DL — HIGH (ref 70–99)
GLUCOSE BLDC GLUCOMTR-MCNC: 176 MG/DL — HIGH (ref 70–99)
GLUCOSE BLDC GLUCOMTR-MCNC: 184 MG/DL — HIGH (ref 70–99)
GLUCOSE BLDC GLUCOMTR-MCNC: 190 MG/DL — HIGH (ref 70–99)
GLUCOSE BLDC GLUCOMTR-MCNC: 194 MG/DL — HIGH (ref 70–99)
GLUCOSE BLDC GLUCOMTR-MCNC: 195 MG/DL — HIGH (ref 70–99)
GLUCOSE BLDC GLUCOMTR-MCNC: 196 MG/DL — HIGH (ref 70–99)
GLUCOSE BLDC GLUCOMTR-MCNC: 213 MG/DL — HIGH (ref 70–99)
GLUCOSE BLDC GLUCOMTR-MCNC: 218 MG/DL — HIGH (ref 70–99)
GLUCOSE BLDC GLUCOMTR-MCNC: 219 MG/DL — HIGH (ref 70–99)
GLUCOSE BLDC GLUCOMTR-MCNC: 220 MG/DL — HIGH (ref 70–99)
GLUCOSE BLDC GLUCOMTR-MCNC: 223 MG/DL — HIGH (ref 70–99)
GLUCOSE BLDC GLUCOMTR-MCNC: 231 MG/DL — HIGH (ref 70–99)
GLUCOSE BLDC GLUCOMTR-MCNC: 239 MG/DL — HIGH (ref 70–99)
GLUCOSE BLDC GLUCOMTR-MCNC: 250 MG/DL — HIGH (ref 70–99)
GLUCOSE BLDC GLUCOMTR-MCNC: 268 MG/DL — HIGH (ref 70–99)
GLUCOSE BLDC GLUCOMTR-MCNC: 272 MG/DL — HIGH (ref 70–99)
GLUCOSE BLDC GLUCOMTR-MCNC: 279 MG/DL — HIGH (ref 70–99)
GLUCOSE BLDC GLUCOMTR-MCNC: 280 MG/DL — HIGH (ref 70–99)
GLUCOSE BLDC GLUCOMTR-MCNC: 281 MG/DL — HIGH (ref 70–99)
GLUCOSE BLDC GLUCOMTR-MCNC: 295 MG/DL — HIGH (ref 70–99)
GLUCOSE BLDC GLUCOMTR-MCNC: 298 MG/DL — HIGH (ref 70–99)
GLUCOSE BLDC GLUCOMTR-MCNC: 300 MG/DL — HIGH (ref 70–99)
GLUCOSE BLDC GLUCOMTR-MCNC: 320 MG/DL — HIGH (ref 70–99)
GLUCOSE BLDC GLUCOMTR-MCNC: 321 MG/DL — HIGH (ref 70–99)
GLUCOSE BLDC GLUCOMTR-MCNC: 326 MG/DL — HIGH (ref 70–99)
GLUCOSE BLDC GLUCOMTR-MCNC: 363 MG/DL — HIGH (ref 70–99)
GLUCOSE BLDC GLUCOMTR-MCNC: 380 MG/DL — HIGH (ref 70–99)
GLUCOSE BLDC GLUCOMTR-MCNC: 424 MG/DL — HIGH (ref 70–99)
GLUCOSE BLDC GLUCOMTR-MCNC: 59 MG/DL — LOW (ref 70–99)
GLUCOSE BLDC GLUCOMTR-MCNC: 64 MG/DL — LOW (ref 70–99)
GLUCOSE BLDC GLUCOMTR-MCNC: 65 MG/DL — LOW (ref 70–99)
GLUCOSE BLDC GLUCOMTR-MCNC: 67 MG/DL — LOW (ref 70–99)
GLUCOSE BLDC GLUCOMTR-MCNC: 76 MG/DL — SIGNIFICANT CHANGE UP (ref 70–99)
GLUCOSE BLDC GLUCOMTR-MCNC: 80 MG/DL — SIGNIFICANT CHANGE UP (ref 70–99)
GLUCOSE BLDC GLUCOMTR-MCNC: 83 MG/DL — SIGNIFICANT CHANGE UP (ref 70–99)
GLUCOSE BLDC GLUCOMTR-MCNC: 85 MG/DL — SIGNIFICANT CHANGE UP (ref 70–99)
GLUCOSE BLDC GLUCOMTR-MCNC: 89 MG/DL — SIGNIFICANT CHANGE UP (ref 70–99)
GLUCOSE BLDC GLUCOMTR-MCNC: 89 MG/DL — SIGNIFICANT CHANGE UP (ref 70–99)
GLUCOSE BLDC GLUCOMTR-MCNC: 92 MG/DL — SIGNIFICANT CHANGE UP (ref 70–99)
GLUCOSE BLDC GLUCOMTR-MCNC: 92 MG/DL — SIGNIFICANT CHANGE UP (ref 70–99)
GLUCOSE BLDC GLUCOMTR-MCNC: 99 MG/DL — SIGNIFICANT CHANGE UP (ref 70–99)
GLUCOSE SERPL-MCNC: 106 MG/DL — HIGH (ref 70–99)
GLUCOSE SERPL-MCNC: 111 MG/DL — HIGH (ref 70–99)
GLUCOSE SERPL-MCNC: 119 MG/DL — HIGH (ref 70–99)
GLUCOSE SERPL-MCNC: 120 MG/DL — HIGH (ref 70–99)
GLUCOSE SERPL-MCNC: 121 MG/DL — HIGH (ref 70–99)
GLUCOSE SERPL-MCNC: 124 MG/DL — HIGH (ref 70–99)
GLUCOSE SERPL-MCNC: 128 MG/DL — HIGH (ref 70–99)
GLUCOSE SERPL-MCNC: 131 MG/DL — HIGH (ref 70–99)
GLUCOSE SERPL-MCNC: 132 MG/DL — HIGH (ref 70–99)
GLUCOSE SERPL-MCNC: 134 MG/DL — HIGH (ref 70–99)
GLUCOSE SERPL-MCNC: 136 MG/DL — HIGH (ref 70–99)
GLUCOSE SERPL-MCNC: 138 MG/DL — HIGH (ref 70–99)
GLUCOSE SERPL-MCNC: 141 MG/DL — HIGH (ref 70–99)
GLUCOSE SERPL-MCNC: 144 MG/DL — HIGH (ref 70–99)
GLUCOSE SERPL-MCNC: 150 MG/DL — HIGH (ref 70–99)
GLUCOSE SERPL-MCNC: 153 MG/DL — HIGH (ref 70–99)
GLUCOSE SERPL-MCNC: 156 MG/DL — HIGH (ref 70–99)
GLUCOSE SERPL-MCNC: 156 MG/DL — HIGH (ref 70–99)
GLUCOSE SERPL-MCNC: 157 MG/DL — HIGH (ref 70–99)
GLUCOSE SERPL-MCNC: 159 MG/DL — HIGH (ref 70–99)
GLUCOSE SERPL-MCNC: 166 MG/DL — HIGH (ref 70–99)
GLUCOSE SERPL-MCNC: 168 MG/DL — HIGH (ref 70–99)
GLUCOSE SERPL-MCNC: 189 MG/DL — HIGH (ref 70–99)
GLUCOSE SERPL-MCNC: 190 MG/DL — HIGH (ref 70–99)
GLUCOSE SERPL-MCNC: 192 MG/DL — HIGH (ref 70–99)
GLUCOSE SERPL-MCNC: 215 MG/DL — HIGH (ref 70–99)
GLUCOSE SERPL-MCNC: 225 MG/DL — HIGH (ref 70–99)
GLUCOSE SERPL-MCNC: 226 MG/DL — HIGH (ref 70–99)
GLUCOSE SERPL-MCNC: 228 MG/DL — HIGH (ref 70–99)
GLUCOSE SERPL-MCNC: 241 MG/DL — HIGH (ref 70–99)
GLUCOSE SERPL-MCNC: 248 MG/DL — HIGH (ref 70–99)
GLUCOSE SERPL-MCNC: 259 MG/DL — HIGH (ref 70–99)
GLUCOSE SERPL-MCNC: 284 MG/DL — HIGH (ref 70–99)
GLUCOSE SERPL-MCNC: 309 MG/DL — HIGH (ref 70–99)
GLUCOSE UR QL: ABNORMAL
GLUCOSE UR QL: ABNORMAL
GLUCOSE UR QL: NEGATIVE — SIGNIFICANT CHANGE UP
HCO3 BLDA-SCNC: 30 MMOL/L — HIGH (ref 23–27)
HCO3 BLDA-SCNC: 33 MMOL/L — HIGH (ref 23–27)
HCO3 BLDA-SCNC: 35 MMOL/L — HIGH (ref 21–29)
HCO3 BLDV-SCNC: 33 MMOL/L — HIGH (ref 22–29)
HCO3 BLDV-SCNC: 34 MMOL/L — HIGH (ref 22–29)
HCO3 BLDV-SCNC: 36 MMOL/L — HIGH (ref 22–29)
HCO3 BLDV-SCNC: 36 MMOL/L — HIGH (ref 22–29)
HCO3 BLDV-SCNC: 38 MMOL/L — HIGH (ref 22–29)
HCO3 BLDV-SCNC: 39 MMOL/L — HIGH (ref 22–29)
HCO3 BLDV-SCNC: 40 MMOL/L — HIGH (ref 22–29)
HCT VFR BLD CALC: 22.2 % — LOW (ref 37–47)
HCT VFR BLD CALC: 25 % — LOW (ref 37–47)
HCT VFR BLD CALC: 28.2 % — LOW (ref 37–47)
HCT VFR BLD CALC: 28.5 % — LOW (ref 37–47)
HCT VFR BLD CALC: 28.9 % — LOW (ref 37–47)
HCT VFR BLD CALC: 28.9 % — LOW (ref 37–47)
HCT VFR BLD CALC: 29.1 % — LOW (ref 37–47)
HCT VFR BLD CALC: 29.5 % — LOW (ref 37–47)
HCT VFR BLD CALC: 29.8 % — LOW (ref 37–47)
HCT VFR BLD CALC: 29.8 % — LOW (ref 37–47)
HCT VFR BLD CALC: 29.9 % — LOW (ref 37–47)
HCT VFR BLD CALC: 30.2 % — LOW (ref 37–47)
HCT VFR BLD CALC: 30.4 % — LOW (ref 37–47)
HCT VFR BLD CALC: 31.1 % — LOW (ref 37–47)
HCT VFR BLD CALC: 31.1 % — LOW (ref 37–47)
HCT VFR BLD CALC: 31.3 % — LOW (ref 37–47)
HCT VFR BLD CALC: 31.3 % — LOW (ref 37–47)
HCT VFR BLD CALC: 31.5 % — LOW (ref 37–47)
HCT VFR BLD CALC: 31.6 % — LOW (ref 37–47)
HCT VFR BLD CALC: 31.8 % — LOW (ref 37–47)
HCT VFR BLD CALC: 32 % — LOW (ref 37–47)
HCT VFR BLD CALC: 32.2 % — LOW (ref 37–47)
HCT VFR BLD CALC: 32.3 % — LOW (ref 37–47)
HCT VFR BLD CALC: 32.3 % — LOW (ref 37–47)
HCT VFR BLD CALC: 32.4 % — LOW (ref 37–47)
HCT VFR BLD CALC: 32.7 % — LOW (ref 37–47)
HCT VFR BLD CALC: 32.8 % — LOW (ref 37–47)
HCT VFR BLD CALC: 33 % — LOW (ref 37–47)
HCT VFR BLD CALC: 33.2 % — LOW (ref 37–47)
HCT VFR BLD CALC: 33.3 % — LOW (ref 37–47)
HCT VFR BLD CALC: 33.4 % — LOW (ref 37–47)
HCT VFR BLD CALC: 33.5 % — LOW (ref 37–47)
HCT VFR BLD CALC: 33.6 % — LOW (ref 37–47)
HCT VFR BLD CALC: 35.7 % — LOW (ref 37–47)
HCT VFR BLD CALC: 35.9 % — LOW (ref 37–47)
HCT VFR BLDA CALC: 25.8 % — LOW (ref 34–44)
HCT VFR BLDA CALC: 30.9 % — LOW (ref 34–44)
HCT VFR BLDA CALC: 32.3 % — LOW (ref 34–44)
HCT VFR BLDA CALC: 33.3 % — LOW (ref 34–44)
HCT VFR BLDA CALC: 38 % — SIGNIFICANT CHANGE UP (ref 34–44)
HDLC SERPL-MCNC: 74 MG/DL — SIGNIFICANT CHANGE UP
HGB BLD CALC-MCNC: 10.1 G/DL — LOW (ref 14–18)
HGB BLD CALC-MCNC: 10.5 G/DL — LOW (ref 14–18)
HGB BLD CALC-MCNC: 10.9 G/DL — LOW (ref 14–18)
HGB BLD CALC-MCNC: 12.4 G/DL — LOW (ref 14–18)
HGB BLD CALC-MCNC: 8.4 G/DL — LOW (ref 14–18)
HGB BLD-MCNC: 10 G/DL — LOW (ref 12–16)
HGB BLD-MCNC: 10 G/DL — LOW (ref 12–16)
HGB BLD-MCNC: 10.2 G/DL — LOW (ref 12–16)
HGB BLD-MCNC: 10.3 G/DL — LOW (ref 12–16)
HGB BLD-MCNC: 10.4 G/DL — LOW (ref 12–16)
HGB BLD-MCNC: 10.4 G/DL — LOW (ref 12–16)
HGB BLD-MCNC: 10.5 G/DL — LOW (ref 12–16)
HGB BLD-MCNC: 10.9 G/DL — LOW (ref 12–16)
HGB BLD-MCNC: 10.9 G/DL — LOW (ref 12–16)
HGB BLD-MCNC: 11.5 G/DL — LOW (ref 12–16)
HGB BLD-MCNC: 11.7 G/DL — LOW (ref 12–16)
HGB BLD-MCNC: 6.9 G/DL — CRITICAL LOW (ref 12–16)
HGB BLD-MCNC: 8.2 G/DL — LOW (ref 12–16)
HGB BLD-MCNC: 8.7 G/DL — LOW (ref 12–16)
HGB BLD-MCNC: 9.1 G/DL — LOW (ref 12–16)
HGB BLD-MCNC: 9.1 G/DL — LOW (ref 12–16)
HGB BLD-MCNC: 9.2 G/DL — LOW (ref 12–16)
HGB BLD-MCNC: 9.2 G/DL — LOW (ref 12–16)
HGB BLD-MCNC: 9.4 G/DL — LOW (ref 12–16)
HGB BLD-MCNC: 9.4 G/DL — LOW (ref 12–16)
HGB BLD-MCNC: 9.5 G/DL — LOW (ref 12–16)
HGB BLD-MCNC: 9.8 G/DL — LOW (ref 12–16)
HGB BLD-MCNC: 9.8 G/DL — LOW (ref 12–16)
HGB BLD-MCNC: 9.9 G/DL — LOW (ref 12–16)
HOROWITZ INDEX BLDA+IHG-RTO: 28 — SIGNIFICANT CHANGE UP
HOROWITZ INDEX BLDA+IHG-RTO: 32 — SIGNIFICANT CHANGE UP
HYALINE CASTS # UR AUTO: 4 /LPF — SIGNIFICANT CHANGE UP (ref 0–7)
IMM GRANULOCYTES NFR BLD AUTO: 0.2 % — SIGNIFICANT CHANGE UP (ref 0.1–0.3)
IMM GRANULOCYTES NFR BLD AUTO: 0.3 % — SIGNIFICANT CHANGE UP (ref 0.1–0.3)
IMM GRANULOCYTES NFR BLD AUTO: 0.4 % — HIGH (ref 0.1–0.3)
IMM GRANULOCYTES NFR BLD AUTO: 0.5 % — HIGH (ref 0.1–0.3)
IMM GRANULOCYTES NFR BLD AUTO: 0.6 % — HIGH (ref 0.1–0.3)
IMM GRANULOCYTES NFR BLD AUTO: 0.7 % — HIGH (ref 0.1–0.3)
IMM GRANULOCYTES NFR BLD AUTO: 0.8 % — HIGH (ref 0.1–0.3)
IMM GRANULOCYTES NFR BLD AUTO: 0.9 % — HIGH (ref 0.1–0.3)
IMM GRANULOCYTES NFR BLD AUTO: 0.9 % — HIGH (ref 0.1–0.3)
IMM GRANULOCYTES NFR BLD AUTO: 1.2 % — HIGH (ref 0.1–0.3)
IMM GRANULOCYTES NFR BLD AUTO: 1.2 % — HIGH (ref 0.1–0.3)
IMM GRANULOCYTES NFR BLD AUTO: 1.3 % — HIGH (ref 0.1–0.3)
IMM GRANULOCYTES NFR BLD AUTO: 1.4 % — HIGH (ref 0.1–0.3)
INR BLD: 0.97 RATIO — SIGNIFICANT CHANGE UP (ref 0.65–1.3)
INR BLD: 0.99 RATIO — SIGNIFICANT CHANGE UP (ref 0.65–1.3)
INR BLD: 1.42 RATIO — HIGH (ref 0.65–1.3)
INR BLD: 1.58 RATIO — HIGH (ref 0.65–1.3)
INR BLD: 1.76 RATIO — HIGH (ref 0.65–1.3)
INR BLD: 1.84 RATIO — HIGH (ref 0.65–1.3)
INR BLD: 1.9 RATIO — HIGH (ref 0.65–1.3)
INR BLD: 1.93 RATIO — HIGH (ref 0.65–1.3)
INR BLD: 1.98 RATIO — HIGH (ref 0.65–1.3)
INR BLD: 2.15 RATIO — HIGH (ref 0.65–1.3)
INR BLD: 2.23 RATIO — HIGH (ref 0.65–1.3)
INR BLD: 2.27 RATIO — HIGH (ref 0.65–1.3)
INR BLD: 2.4 RATIO — HIGH (ref 0.65–1.3)
INR BLD: 2.48 RATIO — HIGH (ref 0.65–1.3)
INR BLD: 2.65 RATIO — HIGH (ref 0.65–1.3)
INR BLD: 2.77 RATIO — HIGH (ref 0.65–1.3)
INR BLD: 3.34 RATIO — HIGH (ref 0.65–1.3)
INR BLD: 3.38 RATIO — HIGH (ref 0.65–1.3)
INR BLD: 4.29 RATIO — HIGH (ref 0.65–1.3)
INR PPP: 1.9 RATIO
INR PPP: 2.1 RATIO
KETONES UR-MCNC: ABNORMAL
KETONES UR-MCNC: NEGATIVE — SIGNIFICANT CHANGE UP
KETONES UR-MCNC: NEGATIVE — SIGNIFICANT CHANGE UP
LACTATE BLDV-MCNC: 0.6 MMOL/L — SIGNIFICANT CHANGE UP (ref 0.5–1.6)
LACTATE BLDV-MCNC: 0.7 MMOL/L — SIGNIFICANT CHANGE UP (ref 0.5–1.6)
LACTATE BLDV-MCNC: 0.8 MMOL/L — SIGNIFICANT CHANGE UP (ref 0.5–1.6)
LACTATE BLDV-MCNC: 1 MMOL/L — SIGNIFICANT CHANGE UP (ref 0.5–1.6)
LACTATE BLDV-MCNC: 1.5 MMOL/L — SIGNIFICANT CHANGE UP (ref 0.5–1.6)
LACTATE BLDV-MCNC: 1.5 MMOL/L — SIGNIFICANT CHANGE UP (ref 0.5–2)
LACTATE BLDV-MCNC: 2.2 MMOL/L — HIGH (ref 0.5–1.6)
LACTATE SERPL-SCNC: 0.9 MMOL/L — SIGNIFICANT CHANGE UP (ref 0.7–2)
LACTATE SERPL-SCNC: 1 MMOL/L — SIGNIFICANT CHANGE UP (ref 0.7–2)
LACTATE SERPL-SCNC: 1.4 MMOL/L — SIGNIFICANT CHANGE UP (ref 0.7–2)
LACTATE SERPL-SCNC: 3.2 MMOL/L — HIGH (ref 0.7–2)
LEUKOCYTE ESTERASE UR-ACNC: ABNORMAL
LEUKOCYTE ESTERASE UR-ACNC: NEGATIVE — SIGNIFICANT CHANGE UP
LEUKOCYTE ESTERASE UR-ACNC: NEGATIVE — SIGNIFICANT CHANGE UP
LIDOCAIN IGE QN: 13 U/L — SIGNIFICANT CHANGE UP (ref 7–60)
LIDOCAIN IGE QN: 45 U/L — SIGNIFICANT CHANGE UP (ref 7–60)
LIPID PNL WITH DIRECT LDL SERPL: 63 MG/DL — SIGNIFICANT CHANGE UP
LYMPHOCYTES # BLD AUTO: 0.09 K/UL — LOW (ref 1.2–3.4)
LYMPHOCYTES # BLD AUTO: 0.15 K/UL — LOW (ref 1.2–3.4)
LYMPHOCYTES # BLD AUTO: 0.16 K/UL — LOW (ref 1.2–3.4)
LYMPHOCYTES # BLD AUTO: 0.16 K/UL — LOW (ref 1.2–3.4)
LYMPHOCYTES # BLD AUTO: 0.18 K/UL — LOW (ref 1.2–3.4)
LYMPHOCYTES # BLD AUTO: 0.21 K/UL — LOW (ref 1.2–3.4)
LYMPHOCYTES # BLD AUTO: 0.21 K/UL — LOW (ref 1.2–3.4)
LYMPHOCYTES # BLD AUTO: 0.22 K/UL — LOW (ref 1.2–3.4)
LYMPHOCYTES # BLD AUTO: 0.24 K/UL — LOW (ref 1.2–3.4)
LYMPHOCYTES # BLD AUTO: 0.26 K/UL — LOW (ref 1.2–3.4)
LYMPHOCYTES # BLD AUTO: 0.28 K/UL — LOW (ref 1.2–3.4)
LYMPHOCYTES # BLD AUTO: 0.31 K/UL — LOW (ref 1.2–3.4)
LYMPHOCYTES # BLD AUTO: 0.36 K/UL — LOW (ref 1.2–3.4)
LYMPHOCYTES # BLD AUTO: 0.37 K/UL — LOW (ref 1.2–3.4)
LYMPHOCYTES # BLD AUTO: 0.47 K/UL — LOW (ref 1.2–3.4)
LYMPHOCYTES # BLD AUTO: 0.48 K/UL — LOW (ref 1.2–3.4)
LYMPHOCYTES # BLD AUTO: 0.52 K/UL — LOW (ref 1.2–3.4)
LYMPHOCYTES # BLD AUTO: 0.55 K/UL — LOW (ref 1.2–3.4)
LYMPHOCYTES # BLD AUTO: 0.55 K/UL — LOW (ref 1.2–3.4)
LYMPHOCYTES # BLD AUTO: 0.56 K/UL — LOW (ref 1.2–3.4)
LYMPHOCYTES # BLD AUTO: 0.62 K/UL — LOW (ref 1.2–3.4)
LYMPHOCYTES # BLD AUTO: 0.63 K/UL — LOW (ref 1.2–3.4)
LYMPHOCYTES # BLD AUTO: 0.72 K/UL — LOW (ref 1.2–3.4)
LYMPHOCYTES # BLD AUTO: 0.76 K/UL — LOW (ref 1.2–3.4)
LYMPHOCYTES # BLD AUTO: 0.77 K/UL — LOW (ref 1.2–3.4)
LYMPHOCYTES # BLD AUTO: 0.8 K/UL — LOW (ref 1.2–3.4)
LYMPHOCYTES # BLD AUTO: 0.8 K/UL — LOW (ref 1.2–3.4)
LYMPHOCYTES # BLD AUTO: 0.84 K/UL — LOW (ref 1.2–3.4)
LYMPHOCYTES # BLD AUTO: 1.02 K/UL — LOW (ref 1.2–3.4)
LYMPHOCYTES # BLD AUTO: 1.14 K/UL — LOW (ref 1.2–3.4)
LYMPHOCYTES # BLD AUTO: 1.2 % — LOW (ref 20.5–51.1)
LYMPHOCYTES # BLD AUTO: 1.8 % — LOW (ref 20.5–51.1)
LYMPHOCYTES # BLD AUTO: 10 % — LOW (ref 20.5–51.1)
LYMPHOCYTES # BLD AUTO: 10.5 % — LOW (ref 20.5–51.1)
LYMPHOCYTES # BLD AUTO: 10.6 % — LOW (ref 20.5–51.1)
LYMPHOCYTES # BLD AUTO: 11.1 % — LOW (ref 20.5–51.1)
LYMPHOCYTES # BLD AUTO: 11.6 % — LOW (ref 20.5–51.1)
LYMPHOCYTES # BLD AUTO: 12.1 % — LOW (ref 20.5–51.1)
LYMPHOCYTES # BLD AUTO: 12.2 % — LOW (ref 20.5–51.1)
LYMPHOCYTES # BLD AUTO: 12.5 % — LOW (ref 20.5–51.1)
LYMPHOCYTES # BLD AUTO: 2 % — LOW (ref 20.5–51.1)
LYMPHOCYTES # BLD AUTO: 2.1 % — LOW (ref 20.5–51.1)
LYMPHOCYTES # BLD AUTO: 2.2 % — LOW (ref 20.5–51.1)
LYMPHOCYTES # BLD AUTO: 2.2 % — LOW (ref 20.5–51.1)
LYMPHOCYTES # BLD AUTO: 2.3 % — LOW (ref 20.5–51.1)
LYMPHOCYTES # BLD AUTO: 2.5 % — LOW (ref 20.5–51.1)
LYMPHOCYTES # BLD AUTO: 2.6 % — LOW (ref 20.5–51.1)
LYMPHOCYTES # BLD AUTO: 2.9 % — LOW (ref 20.5–51.1)
LYMPHOCYTES # BLD AUTO: 3.1 % — LOW (ref 20.5–51.1)
LYMPHOCYTES # BLD AUTO: 3.4 % — LOW (ref 20.5–51.1)
LYMPHOCYTES # BLD AUTO: 4 % — LOW (ref 20.5–51.1)
LYMPHOCYTES # BLD AUTO: 4.2 % — LOW (ref 20.5–51.1)
LYMPHOCYTES # BLD AUTO: 4.5 % — LOW (ref 20.5–51.1)
LYMPHOCYTES # BLD AUTO: 5.3 % — LOW (ref 20.5–51.1)
LYMPHOCYTES # BLD AUTO: 6.2 % — LOW (ref 20.5–51.1)
LYMPHOCYTES # BLD AUTO: 7.5 % — LOW (ref 20.5–51.1)
LYMPHOCYTES # BLD AUTO: 8.6 % — LOW (ref 20.5–51.1)
LYMPHOCYTES # BLD AUTO: 9.6 % — LOW (ref 20.5–51.1)
LYMPHOCYTES # BLD AUTO: 9.7 % — LOW (ref 20.5–51.1)
LYMPHOCYTES # BLD AUTO: 9.9 % — LOW (ref 20.5–51.1)
MAGNESIUM SERPL-MCNC: 1.6 MG/DL — LOW (ref 1.8–2.4)
MAGNESIUM SERPL-MCNC: 1.8 MG/DL — SIGNIFICANT CHANGE UP (ref 1.8–2.4)
MAGNESIUM SERPL-MCNC: 1.9 MG/DL — SIGNIFICANT CHANGE UP (ref 1.8–2.4)
MAGNESIUM SERPL-MCNC: 2 MG/DL — SIGNIFICANT CHANGE UP (ref 1.8–2.4)
MAGNESIUM SERPL-MCNC: 2.1 MG/DL — SIGNIFICANT CHANGE UP (ref 1.8–2.4)
MANUAL SMEAR VERIFICATION: SIGNIFICANT CHANGE UP
MANUAL SMEAR VERIFICATION: SIGNIFICANT CHANGE UP
MCHC RBC-ENTMCNC: 27 PG — SIGNIFICANT CHANGE UP (ref 27–31)
MCHC RBC-ENTMCNC: 27.1 PG — SIGNIFICANT CHANGE UP (ref 27–31)
MCHC RBC-ENTMCNC: 27.2 PG — SIGNIFICANT CHANGE UP (ref 27–31)
MCHC RBC-ENTMCNC: 27.2 PG — SIGNIFICANT CHANGE UP (ref 27–31)
MCHC RBC-ENTMCNC: 27.3 PG — SIGNIFICANT CHANGE UP (ref 27–31)
MCHC RBC-ENTMCNC: 27.3 PG — SIGNIFICANT CHANGE UP (ref 27–31)
MCHC RBC-ENTMCNC: 27.4 PG — SIGNIFICANT CHANGE UP (ref 27–31)
MCHC RBC-ENTMCNC: 27.5 PG — SIGNIFICANT CHANGE UP (ref 27–31)
MCHC RBC-ENTMCNC: 27.5 PG — SIGNIFICANT CHANGE UP (ref 27–31)
MCHC RBC-ENTMCNC: 27.6 PG — SIGNIFICANT CHANGE UP (ref 27–31)
MCHC RBC-ENTMCNC: 27.7 PG — SIGNIFICANT CHANGE UP (ref 27–31)
MCHC RBC-ENTMCNC: 27.8 PG — SIGNIFICANT CHANGE UP (ref 27–31)
MCHC RBC-ENTMCNC: 27.8 PG — SIGNIFICANT CHANGE UP (ref 27–31)
MCHC RBC-ENTMCNC: 27.9 PG — SIGNIFICANT CHANGE UP (ref 27–31)
MCHC RBC-ENTMCNC: 28 PG — SIGNIFICANT CHANGE UP (ref 27–31)
MCHC RBC-ENTMCNC: 28.1 PG — SIGNIFICANT CHANGE UP (ref 27–31)
MCHC RBC-ENTMCNC: 28.2 PG — SIGNIFICANT CHANGE UP (ref 27–31)
MCHC RBC-ENTMCNC: 28.2 PG — SIGNIFICANT CHANGE UP (ref 27–31)
MCHC RBC-ENTMCNC: 28.3 PG — SIGNIFICANT CHANGE UP (ref 27–31)
MCHC RBC-ENTMCNC: 28.3 PG — SIGNIFICANT CHANGE UP (ref 27–31)
MCHC RBC-ENTMCNC: 28.4 PG — SIGNIFICANT CHANGE UP (ref 27–31)
MCHC RBC-ENTMCNC: 28.4 PG — SIGNIFICANT CHANGE UP (ref 27–31)
MCHC RBC-ENTMCNC: 28.5 PG — SIGNIFICANT CHANGE UP (ref 27–31)
MCHC RBC-ENTMCNC: 28.6 PG — SIGNIFICANT CHANGE UP (ref 27–31)
MCHC RBC-ENTMCNC: 28.7 PG — SIGNIFICANT CHANGE UP (ref 27–31)
MCHC RBC-ENTMCNC: 28.8 PG — SIGNIFICANT CHANGE UP (ref 27–31)
MCHC RBC-ENTMCNC: 28.9 PG — SIGNIFICANT CHANGE UP (ref 27–31)
MCHC RBC-ENTMCNC: 28.9 PG — SIGNIFICANT CHANGE UP (ref 27–31)
MCHC RBC-ENTMCNC: 29 PG — SIGNIFICANT CHANGE UP (ref 27–31)
MCHC RBC-ENTMCNC: 29.9 G/DL — LOW (ref 32–37)
MCHC RBC-ENTMCNC: 30 G/DL — LOW (ref 32–37)
MCHC RBC-ENTMCNC: 30.7 G/DL — LOW (ref 32–37)
MCHC RBC-ENTMCNC: 31.1 G/DL — LOW (ref 32–37)
MCHC RBC-ENTMCNC: 31.1 G/DL — LOW (ref 32–37)
MCHC RBC-ENTMCNC: 31.2 G/DL — LOW (ref 32–37)
MCHC RBC-ENTMCNC: 31.3 G/DL — LOW (ref 32–37)
MCHC RBC-ENTMCNC: 31.4 G/DL — LOW (ref 32–37)
MCHC RBC-ENTMCNC: 31.5 G/DL — LOW (ref 32–37)
MCHC RBC-ENTMCNC: 31.5 G/DL — LOW (ref 32–37)
MCHC RBC-ENTMCNC: 31.6 G/DL — LOW (ref 32–37)
MCHC RBC-ENTMCNC: 31.7 G/DL — LOW (ref 32–37)
MCHC RBC-ENTMCNC: 31.7 G/DL — LOW (ref 32–37)
MCHC RBC-ENTMCNC: 31.8 G/DL — LOW (ref 32–37)
MCHC RBC-ENTMCNC: 31.9 G/DL — LOW (ref 32–37)
MCHC RBC-ENTMCNC: 32 G/DL — SIGNIFICANT CHANGE UP (ref 32–37)
MCHC RBC-ENTMCNC: 32.1 G/DL — SIGNIFICANT CHANGE UP (ref 32–37)
MCHC RBC-ENTMCNC: 32.2 G/DL — SIGNIFICANT CHANGE UP (ref 32–37)
MCHC RBC-ENTMCNC: 32.3 G/DL — SIGNIFICANT CHANGE UP (ref 32–37)
MCHC RBC-ENTMCNC: 32.3 G/DL — SIGNIFICANT CHANGE UP (ref 32–37)
MCHC RBC-ENTMCNC: 32.5 G/DL — SIGNIFICANT CHANGE UP (ref 32–37)
MCHC RBC-ENTMCNC: 32.5 G/DL — SIGNIFICANT CHANGE UP (ref 32–37)
MCHC RBC-ENTMCNC: 32.6 G/DL — SIGNIFICANT CHANGE UP (ref 32–37)
MCHC RBC-ENTMCNC: 32.8 G/DL — SIGNIFICANT CHANGE UP (ref 32–37)
MCV RBC AUTO: 85.1 FL — SIGNIFICANT CHANGE UP (ref 81–99)
MCV RBC AUTO: 85.4 FL — SIGNIFICANT CHANGE UP (ref 81–99)
MCV RBC AUTO: 85.5 FL — SIGNIFICANT CHANGE UP (ref 81–99)
MCV RBC AUTO: 85.8 FL — SIGNIFICANT CHANGE UP (ref 81–99)
MCV RBC AUTO: 85.9 FL — SIGNIFICANT CHANGE UP (ref 81–99)
MCV RBC AUTO: 86.2 FL — SIGNIFICANT CHANGE UP (ref 81–99)
MCV RBC AUTO: 86.4 FL — SIGNIFICANT CHANGE UP (ref 81–99)
MCV RBC AUTO: 86.6 FL — SIGNIFICANT CHANGE UP (ref 81–99)
MCV RBC AUTO: 86.9 FL — SIGNIFICANT CHANGE UP (ref 81–99)
MCV RBC AUTO: 87 FL — SIGNIFICANT CHANGE UP (ref 81–99)
MCV RBC AUTO: 87.2 FL — SIGNIFICANT CHANGE UP (ref 81–99)
MCV RBC AUTO: 87.7 FL — SIGNIFICANT CHANGE UP (ref 81–99)
MCV RBC AUTO: 87.9 FL — SIGNIFICANT CHANGE UP (ref 81–99)
MCV RBC AUTO: 88 FL — SIGNIFICANT CHANGE UP (ref 81–99)
MCV RBC AUTO: 88.1 FL — SIGNIFICANT CHANGE UP (ref 81–99)
MCV RBC AUTO: 88.1 FL — SIGNIFICANT CHANGE UP (ref 81–99)
MCV RBC AUTO: 88.2 FL — SIGNIFICANT CHANGE UP (ref 81–99)
MCV RBC AUTO: 88.4 FL — SIGNIFICANT CHANGE UP (ref 81–99)
MCV RBC AUTO: 88.5 FL — SIGNIFICANT CHANGE UP (ref 81–99)
MCV RBC AUTO: 88.6 FL — SIGNIFICANT CHANGE UP (ref 81–99)
MCV RBC AUTO: 88.6 FL — SIGNIFICANT CHANGE UP (ref 81–99)
MCV RBC AUTO: 89.4 FL — SIGNIFICANT CHANGE UP (ref 81–99)
MCV RBC AUTO: 89.6 FL — SIGNIFICANT CHANGE UP (ref 81–99)
MCV RBC AUTO: 89.9 FL — SIGNIFICANT CHANGE UP (ref 81–99)
MCV RBC AUTO: 90.9 FL — SIGNIFICANT CHANGE UP (ref 81–99)
MCV RBC AUTO: 91.4 FL — SIGNIFICANT CHANGE UP (ref 81–99)
MCV RBC AUTO: 92.2 FL — SIGNIFICANT CHANGE UP (ref 81–99)
MCV RBC AUTO: 93 FL — SIGNIFICANT CHANGE UP (ref 81–99)
METHADONE UR-MCNC: NEGATIVE — SIGNIFICANT CHANGE UP
METHOD TYPE: SIGNIFICANT CHANGE UP
MICROCYTES BLD QL: SIGNIFICANT CHANGE UP
MICROCYTES BLD QL: SLIGHT — SIGNIFICANT CHANGE UP
MONOCYTES # BLD AUTO: 0.05 K/UL — LOW (ref 0.1–0.6)
MONOCYTES # BLD AUTO: 0.06 K/UL — LOW (ref 0.1–0.6)
MONOCYTES # BLD AUTO: 0.07 K/UL — LOW (ref 0.1–0.6)
MONOCYTES # BLD AUTO: 0.07 K/UL — LOW (ref 0.1–0.6)
MONOCYTES # BLD AUTO: 0.08 K/UL — LOW (ref 0.1–0.6)
MONOCYTES # BLD AUTO: 0.14 K/UL — SIGNIFICANT CHANGE UP (ref 0.1–0.6)
MONOCYTES # BLD AUTO: 0.18 K/UL — SIGNIFICANT CHANGE UP (ref 0.1–0.6)
MONOCYTES # BLD AUTO: 0.19 K/UL — SIGNIFICANT CHANGE UP (ref 0.1–0.6)
MONOCYTES # BLD AUTO: 0.24 K/UL — SIGNIFICANT CHANGE UP (ref 0.1–0.6)
MONOCYTES # BLD AUTO: 0.24 K/UL — SIGNIFICANT CHANGE UP (ref 0.1–0.6)
MONOCYTES # BLD AUTO: 0.28 K/UL — SIGNIFICANT CHANGE UP (ref 0.1–0.6)
MONOCYTES # BLD AUTO: 0.28 K/UL — SIGNIFICANT CHANGE UP (ref 0.1–0.6)
MONOCYTES # BLD AUTO: 0.29 K/UL — SIGNIFICANT CHANGE UP (ref 0.1–0.6)
MONOCYTES # BLD AUTO: 0.3 K/UL — SIGNIFICANT CHANGE UP (ref 0.1–0.6)
MONOCYTES # BLD AUTO: 0.34 K/UL — SIGNIFICANT CHANGE UP (ref 0.1–0.6)
MONOCYTES # BLD AUTO: 0.39 K/UL — SIGNIFICANT CHANGE UP (ref 0.1–0.6)
MONOCYTES # BLD AUTO: 0.4 K/UL — SIGNIFICANT CHANGE UP (ref 0.1–0.6)
MONOCYTES # BLD AUTO: 0.45 K/UL — SIGNIFICANT CHANGE UP (ref 0.1–0.6)
MONOCYTES # BLD AUTO: 0.46 K/UL — SIGNIFICANT CHANGE UP (ref 0.1–0.6)
MONOCYTES # BLD AUTO: 0.46 K/UL — SIGNIFICANT CHANGE UP (ref 0.1–0.6)
MONOCYTES # BLD AUTO: 0.5 K/UL — SIGNIFICANT CHANGE UP (ref 0.1–0.6)
MONOCYTES # BLD AUTO: 0.51 K/UL — SIGNIFICANT CHANGE UP (ref 0.1–0.6)
MONOCYTES # BLD AUTO: 0.52 K/UL — SIGNIFICANT CHANGE UP (ref 0.1–0.6)
MONOCYTES # BLD AUTO: 0.53 K/UL — SIGNIFICANT CHANGE UP (ref 0.1–0.6)
MONOCYTES # BLD AUTO: 0.66 K/UL — HIGH (ref 0.1–0.6)
MONOCYTES # BLD AUTO: 0.67 K/UL — HIGH (ref 0.1–0.6)
MONOCYTES # BLD AUTO: 0.7 K/UL — HIGH (ref 0.1–0.6)
MONOCYTES # BLD AUTO: 0.73 K/UL — HIGH (ref 0.1–0.6)
MONOCYTES # BLD AUTO: 0.74 K/UL — HIGH (ref 0.1–0.6)
MONOCYTES # BLD AUTO: 0.77 K/UL — HIGH (ref 0.1–0.6)
MONOCYTES NFR BLD AUTO: 0.8 % — LOW (ref 1.7–9.3)
MONOCYTES NFR BLD AUTO: 0.8 % — LOW (ref 1.7–9.3)
MONOCYTES NFR BLD AUTO: 1.1 % — LOW (ref 1.7–9.3)
MONOCYTES NFR BLD AUTO: 1.3 % — LOW (ref 1.7–9.3)
MONOCYTES NFR BLD AUTO: 1.6 % — LOW (ref 1.7–9.3)
MONOCYTES NFR BLD AUTO: 1.9 % — SIGNIFICANT CHANGE UP (ref 1.7–9.3)
MONOCYTES NFR BLD AUTO: 2 % — SIGNIFICANT CHANGE UP (ref 1.7–9.3)
MONOCYTES NFR BLD AUTO: 2.5 % — SIGNIFICANT CHANGE UP (ref 1.7–9.3)
MONOCYTES NFR BLD AUTO: 2.9 % — SIGNIFICANT CHANGE UP (ref 1.7–9.3)
MONOCYTES NFR BLD AUTO: 3.1 % — SIGNIFICANT CHANGE UP (ref 1.7–9.3)
MONOCYTES NFR BLD AUTO: 3.5 % — SIGNIFICANT CHANGE UP (ref 1.7–9.3)
MONOCYTES NFR BLD AUTO: 4.2 % — SIGNIFICANT CHANGE UP (ref 1.7–9.3)
MONOCYTES NFR BLD AUTO: 4.6 % — SIGNIFICANT CHANGE UP (ref 1.7–9.3)
MONOCYTES NFR BLD AUTO: 5.1 % — SIGNIFICANT CHANGE UP (ref 1.7–9.3)
MONOCYTES NFR BLD AUTO: 5.4 % — SIGNIFICANT CHANGE UP (ref 1.7–9.3)
MONOCYTES NFR BLD AUTO: 5.5 % — SIGNIFICANT CHANGE UP (ref 1.7–9.3)
MONOCYTES NFR BLD AUTO: 5.7 % — SIGNIFICANT CHANGE UP (ref 1.7–9.3)
MONOCYTES NFR BLD AUTO: 5.9 % — SIGNIFICANT CHANGE UP (ref 1.7–9.3)
MONOCYTES NFR BLD AUTO: 5.9 % — SIGNIFICANT CHANGE UP (ref 1.7–9.3)
MONOCYTES NFR BLD AUTO: 6 % — SIGNIFICANT CHANGE UP (ref 1.7–9.3)
MONOCYTES NFR BLD AUTO: 6.1 % — SIGNIFICANT CHANGE UP (ref 1.7–9.3)
MONOCYTES NFR BLD AUTO: 6.2 % — SIGNIFICANT CHANGE UP (ref 1.7–9.3)
MONOCYTES NFR BLD AUTO: 6.3 % — SIGNIFICANT CHANGE UP (ref 1.7–9.3)
MONOCYTES NFR BLD AUTO: 6.3 % — SIGNIFICANT CHANGE UP (ref 1.7–9.3)
MONOCYTES NFR BLD AUTO: 6.4 % — SIGNIFICANT CHANGE UP (ref 1.7–9.3)
MONOCYTES NFR BLD AUTO: 6.7 % — SIGNIFICANT CHANGE UP (ref 1.7–9.3)
MONOCYTES NFR BLD AUTO: 7.2 % — SIGNIFICANT CHANGE UP (ref 1.7–9.3)
MONOCYTES NFR BLD AUTO: 7.5 % — SIGNIFICANT CHANGE UP (ref 1.7–9.3)
MONOCYTES NFR BLD AUTO: 7.6 % — SIGNIFICANT CHANGE UP (ref 1.7–9.3)
MONOCYTES NFR BLD AUTO: 7.7 % — SIGNIFICANT CHANGE UP (ref 1.7–9.3)
MRSA PCR RESULT.: NEGATIVE — SIGNIFICANT CHANGE UP
NEUTROPHILS # BLD AUTO: 10.7 K/UL — HIGH (ref 1.4–6.5)
NEUTROPHILS # BLD AUTO: 10.93 K/UL — HIGH (ref 1.4–6.5)
NEUTROPHILS # BLD AUTO: 10.98 K/UL — HIGH (ref 1.4–6.5)
NEUTROPHILS # BLD AUTO: 14.29 K/UL — HIGH (ref 1.4–6.5)
NEUTROPHILS # BLD AUTO: 3.59 K/UL — SIGNIFICANT CHANGE UP (ref 1.4–6.5)
NEUTROPHILS # BLD AUTO: 4.05 K/UL — SIGNIFICANT CHANGE UP (ref 1.4–6.5)
NEUTROPHILS # BLD AUTO: 4.17 K/UL — SIGNIFICANT CHANGE UP (ref 1.4–6.5)
NEUTROPHILS # BLD AUTO: 4.23 K/UL — SIGNIFICANT CHANGE UP (ref 1.4–6.5)
NEUTROPHILS # BLD AUTO: 4.92 K/UL — SIGNIFICANT CHANGE UP (ref 1.4–6.5)
NEUTROPHILS # BLD AUTO: 5.03 K/UL — SIGNIFICANT CHANGE UP (ref 1.4–6.5)
NEUTROPHILS # BLD AUTO: 5.4 K/UL — SIGNIFICANT CHANGE UP (ref 1.4–6.5)
NEUTROPHILS # BLD AUTO: 5.43 K/UL — SIGNIFICANT CHANGE UP (ref 1.4–6.5)
NEUTROPHILS # BLD AUTO: 5.52 K/UL — SIGNIFICANT CHANGE UP (ref 1.4–6.5)
NEUTROPHILS # BLD AUTO: 5.68 K/UL — SIGNIFICANT CHANGE UP (ref 1.4–6.5)
NEUTROPHILS # BLD AUTO: 6.12 K/UL — SIGNIFICANT CHANGE UP (ref 1.4–6.5)
NEUTROPHILS # BLD AUTO: 6.16 K/UL — SIGNIFICANT CHANGE UP (ref 1.4–6.5)
NEUTROPHILS # BLD AUTO: 6.23 K/UL — SIGNIFICANT CHANGE UP (ref 1.4–6.5)
NEUTROPHILS # BLD AUTO: 6.26 K/UL — SIGNIFICANT CHANGE UP (ref 1.4–6.5)
NEUTROPHILS # BLD AUTO: 6.31 K/UL — SIGNIFICANT CHANGE UP (ref 1.4–6.5)
NEUTROPHILS # BLD AUTO: 6.44 K/UL — SIGNIFICANT CHANGE UP (ref 1.4–6.5)
NEUTROPHILS # BLD AUTO: 6.67 K/UL — HIGH (ref 1.4–6.5)
NEUTROPHILS # BLD AUTO: 6.8 K/UL — HIGH (ref 1.4–6.5)
NEUTROPHILS # BLD AUTO: 7.05 K/UL — HIGH (ref 1.4–6.5)
NEUTROPHILS # BLD AUTO: 7.09 K/UL — HIGH (ref 1.4–6.5)
NEUTROPHILS # BLD AUTO: 7.22 K/UL — HIGH (ref 1.4–6.5)
NEUTROPHILS # BLD AUTO: 7.57 K/UL — HIGH (ref 1.4–6.5)
NEUTROPHILS # BLD AUTO: 7.82 K/UL — HIGH (ref 1.4–6.5)
NEUTROPHILS # BLD AUTO: 8.04 K/UL — HIGH (ref 1.4–6.5)
NEUTROPHILS # BLD AUTO: 8.27 K/UL — HIGH (ref 1.4–6.5)
NEUTROPHILS # BLD AUTO: 8.48 K/UL — HIGH (ref 1.4–6.5)
NEUTROPHILS # BLD AUTO: 9.12 K/UL — HIGH (ref 1.4–6.5)
NEUTROPHILS # BLD AUTO: 9.85 K/UL — HIGH (ref 1.4–6.5)
NEUTROPHILS NFR BLD AUTO: 77.6 % — HIGH (ref 42.2–75.2)
NEUTROPHILS NFR BLD AUTO: 78.7 % — HIGH (ref 42.2–75.2)
NEUTROPHILS NFR BLD AUTO: 79.7 % — HIGH (ref 42.2–75.2)
NEUTROPHILS NFR BLD AUTO: 80.6 % — HIGH (ref 42.2–75.2)
NEUTROPHILS NFR BLD AUTO: 80.7 % — HIGH (ref 42.2–75.2)
NEUTROPHILS NFR BLD AUTO: 81.5 % — HIGH (ref 42.2–75.2)
NEUTROPHILS NFR BLD AUTO: 82.2 % — HIGH (ref 42.2–75.2)
NEUTROPHILS NFR BLD AUTO: 82.3 % — HIGH (ref 42.2–75.2)
NEUTROPHILS NFR BLD AUTO: 82.7 % — HIGH (ref 42.2–75.2)
NEUTROPHILS NFR BLD AUTO: 84.4 % — HIGH (ref 42.2–75.2)
NEUTROPHILS NFR BLD AUTO: 84.4 % — HIGH (ref 42.2–75.2)
NEUTROPHILS NFR BLD AUTO: 86.7 % — HIGH (ref 42.2–75.2)
NEUTROPHILS NFR BLD AUTO: 87.4 % — HIGH (ref 42.2–75.2)
NEUTROPHILS NFR BLD AUTO: 87.9 % — HIGH (ref 42.2–75.2)
NEUTROPHILS NFR BLD AUTO: 88.8 % — HIGH (ref 42.2–75.2)
NEUTROPHILS NFR BLD AUTO: 89 % — HIGH (ref 42.2–75.2)
NEUTROPHILS NFR BLD AUTO: 91.4 % — HIGH (ref 42.2–75.2)
NEUTROPHILS NFR BLD AUTO: 91.9 % — HIGH (ref 42.2–75.2)
NEUTROPHILS NFR BLD AUTO: 92 % — HIGH (ref 42.2–75.2)
NEUTROPHILS NFR BLD AUTO: 92.2 % — HIGH (ref 42.2–75.2)
NEUTROPHILS NFR BLD AUTO: 93 % — HIGH (ref 42.2–75.2)
NEUTROPHILS NFR BLD AUTO: 93.9 % — HIGH (ref 42.2–75.2)
NEUTROPHILS NFR BLD AUTO: 94.1 % — HIGH (ref 42.2–75.2)
NEUTROPHILS NFR BLD AUTO: 94.1 % — HIGH (ref 42.2–75.2)
NEUTROPHILS NFR BLD AUTO: 94.3 % — HIGH (ref 42.2–75.2)
NEUTROPHILS NFR BLD AUTO: 95.3 % — HIGH (ref 42.2–75.2)
NEUTROPHILS NFR BLD AUTO: 95.3 % — HIGH (ref 42.2–75.2)
NEUTROPHILS NFR BLD AUTO: 96 % — HIGH (ref 42.2–75.2)
NEUTROPHILS NFR BLD AUTO: 96.2 % — HIGH (ref 42.2–75.2)
NEUTROPHILS NFR BLD AUTO: 96.3 % — HIGH (ref 42.2–75.2)
NITRITE UR-MCNC: NEGATIVE — SIGNIFICANT CHANGE UP
NON HDL CHOLESTEROL: 89 MG/DL — SIGNIFICANT CHANGE UP
NON-GYNECOLOGICAL CYTOLOGY STUDY: SIGNIFICANT CHANGE UP
NRBC # BLD: 0 /100 WBCS — SIGNIFICANT CHANGE UP (ref 0–0)
NT-PROBNP SERPL-SCNC: 1019 PG/ML — HIGH (ref 0–300)
NT-PROBNP SERPL-SCNC: 1371 PG/ML — HIGH (ref 0–300)
NT-PROBNP SERPL-SCNC: 1451 PG/ML — HIGH (ref 0–300)
NT-PROBNP SERPL-SCNC: 371 PG/ML — HIGH (ref 0–300)
NT-PROBNP SERPL-SCNC: 704 PG/ML — HIGH (ref 0–300)
NT-PROBNP SERPL-SCNC: 753 PG/ML — HIGH (ref 0–300)
NT-PROBNP SERPL-SCNC: 814 PG/ML — HIGH (ref 0–300)
OPIATES UR-MCNC: NEGATIVE — SIGNIFICANT CHANGE UP
ORGANISM # SPEC MICROSCOPIC CNT: SIGNIFICANT CHANGE UP
ORGANISM # SPEC MICROSCOPIC CNT: SIGNIFICANT CHANGE UP
OVALOCYTES BLD QL SMEAR: SIGNIFICANT CHANGE UP
PCO2 BLDA: 50 MMHG — HIGH (ref 38–42)
PCO2 BLDA: 51 MMHG — HIGH (ref 38–42)
PCO2 BLDA: 54 MMHG — HIGH (ref 38–42)
PCO2 BLDV: 53 MMHG — HIGH (ref 41–51)
PCO2 BLDV: 64 MMHG — HIGH (ref 41–51)
PCO2 BLDV: 65 MMHG — HIGH (ref 41–51)
PCO2 BLDV: 66 MMHG — HIGH (ref 39–42)
PCO2 BLDV: 66 MMHG — HIGH (ref 41–51)
PCO2 BLDV: 70 MMHG — HIGH (ref 41–51)
PCO2 BLDV: 92 MMHG — HIGH (ref 39–42)
PCP SPEC-MCNC: SIGNIFICANT CHANGE UP
PH BLDA: 7.39 — SIGNIFICANT CHANGE UP (ref 7.38–7.42)
PH BLDA: 7.42 — SIGNIFICANT CHANGE UP (ref 7.38–7.42)
PH BLDA: 7.42 — SIGNIFICANT CHANGE UP (ref 7.38–7.42)
PH BLDV: 7.25 — LOW (ref 7.32–7.43)
PH BLDV: 7.3 — SIGNIFICANT CHANGE UP (ref 7.26–7.43)
PH BLDV: 7.34 — SIGNIFICANT CHANGE UP (ref 7.26–7.43)
PH BLDV: 7.34 — SIGNIFICANT CHANGE UP (ref 7.26–7.43)
PH BLDV: 7.38 — SIGNIFICANT CHANGE UP (ref 7.26–7.43)
PH BLDV: 7.4 — SIGNIFICANT CHANGE UP (ref 7.26–7.43)
PH BLDV: 7.4 — SIGNIFICANT CHANGE UP (ref 7.26–7.43)
PH UR: 5.5 — SIGNIFICANT CHANGE UP (ref 5–8)
PH UR: 6 — SIGNIFICANT CHANGE UP (ref 5–8)
PH UR: 6 — SIGNIFICANT CHANGE UP (ref 5–8)
PHOSPHATE SERPL-MCNC: 3.1 MG/DL — SIGNIFICANT CHANGE UP (ref 2.1–4.9)
PHOSPHATE SERPL-MCNC: 3.1 MG/DL — SIGNIFICANT CHANGE UP (ref 2.1–4.9)
PHOSPHATE SERPL-MCNC: 3.3 MG/DL — SIGNIFICANT CHANGE UP (ref 2.1–4.9)
PLAT MORPH BLD: NORMAL — SIGNIFICANT CHANGE UP
PLAT MORPH BLD: NORMAL — SIGNIFICANT CHANGE UP
PLATELET # BLD AUTO: 111 K/UL — LOW (ref 130–400)
PLATELET # BLD AUTO: 124 K/UL — LOW (ref 130–400)
PLATELET # BLD AUTO: 125 K/UL — LOW (ref 130–400)
PLATELET # BLD AUTO: 133 K/UL — SIGNIFICANT CHANGE UP (ref 130–400)
PLATELET # BLD AUTO: 136 K/UL — SIGNIFICANT CHANGE UP (ref 130–400)
PLATELET # BLD AUTO: 137 K/UL — SIGNIFICANT CHANGE UP (ref 130–400)
PLATELET # BLD AUTO: 139 K/UL — SIGNIFICANT CHANGE UP (ref 130–400)
PLATELET # BLD AUTO: 142 K/UL — SIGNIFICANT CHANGE UP (ref 130–400)
PLATELET # BLD AUTO: 144 K/UL — SIGNIFICANT CHANGE UP (ref 130–400)
PLATELET # BLD AUTO: 147 K/UL — SIGNIFICANT CHANGE UP (ref 130–400)
PLATELET # BLD AUTO: 149 K/UL — SIGNIFICANT CHANGE UP (ref 130–400)
PLATELET # BLD AUTO: 150 K/UL — SIGNIFICANT CHANGE UP (ref 130–400)
PLATELET # BLD AUTO: 150 K/UL — SIGNIFICANT CHANGE UP (ref 130–400)
PLATELET # BLD AUTO: 152 K/UL — SIGNIFICANT CHANGE UP (ref 130–400)
PLATELET # BLD AUTO: 158 K/UL — SIGNIFICANT CHANGE UP (ref 130–400)
PLATELET # BLD AUTO: 161 K/UL — SIGNIFICANT CHANGE UP (ref 130–400)
PLATELET # BLD AUTO: 162 K/UL — SIGNIFICANT CHANGE UP (ref 130–400)
PLATELET # BLD AUTO: 162 K/UL — SIGNIFICANT CHANGE UP (ref 130–400)
PLATELET # BLD AUTO: 163 K/UL — SIGNIFICANT CHANGE UP (ref 130–400)
PLATELET # BLD AUTO: 165 K/UL — SIGNIFICANT CHANGE UP (ref 130–400)
PLATELET # BLD AUTO: 166 K/UL — SIGNIFICANT CHANGE UP (ref 130–400)
PLATELET # BLD AUTO: 168 K/UL — SIGNIFICANT CHANGE UP (ref 130–400)
PLATELET # BLD AUTO: 168 K/UL — SIGNIFICANT CHANGE UP (ref 130–400)
PLATELET # BLD AUTO: 169 K/UL — SIGNIFICANT CHANGE UP (ref 130–400)
PLATELET # BLD AUTO: 169 K/UL — SIGNIFICANT CHANGE UP (ref 130–400)
PLATELET # BLD AUTO: 170 K/UL — SIGNIFICANT CHANGE UP (ref 130–400)
PLATELET # BLD AUTO: 175 K/UL — SIGNIFICANT CHANGE UP (ref 130–400)
PLATELET # BLD AUTO: 175 K/UL — SIGNIFICANT CHANGE UP (ref 130–400)
PLATELET # BLD AUTO: 176 K/UL — SIGNIFICANT CHANGE UP (ref 130–400)
PLATELET # BLD AUTO: 187 K/UL — SIGNIFICANT CHANGE UP (ref 130–400)
PLATELET # BLD AUTO: 190 K/UL — SIGNIFICANT CHANGE UP (ref 130–400)
PLATELET # BLD AUTO: 196 K/UL — SIGNIFICANT CHANGE UP (ref 130–400)
PLATELET # BLD AUTO: 213 K/UL — SIGNIFICANT CHANGE UP (ref 130–400)
PLATELET # BLD AUTO: 226 K/UL — SIGNIFICANT CHANGE UP (ref 130–400)
PLATELET # BLD AUTO: 226 K/UL — SIGNIFICANT CHANGE UP (ref 130–400)
PLATELET # BLD AUTO: 244 K/UL — SIGNIFICANT CHANGE UP (ref 130–400)
PLATELET # BLD AUTO: 300 K/UL — SIGNIFICANT CHANGE UP (ref 130–400)
PO2 BLDA: 214 MMHG — HIGH (ref 78–95)
PO2 BLDA: 223 MMHG — HIGH (ref 78–95)
PO2 BLDA: 89 MMHG — SIGNIFICANT CHANGE UP (ref 78–95)
PO2 BLDV: 16 MMHG — LOW (ref 20–40)
PO2 BLDV: 17 MMHG — LOW (ref 20–40)
PO2 BLDV: 19 MMHG — LOW (ref 20–40)
PO2 BLDV: 19 MMHG — SIGNIFICANT CHANGE UP
PO2 BLDV: 20 MMHG — SIGNIFICANT CHANGE UP (ref 20–40)
PO2 BLDV: 25 MMHG — SIGNIFICANT CHANGE UP (ref 20–40)
PO2 BLDV: 35 MMHG — SIGNIFICANT CHANGE UP (ref 20–40)
POCT-PROTHROMBIN TIME: 25.4 SECS
POCT-PROTHROMBIN TIME: 25.6 SECS
POIKILOCYTOSIS BLD QL AUTO: SIGNIFICANT CHANGE UP
POIKILOCYTOSIS BLD QL AUTO: SIGNIFICANT CHANGE UP
POLYCHROMASIA BLD QL SMEAR: SLIGHT — SIGNIFICANT CHANGE UP
POLYCHROMASIA BLD QL SMEAR: SLIGHT — SIGNIFICANT CHANGE UP
POTASSIUM BLDV-SCNC: 3.9 MMOL/L — SIGNIFICANT CHANGE UP (ref 3.3–5.6)
POTASSIUM BLDV-SCNC: 4.1 MMOL/L — SIGNIFICANT CHANGE UP (ref 3.3–5.6)
POTASSIUM BLDV-SCNC: 4.2 MMOL/L — SIGNIFICANT CHANGE UP (ref 3.3–5.6)
POTASSIUM SERPL-MCNC: 3.4 MMOL/L — LOW (ref 3.5–5)
POTASSIUM SERPL-MCNC: 3.6 MMOL/L — SIGNIFICANT CHANGE UP (ref 3.5–5)
POTASSIUM SERPL-MCNC: 3.7 MMOL/L — SIGNIFICANT CHANGE UP (ref 3.5–5)
POTASSIUM SERPL-MCNC: 3.8 MMOL/L — SIGNIFICANT CHANGE UP (ref 3.5–5)
POTASSIUM SERPL-MCNC: 3.8 MMOL/L — SIGNIFICANT CHANGE UP (ref 3.5–5)
POTASSIUM SERPL-MCNC: 4 MMOL/L — SIGNIFICANT CHANGE UP (ref 3.5–5)
POTASSIUM SERPL-MCNC: 4.1 MMOL/L — SIGNIFICANT CHANGE UP (ref 3.5–5)
POTASSIUM SERPL-MCNC: 4.2 MMOL/L — SIGNIFICANT CHANGE UP (ref 3.5–5)
POTASSIUM SERPL-MCNC: 4.3 MMOL/L — SIGNIFICANT CHANGE UP (ref 3.5–5)
POTASSIUM SERPL-MCNC: 4.3 MMOL/L — SIGNIFICANT CHANGE UP (ref 3.5–5)
POTASSIUM SERPL-MCNC: 4.4 MMOL/L — SIGNIFICANT CHANGE UP (ref 3.5–5)
POTASSIUM SERPL-MCNC: 4.4 MMOL/L — SIGNIFICANT CHANGE UP (ref 3.5–5)
POTASSIUM SERPL-MCNC: 4.5 MMOL/L — SIGNIFICANT CHANGE UP (ref 3.5–5)
POTASSIUM SERPL-MCNC: 4.6 MMOL/L — SIGNIFICANT CHANGE UP (ref 3.5–5)
POTASSIUM SERPL-MCNC: 4.8 MMOL/L — SIGNIFICANT CHANGE UP (ref 3.5–5)
POTASSIUM SERPL-MCNC: 4.8 MMOL/L — SIGNIFICANT CHANGE UP (ref 3.5–5)
POTASSIUM SERPL-MCNC: 4.9 MMOL/L — SIGNIFICANT CHANGE UP (ref 3.5–5)
POTASSIUM SERPL-MCNC: 5 MMOL/L — SIGNIFICANT CHANGE UP (ref 3.5–5)
POTASSIUM SERPL-MCNC: 5.1 MMOL/L — HIGH (ref 3.5–5)
POTASSIUM SERPL-MCNC: 5.2 MMOL/L — HIGH (ref 3.5–5)
POTASSIUM SERPL-MCNC: 5.5 MMOL/L — HIGH (ref 3.5–5)
POTASSIUM SERPL-SCNC: 3.4 MMOL/L — LOW (ref 3.5–5)
POTASSIUM SERPL-SCNC: 3.6 MMOL/L — SIGNIFICANT CHANGE UP (ref 3.5–5)
POTASSIUM SERPL-SCNC: 3.7 MMOL/L — SIGNIFICANT CHANGE UP (ref 3.5–5)
POTASSIUM SERPL-SCNC: 3.8 MMOL/L — SIGNIFICANT CHANGE UP (ref 3.5–5)
POTASSIUM SERPL-SCNC: 3.8 MMOL/L — SIGNIFICANT CHANGE UP (ref 3.5–5)
POTASSIUM SERPL-SCNC: 4 MMOL/L — SIGNIFICANT CHANGE UP (ref 3.5–5)
POTASSIUM SERPL-SCNC: 4.1 MMOL/L — SIGNIFICANT CHANGE UP (ref 3.5–5)
POTASSIUM SERPL-SCNC: 4.2 MMOL/L — SIGNIFICANT CHANGE UP (ref 3.5–5)
POTASSIUM SERPL-SCNC: 4.3 MMOL/L — SIGNIFICANT CHANGE UP (ref 3.5–5)
POTASSIUM SERPL-SCNC: 4.3 MMOL/L — SIGNIFICANT CHANGE UP (ref 3.5–5)
POTASSIUM SERPL-SCNC: 4.4 MMOL/L — SIGNIFICANT CHANGE UP (ref 3.5–5)
POTASSIUM SERPL-SCNC: 4.4 MMOL/L — SIGNIFICANT CHANGE UP (ref 3.5–5)
POTASSIUM SERPL-SCNC: 4.5 MMOL/L — SIGNIFICANT CHANGE UP (ref 3.5–5)
POTASSIUM SERPL-SCNC: 4.6 MMOL/L — SIGNIFICANT CHANGE UP (ref 3.5–5)
POTASSIUM SERPL-SCNC: 4.8 MMOL/L — SIGNIFICANT CHANGE UP (ref 3.5–5)
POTASSIUM SERPL-SCNC: 4.8 MMOL/L — SIGNIFICANT CHANGE UP (ref 3.5–5)
POTASSIUM SERPL-SCNC: 4.9 MMOL/L — SIGNIFICANT CHANGE UP (ref 3.5–5)
POTASSIUM SERPL-SCNC: 5 MMOL/L — SIGNIFICANT CHANGE UP (ref 3.5–5)
POTASSIUM SERPL-SCNC: 5.1 MMOL/L — HIGH (ref 3.5–5)
POTASSIUM SERPL-SCNC: 5.2 MMOL/L — HIGH (ref 3.5–5)
POTASSIUM SERPL-SCNC: 5.5 MMOL/L — HIGH (ref 3.5–5)
PROCALCITONIN SERPL-MCNC: 0.23 NG/ML — HIGH (ref 0.02–0.1)
PROCALCITONIN SERPL-MCNC: 0.25 NG/ML — HIGH (ref 0.02–0.1)
PROCALCITONIN SERPL-MCNC: 0.26 NG/ML — HIGH (ref 0.02–0.1)
PROCALCITONIN SERPL-MCNC: 0.41 NG/ML — HIGH (ref 0.02–0.1)
PROPOXYPHENE QUALITATIVE URINE RESULT: NEGATIVE — SIGNIFICANT CHANGE UP
PROT SERPL-MCNC: 4.4 G/DL — LOW (ref 6–8)
PROT SERPL-MCNC: 5 G/DL — LOW (ref 6–8)
PROT SERPL-MCNC: 5.4 G/DL — LOW (ref 6–8)
PROT SERPL-MCNC: 5.4 G/DL — LOW (ref 6–8)
PROT SERPL-MCNC: 5.6 G/DL — LOW (ref 6–8)
PROT SERPL-MCNC: 5.7 G/DL — LOW (ref 6–8)
PROT SERPL-MCNC: 5.8 G/DL — LOW (ref 6–8)
PROT SERPL-MCNC: 5.8 G/DL — LOW (ref 6–8)
PROT SERPL-MCNC: 5.9 G/DL — LOW (ref 6–8)
PROT SERPL-MCNC: 6 G/DL — SIGNIFICANT CHANGE UP (ref 6–8)
PROT SERPL-MCNC: 6 G/DL — SIGNIFICANT CHANGE UP (ref 6–8)
PROT SERPL-MCNC: 6.1 G/DL — SIGNIFICANT CHANGE UP (ref 6–8)
PROT SERPL-MCNC: 6.2 G/DL — SIGNIFICANT CHANGE UP (ref 6–8)
PROT SERPL-MCNC: 6.2 G/DL — SIGNIFICANT CHANGE UP (ref 6–8)
PROT SERPL-MCNC: 6.3 G/DL — SIGNIFICANT CHANGE UP (ref 6–8)
PROT SERPL-MCNC: 6.4 G/DL — SIGNIFICANT CHANGE UP (ref 6–8)
PROT SERPL-MCNC: 6.5 G/DL — SIGNIFICANT CHANGE UP (ref 6–8)
PROT SERPL-MCNC: 6.5 G/DL — SIGNIFICANT CHANGE UP (ref 6–8)
PROT SERPL-MCNC: 6.6 G/DL — SIGNIFICANT CHANGE UP (ref 6–8)
PROT UR-MCNC: NEGATIVE — SIGNIFICANT CHANGE UP
PROT UR-MCNC: SIGNIFICANT CHANGE UP
PROT UR-MCNC: SIGNIFICANT CHANGE UP
PROTHROM AB SERPL-ACNC: 11.1 SEC — SIGNIFICANT CHANGE UP (ref 9.95–12.87)
PROTHROM AB SERPL-ACNC: 11.4 SEC — SIGNIFICANT CHANGE UP (ref 9.95–12.87)
PROTHROM AB SERPL-ACNC: 16.3 SEC — HIGH (ref 9.95–12.87)
PROTHROM AB SERPL-ACNC: 18.1 SEC — HIGH (ref 9.95–12.87)
PROTHROM AB SERPL-ACNC: 20.2 SEC — HIGH (ref 9.95–12.87)
PROTHROM AB SERPL-ACNC: 21.2 SEC — HIGH (ref 9.95–12.87)
PROTHROM AB SERPL-ACNC: 21.8 SEC — HIGH (ref 9.95–12.87)
PROTHROM AB SERPL-ACNC: 22.2 SEC — HIGH (ref 9.95–12.87)
PROTHROM AB SERPL-ACNC: 22.8 SEC — HIGH (ref 9.95–12.87)
PROTHROM AB SERPL-ACNC: 24.7 SEC — HIGH (ref 9.95–12.87)
PROTHROM AB SERPL-ACNC: 25.7 SEC — HIGH (ref 9.95–12.87)
PROTHROM AB SERPL-ACNC: 26.1 SEC — HIGH (ref 9.95–12.87)
PROTHROM AB SERPL-ACNC: 27.6 SEC — HIGH (ref 9.95–12.87)
PROTHROM AB SERPL-ACNC: 28.5 SEC — HIGH (ref 9.95–12.87)
PROTHROM AB SERPL-ACNC: 30.5 SEC — HIGH (ref 9.95–12.87)
PROTHROM AB SERPL-ACNC: 31.8 SEC — HIGH (ref 9.95–12.87)
PROTHROM AB SERPL-ACNC: 38.4 SEC — HIGH (ref 9.95–12.87)
PROTHROM AB SERPL-ACNC: 38.9 SEC — HIGH (ref 9.95–12.87)
PROTHROM AB SERPL-ACNC: >40 SEC — HIGH (ref 9.95–12.87)
QUALITY CONTROL: YES
QUALITY CONTROL: YES
RAPID RVP RESULT: SIGNIFICANT CHANGE UP
RBC # BLD: 2.43 M/UL — LOW (ref 4.2–5.4)
RBC # BLD: 2.84 M/UL — LOW (ref 4.2–5.4)
RBC # BLD: 3.13 M/UL — LOW (ref 4.2–5.4)
RBC # BLD: 3.17 M/UL — LOW (ref 4.2–5.4)
RBC # BLD: 3.18 M/UL — LOW (ref 4.2–5.4)
RBC # BLD: 3.21 M/UL — LOW (ref 4.2–5.4)
RBC # BLD: 3.32 M/UL — LOW (ref 4.2–5.4)
RBC # BLD: 3.38 M/UL — LOW (ref 4.2–5.4)
RBC # BLD: 3.43 M/UL — LOW (ref 4.2–5.4)
RBC # BLD: 3.43 M/UL — LOW (ref 4.2–5.4)
RBC # BLD: 3.44 M/UL — LOW (ref 4.2–5.4)
RBC # BLD: 3.45 M/UL — LOW (ref 4.2–5.4)
RBC # BLD: 3.45 M/UL — LOW (ref 4.2–5.4)
RBC # BLD: 3.47 M/UL — LOW (ref 4.2–5.4)
RBC # BLD: 3.51 M/UL — LOW (ref 4.2–5.4)
RBC # BLD: 3.52 M/UL — LOW (ref 4.2–5.4)
RBC # BLD: 3.54 M/UL — LOW (ref 4.2–5.4)
RBC # BLD: 3.58 M/UL — LOW (ref 4.2–5.4)
RBC # BLD: 3.59 M/UL — LOW (ref 4.2–5.4)
RBC # BLD: 3.6 M/UL — LOW (ref 4.2–5.4)
RBC # BLD: 3.62 M/UL — LOW (ref 4.2–5.4)
RBC # BLD: 3.65 M/UL — LOW (ref 4.2–5.4)
RBC # BLD: 3.66 M/UL — LOW (ref 4.2–5.4)
RBC # BLD: 3.68 M/UL — LOW (ref 4.2–5.4)
RBC # BLD: 3.7 M/UL — LOW (ref 4.2–5.4)
RBC # BLD: 3.71 M/UL — LOW (ref 4.2–5.4)
RBC # BLD: 3.73 M/UL — LOW (ref 4.2–5.4)
RBC # BLD: 3.73 M/UL — LOW (ref 4.2–5.4)
RBC # BLD: 3.75 M/UL — LOW (ref 4.2–5.4)
RBC # BLD: 3.76 M/UL — LOW (ref 4.2–5.4)
RBC # BLD: 3.76 M/UL — LOW (ref 4.2–5.4)
RBC # BLD: 3.78 M/UL — LOW (ref 4.2–5.4)
RBC # BLD: 3.79 M/UL — LOW (ref 4.2–5.4)
RBC # BLD: 3.8 M/UL — LOW (ref 4.2–5.4)
RBC # BLD: 3.87 M/UL — LOW (ref 4.2–5.4)
RBC # BLD: 4.05 M/UL — LOW (ref 4.2–5.4)
RBC # BLD: 4.06 M/UL — LOW (ref 4.2–5.4)
RBC # FLD: 13.1 % — SIGNIFICANT CHANGE UP (ref 11.5–14.5)
RBC # FLD: 13.2 % — SIGNIFICANT CHANGE UP (ref 11.5–14.5)
RBC # FLD: 13.2 % — SIGNIFICANT CHANGE UP (ref 11.5–14.5)
RBC # FLD: 13.3 % — SIGNIFICANT CHANGE UP (ref 11.5–14.5)
RBC # FLD: 13.4 % — SIGNIFICANT CHANGE UP (ref 11.5–14.5)
RBC # FLD: 13.4 % — SIGNIFICANT CHANGE UP (ref 11.5–14.5)
RBC # FLD: 13.5 % — SIGNIFICANT CHANGE UP (ref 11.5–14.5)
RBC # FLD: 13.6 % — SIGNIFICANT CHANGE UP (ref 11.5–14.5)
RBC # FLD: 13.8 % — SIGNIFICANT CHANGE UP (ref 11.5–14.5)
RBC # FLD: 14 % — SIGNIFICANT CHANGE UP (ref 11.5–14.5)
RBC # FLD: 14 % — SIGNIFICANT CHANGE UP (ref 11.5–14.5)
RBC # FLD: 14.1 % — SIGNIFICANT CHANGE UP (ref 11.5–14.5)
RBC # FLD: 14.3 % — SIGNIFICANT CHANGE UP (ref 11.5–14.5)
RBC # FLD: 14.5 % — SIGNIFICANT CHANGE UP (ref 11.5–14.5)
RBC # FLD: 14.5 % — SIGNIFICANT CHANGE UP (ref 11.5–14.5)
RBC # FLD: 14.6 % — HIGH (ref 11.5–14.5)
RBC # FLD: 14.6 % — HIGH (ref 11.5–14.5)
RBC # FLD: 14.7 % — HIGH (ref 11.5–14.5)
RBC # FLD: 14.8 % — HIGH (ref 11.5–14.5)
RBC # FLD: 14.9 % — HIGH (ref 11.5–14.5)
RBC # FLD: 15 % — HIGH (ref 11.5–14.5)
RBC # FLD: 15 % — HIGH (ref 11.5–14.5)
RBC # FLD: 15.2 % — HIGH (ref 11.5–14.5)
RBC # FLD: 15.2 % — HIGH (ref 11.5–14.5)
RBC # FLD: 15.3 % — HIGH (ref 11.5–14.5)
RBC # FLD: 15.3 % — HIGH (ref 11.5–14.5)
RBC # FLD: 15.5 % — HIGH (ref 11.5–14.5)
RBC # FLD: 15.5 % — HIGH (ref 11.5–14.5)
RBC # FLD: 15.9 % — HIGH (ref 11.5–14.5)
RBC BLD AUTO: ABNORMAL
RBC BLD AUTO: ABNORMAL
RBC CASTS # UR COMP ASSIST: 3 /HPF — SIGNIFICANT CHANGE UP (ref 0–4)
RSV RNA NPH QL NAA+NON-PROBE: NEGATIVE COUNTS — SIGNIFICANT CHANGE UP
SALICYLATES SERPL-MCNC: <0.3 MG/DL — LOW (ref 4–30)
SAO2 % BLDA: 100 % — HIGH (ref 94–98)
SAO2 % BLDA: 100 % — HIGH (ref 94–98)
SAO2 % BLDA: 98 % — HIGH (ref 92–96)
SAO2 % BLDV: 18 % — SIGNIFICANT CHANGE UP
SAO2 % BLDV: 21 % — SIGNIFICANT CHANGE UP
SAO2 % BLDV: 23.3 % — SIGNIFICANT CHANGE UP
SAO2 % BLDV: 25 % — SIGNIFICANT CHANGE UP
SAO2 % BLDV: 31 % — SIGNIFICANT CHANGE UP
SAO2 % BLDV: 44 % — SIGNIFICANT CHANGE UP
SAO2 % BLDV: 68 % — SIGNIFICANT CHANGE UP
SARS-COV-2 IGG+IGM SERPL QL IA: 186 U/ML — HIGH
SARS-COV-2 IGG+IGM SERPL QL IA: 84.8 U/ML — HIGH
SARS-COV-2 IGG+IGM SERPL QL IA: >250 U/ML — HIGH
SARS-COV-2 IGG+IGM SERPL QL IA: POSITIVE
SARS-COV-2 RNA SPEC QL NAA+PROBE: NEGATIVE COUNTS — SIGNIFICANT CHANGE UP
SARS-COV-2 RNA SPEC QL NAA+PROBE: SIGNIFICANT CHANGE UP
SODIUM SERPL-SCNC: 134 MMOL/L — LOW (ref 135–146)
SODIUM SERPL-SCNC: 136 MMOL/L — SIGNIFICANT CHANGE UP (ref 135–146)
SODIUM SERPL-SCNC: 136 MMOL/L — SIGNIFICANT CHANGE UP (ref 135–146)
SODIUM SERPL-SCNC: 137 MMOL/L — SIGNIFICANT CHANGE UP (ref 135–146)
SODIUM SERPL-SCNC: 137 MMOL/L — SIGNIFICANT CHANGE UP (ref 135–146)
SODIUM SERPL-SCNC: 138 MMOL/L — SIGNIFICANT CHANGE UP (ref 135–146)
SODIUM SERPL-SCNC: 139 MMOL/L — SIGNIFICANT CHANGE UP (ref 135–146)
SODIUM SERPL-SCNC: 140 MMOL/L — SIGNIFICANT CHANGE UP (ref 135–146)
SODIUM SERPL-SCNC: 141 MMOL/L — SIGNIFICANT CHANGE UP (ref 135–146)
SODIUM SERPL-SCNC: 142 MMOL/L — SIGNIFICANT CHANGE UP (ref 135–146)
SODIUM SERPL-SCNC: 143 MMOL/L — SIGNIFICANT CHANGE UP (ref 135–146)
SP GR SPEC: 1.01 — SIGNIFICANT CHANGE UP (ref 1.01–1.03)
SP GR SPEC: 1.01 — SIGNIFICANT CHANGE UP (ref 1.01–1.03)
SP GR SPEC: 1.03 — HIGH (ref 1.01–1.03)
SPECIMEN SOURCE: SIGNIFICANT CHANGE UP
TRIGL SERPL-MCNC: 161 MG/DL — HIGH
TROPONIN T SERPL-MCNC: 0.02 NG/ML — HIGH
TROPONIN T SERPL-MCNC: 0.06 NG/ML — CRITICAL HIGH
TROPONIN T SERPL-MCNC: <0.01 NG/ML — SIGNIFICANT CHANGE UP
TSH SERPL-MCNC: 1.02 UIU/ML — SIGNIFICANT CHANGE UP (ref 0.27–4.2)
TSH SERPL-MCNC: 2.2 UIU/ML — SIGNIFICANT CHANGE UP (ref 0.27–4.2)
UROBILINOGEN FLD QL: ABNORMAL
UROBILINOGEN FLD QL: SIGNIFICANT CHANGE UP
UROBILINOGEN FLD QL: SIGNIFICANT CHANGE UP
WBC # BLD: 10.15 K/UL — SIGNIFICANT CHANGE UP (ref 4.8–10.8)
WBC # BLD: 11.1 K/UL — HIGH (ref 4.8–10.8)
WBC # BLD: 11.36 K/UL — HIGH (ref 4.8–10.8)
WBC # BLD: 11.88 K/UL — HIGH (ref 4.8–10.8)
WBC # BLD: 11.9 K/UL — HIGH (ref 4.8–10.8)
WBC # BLD: 12.32 K/UL — HIGH (ref 4.8–10.8)
WBC # BLD: 14.86 K/UL — HIGH (ref 4.8–10.8)
WBC # BLD: 17.35 K/UL — HIGH (ref 4.8–10.8)
WBC # BLD: 4.25 K/UL — LOW (ref 4.8–10.8)
WBC # BLD: 4.56 K/UL — LOW (ref 4.8–10.8)
WBC # BLD: 5.17 K/UL — SIGNIFICANT CHANGE UP (ref 4.8–10.8)
WBC # BLD: 5.22 K/UL — SIGNIFICANT CHANGE UP (ref 4.8–10.8)
WBC # BLD: 5.25 K/UL — SIGNIFICANT CHANGE UP (ref 4.8–10.8)
WBC # BLD: 6.18 K/UL — SIGNIFICANT CHANGE UP (ref 4.8–10.8)
WBC # BLD: 6.23 K/UL — SIGNIFICANT CHANGE UP (ref 4.8–10.8)
WBC # BLD: 6.35 K/UL — SIGNIFICANT CHANGE UP (ref 4.8–10.8)
WBC # BLD: 6.42 K/UL — SIGNIFICANT CHANGE UP (ref 4.8–10.8)
WBC # BLD: 6.53 K/UL — SIGNIFICANT CHANGE UP (ref 4.8–10.8)
WBC # BLD: 6.56 K/UL — SIGNIFICANT CHANGE UP (ref 4.8–10.8)
WBC # BLD: 6.73 K/UL — SIGNIFICANT CHANGE UP (ref 4.8–10.8)
WBC # BLD: 6.92 K/UL — SIGNIFICANT CHANGE UP (ref 4.8–10.8)
WBC # BLD: 6.93 K/UL — SIGNIFICANT CHANGE UP (ref 4.8–10.8)
WBC # BLD: 7.09 K/UL — SIGNIFICANT CHANGE UP (ref 4.8–10.8)
WBC # BLD: 7.25 K/UL — SIGNIFICANT CHANGE UP (ref 4.8–10.8)
WBC # BLD: 7.36 K/UL — SIGNIFICANT CHANGE UP (ref 4.8–10.8)
WBC # BLD: 7.48 K/UL — SIGNIFICANT CHANGE UP (ref 4.8–10.8)
WBC # BLD: 7.5 K/UL — SIGNIFICANT CHANGE UP (ref 4.8–10.8)
WBC # BLD: 7.62 K/UL — SIGNIFICANT CHANGE UP (ref 4.8–10.8)
WBC # BLD: 8.23 K/UL — SIGNIFICANT CHANGE UP (ref 4.8–10.8)
WBC # BLD: 8.45 K/UL — SIGNIFICANT CHANGE UP (ref 4.8–10.8)
WBC # BLD: 8.66 K/UL — SIGNIFICANT CHANGE UP (ref 4.8–10.8)
WBC # BLD: 8.79 K/UL — SIGNIFICANT CHANGE UP (ref 4.8–10.8)
WBC # BLD: 8.81 K/UL — SIGNIFICANT CHANGE UP (ref 4.8–10.8)
WBC # BLD: 9.03 K/UL — SIGNIFICANT CHANGE UP (ref 4.8–10.8)
WBC # BLD: 9.14 K/UL — SIGNIFICANT CHANGE UP (ref 4.8–10.8)
WBC # BLD: 9.31 K/UL — SIGNIFICANT CHANGE UP (ref 4.8–10.8)
WBC # BLD: 9.98 K/UL — SIGNIFICANT CHANGE UP (ref 4.8–10.8)
WBC # FLD AUTO: 10.15 K/UL — SIGNIFICANT CHANGE UP (ref 4.8–10.8)
WBC # FLD AUTO: 11.1 K/UL — HIGH (ref 4.8–10.8)
WBC # FLD AUTO: 11.36 K/UL — HIGH (ref 4.8–10.8)
WBC # FLD AUTO: 11.88 K/UL — HIGH (ref 4.8–10.8)
WBC # FLD AUTO: 11.9 K/UL — HIGH (ref 4.8–10.8)
WBC # FLD AUTO: 12.32 K/UL — HIGH (ref 4.8–10.8)
WBC # FLD AUTO: 14.86 K/UL — HIGH (ref 4.8–10.8)
WBC # FLD AUTO: 17.35 K/UL — HIGH (ref 4.8–10.8)
WBC # FLD AUTO: 4.25 K/UL — LOW (ref 4.8–10.8)
WBC # FLD AUTO: 4.56 K/UL — LOW (ref 4.8–10.8)
WBC # FLD AUTO: 5.17 K/UL — SIGNIFICANT CHANGE UP (ref 4.8–10.8)
WBC # FLD AUTO: 5.22 K/UL — SIGNIFICANT CHANGE UP (ref 4.8–10.8)
WBC # FLD AUTO: 5.25 K/UL — SIGNIFICANT CHANGE UP (ref 4.8–10.8)
WBC # FLD AUTO: 6.18 K/UL — SIGNIFICANT CHANGE UP (ref 4.8–10.8)
WBC # FLD AUTO: 6.23 K/UL — SIGNIFICANT CHANGE UP (ref 4.8–10.8)
WBC # FLD AUTO: 6.35 K/UL — SIGNIFICANT CHANGE UP (ref 4.8–10.8)
WBC # FLD AUTO: 6.42 K/UL — SIGNIFICANT CHANGE UP (ref 4.8–10.8)
WBC # FLD AUTO: 6.53 K/UL — SIGNIFICANT CHANGE UP (ref 4.8–10.8)
WBC # FLD AUTO: 6.56 K/UL — SIGNIFICANT CHANGE UP (ref 4.8–10.8)
WBC # FLD AUTO: 6.73 K/UL — SIGNIFICANT CHANGE UP (ref 4.8–10.8)
WBC # FLD AUTO: 6.92 K/UL — SIGNIFICANT CHANGE UP (ref 4.8–10.8)
WBC # FLD AUTO: 6.93 K/UL — SIGNIFICANT CHANGE UP (ref 4.8–10.8)
WBC # FLD AUTO: 7.09 K/UL — SIGNIFICANT CHANGE UP (ref 4.8–10.8)
WBC # FLD AUTO: 7.25 K/UL — SIGNIFICANT CHANGE UP (ref 4.8–10.8)
WBC # FLD AUTO: 7.36 K/UL — SIGNIFICANT CHANGE UP (ref 4.8–10.8)
WBC # FLD AUTO: 7.48 K/UL — SIGNIFICANT CHANGE UP (ref 4.8–10.8)
WBC # FLD AUTO: 7.5 K/UL — SIGNIFICANT CHANGE UP (ref 4.8–10.8)
WBC # FLD AUTO: 7.62 K/UL — SIGNIFICANT CHANGE UP (ref 4.8–10.8)
WBC # FLD AUTO: 8.23 K/UL — SIGNIFICANT CHANGE UP (ref 4.8–10.8)
WBC # FLD AUTO: 8.45 K/UL — SIGNIFICANT CHANGE UP (ref 4.8–10.8)
WBC # FLD AUTO: 8.66 K/UL — SIGNIFICANT CHANGE UP (ref 4.8–10.8)
WBC # FLD AUTO: 8.79 K/UL — SIGNIFICANT CHANGE UP (ref 4.8–10.8)
WBC # FLD AUTO: 8.81 K/UL — SIGNIFICANT CHANGE UP (ref 4.8–10.8)
WBC # FLD AUTO: 9.03 K/UL — SIGNIFICANT CHANGE UP (ref 4.8–10.8)
WBC # FLD AUTO: 9.14 K/UL — SIGNIFICANT CHANGE UP (ref 4.8–10.8)
WBC # FLD AUTO: 9.31 K/UL — SIGNIFICANT CHANGE UP (ref 4.8–10.8)
WBC # FLD AUTO: 9.98 K/UL — SIGNIFICANT CHANGE UP (ref 4.8–10.8)
WBC UR QL: 62 /HPF — HIGH (ref 0–5)

## 2021-01-01 PROCEDURE — 99239 HOSP IP/OBS DSCHRG MGMT >30: CPT

## 2021-01-01 PROCEDURE — 99285 EMERGENCY DEPT VISIT HI MDM: CPT

## 2021-01-01 PROCEDURE — 99497 ADVNCD CARE PLAN 30 MIN: CPT | Mod: 25

## 2021-01-01 PROCEDURE — 99233 SBSQ HOSP IP/OBS HIGH 50: CPT

## 2021-01-01 PROCEDURE — 93010 ELECTROCARDIOGRAM REPORT: CPT

## 2021-01-01 PROCEDURE — 70450 CT HEAD/BRAIN W/O DYE: CPT | Mod: 26,MA

## 2021-01-01 PROCEDURE — 71045 X-RAY EXAM CHEST 1 VIEW: CPT | Mod: 26

## 2021-01-01 PROCEDURE — 99223 1ST HOSP IP/OBS HIGH 75: CPT

## 2021-01-01 PROCEDURE — 99214 OFFICE O/P EST MOD 30 MIN: CPT

## 2021-01-01 PROCEDURE — 93306 TTE W/DOPPLER COMPLETE: CPT | Mod: 26

## 2021-01-01 PROCEDURE — 99232 SBSQ HOSP IP/OBS MODERATE 35: CPT

## 2021-01-01 PROCEDURE — 74177 CT ABD & PELVIS W/CONTRAST: CPT | Mod: 26,MA

## 2021-01-01 PROCEDURE — 93010 ELECTROCARDIOGRAM REPORT: CPT | Mod: 76

## 2021-01-01 PROCEDURE — 99231 SBSQ HOSP IP/OBS SF/LOW 25: CPT

## 2021-01-01 PROCEDURE — 99283 EMERGENCY DEPT VISIT LOW MDM: CPT

## 2021-01-01 PROCEDURE — 93970 EXTREMITY STUDY: CPT | Mod: 26

## 2021-01-01 PROCEDURE — 93308 TTE F-UP OR LMTD: CPT | Mod: 26

## 2021-01-01 PROCEDURE — 99222 1ST HOSP IP/OBS MODERATE 55: CPT

## 2021-01-01 PROCEDURE — 71275 CT ANGIOGRAPHY CHEST: CPT | Mod: 26,MA

## 2021-01-01 PROCEDURE — 99072 ADDL SUPL MATRL&STAF TM PHE: CPT

## 2021-01-01 PROCEDURE — 99291 CRITICAL CARE FIRST HOUR: CPT

## 2021-01-01 PROCEDURE — 99220: CPT

## 2021-01-01 PROCEDURE — 93010 ELECTROCARDIOGRAM REPORT: CPT | Mod: GW

## 2021-01-01 PROCEDURE — 88112 CYTOPATH CELL ENHANCE TECH: CPT | Mod: 26

## 2021-01-01 PROCEDURE — 93285 PRGRMG DEV EVAL SCRMS IP: CPT | Mod: 26

## 2021-01-01 PROCEDURE — 99451 NTRPROF PH1/NTRNET/EHR 5/>: CPT

## 2021-01-01 PROCEDURE — 71045 X-RAY EXAM CHEST 1 VIEW: CPT | Mod: 26,77

## 2021-01-01 PROCEDURE — 99221 1ST HOSP IP/OBS SF/LOW 40: CPT | Mod: GC

## 2021-01-01 PROCEDURE — 99232 SBSQ HOSP IP/OBS MODERATE 35: CPT | Mod: GC

## 2021-01-01 PROCEDURE — 71260 CT THORAX DX C+: CPT | Mod: 26,MA

## 2021-01-01 PROCEDURE — 72125 CT NECK SPINE W/O DYE: CPT | Mod: 26,MA

## 2021-01-01 PROCEDURE — 99285 EMERGENCY DEPT VISIT HI MDM: CPT | Mod: 25

## 2021-01-01 PROCEDURE — 99285 EMERGENCY DEPT VISIT HI MDM: CPT | Mod: CS

## 2021-01-01 PROCEDURE — 70450 CT HEAD/BRAIN W/O DYE: CPT | Mod: 26,MA,59

## 2021-01-01 PROCEDURE — 76705 ECHO EXAM OF ABDOMEN: CPT | Mod: 26

## 2021-01-01 PROCEDURE — 99284 EMERGENCY DEPT VISIT MOD MDM: CPT

## 2021-01-01 PROCEDURE — 72170 X-RAY EXAM OF PELVIS: CPT | Mod: 26

## 2021-01-01 PROCEDURE — 99222 1ST HOSP IP/OBS MODERATE 55: CPT | Mod: 25

## 2021-01-01 PROCEDURE — 70496 CT ANGIOGRAPHY HEAD: CPT | Mod: 26,MA

## 2021-01-01 PROCEDURE — 88305 TISSUE EXAM BY PATHOLOGIST: CPT | Mod: 26

## 2021-01-01 PROCEDURE — 99238 HOSP IP/OBS DSCHRG MGMT 30/<: CPT

## 2021-01-01 PROCEDURE — 99221 1ST HOSP IP/OBS SF/LOW 40: CPT

## 2021-01-01 PROCEDURE — 99217: CPT

## 2021-01-01 PROCEDURE — 31624 DX BRONCHOSCOPE/LAVAGE: CPT

## 2021-01-01 PROCEDURE — 99218: CPT | Mod: 25

## 2021-01-01 PROCEDURE — 71250 CT THORAX DX C-: CPT | Mod: 26

## 2021-01-01 PROCEDURE — 88312 SPECIAL STAINS GROUP 1: CPT | Mod: 26

## 2021-01-01 PROCEDURE — 70498 CT ANGIOGRAPHY NECK: CPT | Mod: 26,MA

## 2021-01-01 PROCEDURE — 99223 1ST HOSP IP/OBS HIGH 75: CPT | Mod: AI

## 2021-01-01 RX ORDER — SENNA PLUS 8.6 MG/1
2 TABLET ORAL AT BEDTIME
Refills: 0 | Status: DISCONTINUED | OUTPATIENT
Start: 2021-01-01 | End: 2021-01-01

## 2021-01-01 RX ORDER — HYDROXYZINE HCL 10 MG
25 TABLET ORAL ONCE
Refills: 0 | Status: COMPLETED | OUTPATIENT
Start: 2021-01-01 | End: 2021-01-01

## 2021-01-01 RX ORDER — IPRATROPIUM/ALBUTEROL SULFATE 18-103MCG
3 AEROSOL WITH ADAPTER (GRAM) INHALATION EVERY 6 HOURS
Refills: 0 | Status: DISCONTINUED | OUTPATIENT
Start: 2021-01-01 | End: 2021-01-01

## 2021-01-01 RX ORDER — APIXABAN 2.5 MG/1
1 TABLET, FILM COATED ORAL
Qty: 28 | Refills: 0
Start: 2021-01-01 | End: 2021-01-01

## 2021-01-01 RX ORDER — HALOPERIDOL DECANOATE 100 MG/ML
2 INJECTION INTRAMUSCULAR EVERY 8 HOURS
Refills: 0 | Status: DISCONTINUED | OUTPATIENT
Start: 2021-01-01 | End: 2021-01-01

## 2021-01-01 RX ORDER — MIDAZOLAM HYDROCHLORIDE 1 MG/ML
2 INJECTION, SOLUTION INTRAMUSCULAR; INTRAVENOUS ONCE
Refills: 0 | Status: DISCONTINUED | OUTPATIENT
Start: 2021-01-01 | End: 2021-01-01

## 2021-01-01 RX ORDER — ATORVASTATIN CALCIUM 80 MG/1
1 TABLET, FILM COATED ORAL
Qty: 14 | Refills: 0
Start: 2021-01-01 | End: 2021-01-01

## 2021-01-01 RX ORDER — CHLORHEXIDINE GLUCONATE 213 G/1000ML
15 SOLUTION TOPICAL EVERY 12 HOURS
Refills: 0 | Status: DISCONTINUED | OUTPATIENT
Start: 2021-01-01 | End: 2021-01-01

## 2021-01-01 RX ORDER — CHLORHEXIDINE GLUCONATE 213 G/1000ML
1 SOLUTION TOPICAL
Refills: 0 | Status: DISCONTINUED | OUTPATIENT
Start: 2021-01-01 | End: 2021-01-01

## 2021-01-01 RX ORDER — ATORVASTATIN CALCIUM 80 MG/1
20 TABLET, FILM COATED ORAL AT BEDTIME
Refills: 0 | Status: DISCONTINUED | OUTPATIENT
Start: 2021-01-01 | End: 2021-01-01

## 2021-01-01 RX ORDER — BUDESONIDE AND FORMOTEROL FUMARATE DIHYDRATE 160; 4.5 UG/1; UG/1
2 AEROSOL RESPIRATORY (INHALATION)
Refills: 0 | Status: DISCONTINUED | OUTPATIENT
Start: 2021-01-01 | End: 2021-01-01

## 2021-01-01 RX ORDER — INSULIN GLARGINE 100 [IU]/ML
9 INJECTION, SOLUTION SUBCUTANEOUS AT BEDTIME
Refills: 0 | Status: DISCONTINUED | OUTPATIENT
Start: 2021-01-01 | End: 2021-01-01

## 2021-01-01 RX ORDER — INSULIN LISPRO 100/ML
VIAL (ML) SUBCUTANEOUS
Refills: 0 | Status: DISCONTINUED | OUTPATIENT
Start: 2021-01-01 | End: 2021-01-01

## 2021-01-01 RX ORDER — POLYETHYLENE GLYCOL 3350 17 G/17G
17 POWDER, FOR SOLUTION ORAL DAILY
Refills: 0 | Status: DISCONTINUED | OUTPATIENT
Start: 2021-01-01 | End: 2021-01-01

## 2021-01-01 RX ORDER — ONDANSETRON 8 MG/1
4 TABLET, FILM COATED ORAL EVERY 8 HOURS
Refills: 0 | Status: DISCONTINUED | OUTPATIENT
Start: 2021-01-01 | End: 2021-01-01

## 2021-01-01 RX ORDER — AZITHROMYCIN 500 MG/1
500 TABLET, FILM COATED ORAL DAILY
Refills: 0 | Status: DISCONTINUED | OUTPATIENT
Start: 2021-01-01 | End: 2021-01-01

## 2021-01-01 RX ORDER — CLONAZEPAM 1 MG
1 TABLET ORAL EVERY 8 HOURS
Refills: 0 | Status: DISCONTINUED | OUTPATIENT
Start: 2021-01-01 | End: 2021-01-01

## 2021-01-01 RX ORDER — MORPHINE SULFATE 50 MG/1
20 CAPSULE, EXTENDED RELEASE ORAL EVERY 4 HOURS
Refills: 0 | Status: DISCONTINUED | OUTPATIENT
Start: 2021-01-01 | End: 2021-01-01

## 2021-01-01 RX ORDER — TIOTROPIUM BROMIDE 18 UG/1
1 CAPSULE ORAL; RESPIRATORY (INHALATION)
Qty: 56 | Refills: 0
Start: 2021-01-01 | End: 2021-01-01

## 2021-01-01 RX ORDER — CLONAZEPAM 1 MG
1 TABLET ORAL ONCE
Refills: 0 | Status: DISCONTINUED | OUTPATIENT
Start: 2021-01-01 | End: 2021-01-01

## 2021-01-01 RX ORDER — FENTANYL CITRATE 50 UG/ML
0.5 INJECTION INTRAVENOUS
Qty: 2500 | Refills: 0 | Status: DISCONTINUED | OUTPATIENT
Start: 2021-01-01 | End: 2021-01-01

## 2021-01-01 RX ORDER — BENZOCAINE 10 %
1 GEL (GRAM) MUCOUS MEMBRANE ONCE
Refills: 0 | Status: COMPLETED | OUTPATIENT
Start: 2021-01-01 | End: 2021-01-01

## 2021-01-01 RX ORDER — DEXTROSE 50 % IN WATER 50 %
25 SYRINGE (ML) INTRAVENOUS ONCE
Refills: 0 | Status: DISCONTINUED | OUTPATIENT
Start: 2021-01-01 | End: 2021-01-01

## 2021-01-01 RX ORDER — ALBUTEROL 90 UG/1
1.25 AEROSOL, METERED ORAL
Refills: 0 | Status: DISCONTINUED | OUTPATIENT
Start: 2021-01-01 | End: 2021-01-01

## 2021-01-01 RX ORDER — MORPHINE SULFATE 50 MG/1
4 CAPSULE, EXTENDED RELEASE ORAL
Refills: 0 | Status: DISCONTINUED | OUTPATIENT
Start: 2021-01-01 | End: 2021-01-01

## 2021-01-01 RX ORDER — CEFPODOXIME PROXETIL 100 MG
1 TABLET ORAL
Qty: 6 | Refills: 0
Start: 2021-01-01 | End: 2021-01-01

## 2021-01-01 RX ORDER — ETHACRYNIC ACID 25 MG/1
25 TABLET ORAL ONCE
Refills: 0 | Status: COMPLETED | OUTPATIENT
Start: 2021-01-01 | End: 2021-01-01

## 2021-01-01 RX ORDER — APIXABAN 2.5 MG/1
5 TABLET, FILM COATED ORAL EVERY 12 HOURS
Refills: 0 | Status: DISCONTINUED | OUTPATIENT
Start: 2021-01-01 | End: 2021-01-01

## 2021-01-01 RX ORDER — IPRATROPIUM/ALBUTEROL SULFATE 18-103MCG
3 AEROSOL WITH ADAPTER (GRAM) INHALATION ONCE
Refills: 0 | Status: COMPLETED | OUTPATIENT
Start: 2021-01-01 | End: 2021-01-01

## 2021-01-01 RX ORDER — INSULIN LISPRO 100/ML
10 VIAL (ML) SUBCUTANEOUS ONCE
Refills: 0 | Status: COMPLETED | OUTPATIENT
Start: 2021-01-01 | End: 2021-01-01

## 2021-01-01 RX ORDER — ALBUTEROL 90 UG/1
2 AEROSOL, METERED ORAL EVERY 6 HOURS
Refills: 0 | Status: DISCONTINUED | OUTPATIENT
Start: 2021-01-01 | End: 2021-01-01

## 2021-01-01 RX ORDER — EPINEPHRINE 0.3 MG/.3ML
3 INJECTION INTRAMUSCULAR; SUBCUTANEOUS
Qty: 360 | Refills: 0
Start: 2021-01-01 | End: 2021-01-01

## 2021-01-01 RX ORDER — PANTOPRAZOLE SODIUM 20 MG/1
1 TABLET, DELAYED RELEASE ORAL
Qty: 0 | Refills: 0 | DISCHARGE
Start: 2021-01-01

## 2021-01-01 RX ORDER — FENTANYL CITRATE 50 UG/ML
50 INJECTION INTRAVENOUS ONCE
Refills: 0 | Status: DISCONTINUED | OUTPATIENT
Start: 2021-01-01 | End: 2021-01-01

## 2021-01-01 RX ORDER — AZITHROMYCIN 500 MG/1
500 TABLET, FILM COATED ORAL EVERY 24 HOURS
Refills: 0 | Status: DISCONTINUED | OUTPATIENT
Start: 2021-01-01 | End: 2021-01-01

## 2021-01-01 RX ORDER — SODIUM CHLORIDE 9 MG/ML
500 INJECTION INTRAMUSCULAR; INTRAVENOUS; SUBCUTANEOUS ONCE
Refills: 0 | Status: COMPLETED | OUTPATIENT
Start: 2021-01-01 | End: 2021-01-01

## 2021-01-01 RX ORDER — NOREPINEPHRINE BITARTRATE/D5W 8 MG/250ML
0.05 PLASTIC BAG, INJECTION (ML) INTRAVENOUS
Qty: 8 | Refills: 0 | Status: DISCONTINUED | OUTPATIENT
Start: 2021-01-01 | End: 2021-01-01

## 2021-01-01 RX ORDER — WARFARIN SODIUM 2.5 MG/1
1 TABLET ORAL ONCE
Refills: 0 | Status: COMPLETED | OUTPATIENT
Start: 2021-01-01 | End: 2021-01-01

## 2021-01-01 RX ORDER — TIOTROPIUM BROMIDE 18 UG/1
1 CAPSULE ORAL; RESPIRATORY (INHALATION)
Qty: 0 | Refills: 0 | DISCHARGE

## 2021-01-01 RX ORDER — WARFARIN SODIUM 2.5 MG/1
2 TABLET ORAL ONCE
Refills: 0 | Status: COMPLETED | OUTPATIENT
Start: 2021-01-01 | End: 2021-01-01

## 2021-01-01 RX ORDER — ASPIRIN/CALCIUM CARB/MAGNESIUM 324 MG
325 TABLET ORAL ONCE
Refills: 0 | Status: DISCONTINUED | OUTPATIENT
Start: 2021-01-01 | End: 2021-01-01

## 2021-01-01 RX ORDER — WARFARIN SODIUM 2.5 MG/1
1 TABLET ORAL ONCE
Refills: 0 | Status: DISCONTINUED | OUTPATIENT
Start: 2021-01-01 | End: 2021-01-01

## 2021-01-01 RX ORDER — CLONAZEPAM 1 MG
0.5 TABLET ORAL ONCE
Refills: 0 | Status: DISCONTINUED | OUTPATIENT
Start: 2021-01-01 | End: 2021-01-01

## 2021-01-01 RX ORDER — TIOTROPIUM BROMIDE 18 UG/1
1 CAPSULE ORAL; RESPIRATORY (INHALATION)
Qty: 1 | Refills: 0
Start: 2021-01-01 | End: 2021-01-01

## 2021-01-01 RX ORDER — IPRATROPIUM/ALBUTEROL SULFATE 18-103MCG
3 AEROSOL WITH ADAPTER (GRAM) INHALATION EVERY 4 HOURS
Refills: 0 | Status: DISCONTINUED | OUTPATIENT
Start: 2021-01-01 | End: 2021-01-01

## 2021-01-01 RX ORDER — CLONAZEPAM 1 MG
0.5 TABLET ORAL
Refills: 0 | Status: DISCONTINUED | OUTPATIENT
Start: 2021-01-01 | End: 2021-01-01

## 2021-01-01 RX ORDER — ALBUTEROL 90 UG/1
1 AEROSOL, METERED ORAL EVERY 4 HOURS
Refills: 0 | Status: DISCONTINUED | OUTPATIENT
Start: 2021-01-01 | End: 2021-01-01

## 2021-01-01 RX ORDER — PREDNISOLONE 5 MG
8 TABLET ORAL
Qty: 152 | Refills: 0
Start: 2021-01-01 | End: 2021-01-01

## 2021-01-01 RX ORDER — ALBUTEROL 90 UG/1
2.5 AEROSOL, METERED ORAL EVERY 4 HOURS
Refills: 0 | Status: DISCONTINUED | OUTPATIENT
Start: 2021-01-01 | End: 2021-01-01

## 2021-01-01 RX ORDER — ACETAMINOPHEN 500 MG
650 TABLET ORAL EVERY 6 HOURS
Refills: 0 | Status: DISCONTINUED | OUTPATIENT
Start: 2021-01-01 | End: 2021-01-01

## 2021-01-01 RX ORDER — CEFEPIME 1 G/1
1000 INJECTION, POWDER, FOR SOLUTION INTRAMUSCULAR; INTRAVENOUS EVERY 8 HOURS
Refills: 0 | Status: DISCONTINUED | OUTPATIENT
Start: 2021-01-01 | End: 2021-01-01

## 2021-01-01 RX ORDER — APIXABAN 2.5 MG/1
1 TABLET, FILM COATED ORAL
Qty: 0 | Refills: 0 | DISCHARGE
Start: 2021-01-01

## 2021-01-01 RX ORDER — AMLODIPINE BESYLATE 2.5 MG/1
2.5 TABLET ORAL ONCE
Refills: 0 | Status: COMPLETED | OUTPATIENT
Start: 2021-01-01 | End: 2021-01-01

## 2021-01-01 RX ORDER — MORPHINE SULFATE 50 MG/1
2 CAPSULE, EXTENDED RELEASE ORAL EVERY 4 HOURS
Refills: 0 | Status: DISCONTINUED | OUTPATIENT
Start: 2021-01-01 | End: 2021-01-01

## 2021-01-01 RX ORDER — CEFEPIME 1 G/1
2000 INJECTION, POWDER, FOR SOLUTION INTRAMUSCULAR; INTRAVENOUS EVERY 12 HOURS
Refills: 0 | Status: DISCONTINUED | OUTPATIENT
Start: 2021-01-01 | End: 2021-01-01

## 2021-01-01 RX ORDER — ONDANSETRON 8 MG/1
4 TABLET, FILM COATED ORAL ONCE
Refills: 0 | Status: COMPLETED | OUTPATIENT
Start: 2021-01-01 | End: 2021-01-01

## 2021-01-01 RX ORDER — TIOTROPIUM BROMIDE 18 UG/1
1 CAPSULE ORAL; RESPIRATORY (INHALATION) EVERY 24 HOURS
Refills: 0 | Status: DISCONTINUED | OUTPATIENT
Start: 2021-01-01 | End: 2021-01-01

## 2021-01-01 RX ORDER — TIOTROPIUM BROMIDE 18 UG/1
1 CAPSULE ORAL; RESPIRATORY (INHALATION) DAILY
Refills: 0 | Status: DISCONTINUED | OUTPATIENT
Start: 2021-01-01 | End: 2021-01-01

## 2021-01-01 RX ORDER — SODIUM CHLORIDE 9 MG/ML
1000 INJECTION, SOLUTION INTRAVENOUS
Refills: 0 | Status: DISCONTINUED | OUTPATIENT
Start: 2021-01-01 | End: 2021-01-01

## 2021-01-01 RX ORDER — DEXAMETHASONE 0.5 MG/5ML
10 ELIXIR ORAL ONCE
Refills: 0 | Status: COMPLETED | OUTPATIENT
Start: 2021-01-01 | End: 2021-01-01

## 2021-01-01 RX ORDER — HYDROXYZINE HCL 10 MG
1 TABLET ORAL
Qty: 5 | Refills: 0
Start: 2021-01-01 | End: 2021-01-01

## 2021-01-01 RX ORDER — POLYETHYLENE GLYCOL 3350 17 G/17G
17 POWDER, FOR SOLUTION ORAL
Qty: 510 | Refills: 0
Start: 2021-01-01 | End: 2021-01-01

## 2021-01-01 RX ORDER — APIXABAN 2.5 MG/1
1 TABLET, FILM COATED ORAL
Qty: 60 | Refills: 0
Start: 2021-01-01 | End: 2021-01-01

## 2021-01-01 RX ORDER — PANTOPRAZOLE SODIUM 20 MG/1
40 TABLET, DELAYED RELEASE ORAL DAILY
Refills: 0 | Status: DISCONTINUED | OUTPATIENT
Start: 2021-01-01 | End: 2021-01-01

## 2021-01-01 RX ORDER — ACETAMINOPHEN 500 MG
650 TABLET ORAL ONCE
Refills: 0 | Status: DISCONTINUED | OUTPATIENT
Start: 2021-01-01 | End: 2021-01-01

## 2021-01-01 RX ORDER — ATORVASTATIN CALCIUM 80 MG/1
1 TABLET, FILM COATED ORAL
Qty: 0 | Refills: 0 | DISCHARGE
Start: 2021-01-01

## 2021-01-01 RX ORDER — CLONAZEPAM 1 MG
1 TABLET ORAL EVERY 12 HOURS
Refills: 0 | Status: DISCONTINUED | OUTPATIENT
Start: 2021-01-01 | End: 2021-01-01

## 2021-01-01 RX ORDER — SODIUM CHLORIDE 9 MG/ML
1000 INJECTION INTRAMUSCULAR; INTRAVENOUS; SUBCUTANEOUS
Refills: 0 | Status: DISCONTINUED | OUTPATIENT
Start: 2021-01-01 | End: 2021-01-01

## 2021-01-01 RX ORDER — PANTOPRAZOLE SODIUM 20 MG/1
40 TABLET, DELAYED RELEASE ORAL
Refills: 0 | Status: DISCONTINUED | OUTPATIENT
Start: 2021-01-01 | End: 2021-01-01

## 2021-01-01 RX ORDER — CLONAZEPAM 1 MG
1 TABLET ORAL
Qty: 0 | Refills: 0 | DISCHARGE

## 2021-01-01 RX ORDER — EPINEPHRINE 0.3 MG/.3ML
3 INJECTION INTRAMUSCULAR; SUBCUTANEOUS
Qty: 0 | Refills: 0 | DISCHARGE

## 2021-01-01 RX ORDER — ALBUTEROL 90 UG/1
2.5 AEROSOL, METERED ORAL
Qty: 140 | Refills: 0
Start: 2021-01-01 | End: 2021-01-01

## 2021-01-01 RX ORDER — ASPIRIN/CALCIUM CARB/MAGNESIUM 324 MG
300 TABLET ORAL ONCE
Refills: 0 | Status: COMPLETED | OUTPATIENT
Start: 2021-01-01 | End: 2021-01-01

## 2021-01-01 RX ORDER — TIOTROPIUM BROMIDE 18 UG/1
1 CAPSULE ORAL; RESPIRATORY (INHALATION)
Qty: 60 | Refills: 0
Start: 2021-01-01 | End: 2021-01-01

## 2021-01-01 RX ORDER — ONDANSETRON 8 MG/1
4 TABLET, FILM COATED ORAL ONCE
Refills: 0 | Status: DISCONTINUED | OUTPATIENT
Start: 2021-01-01 | End: 2021-01-01

## 2021-01-01 RX ORDER — HYDRALAZINE HCL 50 MG
10 TABLET ORAL ONCE
Refills: 0 | Status: COMPLETED | OUTPATIENT
Start: 2021-01-01 | End: 2021-01-01

## 2021-01-01 RX ORDER — PROCHLORPERAZINE MALEATE 5 MG
1 TABLET ORAL
Qty: 0 | Refills: 0 | DISCHARGE

## 2021-01-01 RX ORDER — CEFPODOXIME PROXETIL 100 MG
1 TABLET ORAL
Qty: 14 | Refills: 0
Start: 2021-01-01 | End: 2021-01-01

## 2021-01-01 RX ORDER — MAGNESIUM SULFATE 500 MG/ML
2 VIAL (ML) INJECTION ONCE
Refills: 0 | Status: COMPLETED | OUTPATIENT
Start: 2021-01-01 | End: 2021-01-01

## 2021-01-01 RX ORDER — SODIUM CHLORIDE 9 MG/ML
500 INJECTION, SOLUTION INTRAVENOUS ONCE
Refills: 0 | Status: COMPLETED | OUTPATIENT
Start: 2021-01-01 | End: 2021-01-01

## 2021-01-01 RX ORDER — MORPHINE SULFATE 50 MG/1
8 CAPSULE, EXTENDED RELEASE ORAL
Refills: 0 | Status: DISCONTINUED | OUTPATIENT
Start: 2021-01-01 | End: 2021-01-01

## 2021-01-01 RX ORDER — CHOLECALCIFEROL (VITAMIN D3) 125 MCG
2000 CAPSULE ORAL DAILY
Refills: 0 | Status: DISCONTINUED | OUTPATIENT
Start: 2021-01-01 | End: 2021-01-01

## 2021-01-01 RX ORDER — AZITHROMYCIN 500 MG/1
TABLET, FILM COATED ORAL
Refills: 0 | Status: DISCONTINUED | OUTPATIENT
Start: 2021-01-01 | End: 2021-01-01

## 2021-01-01 RX ORDER — ALBUTEROL SULFATE 90 UG/1
108 (90 BASE) INHALANT RESPIRATORY (INHALATION)
Qty: 1 | Refills: 5 | Status: ACTIVE | COMMUNITY
Start: 2021-01-01 | End: 1900-01-01

## 2021-01-01 RX ORDER — ALBUTEROL 90 UG/1
1 AEROSOL, METERED ORAL
Refills: 0 | Status: DISCONTINUED | OUTPATIENT
Start: 2021-01-01 | End: 2021-01-01

## 2021-01-01 RX ORDER — ENOXAPARIN SODIUM 100 MG/ML
40 INJECTION SUBCUTANEOUS DAILY
Refills: 0 | Status: DISCONTINUED | OUTPATIENT
Start: 2021-01-01 | End: 2021-01-01

## 2021-01-01 RX ORDER — POLYETHYLENE GLYCOL 3350 17 G/17G
17 POWDER, FOR SOLUTION ORAL
Qty: 510 | Refills: 5
Start: 2021-01-01 | End: 2021-12-02

## 2021-01-01 RX ORDER — SENNA PLUS 8.6 MG/1
2 TABLET ORAL
Qty: 60 | Refills: 5
Start: 2021-01-01 | End: 2021-12-02

## 2021-01-01 RX ORDER — SENNA PLUS 8.6 MG/1
2 TABLET ORAL
Qty: 0 | Refills: 0 | DISCHARGE
Start: 2021-01-01

## 2021-01-01 RX ORDER — GLUCAGON INJECTION, SOLUTION 0.5 MG/.1ML
1 INJECTION, SOLUTION SUBCUTANEOUS ONCE
Refills: 0 | Status: DISCONTINUED | OUTPATIENT
Start: 2021-01-01 | End: 2021-01-01

## 2021-01-01 RX ORDER — ENOXAPARIN SODIUM 100 MG/ML
40 INJECTION SUBCUTANEOUS EVERY 12 HOURS
Refills: 0 | Status: DISCONTINUED | OUTPATIENT
Start: 2021-01-01 | End: 2021-01-01

## 2021-01-01 RX ORDER — ALBUTEROL 90 UG/1
1.25 AEROSOL, METERED ORAL EVERY 6 HOURS
Refills: 0 | Status: DISCONTINUED | OUTPATIENT
Start: 2021-01-01 | End: 2021-01-01

## 2021-01-01 RX ORDER — WARFARIN SODIUM 2.5 MG/1
1 TABLET ORAL
Qty: 0 | Refills: 0 | DISCHARGE

## 2021-01-01 RX ORDER — INSULIN LISPRO 100/ML
3 VIAL (ML) SUBCUTANEOUS
Refills: 0 | Status: DISCONTINUED | OUTPATIENT
Start: 2021-01-01 | End: 2021-01-01

## 2021-01-01 RX ORDER — AZITHROMYCIN 500 MG/1
500 TABLET, FILM COATED ORAL ONCE
Refills: 0 | Status: COMPLETED | OUTPATIENT
Start: 2021-01-01 | End: 2021-01-01

## 2021-01-01 RX ORDER — LIDOCAINE 4 G/100G
100 CREAM TOPICAL ONCE
Refills: 0 | Status: DISCONTINUED | OUTPATIENT
Start: 2021-01-01 | End: 2021-01-01

## 2021-01-01 RX ORDER — PREDNISOLONE 5 MG
40 TABLET ORAL ONCE
Refills: 0 | Status: COMPLETED | OUTPATIENT
Start: 2021-01-01 | End: 2021-01-01

## 2021-01-01 RX ORDER — WARFARIN SODIUM 2.5 MG/1
1 TABLET ORAL AT BEDTIME
Refills: 0 | Status: COMPLETED | OUTPATIENT
Start: 2021-01-01 | End: 2021-01-01

## 2021-01-01 RX ORDER — LIDOCAINE 4 G/100G
20 CREAM TOPICAL ONCE
Refills: 0 | Status: COMPLETED | OUTPATIENT
Start: 2021-01-01 | End: 2021-01-01

## 2021-01-01 RX ORDER — CLONAZEPAM 1 MG
1 TABLET ORAL
Qty: 0 | Refills: 0 | DISCHARGE
Start: 2021-01-01

## 2021-01-01 RX ORDER — MORPHINE SULFATE 50 MG/1
4 CAPSULE, EXTENDED RELEASE ORAL
Qty: 100 | Refills: 0 | Status: DISCONTINUED | OUTPATIENT
Start: 2021-01-01 | End: 2021-01-01

## 2021-01-01 RX ORDER — TIOTROPIUM BROMIDE 18 UG/1
1 CAPSULE ORAL; RESPIRATORY (INHALATION) ONCE
Refills: 0 | Status: DISCONTINUED | OUTPATIENT
Start: 2021-01-01 | End: 2021-01-01

## 2021-01-01 RX ORDER — TIOTROPIUM BROMIDE 18 UG/1
1 CAPSULE ORAL; RESPIRATORY (INHALATION) ONCE
Refills: 0 | Status: COMPLETED | OUTPATIENT
Start: 2021-01-01 | End: 2021-01-01

## 2021-01-01 RX ORDER — SERTRALINE 25 MG/1
1 TABLET, FILM COATED ORAL
Qty: 14 | Refills: 0
Start: 2021-01-01 | End: 2021-01-01

## 2021-01-01 RX ORDER — ROSUVASTATIN CALCIUM 5 MG/1
1 TABLET ORAL
Qty: 0 | Refills: 0 | DISCHARGE

## 2021-01-01 RX ORDER — ALBUTEROL 90 UG/1
2 AEROSOL, METERED ORAL EVERY 4 HOURS
Refills: 0 | Status: DISCONTINUED | OUTPATIENT
Start: 2021-01-01 | End: 2021-01-01

## 2021-01-01 RX ORDER — APIXABAN 2.5 MG/1
5 TABLET, FILM COATED ORAL
Refills: 0 | Status: DISCONTINUED | OUTPATIENT
Start: 2021-01-01 | End: 2021-01-01

## 2021-01-01 RX ORDER — SERTRALINE 25 MG/1
25 TABLET, FILM COATED ORAL DAILY
Refills: 0 | Status: DISCONTINUED | OUTPATIENT
Start: 2021-01-01 | End: 2021-01-01

## 2021-01-01 RX ORDER — CEFEPIME 1 G/1
2000 INJECTION, POWDER, FOR SOLUTION INTRAMUSCULAR; INTRAVENOUS ONCE
Refills: 0 | Status: COMPLETED | OUTPATIENT
Start: 2021-01-01 | End: 2021-01-01

## 2021-01-01 RX ORDER — DEXTROSE 50 % IN WATER 50 %
12.5 SYRINGE (ML) INTRAVENOUS ONCE
Refills: 0 | Status: DISCONTINUED | OUTPATIENT
Start: 2021-01-01 | End: 2021-01-01

## 2021-01-01 RX ORDER — GABAPENTIN 400 MG/1
200 CAPSULE ORAL
Qty: 0 | Refills: 0 | DISCHARGE

## 2021-01-01 RX ORDER — BUDESONIDE AND FORMOTEROL FUMARATE DIHYDRATE 160; 4.5 UG/1; UG/1
1 AEROSOL RESPIRATORY (INHALATION)
Qty: 60 | Refills: 0
Start: 2021-01-01 | End: 2021-01-01

## 2021-01-01 RX ORDER — SODIUM CHLORIDE 9 MG/ML
250 INJECTION INTRAMUSCULAR; INTRAVENOUS; SUBCUTANEOUS ONCE
Refills: 0 | Status: COMPLETED | OUTPATIENT
Start: 2021-01-01 | End: 2021-01-01

## 2021-01-01 RX ORDER — ALBUTEROL 90 UG/1
2 AEROSOL, METERED ORAL ONCE
Refills: 0 | Status: COMPLETED | OUTPATIENT
Start: 2021-01-01 | End: 2021-01-01

## 2021-01-01 RX ORDER — AZITHROMYCIN 500 MG/1
250 TABLET, FILM COATED ORAL DAILY
Refills: 0 | Status: DISCONTINUED | OUTPATIENT
Start: 2021-01-01 | End: 2021-01-01

## 2021-01-01 RX ORDER — ACETAMINOPHEN 500 MG
2 TABLET ORAL
Qty: 0 | Refills: 0 | DISCHARGE
Start: 2021-01-01

## 2021-01-01 RX ORDER — VANCOMYCIN HCL 1 G
650 VIAL (EA) INTRAVENOUS ONCE
Refills: 0 | Status: DISCONTINUED | OUTPATIENT
Start: 2021-01-01 | End: 2021-01-01

## 2021-01-01 RX ORDER — LANOLIN ALCOHOL/MO/W.PET/CERES
5 CREAM (GRAM) TOPICAL ONCE
Refills: 0 | Status: COMPLETED | OUTPATIENT
Start: 2021-01-01 | End: 2021-01-01

## 2021-01-01 RX ORDER — DEXMEDETOMIDINE HYDROCHLORIDE IN 0.9% SODIUM CHLORIDE 4 UG/ML
0.2 INJECTION INTRAVENOUS
Qty: 200 | Refills: 0 | Status: DISCONTINUED | OUTPATIENT
Start: 2021-01-01 | End: 2021-01-01

## 2021-01-01 RX ORDER — ALBUTEROL 90 UG/1
2.5 AEROSOL, METERED ORAL
Qty: 0 | Refills: 0 | DISCHARGE
Start: 2021-01-01

## 2021-01-01 RX ORDER — MORPHINE SULFATE 50 MG/1
2 CAPSULE, EXTENDED RELEASE ORAL
Refills: 0 | Status: DISCONTINUED | OUTPATIENT
Start: 2021-01-01 | End: 2021-01-01

## 2021-01-01 RX ORDER — MIDAZOLAM HYDROCHLORIDE 1 MG/ML
4 INJECTION, SOLUTION INTRAMUSCULAR; INTRAVENOUS ONCE
Refills: 0 | Status: DISCONTINUED | OUTPATIENT
Start: 2021-01-01 | End: 2021-01-01

## 2021-01-01 RX ORDER — MORPHINE SULFATE 50 MG/1
5 CAPSULE, EXTENDED RELEASE ORAL
Refills: 0 | Status: DISCONTINUED | OUTPATIENT
Start: 2021-01-01 | End: 2021-01-01

## 2021-01-01 RX ORDER — ALBUTEROL 90 UG/1
2.5 AEROSOL, METERED ORAL EVERY 6 HOURS
Refills: 0 | Status: DISCONTINUED | OUTPATIENT
Start: 2021-01-01 | End: 2021-01-01

## 2021-01-01 RX ORDER — SCOPALAMINE 1 MG/3D
1 PATCH, EXTENDED RELEASE TRANSDERMAL
Refills: 0 | Status: DISCONTINUED | OUTPATIENT
Start: 2021-01-01 | End: 2021-01-01

## 2021-01-01 RX ORDER — PANTOPRAZOLE SODIUM 20 MG/1
1 TABLET, DELAYED RELEASE ORAL
Qty: 14 | Refills: 0
Start: 2021-01-01 | End: 2021-01-01

## 2021-01-01 RX ORDER — ALBUTEROL 90 UG/1
1 AEROSOL, METERED ORAL ONCE
Refills: 0 | Status: DISCONTINUED | OUTPATIENT
Start: 2021-01-01 | End: 2021-01-01

## 2021-01-01 RX ORDER — BUDESONIDE, GLYCOPYRROLATE, AND FORMOTEROL FUMARATE 160; 9; 4.8 UG/1; UG/1; UG/1
160-9-4.8 AEROSOL, METERED RESPIRATORY (INHALATION)
Qty: 1 | Refills: 6 | Status: ACTIVE | COMMUNITY
Start: 2021-01-01 | End: 1900-01-01

## 2021-01-01 RX ORDER — MORPHINE SULFATE 50 MG/1
20 CAPSULE, EXTENDED RELEASE ORAL
Refills: 0 | Status: DISCONTINUED | OUTPATIENT
Start: 2021-01-01 | End: 2021-01-01

## 2021-01-01 RX ORDER — HYDROXYZINE HCL 10 MG
25 TABLET ORAL EVERY 6 HOURS
Refills: 0 | Status: DISCONTINUED | OUTPATIENT
Start: 2021-01-01 | End: 2021-01-01

## 2021-01-01 RX ORDER — ATORVASTATIN CALCIUM 80 MG/1
1 TABLET, FILM COATED ORAL
Qty: 0 | Refills: 0 | DISCHARGE

## 2021-01-01 RX ORDER — DEXTROSE 50 % IN WATER 50 %
15 SYRINGE (ML) INTRAVENOUS ONCE
Refills: 0 | Status: DISCONTINUED | OUTPATIENT
Start: 2021-01-01 | End: 2021-01-01

## 2021-01-01 RX ORDER — ATORVASTATIN CALCIUM 80 MG/1
1 TABLET, FILM COATED ORAL
Qty: 30 | Refills: 0
Start: 2021-01-01 | End: 2021-01-01

## 2021-01-01 RX ORDER — CEFEPIME 1 G/1
1000 INJECTION, POWDER, FOR SOLUTION INTRAMUSCULAR; INTRAVENOUS ONCE
Refills: 0 | Status: COMPLETED | OUTPATIENT
Start: 2021-01-01 | End: 2021-01-01

## 2021-01-01 RX ORDER — AZITHROMYCIN 500 MG/1
500 TABLET, FILM COATED ORAL ONCE
Refills: 0 | Status: DISCONTINUED | OUTPATIENT
Start: 2021-01-01 | End: 2021-01-01

## 2021-01-01 RX ORDER — WARFARIN SODIUM 2.5 MG/1
1 TABLET ORAL AT BEDTIME
Refills: 0 | Status: DISCONTINUED | OUTPATIENT
Start: 2021-01-01 | End: 2021-01-01

## 2021-01-01 RX ORDER — WARFARIN SODIUM 2.5 MG/1
2 TABLET ORAL ONCE
Refills: 0 | Status: DISCONTINUED | OUTPATIENT
Start: 2021-01-01 | End: 2021-01-01

## 2021-01-01 RX ORDER — AMLODIPINE BESYLATE 2.5 MG/1
5 TABLET ORAL ONCE
Refills: 0 | Status: COMPLETED | OUTPATIENT
Start: 2021-01-01 | End: 2021-01-01

## 2021-01-01 RX ORDER — MORPHINE SULFATE 50 MG/1
0.25 CAPSULE, EXTENDED RELEASE ORAL
Qty: 0 | Refills: 0 | DISCHARGE
Start: 2021-01-01

## 2021-01-01 RX ORDER — TIOTROPIUM BROMIDE 18 UG/1
18 CAPSULE ORAL; RESPIRATORY (INHALATION)
Qty: 301 | Refills: 5 | Status: ACTIVE | COMMUNITY
Start: 2021-01-01 | End: 1900-01-01

## 2021-01-01 RX ORDER — CEFPODOXIME PROXETIL 100 MG
200 TABLET ORAL EVERY 12 HOURS
Refills: 0 | Status: DISCONTINUED | OUTPATIENT
Start: 2021-01-01 | End: 2021-01-01

## 2021-01-01 RX ORDER — TIOTROPIUM BROMIDE 18 UG/1
1 CAPSULE ORAL; RESPIRATORY (INHALATION)
Qty: 30 | Refills: 0
Start: 2021-01-01 | End: 2021-01-01

## 2021-01-01 RX ORDER — CHOLECALCIFEROL (VITAMIN D3) 125 MCG
1 CAPSULE ORAL
Qty: 0 | Refills: 0 | DISCHARGE

## 2021-01-01 RX ORDER — ONDANSETRON 8 MG/1
4 TABLET, FILM COATED ORAL THREE TIMES A DAY
Refills: 0 | Status: DISCONTINUED | OUTPATIENT
Start: 2021-01-01 | End: 2021-01-01

## 2021-01-01 RX ORDER — RANITIDINE HYDROCHLORIDE 150 MG/1
1 TABLET, FILM COATED ORAL
Qty: 0 | Refills: 0 | DISCHARGE

## 2021-01-01 RX ORDER — DEXMEDETOMIDINE HYDROCHLORIDE IN 0.9% SODIUM CHLORIDE 4 UG/ML
0.2 INJECTION INTRAVENOUS
Qty: 400 | Refills: 0 | Status: DISCONTINUED | OUTPATIENT
Start: 2021-01-01 | End: 2021-01-01

## 2021-01-01 RX ORDER — AZITHROMYCIN 500 MG/1
1 TABLET, FILM COATED ORAL
Qty: 3 | Refills: 0
Start: 2021-01-01 | End: 2021-01-01

## 2021-01-01 RX ORDER — VANCOMYCIN HCL 1 G
750 VIAL (EA) INTRAVENOUS ONCE
Refills: 0 | Status: COMPLETED | OUTPATIENT
Start: 2021-01-01 | End: 2021-01-01

## 2021-01-01 RX ORDER — HALOPERIDOL DECANOATE 100 MG/ML
1 INJECTION INTRAMUSCULAR
Qty: 90 | Refills: 0
Start: 2021-01-01 | End: 2021-01-01

## 2021-01-01 RX ORDER — MORPHINE SULFATE 50 MG/1
4 CAPSULE, EXTENDED RELEASE ORAL EVERY 4 HOURS
Refills: 0 | Status: DISCONTINUED | OUTPATIENT
Start: 2021-01-01 | End: 2021-01-01

## 2021-01-01 RX ORDER — ACETYLCYSTEINE 200 MG/ML
4 VIAL (ML) MISCELLANEOUS THREE TIMES A DAY
Refills: 0 | Status: DISCONTINUED | OUTPATIENT
Start: 2021-01-01 | End: 2021-01-01

## 2021-01-01 RX ORDER — PANTOPRAZOLE SODIUM 20 MG/1
40 TABLET, DELAYED RELEASE ORAL EVERY 12 HOURS
Refills: 0 | Status: DISCONTINUED | OUTPATIENT
Start: 2021-01-01 | End: 2021-01-01

## 2021-01-01 RX ORDER — MORPHINE SULFATE 50 MG/1
5 CAPSULE, EXTENDED RELEASE ORAL EVERY 6 HOURS
Refills: 0 | Status: DISCONTINUED | OUTPATIENT
Start: 2021-01-01 | End: 2021-01-01

## 2021-01-01 RX ORDER — SENNA PLUS 8.6 MG/1
2 TABLET ORAL
Qty: 60 | Refills: 0
Start: 2021-01-01 | End: 2021-01-01

## 2021-01-01 RX ORDER — CEFTRIAXONE 500 MG/1
1000 INJECTION, POWDER, FOR SOLUTION INTRAMUSCULAR; INTRAVENOUS ONCE
Refills: 0 | Status: COMPLETED | OUTPATIENT
Start: 2021-01-01 | End: 2021-01-01

## 2021-01-01 RX ORDER — WARFARIN SODIUM 2.5 MG/1
1 TABLET ORAL
Qty: 0 | Refills: 0 | DISCHARGE
Start: 2021-01-01

## 2021-01-01 RX ORDER — SACCHAROMYCES BOULARDII 250 MG
250 POWDER IN PACKET (EA) ORAL
Refills: 0 | Status: DISCONTINUED | OUTPATIENT
Start: 2021-01-01 | End: 2021-01-01

## 2021-01-01 RX ADMIN — BUDESONIDE AND FORMOTEROL FUMARATE DIHYDRATE 2 PUFF(S): 160; 4.5 AEROSOL RESPIRATORY (INHALATION) at 17:28

## 2021-01-01 RX ADMIN — SODIUM CHLORIDE 1000 MILLILITER(S): 9 INJECTION INTRAMUSCULAR; INTRAVENOUS; SUBCUTANEOUS at 13:15

## 2021-01-01 RX ADMIN — Medication 10 MILLIGRAM(S): at 06:09

## 2021-01-01 RX ADMIN — Medication 3 UNIT(S): at 07:55

## 2021-01-01 RX ADMIN — Medication 3 UNIT(S): at 11:39

## 2021-01-01 RX ADMIN — Medication 25 MILLIGRAM(S): at 08:12

## 2021-01-01 RX ADMIN — PANTOPRAZOLE SODIUM 40 MILLIGRAM(S): 20 TABLET, DELAYED RELEASE ORAL at 05:04

## 2021-01-01 RX ADMIN — SENNA PLUS 2 TABLET(S): 8.6 TABLET ORAL at 21:37

## 2021-01-01 RX ADMIN — PANTOPRAZOLE SODIUM 40 MILLIGRAM(S): 20 TABLET, DELAYED RELEASE ORAL at 05:36

## 2021-01-01 RX ADMIN — POLYETHYLENE GLYCOL 3350 17 GRAM(S): 17 POWDER, FOR SOLUTION ORAL at 09:47

## 2021-01-01 RX ADMIN — ATORVASTATIN CALCIUM 20 MILLIGRAM(S): 80 TABLET, FILM COATED ORAL at 21:35

## 2021-01-01 RX ADMIN — SERTRALINE 25 MILLIGRAM(S): 25 TABLET, FILM COATED ORAL at 08:22

## 2021-01-01 RX ADMIN — Medication 1 MILLIGRAM(S): at 14:09

## 2021-01-01 RX ADMIN — ATORVASTATIN CALCIUM 20 MILLIGRAM(S): 80 TABLET, FILM COATED ORAL at 21:12

## 2021-01-01 RX ADMIN — APIXABAN 5 MILLIGRAM(S): 2.5 TABLET, FILM COATED ORAL at 05:07

## 2021-01-01 RX ADMIN — ALBUTEROL 2.5 MILLIGRAM(S): 90 AEROSOL, METERED ORAL at 17:19

## 2021-01-01 RX ADMIN — Medication 25 MILLIGRAM(S): at 01:38

## 2021-01-01 RX ADMIN — AZITHROMYCIN 250 MILLIGRAM(S): 500 TABLET, FILM COATED ORAL at 11:34

## 2021-01-01 RX ADMIN — AZITHROMYCIN 255 MILLIGRAM(S): 500 TABLET, FILM COATED ORAL at 13:45

## 2021-01-01 RX ADMIN — PANTOPRAZOLE SODIUM 40 MILLIGRAM(S): 20 TABLET, DELAYED RELEASE ORAL at 05:13

## 2021-01-01 RX ADMIN — Medication 1 MILLIGRAM(S): at 06:01

## 2021-01-01 RX ADMIN — BUDESONIDE AND FORMOTEROL FUMARATE DIHYDRATE 2 PUFF(S): 160; 4.5 AEROSOL RESPIRATORY (INHALATION) at 23:58

## 2021-01-01 RX ADMIN — CHLORHEXIDINE GLUCONATE 1 APPLICATION(S): 213 SOLUTION TOPICAL at 05:52

## 2021-01-01 RX ADMIN — BUDESONIDE AND FORMOTEROL FUMARATE DIHYDRATE 2 PUFF(S): 160; 4.5 AEROSOL RESPIRATORY (INHALATION) at 09:22

## 2021-01-01 RX ADMIN — BUDESONIDE AND FORMOTEROL FUMARATE DIHYDRATE 2 PUFF(S): 160; 4.5 AEROSOL RESPIRATORY (INHALATION) at 08:18

## 2021-01-01 RX ADMIN — Medication 60 MILLIGRAM(S): at 17:45

## 2021-01-01 RX ADMIN — ATORVASTATIN CALCIUM 20 MILLIGRAM(S): 80 TABLET, FILM COATED ORAL at 21:17

## 2021-01-01 RX ADMIN — Medication 6: at 08:12

## 2021-01-01 RX ADMIN — MORPHINE SULFATE 5 MILLIGRAM(S): 50 CAPSULE, EXTENDED RELEASE ORAL at 15:20

## 2021-01-01 RX ADMIN — MORPHINE SULFATE 5 MILLIGRAM(S): 50 CAPSULE, EXTENDED RELEASE ORAL at 21:40

## 2021-01-01 RX ADMIN — APIXABAN 5 MILLIGRAM(S): 2.5 TABLET, FILM COATED ORAL at 17:59

## 2021-01-01 RX ADMIN — Medication 60 MILLIGRAM(S): at 17:00

## 2021-01-01 RX ADMIN — ALBUTEROL 2.5 MILLIGRAM(S): 90 AEROSOL, METERED ORAL at 17:24

## 2021-01-01 RX ADMIN — Medication 650 MILLIGRAM(S): at 18:52

## 2021-01-01 RX ADMIN — Medication 2000 UNIT(S): at 11:08

## 2021-01-01 RX ADMIN — Medication 3 MILLILITER(S): at 20:27

## 2021-01-01 RX ADMIN — Medication 650 MILLIGRAM(S): at 13:11

## 2021-01-01 RX ADMIN — Medication 650 MILLIGRAM(S): at 10:21

## 2021-01-01 RX ADMIN — BUDESONIDE AND FORMOTEROL FUMARATE DIHYDRATE 2 PUFF(S): 160; 4.5 AEROSOL RESPIRATORY (INHALATION) at 07:50

## 2021-01-01 RX ADMIN — SENNA PLUS 2 TABLET(S): 8.6 TABLET ORAL at 21:18

## 2021-01-01 RX ADMIN — Medication 40 MILLIGRAM(S): at 05:26

## 2021-01-01 RX ADMIN — PANTOPRAZOLE SODIUM 40 MILLIGRAM(S): 20 TABLET, DELAYED RELEASE ORAL at 05:28

## 2021-01-01 RX ADMIN — PANTOPRAZOLE SODIUM 40 MILLIGRAM(S): 20 TABLET, DELAYED RELEASE ORAL at 05:12

## 2021-01-01 RX ADMIN — WARFARIN SODIUM 1 MILLIGRAM(S): 2.5 TABLET ORAL at 21:42

## 2021-01-01 RX ADMIN — PANTOPRAZOLE SODIUM 40 MILLIGRAM(S): 20 TABLET, DELAYED RELEASE ORAL at 05:20

## 2021-01-01 RX ADMIN — Medication 0.5 MILLIGRAM(S): at 17:27

## 2021-01-01 RX ADMIN — PANTOPRAZOLE SODIUM 40 MILLIGRAM(S): 20 TABLET, DELAYED RELEASE ORAL at 18:05

## 2021-01-01 RX ADMIN — Medication 2000 UNIT(S): at 11:06

## 2021-01-01 RX ADMIN — Medication 40 MILLIGRAM(S): at 05:36

## 2021-01-01 RX ADMIN — SENNA PLUS 2 TABLET(S): 8.6 TABLET ORAL at 21:12

## 2021-01-01 RX ADMIN — ATORVASTATIN CALCIUM 20 MILLIGRAM(S): 80 TABLET, FILM COATED ORAL at 21:37

## 2021-01-01 RX ADMIN — SCOPALAMINE 1 PATCH: 1 PATCH, EXTENDED RELEASE TRANSDERMAL at 06:48

## 2021-01-01 RX ADMIN — MORPHINE SULFATE 2 MILLIGRAM(S): 50 CAPSULE, EXTENDED RELEASE ORAL at 15:10

## 2021-01-01 RX ADMIN — PANTOPRAZOLE SODIUM 40 MILLIGRAM(S): 20 TABLET, DELAYED RELEASE ORAL at 06:17

## 2021-01-01 RX ADMIN — ATORVASTATIN CALCIUM 20 MILLIGRAM(S): 80 TABLET, FILM COATED ORAL at 21:21

## 2021-01-01 RX ADMIN — APIXABAN 5 MILLIGRAM(S): 2.5 TABLET, FILM COATED ORAL at 05:36

## 2021-01-01 RX ADMIN — Medication 3 MILLILITER(S): at 12:03

## 2021-01-01 RX ADMIN — MORPHINE SULFATE 20 MILLIGRAM(S): 50 CAPSULE, EXTENDED RELEASE ORAL at 22:51

## 2021-01-01 RX ADMIN — MORPHINE SULFATE 5 MILLIGRAM(S): 50 CAPSULE, EXTENDED RELEASE ORAL at 01:36

## 2021-01-01 RX ADMIN — BUDESONIDE AND FORMOTEROL FUMARATE DIHYDRATE 2 PUFF(S): 160; 4.5 AEROSOL RESPIRATORY (INHALATION) at 09:40

## 2021-01-01 RX ADMIN — CHLORHEXIDINE GLUCONATE 15 MILLILITER(S): 213 SOLUTION TOPICAL at 05:31

## 2021-01-01 RX ADMIN — MORPHINE SULFATE 2 MILLIGRAM(S): 50 CAPSULE, EXTENDED RELEASE ORAL at 18:32

## 2021-01-01 RX ADMIN — PANTOPRAZOLE SODIUM 40 MILLIGRAM(S): 20 TABLET, DELAYED RELEASE ORAL at 08:22

## 2021-01-01 RX ADMIN — MORPHINE SULFATE 4 MILLIGRAM(S): 50 CAPSULE, EXTENDED RELEASE ORAL at 21:08

## 2021-01-01 RX ADMIN — CHLORHEXIDINE GLUCONATE 1 APPLICATION(S): 213 SOLUTION TOPICAL at 06:14

## 2021-01-01 RX ADMIN — Medication 25 MILLIGRAM(S): at 21:21

## 2021-01-01 RX ADMIN — PANTOPRAZOLE SODIUM 40 MILLIGRAM(S): 20 TABLET, DELAYED RELEASE ORAL at 05:17

## 2021-01-01 RX ADMIN — MORPHINE SULFATE 2 MILLIGRAM(S): 50 CAPSULE, EXTENDED RELEASE ORAL at 21:58

## 2021-01-01 RX ADMIN — WARFARIN SODIUM 1 MILLIGRAM(S): 2.5 TABLET ORAL at 21:11

## 2021-01-01 RX ADMIN — SCOPALAMINE 1 PATCH: 1 PATCH, EXTENDED RELEASE TRANSDERMAL at 19:00

## 2021-01-01 RX ADMIN — BUDESONIDE AND FORMOTEROL FUMARATE DIHYDRATE 2 PUFF(S): 160; 4.5 AEROSOL RESPIRATORY (INHALATION) at 22:02

## 2021-01-01 RX ADMIN — MORPHINE SULFATE 20 MILLIGRAM(S): 50 CAPSULE, EXTENDED RELEASE ORAL at 06:48

## 2021-01-01 RX ADMIN — MORPHINE SULFATE 20 MILLIGRAM(S): 50 CAPSULE, EXTENDED RELEASE ORAL at 11:19

## 2021-01-01 RX ADMIN — Medication 1 MILLIGRAM(S): at 00:39

## 2021-01-01 RX ADMIN — APIXABAN 5 MILLIGRAM(S): 2.5 TABLET, FILM COATED ORAL at 17:55

## 2021-01-01 RX ADMIN — TIOTROPIUM BROMIDE 1 CAPSULE(S): 18 CAPSULE ORAL; RESPIRATORY (INHALATION) at 20:40

## 2021-01-01 RX ADMIN — CHLORHEXIDINE GLUCONATE 1 APPLICATION(S): 213 SOLUTION TOPICAL at 06:42

## 2021-01-01 RX ADMIN — ONDANSETRON 4 MILLIGRAM(S): 8 TABLET, FILM COATED ORAL at 10:35

## 2021-01-01 RX ADMIN — APIXABAN 5 MILLIGRAM(S): 2.5 TABLET, FILM COATED ORAL at 08:22

## 2021-01-01 RX ADMIN — Medication 2: at 17:48

## 2021-01-01 RX ADMIN — PANTOPRAZOLE SODIUM 40 MILLIGRAM(S): 20 TABLET, DELAYED RELEASE ORAL at 05:23

## 2021-01-01 RX ADMIN — AZITHROMYCIN 250 MILLIGRAM(S): 500 TABLET, FILM COATED ORAL at 11:37

## 2021-01-01 RX ADMIN — BUDESONIDE AND FORMOTEROL FUMARATE DIHYDRATE 2 PUFF(S): 160; 4.5 AEROSOL RESPIRATORY (INHALATION) at 07:51

## 2021-01-01 RX ADMIN — Medication 100 MILLIGRAM(S): at 02:04

## 2021-01-01 RX ADMIN — MORPHINE SULFATE 4 MG/HR: 50 CAPSULE, EXTENDED RELEASE ORAL at 16:28

## 2021-01-01 RX ADMIN — SODIUM CHLORIDE 100 MILLILITER(S): 9 INJECTION INTRAMUSCULAR; INTRAVENOUS; SUBCUTANEOUS at 13:09

## 2021-01-01 RX ADMIN — Medication 1 MILLIGRAM(S): at 11:48

## 2021-01-01 RX ADMIN — ENOXAPARIN SODIUM 40 MILLIGRAM(S): 100 INJECTION SUBCUTANEOUS at 05:30

## 2021-01-01 RX ADMIN — MORPHINE SULFATE 2 MILLIGRAM(S): 50 CAPSULE, EXTENDED RELEASE ORAL at 01:23

## 2021-01-01 RX ADMIN — Medication 1 MILLIGRAM(S): at 23:33

## 2021-01-01 RX ADMIN — PANTOPRAZOLE SODIUM 40 MILLIGRAM(S): 20 TABLET, DELAYED RELEASE ORAL at 05:00

## 2021-01-01 RX ADMIN — ATORVASTATIN CALCIUM 20 MILLIGRAM(S): 80 TABLET, FILM COATED ORAL at 21:06

## 2021-01-01 RX ADMIN — BUDESONIDE AND FORMOTEROL FUMARATE DIHYDRATE 2 PUFF(S): 160; 4.5 AEROSOL RESPIRATORY (INHALATION) at 08:09

## 2021-01-01 RX ADMIN — Medication 0.5 MILLIGRAM(S): at 00:54

## 2021-01-01 RX ADMIN — CHLORHEXIDINE GLUCONATE 1 APPLICATION(S): 213 SOLUTION TOPICAL at 06:05

## 2021-01-01 RX ADMIN — Medication 3 MILLILITER(S): at 20:39

## 2021-01-01 RX ADMIN — MORPHINE SULFATE 2 MILLIGRAM(S): 50 CAPSULE, EXTENDED RELEASE ORAL at 09:56

## 2021-01-01 RX ADMIN — Medication 0.5 MILLIGRAM(S): at 20:06

## 2021-01-01 RX ADMIN — CHLORHEXIDINE GLUCONATE 1 APPLICATION(S): 213 SOLUTION TOPICAL at 05:23

## 2021-01-01 RX ADMIN — MIDAZOLAM HYDROCHLORIDE 4 MILLIGRAM(S): 1 INJECTION, SOLUTION INTRAMUSCULAR; INTRAVENOUS at 15:49

## 2021-01-01 RX ADMIN — INSULIN GLARGINE 9 UNIT(S): 100 INJECTION, SOLUTION SUBCUTANEOUS at 21:32

## 2021-01-01 RX ADMIN — ALBUTEROL 2.5 MILLIGRAM(S): 90 AEROSOL, METERED ORAL at 20:04

## 2021-01-01 RX ADMIN — Medication 10 UNIT(S): at 12:54

## 2021-01-01 RX ADMIN — Medication 60 MILLIGRAM(S): at 17:07

## 2021-01-01 RX ADMIN — Medication 60 MILLIGRAM(S): at 17:19

## 2021-01-01 RX ADMIN — SENNA PLUS 2 TABLET(S): 8.6 TABLET ORAL at 21:17

## 2021-01-01 RX ADMIN — Medication 1 MILLIGRAM(S): at 09:06

## 2021-01-01 RX ADMIN — MORPHINE SULFATE 2 MILLIGRAM(S): 50 CAPSULE, EXTENDED RELEASE ORAL at 16:12

## 2021-01-01 RX ADMIN — SODIUM CHLORIDE 75 MILLILITER(S): 9 INJECTION, SOLUTION INTRAVENOUS at 14:58

## 2021-01-01 RX ADMIN — SERTRALINE 25 MILLIGRAM(S): 25 TABLET, FILM COATED ORAL at 11:16

## 2021-01-01 RX ADMIN — APIXABAN 5 MILLIGRAM(S): 2.5 TABLET, FILM COATED ORAL at 06:09

## 2021-01-01 RX ADMIN — MORPHINE SULFATE 2 MILLIGRAM(S): 50 CAPSULE, EXTENDED RELEASE ORAL at 18:30

## 2021-01-01 RX ADMIN — CHLORHEXIDINE GLUCONATE 1 APPLICATION(S): 213 SOLUTION TOPICAL at 05:38

## 2021-01-01 RX ADMIN — Medication 1 MILLIGRAM(S): at 17:35

## 2021-01-01 RX ADMIN — Medication 300 MILLIGRAM(S): at 02:04

## 2021-01-01 RX ADMIN — Medication 20 MILLIGRAM(S): at 05:19

## 2021-01-01 RX ADMIN — SENNA PLUS 2 TABLET(S): 8.6 TABLET ORAL at 21:35

## 2021-01-01 RX ADMIN — Medication 1 MILLIGRAM(S): at 12:13

## 2021-01-01 RX ADMIN — ALBUTEROL 2.5 MILLIGRAM(S): 90 AEROSOL, METERED ORAL at 12:41

## 2021-01-01 RX ADMIN — AZITHROMYCIN 250 MILLIGRAM(S): 500 TABLET, FILM COATED ORAL at 17:00

## 2021-01-01 RX ADMIN — HALOPERIDOL DECANOATE 2 MILLIGRAM(S): 100 INJECTION INTRAMUSCULAR at 03:13

## 2021-01-01 RX ADMIN — CEFEPIME 100 MILLIGRAM(S): 1 INJECTION, POWDER, FOR SOLUTION INTRAMUSCULAR; INTRAVENOUS at 05:30

## 2021-01-01 RX ADMIN — Medication 3 MILLILITER(S): at 09:05

## 2021-01-01 RX ADMIN — Medication 60 MILLIGRAM(S): at 17:27

## 2021-01-01 RX ADMIN — Medication 1 MILLIGRAM(S): at 06:45

## 2021-01-01 RX ADMIN — APIXABAN 5 MILLIGRAM(S): 2.5 TABLET, FILM COATED ORAL at 05:19

## 2021-01-01 RX ADMIN — ALBUTEROL 2.5 MILLIGRAM(S): 90 AEROSOL, METERED ORAL at 07:30

## 2021-01-01 RX ADMIN — Medication 650 MILLIGRAM(S): at 06:01

## 2021-01-01 RX ADMIN — AZITHROMYCIN 255 MILLIGRAM(S): 500 TABLET, FILM COATED ORAL at 08:45

## 2021-01-01 RX ADMIN — FENTANYL CITRATE 50 MICROGRAM(S): 50 INJECTION INTRAVENOUS at 15:49

## 2021-01-01 RX ADMIN — AZITHROMYCIN 255 MILLIGRAM(S): 500 TABLET, FILM COATED ORAL at 10:40

## 2021-01-01 RX ADMIN — Medication 1 MILLIGRAM(S): at 06:52

## 2021-01-01 RX ADMIN — Medication 40 MILLIGRAM(S): at 05:25

## 2021-01-01 RX ADMIN — CHLORHEXIDINE GLUCONATE 1 APPLICATION(S): 213 SOLUTION TOPICAL at 05:19

## 2021-01-01 RX ADMIN — PANTOPRAZOLE SODIUM 40 MILLIGRAM(S): 20 TABLET, DELAYED RELEASE ORAL at 05:53

## 2021-01-01 RX ADMIN — MORPHINE SULFATE 2 MILLIGRAM(S): 50 CAPSULE, EXTENDED RELEASE ORAL at 11:49

## 2021-01-01 RX ADMIN — Medication 20 MILLIGRAM(S): at 05:02

## 2021-01-01 RX ADMIN — Medication 60 MILLIGRAM(S): at 05:29

## 2021-01-01 RX ADMIN — BUDESONIDE AND FORMOTEROL FUMARATE DIHYDRATE 2 PUFF(S): 160; 4.5 AEROSOL RESPIRATORY (INHALATION) at 08:05

## 2021-01-01 RX ADMIN — Medication 650 MILLIGRAM(S): at 07:18

## 2021-01-01 RX ADMIN — Medication 0.5 MILLIGRAM(S): at 16:07

## 2021-01-01 RX ADMIN — CHLORHEXIDINE GLUCONATE 1 APPLICATION(S): 213 SOLUTION TOPICAL at 05:24

## 2021-01-01 RX ADMIN — Medication 60 MILLIGRAM(S): at 11:22

## 2021-01-01 RX ADMIN — ONDANSETRON 4 MILLIGRAM(S): 8 TABLET, FILM COATED ORAL at 06:45

## 2021-01-01 RX ADMIN — ALBUTEROL 2.5 MILLIGRAM(S): 90 AEROSOL, METERED ORAL at 08:45

## 2021-01-01 RX ADMIN — Medication 0.5 MILLIGRAM(S): at 06:42

## 2021-01-01 RX ADMIN — APIXABAN 5 MILLIGRAM(S): 2.5 TABLET, FILM COATED ORAL at 17:38

## 2021-01-01 RX ADMIN — Medication 25 MILLIGRAM(S): at 04:20

## 2021-01-01 RX ADMIN — CEFEPIME 100 MILLIGRAM(S): 1 INJECTION, POWDER, FOR SOLUTION INTRAMUSCULAR; INTRAVENOUS at 14:41

## 2021-01-01 RX ADMIN — Medication 20 MILLIGRAM(S): at 05:23

## 2021-01-01 RX ADMIN — ALBUTEROL 2.5 MILLIGRAM(S): 90 AEROSOL, METERED ORAL at 08:50

## 2021-01-01 RX ADMIN — Medication 1 MILLIGRAM(S): at 00:16

## 2021-01-01 RX ADMIN — ONDANSETRON 4 MILLIGRAM(S): 8 TABLET, FILM COATED ORAL at 07:40

## 2021-01-01 RX ADMIN — AZITHROMYCIN 250 MILLIGRAM(S): 500 TABLET, FILM COATED ORAL at 12:00

## 2021-01-01 RX ADMIN — ATORVASTATIN CALCIUM 20 MILLIGRAM(S): 80 TABLET, FILM COATED ORAL at 21:23

## 2021-01-01 RX ADMIN — MORPHINE SULFATE 2 MILLIGRAM(S): 50 CAPSULE, EXTENDED RELEASE ORAL at 21:11

## 2021-01-01 RX ADMIN — AZITHROMYCIN 500 MILLIGRAM(S): 500 TABLET, FILM COATED ORAL at 14:46

## 2021-01-01 RX ADMIN — ALBUTEROL 2.5 MILLIGRAM(S): 90 AEROSOL, METERED ORAL at 08:13

## 2021-01-01 RX ADMIN — Medication 3 MILLILITER(S): at 19:50

## 2021-01-01 RX ADMIN — Medication 10 MILLIGRAM(S): at 17:01

## 2021-01-01 RX ADMIN — CEFEPIME 100 MILLIGRAM(S): 1 INJECTION, POWDER, FOR SOLUTION INTRAMUSCULAR; INTRAVENOUS at 02:19

## 2021-01-01 RX ADMIN — PANTOPRAZOLE SODIUM 40 MILLIGRAM(S): 20 TABLET, DELAYED RELEASE ORAL at 07:30

## 2021-01-01 RX ADMIN — TIOTROPIUM BROMIDE 1 CAPSULE(S): 18 CAPSULE ORAL; RESPIRATORY (INHALATION) at 07:53

## 2021-01-01 RX ADMIN — MORPHINE SULFATE 5 MILLIGRAM(S): 50 CAPSULE, EXTENDED RELEASE ORAL at 23:46

## 2021-01-01 RX ADMIN — Medication 0.5 MILLIGRAM(S): at 04:39

## 2021-01-01 RX ADMIN — MORPHINE SULFATE 2 MILLIGRAM(S): 50 CAPSULE, EXTENDED RELEASE ORAL at 17:54

## 2021-01-01 RX ADMIN — APIXABAN 5 MILLIGRAM(S): 2.5 TABLET, FILM COATED ORAL at 07:30

## 2021-01-01 RX ADMIN — FENTANYL CITRATE 50 MICROGRAM(S): 50 INJECTION INTRAVENOUS at 16:24

## 2021-01-01 RX ADMIN — AZITHROMYCIN 255 MILLIGRAM(S): 500 TABLET, FILM COATED ORAL at 09:39

## 2021-01-01 RX ADMIN — SCOPALAMINE 1 PATCH: 1 PATCH, EXTENDED RELEASE TRANSDERMAL at 01:33

## 2021-01-01 RX ADMIN — CEFEPIME 100 MILLIGRAM(S): 1 INJECTION, POWDER, FOR SOLUTION INTRAMUSCULAR; INTRAVENOUS at 15:33

## 2021-01-01 RX ADMIN — SENNA PLUS 2 TABLET(S): 8.6 TABLET ORAL at 21:55

## 2021-01-01 RX ADMIN — CHLORHEXIDINE GLUCONATE 1 APPLICATION(S): 213 SOLUTION TOPICAL at 06:09

## 2021-01-01 RX ADMIN — CHLORHEXIDINE GLUCONATE 1 APPLICATION(S): 213 SOLUTION TOPICAL at 05:05

## 2021-01-01 RX ADMIN — ATORVASTATIN CALCIUM 20 MILLIGRAM(S): 80 TABLET, FILM COATED ORAL at 22:04

## 2021-01-01 RX ADMIN — MORPHINE SULFATE 5 MILLIGRAM(S): 50 CAPSULE, EXTENDED RELEASE ORAL at 16:25

## 2021-01-01 RX ADMIN — AZITHROMYCIN 250 MILLIGRAM(S): 500 TABLET, FILM COATED ORAL at 11:42

## 2021-01-01 RX ADMIN — Medication 125 MILLIGRAM(S): at 15:26

## 2021-01-01 RX ADMIN — AMLODIPINE BESYLATE 5 MILLIGRAM(S): 2.5 TABLET ORAL at 22:16

## 2021-01-01 RX ADMIN — MORPHINE SULFATE 20 MILLIGRAM(S): 50 CAPSULE, EXTENDED RELEASE ORAL at 14:05

## 2021-01-01 RX ADMIN — Medication 0.5 MILLIGRAM(S): at 00:14

## 2021-01-01 RX ADMIN — ATORVASTATIN CALCIUM 20 MILLIGRAM(S): 80 TABLET, FILM COATED ORAL at 20:21

## 2021-01-01 RX ADMIN — Medication 3 MILLILITER(S): at 07:46

## 2021-01-01 RX ADMIN — APIXABAN 5 MILLIGRAM(S): 2.5 TABLET, FILM COATED ORAL at 05:28

## 2021-01-01 RX ADMIN — APIXABAN 5 MILLIGRAM(S): 2.5 TABLET, FILM COATED ORAL at 17:52

## 2021-01-01 RX ADMIN — Medication 3 MILLILITER(S): at 07:31

## 2021-01-01 RX ADMIN — MORPHINE SULFATE 2 MILLIGRAM(S): 50 CAPSULE, EXTENDED RELEASE ORAL at 05:05

## 2021-01-01 RX ADMIN — ATORVASTATIN CALCIUM 20 MILLIGRAM(S): 80 TABLET, FILM COATED ORAL at 21:08

## 2021-01-01 RX ADMIN — MORPHINE SULFATE 8 MILLIGRAM(S): 50 CAPSULE, EXTENDED RELEASE ORAL at 12:27

## 2021-01-01 RX ADMIN — APIXABAN 5 MILLIGRAM(S): 2.5 TABLET, FILM COATED ORAL at 05:17

## 2021-01-01 RX ADMIN — SERTRALINE 25 MILLIGRAM(S): 25 TABLET, FILM COATED ORAL at 13:39

## 2021-01-01 RX ADMIN — SERTRALINE 25 MILLIGRAM(S): 25 TABLET, FILM COATED ORAL at 11:42

## 2021-01-01 RX ADMIN — Medication 1 MILLIGRAM(S): at 17:42

## 2021-01-01 RX ADMIN — Medication 650 MILLIGRAM(S): at 15:01

## 2021-01-01 RX ADMIN — Medication 3 MILLILITER(S): at 11:45

## 2021-01-01 RX ADMIN — Medication 0.5 MILLIGRAM(S): at 08:09

## 2021-01-01 RX ADMIN — SENNA PLUS 2 TABLET(S): 8.6 TABLET ORAL at 20:05

## 2021-01-01 RX ADMIN — Medication 60 MILLIGRAM(S): at 05:00

## 2021-01-01 RX ADMIN — Medication 1 MILLIGRAM(S): at 11:55

## 2021-01-01 RX ADMIN — ALBUTEROL 2.5 MILLIGRAM(S): 90 AEROSOL, METERED ORAL at 16:37

## 2021-01-01 RX ADMIN — CHLORHEXIDINE GLUCONATE 1 APPLICATION(S): 213 SOLUTION TOPICAL at 05:11

## 2021-01-01 RX ADMIN — SERTRALINE 25 MILLIGRAM(S): 25 TABLET, FILM COATED ORAL at 17:53

## 2021-01-01 RX ADMIN — SERTRALINE 25 MILLIGRAM(S): 25 TABLET, FILM COATED ORAL at 11:46

## 2021-01-01 RX ADMIN — Medication 3 MILLILITER(S): at 08:42

## 2021-01-01 RX ADMIN — CEFEPIME 100 MILLIGRAM(S): 1 INJECTION, POWDER, FOR SOLUTION INTRAMUSCULAR; INTRAVENOUS at 05:37

## 2021-01-01 RX ADMIN — BUDESONIDE AND FORMOTEROL FUMARATE DIHYDRATE 2 PUFF(S): 160; 4.5 AEROSOL RESPIRATORY (INHALATION) at 21:08

## 2021-01-01 RX ADMIN — MORPHINE SULFATE 4 MG/HR: 50 CAPSULE, EXTENDED RELEASE ORAL at 12:22

## 2021-01-01 RX ADMIN — MORPHINE SULFATE 4 MILLIGRAM(S): 50 CAPSULE, EXTENDED RELEASE ORAL at 14:19

## 2021-01-01 RX ADMIN — ONDANSETRON 4 MILLIGRAM(S): 8 TABLET, FILM COATED ORAL at 18:19

## 2021-01-01 RX ADMIN — Medication 3 MILLILITER(S): at 09:11

## 2021-01-01 RX ADMIN — Medication 3 MILLILITER(S): at 13:08

## 2021-01-01 RX ADMIN — APIXABAN 5 MILLIGRAM(S): 2.5 TABLET, FILM COATED ORAL at 17:19

## 2021-01-01 RX ADMIN — ATORVASTATIN CALCIUM 20 MILLIGRAM(S): 80 TABLET, FILM COATED ORAL at 22:01

## 2021-01-01 RX ADMIN — Medication 6: at 11:35

## 2021-01-01 RX ADMIN — PANTOPRAZOLE SODIUM 40 MILLIGRAM(S): 20 TABLET, DELAYED RELEASE ORAL at 05:02

## 2021-01-01 RX ADMIN — SENNA PLUS 2 TABLET(S): 8.6 TABLET ORAL at 21:08

## 2021-01-01 RX ADMIN — MORPHINE SULFATE 2 MILLIGRAM(S): 50 CAPSULE, EXTENDED RELEASE ORAL at 10:31

## 2021-01-01 RX ADMIN — Medication 0.5 MILLIGRAM(S): at 16:45

## 2021-01-01 RX ADMIN — MORPHINE SULFATE 4 MILLIGRAM(S): 50 CAPSULE, EXTENDED RELEASE ORAL at 05:18

## 2021-01-01 RX ADMIN — ONDANSETRON 4 MILLIGRAM(S): 8 TABLET, FILM COATED ORAL at 22:54

## 2021-01-01 RX ADMIN — Medication 1 MILLIGRAM(S): at 19:57

## 2021-01-01 RX ADMIN — MORPHINE SULFATE 5 MILLIGRAM(S): 50 CAPSULE, EXTENDED RELEASE ORAL at 22:34

## 2021-01-01 RX ADMIN — Medication 650 MILLIGRAM(S): at 15:39

## 2021-01-01 RX ADMIN — AZITHROMYCIN 255 MILLIGRAM(S): 500 TABLET, FILM COATED ORAL at 21:11

## 2021-01-01 RX ADMIN — AZITHROMYCIN 500 MILLIGRAM(S): 500 TABLET, FILM COATED ORAL at 11:51

## 2021-01-01 RX ADMIN — Medication 2: at 17:32

## 2021-01-01 RX ADMIN — Medication 40 MILLIGRAM(S): at 21:11

## 2021-01-01 RX ADMIN — Medication 3 MILLILITER(S): at 13:10

## 2021-01-01 RX ADMIN — APIXABAN 5 MILLIGRAM(S): 2.5 TABLET, FILM COATED ORAL at 21:21

## 2021-01-01 RX ADMIN — BUDESONIDE AND FORMOTEROL FUMARATE DIHYDRATE 2 PUFF(S): 160; 4.5 AEROSOL RESPIRATORY (INHALATION) at 05:29

## 2021-01-01 RX ADMIN — Medication 6: at 08:05

## 2021-01-01 RX ADMIN — Medication 0.5 MILLIGRAM(S): at 05:12

## 2021-01-01 RX ADMIN — SERTRALINE 25 MILLIGRAM(S): 25 TABLET, FILM COATED ORAL at 12:38

## 2021-01-01 RX ADMIN — Medication 1 MILLIGRAM(S): at 18:24

## 2021-01-01 RX ADMIN — Medication 40 MILLIGRAM(S): at 05:12

## 2021-01-01 RX ADMIN — Medication 2: at 16:50

## 2021-01-01 RX ADMIN — PANTOPRAZOLE SODIUM 40 MILLIGRAM(S): 20 TABLET, DELAYED RELEASE ORAL at 06:14

## 2021-01-01 RX ADMIN — ALBUTEROL 2.5 MILLIGRAM(S): 90 AEROSOL, METERED ORAL at 22:57

## 2021-01-01 RX ADMIN — Medication 250 MILLIGRAM(S): at 18:17

## 2021-01-01 RX ADMIN — Medication 0.5 MILLIGRAM(S): at 18:19

## 2021-01-01 RX ADMIN — Medication 1 MILLIGRAM(S): at 00:33

## 2021-01-01 RX ADMIN — POLYETHYLENE GLYCOL 3350 17 GRAM(S): 17 POWDER, FOR SOLUTION ORAL at 11:51

## 2021-01-01 RX ADMIN — Medication 3 UNIT(S): at 17:06

## 2021-01-01 RX ADMIN — Medication 40 MILLIGRAM(S): at 06:00

## 2021-01-01 RX ADMIN — Medication 60 MILLIGRAM(S): at 10:03

## 2021-01-01 RX ADMIN — Medication 1 MILLIGRAM(S): at 01:01

## 2021-01-01 RX ADMIN — Medication 60 MILLIGRAM(S): at 05:05

## 2021-01-01 RX ADMIN — Medication 25 MILLIGRAM(S): at 10:29

## 2021-01-01 RX ADMIN — CHLORHEXIDINE GLUCONATE 1 APPLICATION(S): 213 SOLUTION TOPICAL at 05:17

## 2021-01-01 RX ADMIN — MORPHINE SULFATE 2 MILLIGRAM(S): 50 CAPSULE, EXTENDED RELEASE ORAL at 10:49

## 2021-01-01 RX ADMIN — Medication 650 MILLIGRAM(S): at 05:15

## 2021-01-01 RX ADMIN — Medication 4: at 18:17

## 2021-01-01 RX ADMIN — Medication 1200 MILLIGRAM(S): at 05:17

## 2021-01-01 RX ADMIN — BUDESONIDE AND FORMOTEROL FUMARATE DIHYDRATE 2 PUFF(S): 160; 4.5 AEROSOL RESPIRATORY (INHALATION) at 20:00

## 2021-01-01 RX ADMIN — Medication 40 MILLIGRAM(S): at 14:49

## 2021-01-01 RX ADMIN — Medication 0.5 MILLIGRAM(S): at 22:53

## 2021-01-01 RX ADMIN — AMLODIPINE BESYLATE 2.5 MILLIGRAM(S): 2.5 TABLET ORAL at 01:13

## 2021-01-01 RX ADMIN — Medication 4: at 12:37

## 2021-01-01 RX ADMIN — SERTRALINE 25 MILLIGRAM(S): 25 TABLET, FILM COATED ORAL at 11:33

## 2021-01-01 RX ADMIN — ATORVASTATIN CALCIUM 20 MILLIGRAM(S): 80 TABLET, FILM COATED ORAL at 20:05

## 2021-01-01 RX ADMIN — Medication 3 MILLILITER(S): at 09:23

## 2021-01-01 RX ADMIN — AZITHROMYCIN 500 MILLIGRAM(S): 500 TABLET, FILM COATED ORAL at 10:21

## 2021-01-01 RX ADMIN — Medication 250 MILLIGRAM(S): at 05:38

## 2021-01-01 RX ADMIN — Medication 125 MILLIGRAM(S): at 07:41

## 2021-01-01 RX ADMIN — MORPHINE SULFATE 2 MILLIGRAM(S): 50 CAPSULE, EXTENDED RELEASE ORAL at 00:30

## 2021-01-01 RX ADMIN — Medication 40 MILLIGRAM(S): at 05:20

## 2021-01-01 RX ADMIN — Medication 1 MILLIGRAM(S): at 11:41

## 2021-01-01 RX ADMIN — Medication 3 MILLILITER(S): at 08:50

## 2021-01-01 RX ADMIN — MORPHINE SULFATE 4 MILLIGRAM(S): 50 CAPSULE, EXTENDED RELEASE ORAL at 12:00

## 2021-01-01 RX ADMIN — Medication 25 MILLIGRAM(S): at 11:57

## 2021-01-01 RX ADMIN — BUDESONIDE AND FORMOTEROL FUMARATE DIHYDRATE 2 PUFF(S): 160; 4.5 AEROSOL RESPIRATORY (INHALATION) at 07:42

## 2021-01-01 RX ADMIN — Medication 25 MILLIGRAM(S): at 10:10

## 2021-01-01 RX ADMIN — Medication 650 MILLIGRAM(S): at 18:49

## 2021-01-01 RX ADMIN — ATORVASTATIN CALCIUM 20 MILLIGRAM(S): 80 TABLET, FILM COATED ORAL at 20:08

## 2021-01-01 RX ADMIN — AZITHROMYCIN 250 MILLIGRAM(S): 500 TABLET, FILM COATED ORAL at 11:46

## 2021-01-01 RX ADMIN — AZITHROMYCIN 250 MILLIGRAM(S): 500 TABLET, FILM COATED ORAL at 11:16

## 2021-01-01 RX ADMIN — MORPHINE SULFATE 2 MILLIGRAM(S): 50 CAPSULE, EXTENDED RELEASE ORAL at 17:43

## 2021-01-01 RX ADMIN — Medication 60 MILLIGRAM(S): at 05:16

## 2021-01-01 RX ADMIN — CEFTRIAXONE 100 MILLIGRAM(S): 500 INJECTION, POWDER, FOR SOLUTION INTRAMUSCULAR; INTRAVENOUS at 21:58

## 2021-01-01 RX ADMIN — MORPHINE SULFATE 2 MILLIGRAM(S): 50 CAPSULE, EXTENDED RELEASE ORAL at 00:16

## 2021-01-01 RX ADMIN — Medication 1200 MILLIGRAM(S): at 05:29

## 2021-01-01 RX ADMIN — WARFARIN SODIUM 1 MILLIGRAM(S): 2.5 TABLET ORAL at 21:28

## 2021-01-01 RX ADMIN — Medication 1 MILLIGRAM(S): at 11:54

## 2021-01-01 RX ADMIN — BUDESONIDE AND FORMOTEROL FUMARATE DIHYDRATE 2 PUFF(S): 160; 4.5 AEROSOL RESPIRATORY (INHALATION) at 10:26

## 2021-01-01 RX ADMIN — Medication 1 MILLIGRAM(S): at 00:34

## 2021-01-01 RX ADMIN — MORPHINE SULFATE 2 MILLIGRAM(S): 50 CAPSULE, EXTENDED RELEASE ORAL at 06:57

## 2021-01-01 RX ADMIN — MORPHINE SULFATE 2 MILLIGRAM(S): 50 CAPSULE, EXTENDED RELEASE ORAL at 22:59

## 2021-01-01 RX ADMIN — Medication 3 UNIT(S): at 16:50

## 2021-01-01 RX ADMIN — AZITHROMYCIN 255 MILLIGRAM(S): 500 TABLET, FILM COATED ORAL at 14:51

## 2021-01-01 RX ADMIN — APIXABAN 5 MILLIGRAM(S): 2.5 TABLET, FILM COATED ORAL at 17:31

## 2021-01-01 RX ADMIN — SERTRALINE 25 MILLIGRAM(S): 25 TABLET, FILM COATED ORAL at 11:26

## 2021-01-01 RX ADMIN — PANTOPRAZOLE SODIUM 40 MILLIGRAM(S): 20 TABLET, DELAYED RELEASE ORAL at 05:54

## 2021-01-01 RX ADMIN — BUDESONIDE AND FORMOTEROL FUMARATE DIHYDRATE 2 PUFF(S): 160; 4.5 AEROSOL RESPIRATORY (INHALATION) at 20:38

## 2021-01-01 RX ADMIN — APIXABAN 5 MILLIGRAM(S): 2.5 TABLET, FILM COATED ORAL at 17:00

## 2021-01-01 RX ADMIN — SERTRALINE 25 MILLIGRAM(S): 25 TABLET, FILM COATED ORAL at 12:13

## 2021-01-01 RX ADMIN — Medication 0.5 MILLIGRAM(S): at 22:56

## 2021-01-01 RX ADMIN — Medication 650 MILLIGRAM(S): at 20:38

## 2021-01-01 RX ADMIN — CHLORHEXIDINE GLUCONATE 1 APPLICATION(S): 213 SOLUTION TOPICAL at 06:25

## 2021-01-01 RX ADMIN — MIDAZOLAM HYDROCHLORIDE 2 MILLIGRAM(S): 1 INJECTION, SOLUTION INTRAMUSCULAR; INTRAVENOUS at 14:40

## 2021-01-01 RX ADMIN — SODIUM CHLORIDE 1000 MILLILITER(S): 9 INJECTION INTRAMUSCULAR; INTRAVENOUS; SUBCUTANEOUS at 14:00

## 2021-01-01 RX ADMIN — BUDESONIDE AND FORMOTEROL FUMARATE DIHYDRATE 2 PUFF(S): 160; 4.5 AEROSOL RESPIRATORY (INHALATION) at 08:44

## 2021-01-01 RX ADMIN — ONDANSETRON 4 MILLIGRAM(S): 8 TABLET, FILM COATED ORAL at 08:45

## 2021-01-01 RX ADMIN — ALBUTEROL 2.5 MILLIGRAM(S): 90 AEROSOL, METERED ORAL at 11:50

## 2021-01-01 RX ADMIN — Medication 60 MILLIGRAM(S): at 17:11

## 2021-01-01 RX ADMIN — Medication 1 MILLIGRAM(S): at 19:46

## 2021-01-01 RX ADMIN — APIXABAN 5 MILLIGRAM(S): 2.5 TABLET, FILM COATED ORAL at 05:20

## 2021-01-01 RX ADMIN — ONDANSETRON 4 MILLIGRAM(S): 8 TABLET, FILM COATED ORAL at 16:45

## 2021-01-01 RX ADMIN — PANTOPRAZOLE SODIUM 40 MILLIGRAM(S): 20 TABLET, DELAYED RELEASE ORAL at 08:17

## 2021-01-01 RX ADMIN — Medication 125 MILLIGRAM(S): at 11:35

## 2021-01-01 RX ADMIN — Medication 3 MILLILITER(S): at 09:53

## 2021-01-01 RX ADMIN — SODIUM CHLORIDE 75 MILLILITER(S): 9 INJECTION, SOLUTION INTRAVENOUS at 05:29

## 2021-01-01 RX ADMIN — MORPHINE SULFATE 5 MILLIGRAM(S): 50 CAPSULE, EXTENDED RELEASE ORAL at 12:19

## 2021-01-01 RX ADMIN — SERTRALINE 25 MILLIGRAM(S): 25 TABLET, FILM COATED ORAL at 11:48

## 2021-01-01 RX ADMIN — Medication 3 UNIT(S): at 08:08

## 2021-01-01 RX ADMIN — INSULIN GLARGINE 9 UNIT(S): 100 INJECTION, SOLUTION SUBCUTANEOUS at 21:17

## 2021-01-01 RX ADMIN — Medication 650 MILLIGRAM(S): at 18:38

## 2021-01-01 RX ADMIN — PANTOPRAZOLE SODIUM 40 MILLIGRAM(S): 20 TABLET, DELAYED RELEASE ORAL at 05:38

## 2021-01-01 RX ADMIN — PANTOPRAZOLE SODIUM 40 MILLIGRAM(S): 20 TABLET, DELAYED RELEASE ORAL at 08:16

## 2021-01-01 RX ADMIN — CHLORHEXIDINE GLUCONATE 1 APPLICATION(S): 213 SOLUTION TOPICAL at 06:54

## 2021-01-01 RX ADMIN — Medication 2: at 17:27

## 2021-01-01 RX ADMIN — MORPHINE SULFATE 20 MILLIGRAM(S): 50 CAPSULE, EXTENDED RELEASE ORAL at 02:53

## 2021-01-01 RX ADMIN — MORPHINE SULFATE 2 MILLIGRAM(S): 50 CAPSULE, EXTENDED RELEASE ORAL at 15:12

## 2021-01-01 RX ADMIN — BUDESONIDE AND FORMOTEROL FUMARATE DIHYDRATE 2 PUFF(S): 160; 4.5 AEROSOL RESPIRATORY (INHALATION) at 20:06

## 2021-01-01 RX ADMIN — Medication 3 MILLILITER(S): at 19:40

## 2021-01-01 RX ADMIN — Medication 0.5 MILLIGRAM(S): at 19:40

## 2021-01-01 RX ADMIN — MORPHINE SULFATE 2 MILLIGRAM(S): 50 CAPSULE, EXTENDED RELEASE ORAL at 01:22

## 2021-01-01 RX ADMIN — Medication 250 MILLIGRAM(S): at 05:26

## 2021-01-01 RX ADMIN — Medication 200 MILLIGRAM(S): at 17:26

## 2021-01-01 RX ADMIN — Medication 3 MILLILITER(S): at 19:55

## 2021-01-01 RX ADMIN — ATORVASTATIN CALCIUM 20 MILLIGRAM(S): 80 TABLET, FILM COATED ORAL at 21:55

## 2021-01-01 RX ADMIN — BUDESONIDE AND FORMOTEROL FUMARATE DIHYDRATE 2 PUFF(S): 160; 4.5 AEROSOL RESPIRATORY (INHALATION) at 08:04

## 2021-01-01 RX ADMIN — Medication 1200 MILLIGRAM(S): at 17:11

## 2021-01-01 RX ADMIN — Medication 60 MILLIGRAM(S): at 05:35

## 2021-01-01 RX ADMIN — ATORVASTATIN CALCIUM 20 MILLIGRAM(S): 80 TABLET, FILM COATED ORAL at 21:03

## 2021-01-01 RX ADMIN — WARFARIN SODIUM 1 MILLIGRAM(S): 2.5 TABLET ORAL at 22:15

## 2021-01-01 RX ADMIN — Medication 50 GRAM(S): at 23:58

## 2021-01-01 RX ADMIN — APIXABAN 5 MILLIGRAM(S): 2.5 TABLET, FILM COATED ORAL at 17:26

## 2021-01-01 RX ADMIN — Medication 3 UNIT(S): at 17:38

## 2021-01-01 RX ADMIN — PANTOPRAZOLE SODIUM 40 MILLIGRAM(S): 20 TABLET, DELAYED RELEASE ORAL at 11:27

## 2021-01-01 RX ADMIN — Medication 1 MILLIGRAM(S): at 13:26

## 2021-01-01 RX ADMIN — Medication 2: at 11:40

## 2021-01-01 RX ADMIN — CEFEPIME 100 MILLIGRAM(S): 1 INJECTION, POWDER, FOR SOLUTION INTRAMUSCULAR; INTRAVENOUS at 21:16

## 2021-01-01 RX ADMIN — MORPHINE SULFATE 4 MILLIGRAM(S): 50 CAPSULE, EXTENDED RELEASE ORAL at 10:40

## 2021-01-01 RX ADMIN — Medication 3 UNIT(S): at 17:50

## 2021-01-01 RX ADMIN — BUDESONIDE AND FORMOTEROL FUMARATE DIHYDRATE 2 PUFF(S): 160; 4.5 AEROSOL RESPIRATORY (INHALATION) at 20:26

## 2021-01-01 RX ADMIN — Medication 25 MILLIGRAM(S): at 08:35

## 2021-01-01 RX ADMIN — Medication 3 MILLILITER(S): at 13:07

## 2021-01-01 RX ADMIN — MORPHINE SULFATE 5 MILLIGRAM(S): 50 CAPSULE, EXTENDED RELEASE ORAL at 12:15

## 2021-01-01 RX ADMIN — ALBUTEROL 1.25 MILLIGRAM(S): 90 AEROSOL, METERED ORAL at 21:13

## 2021-01-01 RX ADMIN — ENOXAPARIN SODIUM 40 MILLIGRAM(S): 100 INJECTION SUBCUTANEOUS at 11:51

## 2021-01-01 RX ADMIN — Medication 60 MILLIGRAM(S): at 05:24

## 2021-01-01 RX ADMIN — APIXABAN 5 MILLIGRAM(S): 2.5 TABLET, FILM COATED ORAL at 17:05

## 2021-01-01 RX ADMIN — Medication 20 MILLIGRAM(S): at 05:07

## 2021-01-01 RX ADMIN — Medication 1 MILLIGRAM(S): at 20:40

## 2021-01-01 RX ADMIN — BUDESONIDE AND FORMOTEROL FUMARATE DIHYDRATE 2 PUFF(S): 160; 4.5 AEROSOL RESPIRATORY (INHALATION) at 19:57

## 2021-01-01 RX ADMIN — PANTOPRAZOLE SODIUM 40 MILLIGRAM(S): 20 TABLET, DELAYED RELEASE ORAL at 07:33

## 2021-01-01 RX ADMIN — CHLORHEXIDINE GLUCONATE 1 APPLICATION(S): 213 SOLUTION TOPICAL at 05:06

## 2021-01-01 RX ADMIN — Medication 40 MILLIGRAM(S): at 17:28

## 2021-01-01 RX ADMIN — Medication 650 MILLIGRAM(S): at 04:20

## 2021-01-01 RX ADMIN — CHLORHEXIDINE GLUCONATE 1 APPLICATION(S): 213 SOLUTION TOPICAL at 05:54

## 2021-01-01 RX ADMIN — APIXABAN 5 MILLIGRAM(S): 2.5 TABLET, FILM COATED ORAL at 05:54

## 2021-01-01 RX ADMIN — SERTRALINE 25 MILLIGRAM(S): 25 TABLET, FILM COATED ORAL at 11:03

## 2021-01-01 RX ADMIN — ALBUTEROL 2.5 MILLIGRAM(S): 90 AEROSOL, METERED ORAL at 09:11

## 2021-01-01 RX ADMIN — Medication 1 MILLIGRAM(S): at 05:17

## 2021-01-01 RX ADMIN — Medication 6: at 12:03

## 2021-01-01 RX ADMIN — APIXABAN 5 MILLIGRAM(S): 2.5 TABLET, FILM COATED ORAL at 18:12

## 2021-01-01 RX ADMIN — APIXABAN 5 MILLIGRAM(S): 2.5 TABLET, FILM COATED ORAL at 17:12

## 2021-01-01 RX ADMIN — ALBUTEROL 2.5 MILLIGRAM(S): 90 AEROSOL, METERED ORAL at 19:45

## 2021-01-01 RX ADMIN — PANTOPRAZOLE SODIUM 40 MILLIGRAM(S): 20 TABLET, DELAYED RELEASE ORAL at 06:00

## 2021-01-01 RX ADMIN — Medication 25 GRAM(S): at 18:13

## 2021-01-01 RX ADMIN — ATORVASTATIN CALCIUM 20 MILLIGRAM(S): 80 TABLET, FILM COATED ORAL at 21:11

## 2021-01-01 RX ADMIN — SENNA PLUS 2 TABLET(S): 8.6 TABLET ORAL at 22:00

## 2021-01-01 RX ADMIN — MORPHINE SULFATE 2 MILLIGRAM(S): 50 CAPSULE, EXTENDED RELEASE ORAL at 05:07

## 2021-01-01 RX ADMIN — ALBUTEROL 2 PUFF(S): 90 AEROSOL, METERED ORAL at 15:28

## 2021-01-01 RX ADMIN — Medication 0.5 MILLIGRAM(S): at 21:39

## 2021-01-01 RX ADMIN — Medication 25 MILLIGRAM(S): at 08:58

## 2021-01-01 RX ADMIN — Medication 0.5 MILLIGRAM(S): at 11:33

## 2021-01-01 RX ADMIN — Medication 3 MILLILITER(S): at 21:04

## 2021-01-01 RX ADMIN — MORPHINE SULFATE 5 MILLIGRAM(S): 50 CAPSULE, EXTENDED RELEASE ORAL at 21:18

## 2021-01-01 RX ADMIN — CEFEPIME 100 MILLIGRAM(S): 1 INJECTION, POWDER, FOR SOLUTION INTRAMUSCULAR; INTRAVENOUS at 05:15

## 2021-01-01 RX ADMIN — MORPHINE SULFATE 5 MILLIGRAM(S): 50 CAPSULE, EXTENDED RELEASE ORAL at 15:50

## 2021-01-01 RX ADMIN — MORPHINE SULFATE 20 MILLIGRAM(S): 50 CAPSULE, EXTENDED RELEASE ORAL at 13:52

## 2021-01-01 RX ADMIN — BUDESONIDE AND FORMOTEROL FUMARATE DIHYDRATE 2 PUFF(S): 160; 4.5 AEROSOL RESPIRATORY (INHALATION) at 21:15

## 2021-01-01 RX ADMIN — Medication 125 MILLIGRAM(S): at 19:01

## 2021-01-01 RX ADMIN — ATORVASTATIN CALCIUM 20 MILLIGRAM(S): 80 TABLET, FILM COATED ORAL at 21:34

## 2021-01-01 RX ADMIN — AZITHROMYCIN 250 MILLIGRAM(S): 500 TABLET, FILM COATED ORAL at 11:26

## 2021-01-01 RX ADMIN — SERTRALINE 25 MILLIGRAM(S): 25 TABLET, FILM COATED ORAL at 08:16

## 2021-01-01 RX ADMIN — MORPHINE SULFATE 2 MILLIGRAM(S): 50 CAPSULE, EXTENDED RELEASE ORAL at 02:10

## 2021-01-01 RX ADMIN — Medication 3 UNIT(S): at 17:11

## 2021-01-01 RX ADMIN — Medication 1200 MILLIGRAM(S): at 17:18

## 2021-01-01 RX ADMIN — PANTOPRAZOLE SODIUM 40 MILLIGRAM(S): 20 TABLET, DELAYED RELEASE ORAL at 17:28

## 2021-01-01 RX ADMIN — ONDANSETRON 4 MILLIGRAM(S): 8 TABLET, FILM COATED ORAL at 04:43

## 2021-01-01 RX ADMIN — Medication 3 MILLILITER(S): at 08:36

## 2021-01-01 RX ADMIN — MORPHINE SULFATE 4 MILLIGRAM(S): 50 CAPSULE, EXTENDED RELEASE ORAL at 11:58

## 2021-01-01 RX ADMIN — ONDANSETRON 4 MILLIGRAM(S): 8 TABLET, FILM COATED ORAL at 05:00

## 2021-01-01 RX ADMIN — ATORVASTATIN CALCIUM 20 MILLIGRAM(S): 80 TABLET, FILM COATED ORAL at 21:07

## 2021-01-01 RX ADMIN — Medication 25 MILLIGRAM(S): at 12:01

## 2021-01-01 RX ADMIN — ALBUTEROL 2.5 MILLIGRAM(S): 90 AEROSOL, METERED ORAL at 06:46

## 2021-01-01 RX ADMIN — Medication 25 MILLIGRAM(S): at 18:05

## 2021-01-01 RX ADMIN — MORPHINE SULFATE 5 MILLIGRAM(S): 50 CAPSULE, EXTENDED RELEASE ORAL at 01:13

## 2021-01-01 RX ADMIN — SERTRALINE 25 MILLIGRAM(S): 25 TABLET, FILM COATED ORAL at 12:01

## 2021-01-01 RX ADMIN — Medication 60 MILLIGRAM(S): at 05:18

## 2021-01-01 RX ADMIN — Medication 60 MILLIGRAM(S): at 14:40

## 2021-01-01 RX ADMIN — ONDANSETRON 4 MILLIGRAM(S): 8 TABLET, FILM COATED ORAL at 06:26

## 2021-01-01 RX ADMIN — Medication 2000 UNIT(S): at 11:22

## 2021-01-01 RX ADMIN — ATORVASTATIN CALCIUM 20 MILLIGRAM(S): 80 TABLET, FILM COATED ORAL at 21:42

## 2021-01-01 RX ADMIN — ALBUTEROL 2.5 MILLIGRAM(S): 90 AEROSOL, METERED ORAL at 12:05

## 2021-01-01 RX ADMIN — MORPHINE SULFATE 4 MILLIGRAM(S): 50 CAPSULE, EXTENDED RELEASE ORAL at 17:16

## 2021-01-01 RX ADMIN — Medication 1 MILLIGRAM(S): at 12:45

## 2021-01-01 RX ADMIN — Medication 250 MILLIGRAM(S): at 18:56

## 2021-01-01 RX ADMIN — Medication 0.5 MILLIGRAM(S): at 12:03

## 2021-01-01 RX ADMIN — Medication 40 MILLIGRAM(S): at 05:38

## 2021-01-01 RX ADMIN — Medication 20 MILLIGRAM(S): at 05:54

## 2021-01-01 RX ADMIN — Medication 2000 UNIT(S): at 11:48

## 2021-01-01 RX ADMIN — Medication 60 MILLIGRAM(S): at 05:17

## 2021-01-01 RX ADMIN — Medication 25 MILLIGRAM(S): at 15:07

## 2021-01-01 RX ADMIN — Medication 3 MILLILITER(S): at 11:36

## 2021-01-01 RX ADMIN — Medication 60 MILLIGRAM(S): at 22:00

## 2021-01-01 RX ADMIN — MORPHINE SULFATE 5 MILLIGRAM(S): 50 CAPSULE, EXTENDED RELEASE ORAL at 10:13

## 2021-01-01 RX ADMIN — Medication 0.5 MILLIGRAM(S): at 09:50

## 2021-01-01 RX ADMIN — Medication 125 MILLIGRAM(S): at 11:59

## 2021-01-01 RX ADMIN — SENNA PLUS 2 TABLET(S): 8.6 TABLET ORAL at 22:16

## 2021-01-01 RX ADMIN — PANTOPRAZOLE SODIUM 40 MILLIGRAM(S): 20 TABLET, DELAYED RELEASE ORAL at 06:38

## 2021-01-01 RX ADMIN — ATORVASTATIN CALCIUM 20 MILLIGRAM(S): 80 TABLET, FILM COATED ORAL at 22:15

## 2021-01-01 RX ADMIN — ATORVASTATIN CALCIUM 20 MILLIGRAM(S): 80 TABLET, FILM COATED ORAL at 22:16

## 2021-01-01 RX ADMIN — Medication 1 MILLIGRAM(S): at 16:14

## 2021-01-01 RX ADMIN — MORPHINE SULFATE 2 MILLIGRAM(S): 50 CAPSULE, EXTENDED RELEASE ORAL at 14:09

## 2021-01-01 RX ADMIN — Medication 25 MILLIGRAM(S): at 09:55

## 2021-01-01 RX ADMIN — APIXABAN 5 MILLIGRAM(S): 2.5 TABLET, FILM COATED ORAL at 08:14

## 2021-01-01 RX ADMIN — Medication 1 MILLIGRAM(S): at 12:58

## 2021-01-01 RX ADMIN — Medication 1 MILLIGRAM(S): at 06:25

## 2021-01-01 RX ADMIN — SODIUM CHLORIDE 500 MILLILITER(S): 9 INJECTION, SOLUTION INTRAVENOUS at 10:54

## 2021-01-01 RX ADMIN — MORPHINE SULFATE 2 MILLIGRAM(S): 50 CAPSULE, EXTENDED RELEASE ORAL at 02:42

## 2021-01-01 RX ADMIN — ATORVASTATIN CALCIUM 20 MILLIGRAM(S): 80 TABLET, FILM COATED ORAL at 21:28

## 2021-01-01 RX ADMIN — Medication 3 MILLILITER(S): at 00:40

## 2021-01-01 RX ADMIN — SENNA PLUS 2 TABLET(S): 8.6 TABLET ORAL at 21:34

## 2021-01-01 RX ADMIN — Medication 25 MILLIGRAM(S): at 16:28

## 2021-01-01 RX ADMIN — BUDESONIDE AND FORMOTEROL FUMARATE DIHYDRATE 2 PUFF(S): 160; 4.5 AEROSOL RESPIRATORY (INHALATION) at 21:04

## 2021-01-01 RX ADMIN — Medication 1 SPRAY(S): at 04:43

## 2021-01-01 RX ADMIN — CHLORHEXIDINE GLUCONATE 1 APPLICATION(S): 213 SOLUTION TOPICAL at 05:31

## 2021-01-01 RX ADMIN — Medication 3 MILLILITER(S): at 20:38

## 2021-01-01 RX ADMIN — Medication 3 MILLILITER(S): at 09:12

## 2021-01-01 RX ADMIN — BUDESONIDE AND FORMOTEROL FUMARATE DIHYDRATE 2 PUFF(S): 160; 4.5 AEROSOL RESPIRATORY (INHALATION) at 22:29

## 2021-01-01 RX ADMIN — BUDESONIDE AND FORMOTEROL FUMARATE DIHYDRATE 2 PUFF(S): 160; 4.5 AEROSOL RESPIRATORY (INHALATION) at 21:06

## 2021-01-01 RX ADMIN — Medication 1 MILLIGRAM(S): at 17:15

## 2021-01-01 RX ADMIN — Medication 25 MILLIGRAM(S): at 08:21

## 2021-01-01 RX ADMIN — Medication 60 MILLIGRAM(S): at 05:11

## 2021-01-01 RX ADMIN — PANTOPRAZOLE SODIUM 40 MILLIGRAM(S): 20 TABLET, DELAYED RELEASE ORAL at 06:13

## 2021-01-01 RX ADMIN — Medication 1 MILLIGRAM(S): at 04:44

## 2021-01-01 RX ADMIN — Medication 25 MILLIGRAM(S): at 23:29

## 2021-01-01 RX ADMIN — BUDESONIDE AND FORMOTEROL FUMARATE DIHYDRATE 2 PUFF(S): 160; 4.5 AEROSOL RESPIRATORY (INHALATION) at 21:42

## 2021-01-01 RX ADMIN — AZITHROMYCIN 250 MILLIGRAM(S): 500 TABLET, FILM COATED ORAL at 12:38

## 2021-01-01 RX ADMIN — SODIUM CHLORIDE 500 MILLILITER(S): 9 INJECTION INTRAMUSCULAR; INTRAVENOUS; SUBCUTANEOUS at 13:10

## 2021-01-01 RX ADMIN — Medication 250 MILLIGRAM(S): at 03:39

## 2021-01-01 RX ADMIN — SERTRALINE 25 MILLIGRAM(S): 25 TABLET, FILM COATED ORAL at 11:37

## 2021-01-01 RX ADMIN — MORPHINE SULFATE 2 MILLIGRAM(S): 50 CAPSULE, EXTENDED RELEASE ORAL at 15:30

## 2021-01-01 RX ADMIN — APIXABAN 5 MILLIGRAM(S): 2.5 TABLET, FILM COATED ORAL at 05:30

## 2021-01-01 RX ADMIN — APIXABAN 5 MILLIGRAM(S): 2.5 TABLET, FILM COATED ORAL at 05:53

## 2021-01-01 RX ADMIN — PANTOPRAZOLE SODIUM 40 MILLIGRAM(S): 20 TABLET, DELAYED RELEASE ORAL at 05:31

## 2021-01-01 RX ADMIN — PANTOPRAZOLE SODIUM 40 MILLIGRAM(S): 20 TABLET, DELAYED RELEASE ORAL at 06:53

## 2021-01-01 RX ADMIN — MORPHINE SULFATE 4 MILLIGRAM(S): 50 CAPSULE, EXTENDED RELEASE ORAL at 12:20

## 2021-01-01 RX ADMIN — APIXABAN 5 MILLIGRAM(S): 2.5 TABLET, FILM COATED ORAL at 20:21

## 2021-01-01 RX ADMIN — Medication 60 MILLIGRAM(S): at 17:55

## 2021-01-01 RX ADMIN — Medication 60 MILLIGRAM(S): at 21:15

## 2021-01-01 RX ADMIN — Medication 1 MILLIGRAM(S): at 08:47

## 2021-01-01 RX ADMIN — Medication 3 UNIT(S): at 08:16

## 2021-01-01 RX ADMIN — ENOXAPARIN SODIUM 40 MILLIGRAM(S): 100 INJECTION SUBCUTANEOUS at 18:05

## 2021-01-01 RX ADMIN — MORPHINE SULFATE 20 MILLIGRAM(S): 50 CAPSULE, EXTENDED RELEASE ORAL at 11:20

## 2021-01-01 RX ADMIN — MORPHINE SULFATE 5 MILLIGRAM(S): 50 CAPSULE, EXTENDED RELEASE ORAL at 12:54

## 2021-01-01 RX ADMIN — MORPHINE SULFATE 20 MILLIGRAM(S): 50 CAPSULE, EXTENDED RELEASE ORAL at 18:25

## 2021-01-01 RX ADMIN — PANTOPRAZOLE SODIUM 40 MILLIGRAM(S): 20 TABLET, DELAYED RELEASE ORAL at 06:01

## 2021-01-01 RX ADMIN — CHLORHEXIDINE GLUCONATE 1 APPLICATION(S): 213 SOLUTION TOPICAL at 06:13

## 2021-01-01 RX ADMIN — SENNA PLUS 2 TABLET(S): 8.6 TABLET ORAL at 22:15

## 2021-01-01 RX ADMIN — APIXABAN 5 MILLIGRAM(S): 2.5 TABLET, FILM COATED ORAL at 05:13

## 2021-01-01 RX ADMIN — Medication 40 MILLIGRAM(S): at 05:17

## 2021-01-01 RX ADMIN — TIOTROPIUM BROMIDE 1 CAPSULE(S): 18 CAPSULE ORAL; RESPIRATORY (INHALATION) at 14:50

## 2021-01-01 RX ADMIN — Medication 3 MILLILITER(S): at 19:25

## 2021-01-01 RX ADMIN — Medication 25 MILLIGRAM(S): at 17:29

## 2021-01-01 RX ADMIN — SCOPALAMINE 1 PATCH: 1 PATCH, EXTENDED RELEASE TRANSDERMAL at 07:26

## 2021-01-01 RX ADMIN — SENNA PLUS 2 TABLET(S): 8.6 TABLET ORAL at 21:15

## 2021-01-01 RX ADMIN — Medication 1 MILLIGRAM(S): at 17:16

## 2021-01-01 RX ADMIN — Medication 200 MILLIGRAM(S): at 05:35

## 2021-01-01 RX ADMIN — PANTOPRAZOLE SODIUM 40 MILLIGRAM(S): 20 TABLET, DELAYED RELEASE ORAL at 05:07

## 2021-01-01 RX ADMIN — Medication 60 MILLIGRAM(S): at 02:16

## 2021-01-01 RX ADMIN — Medication 40 MILLIGRAM(S): at 05:30

## 2021-01-01 RX ADMIN — Medication 60 MILLIGRAM(S): at 05:30

## 2021-01-01 RX ADMIN — Medication 1 MILLIGRAM(S): at 09:25

## 2021-01-01 RX ADMIN — AZITHROMYCIN 255 MILLIGRAM(S): 500 TABLET, FILM COATED ORAL at 05:23

## 2021-01-01 RX ADMIN — SENNA PLUS 2 TABLET(S): 8.6 TABLET ORAL at 20:21

## 2021-01-01 RX ADMIN — Medication 3 MILLILITER(S): at 14:16

## 2021-01-01 RX ADMIN — SERTRALINE 25 MILLIGRAM(S): 25 TABLET, FILM COATED ORAL at 08:14

## 2021-01-01 RX ADMIN — APIXABAN 5 MILLIGRAM(S): 2.5 TABLET, FILM COATED ORAL at 05:23

## 2021-01-01 RX ADMIN — ENOXAPARIN SODIUM 40 MILLIGRAM(S): 100 INJECTION SUBCUTANEOUS at 05:15

## 2021-01-01 RX ADMIN — ATORVASTATIN CALCIUM 20 MILLIGRAM(S): 80 TABLET, FILM COATED ORAL at 21:15

## 2021-01-01 RX ADMIN — MORPHINE SULFATE 5 MILLIGRAM(S): 50 CAPSULE, EXTENDED RELEASE ORAL at 12:40

## 2021-01-01 RX ADMIN — AZITHROMYCIN 255 MILLIGRAM(S): 500 TABLET, FILM COATED ORAL at 16:15

## 2021-01-01 RX ADMIN — BUDESONIDE AND FORMOTEROL FUMARATE DIHYDRATE 2 PUFF(S): 160; 4.5 AEROSOL RESPIRATORY (INHALATION) at 07:56

## 2021-01-01 RX ADMIN — Medication 3 UNIT(S): at 12:03

## 2021-01-01 RX ADMIN — Medication 3 MILLILITER(S): at 23:19

## 2021-01-01 RX ADMIN — AZITHROMYCIN 255 MILLIGRAM(S): 500 TABLET, FILM COATED ORAL at 15:23

## 2021-01-01 RX ADMIN — Medication 40 MILLIGRAM(S): at 05:53

## 2021-01-01 RX ADMIN — Medication 2: at 11:47

## 2021-01-01 RX ADMIN — Medication 5 MILLIGRAM(S): at 01:04

## 2021-01-01 RX ADMIN — PANTOPRAZOLE SODIUM 40 MILLIGRAM(S): 20 TABLET, DELAYED RELEASE ORAL at 14:59

## 2021-01-01 RX ADMIN — Medication 0.5 MILLIGRAM(S): at 20:58

## 2021-01-01 RX ADMIN — Medication 30 MILLIGRAM(S): at 11:56

## 2021-01-01 RX ADMIN — MORPHINE SULFATE 4 MILLIGRAM(S): 50 CAPSULE, EXTENDED RELEASE ORAL at 15:01

## 2021-01-01 RX ADMIN — APIXABAN 5 MILLIGRAM(S): 2.5 TABLET, FILM COATED ORAL at 17:53

## 2021-01-01 RX ADMIN — AZITHROMYCIN 255 MILLIGRAM(S): 500 TABLET, FILM COATED ORAL at 13:02

## 2021-01-01 RX ADMIN — Medication 1200 MILLIGRAM(S): at 05:04

## 2021-01-01 RX ADMIN — BUDESONIDE AND FORMOTEROL FUMARATE DIHYDRATE 2 PUFF(S): 160; 4.5 AEROSOL RESPIRATORY (INHALATION) at 10:58

## 2021-01-01 RX ADMIN — BUDESONIDE AND FORMOTEROL FUMARATE DIHYDRATE 2 PUFF(S): 160; 4.5 AEROSOL RESPIRATORY (INHALATION) at 21:17

## 2021-01-01 RX ADMIN — Medication 3 MILLILITER(S): at 13:35

## 2021-01-01 RX ADMIN — APIXABAN 5 MILLIGRAM(S): 2.5 TABLET, FILM COATED ORAL at 20:08

## 2021-01-01 RX ADMIN — ONDANSETRON 4 MILLIGRAM(S): 8 TABLET, FILM COATED ORAL at 18:44

## 2021-01-01 RX ADMIN — Medication 1 MILLIGRAM(S): at 06:55

## 2021-01-01 RX ADMIN — WARFARIN SODIUM 2 MILLIGRAM(S): 2.5 TABLET ORAL at 22:04

## 2021-01-01 RX ADMIN — Medication 3 MILLILITER(S): at 20:35

## 2021-01-01 RX ADMIN — Medication 1 MILLIGRAM(S): at 09:12

## 2021-01-01 RX ADMIN — AZITHROMYCIN 255 MILLIGRAM(S): 500 TABLET, FILM COATED ORAL at 13:11

## 2021-01-01 RX ADMIN — Medication 125 MILLIGRAM(S): at 13:08

## 2021-01-01 RX ADMIN — APIXABAN 5 MILLIGRAM(S): 2.5 TABLET, FILM COATED ORAL at 17:35

## 2021-01-01 RX ADMIN — ONDANSETRON 4 MILLIGRAM(S): 8 TABLET, FILM COATED ORAL at 10:21

## 2021-01-01 RX ADMIN — PANTOPRAZOLE SODIUM 40 MILLIGRAM(S): 20 TABLET, DELAYED RELEASE ORAL at 06:09

## 2021-01-01 RX ADMIN — Medication 650 MILLIGRAM(S): at 04:09

## 2021-01-01 RX ADMIN — ALBUTEROL 2.5 MILLIGRAM(S): 90 AEROSOL, METERED ORAL at 12:54

## 2021-01-01 RX ADMIN — APIXABAN 5 MILLIGRAM(S): 2.5 TABLET, FILM COATED ORAL at 20:05

## 2021-01-01 RX ADMIN — Medication 0.5 MILLIGRAM(S): at 10:09

## 2021-01-01 RX ADMIN — Medication 1 MILLIGRAM(S): at 12:01

## 2021-01-01 RX ADMIN — CHLORHEXIDINE GLUCONATE 15 MILLILITER(S): 213 SOLUTION TOPICAL at 18:05

## 2021-01-01 RX ADMIN — Medication 1 MILLIGRAM(S): at 12:16

## 2021-01-01 RX ADMIN — Medication 1 MILLIGRAM(S): at 05:28

## 2021-01-01 RX ADMIN — Medication 1 MILLIGRAM(S): at 18:32

## 2021-01-01 RX ADMIN — CHLORHEXIDINE GLUCONATE 1 APPLICATION(S): 213 SOLUTION TOPICAL at 06:04

## 2021-01-01 RX ADMIN — ALBUTEROL 2 PUFF(S): 90 AEROSOL, METERED ORAL at 11:36

## 2021-01-01 RX ADMIN — Medication 0.5 MILLIGRAM(S): at 10:27

## 2021-01-01 RX ADMIN — Medication 60 MILLIGRAM(S): at 05:12

## 2021-01-01 RX ADMIN — WARFARIN SODIUM 1 MILLIGRAM(S): 2.5 TABLET ORAL at 21:41

## 2021-01-01 RX ADMIN — AZITHROMYCIN 250 MILLIGRAM(S): 500 TABLET, FILM COATED ORAL at 13:39

## 2021-01-01 RX ADMIN — APIXABAN 5 MILLIGRAM(S): 2.5 TABLET, FILM COATED ORAL at 08:16

## 2021-01-01 RX ADMIN — SODIUM CHLORIDE 50 MILLILITER(S): 9 INJECTION INTRAMUSCULAR; INTRAVENOUS; SUBCUTANEOUS at 03:39

## 2021-01-01 RX ADMIN — Medication 1200 MILLIGRAM(S): at 17:27

## 2021-01-01 RX ADMIN — CHLORHEXIDINE GLUCONATE 1 APPLICATION(S): 213 SOLUTION TOPICAL at 05:02

## 2021-01-01 RX ADMIN — Medication 25 MILLIGRAM(S): at 05:22

## 2021-01-01 RX ADMIN — Medication 1 MILLIGRAM(S): at 17:48

## 2021-01-01 RX ADMIN — SCOPALAMINE 1 PATCH: 1 PATCH, EXTENDED RELEASE TRANSDERMAL at 21:33

## 2021-01-01 RX ADMIN — MORPHINE SULFATE 2 MILLIGRAM(S): 50 CAPSULE, EXTENDED RELEASE ORAL at 07:59

## 2021-01-01 RX ADMIN — Medication 3 UNIT(S): at 12:38

## 2021-01-01 RX ADMIN — MORPHINE SULFATE 5 MILLIGRAM(S): 50 CAPSULE, EXTENDED RELEASE ORAL at 17:29

## 2021-01-01 RX ADMIN — WARFARIN SODIUM 2 MILLIGRAM(S): 2.5 TABLET ORAL at 21:17

## 2021-01-01 RX ADMIN — ETHACRYNIC ACID 25 MILLIGRAM(S): 25 TABLET ORAL at 12:58

## 2021-01-01 RX ADMIN — CHLORHEXIDINE GLUCONATE 1 APPLICATION(S): 213 SOLUTION TOPICAL at 05:18

## 2021-01-01 RX ADMIN — Medication 200 MILLIGRAM(S): at 05:23

## 2021-01-01 RX ADMIN — ALBUTEROL 2.5 MILLIGRAM(S): 90 AEROSOL, METERED ORAL at 01:51

## 2021-01-01 RX ADMIN — Medication 10 UNIT(S): at 18:48

## 2021-01-01 RX ADMIN — Medication 25 MILLIGRAM(S): at 14:06

## 2021-01-01 RX ADMIN — ALBUTEROL 2.5 MILLIGRAM(S): 90 AEROSOL, METERED ORAL at 16:41

## 2021-01-01 RX ADMIN — ATORVASTATIN CALCIUM 20 MILLIGRAM(S): 80 TABLET, FILM COATED ORAL at 21:41

## 2021-01-01 RX ADMIN — Medication 25 MILLIGRAM(S): at 18:42

## 2021-01-01 RX ADMIN — AZITHROMYCIN 255 MILLIGRAM(S): 500 TABLET, FILM COATED ORAL at 03:51

## 2021-01-01 RX ADMIN — Medication 60 MILLIGRAM(S): at 05:10

## 2021-01-01 RX ADMIN — ETHACRYNIC ACID 25 MILLIGRAM(S): 25 TABLET ORAL at 17:45

## 2021-01-01 RX ADMIN — CHLORHEXIDINE GLUCONATE 1 APPLICATION(S): 213 SOLUTION TOPICAL at 18:04

## 2021-01-01 RX ADMIN — APIXABAN 5 MILLIGRAM(S): 2.5 TABLET, FILM COATED ORAL at 05:02

## 2021-01-01 RX ADMIN — ALBUTEROL 2.5 MILLIGRAM(S): 90 AEROSOL, METERED ORAL at 07:46

## 2021-01-01 RX ADMIN — Medication 30 MILLILITER(S): at 15:52

## 2021-01-01 RX ADMIN — Medication 1 MILLIGRAM(S): at 10:46

## 2021-01-01 RX ADMIN — SENNA PLUS 2 TABLET(S): 8.6 TABLET ORAL at 20:08

## 2021-01-01 SDOH — ECONOMIC STABILITY - INCOME SECURITY: INSUFFICIENT SOCIAL INSURANCE AND WELFARE SUPPORT: Z59.7

## 2021-03-22 NOTE — ED PROVIDER NOTE - ATTENDING CONTRIBUTION TO CARE
75 y.o f w / pmhx of Afib (on Coumadin) s/p ablation and Micra PPM, COPD on home O2 as needed, left lung ca diagnosed in 2011 (surgery x2 -partial left lobectomy + chemo +RT, oncologist f/up at [facility]), former smoker quit in 2005, anxiety 75 y.o f w / pmhx of Afib (on Coumadin- last INR 1/9 ~ a week ago) s/p ablation and micra PPM, COPD on home O2 ~ 3.5 liters with baseline saturation 99 , left lung ca diagnosed in 2011 (surgery x2 -partial left lobectomy + chemo +RT, oncologist f/up at [facility]- reports finished chemo and radiation in 2011), former smoker quit in 2005, anxiety presents for worsening sob over months, reports cannot take a few steps without getting sob associated with palpitations but reports she gets this because her anxiety. last seen her pulmonologist ~ few months ago (Dr. Sinha). Cardiologist Dr. Spencer. EP-Dr. Rojas. No fever, chills, n/v, cp, pleuritic cp,  diaphoresis, cough, ha/lh/dizziness, numbness/tingling, neck pain/ stiffness, abd pain, diarrhea, constipation, melena/brbpr, urinary symptoms, trauma, weakness, edema, calf pain/swelling/erythema, sick contacts, recent travel or rash.    Vital Signs: I have reviewed the initial vital signs. Constitutional: Elderly female sitting on stretcher speaking full sentences. Integumentary: No rash. ENT: MMM NECK: Supple, non-tender, no meningeal signs. Cardiovascular: RRR, radial pulses 2/4 b/l. No JVD. Respiratory: BS present b/l, poor air movement decreased breath sounds to L lung base, no accessory muscle use, no stridor. Gastrointestinal: BS present throughout all 4 quadrants, soft, nd, nt, no rebound tenderness or guarding, no cvat. Musculoskeletal: FROM, no edema, no calf pain/swelling/erythema. Neurologic: AAOx3, motor 5/5 and sensation intact throughout upper and lower ext, CN II-XII intact, No facial droop or slurring of speech. No focal deficits.

## 2021-03-22 NOTE — H&P ADULT - HISTORY OF PRESENT ILLNESS
75 year old female patient with PMH of Afib (on Coumadin) s/p ablation and Micra PPM, COPD on home O2 as needed, left lung ca diagnosed in 2011 (surgery x2 -partial left lobectomy + chemo +RT, oncologist f/up at [facility]), former smoker quit in 2005 and anxiety presents to ED tfor evaluation of dyspnea.      75 year old female patient with PMH of Afib (on Coumadin) s/p ablation and Micra PPM, COPD on home O2 as needed, left lung ca diagnosed in 2011 (surgery x2 -partial left lobectomy + chemo +RT, oncologist f/up at [facility]), former smoker quit in 2005 and anxiety presents to ED tfor evaluation of dyspnea. pt endorses long standing dyspna that has been progressively worsened over the past few months. she endorses that she uses 3L of oxygen at home at rest. her dyspnea has limited her quality of life and ability to ambulate around the house. pt also endorses episodes of panic attacks and feeling severely anxious. she endorses baseline chronic cough, but denies increased cough or sputum production. pt denies fever, chills, nausea, vomiting, chest pain, palpitations, abdominal pain, change in bowel or urinary habits.    in the ED pt is hemodynamically stable.   received 125 mg solumedrol and admitted to medicine for further management.

## 2021-03-22 NOTE — H&P ADULT - ATTENDING COMMENTS
74 YO F with a PMH of chronic Afib (on Coumadin) s/p ablation and Micra PPM, COPD (on 3.5L home O2), hx of lung CA s/p lobectomy/CT/RT, and anxiety who presents to the hospital with a c/o progressively worsening WHITAKER for the past several months. - increase in sputum production. - change in sputum color. Denies any CP, fevers/chills, sore throat, runny nose, allergies, palpitations, or LE swelling. In the ED, Chest X-ray showed abnormality in the left lower lobe, otherwise negative. Started on IV Steroids and Duonebs.    Physical exam shows pt in NAD. VSS, not hypoxic on 3.5L NC. Speaking in full sentences. Neuro exam is WNLs. CTA B/L with no W/C/R, good air entry B/L. RRR, no M/G/R. ABD is soft and non-tender to palpation, normoactive BSs. No LE edema. Labs and radiology as above.     WHITAKER from progression of COPD vs possible new-onset right-sided HF. Send procal and CRP. IV steroids and transition to PO. Duonebs PRN/standing. LABA/ICS inhaler. Supplemental O2 PRN. Echo.     Normocytic anemia, at baseline. Pt denies bleeding symptoms. Replace as necessary.     Anxiety. Pt states she is very anxious and her [employer] no longer works for her. She would like to try another benzo but does not like Xanax. I advised her that she will need out-pt FU for chronic management.     Hx of chronic Afib (on Coumadin) s/p ablation and Micra PPM, COPD (on 3.5L home O2), and hx of lung CA s/p lobectomy/CT/RT. Restart home meds, except as stated above. DVT PPX. Inform PCP of pt's admission to hospital. My note supersedes the residents note.     Spoke with family:

## 2021-03-22 NOTE — ED PROVIDER NOTE - PLAN OF CARE
Plan: EKG, CXR, labs, steroid, albuterol pump, reassess. Pt is on her baseline NC O2 supplementation.

## 2021-03-22 NOTE — ED PROVIDER NOTE - OBJECTIVE STATEMENT
76 y/o F PMHx COPD on 3-3.5 L O2 at home, baseline O2 88-90s without O2, afib on Coumadin s/p ablations, PPM, lung CA s/p lobectomy 2011, anxiety presents to ED with SOB x1 week worsening over past 24 hours. Patient reports having worsening palpitations over past 24 hours as well, denies chest pain. No LE edema. Pt follow w/ Dr. Spencer, Dr. Garsia and Dr. Sinha.

## 2021-03-22 NOTE — ED PROVIDER NOTE - CARE PLAN
Assessment and plan of treatment:	Plan: EKG, CXR, labs, steroid, albuterol pump, reassess. Pt is on her baseline NC O2 supplementation.   Principal Discharge DX:	COPD (chronic obstructive pulmonary disease)  Assessment and plan of treatment:	Plan: EKG, CXR, labs, steroid, albuterol pump, reassess. Pt is on her baseline NC O2 supplementation.

## 2021-03-22 NOTE — H&P ADULT - NSHPLABSRESULTS_GEN_ALL_CORE
cComplete Blood Count + Automated Diff (03.22.21 @ 11:08)   WBC Count: 5.17 K/uL   RBC Count: 3.79 M/uL   Hemoglobin: 10.4 g/dL   Hematocrit: 33.3 %   Mean Cell Volume: 87.9 fL   Mean Cell Hemoglobin: 27.4 pg   Mean Cell Hemoglobin Conc: 31.2 g/dL   Red Cell Distrib Width: 13.1 %   Platelet Count - Automated: 176 K/uL   Auto Neutrophil #: 4.17 K/uL   Auto Lymphocyte #: 0.55 K/uL   Auto Monocyte #: 0.28 K/uL   Auto Eosinophil #: 0.12 K/uL   Auto Basophil #: 0.03 K/uL   Auto Neutrophil %: 80.7: Differential percentages must be correlated with absolute numbers for   clinical significance. %   Auto Lymphocyte %: 10.6 %   Auto Monocyte %: 5.4 %   Auto Eosinophil %: 2.3 %   Auto Basophil %: 0.6 %   Auto Immature Granulocyte %: 0.4 %   Nucleated RBC: 0 /100 WBCs     Comprehensive Metabolic Panel (03.22.21 @ 11:08)   Sodium, Serum: 143 mmol/L   Potassium, Serum: 4.3 mmol/L   Chloride, Serum: 103 mmol/L   Carbon Dioxide, Serum: 31 mmol/L   Anion Gap, Serum: 9 mmol/L   Blood Urea Nitrogen, Serum: 13 mg/dL   Creatinine, Serum: 0.8 mg/dL   Glucose, Serum: 111 mg/dL   Calcium, Total Serum: 9.2 mg/dL   Protein Total, Serum: 6.5 g/dL   Albumin, Serum: 4.0 g/dL   Bilirubin Total, Serum: 0.4 mg/dL   Alkaline Phosphatase, Serum: 98 U/L   Aspartate Aminotransferase (AST/SGOT): 17 U/L   Alanine Aminotransferase (ALT/SGPT): 9 U/L   eGFR if Non : 72: Interpretative comment       < from: Xray Chest 1 View- PORTABLE-Urgent (Xray Chest 1 View- PORTABLE-Urgent .) (03.22.21 @ 12:32) >  Impression:  Left apical abnormality with basilar opacifications, without change.  < end of copied text >

## 2021-03-22 NOTE — H&P ADULT - ASSESSMENT
75 year old female patient with PMH of Afib (on Coumadin) s/p ablation and Micra PPM, COPD on home O2 as needed, left lung ca diagnosed in 2011 (surgery x2 -partial left lobectomy + chemo +RT, oncologist f/up at [facility]), former smoker quit in 2005 and anxiety presents to ED tfor evaluation of dyspnea.     # Suspected COPD exacerbation (wheezing on exam)  # History of lung cancer diagnosed in 2011  -   - Symbicort + albuterol + spiriva  - methylprednisolone 40 mg IV Q 12 hours  - Doxycycline 100 mg IV Q 12 hours ordered.   - COVID PCR sent.   - TTE ordered.   -    # Afib s/p ablation and Micra PPM  - ECG: LBBB, rate 70  - On warfarin 1 mg daily, except once per week 2 mg  - INR subtherapeutic, will give 2 mg once   75 year old female patient with PMH of Afib (on Coumadin) s/p ablation and Micra PPM, COPD on home O2 as needed, left lung ca diagnosed in 2011 (surgery x2 -partial left lobectomy + chemo +RT, oncologist f/up at [facility]), former smoker quit in 2005 and anxiety presents to ED tfor evaluation of dyspnea.     # Chronic hypoxic resp failure secondary to COPD  # History of lung cancer diagnosed in 2011  - pt is hemodynamically stable  - CXR with left sided tracheal deviation and left apical post surgical changes   - will gett ABG  - Symbicort + albuterol + Spiriva  - s/p methylprednisolone, not wheezing on exam, hold further steriods  - symp are most likely related to underlying lung malignancy worth COPD progression rather that COPD exacerbation  - RVP is negative   - pulm consult     # Afib s/p ablation and Micra PPM  - ECG: LBBB, rate 70  - On warfarin 1 mg daily   - follow up INR  and dose accordingly     # DLD: - c/w atorvastatin    dvt ppx: coumadin  gi ppx: not indicated  from home 75 year old female patient with PMH of Afib (on Coumadin) s/p ablation and Micra PPM, COPD on home O2 as needed, left lung ca diagnosed in 2011 (surgery x2 -partial left lobectomy + chemo +RT, oncologist f/up at [facility]), former smoker quit in 2005 and anxiety presents to ED tfor evaluation of dyspnea.     # Chronic hypoxic resp failure secondary to COPD  # History of lung cancer diagnosed in 2011  - pt is hemodynamically stable  - CXR with left sided tracheal deviation and left apical post surgical changes   - will gett ABG  - Symbicort + albuterol + Spiriva  - s/p methylprednisolone, not wheezing on exam, hold further steriods  - symp are most likely related to underlying lung malignancy worth COPD progression rather that COPD exacerbation  - RVP is negative   - will get LE duplex   - pulm consult     # Afib s/p ablation and Micra PPM  - ECG: LBBB, rate 70  - On warfarin 1 mg daily   - follow up INR  and dose accordingly     # DLD: - c/w atorvastatin    dvt ppx: coumadin  gi ppx: not indicated  from home

## 2021-03-22 NOTE — ED PROVIDER NOTE - NS ED ROS FT
Review of Systems:  CONSTITUTIONAL: No fever, No diaphoresis, No weight change  SKIN: No rash  HEMATOLOGIC: No abnormal bleeding or bruising  EYES: No eye pain, No blurred vision  ENT: No change in hearing, No sore throat, No neck pain, No rhinorrhea, No ear pain  RESPIRATORY: +shortness of breath, No cough  CARDIAC: No chest pain, +palpitations  GI: No abdominal pain, No nausea, No vomiting, No diarrhea, No constipation, No bright red blood per rectum or melena. No flank pain  : No dysuria, frequency, hematuria.   MUSCULOSKELETAL: No joint paint, No swelling, No back pain  NEUROLOGIC: No numbness, No focal weakness, No headache, No dizziness  All other systems negative, unless specified in HPI

## 2021-03-22 NOTE — ED PROVIDER NOTE - PROGRESS NOTE DETAILS
ED Attending BERT Vera  pt presented for sob, worse on exertion, becomes sob in ed with exertion, medical admitting team aware of pt and admission.

## 2021-03-22 NOTE — ED PROVIDER NOTE - PHYSICAL EXAMINATION
CONSTITUTIONAL: Well-developed; well-nourished; in no acute distress. On 3L O2.   SKIN: warm, dry  HEAD: Normocephalic; atraumatic.  EYES: no conjunctival injection. EOMI.   ENT: No nasal discharge; airway clear.  NECK: Supple; non tender.  CARD: S1, S2 normal; Regular rate and rhythm.   RESP: No tachypnea. Poor air movement.   ABD: soft ntnd.    EXT: Normal ROM.  No clubbing, cyanosis or edema.   LYMPH: No acute cervical adenopathy.  NEURO: Alert, oriented, grossly unremarkable. No FND.   PSYCH: Cooperative, appropriate.

## 2021-03-22 NOTE — ED PROVIDER NOTE - CLINICAL SUMMARY MEDICAL DECISION MAKING FREE TEXT BOX
pt presented for sob, (+) COPD, received steroids and albuterol pump. on her NC, aware of plan for admission, medical admitting team aware of pt and admission as discussed and perferred by pt.

## 2021-03-22 NOTE — ED ADULT TRIAGE NOTE - NSWEIGHTCALCTOOLDRUG_GEN_A_CORE
Detail Level: Zone
Patch Test Removal Text: The patch test material was removed from the back today.
 used

## 2021-03-23 NOTE — BEHAVIORAL HEALTH ASSESSMENT NOTE - NSBHREFERDETAILS_PSY_A_CORE_FT
75yoF with COPD, on home O2, with long hx anxiety; had been taking klonopin for a long time but it no longer helps her; cannot take xanax; was given 2 doses of ativan in hospital but she hasn't really noticed any improvement. Her anxiety and COPD are in a vicious cycle--when she gets anxious she gets worsening SOB, and vice versa. She was seen today by Pulm Dr. Sinha; he recommended psych eval for medication management. Please evaluate and advise. Thank you.

## 2021-03-23 NOTE — CONSULT NOTE ADULT - SUBJECTIVE AND OBJECTIVE BOX
Patient is a 75y old  Female who presents with a chief complaint of     HPI:  75 year old female patient with PMH of Afib (on Coumadin) s/p ablation and Micra PPM, COPD on home O2 as needed, left lung ca diagnosed in 2011 (surgery x2 -partial left lobectomy + chemo +RT, oncologist f/up at [facility]), former smoker quit in 2005 and anxiety presents to ED tfor evaluation of dyspnea. pt endorses long standing dyspna that has been progressively worsened over the past few months. she endorses that she uses 3L of oxygen at home at rest. her dyspnea has limited her quality of life and ability to ambulate around the house. pt also endorses episodes of panic attacks and feeling severely anxious. she endorses baseline chronic cough, but denies increased cough or sputum production. pt denies fever, chills, nausea, vomiting, chest pain, palpitations, abdominal pain, change in bowel or urinary habits.    in the ED pt is hemodynamically stable.   received 125 mg solumedrol and admitted to medicine for further management.      (22 Mar 2021 15:32)      PAST MEDICAL & SURGICAL HISTORY:  COPD (chronic obstructive pulmonary disease)    Afib    Lung cancer    Pacemaker    Anxiety    High cholesterol    Cataract  RIGHT  6/17 AND  LEFT  7/5/19    S/P appendectomy    History of tonsillectomy    S/P lobectomy of lung  left    H/O atrioventricular kacie ablation    Artificial cardiac pacemaker        SOCIAL HX:   Smoking      former smoker                   ETOH                            Other    FAMILY HISTORY:  .  No cardiovascular or pulmonary family history     REVIEW OF SYSTEMS:    All ROS are negative exept per HPI       Allergies    bacitracin (Other)  carboplatin (Other)  sulfa drugs (Unknown)  sulfonamides (Unknown)  Xanax (Other)    Intolerances          PHYSICAL EXAM  Vital Signs Last 24 Hrs  T(C): 36.3 (23 Mar 2021 06:12), Max: 36.3 (23 Mar 2021 06:12)  T(F): 97.4 (23 Mar 2021 06:12), Max: 97.4 (23 Mar 2021 06:12)  HR: 74 (23 Mar 2021 06:12) (74 - 75)  BP: 150/84 (23 Mar 2021 06:12) (148/89 - 150/84)  BP(mean): --  RR: 21 (23 Mar 2021 07:39) (18 - 21)  SpO2: 98% (23 Mar 2021 07:39) (98% - 100%)    CONSTITUTIONAL:  cachectic, frail  NAD    ENT:   Airway patent,   No thrush  On NC 2 liters    EYES:   Clear bilaterally,   pupils equal,   round and reactive to light.    CARDIAC:   Normal rate,   regular rhythm.    no edema      RESPIRATORY:   No wheezing   Normal chest expansion  Not tachypneic,  No use of accessory muscles  decreased breath sounds akilah    GASTROINTESTINAL:  Abdomen soft, non-tender,   No guarding,   Positive BS    MUSCULOSKELETAL:   Range of motion is not limited,  No clubbing, cyanosis    NEUROLOGICAL:   Alert and oriented   No motor deficits.    SKIN:   Skin normal color for race,   No evidence of rash.            LABS:                          10.4   5.17  )-----------( 176      ( 22 Mar 2021 11:08 )             33.3                                               03-22    143  |  103  |  13  ----------------------------<  111<H>  4.3   |  31  |  0.8    Ca    9.2      22 Mar 2021 11:08    TPro  6.5  /  Alb  4.0  /  TBili  0.4  /  DBili  x   /  AST  17  /  ALT  9   /  AlkPhos  98  03-22      PT/INR - ( 22 Mar 2021 11:08 )   PT: 25.70 sec;   INR: 2.23 ratio         PTT - ( 22 Mar 2021 11:08 )  PTT:45.5 sec                                           CARDIAC MARKERS ( 22 Mar 2021 11:08 )  x     / <0.01 ng/mL / x     / x     / x                                                LIVER FUNCTIONS - ( 22 Mar 2021 11:08 )  Alb: 4.0 g/dL / Pro: 6.5 g/dL / ALK PHOS: 98 U/L / ALT: 9 U/L / AST: 17 U/L / GGT: x                                                                                                MEDICATIONS  (STANDING):  atorvastatin 20 milliGRAM(s) Oral at bedtime  budesonide 160 MICROgram(s)/formoterol 4.5 MICROgram(s) Inhaler 2 Puff(s) Inhalation two times a day  pantoprazole    Tablet 40 milliGRAM(s) Oral before breakfast  tiotropium 18 MICROgram(s) Capsule 1 Capsule(s) Inhalation daily    MEDICATIONS  (PRN):  ALBUTerol    90 MICROgram(s) HFA Inhaler 2 Puff(s) Inhalation every 6 hours PRN Shortness of Breath  ondansetron    Tablet 4 milliGRAM(s) Oral three times a day PRN Nausea and/or Vomiting      X-Rays reviewed:    CXR interpreted by me:

## 2021-03-23 NOTE — BEHAVIORAL HEALTH ASSESSMENT NOTE - NSBHMEDSOTHERFT_PSY_A_CORE
Patient has seen a psychiatrist in the past, explains she has tried many antidepressants but could not tolerate most of them due to GI side effects. She continued with Klonopin for the past 15yrs about because she found it helpful but not so much any longer. Denies any IPP history or SA history.

## 2021-03-23 NOTE — BEHAVIORAL HEALTH ASSESSMENT NOTE - NSBHCHARTREVIEWLAB_PSY_A_CORE FT
10.2   7.25  )-----------( 226      ( 23 Mar 2021 11:34 )             31.1     03-23    139  |  99  |  14  ----------------------------<  119<H>  4.6   |  32  |  0.8    Ca    9.4      23 Mar 2021 11:34  Mg     1.9     03-23    TPro  6.5  /  Alb  4.0  /  TBili  0.4  /  DBili  x   /  AST  17  /  ALT  9   /  AlkPhos  98  03-22

## 2021-03-23 NOTE — PATIENT PROFILE ADULT - DO YOU LACK THE NECESSARY SUPPORT TO HELP YOU COPE WITH LIFE CHALLENGES?
Madina Zhou  [address]  [address]  [facility] [id number]    [facility] is now requiring that all implant procedures utilize Hibiclens prior to their scheduled procedure. Hibiclens is an antiseptic that fights bacteria and is used to clean the skin to prevent infection that may be caused by surgery, injection, or skin injury. Prior to your procedure you will need to come to the Pain Management office to  your bottle of Hibiclens.     You will need to use this solution the night prior to your procedure and again the day of your procedure before arriving at your scheduled time. You will wash and rinse your hair/body with your normal products before using the Hibiclens. Once you have rinsed and are finished using your normal products you will then apply the Hibiclens with a clean washcloth. Lather yourself from the neck down, avoiding your eyes and mouth. Gently wash your body and focus on the areas where the incision(s) will be. Avoid scrubbing the skin too hard.     Once you have completed the scrub step back under the water and rinse the solution off of your body completely. It is important that you do not use regular soap after you have completed the Hibiclens wash.     Pat yourself dry with a freshly, cleaned towel. DO NOT apply any powders, deodorants, lotions, or make up after you have completed your first shower. Dress with freshly washed clothes and put freshly washed linens on your area of sleep.      *ALLERGY ALERT*  If you are allergic to Chlorhexidine (which is the base ingredient in Hibiclens), please purchase pHisohex. The directions for pHisohex are the same as the ones described above.        yes

## 2021-03-23 NOTE — BEHAVIORAL HEALTH ASSESSMENT NOTE - SUICIDE PROTECTIVE FACTORS
Responsibility to family and others/Identifies reasons for living/Fear of death or the actual act of killing self

## 2021-03-23 NOTE — PROGRESS NOTE ADULT - SUBJECTIVE AND OBJECTIVE BOX
patient reports that she has had progressive SOB over the past months.  she has h/o lung cancer, is s/p radiation and surgery and also has h/o COPD followed by Dr. Sinha.  she is on home oxygen.  she also reports feeling progressively more anxious, usually takes clonazepam but lately her anxiety is not relieved.  She denies increased sputum production, increasing coughin, fever or chills.  her main c/o increasing SOB.  she also has a h/o atrial fibrillation is  patient reports that she has had progressive SOB over the past months.  she has h/o lung cancer, is s/p radiation and surgery and also has h/o COPD followed by Dr. Sinha.  she is on home oxygen.  she also reports feeling progressively more anxious, usually takes clonazepam but lately her anxiety is not relieved.  She denies increased sputum production, increasing coughin, fever or chills.  her main c/o increasing SOB.  she also has a h/o atrial fibrillation is s/p ablation and has a ?defibrillator or pacemaker she is not sure which.  Vital Signs Last 24 Hrs  T(C): 35.5 (23 Mar 2021 13:25), Max: 36.3 (23 Mar 2021 06:12)  T(F): 95.9 (23 Mar 2021 13:25), Max: 97.4 (23 Mar 2021 06:12)  HR: 61 (23 Mar 2021 13:25) (61 - 74)  BP: 140/65 (23 Mar 2021 13:25) (140/65 - 150/84)  RR: 20 (23 Mar 2021 13:25) (18 - 21)  SpO2: 100% (23 Mar 2021 13:25) (98% - 100%)  conj pink, no jaundice  patient visibly tachypneic, with pursed lip respiration  neck no JVD supple  lungs increased AP diameter, no wheezing, decreased BS throughout  heart sounds distant.  regular rhythm and rate  abd. soft nontender  extremiites no edema. no cyanosis                          10.2   7.25  )-----------( 226      ( 23 Mar 2021 11:34 )             31.1   03-23    139  |  99  |  14  ----------------------------<  119<H>  4.6   |  32  |  0.8    Ca    9.4      23 Mar 2021 11:34  Mg     1.9     03-23    TPro  6.5  /  Alb  4.0  /  TBili  0.4  /  DBili  x   /  AST  17  /  ALT  9   /  AlkPhos  98  03-22

## 2021-03-23 NOTE — BEHAVIORAL HEALTH ASSESSMENT NOTE - RISK ASSESSMENT
Low Acute Suicide Risk Modifiable risk factors include medical illness, psychiatric illness, recent psychosocial stressors (her 's health, COVID, her children).  Static risk factors include advanced age,  race.  Protective factors include social support/family, parenthood, seeking out treatment/hopefullness.

## 2021-03-23 NOTE — BEHAVIORAL HEALTH ASSESSMENT NOTE - SUMMARY
Patient is a 76yo  F, domiciled with her , has 3 grown children, retired (manage of law firm), with PMH of Afib (on Coumadin) s/p ablation and Micra PPM, COPD on home O2 as needed, left lung ca diagnosed in 2011 (surgery x2 -partial left lobectomy + chemo +RT, oncologist f/up at [facility]), and PPH of anxiety, depression, and nicotine use d/o, who presented to the ED with worsening dyspnea. Psychiatry was consulted to evaluate patient for anxiety and medication management.    On evaluation today, patient endorsed a recent history of worsening anxiety. She does not present as acutely suicidal, homicidal, psychotic, or manic. She is not a danger to herself/others currently and would not benefit from inpatient psychiatric hospitalization. She would benefit, however, from seeing a psychiatrist and therapist again on an outpatient basis to address her anxiety. Patient is a 74yo  F, domiciled with her , has 3 grown children, retired (manage of law firm), with PMH of Afib (on Coumadin) s/p ablation and Micra PPM, COPD on home O2 as needed, left lung ca diagnosed in 2011 (surgery x2 -partial left lobectomy + chemo +RT, oncologist f/up at [facility]), and PPH of anxiety, depression, and nicotine use d/o, who presented to the ED with worsening dyspnea. Psychiatry was consulted to evaluate patient for anxiety and medication management.    On evaluation today, patient endorsed a recent history of worsening anxiety and shortness of breath. She does not present as acutely suicidal, homicidal, psychotic, or manic. She is not a danger to herself/others currently and would not benefit from inpatient psychiatric hospitalization. Most likely her anxious symptoms are related to her worsening COPD (especially given her use of fan up to her mouth to increase air entry). She would benefit from seeing a psychiatrist and therapist again on an outpatient basis to further address her symptoms. Medication recommendations are provided below.    - Recommend TSH, B12, Folate  - Please start patient on 0.25mg of Ativan PO three times daily, standing to address anxiety.  - May also add 0.25mg PO Ativan BID PRN for anxiety.  - We do not recommend starting patient on an antidepressant at this time given the long duration to onset of action and given her history of poor tolerance (GI upset).  - Please provide patient with prescription for Ativan at discharge, and please do NOT continue Klonopin.  - Please provide her with following referral information at discharge:    [facility]  [address] [place] [address]  #: [phone]    - Psychiatry signing off. Please recall if any questions/concerns. Patient is a 76yo  F, domiciled with her , has 3 grown children, retired (manage of law firm), with PMH of Afib (on Coumadin) s/p ablation and Micra PPM, COPD on home O2 as needed, left lung ca diagnosed in 2011 (surgery x2 -partial left lobectomy + chemo +RT, oncologist f/up at [facility]), and PPH of anxiety, depression, and nicotine use d/o, who presented to the ED with worsening dyspnea. Psychiatry was consulted to evaluate patient for anxiety and medication management.    On evaluation today, patient endorsed a recent history of worsening anxiety and shortness of breath. She does not present as acutely suicidal, homicidal, psychotic, or manic. She is not a danger to herself/others currently and would not benefit from inpatient psychiatric hospitalization. Most likely her anxious symptoms are related to her worsening COPD (especially given her use of fan up to her mouth to increase air entry). She would benefit from seeing a psychiatrist and therapist again on an outpatient basis to further address her symptoms. Medication recommendations are provided below.    - Recommend TSH, B12, Folate  - Please start patient on 0.25mg of Ativan PO @ 8:00, 13:00, 18:00 daily, standing to address anxiety.  - May also add 0.25mg PO Ativan BID PRN for anxiety.  - We do not recommend starting patient on an antidepressant at this time given the long duration to onset of action and given her history of poor tolerance (GI upset).  - Please provide patient with prescription for Ativan at discharge, and please do NOT continue Klonopin.  - Please provide her with following referral information at discharge:    [facility]  [address] [place] [address]  #: [phone]    - Psychiatry signing off. Please recall if any questions/concerns.

## 2021-03-23 NOTE — BEHAVIORAL HEALTH ASSESSMENT NOTE - CASE SUMMARY
75 year old female patient with PMH of Afib (on Coumadin) s/p ablation and Artificial cardiac pacemaker, COPD on home O2 as needed, left lung ca diagnosed in 2011 (surgery x2 -partial left lobectomy + chemo +RT, oncologist f/up at [facility]),  admitted for worsening dyspnea., psychiatry is consulted for anxiety  Overtly anxious with symptoms exacerbation in the setting underlying medical condition COPD related. In the past she responded to Klonopin, but this has not been the case for the past few months. The severity of anxiety requires a faster acting agent, and currently she is responded to low dose of Ativan. She has failed multiple prior anxiolytic and antidepressant medication and patient is currently adamant about starting on an antidepressant medications. Recommendation is to continue Ativan standing 0.25mg po three times a day (schedule at 8:Am 1 PM and 6PM), and Ativan 0.25mg po q12hrs prn. Monitor for sedation. Patient can be discharged with Ativan 0.5mg po three times a day with referral to follow up with [facility] out patient psychiatry as noted above. Furthermore patient is advised to stop taking his prescribed Klonopin.

## 2021-03-23 NOTE — PATIENT PROFILE ADULT - FUNCTIONAL SCREEN CURRENT LEVEL: COMMUNICATION, MLM
patient has severe anxiety and at times cannot answer questions./0 = understands/communicates without difficulty

## 2021-03-23 NOTE — BEHAVIORAL HEALTH ASSESSMENT NOTE - NSBHCHARTREVIEWVS_PSY_A_CORE FT
Vital Signs Last 24 Hrs  T(C): 35.5 (23 Mar 2021 13:25), Max: 36.3 (23 Mar 2021 06:12)  T(F): 95.9 (23 Mar 2021 13:25), Max: 97.4 (23 Mar 2021 06:12)  HR: 61 (23 Mar 2021 13:25) (61 - 75)  BP: 140/65 (23 Mar 2021 13:25) (140/65 - 150/84)  BP(mean): --  RR: 20 (23 Mar 2021 13:25) (18 - 21)  SpO2: 100% (23 Mar 2021 13:25) (98% - 100%)

## 2021-03-23 NOTE — CONSULT NOTE ADULT - ASSESSMENT
IMPRESSION:  Severe COPD on home oxygen- not in exacerbation, likely progression of disease  Anxiety  HO Afib on Warfarin      PLAN:  Triple inhaler therapy  Albuterol PRN  Psychiatry eval   Wean O2 as tolerated  Diuresis as tolerated  Patient stable from pulmonary standpoint  continue coumadin

## 2021-03-23 NOTE — BEHAVIORAL HEALTH ASSESSMENT NOTE - HPI (INCLUDE ILLNESS QUALITY, SEVERITY, DURATION, TIMING, CONTEXT, MODIFYING FACTORS, ASSOCIATED SIGNS AND SYMPTOMS)
Patient is a 74yo  F, domiciled with her , has 3 grown children, retired (manage of law firm), with PMH of Afib (on Coumadin) s/p ablation and Micra PPM, COPD on home O2 as needed, left lung ca diagnosed in 2011 (surgery x2 -partial left lobectomy + chemo +RT, oncologist f/up at [facility]), and PPH of anxiety, depression, and nicotine use d/o, who presented to the ED with worsening dyspnea. Psychiatry was consulted to evaluate patient for anxiety and medication management.    On approach, the patient appeared calm as she was using her phone while in the hospital bed. She was agreeable to the interview. She explains, "I have terrible anxiety." She goes on to explain that she has been taking Klonopin for many years but finds that it no longer works well for her. She identifies several  recent stressors contributing to her worsening anxiety, namely COVID-19 causing her to stay indoors for 1 year ("I became agoraphobic"), and her 's worsening health. She finds her anxiety worsens now when she has to leave the house, which did not happen in the past. She also finds the anxiety worsens her breathing issues and keeps her awake at night, unable to fall asleep.    Due to the anxiety and insomnia, patient endorses some passive suicidal thoughts and has thought of taking pills transiently in the past, but insists that she has no intention of ending her life. She expresses a fear of death/act of killing herself, noting that she wouldn't likely die but end up like "a vegetable, which would be worse." She also identifies her cats as a reason for living.    Patient explains that she takes 0.5mg Klonopin daily, prescribed by her PMD. She occasionally takes an additional 0.5mg at nighttime now when the anxiety gets very bad. In the hospital, patient received 0.5mg Ativan last night, which she only recalls feeling nauseous after, and then a dose this morning with Zofran and felt it did help her anxiety at that time.    Patient denies any homicidal ideations, A/V hallucinations, or paranoid thoughts. She denies any active substance use as well.      Reference #: [id number]  Patient Name: Nazanin Schaefer Date: 1945  Address: [address] [place] [id number]: Female  Rx Written	Rx Dispensed	Drug	Quantity	Days Supply	Prescriber Name	Payment Method	Dispenser  12/31/2020	12/31/2020	klonopin 0.5 mg tablet	90	30	Taty Kidd	Medicare	[id number]  11/04/2020	11/04/2020	klonopin 0.5 mg tablet	90	30	Carmine Spencer MD	Medicare	[id number]  08/27/2020	08/28/2020	klonopin 0.5 mg tablet	90	30	Taty Kidd	Medicare	[id number]  06/24/2020	06/24/2020	klonopin 0.5 mg tablet	90	30	Taty Kidd	Medicare	[id number]  04/29/2020	04/30/2020	klonopin 0.5 mg tablet	90	30	Taty Kidd	Medicare	[id number] Patient is a 76yo  F, domiciled with her , has 3 grown children, retired (manage of law firm), with PMH of Afib (on Coumadin) s/p ablation and Micra PPM, COPD on home O2 as needed, left lung ca diagnosed in 2011 (surgery x2 -partial left lobectomy + chemo +RT, oncologist f/up at [facility]), and PPH of anxiety, depression, and nicotine use d/o, who presented to the ED with worsening dyspnea. Psychiatry was consulted to evaluate patient for anxiety and medication management.    On approach, the patient appeared calm as she was using her phone while in the hospital bed. She was agreeable to the interview. She explains, "I have terrible anxiety." She goes on to explain that she has been taking Klonopin for many years but finds that it no longer works well for her. She identifies several  recent stressors contributing to her worsening anxiety, namely COVID-19 causing her to stay indoors for 1 year ("I became agoraphobic"), and her 's worsening health. She finds her anxiety worsens now when she has to leave the house, which did not happen in the past. She also finds the anxiety worsens her breathing issues and keeps her awake at night, unable to fall asleep. She shows the  the fan hanging around her neck, saying, "this is all that helps me," and she holds it up to her mouth.    Due to the anxiety and insomnia, patient endorses some passive suicidal thoughts and has thought of taking pills transiently in the past, but insists that she has no intention of ending her life. She expresses a fear of death/act of killing herself, noting that she wouldn't likely die but end up like "a vegetable, which would be worse." She also identifies her cats as a reason for living.    Patient explains that she takes 0.5mg Klonopin daily, prescribed by her PMD. She occasionally takes an additional 0.5mg at nighttime now when the anxiety gets very bad. In the hospital, patient received 0.5mg Ativan last night, which she only recalls feeling nauseous after, and then a dose this morning with Zofran and felt it did help her anxiety at that time.    Patient denies any homicidal ideations, A/V hallucinations, or paranoid thoughts. She denies any active substance use as well.      Reference #: [id number]  Patient Name: Nazanin Schaefer Date: 1945  Address: [address] [place], [place] [address]Sex: Female  Rx Written	Rx Dispensed	Drug	Quantity	Days Supply	Prescriber Name	Payment Method	Dispenser  12/31/2020	12/31/2020	klonopin 0.5 mg tablet	90	30	Taty Kidd	Medicare	[id number]  11/04/2020	11/04/2020	klonopin 0.5 mg tablet	90	30	Carmine Spencer MD	Medicare	[id number]  08/27/2020	08/28/2020	klonopin 0.5 mg tablet	90	30	Taty Kidd	Medicare	[id number]  06/24/2020	06/24/2020	klonopin 0.5 mg tablet	90	30	Marti Taty	Medicare	[id number]  04/29/2020	04/30/2020	klonopin 0.5 mg tablet	90	30	Marti Taty	Medicare	[id number]

## 2021-03-23 NOTE — PROGRESS NOTE ADULT - ASSESSMENT
severe-very severe COPD - recent symptoms are most likely worsening of her undelrying disease rather than exacerbation  anxiety - worsening, perhaps as her COPD is worsening  atrial fibrillation - s/p ablation - now in sinus rhythm    Plan:  per Dr. Sinha meds adjusted per his reocmmendations  psych to see patient and advised change in medicaitons  if all can be done today patient can be DC today if not in the morning

## 2021-03-23 NOTE — BEHAVIORAL HEALTH ASSESSMENT NOTE - DESCRIPTION
Afib (on Coumadin) s/p ablation and Micra PPM, COPD on home O2 as needed, left lung ca diagnosed in 2011 (surgery x2 -partial left lobectomy + chemo +RT, oncologist f/up at [facility])

## 2021-03-24 NOTE — DISCHARGE NOTE PROVIDER - NSDCFUADDAPPT_GEN_ALL_CORE_FT
Please obtain prescription for Ativan from pharmacy. Please schedule follow-up at your convenience with behavioral health as outpatient.

## 2021-03-24 NOTE — DISCHARGE NOTE PROVIDER - HOSPITAL COURSE
74 yo F with PMHx of Afib on Coumadin s/p ablation, PPM, COPD on 3LNC home O2, Lung CA s/p left lobectomy and XRT, prior smoker presents to ED on 3/22/21 for eval of SOB. Patient is on multiple inhalers at home and reports compliance. She states that symptoms have progressively worsened since onset prompting presentation. Patient received sterioids and nebulized breathing treatment in ED and was admitted to floor. Patient was admitted to floor for further management. Patient began to complain that in addition to her SOB she has also experienced increased anxiety. She states that she typically taken Klonopin for anxiety symptoms and she has been taking it for a long period of time however it has stopped working. Patient diagnostic imaging reveals no signs of infectious component to symptoms. Patient is COVID-19 negative and reports hx of COVID-19 vaccination. Patient was seen by Psych and recommendations giving for medications related to anxiety abatement. Patient reports marked improvement in dyspnea. Patient now stable for discharge. Patient understand and agree with plan. 76 yo F with PMHx of Afib on Coumadin s/p ablation, PPM, COPD on 3LNC home O2, Lung CA s/p left lobectomy and XRT, prior smoker presents to ED on 3/22/21 for eval of SOB. Patient is on multiple inhalers at home and reports compliance. She states that symptoms have progressively worsened since onset prompting presentation. Patient received sterioids and nebulized breathing treatment in ED and was admitted to floor. Patient was admitted to floor for further management. Patient began to complain that in addition to her SOB she has also experienced increased anxiety. She states that she typically taken Klonopin for anxiety symptoms and she has been taking it for a long period of time however it has stopped working. Patient diagnostic imaging reveals no signs of infectious component to symptoms. Patient is COVID-19 negative and reports hx of COVID-19 vaccination. Patient was seen by Psych and recommendations giving for medications related to anxiety abatement. Patient reports marked improvement in dyspnea. Patient now stable for discharge. Patient understand and agree with plan.     Attending addendum:  patient seen and examined earlier today.  patient has had no change in her respiratory status.  feels she is stable and able to go home.  major issue for her is anxiety.  she is requesting lorazepm instead of clonazepma.  had nausea when she took it the first time, not actually allergic.  patient advised to cut lorazepam tablet in half.  she will f/u with Dr. Sinha and her cardiologist Dr. Bill.  patient medically stable for DC.

## 2021-03-24 NOTE — DISCHARGE NOTE PROVIDER - NSDCCPCAREPLAN_GEN_ALL_CORE_FT
PRINCIPAL DISCHARGE DIAGNOSIS  Diagnosis: COPD (chronic obstructive pulmonary disease)  Assessment and Plan of Treatment:

## 2021-03-24 NOTE — DISCHARGE NOTE NURSING/CASE MANAGEMENT/SOCIAL WORK - PATIENT PORTAL LINK FT
You can access the FollowMyHealth Patient Portal offered by [facility] by registering at the following website: [email or web address]. By joining [employer]’s FollowMyHealth portal, you will also be able to view your health information using other applications (apps) compatible with our system.

## 2021-03-24 NOTE — DISCHARGE NOTE PROVIDER - PROVIDER TOKENS
FREE:[LAST:[Behavioral Health],PHONE:[phone]],FAX:[(   [phone]    -],ADDRESS:[[address] [place] [address]]]

## 2021-03-24 NOTE — DISCHARGE NOTE PROVIDER - NSDCMRMEDTOKEN_GEN_ALL_CORE_FT
albuterol 1.25 mg/3 mL (0.042%) inhalation solution: 3 milliliter(s) inhaled 4 times a day  budesonide-formoterol 160 mcg-4.5 mcg/inh inhalation aerosol: 1 puff(s) inhaled 2 times a day  Crestor 5 mg oral tablet: 1 tab(s) orally once a day (at bedtime)  Spiriva 18 mcg inhalation capsule: 1 cap(s) inhaled once a day  Vitamin D3 2000 intl units oral tablet: 1 tab(s) orally once a day  warfarin 1 mg oral tablet: 1 tab(s) orally once a day (at bedtime)   albuterol 1.25 mg/3 mL (0.042%) inhalation solution: 3 milliliter(s) inhaled 4 times a day  Ativan 0.5 mg oral tablet: 0.5 tab(s) orally 2 times a day  hold for over sedation MDD:2  budesonide-formoterol 160 mcg-4.5 mcg/inh inhalation aerosol: 1 puff(s) inhaled 2 times a day  Crestor 5 mg oral tablet: 1 tab(s) orally once a day (at bedtime)  Spiriva 18 mcg inhalation capsule: 1 cap(s) inhaled once a day  Vitamin D3 2000 intl units oral tablet: 1 tab(s) orally once a day  warfarin 1 mg oral tablet: 1 tab(s) orally once a day (at bedtime)

## 2021-03-24 NOTE — DISCHARGE NOTE PROVIDER - CARE PROVIDER_API CALL
Behavioral Health,   [address] [place] [address]  Phone: [phone]  Fax: (   )    -  Follow Up Time:

## 2021-04-23 NOTE — CONSULT NOTE ADULT - SUBJECTIVE AND OBJECTIVE BOX
Patient is a 75y old  Female who presents with a chief complaint of     HPI:  The patient is a 75 year old female with a history of COPD on ~3L home O2, afib on coumadin s/p ablations and pacemaker, lung CA s/p lobectomy 2011, anxiety presenting for worsening sob x 3 days. States symptoms got worse overnight so called EMS. Upon EMS arrival, patient was tachypneic and satting 96% on 3L O2 and given 1 albuterol txt en route. Pt denies fevers/chills, chest pain, abdominal pain, leg swelling.    PAST MEDICAL & SURGICAL HISTORY:  High cholesterol    Anxiety    Pacemaker    Lung cancer    Afib    COPD (chronic obstructive pulmonary disease)    Artificial cardiac pacemaker    H/O atrioventricular kacie ablation    S/P lobectomy of lung  left    History of tonsillectomy    S/P appendectomy    Cataract  RIGHT  6/17 AND  LEFT  7/5/19        SOCIAL HX:  former smoker, quit 15 years ago. no etoh abuse    FAMILY HISTORY:  .  No cardiovascular or pulmonary family history     Review of System:  See HPI    Allergies    bacitracin (Other)  carboplatin (Other)  sulfa drugs (Unknown)  sulfonamides (Unknown)  Xanax (Other)    Intolerances          PHYSICAL EXAM  Vital Signs Last 24 Hrs  T(C): 36.7 (23 Apr 2021 07:12), Max: 36.7 (23 Apr 2021 07:12)  T(F): 98 (23 Apr 2021 07:12), Max: 98 (23 Apr 2021 07:12)  HR: 82 (23 Apr 2021 07:32) (80 - 82)  BP: 152/71 (23 Apr 2021 07:32) (145/80 - 152/71)  BP(mean): --  RR: 22 (23 Apr 2021 07:32) (20 - 22)  SpO2: 100% (23 Apr 2021 07:32) (100% - 100%)    General: In NAD  HEENT: LUZMARIA             Lymphatic system: No cervical LN     Lungs: Petey wheezing  Cardiovascular: Regular  Gastrointestinal: Soft.  + BS   Musculoskeletal: No Clubbing.  Full range of motion.. Moves all extremities  Skin: Warm.  Intact  Neurological: No motor or sensory deficit       LABS:                          10.3   5.25  )-----------( 150      ( 23 Apr 2021 07:30 )             32.4                                               04-23    141  |  102  |  12  ----------------------------<  131<H>  4.0   |  30  |  0.7    Ca    9.3      23 Apr 2021 07:30    TPro  6.4  /  Alb  4.1  /  TBili  0.5  /  DBili  x   /  AST  15  /  ALT  7   /  AlkPhos  86  04-23      PT/INR - ( 23 Apr 2021 08:21 )   PT: 24.70 sec;   INR: 2.15 ratio         PTT - ( 23 Apr 2021 08:21 )  PTT:44.2 sec                                           CARDIAC MARKERS ( 23 Apr 2021 07:30 )  x     / <0.01 ng/mL / x     / x     / x                                                LIVER FUNCTIONS - ( 23 Apr 2021 07:30 )  Alb: 4.1 g/dL / Pro: 6.4 g/dL / ALK PHOS: 86 U/L / ALT: 7 U/L / AST: 15 U/L / GGT: x                                                                                                MEDICATIONS  (STANDING):    MEDICATIONS  (PRN):

## 2021-04-23 NOTE — H&P ADULT - HISTORY OF PRESENT ILLNESS
75 year old female patient with PMH of Afib (on Coumadin) s/p ablation and Micra PPM, COPD on home O2 as needed, left lung ca diagnosed in 2011 (surgery x2 -partial left lobectomy + chemo +RT, oncologist f/up at [facility]), former smoker quit in 2005 and anxiety presents to ED tfor evaluation of dyspnea.      Pt reports longstanding dyspnea that has been progressing over the past few years requiring multiple hospitalizations (last in March 2021 for progressing chronic COPD vs Acute COPD exacerbation). She is usually stable on 3LNC O2 at home. Her dyspnea has limited her quality of life and ability to ambulate around the house. Today, she reports worsening dyspnea at rest and on exertion for the past 3 days. Her symptom worsened severely overnight which prompted her to call EMS. Upon EMS arrival, patient was tachypneic and satting 96% on 3L O2 and given 1 albuterol txt en route. She denies any fevers, chills, chest pain, increased sputum production, or change in sputum color. She also reports orthopnea, PND, or worsening LE swelling.    In the ED, VS: /80, HR 80 RR 20, T 98, satting 100% on 3LNC  CXR showed L-sided post surgical changes with loss of lung volume and L tracheal deviation. R basilar opacity unchanged from last admission in March.  Pt received solumderol 125mg, and duoneb treatment, 75 year old female patient with PMH of Afib (on Coumadin) s/p ablation and Micra PPM, COPD on home O2 as needed, left lung ca diagnosed in 2011 (surgery x2 -partial left lobectomy + chemo +RT, oncologist f/up at [facility]), former smoker quit in 2005 and anxiety presents to ED tfor evaluation of dyspnea.      Pt reports longstanding dyspnea that has been progressing over the past few years requiring multiple hospitalizations (last in March 2021 for progressing chronic COPD vs Acute COPD exacerbation). She is usually stable on 3LNC O2 at home. Her dyspnea has limited her quality of life and ability to ambulate around the house. Today, she reports worsening dyspnea at rest and on exertion for the past 3 days. Her symptom worsened severely overnight which prompted her to call EMS. Upon EMS arrival, patient was tachypneic and satting 96% on 3L O2 and given 1 albuterol txt en route. She denies any fevers, chills, chest pain, increased sputum production, or change in sputum color. She also reports no orthopnea, PND, or worsening LE swelling.    In the ED, VS: /80, HR 80 RR 20, T 98, satting 100% on 3LNC  CXR showed L-sided post surgical changes with loss of lung volume and L tracheal deviation. R basilar opacity unchanged from last admission in March.  Pt received solumderol 125mg, and duoneb treatment,

## 2021-04-23 NOTE — H&P ADULT - NSHPLABSRESULTS_GEN_ALL_CORE
10.3   5.25  )-----------( 150      ( 23 Apr 2021 07:30 )             32.4       04-23    141  |  102  |  12  ----------------------------<  131<H>  4.0   |  30  |  0.7    Ca    9.3      23 Apr 2021 07:30    TPro  6.4  /  Alb  4.1  /  TBili  0.5  /  DBili  x   /  AST  15  /  ALT  7   /  AlkPhos  86  04-23                  PT/INR - ( 23 Apr 2021 08:21 )   PT: 24.70 sec;   INR: 2.15 ratio         PTT - ( 23 Apr 2021 08:21 )  PTT:44.2 sec    Lactate Trend      CARDIAC MARKERS ( 23 Apr 2021 07:30 )  x     / <0.01 ng/mL / x     / x     / x          RADIOLOGY:    < from: Xray Chest 1 View-PORTABLE IMMEDIATE (04.23.21 @ 07:50) >    Impression:    Right basilar opacity, unchanged.    Chronic left apical postsurgical changes.    < end of copied text >    < from: TTE Echo Complete w/o Contrast w/ Doppler (03.24.21 @ 07:01) >    Summary:   1. Normal global left ventricular systolic function.   2. LV Ejection Fraction by Chappell's Method with a biplane EF of 62 %.   3. Mildly enlarged left atrium.   4. Normal right atrial size.   5. Findingsare consistent with cardiac tamponade.   6. Mild to moderate mitral valve regurgitation.   7. Mitral annular calcification.   8. Thickening and calcification of the anterior and posterior mitral valve leaflets.   9. Moderate tricuspid regurgitation.  10. Mild aortic regurgitation.  11. Sclerotic aortic valve with normal opening.  12. Mild pulmonic valve regurgitation.  13. Estimated pulmonary artery systolic pressure is 61.3 mmHg assuming a right atrial pressure of 8 mmHg, which is consistent with severe pulmonary hypertension.    < end of copied text >

## 2021-04-23 NOTE — ED PROVIDER NOTE - NS ED ROS FT
Eyes:  No visual changes, eye pain or discharge.  ENMT:  No hearing changes, pain, discharge or infections. No neck pain or stiffness.  Cardiac:  No chest pain, +SOB. No chest pain with exertion.  Respiratory:  No cough + respiratory distress. No hemoptysis. +hx of COPD.   GI:  No nausea, vomiting, diarrhea or abdominal pain.  :  No dysuria, frequency or burning.  MS:  No myalgia, muscle weakness, joint pain or back pain.  Neuro:  No headache or weakness.  No LOC.  Skin:  No skin rash.   Endocrine: No history of thyroid disease or diabetes.

## 2021-04-23 NOTE — ED PROVIDER NOTE - ATTENDING CONTRIBUTION TO CARE
75 F copd, lung ca s/p L lobectomy, afib on AC, pacemaker, abhijit/regan/tamburino, anxiety, now with sob x 3 days, given neb x 1 by ems. no cp. no leg pain/swelling.  vs, tachypnea, mild resp distress, pursed lips, chroncially ill appearing, pink conj, anicteric, dry MM, neck supple, bilat wheezing, RRR, equal radial pulses bilat, abd soft/nt/nd, no cva tend. no calves tend, no edema, no fnd. no rashes.   a/p: labs, imaging, reassess

## 2021-04-23 NOTE — H&P ADULT - ATTENDING COMMENTS
75 year old female patient with PMH of Afib (on Coumadin) s/p ablation and Micra PPM, COPD on home O2 as needed, left lung ca diagnosed in 2011 (surgery x2 -partial left lobectomy + chemo +RT, oncologist f/up at [facility]), former smoker quit in 2005 and anxiety presents to ED tfor evaluation of dyspnea.    Merlene Herring, DO 75 year old female patient with PMH of Afib (on Coumadin) s/p ablation and Micra PPM, COPD on home O2 as needed, left lung ca diagnosed in 2011 (surgery x2 -partial left lobectomy + chemo +RT, oncologist f/up at [facility]), former smoker quit in 2005 and anxiety presents to ED for evaluation of dyspnea. Has had multiple hospitalizations for dyspnea related to progression of chronic copd. Typically on 3L NC at home. She was noted to have worsening dyspnea overnight which is why she called EMS. IN ED patients BP/HR stable, RR elevated, saturating 100% on home 3L O2. Labwork notable for BNP 1451, similar to prior admission. CXR revealing for L sided post surgical changes, and chronic R basilar opacity. Patient received Solumedrol and Duonebs in the ED.     Physical Exam:   General: anxious/frustrated, mild conversational dyspnea related to the speed with which patient is speaking   HNENT: NCAT MMM   CV: RRR no MRG  Pulm: diminished breath sounds especially in L upper lung field, but no wheezes appreciated   Abdomen: soft nontender nondistended   Extremities: warm well perfused no edema       A/P     #Severe COPD with possible acute exacerbation   #Severe pulmonary hypertension  - pt endorsing worsening shortness of breath, but oxygen saturation at baseline, no new infiltrates on CXR, no worsening cough/sputum production  - BNP similar to prior adm, no overload on XR    - Pulmonary following: appreciate final reccs   - s/p Solumedrol 125mg x1 today, will start 60mg BID tomorrow   - Azithromycin x5d   - C/w Symbicort q12hr, duonebs prn   - Ethacrynic acid 25mg x1 (hx of allergy to sulfa drugs)    #Hx of Afib s/p micra PPM  - rate controlled, c/w coumadin   - EP consult for PM interrogation per patient request     #DLD - c/w statin    #Anxiety - continue home Clonopin 0.5mg BID prn     #Progress Note Handoff:  Pending (specify):  improvement in clinical condition, EP and Pulm follow up   Disposition: Home___/SNF___/Other________/Unknown at this time________          Merlene Herring, DO

## 2021-04-23 NOTE — CHART NOTE - NSCHARTNOTEFT_GEN_A_CORE
Electrophysiology    Pts device __Micra VR ([employer]tronic)____ was interrogated on 04-23-21  Device working properly  Events: no events  Preliminary results d/w with primary team  Awaiting / Reviewed by attending    Contact EP ACP with any questions [phone]

## 2021-04-23 NOTE — ED PROVIDER NOTE - PROGRESS NOTE DETAILS
TA: no b-lines on bedside sono; likely copd exacerbation Authored by Dr. Miguel: pt with sob. will do pocus Echo. Authored by Dr. Miguel: echo reduced ef, but no effusion.  pt refusing further nebs and bipap.  only wants o2, steroids.  pulm and EP paged. TA: received call back from Dr. Sinha who stated pulm team will see pt as routine consult

## 2021-04-23 NOTE — ED ADULT NURSE NOTE - NSIMPLEMENTINTERV_GEN_ALL_ED
Implemented All Fall with Harm Risk Interventions:  Smiley to call system. Call bell, personal items and telephone within reach. Instruct patient to call for assistance. Room bathroom lighting operational. Non-slip footwear when patient is off stretcher. Physically safe environment: no spills, clutter or unnecessary equipment. Stretcher in lowest position, wheels locked, appropriate side rails in place. Provide visual cue, wrist band, yellow gown, etc. Monitor gait and stability. Monitor for mental status changes and reorient to person, place, and time. Review medications for side effects contributing to fall risk. Reinforce activity limits and safety measures with patient and family. Provide visual clues: red socks.

## 2021-04-23 NOTE — CONSULT NOTE ADULT - ASSESSMENT
IMPRESSION:    COPD exacerbation  HO PPM  Ho lung cancer s/p resection  Anxiety  Ho Afib on coumadin    RECOMMEND:    Solumedrol 60 q12 for now  nebs q4h and prn  continue home inhalers  EP eval for PPM interrogation  O2 as needed to maintain spo2 89-92%  continue coumadin     IMPRESSION:    COPD exacerbation  HO PPM  Ho lung cancer s/p resection  Anxiety  Ho Afib on coumadin    RECOMMEND:    Solumedrol 60 q12 for now  Azithromycin 5 days total  Trial of lasix x1  nebs q4h and prn  continue home inhalers  Please provide spacer for symbicort  EP eval for PPM interrogation  O2 as needed to maintain spo2 89-92%  continue coumadin

## 2021-04-23 NOTE — H&P ADULT - NSHPPHYSICALEXAM_GEN_ALL_CORE
T(C): 36.7 (04-23-21 @ 07:12), Max: 36.7 (04-23-21 @ 07:12)  T(F): 98 (04-23-21 @ 07:12), Max: 98 (04-23-21 @ 07:12)  HR: 82 (04-23-21 @ 07:32) (80 - 82)  BP: 152/71 (04-23-21 @ 07:32) (145/80 - 152/71)  RR: 22 (04-23-21 @ 07:32) (20 - 22)  SpO2: 100% (04-23-21 @ 07:32) (100% - 100%)  Wt(kg): --    General: In NAD  HEENT: LUZMARIA             Lymphatic system: No cervical LN     Lungs: Petey wheezing  Cardiovascular: Regular  Gastrointestinal: Soft.  + BS   Musculoskeletal: No Clubbing.  Full range of motion.. Moves all extremities  Skin: Warm.  Intact  Neurological: No motor or sensory deficit

## 2021-04-23 NOTE — ED PROVIDER NOTE - PHYSICAL EXAMINATION
CONSTITUTIONAL: laying in stretcher speaking full sentences.   SKIN: warm, dry  HEAD: Normocephalic; atraumatic.  EYES:  normal sclera and conjunctiva   ENT: No nasal discharge; airway clear.  NECK: Supple; non tender.  CARD: S1, S2 normal; no murmurs, gallops, or rubs. Regular rate.   RESP: b/l wheezing  ABD: soft ntnd  EXT: Normal ROM.  No clubbing, cyanosis or edema.   LYMPH: No acute cervical adenopathy.  NEURO: Alert, oriented, grossly unremarkable  PSYCH: Cooperative, appropriate.

## 2021-04-23 NOTE — ED PROVIDER NOTE - OBJECTIVE STATEMENT
The patient is a 75 year old female with a history of COPD on ~3L home O2, afib on coumadin s/p ablations and pacemaker, lung CA s/p lobectomy 2011, anxiety presenting for worsening sob x 3 days. States symptoms got worse overnight so called EMS. Upon EMS arrival, patient was tachypneic and satting 96% on 3L O2 and given 1 albuterol txt en route. Pt denies fevers/chills, chest pain, abdominal pain, leg swelling.

## 2021-04-23 NOTE — H&P ADULT - ASSESSMENT
75 year old female patient with PMH of Afib (on Coumadin) s/p ablation and Micra PPM, COPD on home O2 as needed, left lung ca diagnosed in 2011 (surgery x2 -partial left lobectomy + chemo +RT, oncologist f/up at [facility]), former smoker quit in 2005 and anxiety presents to ED tfor evaluation of dyspnea.    #Worsening dyspnea; likely multifactorial in the setting of worsening severe COPD with acute decompensation + Severe pulmonary hypertension likely GROUP 3 (PA pressure 61.3mmHg)  - Pt appears comfortable, vital signs stable, no increased oxygen requirement. C/w 3LNC (Baseline O2), increae as needed to maintain SpO2>92%.  - Solumderol 60mg q12hr  - C/w Symbicort q12hr, albuterol q6hr prn  - Ethacrynic acid 25mg x1 (hx of allergy to sulfa drugs)  - Monitor off antibiotics.  - Pulmonary eval for severe pulmonary htn.    #Hx of Afib s/p micra PPM  - C/w COumadin 1mg daily for anticoagulation  -      75 year old female patient with PMH of Afib (on Coumadin) s/p ablation and Micra PPM, COPD on home O2 as needed, left lung ca diagnosed in 2011 (surgery x2 -partial left lobectomy + chemo +RT, oncologist f/up at [facility]), former smoker quit in 2005 and anxiety presents to ED tfor evaluation of dyspnea.    #Worsening dyspnea; likely multifactorial in the setting of worsening severe COPD with acute decompensation + Severe pulmonary hypertension likely GROUP 3 (PA pressure 61.3mmHg)  - Pt appears comfortable, vital signs stable, no increased oxygen requirement. C/w 3LNC (Baseline O2), increae as needed to maintain SpO2>92%.  - Solumderol 60mg q12hr  - C/w Symbicort q12hr, albuterol q6hr prn  - Ethacrynic acid 25mg x1 (hx of allergy to sulfa drugs)  - Monitor off antibiotics.  - Pulmonary eval for severe pulmonary htn.    #Hx of Afib s/p micra PPM  - ECG: V-paced rhythm  - Pt is rate controlled off AV-kacie blockers  - C/w Coumadin 1mg daily for anticoagulation; INR 2.16 on admission    #DLD - c/w statin    DVT PPX: on coumadin

## 2021-04-25 NOTE — PROGRESS NOTE ADULT - ASSESSMENT
IMPRESSION:    COPD exacerbation-improving  HO PPM  Ho lung cancer s/p resection  Anxiety  Ho Afib on coumadin    RECOMMEND:    Continue Solumedrol 60 q12 for now  Azithromycin 5 days total  nebs q4h and prn  continue home inhalers  continue to use spacer for symbicort  O2 as needed to maintain spo2 88-92%  continue coumadin

## 2021-04-26 NOTE — PROGRESS NOTE ADULT - ASSESSMENT
IMPRESSION:    COPD exacerbation-improving  HO PPM  Ho lung cancer s/p resection  Anxiety  Ho Afib on coumadin    RECOMMEND:    Steroid taper  Azithromycin 5 days total  nebs q4h and prn  continue home inhalers  continue to use spacer for symbicort  O2 as needed to maintain spo2 88-92%, currently 98% on 2 liters  DVT ppx  possible d/c in 24-48hours

## 2021-04-26 NOTE — PROGRESS NOTE ADULT - ATTENDING COMMENTS
Patient was seen independently. Latest vital signs and labs were reviewed. Case was discussed with house staff. Agree with resident's assessment and plan with following additions/modifications.     Patient denies any headache, any vision complaints, runny nose, fever, chills, sore throat. Denies chest pain, palpitation. Denies nausea, vomiting, abdominal pain, diarrhoea, Denies urinary burning, urgency, frequency, dysuria.   At least 10 systems were reviewed in ROS. All systems reviewed  are within normal limits except for the complaints as described in Subjective.    Not in acute distress, anxious looking  General: No pallor, No icterus, afebrile  HEENT: No JVD, no Bruit.  Heart: S1+S2 audible  Lungs: bilateral  reduced air entry, bilateral  wheezing, no crepitations.  Abdomen: Soft, non-tender, non-distended , no  rigidity or guarding.  CNS: Grossly intact, sensations intact.  Extremities:  No edema    ASSESSMENT & PLAN:  75 year old female patient with PMH of Afib (on Coumadin) s/p ablation and Micra PPM, COPD on home O2 as needed, left lung ca diagnosed in 2011 (surgery x2 -partial left lobectomy + chemo +RT, oncologist f/up at [facility]), former smoker quit in 2005 and anxiety presents to ED tfor evaluation of dyspnea. Patient is admitted for COPD exacerbation      Acute COPD exacerbation, acute  on chronic hypoxic res failure   Chronic  Afib on coumadin  status post PPM  Ho lung cancer s/p resection  Anxiety disorder  Dyslipidemia        Solumedrol 60 q12 for now  Azithromycin 5 days total  Trial of Ethacrynic acid( allergic to lasix)  Continue nebs q4h and prn with Symbicort  PPM interrogation-unremarkable  O2 as needed to maintain spo2 89-92%, currently requiring 3 L( at home on 2 L)  Chronic  Afib on coumadin-continue coumadin  Dyslipidemia- continue statins  Continue current medical management for active chronic comorbid conditions.  DVT Prophylaxis as appropriate  Supportive care including activity, diet, goals of care discussed in AM rounds.  Discussed with  / in AM rounds  Handoff: Discharge Disposition: Acute inpatient management  required further
overall
75 year old female patient with PMH of Afib (on Coumadin) s/p ablation and Micra PPM, COPD on home O2 as needed, left lung ca diagnosed in 2011 (surgery x2 -partial left lobectomy + chemo +RT, oncologist f/up at [facility]), former smoker quit in 2005 and anxiety presents to ED tfor evaluation of dyspnea. Patient is admitted for COPD exacerbation      Acute COPD exacerbation, acute  on chronic hypoxic res failure   Chronic  Afib on coumadin  status post PPM  Ho lung cancer s/p resection  Anxiety disorder  Dyslipidemia        Solumedrol 60 q12 for now  Azithromycin 5 days total  Trial of Ethacrynic acid( allergic to Lasix). One dose 25 mg given today  Continue nebs q4h and prn with Symbicort  PPM interrogation-unremarkable  O2 as needed to maintain spo2 89-92%, currently requiring 3 L( at home on 2 L)  Chronic  Afib on coumadin-continue coumadin. INR supra- hold coumadin today  Dyslipidemia- continue statins  Continue current medical management for active chronic comorbid conditions.  DVT Prophylaxis as appropriate  Supportive care including activity, diet, goals of care discussed in AM rounds.  Discussed with  / in AM rounds  Handoff: Discharge Disposition: Acute inpatient management  required further .
copd exacerbation  improved on iv steroids  and diretics

## 2021-04-26 NOTE — DISCHARGE NOTE ADULT - MEDICATION SUMMARY - MEDICATIONS TO CHANGE
SUBJECTIVE:  Anupam Benavides is a 4 year old male who is accompanied by:mother     Chief Complaint   Patient presents with   • Derm Problem       HPI   Noted some bumps in Feb 2021  Using dermsmooth for eczema  Has a history of sensitive skin since birth    We discussed photos on portal an looked to be molluscum    Advised getting the eczema under control and then tackling the molluscum    At this point Mom wanted things looked at as she wanted to be sure of what it was  We discussed treatment options over the portal and again today, nothing, topical irritant or cantharidin    He is not itching any of the areas  Behind the knees seems to be the worst spot     Review of Systems   Constitutional: Negative for fever.   HENT: Negative for congestion, rhinorrhea and sore throat.    Eyes: Negative for discharge.   Respiratory: Negative for cough.    Gastrointestinal: Negative for diarrhea and vomiting.   Skin: Positive for rash.        Per hpi   Psychiatric/Behavioral: Negative for sleep disturbance.       Patient's medications, allergies, past medical, surgical, social and family histories were reviewed and updated as appropriate.    Lives with parents and older brother    OBJECTIVE:  Visit Vitals  BP (!) 80/50   Pulse 90   Temp 98.2 °F (36.8 °C)   Resp 22   Wt 14.4 kg (31 lb 11.2 oz)   SpO2 100%     Physical Exam  Vitals reviewed.   Constitutional:       General: He is not in acute distress.  Skin:     Comments: Scattered pearly papules behind knees and scattered on legs    Upper outer arms and thighs with rough papules   Neurological:      General: No focal deficit present.      Mental Status: He is alert.         ASSESSMENT/PLAN:  Anupam was seen today for derm problem.    Diagnoses and all orders for this visit:    Molluscum contagiosum infection    Keratosis pilaris    Eczema, unspecified type    underlying sensitive skin with many molluscum    Viral process and self limited  No treatment necessary unless  bothersome, spreading or desired by patient  Control any itching by using steroid cream and moisturize daily to prevent spread  If more aggressive treatment desired can do topical irritant with differin, retin A  In office can treat with cantharidin  Treatment can cause scaring and severe irritation    Will start with topical differin gel and recommend doing a few lesions at a time to see how he responds  Once it is red and irritated stop and moisturize    Thick cream like eucerin, aquaphor and cerave are better than lotions and recommend them after every bath and on a daily basis  dermasmooth can help with the eczema flares    We can always do more but I believe he will be very sensitive to cantharidin with his skin type    You can visit with Dr Kapadia as well to see if any other recommendations (has seen him in the past)  Continue supportive care.  Watch for worsening or symptoms that are not improving.  Call for any concerns or prolonged symptoms. Parents educated on signs/symptoms requiring immediate medical attention.     The mother indicated understanding of the diagnosis and agreed with the plan of care. All questions answered.         Allegra Petersen MD             I will SWITCH the dose or number of times a day I take the medications listed below when I get home from the hospital:  None

## 2021-04-26 NOTE — PROGRESS NOTE ADULT - ASSESSMENT
75 year old female patient with PMH of Afib (on Coumadin) s/p ablation and Micra PPM, COPD on home O2 as needed, left lung ca diagnosed in 2011 (surgery x2 -partial left lobectomy + chemo +RT, oncologist f/up at [facility]), former smoker quit in 2005 and anxiety presents to ED tfor evaluation of dyspnea. Patient is admitted for COPD exacerbation    #Acute COPD exacerbation  #Acute  on chronic hypoxic res failure   -S/p solumedrol 60 q12 , with change to prednisone   -Azithromycin 5 days total  -Continue nebs q4h and prn with Symbicort  -O2 as needed to maintain spo2 89-92%, currently requiring 3 L( at home on 2 L)      #Chronic  Afib on coumadin  #status post PPM  -    #lung cancer s/p resection  Anxiety disorder  Dyslipidemia        Solumedrol 60 q12 for now  Azithromycin 5 days total  Trial of Ethacrynic acid( allergic to lasix)  Continue nebs q4h and prn with Symbicort  PPM interrogation-unremarkable  O2 as needed to maintain spo2 89-92%, currently requiring 3 L( at home on 2 L)  Chronic  Afib on coumadin-continue coumadin. INR in range  Dyslipidemia- continue statins  Continue current medical management for active chronic comorbid conditions.  DVT Prophylaxis as appropriate  Supportive care including activity, diet, goals of care discussed in AM rounds.  Discussed with  / in AM rounds  Handoff: Discharge Disposition: Acute inpatient management  required further .     75 year old female patient with PMH of Afib (on Coumadin) s/p ablation and Micra PPM, COPD on home O2 as needed, left lung ca diagnosed in 2011 (surgery x2 -partial left lobectomy + chemo +RT, oncologist f/up at [facility]), former smoker quit in 2005 and anxiety presents to ED tfor evaluation of dyspnea. Patient is admitted for COPD exacerbation    #Acute COPD exacerbation  #Acute  on chronic hypoxic res failure   #lung cancer s/p resection  -S/p solumedrol 60 q12 , with change to prednisone 60 mg in AM  -Azithromycin 5 days total  -Continue nebs q4h and prn with Symbicort  -O2 as needed to maintain spo2 89-92%, currently requiring 3 L( at home on 2 L)  -Pulmonary on board    #Chronic  Afib on coumadin  #status post PPM  -Patient s/p ablation 2018  -Remains on coumadin 1 mg daily??  -INR supra therapeutic today. Will hold  -Discuss the need for anticoagulation  -PPM interrogated by EP      #Dyslipidemia  - continue statins    DVT PPx: coumadin  GI PPX: Protonix 40 mg PO   Diet: DASH/Carb consistent  Activity: Increase as tolerated  Dispo: from home, no needs  Code Status: full code

## 2021-04-27 NOTE — DISCHARGE NOTE PROVIDER - NSDCFUADDAPPT_GEN_ALL_CORE_FT
Please follow up with your own pulmonologist (lung doctor)  Please follow up with your cardiologist (heart doctor)

## 2021-04-27 NOTE — DISCHARGE NOTE PROVIDER - HOSPITAL COURSE
75 year old female patient with PMH of Afib (on Coumadin) s/p ablation and Micra PPM, COPD on home   O2 as needed, left lung ca diagnosed in 2011 (surgery x2 -partial left lobectomy + chemo +RT, oncologist f/up   at [facility]), former smoker quit in 2005 and anxiety presents to ED tfor evaluation of dyspnea. Patient is admitted for COPD exacerbation    #Acute COPD exacerbation  #Acute  on chronic hypoxic res failure   #lung cancer s/p resection  -S/p solumedrol 60 q12 , with change to prednisone 60 mg daily  -Azithromycin 5 days total  -Continue nebs q4h and prn with Symbicort  -O2 as needed to maintain spo2 89-92%, currently requiring 3 L( at home on 2 L)  -Pulmonary on board    #Chronic  Afib on coumadin  #status post PPM  -Patient s/p ablation 2018  -Remains on coumadin 1 mg daily??  -INR supra therapeutic today. Will hold  -Discuss the need for anticoagulation  -PPM interrogated by EP      #Dyslipidemia  - continue statins    Patient seen and examined today. Clinically and hemodynamically stable for dc  Vital Signs Last 24 Hrs  T(C): 36 (27 Apr 2021 04:40), Max: 36 (27 Apr 2021 04:40)  T(F): 96.8 (27 Apr 2021 04:40), Max: 96.8 (27 Apr 2021 04:40)  HR: 73 (27 Apr 2021 04:40) (71 - 81)  BP: 149/66 (27 Apr 2021 04:40) (133/61 - 149/66)  BP(mean): --  RR: 20 (26 Apr 2021 20:41) (20 - 20)  SpO2: 100% (27 Apr 2021 07:50) (98% - 100%)    GENERAL: Not in distress, lying supine in bed, on 2l O2 by nc  HEAD:  Atraumatic, Normocephalic  EYES: EOMI  NECK: Supple  NERVOUS SYSTEM:  Alert & Oriented X3, non focal   CHEST/LUNG: Bilateral wheezing on auscultation  HEART: Regular rate and rhythm; No murmurs, rubs, or gallops  ABDOMEN: Soft, Nontender, Nondistended; Bowel sounds present  EXTREMITIES:  2+ Peripheral Pulses, No clubbing, cyanosis, or edema  LYMPH: No lymphadenopathy noted  SKIN: No rashes or lesions         75 year old female patient with PMH of Afib (on Coumadin) s/p ablation and Micra PPM, COPD on home   O2 as needed, left lung ca diagnosed in 2011 (surgery x2 -partial left lobectomy + chemo +RT, oncologist f/up   at [facility]), former smoker quit in 2005 and anxiety presents to ED tfor evaluation of dyspnea. Patient is admitted for COPD exacerbation    #Acute COPD exacerbation  #Acute  on chronic hypoxic res failure   #lung cancer s/p resection  -S/p solumedrol 60 q12 , with change to prednisone 60 mg daily  -Azithromycin 5 days total  -Continue nebs q4h and prn with Symbicort  -O2 as needed to maintain spo2 89-92%, currently requiring 3 L( at home on 2 L)  -Pulmonary on board    #Chronic  Afib on coumadin  #status post PPM  -Patient s/p ablation 2018  -Remains on coumadin 1 mg daily??  -INR supra therapeutic today. Will hold  -Discuss the need for anticoagulation  -PPM interrogated by EP      #Dyslipidemia  - continue statins    Patient seen and examined today. Clinically and hemodynamically stable for dc  Vital Signs Last 24 Hrs  T(C): 36 (27 Apr 2021 04:40), Max: 36 (27 Apr 2021 04:40)  T(F): 96.8 (27 Apr 2021 04:40), Max: 96.8 (27 Apr 2021 04:40)  HR: 73 (27 Apr 2021 04:40) (71 - 81)  BP: 149/66 (27 Apr 2021 04:40) (133/61 - 149/66)  BP(mean): --  RR: 20 (26 Apr 2021 20:41) (20 - 20)  SpO2: 100% (27 Apr 2021 07:50) (98% - 100%)    GENERAL: Not in distress, lying supine in bed, on 2l O2 by nc  HEAD:  Atraumatic, Normocephalic  EYES: EOMI  NECK: Supple  NERVOUS SYSTEM:  Alert & Oriented X3, non focal   CHEST/LUNG: Bilateral wheezing on auscultation  HEART: Regular rate and rhythm; No murmurs, rubs, or gallops  ABDOMEN: Soft, Nontender, Nondistended; Bowel sounds present  EXTREMITIES:  2+ Peripheral Pulses, No clubbing, cyanosis, or edema  LYMPH: No lymphadenopathy noted  SKIN: No rashes or lesions      Attending Attestation:  Admitted & treated for COPD exacerbation.  Wheezing improved . Off solumedrol & requiring baseline 3 L of home oxygen. Will be discharged on PO prednisone. Cleared by pulm for discharge .  Patient was seen & examined independently. At least 10 systems were reviewed in ROS. All systems reviewed  are within normal limits. Latest vital signs and labs were reviewed today. Case was discussed with house staff in morning rounds for assessment and plan.  Patient is medically stable for discharge . About 32 mins spent on discharge disposition.

## 2021-04-27 NOTE — DISCHARGE NOTE PROVIDER - PROVIDER TOKENS
PROVIDER:[TOKEN:[[id number]],FOLLOWUP:[1 week]] PROVIDER:[TOKEN:[[id number]],FOLLOWUP:[1 week]],PROVIDER:[TOKEN:[[id number]]]

## 2021-04-27 NOTE — DISCHARGE NOTE PROVIDER - NSDCMRMEDTOKEN_GEN_ALL_CORE_FT
albuterol 1.25 mg/3 mL (0.042%) inhalation solution: 3 milliliter(s) inhaled 4 times a day  budesonide-formoterol 160 mcg-4.5 mcg/inh inhalation aerosol: 1 puff(s) inhaled 2 times a day  clonazePAM 0.5 mg oral tablet: 1 tab(s) orally 2 times a day, As needed, anxiety  Crestor 5 mg oral tablet: 1 tab(s) orally once a day (at bedtime)  predniSONE 20 mg oral tablet: 2.5 tab(s) orally once daily for 3 days, then 2 tabs once daily for 3 days, then 1.5 tab daily for 3 days, then 1 tab daily for 3 days, then 0.5 tab daily for 3 days then stop.    (Strat tomorrow 50 mg orally for 3 days, then 40 mg orally for 3 days, then 30 mg orally for 3 days, then 20 mg orally for 3 days, then 10 mg orally for 3 days, then stop)  Spiriva HandiHaler 18 mcg inhalation capsule: 1 cap(s) inhaled 2 times a day   Vitamin D3 2000 intl units oral tablet: 1 tab(s) orally once a day   albuterol 1.25 mg/3 mL (0.042%) inhalation solution: 3 milliliter(s) inhaled 4 times a day  budesonide-formoterol 160 mcg-4.5 mcg/inh inhalation aerosol: 1 puff(s) inhaled 2 times a day  clonazePAM 0.5 mg oral tablet: 1 tab(s) orally 2 times a day, As needed, anxiety  Crestor 5 mg oral tablet: 1 tab(s) orally once a day (at bedtime)  predniSONE 20 mg oral tablet: 2.5 tab(s) orally once daily for 3 days, then 2 tabs once daily for 3 days, then 1.5 tab daily for 3 days, then 1 tab daily for 3 days, then 0.5 tab daily for 3 days then stop.    (Strat tomorrow 50 mg orally for 3 days, then 40 mg orally for 3 days, then 30 mg orally for 3 days, then 20 mg orally for 3 days, then 10 mg orally for 3 days, then stop)  Spiriva HandiHaler 18 mcg inhalation capsule: 1 cap(s) inhaled 2 times a day   Vitamin D3 2000 intl units oral tablet: 1 tab(s) orally once a day  warfarin 1 mg oral tablet: 1 tab(s) orally once a day to be started tomorrow night

## 2021-04-27 NOTE — DISCHARGE NOTE PROVIDER - NSDCCPCAREPLAN_GEN_ALL_CORE_FT
PRINCIPAL DISCHARGE DIAGNOSIS  Diagnosis: COPD exacerbation  Assessment and Plan of Treatment: You were admitted for COPD exacerbation.  Chronic Obstructive Pulmonary DiseaseChronic obstructive pulmonary disease (COPD) is a lung condition in which airflow from the lungs is limited. Causes include smoking, secondhand smoke exposure, genetics, or recurrent infections. Take all medicines (inhaled or pills) as directed by your health care provider. Avoid exposure to irritants such as smoke, chemicals, and fumes that aggravate your breathing.  If you are a smoker, the most important thing that you can do is stop smoking. Continuing to smoke will cause further lung damage and breathing trouble. Ask your health care provider for help with quitting smoking.  SEEK IMMEDIATE MEDICAL CARE IF YOU HAVE ANY OF THE FOLLOWING SYMPTOMS: shortness of breath at rest or when talking, bluish discoloration of lips, skin, fever, worsening cough, unexplained chest pain, or lightheadedness/dizziness.

## 2021-04-27 NOTE — DISCHARGE NOTE PROVIDER - CARE PROVIDERS DIRECT ADDRESSES
[email or web address]

## 2021-04-27 NOTE — DISCHARGE NOTE PROVIDER - CARE PROVIDER_API CALL
Taty Kidd  Internal Medicine  [address] [place] [address]  Phone: [phone]  Fax: [phone]  Follow Up Time: 1 week   Taty Kidd  Internal Medicine  [address] [place] [address]  Phone: [phone]  Fax: [phone]  Follow Up Time: 1 week    Ky Sinha  INTERNAL MEDICINE  [address] [place] [address], [address] [address]  Phone: [phone]  Fax: [phone]  Follow Up Time:

## 2021-04-28 NOTE — PROGRESS NOTE ADULT - ASSESSMENT
75 year old female patient with PMH of Afib (on Coumadin) s/p ablation and Micra PPM, COPD on home   O2 as needed, left lung ca diagnosed in 2011 (surgery x2 -partial left lobectomy + chemo +RT, oncologist f/up   at [facility]), former smoker quit in 2005 and anxiety presents to ED tfor evaluation of dyspnea. Patient is admitted for COPD exacerbation    #Acute COPD exacerbation  #Acute  on chronic hypoxic res failure   #lung cancer s/p resection  -S/p solumedrol 60 q12 , with change to prednisone 60 mg daily, will be dc on steroid taper  -Azithromycin 5 days total (completed)  -Continue nebs q4h and prn with Symbicort  -O2 as needed to maintain spo2 89-92%, currently requiring 2 L( at home on 2 L)  -Pulmonary on board    #Chronic  Afib on coumadin  #status post PPM  -Patient s/p ablation 2018  -Remains on coumadin 1 mg daily??  -INR supra therapeutic today. Will hold  -Discuss the need for anticoagulation  -PPM interrogated by EP      #Dyslipidemia  - continue statins    DVT PPx: coumadin INR remains high  GI PPX: Protonix 20 mg daily  Diet: DASH/Carb consistent  Activity: Increase as tolerated  Dispo:   Code Status: full code   75 year old female patient with PMH of Afib (on Coumadin) s/p ablation and Micra PPM, COPD on home   O2 as needed, left lung ca diagnosed in 2011 (surgery x2 -partial left lobectomy + chemo +RT, oncologist f/up   at [facility]), former smoker quit in 2005 and anxiety presents to ED tfor evaluation of dyspnea. Patient is admitted for COPD exacerbation    #Acute COPD exacerbation  #Acute  on chronic hypoxic res failure   #lung cancer s/p resection  -S/p solumedrol 60 q12 , with change to prednisone 60 mg daily, will be dc on steroid taper  -Azithromycin 5 days total (completed)  -Continue nebs q4h and prn with Symbicort  -O2 as needed to maintain spo2 89-92%, currently requiring 2 L( at home on 2 L)  -Pulmonary on board    #Chronic  Afib on coumadin  #status post PPM  -Patient s/p ablation 2018  -Remains on coumadin 1 mg daily??  -INR supra therapeutic today. Will hold  -Discuss the need for anticoagulation  -PPM interrogated by EP      #Dyslipidemia  - continue statins    DVT PPx: coumadin INR remains high  GI PPX: Protonix 20 mg daily  Diet: DASH/Carb consistent  Activity: Increase as tolerated  Dispo: home with home care  Code Status: full code   75 year old female patient with PMH of Afib (on Coumadin) s/p ablation and Micra PPM, COPD on home   O2 as needed, left lung ca diagnosed in 2011 (surgery x2 -partial left lobectomy + chemo +RT, oncologist f/up   at [facility]), former smoker quit in 2005 and anxiety presents to ED tfor evaluation of dyspnea. Patient is admitted for COPD exacerbation    #Acute COPD exacerbation  #Acute  on chronic hypoxic res failure   #lung cancer s/p resection  -S/p solumedrol 60 q12 , with change to prednisone 60 mg daily, will be dc on steroid taper  -Azithromycin 5 days total (completed)  -Continue nebs q4h and prn with Symbicort  -O2 as needed to maintain spo2 89-92%, currently requiring 2 L( at home on 2 L)  -Pulmonary on board    #Chronic  Afib on coumadin  #status post PPM  -Patient s/p ablation 2018  -Remains on coumadin 1 mg daily   -INR supra therapeutic today. Will hold  -Discuss the need for anticoagulation  -PPM interrogated by EP      #Dyslipidemia  - continue statins    DVT PPx: coumadin INR remains high  GI PPX: Protonix 20 mg daily  Diet: DASH/Carb consistent  Activity: Increase as tolerated  Dispo: home with home care  Code Status: full code    Attending Attestation:  Pt has been seen and examined. Case and Plan discussed at rounds and agree with above note as reviewed.  Pt with Chronic Afib on coumadin / Supratherapeutic INR / Admitted for COPD Exarc   - s/p treatement and s/p Pulm eval - Dr. Miles   - s/p improvement  - complete slow steroid taper as discussed and per Rx  - completed Azithromycin x 5 days  - INR 3.35   - Hold coumadin tonight  - Resume coumadin 1mg po qhs beginning 4/29 night   - f/u INR w/ PMD  - f/u Pulm 5-10 days    Dispo: d/C home

## 2021-04-28 NOTE — DISCHARGE NOTE NURSING/CASE MANAGEMENT/SOCIAL WORK - PATIENT PORTAL LINK FT
You can access the FollowMyHealth Patient Portal offered by [facility] by registering at the following website: [email or web address]. By joining [email or web address]’s FollowMyHealth portal, you will also be able to view your health information using other applications (apps) compatible with our system.

## 2021-04-28 NOTE — PROGRESS NOTE ADULT - SUBJECTIVE AND OBJECTIVE BOX
Patient is a 75y old  Female who presents with a chief complaint of COPD exacerbation (26 Apr 2021 09:55)        SUBJECTIVE:  patient seen and examined at the bedside       REVIEW OF SYSTEMS:    All Pertinent ROS are negative except per HPI       PHYSICAL EXAM  Vital Signs Last 24 Hrs  T(C): 35.9 (26 Apr 2021 11:05), Max: 35.9 (25 Apr 2021 12:40)  T(F): 96.6 (26 Apr 2021 11:05), Max: 96.7 (25 Apr 2021 12:40)  HR: 94 (26 Apr 2021 11:05) (71 - 94)  BP: 140/63 (26 Apr 2021 11:05) (131/61 - 147/78)  BP(mean): --  RR: 20 (25 Apr 2021 20:13) (18 - 20)  SpO2: 98% (26 Apr 2021 11:05) (98% - 100%)    CONSTITUTIONAL:  chronically ill appearing, frail, in  NAD    ENT:   Airway patent,   No thrush    CARDIAC:   Normal rate,   regular rhythm.    no edema      RESPIRATORY:   + Wheezing, improved  Normal chest expansion  Not tachypneic,  No use of accessory muscles    GASTROINTESTINAL:  Abdomen soft,   non-tender,   no guarding,     MUSCULOSKELETAL:   range of motion is not limited,  no clubbing, cyanosis    NEUROLOGICAL:   Alert and oriented   no motor or deficits.    SKIN:   Skin normal color for race,   warm, dry   No evidence of rash.      04-25-21 @ 07:01  -  04-26-21 @ 07:00  --------------------------------------------------------  IN:    IV PiggyBack: 250 mL    Oral Fluid: 480 mL  Total IN: 730 mL    OUT:  Total OUT: 0 mL    Total NET: 730 mL          LABS:                          9.8    7.50  )-----------( 168      ( 26 Apr 2021 07:14 )             31.3                                               04-26    140  |  98  |  20  ----------------------------<  166<H>  5.0   |  37<H>  |  0.7    Ca    9.5      26 Apr 2021 07:14  Phos  3.3     04-26  Mg     2.1     04-26    TPro  6.6  /  Alb  4.1  /  TBili  0.4  /  DBili  x   /  AST  12  /  ALT  8   /  AlkPhos  78  04-26      PT/INR - ( 26 Apr 2021 07:14 )   PT: 38.90 sec;   INR: 3.38 ratio                                                                                              LIVER FUNCTIONS - ( 26 Apr 2021 07:14 )  Alb: 4.1 g/dL / Pro: 6.6 g/dL / ALK PHOS: 78 U/L / ALT: 8 U/L / AST: 12 U/L / GGT: x                                                                                                MEDICATIONS  (STANDING):  albuterol/ipratropium for Nebulization 3 milliLiter(s) Nebulizer every 4 hours  atorvastatin 20 milliGRAM(s) Oral at bedtime  azithromycin  IVPB      azithromycin  IVPB 500 milliGRAM(s) IV Intermittent every 24 hours  budesonide 160 MICROgram(s)/formoterol 4.5 MICROgram(s) Inhaler 2 Puff(s) Inhalation two times a day  pantoprazole    Tablet 40 milliGRAM(s) Oral before breakfast    MEDICATIONS  (PRN):  acetaminophen   Tablet .. 650 milliGRAM(s) Oral every 6 hours PRN Mild Pain (1 - 3)  albuterol/ipratropium for Nebulization 3 milliLiter(s) Nebulizer every 6 hours PRN Shortness of Breath and/or Wheezing  clonazePAM  Tablet 0.5 milliGRAM(s) Oral two times a day PRN anxiety  polyethylene glycol 3350 17 Gram(s) Oral daily PRN Constipation      X-Rays reviewed    CXR interpreted by me:
Progress Note:  Provider Speciality                            Hospitalist      CASEY POWERS MRN-[id number] 75y Female     CHIEF PRESENTING COMPLAINT:  Patient is a 75y old  Female who presents with a chief complaint of COPD exacerbation (25 Apr 2021 12:11)        SUBJECTIVE:  Patient was seen and examined at bedside. Reports  persistent dyspnea   . No significant overnight events reported.     HISTORY OF PRESENTING ILLNESS:  HPI:  75 year old female patient with PMH of Afib (on Coumadin) s/p ablation and Micra PPM, COPD on home O2 as needed, left lung ca diagnosed in 2011 (surgery x2 -partial left lobectomy + chemo +RT, oncologist f/up at [facility]), former smoker quit in 2005 and anxiety presents to ED tfor evaluation of dyspnea.      Pt reports longstanding dyspnea that has been progressing over the past few years requiring multiple hospitalizations (last in March 2021 for progressing chronic COPD vs Acute COPD exacerbation). She is usually stable on 3LNC O2 at home. Her dyspnea has limited her quality of life and ability to ambulate around the house. Today, she reports worsening dyspnea at rest and on exertion for the past 3 days. Her symptom worsened severely overnight which prompted her to call EMS. Upon EMS arrival, patient was tachypneic and satting 96% on 3L O2 and given 1 albuterol txt en route. She denies any fevers, chills, chest pain, increased sputum production, or change in sputum color. She also reports no orthopnea, PND, or worsening LE swelling.    In the ED, VS: /80, HR 80 RR 20, T 98, satting 100% on 3LNC  CXR showed L-sided post surgical changes with loss of lung volume and L tracheal deviation. R basilar opacity unchanged from last admission in March.  Pt received solumderol 125mg, and duoneb treatment, (23 Apr 2021 10:47)        REVIEW OF SYSTEMS:  Patient denies any headache, any vision complaints, runny nose, fever, chills, sore throat. Denies chest pain, palpitation. Denies nausea, vomiting, abdominal pain, diarrhoea, Denies urinary burning, urgency, frequency, dysuria. At least 10 systems were reviewed in ROS. All systems reviewed  are within normal limits except for the complaints as described in Subjective.    PAST MEDICAL & SURGICAL HISTORY:  PAST MEDICAL & SURGICAL HISTORY:  High cholesterol    Anxiety    Pacemaker    Lung cancer    Afib    COPD (chronic obstructive pulmonary disease)    Artificial cardiac pacemaker    H/O atrioventricular kacie ablation    S/P lobectomy of lung  left    History of tonsillectomy    S/P appendectomy    Cataract  RIGHT  6/17 AND  LEFT  7/5/19            VITAL SIGNS:  Vital Signs Last 24 Hrs  T(C): 36.4 (25 Apr 2021 08:30), Max: 36.4 (25 Apr 2021 08:30)  T(F): 97.5 (25 Apr 2021 08:30), Max: 97.5 (25 Apr 2021 08:30)  HR: 71 (25 Apr 2021 04:40) (71 - 72)  BP: 137/66 (25 Apr 2021 04:40) (137/66 - 143/71)  BP(mean): --  RR: 18 (25 Apr 2021 04:40) (18 - 18)  SpO2: 99% (24 Apr 2021 17:04) (99% - 99%)          PHYSICAL EXAMINATION:  Not in acute distress  General: No pallor, no icterus  HEENT:   EOMI, no JVD.  Heart: S1+S2 audible  Lungs: bilateral reduced air entry, bilateral  wheezing, no crepitations.  Abdomen: Soft, non-tender, non-distended , no  rigidity or guarding.  CNS: Awake alert, CN  grossly intact.  Extremities:  No edema            CONSULTS:  Consultant(s) Notes Reviewed by me.   Care Discussed with Consultants/Other Providers where required.        MEDICATIONS:  MEDICATIONS  (STANDING):  albuterol/ipratropium for Nebulization 3 milliLiter(s) Nebulizer every 4 hours  atorvastatin 20 milliGRAM(s) Oral at bedtime  azithromycin  IVPB      azithromycin  IVPB 500 milliGRAM(s) IV Intermittent every 24 hours  budesonide 160 MICROgram(s)/formoterol 4.5 MICROgram(s) Inhaler 2 Puff(s) Inhalation two times a day  methylPREDNISolone sodium succinate Injectable 60 milliGRAM(s) IV Push two times a day  pantoprazole    Tablet 40 milliGRAM(s) Oral before breakfast  warfarin 1 milliGRAM(s) Oral once    MEDICATIONS  (PRN):  acetaminophen   Tablet .. 650 milliGRAM(s) Oral every 6 hours PRN Mild Pain (1 - 3)  albuterol/ipratropium for Nebulization 3 milliLiter(s) Nebulizer every 6 hours PRN Shortness of Breath and/or Wheezing  clonazePAM  Tablet 0.5 milliGRAM(s) Oral two times a day PRN anxiety            ASSESSMENT:            75 year old female patient with PMH of Afib (on Coumadin) s/p ablation and Micra PPM, COPD on home O2 as needed, left lung ca diagnosed in 2011 (surgery x2 -partial left lobectomy + chemo +RT, oncologist f/up at [facility]), former smoker quit in 2005 and anxiety presents to ED tfor evaluation of dyspnea. Patient is admitted for COPD exacerbation      Acute COPD exacerbation, acute  on chronic hypoxic res failure   Chronic  Afib on coumadin  status post PPM  Ho lung cancer s/p resection  Anxiety disorder  Dyslipidemia        Solumedrol 60 q12 for now  Azithromycin 5 days total  Trial of Ethacrynic acid( allergic to lasix)  Continue nebs q4h and prn with Symbicort  PPM interrogation-unremarkable  O2 as needed to maintain spo2 89-92%, currently requiring 3 L( at home on 2 L)  Chronic  Afib on coumadin-continue coumadin. INR in range  Dyslipidemia- continue statins  Continue current medical management for active chronic comorbid conditions.  DVT Prophylaxis as appropriate  Supportive care including activity, diet, goals of care discussed in AM rounds.  Discussed with  / in AM rounds  Handoff: Discharge Disposition: Acute inpatient management  required further .    
24H events:    Patient is a 75y old Female who presents with a chief complaint of COPD exacerbation (27 Apr 2021 11:03)    Primary diagnosis of COPD exacerbation    Today is hospital day 5d. This morning patient was seen and examined at bedside. She is anxious, but wants to go home. No events overnight. Was sleeping comfortably when I saw her. Has constipation but doesn't want anything for now.    PAST MEDICAL & SURGICAL HISTORY  High cholesterol    Anxiety    Pacemaker    Lung cancer    Afib    COPD (chronic obstructive pulmonary disease)    Artificial cardiac pacemaker    H/O atrioventricular kacie ablation    S/P lobectomy of lung  left    History of tonsillectomy    S/P appendectomy    Cataract  RIGHT  6/17 AND  LEFT  7/5/19      SOCIAL HISTORY:  Social History:      ALLERGIES:  bacitracin (Other)  carboplatin (Other)  sulfa drugs (Unknown)  sulfonamides (Unknown)  Xanax (Other)    MEDICATIONS:  STANDING MEDICATIONS  albuterol/ipratropium for Nebulization 3 milliLiter(s) Nebulizer every 4 hours  atorvastatin 20 milliGRAM(s) Oral at bedtime  azithromycin  IVPB      azithromycin  IVPB 500 milliGRAM(s) IV Intermittent every 24 hours  budesonide 160 MICROgram(s)/formoterol 4.5 MICROgram(s) Inhaler 2 Puff(s) Inhalation two times a day  pantoprazole    Tablet 40 milliGRAM(s) Oral before breakfast  predniSONE   Tablet 60 milliGRAM(s) Oral daily    PRN MEDICATIONS  acetaminophen   Tablet .. 650 milliGRAM(s) Oral every 6 hours PRN  albuterol/ipratropium for Nebulization 3 milliLiter(s) Nebulizer every 6 hours PRN  clonazePAM  Tablet 0.5 milliGRAM(s) Oral two times a day PRN  polyethylene glycol 3350 17 Gram(s) Oral daily PRN    VITALS:   T(F): 96.4  HR: 72  BP: 138/63  RR: 20  SpO2: 95%    PHYSICAL EXAM:  GENERAL: Not in distress, lying supine in bed, on 2l O2 by NC  HEAD:  Atraumatic, Normocephalic  EYES: EOMI  NECK: Supple  NERVOUS SYSTEM:  Alert & Oriented X3, non focal   CHEST/LUNG: Bilateral wheezing on auscultation  HEART: Regular rate and rhythm; No murmurs, rubs, or gallops  ABDOMEN: Soft, Nontender, Nondistended; Bowel sounds present  EXTREMITIES:  2+ Peripheral Pulses, No clubbing, cyanosis, or edema  LYMPH: No lymphadenopathy noted  SKIN: No rashes or lesions  LABS:                        10.5   9.14  )-----------( 162      ( 28 Apr 2021 07:21 )             33.6     04-27    141  |  95<L>  |  19  ----------------------------<  248<H>  5.1<H>   |  34<H>  |  0.8    Ca    9.5      27 Apr 2021 12:09    TPro  6.6  /  Alb  4.3  /  TBili  0.4  /  DBili  x   /  AST  21  /  ALT  17  /  AlkPhos  88  04-27    PT/INR - ( 28 Apr 2021 07:21 )   PT: 38.40 sec;   INR: 3.34 ratio             RADIOLOGY:          
CASEY POWERS 75y Female  MRN#: [id number]   CODE STATUS:__full____      SUBJECTIVE  Patient is a 75y old Female who presents with a chief complaint of COPD exacerbation (23 Apr 2021 10:47)  Currently admitted to medicine with the primary diagnosis of COPD exacerbation      Today is hospital day 1d, and this morning she is continuing to report shallow breaths/shortness of breath but denies chest pain/dizziness/palpitations.          OBJECTIVE  PAST MEDICAL & SURGICAL HISTORY  High cholesterol    Anxiety    Pacemaker    Lung cancer    Afib    COPD (chronic obstructive pulmonary disease)    Artificial cardiac pacemaker    H/O atrioventricular kacie ablation    S/P lobectomy of lung  left    History of tonsillectomy    S/P appendectomy    Cataract  RIGHT  6/17 AND  LEFT  7/5/19      ALLERGIES:  bacitracin (Other)  carboplatin (Other)  sulfa drugs (Unknown)  sulfonamides (Unknown)  Xanax (Other)    MEDICATIONS:  STANDING MEDICATIONS  atorvastatin 20 milliGRAM(s) Oral at bedtime  azithromycin  IVPB      azithromycin  IVPB 500 milliGRAM(s) IV Intermittent every 24 hours  budesonide 160 MICROgram(s)/formoterol 4.5 MICROgram(s) Inhaler 2 Puff(s) Inhalation two times a day  methylPREDNISolone sodium succinate Injectable 60 milliGRAM(s) IV Push two times a day    PRN MEDICATIONS  acetaminophen   Tablet .. 650 milliGRAM(s) Oral every 6 hours PRN  albuterol/ipratropium for Nebulization 3 milliLiter(s) Nebulizer every 6 hours PRN  clonazePAM  Tablet 0.5 milliGRAM(s) Oral two times a day PRN      VITAL SIGNS: Last 24 Hours  T(C): 36 (24 Apr 2021 08:00), Max: 36 (24 Apr 2021 08:00)  T(F): 96.8 (24 Apr 2021 08:00), Max: 96.8 (24 Apr 2021 08:00)  HR: 69 (24 Apr 2021 08:00) (69 - 74)  BP: 142/63 (24 Apr 2021 08:00) (138/59 - 151/67)  BP(mean): --  RR: 18 (24 Apr 2021 08:00) (17 - 18)  SpO2: 99% (24 Apr 2021 08:00) (97% - 100%)    LABS:                        9.4    6.35  )-----------( 158      ( 24 Apr 2021 05:57 )             29.8     04-24    140  |  99  |  13  ----------------------------<  124<H>  4.4   |  36<H>  |  0.7    Ca    9.2      24 Apr 2021 05:57    TPro  6.4  /  Alb  4.1  /  TBili  0.5  /  DBili  x   /  AST  15  /  ALT  7   /  AlkPhos  86  04-23    PT/INR - ( 24 Apr 2021 05:57 )   PT: 31.80 sec;   INR: 2.77 ratio         PTT - ( 23 Apr 2021 08:21 )  PTT:44.2 sec          CARDIAC MARKERS ( 23 Apr 2021 07:30 )  x     / <0.01 ng/mL / x     / x     / x          RADIOLOGY:  < from: Xray Chest 1 View-PORTABLE IMMEDIATE (04.23.21 @ 07:50) >  Impression:    Right basilar opacity, unchanged.    Chronic left apical postsurgical changes.    < end of copied text >    < from: TTE Echo Complete w/o Contrast w/ Doppler (03.24.21 @ 07:01) >    Summary:   1. Normal global left ventricular systolic function.   2. LV Ejection Fraction by Cahppell's Method with a biplane EF of 62 %.   3. Mildly enlarged left atrium.   4. Normal right atrial size.   5. Findingsare consistent with cardiac tamponade.   6. Mild to moderate mitral valve regurgitation.   7. Mitral annular calcification.   8. Thickening and calcification of the anterior and posterior mitral valve leaflets.   9. Moderate tricuspid regurgitation.  10. Mild aortic regurgitation.  11. Sclerotic aortic valve with normal opening.  12. Mild pulmonic valve regurgitation.  13. Estimated pulmonary artery systolic pressure is 61.3 mmHg assuming a right atrial pressure of 8 mmHg, which is consistent with severe pulmonary hypertension.    < end of copied text >    PHYSICAL EXAM:    GENERAL: NAD, well-developed, AAOx3  HEENT:  Atraumatic, Normocephalic. EOMI, PERRLA,   PULMONARY: bilateral wheezing L>R.   CARDIOVASCULAR: regular rate and rhythm; No murmurs, rubs, or gallops  GASTROINTESTINAL: Soft, Nontender, Nondistended; Bowel sounds present  MUSCULOSKELETAL:  2+ Peripheral Pulses, No clubbing, cyanosis, or edema  NEUROLOGY: non-focal  SKIN: No rashes or lesions      ADMISSION SUMMARY  Patient is a 75y old Female who presents with a chief complaint of COPD exacerbation (23 Apr 2021 10:47)  Currently admitted to medicine with the primary diagnosis of COPD exacerbation    Hospital course has been complicated by _______.       ASSESSMENT & PLAN  75 year old female patient with PMH of Afib (on Coumadin) s/p ablation and Micra PPM, COPD on home O2 as needed, left lung ca diagnosed in 2011 (surgery x2 -partial left lobectomy + chemo +RT, oncologist f/up at [facility]), former smoker quit in 2005 and anxiety presents to ED tfor evaluation of dyspnea. Patient is admitted for COPD exacerbation    #Worsening dyspnea; likely multifactorial in the setting of worsening severe COPD with acute decompensation + Severe pulmonary hypertension likely GROUP 3 (PA pressure 61.3mmHg)  - Pt appears comfortable, vital signs stable, no increased oxygen requirement. C/w 3LNC (Baseline O2), increase as needed to maintain SpO2>92%.  - s/p solumedrol 125mg x1 on 4/23, started Solumderol 60mg q12hr 4/24. patient continues to have wheezing  - C/w Symbicort q12hr, albuterol q6hr prn, nebs q4h and prn  - s/p Ethacrynic acid 25mg x1 4/23 (hx of allergy to sulfa drugs)  - Monitor off antibiotics except azithromycin for 5 days (started 4/23).  - Pulmonary eval for severe pulmonary htn: recs appreciated. F/u pulm     #Hx of Afib s/p micra PPM  - ECG: V-paced rhythm  - Pt is rate controlled off AV-kacie blockers  - pt's ppm interrogated and functional without events per EP 4/23.   - C/w Coumadin 1mg daily for anticoagulation; INR 2.16 on 4/23, 2.77 4/24.   - daily INR    #DLD - c/w statin    DVT PPX: on coumadin  Diet DASH  GI ppx : pantoprazole  activity : iat   Dispo: acute      
24H events:    Patient is a 75y old Female who presents with a chief complaint of SOB and cough (25 Apr 2021 13:37)    Primary diagnosis of COPD exacerbation       Today is hospital day 3d. This morning patient was seen and examined at bedside. Patient mentioned that she still doesn't feel well. Complaining of non productive cough and SOB. She also complains of constipation of 3 days duration.    PAST MEDICAL & SURGICAL HISTORY  High cholesterol    Anxiety    Pacemaker    Lung cancer    Afib    COPD (chronic obstructive pulmonary disease)    Artificial cardiac pacemaker    H/O atrioventricular kacie ablation    S/P lobectomy of lung  left    History of tonsillectomy    S/P appendectomy    Cataract  RIGHT  6/17 AND  LEFT  7/5/19      SOCIAL HISTORY:  Social History:      ALLERGIES:  bacitracin (Other)  carboplatin (Other)  sulfa drugs (Unknown)  sulfonamides (Unknown)  Xanax (Other)    MEDICATIONS:  STANDING MEDICATIONS  albuterol/ipratropium for Nebulization 3 milliLiter(s) Nebulizer every 4 hours  atorvastatin 20 milliGRAM(s) Oral at bedtime  azithromycin  IVPB      azithromycin  IVPB 500 milliGRAM(s) IV Intermittent every 24 hours  budesonide 160 MICROgram(s)/formoterol 4.5 MICROgram(s) Inhaler 2 Puff(s) Inhalation two times a day  pantoprazole    Tablet 40 milliGRAM(s) Oral before breakfast  warfarin 1 milliGRAM(s) Oral once    PRN MEDICATIONS  acetaminophen   Tablet .. 650 milliGRAM(s) Oral every 6 hours PRN  albuterol/ipratropium for Nebulization 3 milliLiter(s) Nebulizer every 6 hours PRN  clonazePAM  Tablet 0.5 milliGRAM(s) Oral two times a day PRN  polyethylene glycol 3350 17 Gram(s) Oral daily PRN    VITALS:   T(F): 96.4  HR: 77  BP: 136/63  RR: 20  SpO2: 100%    PHYSICAL EXAM:  GENERAL: Not in distress, lying supine in bed  HEAD:  Atraumatic, Normocephalic  EYES: EOMI  NECK: Supple  NERVOUS SYSTEM:  Alert & Oriented X3, non focal   CHEST/LUNG: Bilateral wheezing on auscultation  HEART: Regular rate and rhythm; No murmurs, rubs, or gallops  ABDOMEN: Soft, Nontender, Nondistended; Bowel sounds present  EXTREMITIES:  2+ Peripheral Pulses, No clubbing, cyanosis, or edema  LYMPH: No lymphadenopathy noted  SKIN: No rashes or lesions  LABS:                        9.8    7.50  )-----------( 168      ( 26 Apr 2021 07:14 )             31.3     04-26    140  |  98  |  20  ----------------------------<  166<H>  5.0   |  37<H>  |  0.7    Ca    9.5      26 Apr 2021 07:14  Phos  3.3     04-26  Mg     2.1     04-26    TPro  6.6  /  Alb  4.1  /  TBili  0.4  /  DBili  x   /  AST  12  /  ALT  8   /  AlkPhos  78  04-26    PT/INR - ( 26 Apr 2021 07:14 )   PT: 38.90 sec;   INR: 3.38 ratio             RADIOLOGY:          
Patient is a 75y old  Female who presents with a chief complaint of COPD exacerbation (24 Apr 2021 10:20)        SUBJECTIVE:  patient seen and examined at the bedside   remains on 3 liters nc    REVIEW OF SYSTEMS:    All Pertinent ROS are negative except per HPI       PHYSICAL EXAM  Vital Signs Last 24 Hrs  T(C): 36.4 (25 Apr 2021 08:30), Max: 36.4 (25 Apr 2021 08:30)  T(F): 97.5 (25 Apr 2021 08:30), Max: 97.5 (25 Apr 2021 08:30)  HR: 71 (25 Apr 2021 04:40) (70 - 72)  BP: 137/66 (25 Apr 2021 04:40) (137/66 - 168/68)  BP(mean): --  RR: 18 (25 Apr 2021 04:40) (18 - 18)  SpO2: 99% (24 Apr 2021 17:04) (99% - 99%)    CONSTITUTIONAL:  ill appearing, frail    ENT:   Airway patent,   No thrush  on 3 liters NC    CARDIAC:   Normal rate,   regular rhythm.    no edema      RESPIRATORY:   + Wheezing  Normal chest expansion  mild tachypneic,  No use of accessory muscles      GASTROINTESTINAL:  Abdomen soft,   non-tender,   no guarding,     MUSCULOSKELETAL:   range of motion is not limited,  no clubbing, cyanosis    NEUROLOGICAL:   Alert and oriented   no motor or deficits.    SKIN:   Skin normal color for race,   warm, dry   No evidence of rash.      04-24-21 @ 07:01  -  04-25-21 @ 07:00  --------------------------------------------------------  IN:    Oral Fluid: 120 mL  Total IN: 120 mL    OUT:  Total OUT: 0 mL    Total NET: 120 mL          LABS:                          9.4    6.18  )-----------( 170      ( 25 Apr 2021 06:34 )             29.9                                               04-25    138  |  97<L>  |  16  ----------------------------<  157<H>  4.9   |  29  |  0.6<L>    Ca    9.5      25 Apr 2021 06:34  Phos  3.1     04-25  Mg     1.9     04-25    TPro  6.3  /  Alb  4.1  /  TBili  0.4  /  DBili  x   /  AST  14  /  ALT  7   /  AlkPhos  84  04-25      PT/INR - ( 25 Apr 2021 06:34 )   PT: 28.50 sec;   INR: 2.48 ratio                                                                                              LIVER FUNCTIONS - ( 25 Apr 2021 06:34 )  Alb: 4.1 g/dL / Pro: 6.3 g/dL / ALK PHOS: 84 U/L / ALT: 7 U/L / AST: 14 U/L / GGT: x                                                                                                MEDICATIONS  (STANDING):  albuterol/ipratropium for Nebulization 3 milliLiter(s) Nebulizer every 4 hours  atorvastatin 20 milliGRAM(s) Oral at bedtime  azithromycin  IVPB      azithromycin  IVPB 500 milliGRAM(s) IV Intermittent every 24 hours  budesonide 160 MICROgram(s)/formoterol 4.5 MICROgram(s) Inhaler 2 Puff(s) Inhalation two times a day  methylPREDNISolone sodium succinate Injectable 60 milliGRAM(s) IV Push two times a day  pantoprazole    Tablet 40 milliGRAM(s) Oral before breakfast  warfarin 1 milliGRAM(s) Oral once    MEDICATIONS  (PRN):  acetaminophen   Tablet .. 650 milliGRAM(s) Oral every 6 hours PRN Mild Pain (1 - 3)  albuterol/ipratropium for Nebulization 3 milliLiter(s) Nebulizer every 6 hours PRN Shortness of Breath and/or Wheezing  clonazePAM  Tablet 0.5 milliGRAM(s) Oral two times a day PRN anxiety      X-Rays reviewed    CXR interpreted by me:

## 2021-05-11 NOTE — PACU DISCHARGE NOTE - AIRWAY PATENCY:
Satisfactory Tissue Cultured Epidermal Autograft Text: The defect edges were debeveled with a #15 scalpel blade.  Given the location of the defect, shape of the defect and the proximity to free margins a tissue cultured epidermal autograft was deemed most appropriate.  The graft was then trimmed to fit the size of the defect.  The graft was then placed in the primary defect and oriented appropriately.

## 2021-05-17 NOTE — ED PROVIDER NOTE - PHYSICAL EXAMINATION
VITALS:  I have reviewed the initial vital signs.  GENERAL: Well-developed, well-nourished, in mild resp distress. Nontoxic.  HEENT: Sclera clear. EOMI, PERRLA. Mucous membranes moist.  NECK: supple w FROM. No JVD.  CARDIO: RRR, nl S1 and S2. No murmurs, rubs, or gallops. No peripheral edema. 2+ radial and pedal pulses bilaterally. No chest wall tenderness.  PULM: Mild tachypnea, diffuse exp wheezing. No stridor.  MSK: No leg warmth, swelling, erythema, or ttp.  GI: Abdomen soft and non-distended. Nontender.  SKIN: Warm, dry. No pallor. No rash.  NEURO: A&Ox3. Speech clear. No focal deficits.  PSYCH: Calm and cooperative.

## 2021-05-17 NOTE — H&P ADULT - HISTORY OF PRESENT ILLNESS
76 yo F with pmh of anxiety, COPD (on 3 L home O2 ) and  lung CA s/p chemo/radiation/ and partial left lobectomy in remission x 9 years, severe pulmonary hypertension - follows with Dr. Ernestine Mcpherson outpatient , Afib on Coumadin s/p ablation and micra PPM. She now presents to the hospital with worsening shortness of breath and wheezing and 2 episodes of hemoptysis. The patient states symptoms have been ongoing since her recent admission for COPD exacerbation and has been worsening over the last few years limiting her quality of life due to difficulty ambulating due to shortness of breath.  She was discharged on 4/27/21 feeling better but still short of breath and had acute on chronic dyspnea today with hemoptysis which prompted her come to the hospital. The patient denies fevers/chills/dizziness/chest pain/ abd pain/ constipation/ diarrhea/ dysuria. all ros negative except as above.     T(C): 36.4, HR: 72 , BP: 151/63 , RR: 18, SpO2: 100%  Labs: Hb 8.4 (baseline ~ 9.5), pbnp 700   CXR : appears stable from 4/28/21   CT Angio Chest PE Protocol w/ IV Cont (05.17.21 @ 18:23) >  1.  No pulmonary embolus.  2.  Stable posttreatment changes in the left lung as described above.  3.  Scattered bilateral tree-in-bud branching nodularity with areas of mucus plugging in the left lung. Findings are compatible with infectious/inflammatory airways disease.  4.  Cardiomegaly with evidence of mild right heart dysfunction.           74 yo F with pmh of anxiety, COPD (on 3 L home O2 ) with multiple admissions for recurrent exacerbations ,   lung CA s/p chemo/radiation/ and partial left lobectomy in remission x 9 years, severe pulmonary hypertension - follows with Dr. Ernestine Mcpherson outpatient , Afib on Coumadin s/p ablation and micra PPM. She now presents to the hospital with worsening shortness of breath and wheezing and 2 episodes of hemoptysis. The patient states symptoms have been ongoing since her recent admission for COPD exacerbation and has been worsening over the last few years limiting her quality of life due to difficulty ambulating due to shortness of breath.  She was discharged on 4/27/21 feeling better but still short of breath and had acute on chronic dyspnea today with hemoptysis which prompted her come to the hospital. The patient denies fevers/chills/dizziness/chest pain/ abd pain/ constipation/ diarrhea/ dysuria. all ros negative except as above.     T(C): 36.4, HR: 72 , BP: 151/63 , RR: 18, SpO2: 100%  Labs: Hb 8.4 (baseline ~ 9.5), pbnp 700   CXR : appears stable from 4/28/21   CT Angio Chest PE Protocol w/ IV Cont (05.17.21 @ 18:23) >  1.  No pulmonary embolus.  2.  Stable posttreatment changes in the left lung as described above.  3.  Scattered bilateral tree-in-bud branching nodularity with areas of mucus plugging in the left lung. Findings are compatible with infectious/inflammatory airways disease.  4.  Cardiomegaly with evidence of mild right heart dysfunction.

## 2021-05-17 NOTE — H&P ADULT - NSHPLABSRESULTS_GEN_ALL_CORE
9.5    4.56  )-----------( 111      ( 17 May 2021 15:28 )             29.5     05-17-21 @ 15:28    139  |  103  |  11             --------------------------< 156<H>     3.6  |  28  | 0.7    eGFR AA: 98  eGFR N-AA: 85    Calcium: 8.8  Phosphorus: --  Magnesium: --    AST: 13    ALT: 9  AlkPhos: 76  Protein: 5.7<L>  Albumin: 3.9  TBili: 0.3  D-Bili: --    Serum Pro-Brain Natriuretic Peptide: 704 pg/mL (05.17.21 @ 15:28)    Troponin T, Serum: <0.01 ng/mL (05.17.21 @ 15:28)    < from: CT Angio Chest PE Protocol w/ IV Cont (05.17.21 @ 18:23) >    IMPRESSION:  1.  No pulmonary embolus.  2.  Stable posttreatment changes in the left lung as described above.  3.  Scattered bilateral tree-in-bud branching nodularity with areas of mucus plugging in the left lung. Findings are compatible with infectious/inflammatory airways disease.  4.  Cardiomegaly with evidence of mild right heart dysfunction.    < end of copied text >

## 2021-05-17 NOTE — ED PROVIDER NOTE - CHIEF COMPLAINT
Please notify lab work was reassuring.   The patient is a 75y Female complaining of shortness of breath.

## 2021-05-17 NOTE — ED PROVIDER NOTE - PROGRESS NOTE DETAILS
CTA chest is negative for PE, mucous plugging seen which may contribute to wheezing.   Labs are at baseline.  Will admit for observation of hemoptysis. MAR aware of admission.

## 2021-05-17 NOTE — ED ADULT NURSE NOTE - OBJECTIVE STATEMENT
patient alert and oriented  x4 complaining of worsening SOB x1 month. pt has hx of COPD and uses home O2

## 2021-05-17 NOTE — H&P ADULT - ASSESSMENT
76 yo F with pmh of anxiety, COPD (on 3 L home O2 ) and  lung CA s/p chemo/radiation/ and partial left lobectomy in remission x 9 years, severe pulmonary hypertension - follows with Dr. Ernestine Mcpherson outpatient , Afib on Coumadin s/p ablation and micra PPM. She now presents to the hospital with worsening shortness of breath and wheezing and 2 episodes of hemoptysis after discharge from the hospital in April.       # Deconditioning due to COPD / lung cancer radiation and lobectomy/ severe pulm HTN  - SpO2 96% on 3L home O2  , at baseline here  - was recently admitted with similar symptoms , was on prednisone taper  - s/p solumedrol in ED, c./w solumedrol 60 q12  - TTE 62% // severe pulm HTN (3/24/21)   - Pulm c/s Dr. Ernestine Mcpherson   - recently completed Azithromycin abx  , monitor off abx   - c/w home inhalers  - duonebs q6h and prn  - Albuterol prn     # Hemoptysis       # Incidental finding of  Large pericardial effusion with right ventricular collapse on TTE 3/24/21   - hemodynamically stable now , monitor  -  CT sx eval if hemodynamic instability   - monitor     # h/o Afib on Coumadin s/p ablation and micra PPM  - hold coumadin , given episode of hemoptysis and Hb 8.4 (baseline ~ 9.5)  - INR 2.3   - not on rate control medications at home     # DVT PPX: on coumadin, held tonight  # GI PPX: PPI  # Diet: dash/tlc  # CHG BATH  # Activity :as tolerated  76 yo F with pmh of anxiety, COPD (on 3 L home O2 ) and  lung CA s/p chemo/radiation/ and partial left lobectomy in remission x 9 years, severe pulmonary hypertension - follows with Dr. Ernestine Mcpherson outpatient , Afib on Coumadin s/p ablation and micra PPM. She now presents to the hospital with worsening shortness of breath and wheezing and 2 episodes of hemoptysis after discharge from the hospital in April.       # Deconditioning due to COPD / lung cancer radiation and lobectomy/ severe pulm HTN  - SpO2 96% on 3L home O2  , at baseline here  - was recently admitted with similar symptoms , was on prednisone taper and completed azithromycin   - s/p solumedrol in ED, c./w solumedrol 60 q12  - pbnp 700 ( below baseline) , trop neg, EKG no changes   - CXR appears stable, f/u   - TTE 62% // severe pulm HTN (3/24/21)   - Pulm c/s Dr. Ernestine Mcpherson   - c/w home inhalers  - duonebs q6h and prn  - Albuterol prn     # Hemoptysis , likely secondary to bronchitis   - Hb 8.4 , baseline 9.5 , monitor  - hold coumadin  - active t&s  - monitor     # Incidental finding of  Large pericardial effusion with right ventricular collapse on TTE 3/24/21   - hemodynamically stable now   -  CT sx eval if hemodynamic instability   - monitor     # h/o Afib on Coumadin s/p ablation and micra PPM  - hold coumadin , given episode of hemoptysis and Hb 8.4 (baseline ~ 9.5)  - INR 2.3   - not on rate control medications at home     # h.o HLD  - c/w statin    # h/o anxiety  - c/w clonazepam prn home dose     # DVT PPX: on coumadin, held tonight  # GI PPX: PPI  # Diet: dash/tlc  # CHG BATH  # Activity :as tolerated   # Dispo :acute  76 yo F with pmh of anxiety, COPD (on 3 L home O2 ) and  lung CA s/p chemo/radiation/ and partial left lobectomy in remission x 9 years, severe pulmonary hypertension - follows with Dr. Ernestine Mcpherson outpatient , Afib on Coumadin s/p ablation and micra PPM. She now presents to the hospital with worsening shortness of breath and wheezing and 2 episodes of hemoptysis after discharge from the hospital in April.       # Deconditioning due to COPD/ severe pulm HTN /  cor pulmonale   # h/o lung cancer in remission s/p radiation/chemo/ left partial lobectomy   - SpO2 96% on 3L home O2  , at baseline here , goal 88-92%   - afebrile, no fever , no wbc   - was recently admitted with similar symptoms , was on prednisone taper and completed azithromycin   - s/p solumedrol in ED, c./w solumedrol 60 q12  CT Angio Chest PE Protocol w/ IV Cont (05.17.21 @ 18:23) >  1.  No pulmonary embolus.  2.  Stable posttreatment changes in the left lung as described above.  3.  Scattered bilateral tree-in-bud branching nodularity with areas of mucus plugging in the left lung. Findings are compatible with infectious/inflammatory airways disease.  4.  Cardiomegaly with evidence of mild right heart dysfunction.  - pbnp 700 ( below baseline) , trop neg, EKG no changes   - CXR appears stable, f/u   - TTE 62% // severe pulm HTN (3/24/21)   - Pulm c/s Dr. Ernestine Mcpherson   - c/w home inhalers  - duonebs q6h and prn  - Mg 2g x1   - Albuterol prn     # Hemoptysis , likely secondary to bronchitis   - Hb 8.4 , baseline 9.5 , monitor  - hold coumadin  - active t&s  - monitor     # Incidental finding of  Large pericardial effusion with right ventricular collapse on TTE 3/24/21   - hemodynamically stable now   - CT sx eval if hemodynamic instability   - monitor     # h/o Afib on Coumadin s/p ablation and micra PPM  - hold coumadin , given episode of hemoptysis and Hb 8.4 (baseline ~ 9.5)  - INR 2.3   - not on rate control medications at home     # h.o HLD  - c/w statin    # h/o anxiety  - c/w clonazepam prn home dose     # DVT PPX: on coumadin, held tonight  # GI PPX: PPI  # Diet: regular   # CHG BATH  # Activity :as tolerated   # Dispo :acute

## 2021-05-17 NOTE — H&P ADULT - ATTENDING COMMENTS
74 yo F with pmh of anxiety, COPD (on 3 L home O2 ) with multiple admissions for recurrent exacerbations, lung CA s/p chemo/radiation/ and partial left lobectomy in remission x 9 years, severe pulmonary hypertension - follows with Dr. Ernestine Mcpherson outpatient , Afib on Coumadin s/p ablation and micra PPM. She now presents to the hospital with chronic shortness of breath and wheezing and 2 episodes of hemoptysis. The patient states symptoms have been ongoing since her recent admission for COPD exacerbation and has been worsening over the last few years limiting her quality of life due to difficulty ambulating due to shortness of breath.  She was discharged on 4/27/21 feeling better but still short of breath and had acute on chronic dyspnea today with hemoptysis which prompted her come to the hospital.     Denies CP, N/V/D/C/AP, cough, F, chills, dizziness, new focal weakness, HA, vision changes, dysuria, or urinary symptoms, blood in stool.    ROS: all systems unremarkable except as above.     Gen: NAD, AA0x3, chronically ill appearing, cachectic, pursed lip breathing  HEENT: PERRLA, EOMI, no LAD  CV: nl S1 S2  Resp: decreased BS b/l  GI: NT/ND/S +BS  MS: no c/c/e, +pulses  Neuro: nonfocal, +reflexes    EKG - nonspecific changes (my read)  Chart and consultant notes personally reviewed.  Care Discussed with Consultants/Other Providers/ Housestaff [ x] YES [ ] NO   Radiology, labs, old records personally reviewed.    74 yo F with pmh of anxiety, COPD (on 3 L home O2 ) and  lung CA s/p chemo/radiation/ and partial left lobectomy in remission x 9 years, severe pulmonary hypertension - follows with Dr. Ernestine Mcpherson outpatient , Afib on Coumadin s/p ablation and micra PPM. She now presents to the hospital with worsening shortness of breath and wheezing and 2 episodes of hemoptysis after discharge from the hospital in April.       # Acute on chronic resp failure due to COPD/ severe pulm HTN   # h/o lung cancer in remission s/p radiation/chemo/ left partial lobectomy   - SpO2 96% on 3L home O2  , at baseline here , goal 88-92%   - afebrile, no fever , no wbc   - was recently admitted with similar symptoms , was on prednisone taper and completed azithromycin   - s/p solumedrol in ED,   - CT Angio Chest PE Protocol w/ IV Cont (05.17.21 @ 18:23) >No pulmonary embolus. Stable posttreatment changes in the left lung as described above. Scattered bilateral tree-in-bud branching nodularity with areas of mucus plugging in the left lung. Findings are compatible with infectious/inflammatory airways disease. Cardiomegaly with evidence of mild right heart dysfunction.  - bnp 700 ( below baseline) , trop neg, EKG no changes   - CXR appears stable  - TTE 62% // severe pulm HTN, large pericardial effusion (3/24/21)   - Mg 2g x1   - seen by pulm, rec:      - ABG, ECHO, VDUS, Procal     -  C/w Symbicort, Solumedrol 60 q12h for now, Duonebs q4h and PRN, Azithromycin 500mg IVPB x5 days     - Chest PT, Mucinex, Incentive spirometry, Pulmonary rehab upon DC, PT/ OT eval    # Hemoptysis , likely secondary to bronchitis   - Hb 8.4 , baseline 9.5 , monitor  - resume coumadin  - active t&s  - monitor     # Incidental finding of  Large pericardial effusion with right ventricular collapse on TTE 3/24/21   - repeat echo     # h/o Afib on Coumadin s/p ablation and micra PPM  - resume coumadin   - INR 2.3   - not on rate control medications at home     # h.o HLD  - c/w statin    # h/o anxiety  - c/w clonazepam prn home dose     # DVT PPX: on coumadin  # GI PPX: PPI  # Diet: regular   # CHG BATH  # Activity :as tolerated   # Dispo :acute     High risk pt.    #Progress Note Handoff  Pending: ABG, Echo,_Clinical improvement and stability__x___Tests________PT____x____  Pt/Family discussion: Pt informed and agrees with the current plan  Disposition: Home______/SNF___?____/4A______/To be determined____x____    My note supersedes the residents note should a discrepancy arise.    Chart and consultant notes personally reviewed.  Care Discussed with Consultants/Other Providers/ Housestaff [ x] YES [ ] NO   Radiology, labs, old records personally reviewed.

## 2021-05-17 NOTE — ED PROVIDER NOTE - ATTENDING CONTRIBUTION TO CARE
74 yo female PMH lung cancer s/p partial left lobe resection/chemo about 9 years ago, COPD on 3L via NC at all times, a.fib on Coumadin c/o persistent wheezing and SOB since recent hospital admission.  She used to use oxygen only as needed, but now uses it all the time.  Reports poor appetite, generalized weakness and weight loss. Today she had 2 episodes of hemoptysis( bright red blood).  Denies any CP, N/V/abdominal pain , no leg pain or swelling, no other additional complaints.  Elderly female on a stretcher in NAD, PERRL, mmm, nml phonation, no acute respiratory distress, but she is occasionally pursed lip breathing, diffuse inspiratory and expiratory wheezing on the left hemithorax, irreg irreg rate, distal pulses intact, 76 yo female PMH lung cancer s/p partial left lobe resection/chemo about 9 years ago, COPD on 3L via NC at all times, a.fib on Coumadin c/o persistent wheezing and SOB since recent hospital admission.  She used to use oxygen only as needed, but now uses it all the time.  Reports poor appetite, generalized weakness and weight loss. Today she had 2 episodes of hemoptysis( bright red blood).  Denies any CP, N/V/abdominal pain , no leg pain or swelling, no other additional complaints.  Elderly female on a stretcher in NAD, PERRL, mmm, nml phonation, no acute respiratory distress, but she is occasionally pursed lip breathing, diffuse inspiratory and expiratory wheezing on the left hemithorax, irreg irreg rate, distal pulses intact, abdomen soft NT/ND, no pitting leg edema or unilateral calf ttp, patient is anxious , but pleasant and cooperative.  CXR appears unchanged from prior.  Will get labs, CTA chest.

## 2021-05-17 NOTE — PATIENT PROFILE ADULT - HOW PATIENT ADDRESSED, PROFILE
Patient Name: Eric Landeros  Procedure Date: 11/6/2019 10:10 AM  MRN: [id number]  Account Number: [id number]  YOB: 1935  Age: 83  Attending MD: Alonzo Bates DO  Grafts or Implants: No  Procedure:            Upper GI endoscopy  Indications:          Dyspepsia, Esophageal dysphagia  Patient Profile:      This is an 83 year old male.  Providers:            Alonzo Bates DO, Mitchell Krause DO  Referring MD:         Constance Rodriguez Md  Attending Participation:       I personally performed the entire procedure.  Sedation:             General Anesthesia  Procedure:       Pre-Anesthesia Assessment:       - Prior to the procedure, a History and Physical was performed, and       patient medications and allergies were reviewed. The patient's tolerance       of previous anesthesia was also reviewed. The risks and benefits of the       procedure and the sedation options and risks were discussed with the       patient. All questions were answered, and informed consent was obtained.       Prior Anticoagulants: The patient has taken no previous anticoagulant or       antiplatelet agents. ASA Grade Assessment: III - A patient with severe       systemic disease. After reviewing the risks and benefits, the patient       was deemed in satisfactory condition to undergo the procedure.       The endoscope was passed under direct vision. The endoscope was       introduced through the mouth, and advanced to the second part of       duodenum. The physical status of the patient was re-assessed after the       procedure. The upper GI endoscopy was accomplished without difficulty.       The patient tolerated the procedure well.  Findings:       The examined esophagus was normal.       One non-bleeding superficial gastric ulcer with no stigmata of bleeding       was found in the gastric antrum. The lesion was 6 mm in largest       dimension. Biopsies were taken with a cold forceps for histology.       Verification of patient  identification for the specimen was done.       Estimated blood loss was minimal.       The examined duodenum was normal.  Impression:       - Normal esophagus.       - Non-bleeding gastric ulcer with no stigmata of bleeding. Biopsied.       - Normal examined duodenum.  Recommendation:       - Return patient to [facility] for ongoing care.       - Resume previous diet.       - Continue present medications.       - Use Protonix (pantoprazole) 40 mg PO daily for 12 weeks.       - Please call the office in 1 week for biopsy results: [phone]  Complications:        No immediate complications.  Estimated Blood Loss: Estimated blood loss was minimal.  DO Alonzo Estrella DO  11/6/2019 10:39:08 AM  This report has been signed electronically by the above.  Mitchell Krause DO  Number of Addenda: 0   Mrs Joseph

## 2021-05-17 NOTE — ED PROVIDER NOTE - NS ED ROS FT
CONSTITUTIONAL: (-) fevers, (-) chills  EYES: (-) vision changes, (-) photophobia  ENT: (-) congestion, (-) rhinorrhea, (-) sore throat  NECK: (-) neck pain, (-) neck stiffness  CARDIO: (-) chest pain, (-) palpitations, (-) edema  PULM: see HPI  GI: (-) nausea, (-) vomiting, (-) diarrhea, (-) constipation, (-) abdominal pain, (-) melena, (-) hematochezia  : (-) dysuria, (-) hematuria, (-) frequency, (-) urgency, (-) flank pain  ENDO: (-) polyuria, (-) polydipsia, (-) polyphagia  HEME: see HPI  MSK: (-) back pain, (-) myalgias  SKIN: (-) rashes, (-) pallor  NEURO: (-) headache, (-) dizziness, (-) lightheadedness, (-) weakness, (-) syncope    *all other systems negative except as documented above and in the HPI*

## 2021-05-17 NOTE — H&P ADULT - NSHPPHYSICALEXAM_GEN_ALL_CORE
Vital Signs (24 Hrs):  T(C): 35.7 (05-17-21 @ 23:18), Max: 36.4 (05-17-21 @ 14:16)  HR: 73 (05-17-21 @ 23:18) (72 - 73)  BP: 154/70 (05-17-21 @ 23:18) (151/63 - 154/70)  RR: 19 (05-17-21 @ 23:18) (18 - 19)  SpO2: 96% (05-17-21 @ 23:18) (96% - 100%) Vital Signs (24 Hrs):  T(C): 35.7 (05-17-21 @ 23:18), Max: 36.4 (05-17-21 @ 14:16)  HR: 73 (05-17-21 @ 23:18) (72 - 73)  BP: 154/70 (05-17-21 @ 23:18) (151/63 - 154/70)  RR: 19 (05-17-21 @ 23:18) (18 - 19)  SpO2: 96% (05-17-21 @ 23:18) (96% - 100%)    PHYSICAL EXAM:  GENERAL: NAD, lying in bed comfortably , appears anxious, breathing with pursed lips  HEAD:  Atraumatic, Normocephalic  EYES: EOMI, PERRLA, conjunctiva and sclera clear  ENT: Moist mucous membranes  NECK: Supple, No JVD  CHEST/LUNG: B/L DIFFUSE WHEEZING  HEART: Regular rate and rhythm; No murmurs, rubs, or gallops  ABDOMEN: Bowel sounds present; Soft, Nontender, Nondistended. No hepatomegally  EXTREMITIES:  2+ Peripheral Pulses, brisk capillary refill. No clubbing, cyanosis, or edema  NERVOUS SYSTEM:  Alert & Oriented X3, speech clear. No deficits   MSK: FROM all 4 extremities, full and equal strength  SKIN: No rashes or lesions

## 2021-05-17 NOTE — ED PROVIDER NOTE - OBJECTIVE STATEMENT
75 year old female w hx of COPD (on 3 LPM home O2), lung CA s/p chemo/xrt/resection in remission x 9 years, Afib on Coumadin, PPM presents to the ED for evaluation of chest pain of constant moderate worsening shortness of breath and wheezing. Pt states symptoms have been ongoing since her recent admission for COPD exacerbation. She was discharged feeling improved but still short of breath, which has again worsened. She was getting ready for her f/u with Dr. Sinha (Pulm) today when she felt acutely short of breath and had 2 episodes of hemoptysis. Denies fevers/chills, URI sx, cp, palpitations, syncope, diaphoresis, n/v, abd pain, leg pain/swelling.

## 2021-05-17 NOTE — ED ADULT TRIAGE NOTE - CHIEF COMPLAINT QUOTE
BIBA from home, worsening SOB x couple month, on home O2 3LNC, 1 episode of coughing up Bright red blood

## 2021-05-17 NOTE — ED ADULT NURSE NOTE - NSIMPLEMENTINTERV_GEN_ALL_ED
Implemented All Fall Risk Interventions:  [place] to call system. Call bell, personal items and telephone within reach. Instruct patient to call for assistance. Room bathroom lighting operational. Non-slip footwear when patient is off stretcher. Physically safe environment: no spills, clutter or unnecessary equipment. Stretcher in lowest position, wheels locked, appropriate side rails in place. Provide visual cue, wrist band, yellow gown, etc. Monitor gait and stability. Monitor for mental status changes and reorient to person, place, and time. Review medications for side effects contributing to fall risk. Reinforce activity limits and safety measures with patient and family.

## 2021-05-18 NOTE — CONSULT NOTE ADULT - ASSESSMENT
Impression  Dyspnea, likely multifactorial  Chronic hypoxemic respiratory failure  Chronic hypercapnic respiratory failure  HO Lung Ca, s/p chemo, RT/ partial lobectomy, now in remission for 9yrs  HO COPD, on triple therapy and 10mg prednisone daily  HO Afib, on coumadin    Recommendations  ECHO  VDUS  Procal  Symbicort  Solumedrol 60 q8h for now  Duonebs q4h and PRN  Azithromycin 500mg IVPB x5 days  DC spiriva for now  HOB at 45  Aspiration precautions  On coumadin for Afib  Overall poor prognosis  GOC needed Impression  Dyspnea, likely multifactorial  Nonmassive hemoptysis likely secondary to chronic coughing  Chronic hypoxemic respiratory failure  Chronic hypercapnic respiratory failure  HO Severe pulmonary hypertension, likely Group 3  HO Lung Ca, s/p chemo, RT/ partial lobectomy, now in remission for 9yrs  HO COPD, on triple therapy and 10mg prednisone daily  HO Afib, on coumadin    Recommendations  ABG  ECHO  VDUS  Procal  Symbicort  Chest PT  Mucinex  Incentive spirometry  Sputum culture  Fungitell  AVAPS, TV-400, EPAP-10, minIPAP-14, maxIPAP-25  Target SpO2 88-94%  Solumedrol 60 q12h for now  Duonebs q4h and PRN  Azithromycin 500mg IVPB x5 days  DC spiriva for now  HOB at 45  Aspiration precautions  On coumadin for Afib  Overall poor prognosis  GOC needed  Pulmonary rehab upon DC  PT/ OT eval Impression  Dyspnea, likely multifactorial  Nonmassive hemoptysis likely secondary to chronic coughing  Chronic hypoxemic respiratory failure  Chronic hypercapnic respiratory failure  HO Severe pulmonary hypertension, likely Group 3  HO Lung Ca, s/p chemo, RT/ partial lobectomy, now in remission for 9yrs  HO COPD, on triple therapy and 10mg prednisone daily  HO Afib, on coumadin    Recommendations  ABG  ECHO  VDUS  Procal  Symbicort  Chest PT  Mucinex  Incentive spirometry  Sputum culture  Fungitell  AVAPS, TV-400, EPAP-10, minIPAP-14, maxIPAP-25, RR-12  Target SpO2 88-94%  Solumedrol 60 q12h for now  Duonebs q4h and PRN  Azithromycin 500mg IVPB x5 days  DC spiriva for now  HOB at 45  Aspiration precautions  On coumadin for Afib  Overall poor prognosis  GOC needed  Pulmonary rehab upon DC  PT/ OT eval

## 2021-05-18 NOTE — PROGRESS NOTE ADULT - SUBJECTIVE AND OBJECTIVE BOX
CASEY POWERS 75y Female  MRN#: [id number]   Hospital Day: 1d    SUBJECTIVE  Patient is a 75y old Female who presents with a chief complaint of Currently admitted to medicine with the primary diagnosis of Hemoptysis      INTERVAL HPI AND OVERNIGHT EVENTS:  Patient was examined and seen at bedside. This morning she is resting comfortably in bed and reports no issues or overnight events.    REVIEW OF SYMPTOMS:  CONSTITUTIONAL: No weakness, fevers or chills; No headaches  EYES: No visual changes, eye pain, or discharge  ENT: No vertigo; No ear pain or change in hearing; No sore throat or difficulty swallowing  NECK: No pain or stiffness  RESPIRATORY: No cough, wheezing, or hemoptysis; No shortness of breath  CARDIOVASCULAR: No chest pain or palpitations  GASTROINTESTINAL: No abdominal or epigastric pain; No nausea, vomiting, or hematemesis; No diarrhea or constipation; No melena or hematochezia  GENITOURINARY: No dysuria, frequency or hematuria  MUSCULOSKELETAL: No joint pain, no muscle pain, no weakness  NEUROLOGICAL: No numbness or weakness  SKIN: No itching or rashes    OBJECTIVE  PAST MEDICAL & SURGICAL HISTORY  High cholesterol    Anxiety    Pacemaker    Lung cancer    Afib    COPD (chronic obstructive pulmonary disease)    Artificial cardiac pacemaker    H/O atrioventricular kacie ablation    S/P lobectomy of lung  left    History of tonsillectomy    S/P appendectomy    Cataract  RIGHT  6/17 AND  LEFT  7/5/19      ALLERGIES:  bacitracin (Other)  carboplatin (Other)  sulfa drugs (Unknown)  sulfonamides (Unknown)  Xanax (Other)    MEDICATIONS:  STANDING MEDICATIONS  albuterol/ipratropium for Nebulization 3 milliLiter(s) Nebulizer every 6 hours  atorvastatin 20 milliGRAM(s) Oral at bedtime  budesonide 160 MICROgram(s)/formoterol 4.5 MICROgram(s) Inhaler 2 Puff(s) Inhalation two times a day  chlorhexidine 4% Liquid 1 Application(s) Topical <User Schedule>  cholecalciferol 2000 Unit(s) Oral daily  methylPREDNISolone sodium succinate Injectable 60 milliGRAM(s) IV Push two times a day  pantoprazole    Tablet 40 milliGRAM(s) Oral before breakfast    PRN MEDICATIONS  ALBUTerol    90 MICROgram(s) HFA Inhaler 1 Puff(s) Inhalation every 3 hours PRN  clonazePAM  Tablet 0.5 milliGRAM(s) Oral two times a day PRN      VITAL SIGNS: Last 24 Hours  T(C): 35.9 (18 May 2021 05:13), Max: 36.4 (17 May 2021 14:16)  T(F): 96.6 (18 May 2021 05:13), Max: 97.6 (17 May 2021 14:16)  HR: 69 (18 May 2021 05:13) (69 - 73)  BP: 131/60 (18 May 2021 05:13) (131/60 - 154/70)  BP(mean): --  RR: 19 (18 May 2021 05:13) (18 - 19)  SpO2: 97% (18 May 2021 00:43) (96% - 100%)    LABS:                        10.3   5.22  )-----------( 136      ( 18 May 2021 05:41 )             32.3     05-18    138  |  99  |  13  ----------------------------<  241<H>  4.5   |  28  |  0.7    Ca    9.4      18 May 2021 05:41    TPro  6.2  /  Alb  4.1  /  TBili  0.3  /  DBili  x   /  AST  13  /  ALT  11  /  AlkPhos  79  05-18    PT/INR - ( 18 May 2021 05:41 )   PT: 27.60 sec;   INR: 2.40 ratio         PTT - ( 18 May 2021 05:41 )  PTT:42.0 sec      Troponin T, Serum: <0.01 ng/mL (05-17-21 @ 15:28)      CARDIAC MARKERS ( 17 May 2021 15:28 )  x     / <0.01 ng/mL / x     / x     / x          RADIOLOGY:      PHYSICAL EXAM:  CONSTITUTIONAL: No acute distress, well-developed, well-groomed, AAOx3  HEAD: Atraumatic, normocephalic  EYES: EOM intact, PERRLA, conjunctiva and sclera clear  ENT: Supple, no masses, no thyromegaly, no bruits, no JVD; moist mucous membranes  PULMONARY: Clear to auscultation bilaterally; no wheezes, rales, or rhonchi  CARDIOVASCULAR: Regular rate and rhythm; no murmurs, rubs, or gallops  GASTROINTESTINAL: Soft, non-tender, non-distended; bowel sounds present  MUSCULOSKELETAL: 2+ peripheral pulses; no clubbing, no cyanosis, no edema  NEUROLOGY: non-focal  SKIN: No rashes or lesions; warm and dry

## 2021-05-18 NOTE — PROGRESS NOTE ADULT - ASSESSMENT
74 yo F with pmh of anxiety, COPD (on 3 L home O2 ) and  lung CA s/p chemo/radiation/ and partial left lobectomy in remission x 9 years, severe pulmonary hypertension - follows with Dr. Ernestine Mcpherson outpatient , Afib on Coumadin s/p ablation and micra PPM. She now presents to the hospital with worsening shortness of breath and wheezing and 2 episodes of hemoptysis after discharge from the hospital in April.       # Deconditioning due to COPD/ severe pulm HTN /  cor pulmonale   # h/o lung cancer in remission s/p radiation/chemo/ left partial lobectomy   - SpO2 96% on 3L home O2  , at baseline here , goal 88-92%   - afebrile, no fever , no wbc   - was recently admitted with similar symptoms , was on prednisone taper and completed azithromycin   - s/p solumedrol in ED, c./w solumedrol 60 q12  CT Angio Chest PE Protocol w/ IV Cont (05.17.21 @ 18:23) >  1.  No pulmonary embolus.  2.  Stable posttreatment changes in the left lung as described above.  3.  Scattered bilateral tree-in-bud branching nodularity with areas of mucus plugging in the left lung. Findings are compatible with infectious/inflammatory airways disease.  4.  Cardiomegaly with evidence of mild right heart dysfunction.  - pbnp 700 ( below baseline) , trop neg, EKG no changes   - CXR appears stable, f/u   - TTE 62% // severe pulm HTN (3/24/21)   - Pulm c/s Dr. Ernestine Mcpherson   - c/w home inhalers  - duonebs q6h and prn  - Mg 2g x1   - Albuterol prn     # Hemoptysis , likely secondary to bronchitis   - Hb 8.4 , baseline 9.5 , monitor  - hold coumadin  - active t&s  - monitor     # Incidental finding of  Large pericardial effusion with right ventricular collapse on TTE 3/24/21   - hemodynamically stable now   - CT sx eval if hemodynamic instability   - monitor     # h/o Afib on Coumadin s/p ablation and micra PPM  - hold coumadin , given episode of hemoptysis and Hb 8.4 (baseline ~ 9.5)  - INR 2.3   - not on rate control medications at home     # h.o HLD  - c/w statin    # h/o anxiety  - c/w clonazepam prn home dose     # DVT PPX: on coumadin, held tonight  # GI PPX: PPI  # Diet: regular   # CHG BATH  # Activity :as tolerated   # Dispo :acute      74 yo F with pmh of anxiety, COPD (on 3 L home O2 ) and  lung CA s/p chemo/radiation/ and partial left lobectomy in remission x 9 years, severe pulmonary hypertension - follows with Dr. Ernestine Mcpherson outpatient , Afib on Coumadin s/p ablation and micra PPM. She now presents to the hospital with worsening shortness of breath and wheezing and 2 episodes of hemoptysis after discharge from the hospital in April.       # Deconditioning due to COPD/ severe pulm HTN /  cor pulmonale   # h/o lung cancer in remission s/p radiation/chemo/ left partial lobectomy   - SpO2 96% on 3L home O2  , at baseline here , goal 88-92%   - afebrile, no fever , no wbc   - was recently admitted with similar symptoms , was on prednisone taper and completed azithromycin   - s/p solumedrol in ED,   - CT Angio Chest PE Protocol w/ IV Cont (05.17.21 @ 18:23) >No pulmonary embolus. Stable posttreatment changes in the left lung as described above. Scattered bilateral tree-in-bud branching nodularity with areas of mucus plugging in the left lung. Findings are compatible with infectious/inflammatory airways disease. Cardiomegaly with evidence of mild right heart dysfunction.  - pbnp 700 ( below baseline) , trop neg, EKG no changes   - CXR appears stable, f/u   - TTE 62% // severe pulm HTN (3/24/21)   - Pulm c/s Dr. Ernestine Mcpherson   - c/w home inhalers  - duonebs q6h and prn  - Mg 2g x1   - Albuterol prn   - seen by pulm, rec:      - ABG, ECHO, VDUS, Procal     -  C/w Symbicort, Solumedrol 60 q12h for now, Duonebs q4h and PRN, Azithromycin 500mg IVPB x5 days     - Chest PT     - Mucinex     - Incentive spirometry     - Sputum culture, Fungitell     - AVAPS, TV-400, EPAP-10, minIPAP-14, maxIPAP-25, RR-12, Target SpO2 88-94%     - Pulmonary rehab upon DC     - PT/ OT eval      # Hemoptysis , likely secondary to bronchitis   - Hb 8.4 , baseline 9.5 , monitor  - hold coumadin  - active t&s  - monitor     # Incidental finding of  Large pericardial effusion with right ventricular collapse on TTE 3/24/21   - hemodynamically stable now   - CT sx eval if hemodynamic instability   - monitor     # h/o Afib on Coumadin s/p ablation and micra PPM  - hold coumadin , given episode of hemoptysis and Hb 8.4 (baseline ~ 9.5)  - INR 2.3   - not on rate control medications at home     # h.o HLD  - c/w statin    # h/o anxiety  - c/w clonazepam prn home dose     # DVT PPX: on coumadin, held tonight  # GI PPX: PPI  # Diet: regular   # CHG BATH  # Activity :as tolerated   # Dispo :acute

## 2021-05-18 NOTE — CONSULT NOTE ADULT - SUBJECTIVE AND OBJECTIVE BOX
Patient is a 75y old  Female who presents with a chief complaint of     HPI:  74 yo F with pmh of anxiety, COPD (on 3 L home O2 ) with multiple admissions for recurrent exacerbations ,   lung CA s/p chemo/radiation/ and partial left lobectomy in remission x 9 years, severe pulmonary hypertension - follows with Dr. Ernestine Mcpherson outpatient , Afib on Coumadin s/p ablation and micra PPM. She now presents to the hospital with worsening shortness of breath and wheezing and 2 episodes of hemoptysis. The patient states symptoms have been ongoing since her recent admission for COPD exacerbation and has been worsening over the last few years limiting her quality of life due to difficulty ambulating due to shortness of breath.  She was discharged on 4/27/21 feeling better but still short of breath and had acute on chronic dyspnea today with hemoptysis which prompted her come to the hospital. The patient denies fevers/chills/dizziness/chest pain/ abd pain/ constipation/ diarrhea/ dysuria. all ros negative except as above.     T(C): 36.4, HR: 72 , BP: 151/63 , RR: 18, SpO2: 100%  Labs: Hb 8.4 (baseline ~ 9.5), pbnp 700   CXR : appears stable from 4/28/21   CT Angio Chest PE Protocol w/ IV Cont (05.17.21 @ 18:23) >  1.  No pulmonary embolus.  2.  Stable posttreatment changes in the left lung as described above.  3.  Scattered bilateral tree-in-bud branching nodularity with areas of mucus plugging in the left lung. Findings are compatible with infectious/inflammatory airways disease.  4.  Cardiomegaly with evidence of mild right heart dysfunction.           (17 May 2021 23:01)      PAST MEDICAL & SURGICAL HISTORY:  High cholesterol    Anxiety    Pacemaker    Lung cancer    Afib    COPD (chronic obstructive pulmonary disease)    Artificial cardiac pacemaker    H/O atrioventricular kacie ablation    S/P lobectomy of lung  left    History of tonsillectomy    S/P appendectomy    Cataract  RIGHT  6/17 AND  LEFT  7/5/19        SOCIAL HX:   Smoking    former smoker                     ETOH      denies                      Other    FAMILY HISTORY:  .  No cardiovascular or pulmonary family history     REVIEW OF SYSTEMS:    All ROS are negative except per HPI       Allergies    bacitracin (Other)  carboplatin (Other)  sulfa drugs (Unknown)  sulfonamides (Unknown)  Xanax (Other)    Intolerances          PHYSICAL EXAM  Vital Signs Last 24 Hrs  T(C): 35.9 (18 May 2021 05:13), Max: 36.4 (17 May 2021 14:16)  T(F): 96.6 (18 May 2021 05:13), Max: 97.6 (17 May 2021 14:16)  HR: 69 (18 May 2021 05:13) (69 - 73)  BP: 131/60 (18 May 2021 05:13) (131/60 - 154/70)  RR: 19 (18 May 2021 05:13) (18 - 19)  SpO2: 97% (18 May 2021 00:43) (96% - 100%)    CONSTITUTIONAL:  Chronically ill appearing. NAD    ENT:   Airway patent,   No thrush    EYES:   Clear bilaterally,   pupils equal,   round and reactive to light.    CARDIAC:   Normal rate,   regular rhythm.    no edema    RESPIRATORY:   Wheezing   Normal chest expansion  Not tachypneic,  No use of accessory muscles    GASTROINTESTINAL:  Abdomen soft, non-tender,   No guarding,   Positive BS    MUSCULOSKELETAL:   Range of motion is not limited,  No clubbing, cyanosis    NEUROLOGICAL:   Alert and oriented   No motor deficits.    SKIN:   Skin normal color for race,   No evidence of rash.          LABS:                          10.3   5.22  )-----------( 136      ( 18 May 2021 05:41 )             32.3                                               05-18    138  |  99  |  13  ----------------------------<  241<H>  4.5   |  28  |  0.7    Ca    9.4      18 May 2021 05:41    TPro  6.2  /  Alb  4.1  /  TBili  0.3  /  DBili  x   /  AST  13  /  ALT  11  /  AlkPhos  79  05-18      PT/INR - ( 18 May 2021 05:41 )   PT: 27.60 sec;   INR: 2.40 ratio         PTT - ( 18 May 2021 05:41 )  PTT:42.0 sec                                           CARDIAC MARKERS ( 17 May 2021 15:28 )  x     / <0.01 ng/mL / x     / x     / x                                                LIVER FUNCTIONS - ( 18 May 2021 05:41 )  Alb: 4.1 g/dL / Pro: 6.2 g/dL / ALK PHOS: 79 U/L / ALT: 11 U/L / AST: 13 U/L / GGT: x                                                                                                MEDICATIONS  (STANDING):  albuterol/ipratropium for Nebulization 3 milliLiter(s) Nebulizer every 6 hours  atorvastatin 20 milliGRAM(s) Oral at bedtime  budesonide 160 MICROgram(s)/formoterol 4.5 MICROgram(s) Inhaler 2 Puff(s) Inhalation two times a day  chlorhexidine 4% Liquid 1 Application(s) Topical <User Schedule>  cholecalciferol 2000 Unit(s) Oral daily  methylPREDNISolone sodium succinate Injectable 60 milliGRAM(s) IV Push two times a day  pantoprazole    Tablet 40 milliGRAM(s) Oral before breakfast  tiotropium 18 MICROgram(s) Capsule 1 Capsule(s) Inhalation every 24 hours    MEDICATIONS  (PRN):  ALBUTerol    90 MICROgram(s) HFA Inhaler 1 Puff(s) Inhalation every 3 hours PRN Bronchospasm  clonazePAM  Tablet 0.5 milliGRAM(s) Oral two times a day PRN anxiety      X-Rays reviewed:    CXR interpreted by me: marquez

## 2021-05-19 NOTE — PROGRESS NOTE ADULT - SUBJECTIVE AND OBJECTIVE BOX
CASEY POWERS 75y Female  MRN#: [id number]   Hospital Day: 2d    SUBJECTIVE  Patient is a 75y old Female who presents with a chief complaint of Currently admitted to medicine with the primary diagnosis of Hemoptysis      INTERVAL HPI AND OVERNIGHT EVENTS:  Patient was examined and seen at bedside. This morning she is resting comfortably in bed and reports no issues or overnight events.    REVIEW OF SYMPTOMS:  CONSTITUTIONAL: No weakness, fevers or chills; No headaches  EYES: No visual changes, eye pain, or discharge  ENT: No vertigo; No ear pain or change in hearing; No sore throat or difficulty swallowing  NECK: No pain or stiffness  RESPIRATORY: No cough, wheezing, or hemoptysis; No shortness of breath  CARDIOVASCULAR: No chest pain or palpitations  GASTROINTESTINAL: No abdominal or epigastric pain; No nausea, vomiting, or hematemesis; No diarrhea or constipation; No melena or hematochezia  GENITOURINARY: No dysuria, frequency or hematuria  MUSCULOSKELETAL: No joint pain, no muscle pain, no weakness  NEUROLOGICAL: No numbness or weakness  SKIN: No itching or rashes    OBJECTIVE  PAST MEDICAL & SURGICAL HISTORY  High cholesterol    Anxiety    Pacemaker    Lung cancer    Afib    COPD (chronic obstructive pulmonary disease)    Artificial cardiac pacemaker    H/O atrioventricular kacie ablation    S/P lobectomy of lung  left    History of tonsillectomy    S/P appendectomy    Cataract  RIGHT  6/17 AND  LEFT  7/5/19      ALLERGIES:  bacitracin (Other)  carboplatin (Other)  sulfa drugs (Unknown)  sulfonamides (Unknown)  Xanax (Other)    MEDICATIONS:  STANDING MEDICATIONS  albuterol/ipratropium for Nebulization 3 milliLiter(s) Nebulizer every 6 hours  atorvastatin 20 milliGRAM(s) Oral at bedtime  azithromycin  IVPB      azithromycin  IVPB 500 milliGRAM(s) IV Intermittent every 24 hours  budesonide 160 MICROgram(s)/formoterol 4.5 MICROgram(s) Inhaler 2 Puff(s) Inhalation two times a day  chlorhexidine 4% Liquid 1 Application(s) Topical <User Schedule>  cholecalciferol 2000 Unit(s) Oral daily  guaiFENesin ER 1200 milliGRAM(s) Oral every 12 hours  methylPREDNISolone sodium succinate Injectable 60 milliGRAM(s) IV Push two times a day  pantoprazole    Tablet 40 milliGRAM(s) Oral before breakfast    PRN MEDICATIONS  ALBUTerol    90 MICROgram(s) HFA Inhaler 1 Puff(s) Inhalation every 3 hours PRN  clonazePAM  Tablet 0.5 milliGRAM(s) Oral two times a day PRN      VITAL SIGNS: Last 24 Hours  T(C): 36.1 (19 May 2021 04:30), Max: 36.1 (19 May 2021 04:30)  T(F): 96.9 (19 May 2021 04:30), Max: 96.9 (19 May 2021 04:30)  HR: 72 (19 May 2021 04:30) (70 - 72)  BP: 139/64 (19 May 2021 04:30) (133/64 - 163/70)  BP(mean): --  RR: 22 (19 May 2021 04:30) (20 - 22)  SpO2: 97% (19 May 2021 04:30) (95% - 97%)    LABS:                        9.5    8.45  )-----------( 124      ( 19 May 2021 05:36 )             30.4     05-19    142  |  101  |  15  ----------------------------<  228<H>  4.8   |  28  |  0.7    Ca    9.4      19 May 2021 05:36  Mg     2.1     05-19    TPro  6.0  /  Alb  4.1  /  TBili  0.3  /  DBili  x   /  AST  12  /  ALT  11  /  AlkPhos  79  05-19    PT/INR - ( 18 May 2021 05:41 )   PT: 27.60 sec;   INR: 2.40 ratio         PTT - ( 18 May 2021 05:41 )  PTT:42.0 sec          CARDIAC MARKERS ( 17 May 2021 15:28 )  x     / <0.01 ng/mL / x     / x     / x          RADIOLOGY:      PHYSICAL EXAM:  CONSTITUTIONAL: No acute distress, well-developed, well-groomed, AAOx3  HEAD: Atraumatic, normocephalic  EYES: EOM intact, PERRLA, conjunctiva and sclera clear  ENT: Supple, no masses, no thyromegaly, no bruits, no JVD; moist mucous membranes  PULMONARY: Clear to auscultation bilaterally; no wheezes, rales, or rhonchi  CARDIOVASCULAR: Regular rate and rhythm; no murmurs, rubs, or gallops  GASTROINTESTINAL: Soft, non-tender, non-distended; bowel sounds present  MUSCULOSKELETAL: 2+ peripheral pulses; no clubbing, no cyanosis, no edema  NEUROLOGY: non-focal  SKIN: No rashes or lesions; warm and dry

## 2021-05-19 NOTE — PROGRESS NOTE ADULT - ASSESSMENT
76 yo F with pmh of anxiety, COPD (on 3 L home O2 ) and  lung CA s/p chemo/radiation/ and partial left lobectomy in remission x 9 years, severe pulmonary hypertension - follows with Dr. Ernestine Mcpherson outpatient , Afib on Coumadin s/p ablation and micra PPM. She now presents to the hospital with worsening shortness of breath and wheezing and 2 episodes of hemoptysis after discharge from the hospital in April.       # Deconditioning due to COPD/ severe pulm HTN /  cor pulmonale   # h/o lung cancer in remission s/p radiation/chemo/ left partial lobectomy   - SpO2 96% on 3L home O2  , at baseline here , goal 88-92%   - afebrile, no fever , no wbc   - was recently admitted with similar symptoms , was on prednisone taper and completed azithromycin   - s/p solumedrol in ED,   - CT Angio Chest PE Protocol w/ IV Cont (05.17.21 @ 18:23) >No pulmonary embolus. Stable posttreatment changes in the left lung as described above. Scattered bilateral tree-in-bud branching nodularity with areas of mucus plugging in the left lung. Findings are compatible with infectious/inflammatory airways disease. Cardiomegaly with evidence of mild right heart dysfunction.  - pbnp 700 ( below baseline) , trop neg, EKG no changes   - CXR appears stable, f/u   - TTE 62% // severe pulm HTN (3/24/21)   - Pulm c/s Dr. Ernestine Mcpherson   - c/w home inhalers  - duonebs q6h and prn  - Mg 2g x1   - Albuterol prn   - seen by pulm, rec:      - ABG, ECHO, VDUS, Procal     -  C/w Symbicort, Solumedrol 60 q12h for now, Duonebs q4h and PRN, Azithromycin 500mg IVPB x5 days     - Chest PT     - Mucinex     - Incentive spirometry     - Sputum culture, Fungitell     - AVAPS, TV-400, EPAP-10, minIPAP-14, maxIPAP-25, RR-12, Target SpO2 88-94%     - Pulmonary rehab upon DC     - PT/ OT eval      # Hemoptysis , likely secondary to bronchitis   - Hb 8.4 , baseline 9.5 , monitor  - hold coumadin  - active t&s  - monitor     # Incidental finding of  Large pericardial effusion with right ventricular collapse on TTE 3/24/21   - hemodynamically stable now   - CT sx eval if hemodynamic instability   - monitor     # h/o Afib on Coumadin s/p ablation and micra PPM  - hold coumadin , given episode of hemoptysis and Hb 8.4 (baseline ~ 9.5)  - INR 2.3   - not on rate control medications at home     # h.o HLD  - c/w statin    # h/o anxiety  - c/w clonazepam prn home dose     # DVT PPX: on coumadin, held tonight  # GI PPX: PPI  # Diet: regular   # CHG BATH  # Activity :as tolerated   # Dispo :acute

## 2021-05-20 NOTE — CONSULT NOTE ADULT - WHAT MATTERS MOST
getting information about hospice  relieving dyspnea/anxiety  does not want AVAP/bipap - claustrophobic Ongoing medical management  getting information about hospice  relieving dyspnea/anxiety  does not want AVAP/bipap - claustrophobic

## 2021-05-20 NOTE — CONSULT NOTE ADULT - ASSESSMENT
74 yo F with pmh of anxiety, COPD (on 3 L home O2 ) and  lung CA s/p chemo/radiation/ and partial left lobectomy in remission x 9 years, severe pulmonary hypertension;  Afib on Coumadin s/p ablation and micra PPM. She presented to the hospital with worsening shortness of breath and wheezing and 2 episodes of hemoptysis after discharge from the hospital in April. Deconditioning due to COPD/ severe pulm HTN /  cor pulmonale         Morphine Equivalent Daily Dose (MEDD)      See Recs below.    Please call   Dee Dee Cueva  or [phone] 24/7  with questions or concerns.   We will continue to follow.    76 yo F with pmh of anxiety, COPD (on 3 L home O2 ) and  lung CA s/p chemo/radiation/ and partial left lobectomy in remission x 9 years, severe pulmonary hypertension;  Afib on Coumadin s/p ablation and micra PPM. She presented to the hospital with worsening shortness of breath and wheezing and 2 episodes of hemoptysis after discharge from the hospital in April. Deconditioning due to COPD/ severe pulm HTN /  cor pulmonale     Morphine Equivalent Daily Dose (MEDD)    See Recs below.    Please call   Dee Dee Cueva  or [phone] 24/7  with questions or concerns.   We will continue to follow.    76 yo F with pmh of anxiety, COPD (on 3 L home O2 ) and  lung CA s/p chemo/radiation/ and partial left lobectomy in remission x 9 years, severe pulmonary hypertension;  Afib on Coumadin s/p ablation and micra PPM. She presented to the hospital with worsening shortness of breath and wheezing and 2 episodes of hemoptysis after discharge from the hospital in April. Deconditioning due to COPD/ severe pulm HTN / cor pulmonale.    Morphine Equivalent Daily Dose (MEDD): NA    See Recs below.    Please call   Dee Dee Cueva  or [phone] 24/7  with questions or concerns.   We will continue to follow.     Discussed with primary MD.

## 2021-05-20 NOTE — HOSPICE CARE NOTE - CONVESATION DETAILS
Attempts to reach patient via cell phone provided unsuccessful. Hospice contact info provided via [employer]. Hospice to follow up again tomorrow.

## 2021-05-20 NOTE — CONSULT NOTE ADULT - CONSULT REASON
severe/end stage COPD on home O2 (3L) here for exavcerbation. s/p GOC convo with crit care/pulm now DNR/DNI.
dyspnea

## 2021-05-20 NOTE — PROGRESS NOTE ADULT - ASSESSMENT
74 yo F with pmh of anxiety, COPD (on 3 L home O2 ) and  lung CA s/p chemo/radiation/ and partial left lobectomy in remission x 9 years, severe pulmonary hypertension - follows with Dr. Ernestine Mcpherson outpatient , Afib on Coumadin s/p ablation and micra PPM. She now presents to the hospital with worsening shortness of breath and wheezing and 2 episodes of hemoptysis after discharge from the hospital in April.       # Deconditioning due to COPD/ severe pulm HTN /  cor pulmonale   # h/o lung cancer in remission s/p radiation/chemo/ left partial lobectomy   - SpO2 96% on 3L home O2  , at baseline here , goal 88-92%   - afebrile, no fever , no wbc   - was recently admitted with similar symptoms , was on prednisone taper and completed azithromycin   - s/p solumedrol in ED,   - CT Angio Chest PE Protocol w/ IV Cont (05.17.21 @ 18:23) >No pulmonary embolus. Stable posttreatment changes in the left lung as described above. Scattered bilateral tree-in-bud branching nodularity with areas of mucus plugging in the left lung. Findings are compatible with infectious/inflammatory airways disease. Cardiomegaly with evidence of mild right heart dysfunction.  - pbnp 700 ( below baseline) , trop neg, EKG no changes   - CXR appears stable, f/u   - TTE 62% // severe pulm HTN (3/24/21)   - Pulm c/s Dr. Ernestine Mcpherson   - c/w home inhalers  - duonebs q6h and prn  - Mg 2g x1   - Albuterol prn   - seen by pulm, rec:      - ABG, ECHO, VDUS, Procal     -  C/w Symbicort, Solumedrol 60 q12h for now, Duonebs q4h and PRN, Azithromycin 500mg IVPB x5 days     - Chest PT     - Mucinex     - Incentive spirometry     - Sputum culture, Fungitell     - AVAPS, TV-400, EPAP-10, minIPAP-14, maxIPAP-25, RR-12, Target SpO2 88-94%     - Pulmonary rehab upon DC     - PT/ OT eval      # Hemoptysis , likely secondary to bronchitis   - Hb 8.4 , baseline 9.5 , monitor  - hold coumadin  - active t&s  - monitor     # Incidental finding of  Large pericardial effusion with right ventricular collapse on TTE 3/24/21   - hemodynamically stable now   - CT sx eval if hemodynamic instability   - monitor     # h/o Afib on Coumadin s/p ablation and micra PPM  - hold coumadin , given episode of hemoptysis and Hb 8.4 (baseline ~ 9.5)  - INR 2.3   - not on rate control medications at home     # h.o HLD  - c/w statin    # h/o anxiety  - c/w clonazepam prn home dose     # DVT PPX: on coumadin, held tonight  # GI PPX: PPI  # Diet: regular   # CHG BATH  # Activity :as tolerated   # Dispo :acute

## 2021-05-20 NOTE — PROGRESS NOTE ADULT - SUBJECTIVE AND OBJECTIVE BOX
Patient is a 75y old  Female who presents with a chief complaint of hemoptysis  INTERVAL HPI/OVERNIGHT EVENTS: Patient was examined and seen at bedside. This morning pt is resting in bed and reports being still being very SOB. Reports severe anxiety, dry mouth. Unable to use AVAP due to claustrophobia.  ROS: Denies CP, AP  All other systems reviewed and are within normal limits.  InitialHPI:  74 yo F with pmh of anxiety, COPD (on 3 L home O2 ) with multiple admissions for recurrent exacerbations ,   lung CA s/p chemo/radiation/ and partial left lobectomy in remission x 9 years, severe pulmonary hypertension - follows with Dr. Ernestine Mcpherson outpatient , Afib on Coumadin s/p ablation and micra PPM. She now presents to the hospital with worsening shortness of breath and wheezing and 2 episodes of hemoptysis. The patient states symptoms have been ongoing since her recent admission for COPD exacerbation and has been worsening over the last few years limiting her quality of life due to difficulty ambulating due to shortness of breath.  She was discharged on 4/27/21 feeling better but still short of breath and had acute on chronic dyspnea today with hemoptysis which prompted her come to the hospital. The patient denies fevers/chills/dizziness/chest pain/ abd pain/ constipation/ diarrhea/ dysuria. all ros negative except as above.     T(C): 36.4, HR: 72 , BP: 151/63 , RR: 18, SpO2: 100%  Labs: Hb 8.4 (baseline ~ 9.5), pbnp 700   CXR : appears stable from 4/28/21   CT Angio Chest PE Protocol w/ IV Cont (05.17.21 @ 18:23) >  1.  No pulmonary embolus.  2.  Stable posttreatment changes in the left lung as described above.  3.  Scattered bilateral tree-in-bud branching nodularity with areas of mucus plugging in the left lung. Findings are compatible with infectious/inflammatory airways disease.  4.  Cardiomegaly with evidence of mild right heart dysfunction.           (17 May 2021 23:01)    PAST MEDICAL & SURGICAL HISTORY:  High cholesterol    Anxiety    Pacemaker    Lung cancer    Afib    COPD (chronic obstructive pulmonary disease)    Artificial cardiac pacemaker    H/O atrioventricular kacie ablation    S/P lobectomy of lung  left    History of tonsillectomy    S/P appendectomy    Cataract  RIGHT  6/17 AND  LEFT  7/5/19        General: NAD, AAO3, extremely anxious, cachectic, chronically ill appearing  HEENT:  EOMI, no LAD  CV: S1 S2  Resp: +wheeze  GI: NT/ND/S +BS  MS: no clubbing/cyanosis/edema, + pulses b/l  Neuro: BOONE, +reflexes thruout    MEDICATIONS  (STANDING):  albuterol/ipratropium for Nebulization 3 milliLiter(s) Nebulizer every 6 hours  atorvastatin 20 milliGRAM(s) Oral at bedtime  azithromycin  IVPB      azithromycin  IVPB 500 milliGRAM(s) IV Intermittent every 24 hours  budesonide 160 MICROgram(s)/formoterol 4.5 MICROgram(s) Inhaler 2 Puff(s) Inhalation two times a day  chlorhexidine 4% Liquid 1 Application(s) Topical <User Schedule>  cholecalciferol 2000 Unit(s) Oral daily  clonazePAM  Tablet 1 milliGRAM(s) Oral every 12 hours  guaiFENesin ER 1200 milliGRAM(s) Oral every 12 hours  methylPREDNISolone sodium succinate Injectable 60 milliGRAM(s) IV Push two times a day  pantoprazole    Tablet 40 milliGRAM(s) Oral before breakfast  warfarin 1 milliGRAM(s) Oral once    MEDICATIONS  (PRN):  ALBUTerol    90 MICROgram(s) HFA Inhaler 1 Puff(s) Inhalation every 3 hours PRN Bronchospasm    Vital Signs Last 24 Hrs  T(C): 36.2 (20 May 2021 13:11), Max: 36.2 (20 May 2021 13:11)  T(F): 97.2 (20 May 2021 13:11), Max: 97.2 (20 May 2021 13:11)  HR: 93 (20 May 2021 13:11) (71 - 96)  BP: 186/84 (20 May 2021 13:11) (135/62 - 186/84)  BP(mean): 93 (20 May 2021 05:42) (93 - 93)  RR: 21 (20 May 2021 13:11) (20 - 22)  SpO2: 97% (20 May 2021 10:35) (97% - 97%)  CAPILLARY BLOOD GLUCOSE                              9.5    8.81  )-----------( 133      ( 20 May 2021 05:15 )             30.4     05-20    138  |  99  |  21<H>  ----------------------------<  225<H>  4.6   |  29  |  0.8    Ca    9.2      20 May 2021 05:15  Mg     1.9     05-20    TPro  5.7<L>  /  Alb  4.0  /  TBili  0.3  /  DBili  x   /  AST  12  /  ALT  11  /  AlkPhos  72  05-20    LIVER FUNCTIONS - ( 20 May 2021 05:15 )  Alb: 4.0 g/dL / Pro: 5.7 g/dL / ALK PHOS: 72 U/L / ALT: 11 U/L / AST: 12 U/L / GGT: x               PT/INR - ( 20 May 2021 11:03 )   PT: 20.20 sec;   INR: 1.76 ratio         PTT - ( 19 May 2021 12:14 )  PTT:35.0 sec            Chart, Consultant(s) Notes Reviewed:  [x ] YES  [ ] NO  Care Discussed with Consultants/Other Providers/ Housestaff [ x] YES  [ ] NO  Radiology, labs, old available records personally reviewed.

## 2021-05-20 NOTE — DISCHARGE NOTE NURSING/CASE MANAGEMENT/SOCIAL WORK - PATIENT PORTAL LINK FT
You can access the FollowMyHealth Patient Portal offered by [facility] by registering at the following website: [email or web address]. By joining [facility]’s FollowMyHealth portal, you will also be able to view your health information using other applications (apps) compatible with our system.

## 2021-05-20 NOTE — CONSULT NOTE ADULT - PROBLEM SELECTOR RECOMMENDATION 4
as above  in some, continue current medical plan; hospice for information Prophylactic in setting of starting opioid  -recommend senna 2 tabs qhs  -recommend miralax 17g q24h PRN for constipation

## 2021-05-20 NOTE — PROGRESS NOTE ADULT - SUBJECTIVE AND OBJECTIVE BOX
CASEY POWERS 75y Female  MRN#: [id number]   Hospital Day: 3d    SUBJECTIVE  Patient is a 75y old Female who presents with a chief complaint of Currently admitted to medicine with the primary diagnosis of Hemoptysis      INTERVAL HPI AND OVERNIGHT EVENTS:  Patient was examined and seen at bedside. This morning she is resting comfortably in bed and reports no issues or overnight events.    REVIEW OF SYMPTOMS:  CONSTITUTIONAL: No weakness, fevers or chills; No headaches  EYES: No visual changes, eye pain, or discharge  ENT: No vertigo; No ear pain or change in hearing; No sore throat or difficulty swallowing  NECK: No pain or stiffness  RESPIRATORY: No cough, wheezing, or hemoptysis; No shortness of breath  CARDIOVASCULAR: No chest pain or palpitations  GASTROINTESTINAL: No abdominal or epigastric pain; No nausea, vomiting, or hematemesis; No diarrhea or constipation; No melena or hematochezia  GENITOURINARY: No dysuria, frequency or hematuria  MUSCULOSKELETAL: No joint pain, no muscle pain, no weakness  NEUROLOGICAL: No numbness or weakness  SKIN: No itching or rashes    OBJECTIVE  PAST MEDICAL & SURGICAL HISTORY  High cholesterol    Anxiety    Pacemaker    Lung cancer    Afib    COPD (chronic obstructive pulmonary disease)    Artificial cardiac pacemaker    H/O atrioventricular kacie ablation    S/P lobectomy of lung  left    History of tonsillectomy    S/P appendectomy    Cataract  RIGHT  6/17 AND  LEFT  7/5/19      ALLERGIES:  bacitracin (Other)  carboplatin (Other)  sulfa drugs (Unknown)  sulfonamides (Unknown)  Xanax (Other)    MEDICATIONS:  STANDING MEDICATIONS  albuterol/ipratropium for Nebulization 3 milliLiter(s) Nebulizer every 6 hours  atorvastatin 20 milliGRAM(s) Oral at bedtime  azithromycin  IVPB      azithromycin  IVPB 500 milliGRAM(s) IV Intermittent every 24 hours  budesonide 160 MICROgram(s)/formoterol 4.5 MICROgram(s) Inhaler 2 Puff(s) Inhalation two times a day  chlorhexidine 4% Liquid 1 Application(s) Topical <User Schedule>  cholecalciferol 2000 Unit(s) Oral daily  guaiFENesin ER 1200 milliGRAM(s) Oral every 12 hours  methylPREDNISolone sodium succinate Injectable 60 milliGRAM(s) IV Push two times a day  pantoprazole    Tablet 40 milliGRAM(s) Oral before breakfast  warfarin 1 milliGRAM(s) Oral once    PRN MEDICATIONS  ALBUTerol    90 MICROgram(s) HFA Inhaler 1 Puff(s) Inhalation every 3 hours PRN  clonazePAM  Tablet 0.5 milliGRAM(s) Oral two times a day PRN      VITAL SIGNS: Last 24 Hours  T(C): 35.6 (20 May 2021 05:42), Max: 36.1 (19 May 2021 21:25)  T(F): 96 (20 May 2021 05:42), Max: 97 (19 May 2021 21:25)  HR: 96 (20 May 2021 10:35) (71 - 96)  BP: 146/67 (20 May 2021 10:35) (135/62 - 169/72)  BP(mean): 93 (20 May 2021 05:42) (93 - 93)  RR: 22 (20 May 2021 10:35) (20 - 22)  SpO2: 97% (20 May 2021 10:35) (97% - 97%)    LABS:                        9.5    8.81  )-----------( 133      ( 20 May 2021 05:15 )             30.4     05-20    138  |  99  |  21<H>  ----------------------------<  225<H>  4.6   |  29  |  0.8    Ca    9.2      20 May 2021 05:15  Mg     1.9     05-20    TPro  5.7<L>  /  Alb  4.0  /  TBili  0.3  /  DBili  x   /  AST  12  /  ALT  11  /  AlkPhos  72  05-20    PT/INR - ( 19 May 2021 12:14 )   PT: 21.80 sec;   INR: 1.90 ratio         PTT - ( 19 May 2021 12:14 )  PTT:35.0 sec              RADIOLOGY:      PHYSICAL EXAM:  CONSTITUTIONAL: No acute distress, well-developed, well-groomed, AAOx3  HEAD: Atraumatic, normocephalic  EYES: EOM intact, PERRLA, conjunctiva and sclera clear  ENT: Supple, no masses, no thyromegaly, no bruits, no JVD; moist mucous membranes  PULMONARY: Clear to auscultation bilaterally; no wheezes, rales, or rhonchi  CARDIOVASCULAR: Regular rate and rhythm; no murmurs, rubs, or gallops  GASTROINTESTINAL: Soft, non-tender, non-distended; bowel sounds present  MUSCULOSKELETAL: 2+ peripheral pulses; no clubbing, no cyanosis, no edema  NEUROLOGY: non-focal  SKIN: No rashes or lesions; warm and dry

## 2021-05-20 NOTE — CONSULT NOTE ADULT - PROBLEM SELECTOR RECOMMENDATION 9
end stage   continue O2 NC and current meds per pulm/primary teams end stage   continue O2 NC and current meds per pulm/primary teams  Dyspnea treatment as below

## 2021-05-20 NOTE — CONSULT NOTE ADULT - SUBJECTIVE AND OBJECTIVE BOX
NAZANIN POWERS          MRN-[id number]              HPI:  74 yo F with pmh of anxiety, COPD (on 3 L home O2 ) with multiple admissions for recurrent exacerbations ,   lung CA s/p chemo/radiation/ and partial left lobectomy in remission x 9 years, severe pulmonary hypertension - follows with Dr. Ernestine Mcpherson outpatient , Afib on Coumadin s/p ablation and micra PPM. She now presents to the hospital with worsening shortness of breath and wheezing and 2 episodes of hemoptysis. The patient states symptoms have been ongoing since her recent admission for COPD exacerbation and has been worsening over the last few years limiting her quality of life due to difficulty ambulating due to shortness of breath.  She was discharged on 21 feeling better but still short of breath and had acute on chronic dyspnea today with hemoptysis which prompted her come to the hospital. The patient denies fevers/chills/dizziness/chest pain/ abd pain/ constipation/ diarrhea/ dysuria. all ros negative except as above.     T(C): 36.4, HR: 72 , BP: 151/63 , RR: 18, SpO2: 100%  Labs: Hb 8.4 (baseline ~ 9.5), pbnp 700   CXR : appears stable from 21   CT Angio Chest PE Protocol w/ IV Cont (21 @ 18:23) >  1.  No pulmonary embolus.  2.  Stable posttreatment changes in the left lung as described above.  3.  Scattered bilateral tree-in-bud branching nodularity with areas of mucus plugging in the left lung. Findings are compatible with infectious/inflammatory airways disease.  4.  Cardiomegaly with evidence of mild right heart dysfunction.           (17 May 2021 23:01)      PAST MEDICAL & SURGICAL HISTORY:  High cholesterol    Anxiety    Pacemaker    Lung cancer    Afib    COPD (chronic obstructive pulmonary disease)    Artificial cardiac pacemaker    H/O atrioventricular kacie ablation    S/P lobectomy of lung  left    History of tonsillectomy    S/P appendectomy    Cataract  RIGHT   AND  LEFT  19        FAMILY HISTORY:   Reviewed and found non contributory in mother or father    SOCIAL HISTORY:     ROS:    Unable to attain due to:              NOT ACCURATE-CONSULT NOT COMPLETE        Dyspnea (Marcel 0-10): 0                       N/V (Y/N): No                             Secretions (Y/N) : No                                          Agitation(Y/N): No                              Pain (Y/N): No                                 -Provocation/Palliation: N/A  -Quality/Quantity: N/A  -Radiating: N/A  -Severity: No pain  -Timing/Frequency: N/A  -Impact on ADLs: N/A    General:  Denied  HEENT:    Denied  Neck:  Denied  CVS:  Denied  Resp:  Denied  GI:  Denied    :  Denied  Musc:  Denied  Neuro:  Denied  Psych:  Denied  Skin:  Denied  Lymph:  Denied    Allergies    bacitracin (Other)  carboplatin (Other)  sulfa drugs (Unknown)  sulfonamides (Unknown)  Xanax (Other)    Intolerances      Opiate Naive (Y/N): Y  -iStop reviewed (Y/N): Y  Ref#:        Reference #: [id number]    Others' Prescriptions  Patient Name: Nazanin Schaefer Date: 1945  Address: [address] [place], [place] [address]Sex: Female  Rx Written	Rx Dispensed	Drug	Quantity	Days Supply	Prescriber Name	Prescriber Jolanta #	Payment Method  2021	klonopin 0.5 mg tablet	90	30	Taty Kidd	[id number]	[facility] #[id number]  2021	lorazepam 0.5 mg tablet	30	30	Penny Griffith MD	[id number]	[facility][id number]  2020	klonopin 0.5 mg tablet	90	30	Praneeth Kidda	[id number]	[facility] #[id number]  2020	klonopin 0.5 mg tablet	90	30	Carmine Spencer MD	[id number]	[facility] #[id number]  2020	klonopin 0.5 mg tablet	90	30	Praneeth Kidda	[id number]	[facility] #[id number]  2020	klonopin 0.5 mg tablet	90	30	Taty Kidd	[id number]	[facility] #[id number]       Medications:      MEDICATIONS  (STANDING):  albuterol/ipratropium for Nebulization 3 milliLiter(s) Nebulizer every 6 hours  atorvastatin 20 milliGRAM(s) Oral at bedtime  azithromycin  IVPB      azithromycin  IVPB 500 milliGRAM(s) IV Intermittent every 24 hours  budesonide 160 MICROgram(s)/formoterol 4.5 MICROgram(s) Inhaler 2 Puff(s) Inhalation two times a day  chlorhexidine 4% Liquid 1 Application(s) Topical <User Schedule>  cholecalciferol 2000 Unit(s) Oral daily  clonazePAM  Tablet 1 milliGRAM(s) Oral every 12 hours  guaiFENesin ER 1200 milliGRAM(s) Oral every 12 hours  methylPREDNISolone sodium succinate Injectable 60 milliGRAM(s) IV Push two times a day  pantoprazole    Tablet 40 milliGRAM(s) Oral before breakfast  warfarin 1 milliGRAM(s) Oral once    MEDICATIONS  (PRN):  ALBUTerol    90 MICROgram(s) HFA Inhaler 1 Puff(s) Inhalation every 3 hours PRN Bronchospasm  clonazePAM  Tablet 0.5 milliGRAM(s) Oral two times a day PRN anxiety      Labs:    CBC:                        9.5    8.81  )-----------( 133      ( 20 May 2021 05:15 )             30.4     CMP:        138  |  99  |  21<H>  ----------------------------<  225<H>  4.6   |  29  |  0.8    Ca    9.2      20 May 2021 05:15  Mg     1.9         TPro  5.7<L>  /  Alb  4.0  /  TBili  0.3  /  DBili  x   /  AST  12  /  ALT  11  /  AlkPhos  72       Albumin, Serum: 4.0 g/dL (21 @ 05:15)    PT/INR - ( 19 May 2021 12:14 )   PT: 21.80 sec;   INR: 1.90 ratio      PTT - ( 19 May 2021 12:14 )  PTT:35.0 sec     Imaging:  Reviewed    PEx:  T(C): 35.6 (21 @ 05:42), Max: 36.1 (21 @ 21:25)  HR: 96 (21 @ 10:35) (71 - 96)  BP: 146/67 (21 @ 10:35) (135/62 - 169/72)  RR: 22 (21 @ 10:35) (20 - )  SpO2: 97% (21 @ 10:35) (97% - 97%)  Wt(kg): --  Daily     Daily Weight in k.3 (20 May 2021 05:42)                       NOT ACCURATE-CONSULT NOT COMPLETE        General: AAOx3    found in bed in NAD  HEENT:  NCAT PERRL EOMI Non icteric MOM  Neck: Supple no masses  CVS: RR S1S2 No M/G/R  Resp: Unlabored Non tachypneic No increased WOB  GI:  Soft NT ND BS+  :  Voiding / [place] / [employer]  Musc: No C/C/E    Neuro: Follows commands No focal deficits  Psych: Calm Pleasant  Skin: Non jaundiced   Lymph: Normal    Preadmit Karnofsky:  %           Current Karnofsky:     %  http://www.npcrc.org/files/news/karnofsky_performance_scale.pdf   http://www.npcrc.org/files/news/palliative_performance_scale_PPSv2.pdf  Cachexia (Y/N):   BMI:    Advanced Directives:     Full Code     DNR/DNI     MOLST     HCP     DPOA     Living Will     Decision maker: The patient is able to participate in complex medical decision making conversations.   Legal surrogate:    GOALS OF CARE DISCUSSION       Palliative care info/counseling provided	           Family meeting       Advanced Directives addressed please see Advance Care Planning Note	           See previous Palliative Medicine Note       Documentation of GOC: 	    REFERRALS	        Palliative Med        Unit JOON/Case Carmita               NAZANIN POWERS          MRN-[id number]              HPI:  76 yo F with pmh of anxiety, COPD (on 3 L home O2 ) with multiple admissions for recurrent exacerbations ,   lung CA s/p chemo/radiation/ and partial left lobectomy in remission x 9 years, severe pulmonary hypertension - follows with Dr. Ernestine Mcpherson outpatient , Afib on Coumadin s/p ablation and micra PPM. She now presents to the hospital with worsening shortness of breath and wheezing and 2 episodes of hemoptysis. The patient states symptoms have been ongoing since her recent admission for COPD exacerbation and has been worsening over the last few years limiting her quality of life due to difficulty ambulating due to shortness of breath.  She was discharged on 21 feeling better but still short of breath and had acute on chronic dyspnea today with hemoptysis which prompted her come to the hospital. The patient denies fevers/chills/dizziness/chest pain/ abd pain/ constipation/ diarrhea/ dysuria. all ros negative except as above.     T(C): 36.4, HR: 72 , BP: 151/63 , RR: 18, SpO2: 100%  Labs: Hb 8.4 (baseline ~ 9.5), pbnp 700   CXR : appears stable from 21   CT Angio Chest PE Protocol w/ IV Cont (21 @ 18:23) >  1.  No pulmonary embolus.  2.  Stable posttreatment changes in the left lung as described above.  3.  Scattered bilateral tree-in-bud branching nodularity with areas of mucus plugging in the left lung. Findings are compatible with infectious/inflammatory airways disease.  4.  Cardiomegaly with evidence of mild right heart dysfunction.     (17 May 2021 23:01)    PAST MEDICAL & SURGICAL HISTORY:  High cholesterol    Anxiety    Pacemaker    Lung cancer    Afib    COPD (chronic obstructive pulmonary disease)    Artificial cardiac pacemaker    H/O atrioventricular kacie ablation    S/P lobectomy of lung  left    History of tonsillectomy    S/P appendectomy    Cataract  RIGHT   AND  LEFT  19    FAMILY HISTORY:   Reviewed and found non contributory in mother or father    SOCIAL HISTORY:     ROS:    Unable to attain due to:        Dyspnea (Marcel 0-10): yes - when cannot do something > anxious > SOB                     N/V (Y/N): No                             Secretions (Y/N) : No                                          Agitation(Y/N): No                              Pain (Y/N): No                                 -Provocation/Palliation: N/A  -Quality/Quantity: N/A  -Radiating: N/A  -Severity: No pain  -Timing/Frequency: N/A  -Impact on ADLs: N/A    General:  becoming more dependent  HEENT:    Denied  Neck:  Denied  CVS:  Denied  Resp:  Denied  GI:  BM 2 days ago  :  Denied  Musc:  Denied  Neuro:  Denied  Psych:  Denied  Skin:  Denied  Lymph:  Denied    Allergies    bacitracin (Other)  carboplatin (Other)  sulfa drugs (Unknown)  sulfonamides (Unknown)  Xanax (Other)    Intolerances      Opiate Naive (Y/N): Y  -iStop reviewed (Y/N): Y  Ref#:        Reference #: [id number]    Others' Prescriptions  Patient Name: Nazanin Schaefer Date: 1945  Address: [address] [place] [id number]: Female  Rx Written	Rx Dispensed	Drug	Quantity	Days Supply	Prescriber Name	Prescriber Jolanta #	Payment Method  2021	klonopin 0.5 mg tablet	90	30	Taty Kidd	[id number]	[facility] #[id number]  2021	lorazepam 0.5 mg tablet	30	30	Penny Griffith MD	[id number]	[facility][id number]  2020	klonopin 0.5 mg tablet	90	30	Praneeth Kidda	[id number]	[facility] #[id number]  2020	klonopin 0.5 mg tablet	90	30	Carmine Spencer MD	[id number]	[facility] #[id number]  2020	klonopin 0.5 mg tablet	90	30	Marti Taty	[id number]	[facility] #[id number]  2020	klonopin 0.5 mg tablet	90	30	Taty Kidd	[id number]	[facility] #[id number]       Medications:      MEDICATIONS  (STANDING):  albuterol/ipratropium for Nebulization 3 milliLiter(s) Nebulizer every 6 hours  atorvastatin 20 milliGRAM(s) Oral at bedtime  azithromycin  IVPB      azithromycin  IVPB 500 milliGRAM(s) IV Intermittent every 24 hours  budesonide 160 MICROgram(s)/formoterol 4.5 MICROgram(s) Inhaler 2 Puff(s) Inhalation two times a day  chlorhexidine 4% Liquid 1 Application(s) Topical <User Schedule>  cholecalciferol 2000 Unit(s) Oral daily  clonazePAM  Tablet 1 milliGRAM(s) Oral every 12 hours  guaiFENesin ER 1200 milliGRAM(s) Oral every 12 hours  methylPREDNISolone sodium succinate Injectable 60 milliGRAM(s) IV Push two times a day  pantoprazole    Tablet 40 milliGRAM(s) Oral before breakfast  warfarin 1 milliGRAM(s) Oral once    MEDICATIONS  (PRN):  ALBUTerol    90 MICROgram(s) HFA Inhaler 1 Puff(s) Inhalation every 3 hours PRN Bronchospasm  clonazePAM  Tablet 0.5 milliGRAM(s) Oral two times a day PRN anxiety      Labs:    CBC:                        9.5    8.81  )-----------( 133      ( 20 May 2021 05:15 )             30.4     CMP:        138  |  99  |  21<H>  ----------------------------<  225<H>  4.6   |  29  |  0.8    Ca    9.2      20 May 2021 05:15  Mg     1.9         TPro  5.7<L>  /  Alb  4.0  /  TBili  0.3  /  DBili  x   /  AST  12  /  ALT  11  /  AlkPhos  72       Albumin, Serum: 4.0 g/dL (21 @ 05:15)    PT/INR - ( 19 May 2021 12:14 )   PT: 21.80 sec;   INR: 1.90 ratio      PTT - ( 19 May 2021 12:14 )  PTT:35.0 sec     Imaging:  Reviewed    PEx:  T(C): 35.6 (21 @ 05:42), Max: 36.1 (21 @ 21:25)  HR: 96 (21 @ 10:35) (71 - 96)  BP: 146/67 (21 @ 10:35) (135/62 - 169/72)  RR: 22 (21 @ 10:35) ( - 22)  SpO2: 97% (21 @ 10:35) (97% - 97%)  Wt(kg): --  Daily     Daily Weight in k.3 (20 May 2021 05:42)                General: AAOx3    found in bed after return from testing in mild distress - breathing - eased with fan/and after encounter was over   HEENT:  NCAT PER  Non icteric   CVS:not tachy  Resp: sl abored tachypneic + increased WOB - fan improved and after encounter improved  GI:  Soft NT ND   :  Voiding   Musc: No C/C/E    Neuro: Follows commands No focal deficits  Psych: slight anxious Pleasant    Lymph: Normal    Preadmit Karnofsky:  %  50         Current Karnofsky:  40%   %  http://www.npcrc.org/files/news/karnofsky_performance_scale.pdf   http://www.npcrc.org/files/news/palliative_performance_scale_PPSv2.pdf  Cachexia (Y/N): y  BMI:    Advanced Directives:       DNR/DNI just today by primary team      Decision maker: The patient is able to participate in complex medical decision making conversations.   Legal surrogate:    GOALS OF CARE DISCUSSION       Palliative care info/counseling provided	             Documentation of GOC: see below	    REFERRALS	        Palliative Med        Unit SW/Case Carmita               NAZANIN POWERS          MRN-[id number]              CC: SOB    HPI:  74 yo F with pmh of anxiety, COPD (on 3 L home O2 ) with multiple admissions for recurrent exacerbations ,   lung CA s/p chemo/radiation/ and partial left lobectomy in remission x 9 years, severe pulmonary hypertension - follows with Dr. Ernestine Mcpherson outpatient , Afib on Coumadin s/p ablation and micra PPM. She now presents to the hospital with worsening shortness of breath and wheezing and 2 episodes of hemoptysis. The patient states symptoms have been ongoing since her recent admission for COPD exacerbation and has been worsening over the last few years limiting her quality of life due to difficulty ambulating due to shortness of breath.  She was discharged on 21 feeling better but still short of breath and had acute on chronic dyspnea today with hemoptysis which prompted her come to the hospital. The patient denies fevers/chills/dizziness/chest pain/ abd pain/ constipation/ diarrhea/ dysuria. all ros negative except as above.     T(C): 36.4, HR: 72 , BP: 151/63 , RR: 18, SpO2: 100%  Labs: Hb 8.4 (baseline ~ 9.5), pbnp 700   CXR : appears stable from 21   CT Angio Chest PE Protocol w/ IV Cont (21 @ 18:23) >  1.  No pulmonary embolus.  2.  Stable posttreatment changes in the left lung as described above.  3.  Scattered bilateral tree-in-bud branching nodularity with areas of mucus plugging in the left lung. Findings are compatible with infectious/inflammatory airways disease.  4.  Cardiomegaly with evidence of mild right heart dysfunction.     (17 May 2021 23:01)    PAST MEDICAL & SURGICAL HISTORY:  High cholesterol    Anxiety    Pacemaker    Lung cancer    Afib    COPD (chronic obstructive pulmonary disease)    Artificial cardiac pacemaker    H/O atrioventricular kacie ablation    S/P lobectomy of lung  left    History of tonsillectomy    S/P appendectomy    Cataract  RIGHT   AND  LEFT  19    FAMILY HISTORY:   Reviewed and found non contributory in mother or father    SOCIAL HISTORY:  Lives with spouse.    ROS:        Dyspnea (Marcel 0-10): yes - when cannot do something > anxious > SOB                     N/V (Y/N): No                             Secretions (Y/N) : No                                          Agitation(Y/N): No, Anxiety yes                            Pain (Y/N): No                                 -Provocation/Palliation: N/A  -Quality/Quantity: N/A  -Radiating: N/A  -Severity: No pain  -Timing/Frequency: N/A  -Impact on ADLs: N/A    General:  becoming more dependent  HEENT:    Denied  Neck:  Denied  CVS:  Denied  Resp:  Denied  GI:  BM 2 days ago  :  Denied  Musc:  Denied  Neuro:  Denied  Psych:  Denied  Skin:  Denied  Lymph:  Denied    Allergies    bacitracin (Other)  carboplatin (Other)  sulfa drugs (Unknown)  sulfonamides (Unknown)  Xanax (Other)    Intolerances      Opiate Naive (Y/N): Y  -iStop reviewed (Y/N): Y  Ref#:        Reference #: [id number]    Others' Prescriptions  Patient Name: Nazanin Schaefer Date: 1945  Address: [address] [place] [id number]: Female  Rx Written	Rx Dispensed	Drug	Quantity	Days Supply	Prescriber Name	Prescriber Jolanta #	Payment Method  2021	klonopin 0.5 mg tablet	90	30	Praneeth Kidda	[id number]	[facility] #[id number]  2021	lorazepam 0.5 mg tablet	30	30	Penny Griffith MD	[id number]	[facility] #[id number]  2020	klonopin 0.5 mg tablet	90	30	Praneeth Kidda	[id number]	[facility] #[id number]  2020	klonopin 0.5 mg tablet	90	30	Carmine Spencer MD	[id number]	[facility] #[id number]  2020	klonopin 0.5 mg tablet	90	30	Marti Taty	[id number]	[facility] #[id number]  2020	klonopin 0.5 mg tablet	90	30	Taty Kidd	[id number]	[facility] #[id number]       Medications:      MEDICATIONS  (STANDING):  albuterol/ipratropium for Nebulization 3 milliLiter(s) Nebulizer every 6 hours  atorvastatin 20 milliGRAM(s) Oral at bedtime  azithromycin  IVPB      azithromycin  IVPB 500 milliGRAM(s) IV Intermittent every 24 hours  budesonide 160 MICROgram(s)/formoterol 4.5 MICROgram(s) Inhaler 2 Puff(s) Inhalation two times a day  chlorhexidine 4% Liquid 1 Application(s) Topical <User Schedule>  cholecalciferol 2000 Unit(s) Oral daily  clonazePAM  Tablet 1 milliGRAM(s) Oral every 12 hours  guaiFENesin ER 1200 milliGRAM(s) Oral every 12 hours  methylPREDNISolone sodium succinate Injectable 60 milliGRAM(s) IV Push two times a day  pantoprazole    Tablet 40 milliGRAM(s) Oral before breakfast  warfarin 1 milliGRAM(s) Oral once    MEDICATIONS  (PRN):  ALBUTerol    90 MICROgram(s) HFA Inhaler 1 Puff(s) Inhalation every 3 hours PRN Bronchospasm  clonazePAM  Tablet 0.5 milliGRAM(s) Oral two times a day PRN anxiety      Labs:    CBC:                        9.5    8.81  )-----------( 133      ( 20 May 2021 05:15 )             30.4     CMP:        138  |  99  |  21<H>  ----------------------------<  225<H>  4.6   |  29  |  0.8    Ca    9.2      20 May 2021 05:15  Mg     1.9         TPro  5.7<L>  /  Alb  4.0  /  TBili  0.3  /  DBili  x   /  AST  12  /  ALT  11  /  AlkPhos  72       Albumin, Serum: 4.0 g/dL (21 @ 05:15)    PT/INR - ( 19 May 2021 12:14 )   PT: 21.80 sec;   INR: 1.90 ratio      PTT - ( 19 May 2021 12:14 )  PTT:35.0 sec     Imaging:  Reviewed  CT angio  IMPRESSION:  1.  No pulmonary embolus.  2.  Stable posttreatment changes in the left lung as described above.  3.  Scattered bilateral tree-in-bud branching nodularity with areas of mucus plugging in the left lung. Findings are compatible with infectious/inflammatory airways disease.  4.  Cardiomegaly with evidence of mild right heart dysfunction.    EKG: interpreted by me: V paced rhythm    PEx:  T(C): 35.6 (21 @ 05:42), Max: 36.1 (21 @ 21:25)  HR: 96 (21 @ 10:35) (71 - 96)  BP: 146/67 (21 @ 10:35) (135/62 - 169/72)  RR: 22 (21 @ 10:35) (20 - 22)  SpO2: 97% (21 @ 10:35) (97% - 97%)  Wt(kg): --  Daily     Daily Weight in k.3 (20 May 2021 05:42)                General: AAOx3    found in bed after return from testing in mild distress - breathing - eased with fan/and after encounter was over   Eyes: EOMI Non icteric   CVS: not tachy, edema  Resp: sl abored tachypneic + increased WOB - fan improved and after encounter improved  GI:  Soft NT ND   Musc: No clubbing    Neuro: Follows commands No focal deficits  Psych: slight anxious Pleasant      Preadmit Karnofsky:  %  50         Current Karnofsky:  40%   %  http://www.npcrc.org/files/news/karnofsky_performance_scale.pdf   http://www.npcrc.org/files/news/palliative_performance_scale_PPSv2.pdf  Cachexia (Y/N): N  BMI: 24    Advanced Directives:     DNR/DNI just today by primary team      Decision maker: The patient is able to participate in complex medical decision making conversations.     GOALS OF CARE DISCUSSION       Palliative care info/counseling provided	            Documentation of GOC: see below	    REFERRALS	        Palliative Med        Unit SW/Case [facility]

## 2021-05-20 NOTE — CONSULT NOTE ADULT - CONVERSATION DETAILS
patient described little support; lives with  who is sick as well; taught back what Pulmonologist reviewed with her: end stage; treat anxiety and comfort - and hospice.   Open to trialing standing/PRN anxiolytic and  PRN opioids  All questions answered

## 2021-05-20 NOTE — PROGRESS NOTE ADULT - ASSESSMENT
Impression  Dyspnea, likely multifactorial  Nonmassive hemoptysis likely secondary to chronic coughing  Chronic hypoxemic respiratory failure  Chronic hypercapnic respiratory failure  HO Severe pulmonary hypertension, likely Group 3  HO Lung Ca, s/p chemo, RT/ partial lobectomy, now in remission for 9yrs  HO COPD, on triple therapy and 10mg prednisone daily  HO Afib, on coumadin    Recommendations  Please repeat ECHO  Please send fungitell  Please obtain GOC  Change solumedrol to prednisone 40mg, 40mg x5 days, then 20mg x5 days  2 more days of Azithromycin 500mg IVPB  Dimer  Symbicort  Chest PT, Mucinex  Incentive spirometry  AVAPS, TV-400, EPAP-10, minIPAP-14, maxIPAP-25, RR-12  Target SpO2 88-94%  Duonebs q4h and PRN  DC spiriva for now  HOB at 45  Aspiration precautions  On coumadin for Afib  Overall poor prognosis  Pulmonary rehab upon DC  AVAPS upon DC, patient is chronically hypercapnic  DC on symbicort and spiriva  PT/ OT eval Impression  Dyspnea, likely multifactorial  Nonmassive hemoptysis likely secondary to chronic coughing  Chronic hypoxemic respiratory failure  Chronic hypercapnic respiratory failure  HO Severe pulmonary hypertension, likely Group 3  HO Lung Ca, s/p chemo, RT/ partial lobectomy, now in remission for 9yrs  HO COPD, on triple therapy and 10mg prednisone daily  HO Afib, on coumadin    Recommendations  Please repeat ECHO  Please send fungitell  Please obtain GOC  Patient is very anxious, takes klonipin 0.5mg q12h at home, recommend to increase to 1mg q12h for now and close OP FU  Spoke to patient at length, patient requesting DNR/ DNI, MOLST form needs to be completed  Change solumedrol to prednisone 40mg, 40mg x5 days, then 20mg x5 days  2 more days of Azithromycin 500mg  Dimer  Symbicort  Chest PT, Mucinex  Incentive spirometry  AVAPS, TV-400, EPAP-10, minIPAP-14, maxIPAP-25, RR-12  Target SpO2 88-94%  Duonebs q4h and PRN  DC spiriva for now  HOB at 45  Aspiration precautions  On coumadin for Afib  Overall poor prognosis  Pulmonary rehab upon DC  AVAPS upon DC, patient is chronically hypercapnic  DC on symbicort and spiriva  PT/ OT eval Impression  Dyspnea, likely multifactorial  Nonmassive hemoptysis likely secondary to chronic coughing  Chronic hypoxemic respiratory failure  Chronic hypercapnic respiratory failure  HO Severe pulmonary hypertension, likely Group 3  HO Lung Ca, s/p chemo, RT/ partial lobectomy, now in remission for 9yrs  HO COPD, on triple therapy and 10mg prednisone daily  HO Afib, on coumadin    Recommendations  Please repeat ECHO  Please send fungitell  Please obtain GOC  Patient is very anxious, takes klonipin 0.5mg q12h at home, recommend to increase to 1mg q12h for now and close OP FU  Spoke to patient at length, patient requesting DNR/ DNI, MOLST form needs to be completed  Palliative eval  Change solumedrol to prednisone 40mg, 40mg x5 days, then 20mg x5 days  2 more days of Azithromycin 500mg  Dimer  Symbicort  Chest PT, Mucinex  Incentive spirometry  AVAPS, TV-400, EPAP-10, minIPAP-14, maxIPAP-25, RR-12  Target SpO2 88-94%  Duonebs q4h and PRN  DC spiriva for now  HOB at 45  Aspiration precautions  On coumadin for Afib  Overall poor prognosis  Pulmonary rehab upon DC  AVAPS upon DC, patient is chronically hypercapnic  DC on symbicort and spiriva  PT/ OT eval greater than 3 times/day

## 2021-05-20 NOTE — CONSULT NOTE ADULT - ATTENDING COMMENTS
case discussed with fellow at bedside  pt well known to me and staff  plan   aavap at night  [facility]  social service as out pt
75 year old woman with history of COPD on home O2  Patient presents with exacerbation  Met with patient at bedside  She discussed GOC discussion with primary team/pulm  She wishes for DNR/DNI  She is interested in hospice if she is to worsen; interested in hospice consult now  See above for recommendations for medications for dyspnea, anxiety, constipation  Palliative care will continue to follow

## 2021-05-20 NOTE — CONSULT NOTE ADULT - PROBLEM SELECTOR RECOMMENDATION 3
add roxanol 5mg PO/SL every 4 H PRN for dyspnea  add senna 2 tabs hs add roxanol 5mg PO/SL every 4 H PRN for dyspnea

## 2021-05-20 NOTE — PROGRESS NOTE ADULT - ASSESSMENT
76 yo F with pmh of anxiety, COPD (on 3 L home O2 ) and  lung CA s/p chemo/radiation/ and partial left lobectomy in remission x 9 years, severe pulmonary hypertension - follows with Dr. Ernestine Mcpherson outpatient , Afib on Coumadin s/p ablation and micra PPM. She now presents to the hospital with worsening shortness of breath and wheezing and 2 episodes of hemoptysis after discharge from the hospital in April.       # Acute on chronic resp failure due to COPD/ severe pulm HTN   # h/o lung cancer in remission s/p radiation/chemo/ left partial lobectomy   - SpO2 96% on 3L home O2  , at baseline here , goal 88-92%   - afebrile, no fever , no wbc   - was recently admitted with similar symptoms , was on prednisone taper and completed azithromycin   - s/p solumedrol in ED,   - CT Angio Chest PE Protocol w/ IV Cont (05.17.21 @ 18:23) >No pulmonary embolus. Stable posttreatment changes in the left lung as described above. Scattered bilateral tree-in-bud branching nodularity with areas of mucus plugging in the left lung. Findings are compatible with infectious/inflammatory airways disease. Cardiomegaly with evidence of mild right heart dysfunction.  - bnp 700 ( below baseline) , trop neg, EKG no changes   - CXR appears stable  - TTE 62% // severe pulm HTN, large pericardial effusion (3/24/21)   -cont nebs, ABx, f/u fungitell      # Hemoptysis , likely secondary to bronchitis   - Hb 8.4 , baseline 9.5 , monitor  - resume coumadin  - active t&s  - monitor     # Incidental finding of  Large pericardial effusion with right ventricular collapse on TTE 3/24/21   - repeat echo     # h/o Afib on Coumadin s/p ablation and micra PPM  - resume coumadin   - INR 2.3   - not on rate control medications at home     # h.o HLD  - c/w statin    # h/o anxiety  - increase clonazepam     # DVT PPX: on coumadin  # GI PPX: PPI  # Diet: regular   # CHG BATH  # Activity :as tolerated   # Dispo :acute     High risk pt. D/w pt GOC: she wants to be DNR/DNI. Agreeable to palliative care consult.    #Progress Note Handoff  Pending: LE doppler, Echo,_Clinical improvement and stability__x_____PT____x____  Pt/Family discussion: Pt informed and agrees with the current plan  Disposition: Home______/SNF___?____/4A______/To be determined____x____    My note supersedes the residents note should a discrepancy arise.    Chart and consultant notes personally reviewed.  Care Discussed with Consultants/Other Providers/ Housestaff [ x] YES [ ] NO   Radiology, labs, old records personally reviewed.      d/w Housestaff, nursing, case mgmt, pulm    Time-based billing (NON-critical care).     45 minutes spent on total encounter; more than 50% of the visit was spent counseling and / or coordinating care by the attending physician.  The necessity of the time spent during the encounter on this date of service was due to:     complexity of care.

## 2021-05-21 NOTE — PROGRESS NOTE ADULT - ASSESSMENT
Impression  Dyspnea, likely multifactorial  Non-massive hemoptysis likely secondary to chronic coughing  Chronic hypoxemic respiratory failure, 3L at home  Chronic hypercapnic respiratory failure  HO Severe pulmonary hypertension, likely Group 3  HO Lung Ca, s/p chemo, RT/ partial lobectomy, now in remission for 9yrs  HO COPD, on triple therapy and 10mg prednisone daily  HO Afib, on coumadin    Recommendations  FU VDUS  FU ECHO  FU Fungitell  Palliative and hospice following  Continue with klonipin 1mg q12h, patient feels less anxious today  Change solumedrol to prednisone 40mg, 40mg x5 days, then 20mg x5 days  1 more day of Azithromycin 500mg  Symbicort  Chest PT, Mucinex  Incentive spirometry  AVAPS, TV-400, EPAP-10, minIPAP-14, maxIPAP-25, RR-12, educated patient on importance of using AVAPS but patient still refusing, stating she is claustrophobic, patient verbalizes understanding of risks of not using it  Target SpO2 88-94%  Duonebs q4h and PRN  HOB at 45  Aspiration precautions  On coumadin for Afib  Overall extremely poor prognosis  Pulmonary rehab upon DC  AVAPS upon DC, patient is chronically hypercapnic  DC on symbicort and spiriva  PT/ OT eval  From pulmonary standpoint, ok to DC

## 2021-05-21 NOTE — PROGRESS NOTE ADULT - PROBLEM SELECTOR PLAN 4
patient states will have BM at home  continue senna patient states will have BM at home  continue senna and PRN miralax

## 2021-05-21 NOTE — PROGRESS NOTE ADULT - PROBLEM SELECTOR PLAN 2
klonopin as written   support given Continue clonopin 1mg PO q12h ATC and 1mg PO q12h PRN for anxiety/agitation

## 2021-05-21 NOTE — PROGRESS NOTE ADULT - PROBLEM SELECTOR PLAN 1
end stage  DC today with hospice admit tomorrow end stage  DC today with hospice admit tomorrow  Treatment of dyspnea as below

## 2021-05-21 NOTE — DISCHARGE NOTE PROVIDER - NSDCMRMEDTOKEN_GEN_ALL_CORE_FT
albuterol 1.25 mg/3 mL (0.042%) inhalation solution: 3 milliliter(s) inhaled 4 times a day  budesonide-formoterol 160 mcg-4.5 mcg/inh inhalation aerosol: 1 puff(s) inhaled 2 times a day  clonazePAM 0.5 mg oral tablet: 1 tab(s) orally 2 times a day, As needed, anxiety  Crestor 5 mg oral tablet: 1 tab(s) orally once a day (at bedtime)  morphine 20 mg/mL oral concentrate: 0.25 milliliter(s) orally every 6 hours, As needed, dyspnea  predniSONE 20 mg oral tablet: 2.5 tab(s) orally once daily for 3 days, then 2 tabs once daily for 3 days, then 1.5 tab daily for 3 days, then 1 tab daily for 3 days, then 0.5 tab daily for 3 days then stop.    (Strat tomorrow 50 mg orally for 3 days, then 40 mg orally for 3 days, then 30 mg orally for 3 days, then 20 mg orally for 3 days, then 10 mg orally for 3 days, then stop)  Spiriva HandiHaler 18 mcg inhalation capsule: 1 cap(s) inhaled 2 times a day   Vitamin D3 2000 intl units oral tablet: 1 tab(s) orally once a day  warfarin 1 mg oral tablet: 1 tab(s) orally once a day to be started tomorrow night   albuterol 1.25 mg/3 mL (0.042%) inhalation solution: 3 milliliter(s) inhaled 4 times a day  budesonide-formoterol 160 mcg-4.5 mcg/inh inhalation aerosol: 1 puff(s) inhaled 2 times a day  clonazePAM 1 mg oral tablet: 1 tab(s) orally every 8 hours, As Needed  morphine 20 mg/mL oral concentrate: 0.25 milliliter(s) orally every 6 hours, As needed, dyspnea  pantoprazole 40 mg oral delayed release tablet: 1 tab(s) orally once a day (before a meal)  predniSONE 20 mg oral tablet: 3 tab(s) daily for 3 days, then 2 tabs for 3 days, then 1 tab daily for 3 days, then 0.5 tab daily for 4 days,  then stop.      Spiriva HandiHaler 18 mcg inhalation capsule: 1 cap(s) inhaled 2 times a day

## 2021-05-21 NOTE — DISCHARGE NOTE PROVIDER - HOSPITAL COURSE
74 yo F with pmh of anxiety, COPD (on 3 L home O2 ) with multiple admissions for recurrent exacerbations ,   lung CA s/p chemo/radiation/ and partial left lobectomy in remission x 9 years, severe pulmonary hypertension - follows with Dr. Ernestine Mcpherson outpatient , Afib on Coumadin s/p ablation and micra PPM. She now presents to the hospital with worsening shortness of breath and wheezing and 2 episodes of hemoptysis. The patient states symptoms have been ongoing since her recent admission for COPD exacerbation and has been worsening over the last few years limiting her quality of life due to difficulty ambulating due to shortness of breath.  She was discharged on 4/27/21 feeling better but still short of breath and had acute on chronic dyspnea today with hemoptysis which prompted her come to the hospital. The patient denies fevers/chills/dizziness/chest pain/ abd pain/ constipation/ diarrhea/ dysuria. all ros negative except as above.   T(C): 36.4, HR: 72 , BP: 151/63 , RR: 18, SpO2: 100%  Labs: Hb 8.4 (baseline ~ 9.5), pbnp 700   CXR : appears stable from 4/28/21   CT Angio Chest PE Protocol w/ IV Cont (05.17.21 @ 18:23) > No pulmonary embolus. Stable posttreatment changes in the left lung as described above. Scattered bilateral tree-in-bud branching nodularity with areas of mucus plugging in the left lung. Findings are compatible with infectious/inflammatory airways disease. Cardiomegaly with evidence of mild right heart dysfunction.        # Deconditioning due to COPD/ severe pulm HTN /  cor pulmonale   # h/o lung cancer in remission s/p radiation/chemo/ left partial lobectomy   -  on 3L home O2 , at baseline here , goal 88-92%   - CT Angio Chest PE Protocol w/ IV Cont (05.17.21 @ 18:23) >No pulmonary embolus. Stable posttreatment changes in the left lung as described above. Scattered bilateral tree-in-bud branching nodularity with areas of mucus plugging in the left lung. Findings are compatible with infectious/inflammatory airways disease. Cardiomegaly with evidence of mild right heart dysfunction.  - pbnp 700 ( below baseline) , trop neg, EKG no changes   - CXR appears stable, f/u   - TTE 62% // severe pulm HTN (3/24/21)   - Pulm c/s Dr. Ernestine Mcpherson      - ABG, ECHO, VDUS, Procal     - C/w Symbicort, Solumedrol 60 q12h for now, Duonebs q4h and PRN, Azithromycin 500mg IVPB x5 days     - Chest PT     - Mucinex     - Incentive spirometry     - Sputum culture, Fungitell     - AVAPS, TV-400, EPAP-10, minIPAP-14, maxIPAP-25, RR-12, Target SpO2 88-94%     - Pulmonary rehab upon DC     - PT/ OT eval  - Seen by palliative care recommendations noted   - Hospice c/s-->f/u     # Hemoptysis , likely secondary to bronchitis (resolved)  - Hb at baseline monitor  - resumed coumadin (2 mg)  - active t&s  - monitor     # Incidental finding of  Large pericardial effusion with right ventricular collapse on TTE 3/24/21   - hemodynamically stable now   - CT sx eval if hemodynamic instability   - monitor     # h/o Afib on Coumadin s/p ablation and micra PPM  - hold coumadin , given episode of hemoptysis and Hb 8.4 (baseline ~ 9.5)  - INR 2.3   - not on rate control medications at home     # h.o HLD  - c/w statin    # h/o anxiety  - c/w clonazepam prn home dose            74 yo F with pmh of anxiety, COPD (on 3 L home O2 ) with multiple admissions for recurrent exacerbations ,   lung CA s/p chemo/radiation/ and partial left lobectomy in remission x 9 years, severe pulmonary hypertension - follows with Dr. Ernestine Mcpherson outpatient , Afib on Coumadin s/p ablation and micra PPM. She now presents to the hospital with worsening shortness of breath and wheezing and 2 episodes of hemoptysis. The patient states symptoms have been ongoing since her recent admission for COPD exacerbation and has been worsening over the last few years limiting her quality of life due to difficulty ambulating due to shortness of breath.  She was discharged on 4/27/21 feeling better but still short of breath and had acute on chronic dyspnea today with hemoptysis which prompted her come to the hospital. The patient denies fevers/chills/dizziness/chest pain/ abd pain/ constipation/ diarrhea/ dysuria. all ros negative except as above.   T(C): 36.4, HR: 72 , BP: 151/63 , RR: 18, SpO2: 100%  Labs: Hb 8.4 (baseline ~ 9.5), pbnp 700   CXR : appears stable from 4/28/21   CT Angio Chest PE Protocol w/ IV Cont (05.17.21 @ 18:23) > No pulmonary embolus. Stable posttreatment changes in the left lung as described above. Scattered bilateral tree-in-bud branching nodularity with areas of mucus plugging in the left lung. Findings are compatible with infectious/inflammatory airways disease. Cardiomegaly with evidence of mild right heart dysfunction.        # Deconditioning due to COPD/ severe pulm HTN /  cor pulmonale   # h/o lung cancer in remission s/p radiation/chemo/ left partial lobectomy   -  on 3L home O2 , at baseline here , goal 88-92%   - CT Angio Chest PE Protocol w/ IV Cont (05.17.21 @ 18:23) >No pulmonary embolus. Stable posttreatment changes in the left lung as described above. Scattered bilateral tree-in-bud branching nodularity with areas of mucus plugging in the left lung. Findings are compatible with infectious/inflammatory airways disease. Cardiomegaly with evidence of mild right heart dysfunction.  - pbnp 700 ( below baseline) , trop neg, EKG no changes   - CXR appears stable, f/u   - TTE 62% // severe pulm HTN (3/24/21)   - Pulm c/s Dr. Ernestine Mcpherson      - ABG, ECHO, VDUS, Procal     - C/w Symbicort, Solumedrol 60 q12h for now, Duonebs q4h and PRN, Azithromycin 500mg IVPB x5 days     - Chest PT     - Mucinex     - Incentive spirometry     - Sputum culture, Fungitell     - AVAPS, TV-400, EPAP-10, minIPAP-14, maxIPAP-25, RR-12, Target SpO2 88-94%     - Pulmonary rehab upon DC     - PT/ OT eval  - Seen by palliative care recommendations noted   - Hospice c/s-->f/u     # Hemoptysis , likely secondary to bronchitis (resolved)  - Hb at baseline monitor  - resumed coumadin (2 mg)  - active t&s  - monitor     # Incidental finding of  Large pericardial effusion with right ventricular collapse on TTE 3/24/21   - hemodynamically stable now   - CT sx eval if hemodynamic instability   - monitor     # h/o Afib on Coumadin s/p ablation and micra PPM  - hold coumadin , given episode of hemoptysis and Hb 8.4 (baseline ~ 9.5)  - INR 2.3   - not on rate control medications at home     # h.o HLD  - c/w statin    # h/o anxiety  - c/w clonazepam prn home dose     Pt requested to be made DNR/DNI and agreed to hospice care. Will be going home on hospice.

## 2021-05-21 NOTE — PROGRESS NOTE ADULT - SUBJECTIVE AND OBJECTIVE BOX
Patient is a 75y old  Female who presents with a chief complaint of       SUBJECTIVE:  Patient feels much better today compared to yesterday. Patient states she feels more calm and less anxious.       PHYSICAL EXAM  Vital Signs Last 24 Hrs  T(C): 35.8 (21 May 2021 05:09), Max: 36.4 (20 May 2021 20:15)  T(F): 96.4 (21 May 2021 05:09), Max: 97.6 (20 May 2021 20:15)  HR: 71 (21 May 2021 05:09) (71 - 96)  BP: 135/89 (21 May 2021 05:55) (135/89 - 193/79)  BP(mean): 107 (21 May 2021 05:55) (103 - 107)  RR: 20 (21 May 2021 05:09) (20 - 22)  SpO2: 100% (21 May 2021 05:55) (95% - 100%)    CONSTITUTIONAL:  Chronically ill-appearing. NAD    ENT:   Airway patent,   No thrush    EYES:   Clear bilaterally,   pupils equal, round and reactive to light.    CARDIAC:   Normal rate,   regular rhythm.    no edema    RESPIRATORY:   No wheezing  Normal chest expansion  Not tachypneic,  No use of accessory muscles    GASTROINTESTINAL:  Abdomen soft,   non-tender,   no guarding,   + BS    MUSCULOSKELETAL:   range of motion is not limited,  no clubbing, cyanosis    NEUROLOGICAL:   Alert and oriented   no motor  deficits.    SKIN:   Skin normal color for race,   No evidence of rash.    PSYCHIATRIC:   normal mood and affect.   no apparent risk to self or others.        LABS:                          10.2   9.98  )-----------( 165      ( 21 May 2021 04:30 )             33.2                                               05-21    138  |  96<L>  |  20  ----------------------------<  215<H>  4.9   |  31  |  0.7    Ca    9.5      21 May 2021 04:30  Mg     2.1     05-21    TPro  6.0  /  Alb  4.1  /  TBili  0.4  /  DBili  x   /  AST  12  /  ALT  13  /  AlkPhos  79  05-21      PT/INR - ( 21 May 2021 04:30 )   PT: 22.80 sec;   INR: 1.98 ratio         PTT - ( 21 May 2021 04:30 )  PTT:31.8 sec                                                                                     LIVER FUNCTIONS - ( 21 May 2021 04:30 )  Alb: 4.1 g/dL / Pro: 6.0 g/dL / ALK PHOS: 79 U/L / ALT: 13 U/L / AST: 12 U/L / GGT: x                                                                                                MEDICATIONS  (STANDING):  albuterol/ipratropium for Nebulization 3 milliLiter(s) Nebulizer every 6 hours  atorvastatin 20 milliGRAM(s) Oral at bedtime  azithromycin  IVPB 500 milliGRAM(s) IV Intermittent every 24 hours  azithromycin  IVPB      budesonide 160 MICROgram(s)/formoterol 4.5 MICROgram(s) Inhaler 2 Puff(s) Inhalation two times a day  chlorhexidine 4% Liquid 1 Application(s) Topical <User Schedule>  cholecalciferol 2000 Unit(s) Oral daily  clonazePAM  Tablet 1 milliGRAM(s) Oral every 12 hours  guaiFENesin ER 1200 milliGRAM(s) Oral every 12 hours  methylPREDNISolone sodium succinate Injectable 60 milliGRAM(s) IV Push two times a day  pantoprazole    Tablet 40 milliGRAM(s) Oral before breakfast  senna 2 Tablet(s) Oral at bedtime    MEDICATIONS  (PRN):  ALBUTerol    90 MICROgram(s) HFA Inhaler 1 Puff(s) Inhalation every 3 hours PRN Bronchospasm  clonazePAM  Tablet 1 milliGRAM(s) Oral every 12 hours PRN anxiety  morphine Concentrate 5 milliGRAM(s) Oral every 6 hours PRN dyspnea  polyethylene glycol 3350 17 Gram(s) Oral daily PRN Constipation      X-Rays reviewed    CXR interpreted by me:

## 2021-05-21 NOTE — PROGRESS NOTE ADULT - ASSESSMENT
76 yo F with pmh of anxiety, COPD (on 3 L home O2 ) and  lung CA s/p chemo/radiation/ and partial left lobectomy in remission x 9 years, severe pulmonary hypertension;  Afib on Coumadin s/p ablation and micra PPM. She presented to the hospital with worsening shortness of breath and wheezing and 2 episodes of hemoptysis after discharge from the hospital in April. Deconditioning due to COPD/ severe pulm HTN / cor pulmonale.    Interim - one PRN of klonopin and 2PRNs of morphine    Morphine Equivalent Daily Dose (MEDD): 10mg    See Recs below.    Please call   Dee Dee Cueva  or [phone]  with questions or concerns.   We will continue to follow.     Discussed with primary MD.

## 2021-05-21 NOTE — PROGRESS NOTE ADULT - PROVIDER SPECIALTY LIST ADULT
Internal Medicine
Hospitalist
Internal Medicine
Pulmonology
Pulmonology
Internal Medicine
Internal Medicine
Palliative Care
Hospitalist

## 2021-05-21 NOTE — HOSPICE CARE NOTE - CONVESATION DETAILS
Consult completed via phone call to patient. Patient verbalizes she wants to go home. Reports her  can not manage her care and there is no finances available for private hire HHA services. Reinforced that hospice is a support service that visits once a week. Nazanin maintains that she has no family support other than her  who is unable to provide care for her. Reviewed NH placement and patient declined. Patient requires safe D/C plan prior to D/C. Hospice to discuss further with CM.

## 2021-05-21 NOTE — DISCHARGE NOTE PROVIDER - NSDCCPCAREPLAN_GEN_ALL_CORE_FT
PRINCIPAL DISCHARGE DIAGNOSIS  Diagnosis: COPD exacerbation  Assessment and Plan of Treatment: You were admitted to the hospital after experiencing shortness of breath and hemoptysis (blood with cough). You were given supplemental oxygen, duonebs, inhalers, mucinex and antibiotics and steroids intravenously. You were seen by our pulmonologist here at the hospital, he recommended AVAPS at night. You did not have any further episodes of bleeding. Palliative care saw you during your stay. You are going home with hospice. Please follow up with your primary care physician and pulmonologist within 1-2 week(s) after discharge from the hospital. Please take your medications as prescribed.       PRINCIPAL DISCHARGE DIAGNOSIS  Diagnosis: COPD exacerbation  Assessment and Plan of Treatment: You were admitted to the hospital after experiencing shortness of breath and hemoptysis (blood with cough). You were given supplemental oxygen, duonebs, inhalers, mucinex and antibiotics and steroids intravenously. You were seen by our pulmonologist here at the hospital, he recommended AVAPS at night. You did not have any further episodes of bleeding. Palliative care saw you during your stay. You are going home with hospice. Please follow up with your primary care physician and pulmonologist within 1-2 week(s) after discharge from the hospital. Please take your medications as prescribed. Hospice will follow up with you at home.

## 2021-05-21 NOTE — PROGRESS NOTE ADULT - SUBJECTIVE AND OBJECTIVE BOX
CASEY POWERS 75y Female  MRN#: [id number]   Hospital Day: 4d    SUBJECTIVE  Patient is a 75y old Female who presents with a chief complaint of Currently admitted to medicine with the primary diagnosis of Hemoptysis      INTERVAL HPI AND OVERNIGHT EVENTS:  Patient was examined and seen at bedside. This morning she is resting comfortably in bed and reports no issues or overnight events.    REVIEW OF SYMPTOMS:  CONSTITUTIONAL: No weakness, fevers or chills; No headaches  EYES: No visual changes, eye pain, or discharge  ENT: No vertigo; No ear pain or change in hearing; No sore throat or difficulty swallowing  NECK: No pain or stiffness  RESPIRATORY: No cough, wheezing, or hemoptysis; No shortness of breath  CARDIOVASCULAR: No chest pain or palpitations  GASTROINTESTINAL: No abdominal or epigastric pain; No nausea, vomiting, or hematemesis; No diarrhea or constipation; No melena or hematochezia  GENITOURINARY: No dysuria, frequency or hematuria  MUSCULOSKELETAL: No joint pain, no muscle pain, no weakness  NEUROLOGICAL: No numbness or weakness  SKIN: No itching or rashes    OBJECTIVE  PAST MEDICAL & SURGICAL HISTORY  High cholesterol    Anxiety    Pacemaker    Lung cancer    Afib    COPD (chronic obstructive pulmonary disease)    Artificial cardiac pacemaker    H/O atrioventricular kacie ablation    S/P lobectomy of lung  left    History of tonsillectomy    S/P appendectomy    Cataract  RIGHT  6/17 AND  LEFT  7/5/19      ALLERGIES:  bacitracin (Other)  carboplatin (Other)  sulfa drugs (Unknown)  sulfonamides (Unknown)  Xanax (Other)    MEDICATIONS:  STANDING MEDICATIONS  albuterol/ipratropium for Nebulization 3 milliLiter(s) Nebulizer every 6 hours  atorvastatin 20 milliGRAM(s) Oral at bedtime  azithromycin  IVPB      azithromycin  IVPB 500 milliGRAM(s) IV Intermittent every 24 hours  budesonide 160 MICROgram(s)/formoterol 4.5 MICROgram(s) Inhaler 2 Puff(s) Inhalation two times a day  chlorhexidine 4% Liquid 1 Application(s) Topical <User Schedule>  cholecalciferol 2000 Unit(s) Oral daily  clonazePAM  Tablet 1 milliGRAM(s) Oral every 12 hours  guaiFENesin ER 1200 milliGRAM(s) Oral every 12 hours  methylPREDNISolone sodium succinate Injectable 60 milliGRAM(s) IV Push two times a day  pantoprazole    Tablet 40 milliGRAM(s) Oral before breakfast  senna 2 Tablet(s) Oral at bedtime    PRN MEDICATIONS  ALBUTerol    90 MICROgram(s) HFA Inhaler 1 Puff(s) Inhalation every 3 hours PRN  clonazePAM  Tablet 1 milliGRAM(s) Oral every 12 hours PRN  morphine Concentrate 5 milliGRAM(s) Oral every 6 hours PRN  polyethylene glycol 3350 17 Gram(s) Oral daily PRN      VITAL SIGNS: Last 24 Hours  T(C): 35.8 (21 May 2021 05:09), Max: 36.4 (20 May 2021 20:15)  T(F): 96.4 (21 May 2021 05:09), Max: 97.6 (20 May 2021 20:15)  HR: 71 (21 May 2021 05:09) (71 - 93)  BP: 135/89 (21 May 2021 05:55) (135/89 - 193/79)  BP(mean): 107 (21 May 2021 05:55) (103 - 107)  RR: 20 (21 May 2021 05:09) (20 - 22)  SpO2: 100% (21 May 2021 05:55) (95% - 100%)    LABS:                        10.2   9.98  )-----------( 165      ( 21 May 2021 04:30 )             33.2     05-21    138  |  96<L>  |  20  ----------------------------<  215<H>  4.9   |  31  |  0.7    Ca    9.5      21 May 2021 04:30  Mg     2.1     05-21    TPro  6.0  /  Alb  4.1  /  TBili  0.4  /  DBili  x   /  AST  12  /  ALT  13  /  AlkPhos  79  05-21    PT/INR - ( 21 May 2021 04:30 )   PT: 22.80 sec;   INR: 1.98 ratio         PTT - ( 21 May 2021 04:30 )  PTT:31.8 sec              RADIOLOGY:      PHYSICAL EXAM:  CONSTITUTIONAL: No acute distress, well-developed, well-groomed, AAOx3  HEAD: Atraumatic, normocephalic  EYES: EOM intact, PERRLA, conjunctiva and sclera clear  ENT: Supple, no masses, no thyromegaly, no bruits, no JVD; moist mucous membranes  PULMONARY: Clear to auscultation bilaterally; no wheezes, rales, or rhonchi  CARDIOVASCULAR: Regular rate and rhythm; no murmurs, rubs, or gallops  GASTROINTESTINAL: Soft, non-tender, non-distended; bowel sounds present  MUSCULOSKELETAL: 2+ peripheral pulses; no clubbing, no cyanosis, no edema  NEUROLOGY: non-focal  SKIN: No rashes or lesions; warm and dry

## 2021-05-21 NOTE — PROGRESS NOTE ADULT - ASSESSMENT
74 yo F with pmh of anxiety, COPD (on 3 L home O2 ) and  lung CA s/p chemo/radiation/ and partial left lobectomy in remission x 9 years, severe pulmonary hypertension - follows with Dr. Ernestine Mcpherson outpatient , Afib on Coumadin s/p ablation and micra PPM. She now presents to the hospital with worsening shortness of breath and wheezing and 2 episodes of hemoptysis after discharge from the hospital in April.       # Deconditioning due to COPD/ severe pulm HTN /  cor pulmonale   # h/o lung cancer in remission s/p radiation/chemo/ left partial lobectomy   -  on 3L home O2 , at baseline here , goal 88-92%   - CT Angio Chest PE Protocol w/ IV Cont (05.17.21 @ 18:23) >No pulmonary embolus. Stable posttreatment changes in the left lung as described above. Scattered bilateral tree-in-bud branching nodularity with areas of mucus plugging in the left lung. Findings are compatible with infectious/inflammatory airways disease. Cardiomegaly with evidence of mild right heart dysfunction.  - pbnp 700 ( below baseline) , trop neg, EKG no changes   - CXR appears stable, f/u   - TTE 62% // severe pulm HTN (3/24/21)   - Pulm c/s Dr. Ernestine Mcpherson      - ABG, ECHO, VDUS, Procal     - C/w Symbicort, Solumedrol 60 q12h for now, Duonebs q4h and PRN, Azithromycin 500mg IVPB x5 days     - Chest PT     - Mucinex     - Incentive spirometry     - Sputum culture, Fungitell     - AVAPS, TV-400, EPAP-10, minIPAP-14, maxIPAP-25, RR-12, Target SpO2 88-94%     - Pulmonary rehab upon DC     - PT/ OT eval  - Seen by palliative care recommendations noted   - Hospice c/s-->f/u     # Hemoptysis , likely secondary to bronchitis (resolved)  - Hb at baseline monitor  - resumed coumadin (2 mg)  - active t&s  - monitor     # Incidental finding of  Large pericardial effusion with right ventricular collapse on TTE 3/24/21   - hemodynamically stable now   - CT sx eval if hemodynamic instability   - monitor     # h/o Afib on Coumadin s/p ablation and micra PPM  - hold coumadin , given episode of hemoptysis and Hb 8.4 (baseline ~ 9.5)  - INR 2.3   - not on rate control medications at home     # h.o HLD  - c/w statin    # h/o anxiety  - c/w clonazepam prn home dose

## 2021-05-21 NOTE — PROGRESS NOTE ADULT - ATTENDING COMMENTS
5/21 pt much improved from yesterday  with increase in meds  pt seen by palliative  requesting to go home has signed Mol[employer] form
patient seen and examined independently on morning rounds for the first time today, chart reviewed and discussed with the medicine resident and on interdisciplinary rounds and agree with the above resident progress note with the following addendum:    in brief, 76 yo woman with h/o anxiety, copd (on home O2 3L), severe pulmonary htn, AF on coumadin s/p ablation and h/o lung CA who p/w worsening sob, wheezing and single episode of hemoptysis after recently being discharged home in april after hospitlaization.     multiple medical complaints today- including dry mouth/lips,   PE:  GEN-NAD, AAOx3,, anxious, tachypneic  PULM-  fair air entry with decreased bs bilateral bases, diffuse +expiratory wheezing  CVS- +s1/s2 RRR no murmurs  GI- soft NT ND +bs, no rebound, no guarding  EXT- no edema    labs/radiology reviewed    a/p:  #Acute hypoxic respiratory failure- acute copd exacerbation  -pulmonary following  -cont o2 suppl  -iv solumedrol 60 mg q12 hr  -cont nebs, symbicort- hold spiriva  -mucinex  -no further hemoptysis---patient showed me blood in tissue from purse from prior to admission- hb stable (hb 9.5)---inr 2.4 will restart coumadin 1 mg daily today  -advise oral moisture swabs for dryness  -monitor wbc and fever curve  -check repeat cxr today  -EPS interogation of ppm device    DVT/GI ppx  FULL CODE    #Progress Note Handoff  Pending (specify): clinical stability  Family discussion: d/w patient herself bedside at length  Disposition: Home__x (with svcs)
Patient seen at bedside  Dyspnea better controlled today  Plan for discharge home today and hospice admission tomorrow  Palliative care will continue to follow while inpatient

## 2021-05-21 NOTE — DISCHARGE NOTE PROVIDER - CARE PROVIDER_API CALL
Carmine Spencer  CARDIOVASCULAR DISEASE  [address] [place] [address] [address]  [place] [address]  Phone: [phone]  Fax: [phone]  Follow Up Time:     Ky Sinha  INTERNAL MEDICINE  [address] [place] [address], [address]  [place] [address]  Phone: [phone]  Fax: [phone]  Follow Up Time:    Ky Sinha  INTERNAL MEDICINE  [address] [place] [address], [address]  [place] [address]  Phone: [phone]  Fax: [phone]  Follow Up Time:     Hospice Services,   Phone: (   )    -  Fax: (   )    -  Follow Up Time: 1-3 days

## 2021-05-21 NOTE — PROGRESS NOTE ADULT - ASSESSMENT
74 yo F with pmh of anxiety, COPD (on 3 L home O2 ) and  lung CA s/p chemo/radiation/ and partial left lobectomy in remission x 9 years, severe pulmonary hypertension - follows with Dr. Ernestine Mcpherson outpatient , Afib on Coumadin s/p ablation and micra PPM. She now presents to the hospital with worsening shortness of breath and wheezing and 2 episodes of hemoptysis after discharge from the hospital in April.       # Acute on chronic resp failure due to COPD/ severe pulm HTN   # h/o lung cancer in remission s/p radiation/chemo/ left partial lobectomy   - SpO2 96% on 3L home O2  , at baseline here , goal 88-92%   - afebrile, no fever , no wbc   - was recently admitted with similar symptoms , was on prednisone taper and completed azithromycin   - s/p solumedrol in ED,   - CT Angio Chest PE Protocol w/ IV Cont (05.17.21 @ 18:23) >No pulmonary embolus. Stable posttreatment changes in the left lung as described above. Scattered bilateral tree-in-bud branching nodularity with areas of mucus plugging in the left lung. Findings are compatible with infectious/inflammatory airways disease. Cardiomegaly with evidence of mild right heart dysfunction.  - bnp 700 ( below baseline) , trop neg, EKG no changes   - CXR appears stable  - TTE 62% // severe pulm HTN, large pericardial effusion (3/24/21)   -cont nebs, ABx, f/u fungitell  -echo w/ small pericardial effusion      # Hemoptysis , likely secondary to bronchitis   - Hb 8.4 , baseline 9.5 , monitor  - resumed coumadin  - active t&s  - resolved       # h/o Afib on Coumadin s/p ablation and micra PPM  - resume coumadin   - INR 2.3   - not on rate control medications at home     # h.o HLD  - c/w statin    # h/o anxiety  - increased clonazepam     Dispo; pt wants to go home on hospice, which is reasonable considering how advanced her disease is.  DNR/DNI/Grave prognosis

## 2021-05-21 NOTE — DISCHARGE NOTE PROVIDER - NSDCFUADDINST_GEN_ALL_CORE_FT
Please follow up with your primary care physician and pulmonologist within 1-2 week(s) after discharge from the hospital. Please take your medications as prescribed.

## 2021-05-21 NOTE — DISCHARGE NOTE PROVIDER - NSDCHHNEEDSERVICE_GEN_ALL_CORE
Observation and assessment/Rehabilitation services Observation and assessment/Rehabilitation services/Teaching and training

## 2021-05-21 NOTE — PROGRESS NOTE ADULT - SUBJECTIVE AND OBJECTIVE BOX
Patient is a 75y old  Female who presents with a chief complaint of hemoptysis  INTERVAL HPI/OVERNIGHT EVENTS: Patient was examined and seen at bedside. This morning pt is resting in bed and reports feeling less anxious. Wants to go home today. Agreed to hospice.   ROS: Denies CP, AP  All other systems reviewed and are within normal limits.  InitialHPI:  76 yo F with pmh of anxiety, COPD (on 3 L home O2 ) with multiple admissions for recurrent exacerbations ,   lung CA s/p chemo/radiation/ and partial left lobectomy in remission x 9 years, severe pulmonary hypertension - follows with Dr. Ernestine Mcpherson outpatient , Afib on Coumadin s/p ablation and micra PPM. She now presents to the hospital with worsening shortness of breath and wheezing and 2 episodes of hemoptysis. The patient states symptoms have been ongoing since her recent admission for COPD exacerbation and has been worsening over the last few years limiting her quality of life due to difficulty ambulating due to shortness of breath.  She was discharged on 4/27/21 feeling better but still short of breath and had acute on chronic dyspnea today with hemoptysis which prompted her come to the hospital. The patient denies fevers/chills/dizziness/chest pain/ abd pain/ constipation/ diarrhea/ dysuria. all ros negative except as above.     T(C): 36.4, HR: 72 , BP: 151/63 , RR: 18, SpO2: 100%  Labs: Hb 8.4 (baseline ~ 9.5), pbnp 700   CXR : appears stable from 4/28/21   CT Angio Chest PE Protocol w/ IV Cont (05.17.21 @ 18:23) >  1.  No pulmonary embolus.  2.  Stable posttreatment changes in the left lung as described above.  3.  Scattered bilateral tree-in-bud branching nodularity with areas of mucus plugging in the left lung. Findings are compatible with infectious/inflammatory airways disease.  4.  Cardiomegaly with evidence of mild right heart dysfunction.       (17 May 2021 23:01)    PAST MEDICAL & SURGICAL HISTORY:  High cholesterol    Anxiety    Pacemaker    Lung cancer    Afib    COPD (chronic obstructive pulmonary disease)    Artificial cardiac pacemaker    H/O atrioventricular kacie ablation    S/P lobectomy of lung  left    History of tonsillectomy    S/P appendectomy    Cataract  RIGHT  6/17 AND  LEFT  7/5/19        General: NAD, AAO3, less anxious, cachectic, chronically ill appearing  HEENT:  EOMI, no LAD  CV: S1 S2  Resp: +wheeze  GI: NT/ND/S +BS  MS: no clubbing/cyanosis/edema, + pulses b/l  Neuro: BOONE, +reflexes thruout      MEDICATIONS  (STANDING):  albuterol/ipratropium for Nebulization 3 milliLiter(s) Nebulizer every 6 hours  atorvastatin 20 milliGRAM(s) Oral at bedtime  azithromycin  IVPB      azithromycin  IVPB 500 milliGRAM(s) IV Intermittent every 24 hours  budesonide 160 MICROgram(s)/formoterol 4.5 MICROgram(s) Inhaler 2 Puff(s) Inhalation two times a day  chlorhexidine 4% Liquid 1 Application(s) Topical <User Schedule>  cholecalciferol 2000 Unit(s) Oral daily  clonazePAM  Tablet 1 milliGRAM(s) Oral every 12 hours  guaiFENesin ER 1200 milliGRAM(s) Oral every 12 hours  methylPREDNISolone sodium succinate Injectable 60 milliGRAM(s) IV Push two times a day  pantoprazole    Tablet 40 milliGRAM(s) Oral before breakfast  senna 2 Tablet(s) Oral at bedtime  warfarin 2 milliGRAM(s) Oral once    MEDICATIONS  (PRN):  ALBUTerol    90 MICROgram(s) HFA Inhaler 1 Puff(s) Inhalation every 3 hours PRN Bronchospasm  clonazePAM  Tablet 1 milliGRAM(s) Oral every 12 hours PRN anxiety  morphine Concentrate 5 milliGRAM(s) Oral every 6 hours PRN dyspnea  polyethylene glycol 3350 17 Gram(s) Oral daily PRN Constipation    Home Medications:  clonazePAM 0.5 mg oral tablet: 1 tab(s) orally 2 times a day, As needed, anxiety (17 May 2021 23:19)  Crestor 5 mg oral tablet: 1 tab(s) orally once a day (at bedtime) (17 May 2021 23:19)  morphine 20 mg/mL oral concentrate: 0.25 milliliter(s) orally every 6 hours, As needed, dyspnea (21 May 2021 11:55)  Vitamin D3 2000 intl units oral tablet: 1 tab(s) orally once a day (17 May 2021 23:19)  warfarin 1 mg oral tablet: 1 tab(s) orally once a day to be started tomorrow night (17 May 2021 23:19)    Vital Signs Last 24 Hrs  T(C): 35.8 (21 May 2021 05:09), Max: 36.4 (20 May 2021 20:15)  T(F): 96.4 (21 May 2021 05:09), Max: 97.6 (20 May 2021 20:15)  HR: 71 (21 May 2021 05:09) (71 - 93)  BP: 135/89 (21 May 2021 05:55) (135/89 - 193/79)  BP(mean): 107 (21 May 2021 05:55) (103 - 107)  RR: 20 (21 May 2021 05:09) (20 - 22)  SpO2: 100% (21 May 2021 05:55) (95% - 100%)  CAPILLARY BLOOD GLUCOSE        LABS:                        10.2   9.98  )-----------( 165      ( 21 May 2021 04:30 )             33.2     05-21    138  |  96<L>  |  20  ----------------------------<  215<H>  4.9   |  31  |  0.7    Ca    9.5      21 May 2021 04:30  Mg     2.1     05-21    TPro  6.0  /  Alb  4.1  /  TBili  0.4  /  DBili  x   /  AST  12  /  ALT  13  /  AlkPhos  79  05-21    LIVER FUNCTIONS - ( 21 May 2021 04:30 )  Alb: 4.1 g/dL / Pro: 6.0 g/dL / ALK PHOS: 79 U/L / ALT: 13 U/L / AST: 12 U/L / GGT: x           PT/INR - ( 21 May 2021 04:30 )   PT: 22.80 sec;   INR: 1.98 ratio         PTT - ( 21 May 2021 04:30 )  PTT:31.8 sec    Consultant Notes Reviewed:  [x ] YES  [ ] NO  Care Discussed with Consultants/Other Providers/ Housestaff [ x] YES  [ ] NO  Radiology, labs, new studies personally reviewed.

## 2021-05-21 NOTE — PROGRESS NOTE ADULT - SUBJECTIVE AND OBJECTIVE BOX
CASEY POWERS          MRN-[id number]              CC: SOB    HPI:  76 yo F with pmh of anxiety, COPD (on 3 L home O2 ) with multiple admissions for recurrent exacerbations ,   lung CA s/p chemo/radiation/ and partial left lobectomy in remission x 9 years, severe pulmonary hypertension - follows with Dr. Ernestine Mcpherson outpatient , Afib on Coumadin s/p ablation and micra PPM. She now presents to the hospital with worsening shortness of breath and wheezing and 2 episodes of hemoptysis. The patient states symptoms have been ongoing since her recent admission for COPD exacerbation and has been worsening over the last few years limiting her quality of life due to difficulty ambulating due to shortness of breath.  She was discharged on 4/27/21 feeling better but still short of breath and had acute on chronic dyspnea today with hemoptysis which prompted her come to the hospital. The patient denies fevers/chills/dizziness/chest pain/ abd pain/ constipation/ diarrhea/ dysuria. all ros negative except as above.     T(C): 36.4, HR: 72 , BP: 151/63 , RR: 18, SpO2: 100%  Labs: Hb 8.4 (baseline ~ 9.5), pbnp 700   CXR : appears stable from 4/28/21   CT Angio Chest PE Protocol w/ IV Cont (05.17.21 @ 18:23) >  1.  No pulmonary embolus.  2.  Stable posttreatment changes in the left lung as described above.  3.  Scattered bilateral tree-in-bud branching nodularity with areas of mucus plugging in the left lung. Findings are compatible with infectious/inflammatory airways disease.  4.  Cardiomegaly with evidence of mild right heart dysfunction.     (17 May 2021 23:01)    PAST MEDICAL & SURGICAL HISTORY:  High cholesterol    Anxiety    Pacemaker    Lung cancer    Afib    COPD (chronic obstructive pulmonary disease)    Artificial cardiac pacemaker    H/O atrioventricular kacie ablation    S/P lobectomy of lung  left    History of tonsillectomy    S/P appendectomy    Cataract  RIGHT  6/17 AND  LEFT  7/5/19    FAMILY HISTORY:   Reviewed and found non contributory in mother or father    SOCIAL HISTORY:  Lives with spouse.    ROS:      NOT UPDATED  Dyspnea (Marcel 0-10): yes - when cannot do something > anxious > SOB                     N/V (Y/N): No                             Secretions (Y/N) : No                                          Agitation(Y/N): No, Anxiety yes                            Pain (Y/N): No                                 -Provocation/Palliation: N/A  -Quality/Quantity: N/A  -Radiating: N/A  -Severity: No pain  -Timing/Frequency: N/A  -Impact on ADLs: N/A    General:  becoming more dependent  HEENT:    Denied  Neck:  Denied  CVS:  Denied  Resp:  Denied  GI:  BM 2 days ago  :  Denied  Musc:  Denied  Neuro:  Denied  Psych:  Denied  Skin:  Denied  Lymph:  Denied    Allergies    bacitracin (Other)  carboplatin (Other)  sulfa drugs (Unknown)  sulfonamides (Unknown)  Xanax (Other)    Intolerances      Opiate Naive (Y/N): Y    Medications:      MEDICATIONS  (STANDING):  albuterol/ipratropium for Nebulization 3 milliLiter(s) Nebulizer every 6 hours  atorvastatin 20 milliGRAM(s) Oral at bedtime  azithromycin  IVPB      azithromycin  IVPB 500 milliGRAM(s) IV Intermittent every 24 hours  budesonide 160 MICROgram(s)/formoterol 4.5 MICROgram(s) Inhaler 2 Puff(s) Inhalation two times a day  chlorhexidine 4% Liquid 1 Application(s) Topical <User Schedule>  cholecalciferol 2000 Unit(s) Oral daily  clonazePAM  Tablet 1 milliGRAM(s) Oral every 12 hours  guaiFENesin ER 1200 milliGRAM(s) Oral every 12 hours  methylPREDNISolone sodium succinate Injectable 60 milliGRAM(s) IV Push two times a day  pantoprazole    Tablet 40 milliGRAM(s) Oral before breakfast  warfarin 1 milliGRAM(s) Oral once    MEDICATIONS  (PRN):  ALBUTerol    90 MICROgram(s) HFA Inhaler 1 Puff(s) Inhalation every 3 hours PRN Bronchospasm  clonazePAM  Tablet 0.5 milliGRAM(s) Oral two times a day PRN anxiety      Vital Signs Last 24 Hrs  T(C): 35.8 (21 May 2021 05:09), Max: 36.4 (20 May 2021 20:15)  T(F): 96.4 (21 May 2021 05:09), Max: 97.6 (20 May 2021 20:15)  HR: 71 (21 May 2021 05:09) (71 - 93)  BP: 135/89 (21 May 2021 05:55) (135/89 - 193/79)  BP(mean): 107 (21 May 2021 05:55) (103 - 107)  RR: 20 (21 May 2021 05:09) (20 - 22)  SpO2: 100% (21 May 2021 05:55) (95% - 100%)    Labs    05-21    138  |  96<L>  |  20  ----------------------------<  215<H>  4.9   |  31  |  0.7    Ca    9.5      21 May 2021 04:30  Mg     2.1     05-21    TPro  6.0  /  Alb  4.1  /  TBili  0.4  /  DBili  x   /  AST  12  /  ALT  13  /  AlkPhos  79  05-21                            10.2   9.98  )-----------( 165      ( 21 May 2021 04:30 )             33.2       Medications    MEDICATIONS  (STANDING):  albuterol/ipratropium for Nebulization 3 milliLiter(s) Nebulizer every 6 hours  atorvastatin 20 milliGRAM(s) Oral at bedtime  azithromycin  IVPB      azithromycin  IVPB 500 milliGRAM(s) IV Intermittent every 24 hours  budesonide 160 MICROgram(s)/formoterol 4.5 MICROgram(s) Inhaler 2 Puff(s) Inhalation two times a day  chlorhexidine 4% Liquid 1 Application(s) Topical <User Schedule>  cholecalciferol 2000 Unit(s) Oral daily  clonazePAM  Tablet 1 milliGRAM(s) Oral every 12 hours  guaiFENesin ER 1200 milliGRAM(s) Oral every 12 hours  methylPREDNISolone sodium succinate Injectable 60 milliGRAM(s) IV Push two times a day  pantoprazole    Tablet 40 milliGRAM(s) Oral before breakfast  senna 2 Tablet(s) Oral at bedtime    MEDICATIONS  (PRN):  ALBUTerol    90 MICROgram(s) HFA Inhaler 1 Puff(s) Inhalation every 3 hours PRN Bronchospasm  clonazePAM  Tablet 1 milliGRAM(s) Oral every 12 hours PRN anxiety  morphine Concentrate 5 milliGRAM(s) Oral every 6 hours PRN dyspnea  polyethylene glycol 3350 17 Gram(s) Oral daily PRN Constipation                      General: AAOx3    found in bed after return from testing in mild distress - breathing - eased with fan/and after encounter was over   Eyes: EOMI Non icteric   CVS: not tachy, edema  Resp: sl abored tachypneic + increased WOB - fan improved and after encounter improved  GI:  Soft NT ND   Musc: No clubbing    Neuro: Follows commands No focal deficits  Psych: slight anxious Pleasant      Preadmit Karnofsky:  %  50         Current Karnofsky:  40%   %  http://www.npcrc.org/files/news/karnofsky_performance_scale.pdf   http://www.npcrc.org/files/news/palliative_performance_scale_PPSv2.pdf  Cachexia (Y/N): N  BMI: 24    Advanced Directives:     DNR/DNI just today by primary team      Decision maker: The patient is able to participate in complex medical decision making conversations.     GOALS OF CARE DISCUSSION       [facility]	            REFERRALS	        Palliative Med        Unit SW/Case Mgmt               CASEY POWERS          MRN-[id number]              CC: SOB    HPI:  76 yo F with pmh of anxiety, COPD (on 3 L home O2 ) with multiple admissions for recurrent exacerbations ,   lung CA s/p chemo/radiation/ and partial left lobectomy in remission x 9 years, severe pulmonary hypertension - follows with Dr. Ernestine Mcpherson outpatient , Afib on Coumadin s/p ablation and micra PPM. She now presents to the hospital with worsening shortness of breath and wheezing and 2 episodes of hemoptysis. The patient states symptoms have been ongoing since her recent admission for COPD exacerbation and has been worsening over the last few years limiting her quality of life due to difficulty ambulating due to shortness of breath.  She was discharged on 4/27/21 feeling better but still short of breath and had acute on chronic dyspnea today with hemoptysis which prompted her come to the hospital. The patient denies fevers/chills/dizziness/chest pain/ abd pain/ constipation/ diarrhea/ dysuria. all ros negative except as above.     T(C): 36.4, HR: 72 , BP: 151/63 , RR: 18, SpO2: 100%  Labs: Hb 8.4 (baseline ~ 9.5), pbnp 700   CXR : appears stable from 4/28/21   CT Angio Chest PE Protocol w/ IV Cont (05.17.21 @ 18:23) >  1.  No pulmonary embolus.  2.  Stable posttreatment changes in the left lung as described above.  3.  Scattered bilateral tree-in-bud branching nodularity with areas of mucus plugging in the left lung. Findings are compatible with infectious/inflammatory airways disease.  4.  Cardiomegaly with evidence of mild right heart dysfunction.     (17 May 2021 23:01)    PAST MEDICAL & SURGICAL HISTORY:  High cholesterol    Anxiety    Pacemaker    Lung cancer    Afib    COPD (chronic obstructive pulmonary disease)    Artificial cardiac pacemaker    H/O atrioventricular kacie ablation    S/P lobectomy of lung  left    History of tonsillectomy    S/P appendectomy    Cataract  RIGHT  6/17 AND  LEFT  7/5/19    FAMILY HISTORY:   Reviewed and found non contributory in mother or father    SOCIAL HISTORY:  Lives with spouse.    ROS:      Dyspnea (Marcel 0-10): improved - does not bother as much as anxiety and want to go home - was awaiting DC during encounter                 N/V (Y/N): No                             Secretions (Y/N) : No                                          Agitation(Y/N): No, Anxiety yes                            Pain (Y/N): No                                 -Provocation/Palliation: N/A  -Quality/Quantity: N/A  -Radiating: N/A  -Severity: No pain  -Timing/Frequency: N/A  -Impact on ADLs: N/A    General:  becoming more dependent  HEENT:    Denied  Neck:  Denied  CVS:  Denied  Resp:  Denied  GI:  BM 3 days ago; went to [place] today - feels will have BM when at home later today; appetite - picked at a bit of everything  :  Denied  Musc:  Denied  Neuro:  Denied  Psych:  Denied  Skin:  Denied  Lymph:  Denied    Allergies    bacitracin (Other)  carboplatin (Other)  sulfa drugs (Unknown)  sulfonamides (Unknown)  Xanax (Other)    Intolerances      Opiate Naive (Y/N): Y    Vital Signs Last 24 Hrs  T(C): 35.8 (21 May 2021 05:09), Max: 36.4 (20 May 2021 20:15)  T(F): 96.4 (21 May 2021 05:09), Max: 97.6 (20 May 2021 20:15)  HR: 71 (21 May 2021 05:09) (71 - 88)  BP: 135/89 (21 May 2021 05:55) (135/89 - 193/79)  BP(mean): 107 (21 May 2021 05:55) (103 - 107)  RR: 20 (21 May 2021 05:09) (20 - 22)  SpO2: 100% (21 May 2021 05:55) (95% - 100%)    Labs    05-21    138  |  96<L>  |  20  ----------------------------<  215<H>  4.9   |  31  |  0.7    Ca    9.5      21 May 2021 04:30  Mg     2.1     05-21    TPro  6.0  /  Alb  4.1  /  TBili  0.4  /  DBili  x   /  AST  12  /  ALT  13  /  AlkPhos  79  05-21                            10.2   9.98  )-----------( 165      ( 21 May 2021 04:30 )             33.2       Medications    MEDICATIONS  (STANDING):  albuterol/ipratropium for Nebulization 3 milliLiter(s) Nebulizer every 6 hours  atorvastatin 20 milliGRAM(s) Oral at bedtime  azithromycin  IVPB      azithromycin  IVPB 500 milliGRAM(s) IV Intermittent every 24 hours  budesonide 160 MICROgram(s)/formoterol 4.5 MICROgram(s) Inhaler 2 Puff(s) Inhalation two times a day  chlorhexidine 4% Liquid 1 Application(s) Topical <User Schedule>  cholecalciferol 2000 Unit(s) Oral daily  clonazePAM  Tablet 1 milliGRAM(s) Oral every 12 hours  guaiFENesin ER 1200 milliGRAM(s) Oral every 12 hours  methylPREDNISolone sodium succinate Injectable 60 milliGRAM(s) IV Push two times a day  pantoprazole    Tablet 40 milliGRAM(s) Oral before breakfast  senna 2 Tablet(s) Oral at bedtime  warfarin 2 milliGRAM(s) Oral once    MEDICATIONS  (PRN):  ALBUTerol    90 MICROgram(s) HFA Inhaler 1 Puff(s) Inhalation every 3 hours PRN Bronchospasm  clonazePAM  Tablet 1 milliGRAM(s) Oral every 12 hours PRN anxiety  morphine Concentrate 5 milliGRAM(s) Oral every 6 hours PRN dyspnea  polyethylene glycol 3350 17 Gram(s) Oral daily PRN Constipation                          General: AAOx3    found in bed- breathing - easier than yesterday  Eyes: EOMI Non icteric   CVS: not tachy,   Resp: pursed lipped at times; O2 NC   GI:  Soft NT ND   Musc: No clubbing    Neuro: Follows commands No focal deficits  Psych: slight anxious Pleasant      Preadmit Karnofsky:  %  50         Current Karnofsky:  40%   %  http://www.npcrc.org/files/news/karnofsky_performance_scale.pdf   http://www.npcrc.org/files/news/palliative_performance_scale_PPSv2.pdf  Cachexia (Y/N): N  BMI: 24    Advanced Directives:     DNR/DNI just today by primary team      Decision maker: The patient is able to participate in complex medical decision making conversations.     GOALS OF CARE DISCUSSION       [facility]	            REFERRALS	        Palliative Med        Unit SW/Case Mgmt               CASEY POWERS          MRN-[id number]              CC: SOB    HPI:  76 yo F with pmh of anxiety, COPD (on 3 L home O2 ) with multiple admissions for recurrent exacerbations ,   lung CA s/p chemo/radiation/ and partial left lobectomy in remission x 9 years, severe pulmonary hypertension - follows with Dr. Ernestine Mcpherson outpatient , Afib on Coumadin s/p ablation and micra PPM. She now presents to the hospital with worsening shortness of breath and wheezing and 2 episodes of hemoptysis. The patient states symptoms have been ongoing since her recent admission for COPD exacerbation and has been worsening over the last few years limiting her quality of life due to difficulty ambulating due to shortness of breath.  She was discharged on 4/27/21 feeling better but still short of breath and had acute on chronic dyspnea today with hemoptysis which prompted her come to the hospital. The patient denies fevers/chills/dizziness/chest pain/ abd pain/ constipation/ diarrhea/ dysuria. all ros negative except as above.     T(C): 36.4, HR: 72 , BP: 151/63 , RR: 18, SpO2: 100%  Labs: Hb 8.4 (baseline ~ 9.5), pbnp 700   CXR : appears stable from 4/28/21   CT Angio Chest PE Protocol w/ IV Cont (05.17.21 @ 18:23) >  1.  No pulmonary embolus.  2.  Stable posttreatment changes in the left lung as described above.  3.  Scattered bilateral tree-in-bud branching nodularity with areas of mucus plugging in the left lung. Findings are compatible with infectious/inflammatory airways disease.  4.  Cardiomegaly with evidence of mild right heart dysfunction.     (17 May 2021 23:01)    Subjective  -Seen at bedside  -Sleeping but arousable  -States SOB better improved today, wishes to go home  -No nonverbal signs of pain or distress noted on exam    ROS:    Dyspnea (Marcel 0-10): improved - does not bother as much as anxiety and want to go home - was awaiting DC during encounter                 N/V (Y/N): No                             Secretions (Y/N) : No                                          Agitation(Y/N): No, Anxiety yes                            Pain (Y/N): No                                 -Provocation/Palliation: N/A  -Quality/Quantity: N/A  -Radiating: N/A  -Severity: No pain  -Timing/Frequency: N/A  -Impact on ADLs: N/A    General:  becoming more dependent  HEENT:    Denied  Neck:  Denied  CVS:  Denied  Resp:  Denied  GI:  BM 3 days ago; went to [place] today - feels will have BM when at home later today; appetite - picked at a bit of everything  :  Denied  Musc:  Denied  Neuro:  Denied  Psych:  Denied  Skin:  Denied  Lymph:  Denied    Allergies    bacitracin (Other)  carboplatin (Other)  sulfa drugs (Unknown)  sulfonamides (Unknown)  Xanax (Other)      Opiate Naive (Y/N): Y    Vital Signs Last 24 Hrs  T(C): 35.8 (21 May 2021 05:09), Max: 36.4 (20 May 2021 20:15)  T(F): 96.4 (21 May 2021 05:09), Max: 97.6 (20 May 2021 20:15)  HR: 71 (21 May 2021 05:09) (71 - 88)  BP: 135/89 (21 May 2021 05:55) (135/89 - 193/79)  BP(mean): 107 (21 May 2021 05:55) (103 - 107)  RR: 20 (21 May 2021 05:09) (20 - 22)  SpO2: 100% (21 May 2021 05:55) (95% - 100%)                 General: AAOx3    found in bed- breathing - easier than yesterday  Eyes: EOMI Non icteric   CVS: not tachy,   Resp: pursed lipped at times; O2 NC   GI:  Soft NT ND   Musc: No clubbing    Neuro: Follows commands No focal deficits  Psych: slight anxious Pleasant    Labs    05-21    138  |  96<L>  |  20  ----------------------------<  215<H>  4.9   |  31  |  0.7    Ca    9.5      21 May 2021 04:30  Mg     2.1     05-21    TPro  6.0  /  Alb  4.1  /  TBili  0.4  /  DBili  x   /  AST  12  /  ALT  13  /  AlkPhos  79  05-21                            10.2   9.98  )-----------( 165      ( 21 May 2021 04:30 )             33.2       Medications Reviewed  Previous EKG and imaging reviewed    MEDICATIONS  (STANDING):  albuterol/ipratropium for Nebulization 3 milliLiter(s) Nebulizer every 6 hours  atorvastatin 20 milliGRAM(s) Oral at bedtime  azithromycin  IVPB      azithromycin  IVPB 500 milliGRAM(s) IV Intermittent every 24 hours  budesonide 160 MICROgram(s)/formoterol 4.5 MICROgram(s) Inhaler 2 Puff(s) Inhalation two times a day  chlorhexidine 4% Liquid 1 Application(s) Topical <User Schedule>  cholecalciferol 2000 Unit(s) Oral daily  clonazePAM  Tablet 1 milliGRAM(s) Oral every 12 hours  guaiFENesin ER 1200 milliGRAM(s) Oral every 12 hours  methylPREDNISolone sodium succinate Injectable 60 milliGRAM(s) IV Push two times a day  pantoprazole    Tablet 40 milliGRAM(s) Oral before breakfast  senna 2 Tablet(s) Oral at bedtime  warfarin 2 milliGRAM(s) Oral once    MEDICATIONS  (PRN):  ALBUTerol    90 MICROgram(s) HFA Inhaler 1 Puff(s) Inhalation every 3 hours PRN Bronchospasm  clonazePAM  Tablet 1 milliGRAM(s) Oral every 12 hours PRN anxiety  morphine Concentrate 5 milliGRAM(s) Oral every 6 hours PRN dyspnea  polyethylene glycol 3350 17 Gram(s) Oral daily PRN Constipation    Advanced Directives:     DNR/DNI just today by primary team      Decision maker: The patient is able to participate in complex medical decision making conversations.     GOALS OF CARE DISCUSSION       [facility] provided	            REFERRALS	        Palliative Med        Unit SW/Case Mgmt

## 2021-05-21 NOTE — DISCHARGE NOTE PROVIDER - PROVIDER TOKENS
PROVIDER:[TOKEN:[[id number]]],PROVIDER:[TOKEN:[[id number]]] PROVIDER:[TOKEN:[[id number]]],FREE:[LAST:[Hospice Services],PHONE:[(   )    -],FAX:[(   )    -],FOLLOWUP:[1-3 days]]

## 2021-06-05 NOTE — ED PROVIDER NOTE - OBJECTIVE STATEMENT
75 year old female w hx of COPD (on 3 LPM home O2), lung CA s/p chemo/xrt/resection in remission x 9 years, Afib on Coumadin, PPM presents to the ED for sob. Symptoms started a week ago, associated w/ cough and slight palpitations, worse than her baseline COPD. Denies f/c, cp, n/v/d, abd pain, syncope, urinary symptoms, diaphoresis. Of note, pt is DNR and DNI and endorses that last time she was discharged she was put in hospice care by mistake.

## 2021-06-05 NOTE — ED PROVIDER NOTE - CLINICAL SUMMARY MEDICAL DECISION MAKING FREE TEXT BOX
74 yo F presented to ED for SOB due to COPD exacerbation. Pt received steroids, breathing treatment.s Pt admitted for further evaluation.

## 2021-06-05 NOTE — ED PROVIDER NOTE - PHYSICAL EXAMINATION
CONSTITUTIONAL: Well-developed; well-nourished; in no acute distress.   SKIN: warm, dry  HEAD: Normocephalic; atraumatic.  EYES: PERRL, EOMI, no conjunctival erythema  ENT: No nasal discharge; airway clear.  NECK: Supple; non tender.  CARD: S1, S2 normal; no murmurs, gallops, or rubs. R  RESP: Wheezes at the bases  ABD: soft ntnd  EXT: Normal ROM.  No clubbing, cyanosis or edema.   LYMPH: No acute cervical adenopathy.  NEURO: Alert, oriented, grossly unremarkable  PSYCH: uncooperative

## 2021-06-05 NOTE — ED ADULT NURSE NOTE - OBJECTIVE STATEMENT
Pt c/o sob - states she has not had any hha or vna since her discharge 2 weeks ago to help her at home as well

## 2021-06-05 NOTE — H&P ADULT - NSHPPHYSICALEXAM_GEN_ALL_CORE
GENERAL: NAD, speaks in full sentences, no signs of respiratory distress, on 2L NC  HEAD:  Atraumatic, Normocephalic  EYES: EOMI, PERRLA, non-icteric, no injected sclera  NECK: Supple, No JVD  CHEST/LUNG: mild wheeze on left side,   HEART: Regular rate and rhythm; No murmurs;   ABDOMEN: Soft, Nontender, Nondistended; Bowel sounds present; No guarding  EXTREMITIES:  2+ Peripheral Pulses, No cyanosis or edema  PSYCH: AAOx3  NEUROLOGY: non-focal  SKIN: No rashes or lesions

## 2021-06-05 NOTE — ED ADULT NURSE NOTE - INTERVENTIONS DEFINITIONS
[place] to call system/Monitor for mental status changes and reorient to person, place, and time/Review medications for side effects contributing to fall risk/Reinforce activity limits and safety measures with patient and family

## 2021-06-05 NOTE — H&P ADULT - ASSESSMENT
76 yo F with pmh of anxiety, COPD (on 3 L home O2 ) with multiple admissions for recurrent exacerbations , lung CA s/p chemo/radiation/ and partial left lobectomy in remission x 9 years, severe pulmonary hypertension - follows with Dr. Sinha outpatient , Afib on Coumadin s/p ablation and micra PPM presents to ED for SOB and wheezing.    # dyspnea multifactorial: COPD exacerbation/ deconditioning/ anxiety/ severe pulm HTN /  cor pulmonale   # h/o lung cancer in remission s/p radiation/chemo/ left partial lobectomy   #Chronic hypercapnic respiratory failure on admission VBG pH 7.4 CO2  64 bicarb 37, lactate 0.8  -s/p methyprednisone 125, duoneb, spiriva feels better on exam  -  on 3L home O2 , here 100% on 2 L NC , goal 88-92%   -at home on triple therapy c/w home meds start prednisone 40mg daily x days, duonebs q4 and prn, hold off on abx  - pbnp 814( below baseline) , trop neg, VDUS  - CXR appears stable, f/u official read  - Recent TTE 62% and severe pulm HTN (3/24/21) - would not repeat as of now  -Pulm c/s Dr. Sinha   -send procal, ABG   -Chest PT  -Incentive spirometry  -last admission seen by pulm recommended - AVAPS, TV-400, EPAP-10, minIPAP-14, maxIPAP-25, RR-12, Target SpO2 88-94% but Pt. refused reporting claustrophbia  -PT  - Pt. reports was sent home on hospice but `no one followed up with her when she was d/ukmar and she no longer wants Hospice- please clarify with Hospice in am    #H/o Afib on Coumadin s/p ablation and micra PPM  - last admission coumadin was held for episode of hemoptysis, Hb 10.2 is stable above baseline will restart home dose of 1mg daily f/u am INR  -not in rate control meds HR in ED 71    # h.o HLD  - c/w statin    # h/o anxiety  - c/w clonazepam prn home dose     #DNR/DNI  #DVT ppx- warfarin  #GI ppx- PPI

## 2021-06-05 NOTE — H&P ADULT - HISTORY OF PRESENT ILLNESS
74 yo F with pmh of anxiety, COPD (on 3 L home O2 ) with multiple admissions for recurrent exacerbations , lung CA s/p chemo/radiation/ and partial left lobectomy in remission x 9 years, severe pulmonary hypertension - follows with Dr. Sinha outpatient , Afib on Coumadin s/p ablation and micra PPM presents to ED for SOB and wheezing.  Pt. reports past 5 days of feeling SOB both at rest and on exertion with wheezing which is worse then her baseluine COPD, Pt. reports no precipitating factors, denies: cough, soutum production recent URI/ UTI, no chest pain. NO LE swelling. Pt. reports compliance with all COPD medications.  Of note Pt. with recent admission for same complaints 2 weeks ago, was planned to be d.kumar with Hospice but per Pt. no one contacted her since she came home and she no longer wants hospice just to remain Dnr/Dni.   Lab work in ED bicarb 37, VBG with pH 7.4, CO2 64, lactate 0.8. Chest xray unchanged. Pt. was given methyprednisone 125, duoneb and spiriva and at time of exam feels better and is satting 100% on 2 L NC.  Admitted for COPD exacerbation   74 yo F with pmh of anxiety, COPD (on 3 L home O2 ) with multiple admissions for recurrent exacerbations , lung CA s/p chemo/radiation/ and partial left lobectomy in remission x 9 years, severe pulmonary hypertension - follows with Dr. Sinha outpatient , Afib on Coumadin s/p ablation and micra PPM presents to ED for SOB and wheezing.  Pt. reports past 5 days of feeling SOB both at rest and on exertion with wheezing which is worse then her baseluine COPD, Pt. reports no precipitating factors, denies: cough, soutum production recent URI/ UTI, no chest pain. NO LE swelling. Pt. reports compliance with all COPD medications.  Of note Pt. with recent admission for same complaints 2 weeks ago, was planned to be d.kumar with Hospice but per Pt. no one contacted her since she came home and she no longer wants hospice just to remain Dnr/Dni.   Lab work in ED bicarb 37, VBG with pH 7.4, CO2 64, lactate 0.8. Chest xray unchanged. Pt. was given methyprednisone 125, duoneb and spiriva and at time of exam feels better and is satting 100% on 2 L NC.  Admitted for COPD exacerbation.

## 2021-06-05 NOTE — ED PROVIDER NOTE - ATTENDING CONTRIBUTION TO CARE
74 yo F with PMH of COPD on 3L home o2, lung CA, afib on Coumadin presents to ED for worsening SOB. Pt states over the past week she has become increasingly SOB associated with cough. No CP, palpitations, diarrhea, constipation.     Const: thin lady  Eyes: PERRL, no conjunctival injection  HENT:  Neck supple without meningismus   CV: RRR, Warm, well-perfused extremities  RESP: diffuse wheezing, no tachypnea   GI: soft, non-tender, non-distended  MSK: No gross deformities appreciated  Skin: Warm, dry. No rashes    Will do labs, CXR, steroids, breathing treatments

## 2021-06-05 NOTE — ED PROVIDER NOTE - NS ED ROS FT
Eyes:  No visual changes, eye pain or discharge.  ENMT:  No hearing changes, pain, no sore throat or runny nose, no difficulty swallowing  Cardiac:  No chest pain, SOB or edema. No chest pain with exertion.  Respiratory:  +SOB  GI:  No nausea, vomiting, diarrhea or abdominal pain.  :  No dysuria, frequency or burning.  MS:  No myalgia, muscle weakness, joint pain or back pain.  Neuro:  No headache or weakness.  No LOC.  Skin:  No skin rash.   Endocrine: No history of thyroid disease or diabetes.

## 2021-06-05 NOTE — H&P ADULT - ATTENDING COMMENTS
HPI:  76 yo woman  with a Pmh of anxiety, COPD (on 3 L home O2 ) with multiple admissions for recurrent exacerbations, lung CA s/p chemo/radiation/ and partial left lobectomy in remission x 9 years, severe pulmonary hypertension - follows with Dr. Sinha outpatient , Afib on Coumadin s/p ablation and micra PPM presents admitted for a COPD exacerbation. She reports progressive dyspnea over the last 5 days, including at rest. She denied any fevers, chills, palpitations, abdo pain, n/v/d, cough, increased sputum, LE edema/pain.   She was admitted 2 weeks ago with similar symptoms, she was to be discharged on home hospice. She reports that she was home but hospice never followed back up with her. She states that the told her they were "short staffed".     She was given solumedrol 125 and duonebs with improvement in her symptoms     REVIEW OF SYSTEMS:  CONSTITUTIONAL:  No weakness, fevers, chills, night sweats, weight loss  EYES/ENT: No visual changes. No vertigo or dysphagia  NECK: No neck pain or stiffness  RESPIRATORY: No cough, wheezing, hemoptysis. + shortness of breath  CARDIOVASCULAR: No chest pain or palpitations. No lower extremity edema  GASTROINTESTINAL: No abdominal pain. No nausea, vomiting, diarrhea, or hematemesis  GENITOURINARY: No dysuria or hematuria   NEUROLOGICAL: No focal numbness or weakness  SKIN: No rashes or itching  HEMATOLOGIC: No easy bruising or prolonged bleeding.      PHYSICAL EXAM:  GENERAL: NAD, thin, Non-toxic, stated age   HEAD:  Atraumatic, Normocephalic  EYES: EOMI, Sclera White   NECK: Supple, No JVD  CHEST/LUNG: minimal wheeze in Left middle lung field; No rhonchi, or crackles. On 4L NC speaking and breathing comfortably   HEART: Regular rate and irregular rhythm; s1, s2, No murmurs, rubs, or gallops  ABDOMEN: Soft, Nontender, Nondistended; Bowel sounds present, No rebound or guarding noted   EXTREMITIES:  No lower extremity edema or calf tenderness to palpation.  No clubbing or cyanosis  PSYCH: AAOx3, pleasant, cooperative, not anxious  NEUROLOGY: non-focal  SKIN: No rashes or lesions      ASSESSMENT AND PLAN:  Chronic hypercapnic respiratory failure w/end stage COPD on 3L home O2, severe pulmonary HTN, cor pulmonale, s/p ARABELLA lung resection:\  -Cont with prednisone 40mg PO qD, symbicort, spiriva, and albuterol prn.   -Pulmonary evaluation for recurrent COPD exacerbation requiring hospitalization. Possible need for chronic steroid use if not amenable to hospice.  -Re-discuss goals of care, at this time she did not want to further discuss it and stated she needs to think about it more.   -please clarify what happened with hospice and an out patient    Chronic Atrial fibrillation: on coumadin  -Currently rate controlled, not on anti-arrhythmics at home   -Cont with warfarin     Anxiety: cont with clonazepam     Lung ca s/p chemo/RT/Lobectomy and in remission:  -out patient follow up     DVT ppx: on coumadin   GI ppx: Not indicated  GOC: DNR/DNI     My note supersedes the residents in the event of a discrepancy.

## 2021-06-06 NOTE — CONSULT NOTE ADULT - ATTENDING COMMENTS
events noted, severe COPD oxygen dependant, now exacerbation, recurrent admission, steroid, Neb, LE doppler, dc planning, very poor prognosis

## 2021-06-06 NOTE — PROGRESS NOTE ADULT - ASSESSMENT
74 yo F with pmh of anxiety, COPD (on 3 L home O2 ) with multiple admissions for recurrent exacerbations , lung CA s/p chemo/radiation/ and partial left lobectomy in remission x 9 years, severe pulmonary hypertension, Afib on Coumadin s/p ablation and micra PPM presents to ED for SOB and wheezing      # dyspnea multifactorial  # h/o lung cancer in remission s/p radiation/chemo/ left partial lobectomy   # Chronic hypercapnic respiratory failure  - likely acute decompensation 2/2 to acute COPD exacerbation vs disease progression of her pHTN vs deconditioning  - on admission VBG pH 7.4 CO2  64 bicarb 37, lactate 0.8  - s/p methyprednisone 125, duoneb, spiriva feels better on exam  -  on 3L home O2 , here 100% on 2 L NC , goal 88-92%   - at home on triple therapy c/w home meds start prednisone 40mg daily x days, duonebs q4 and prn, hold off on abx  - pbnp 814( below baseline) , trop neg, VDUS  - CXR appears stable, f/u official read  - Recent TTE 62% and severe pulm HTN (3/24/21) - would not repeat as of now  - Pulm c/s Dr. Sinha   - send procal, ABG   - Chest PT  - Incentive spirometry  - last admission seen by pulm recommended - AVAPS, TV-400, EPAP-10, minIPAP-14, maxIPAP-25, RR-12, Target SpO2 88-94% but Pt. refused reporting claustrophbia  -PT  - Pt. reports was sent home on hospice but `no one followed up with her when she was d/kumar and she no longer wants Hospice- please clarify with Hospice in am    #H/o Afib on Coumadin s/p ablation and micra PPM  - last admission coumadin was held for episode of hemoptysis, Hb 10.2 is stable above baseline will restart home dose of 1mg daily f/u am INR  -not in rate control meds HR in ED 71    # h.o HLD  - c/w statin    # h/o anxiety  - c/w clonazepam prn home dose     #DNR/DNI  #DVT ppx- warfarin  #GI ppx- PPI    #Progress Note Handoff  Pending (specify):  Consults___Pulm c/s, SW   Family discussion: patient updated on plan. in agreement. all questions answered  Disposition: Unknown at this time________

## 2021-06-06 NOTE — HOSPICE CARE NOTE - CONVESATION DETAILS
Patient was on Hospice services at Home - Patient is revoked at this time - Will need a Safe DC plan. Hospice to remain available.

## 2021-06-06 NOTE — DISCHARGE NOTE PROVIDER - NSDCCPCAREPLAN_GEN_ALL_CORE_FT
PRINCIPAL DISCHARGE DIAGNOSIS  Diagnosis: COPD exacerbation  Assessment and Plan of Treatment: Your symptoms were due to a worsening of the COPD causing narrowing of your airways. You were treated with steroids to reduce the inflammation. You will be discharged with a 5 week course of steroids that will decrease in dosage, the first week you will take 40 mg, then 30 mg the next week, then 20 mg, then 10 mg and then 5 mg the last week (you are prescribed 5 mg tabs so make sure to take the appropriate amount each wee). Follow up with your pulmnologist and primar care doctor to see how you are doing.

## 2021-06-06 NOTE — PHYSICAL THERAPY INITIAL EVALUATION ADULT - SPECIFY REASON(S)
1012 am Attempted to see pt for PT initial evaluation; declined at this time, pt verbalized she feels tired and SOB. Will f/u once receptive to therapy.

## 2021-06-06 NOTE — PATIENT PROFILE ADULT - STATED REASON FOR ADMISSION
PREOPERATIVE DIAGNOSIS: BILATERAL SEROUS OTITIS.



POSTOPERATIVE DIAGNOSIS:  BILATERAL SEROUS OTITIS.



OPERATION:  INSERTION OF VENTILATION TUBES.



PROCEDURE:  

The patient was taken to surgery, placed on the table and general anesthesia 
was administered.  



The right ear was inspected.  Anterior superior quadrant incision was made. A 
small amount of syrupy material was suctioned out and Lyons tube inserted.  



Attention was turned to the other ear where again anterior superior quadrant 
incision was made. Again glue like material was suctioned out and Lyons 
tube was inserted. Cortisporin drops instilled in both ears. The patient was 
taken back to the recovery room in satisfactory condition.   
SHIVANI
SOB, hemoptysis

## 2021-06-06 NOTE — PROVIDER CONTACT NOTE (OTHER) - ASSESSMENT
pt is 98% on 3l nc. pt is very anxious at this time. she is a dnr dni. pt called son at this time to tell him she uis coming home

## 2021-06-06 NOTE — DISCHARGE NOTE PROVIDER - CARE PROVIDER_API CALL
Carmine Spencer  CARDIOVASCULAR DISEASE  [address] [facility] [address]  [place] [address]  Phone: [phone]  Fax: [phone]  Follow Up Time: 1 month

## 2021-06-06 NOTE — HOSPICE CARE NOTE - CONVESATION DETAILS
Hospice aware patient is being DC Today as per CM Manager Breanna - Hospice to follow back up once in the community

## 2021-06-06 NOTE — DISCHARGE NOTE PROVIDER - NSDCMRMEDTOKEN_GEN_ALL_CORE_FT
albuterol 1.25 mg/3 mL (0.042%) inhalation solution: 3 milliliter(s) inhaled 4 times a day  atorvastatin 20 mg oral tablet: 1 tab(s) orally once a day  budesonide-formoterol 160 mcg-4.5 mcg/inh inhalation aerosol: 1 puff(s) inhaled 2 times a day  clonazePAM 1 mg oral tablet: 1 tab(s) orally every 8 hours, As Needed  pantoprazole 40 mg oral delayed release tablet: 1 tab(s) orally once a day (before a meal)  polyethylene glycol 3350 oral powder for reconstitution: 17 gram(s) orally once a day, As needed, Constipation  prednisoLONE 5 mg oral tablet: take 8 tabs daily for 7 days, then   6 tabs daily for 7 days, then  4 tabs daily for 7 days, then  2 tabs daily for 7 days, then  1 tab daily for 7 days  senna oral tablet: 2 tab(s) orally once a day (at bedtime)  Spiriva HandiHaler 18 mcg inhalation capsule: 1 cap(s) inhaled 2 times a day   warfarin 1 mg oral tablet: 1 tab(s) orally once a day

## 2021-06-06 NOTE — CONSULT NOTE ADULT - ASSESSMENT
Impression  HO Lung s/p chemo/radiation/ and partial left lobectomy in remission x 9 years  HO Severe pulmonary hypertension  Chronic hypoxemic respiratory failure  Chronic hypercapnic respiratory failure    Recommendations  Target SpO2 88-94%, titrate oxygen as tolerated  Prednisone 40mg x5days, then 20mg x5 days  Duonebs q4h and PRN  Azithromycin 500mg x5 days  AVAPS PRN/ HS, TV-400, RR-14, EPAP-8, minIPAP-12, maxIPAP-20  HOB at 45  Aspiration precautions  DVT ppx   Overall extremely poor prognosis Impression  HO Lung s/p chemo/radiation/ and partial left lobectomy in remission x 9 years  HO Severe pulmonary hypertension  Chronic hypoxemic respiratory failure  Chronic hypercapnic respiratory failure    Recommendations  Target SpO2 88-94%, titrate oxygen as tolerated  Prednisone 40mg x5days, then 20mg x5 days  Duonebs q4h and PRN  Azithromycin 500mg x5 days  AVAPS PRN/ HS, TV-400, RR-14, EPAP-8, minIPAP-12, maxIPAP-20  HOB at 45  Aspiration precautions  DVT ppx   DNR/ DNI  Overall extremely poor prognosis Impression  COPD exacerabation  HO Lung s/p chemo/radiation/ and partial left lobectomy in remission x 9 years  HO Severe pulmonary hypertension  Chronic hypoxemic respiratory failure  Chronic hypercapnic respiratory failure    Recommendations  Target SpO2 88-94%, titrate oxygen as tolerated  Prednisone 40mg x5days, then 20mg x5 days  Duonebs q4h and PRN  Azithromycin 500mg x5 days  AVAPS PRN/ HS, TV-400, RR-14, EPAP-8, minIPAP-12, maxIPAP-20  HOB at 45  Aspiration precautions  DVT ppx   DNR/ DNI  Overall extremely poor prognosis

## 2021-06-06 NOTE — CONSULT NOTE ADULT - SUBJECTIVE AND OBJECTIVE BOX
Patient is a 75y old  Female who presents with a chief complaint of SOB (05 Jun 2021 22:17)      HPI:  74 yo F with pmh of anxiety, COPD (on 3 L home O2 ) with multiple admissions for recurrent exacerbations , lung CA s/p chemo/radiation/ and partial left lobectomy in remission x 9 years, severe pulmonary hypertension - follows with Dr. Sinha outpatient , Afib on Coumadin s/p ablation and micra PPM presents to ED for SOB and wheezing.  Pt. reports past 5 days of feeling SOB both at rest and on exertion with wheezing which is worse then her baseluine COPD, Pt. reports no precipitating factors, denies: cough, soutum production recent URI/ UTI, no chest pain. NO LE swelling. Pt. reports compliance with all COPD medications.  Of note Pt. with recent admission for same complaints 2 weeks ago, was planned to be d.kumar with Hospice but per Pt. no one contacted her since she came home and she no longer wants hospice just to remain Dnr/Dni.   Lab work in ED bicarb 37, VBG with pH 7.4, CO2 64, lactate 0.8. Chest xray unchanged. Pt. was given methyprednisone 125, duoneb and spiriva and at time of exam feels better and is satting 100% on 2 L NC.  Admitted for COPD exacerbation.   (05 Jun 2021 22:17)    Pulmonary HPI  Pulmonary consulted for COPD exacerbation, HO COPD 3L at home, HO lung ca s/p chemo/radiation/ and partial left lobectomy in remission x 9 years, severe pulmonary hypertension. Patient states she currently feels better than when she came in.     PAST MEDICAL & SURGICAL HISTORY:  High cholesterol    Anxiety    Pacemaker    Lung cancer    Afib    COPD (chronic obstructive pulmonary disease)    Artificial cardiac pacemaker    H/O atrioventricular kacie ablation    S/P lobectomy of lung  left    History of tonsillectomy    S/P appendectomy    Cataract  RIGHT  6/17 AND  LEFT  7/5/19        SOCIAL HX:   Smoking      former smoker, quit 2010, 2ppd for 15 years                   ETOH        denies                      Other denies illicit drug use, from home, uses 3L O2 at home    FAMILY HISTORY:  .  No cardiovascular or pulmonary family history     REVIEW OF SYSTEMS:    All ROS are negative except per HPI       Allergies    bacitracin (Other)  carboplatin (Other)  sulfa drugs (Unknown)  sulfonamides (Unknown)  Xanax (Other)    Intolerances          PHYSICAL EXAM  Vital Signs Last 24 Hrs  T(C): 36.3 (06 Jun 2021 01:00), Max: 36.4 (05 Jun 2021 22:27)  T(F): 97.3 (06 Jun 2021 01:00), Max: 97.5 (05 Jun 2021 22:27)  HR: 69 (06 Jun 2021 01:00) (69 - 73)  BP: 118/58 (06 Jun 2021 01:00) (118/58 - 182/82)  BP(mean): 82 (06 Jun 2021 01:00) (82 - 82)  RR: 18 (06 Jun 2021 01:00) (18 - 20)  SpO2: 99% (06 Jun 2021 01:00) (99% - 100%)    CONSTITUTIONAL:  Thin, elderly, frail female. NAD    ENT:   Airway patent,   No thrush    EYES:   Clear bilaterally,   pupils equal,   round and reactive to light.    CARDIAC:   Normal rate,   regular rhythm.    no edema    RESPIRATORY:   No wheezing   Normal chest expansion  Not tachypneic,  No use of accessory muscles    GASTROINTESTINAL:  Abdomen soft, non-tender,   No guarding,   Positive BS    MUSCULOSKELETAL:   Range of motion is not limited,  No clubbing, cyanosis    NEUROLOGICAL:   Alert and oriented   No motor deficits.    SKIN:   Skin normal color for race,   No evidence of rash.          LABS:                          10.2   6.73  )-----------( 139      ( 05 Jun 2021 20:31 )             31.8                                               06-05    140  |  97<L>  |  9<L>  ----------------------------<  131<H>  3.6   |  37<H>  |  0.7    Ca    9.4      05 Jun 2021 18:15    TPro  5.7<L>  /  Alb  3.8  /  TBili  0.6  /  DBili  x   /  AST  17  /  ALT  14  /  AlkPhos  73  06-05                                                 CARDIAC MARKERS ( 05 Jun 2021 18:15 )  x     / <0.01 ng/mL / x     / x     / x                                                LIVER FUNCTIONS - ( 05 Jun 2021 18:15 )  Alb: 3.8 g/dL / Pro: 5.7 g/dL / ALK PHOS: 73 U/L / ALT: 14 U/L / AST: 17 U/L / GGT: x                                                                                                MEDICATIONS  (STANDING):  ALBUTerol    90 MICROgram(s) HFA Inhaler 1 Puff(s) Inhalation once  ALBUTerol    90 MICROgram(s) HFA Inhaler 1 Puff(s) Inhalation every 4 hours  albuterol/ipratropium for Nebulization 3 milliLiter(s) Nebulizer every 6 hours  atorvastatin 20 milliGRAM(s) Oral at bedtime  budesonide 160 MICROgram(s)/formoterol 4.5 MICROgram(s) Inhaler 2 Puff(s) Inhalation two times a day  chlorhexidine 4% Liquid 1 Application(s) Topical <User Schedule>  ondansetron Injectable 4 milliGRAM(s) IV Push once  pantoprazole    Tablet 40 milliGRAM(s) Oral before breakfast  predniSONE   Tablet 40 milliGRAM(s) Oral daily  tiotropium 18 MICROgram(s) Capsule 1 Capsule(s) Inhalation daily  tiotropium 18 MICROgram(s) Capsule 1 Capsule(s) Inhalation daily    MEDICATIONS  (PRN):  clonazePAM  Tablet 1 milliGRAM(s) Oral every 8 hours PRN anxuiety      X-Rays reviewed:    CXR interpreted by me: marquez Patient is a 75y old  Female who presents with a chief complaint of SOB (05 Jun 2021 22:17)      HPI:  76 yo F with pmh of anxiety, COPD (on 3 L home O2 ) with multiple admissions for recurrent exacerbations , lung CA s/p chemo/radiation/ and partial left lobectomy in remission x 9 years, severe pulmonary hypertension - follows with Dr. Sinha outpatient , Afib on Coumadin s/p ablation and micra PPM presents to ED for SOB and wheezing.  Pt. reports past 5 days of feeling SOB both at rest and on exertion with wheezing which is worse then her baseluine COPD, Pt. reports no precipitating factors, denies: cough, soutum production recent URI/ UTI, no chest pain. NO LE swelling. Pt. reports compliance with all COPD medications.  Of note Pt. with recent admission for same complaints 2 weeks ago, was planned to be d.kumar with Hospice but per Pt. no one contacted her since she came home and she no longer wants hospice just to remain Dnr/Dni.   Lab work in ED bicarb 37, VBG with pH 7.4, CO2 64, lactate 0.8. Chest xray unchanged. Pt. was given methyprednisone 125, duoneb and spiriva and at time of exam feels better and is satting 100% on 2 L NC.  Admitted for COPD exacerbation. called to evaluated    Pulmonary HPI  Pulmonary consulted for COPD exacerbation, HO COPD 3L at home, HO lung ca s/p chemo/radiation/ and partial left lobectomy in remission x 9 years, severe pulmonary hypertension. Patient states she currently feels better than when she came in.     PAST MEDICAL & SURGICAL HISTORY:  High cholesterol    Anxiety    Pacemaker    Lung cancer    Afib    COPD (chronic obstructive pulmonary disease)    Artificial cardiac pacemaker    H/O atrioventricular kacie ablation    S/P lobectomy of lung  left    History of tonsillectomy    S/P appendectomy    Cataract  RIGHT  6/17 AND  LEFT  7/5/19        SOCIAL HX:   Smoking      former smoker, quit 2010, 2ppd for 15 years                   ETOH        denies                      Other denies illicit drug use, from home, uses 3L O2 at home    FAMILY HISTORY:  .  No cardiovascular or pulmonary family history     REVIEW OF SYSTEMS:    All ROS are negative except per HPI       Allergies    bacitracin (Other)  carboplatin (Other)  sulfa drugs (Unknown)  sulfonamides (Unknown)  Xanax (Other)    Intolerances          PHYSICAL EXAM  Vital Signs Last 24 Hrs  T(C): 36.3 (06 Jun 2021 01:00), Max: 36.4 (05 Jun 2021 22:27)  T(F): 97.3 (06 Jun 2021 01:00), Max: 97.5 (05 Jun 2021 22:27)  HR: 69 (06 Jun 2021 01:00) (69 - 73)  BP: 118/58 (06 Jun 2021 01:00) (118/58 - 182/82)  BP(mean): 82 (06 Jun 2021 01:00) (82 - 82)  RR: 18 (06 Jun 2021 01:00) (18 - 20)  SpO2: 99% (06 Jun 2021 01:00) (99% - 100%)    CONSTITUTIONAL:  Thin, elderly, frail female. NAD    ENT:   Airway patent,   No thrush    EYES:   Clear bilaterally,   pupils equal,   round and reactive to light.    CARDIAC:   RAÚL 3.6    RESPIRATORY:   Dec bs l side    GASTROINTESTINAL:  Abdomen soft, non-tender,   No guarding,   Positive BS    MUSCULOSKELETAL:   Range of motion is not limited,  No clubbing, cyanosis    NEUROLOGICAL:   Alert and oriented   No motor deficits.            LABS:                          10.2   6.73  )-----------( 139      ( 05 Jun 2021 20:31 )             31.8                                               06-05    140  |  97<L>  |  9<L>  ----------------------------<  131<H>  3.6   |  37<H>  |  0.7    Ca    9.4      05 Jun 2021 18:15    TPro  5.7<L>  /  Alb  3.8  /  TBili  0.6  /  DBili  x   /  AST  17  /  ALT  14  /  AlkPhos  73  06-05                                                 CARDIAC MARKERS ( 05 Jun 2021 18:15 )  x     / <0.01 ng/mL / x     / x     / x                                                LIVER FUNCTIONS - ( 05 Jun 2021 18:15 )  Alb: 3.8 g/dL / Pro: 5.7 g/dL / ALK PHOS: 73 U/L / ALT: 14 U/L / AST: 17 U/L / GGT: x                                                                                                MEDICATIONS  (STANDING):  ALBUTerol    90 MICROgram(s) HFA Inhaler 1 Puff(s) Inhalation once  ALBUTerol    90 MICROgram(s) HFA Inhaler 1 Puff(s) Inhalation every 4 hours  albuterol/ipratropium for Nebulization 3 milliLiter(s) Nebulizer every 6 hours  atorvastatin 20 milliGRAM(s) Oral at bedtime  budesonide 160 MICROgram(s)/formoterol 4.5 MICROgram(s) Inhaler 2 Puff(s) Inhalation two times a day  chlorhexidine 4% Liquid 1 Application(s) Topical <User Schedule>  ondansetron Injectable 4 milliGRAM(s) IV Push once  pantoprazole    Tablet 40 milliGRAM(s) Oral before breakfast  predniSONE   Tablet 40 milliGRAM(s) Oral daily  tiotropium 18 MICROgram(s) Capsule 1 Capsule(s) Inhalation daily  tiotropium 18 MICROgram(s) Capsule 1 Capsule(s) Inhalation daily    MEDICATIONS  (PRN):  clonazePAM  Tablet 1 milliGRAM(s) Oral every 8 hours PRN anxuiety      X-Rays reviewed:    CXR interpreted by me: marquez

## 2021-06-06 NOTE — CHART NOTE - NSCHARTNOTEFT_GEN_A_CORE
<<<RESIDENT DISCHARGE NOTE>>>     CASEY POWERS  MRN-[id number]    VITAL SIGNS:  T(F): 98 (06-06-21 @ 08:19), Max: 98 (06-06-21 @ 08:19)  HR: 70 (06-06-21 @ 08:19)  BP: 152/83 (06-06-21 @ 08:19)  SpO2: 99% (06-06-21 @ 01:00)      PHYSICAL EXAMINATION:  GEN: NAD, Resting comfortably in bed  PULM: Clear to auscultation bilaterally, No wheezes  CVS: Regular rate and rhythm, S1-S2, no murmurs  ABD: Soft, non-tender, non-distended, no guarding  EXT: No edema  NEURO: AAOx3, no focal deficits    TEST RESULTS:                        9.2    6.92  )-----------( 125      ( 06 Jun 2021 04:30 )             28.9       06-06    140  |  98  |  8<L>  ----------------------------<  168<H>  3.7   |  35<H>  |  0.7    Ca    9.4      06 Jun 2021 04:30    TPro  5.4<L>  /  Alb  3.7  /  TBili  0.5  /  DBili  x   /  AST  14  /  ALT  12  /  AlkPhos  75  06-06      Patient seen and evaluated at bedside. No overnight acute events. Patient is ready for discharge.    FINAL DISCHARGE INTERVIEW:  Resident(s) Present: Yadiel Ruffin MD    DISPOSITION:   [ x ] Home,    [  ] Home with Visiting Nursing Services,   [    ]  SNF/ NH,    [   ] Acute Rehab (4A),   [   ] Other (Specify:_________)

## 2021-06-06 NOTE — PROVIDER CONTACT NOTE (OTHER) - RECOMMENDATIONS
axel and mustapha aware of pt wanting to leave [facility]. pt calling primary care to set up transport with o2 for the ride. according the patient she has 02 already set up in the house

## 2021-06-06 NOTE — PROGRESS NOTE ADULT - SUBJECTIVE AND OBJECTIVE BOX
SAHARA POWERSLLA  75y  Female      Patient is a 75y old  Female who presents with a chief complaint of SOB (06 Jun 2021 08:17)      INTERVAL HPI/OVERNIGHT EVENTS:       REVIEW OF SYSTEMS:  CONSTITUTIONAL: No fever, weight loss, or fatigue  RESPIRATORY: No cough, wheezing, chills or hemoptysis; No shortness of breath  CARDIOVASCULAR: No chest pain, palpitations, dizziness, or leg swelling  GASTROINTESTINAL: No abdominal or epigastric pain. No nausea, vomiting, or hematemesis; No diarrhea or constipation. No melena or hematochezia.  GENITOURINARY: No dysuria, frequency, hematuria, or incontinence  NEUROLOGICAL: No headaches, memory loss, loss of strength, numbness, or tremors  SKIN: No itching, burning, rashes, or lesions   MUSCULOSKELETAL: No joint pain or swelling; No muscle, back, or extremity pain  PSYCHIATRIC: No depression, anxiety, mood swings, or difficulty sleeping  All other review of systems negative    VITALS  T(C): 36.7 (06-06-21 @ 08:19), Max: 36.7 (06-06-21 @ 08:19)  HR: 70 (06-06-21 @ 08:19) (69 - 73)  BP: 152/83 (06-06-21 @ 08:19) (118/58 - 182/82)  RR: 18 (06-06-21 @ 08:19) (18 - 20)  SpO2: 99% (06-06-21 @ 01:00) (99% - 100%)  Wt(kg): --Vital Signs Last 24 Hrs  T(C): 36.7 (06 Jun 2021 08:19), Max: 36.7 (06 Jun 2021 08:19)  T(F): 98 (06 Jun 2021 08:19), Max: 98 (06 Jun 2021 08:19)  HR: 70 (06 Jun 2021 08:19) (69 - 73)  BP: 152/83 (06 Jun 2021 08:19) (118/58 - 182/82)  BP(mean): 82 (06 Jun 2021 01:00) (82 - 82)  RR: 18 (06 Jun 2021 08:19) (18 - 20)  SpO2: 99% (06 Jun 2021 01:00) (99% - 100%)        PHYSICAL EXAM:  GENERAL: NAD, well-groomed, well-developed  PSYCH: no agitation, baseline mentation  NERVOUS SYSTEM:  Alert & Oriented X3, Motor Strength 5/5 B/L upper and lower extremities; Sensory intact; FTN WNL  PULMONARY: Clear to percussion bilaterally; No rales, rhonchi, wheezing, or rubs  CARDIOVASCULAR: Regular rate and rhythm; No murmurs, rubs, or gallops  GI: Soft, Nontender, Nondistended; Bowel sounds present  EXTREMITIES:  2+ Peripheral Pulses, No clubbing, cyanosis, or edema  LYMPH: No lymphadenopathy noted  SKIN: No rashes or lesions    Consultant(s) Notes Reviewed:  [x ] YES  [ ] NO    Discussed with Consultants/Other Providers [ x] YES     LABS                          10.2   6.73  )-----------( 139      ( 05 Jun 2021 20:31 )             31.8     06-05    140  |  97<L>  |  9<L>  ----------------------------<  131<H>  3.6   |  37<H>  |  0.7    Ca    9.4      05 Jun 2021 18:15    TPro  5.7<L>  /  Alb  3.8  /  TBili  0.6  /  DBili  x   /  AST  17  /  ALT  14  /  AlkPhos  73  06-05 06-05 @ 18:15 Lactate:1.0     CARDIAC MARKERS ( 05 Jun 2021 18:15 )  x     / <0.01 ng/mL / x     / x     / x            RADIOLOGY & ADDITIONAL TESTS:  - no images 6/6    MEDICATIONS  (STANDING):  ALBUTerol    90 MICROgram(s) HFA Inhaler 1 Puff(s) Inhalation once  ALBUTerol    90 MICROgram(s) HFA Inhaler 1 Puff(s) Inhalation every 4 hours  albuterol/ipratropium for Nebulization 3 milliLiter(s) Nebulizer every 6 hours  atorvastatin 20 milliGRAM(s) Oral at bedtime  budesonide 160 MICROgram(s)/formoterol 4.5 MICROgram(s) Inhaler 2 Puff(s) Inhalation two times a day  chlorhexidine 4% Liquid 1 Application(s) Topical <User Schedule>  pantoprazole    Tablet 40 milliGRAM(s) Oral before breakfast  predniSONE   Tablet 40 milliGRAM(s) Oral daily  tiotropium 18 MICROgram(s) Capsule 1 Capsule(s) Inhalation daily  tiotropium 18 MICROgram(s) Capsule 1 Capsule(s) Inhalation daily    MEDICATIONS  (PRN):  clonazePAM  Tablet 1 milliGRAM(s) Oral every 8 hours PRN anxuiety

## 2021-06-06 NOTE — GOALS OF CARE CONVERSATION - ADVANCED CARE PLANNING - CONVERSATION DETAILS
At this time she wishes to remain DNR/DNI but has changed her mind regarding hospice. She is unsure if she would like to go back on it and would prefer to further discuss her options tomorrow after thinking about it more

## 2021-06-06 NOTE — DISCHARGE NOTE PROVIDER - HOSPITAL COURSE
74 yo F with pmh of anxiety, COPD (on 3 L home O2 ) with multiple admissions for recurrent exacerbations , lung CA s/p chemo/radiation/ and partial left lobectomy in remission x 9 years, severe pulmonary hypertension - follows with Dr. Sinha outpatient , Afib on Coumadin s/p ablation and micra PPM presents to ED for SOB and wheezing.  Pt. reports past 5 days of feeling SOB both at rest and on exertion with wheezing which is worse then her baseluine COPD, Pt. reports no precipitating factors, denies: cough, soutum production recent URI/ UTI, no chest pain. NO LE swelling. Pt. reports compliance with all COPD medications.  Of note Pt. with recent admission for same complaints 2 weeks ago, was planned to be d.kumar with Hospice but per Pt. no one contacted her since she came home and she no longer wants hospice just to remain Dnr/Dni.   Lab work in ED bicarb 37, VBG with pH 7.4, CO2 64, lactate 0.8. Chest xray unchanged. Pt. was given methyprednisone 125, duoneb and spiriva and at time of exam feels better and is satting 100% on 2 L NC.  Admitted for COPD exacerbation and discharged on PO steroids. 74 yo F with pmh of anxiety, COPD (on 3 L home O2 ) with multiple admissions for recurrent exacerbations , lung CA s/p chemo/radiation/ and partial left lobectomy in remission x 9 years, severe pulmonary hypertension - follows with Dr. Sinha outpatient , Afib on Coumadin s/p ablation and micra PPM presents to ED for SOB and wheezing.Pt. reports past 5 days of feeling SOB both at rest and on exertion with wheezing which is worse then her baseluine COPD, Pt. reports no precipitating factors, denies: cough, soutum production recent URI/ UTI, no chest pain. NO LE swelling. Pt. reports compliance with all COPD medications.  Of note Pt. with recent admission for same complaints 2 weeks ago, was planned to be d.kumar with Hospice but per Pt. no one contacted her since she came home and she no longer wants hospice just to remain Dnr/Dni.   Lab work in ED bicarb 37, VBG with pH 7.4, CO2 64, lactate 0.8. Chest xray unchanged.     Pt. was given methyprednisone 125, duoneb and spiriva and at time of exam feels better and is satting 100% on 2 L NC. Had extensive conversation with patient regarding her disease progression and its relation to her current symptoms. She understands the progressive nature and that her lifespan is short. I provided additional education on hospice as patient was very confused and had no real expectations of their services may be helpful. She believed a lot her current symptoms may be related to anxiety as she feels a bit better now that shes here. She expressed that she would like to go home with hospice once again. Arranged with CM to initiate services for a follow up visit.  Will be discharged on oral tapering course of prednisone.    Vital Signs Last 24 Hrs  T(C): 36.7 (06 Jun 2021 08:19), Max: 36.7 (06 Jun 2021 08:19)  T(F): 98 (06 Jun 2021 08:19), Max: 98 (06 Jun 2021 08:19)  HR: 70 (06 Jun 2021 08:19) (69 - 73)  BP: 152/83 (06 Jun 2021 08:19) (118/58 - 182/82)  BP(mean): 82 (06 Jun 2021 01:00) (82 - 82)  RR: 18 (06 Jun 2021 08:19) (18 - 20)  SpO2: 99% (06 Jun 2021 01:00) (99% - 100%)    Gen- elderly F, NAD, non toxic, red hair dye  Eyes-anicteric sclera, non injected conjunctiva, EOMI  ENT- oropharynx clear, dry MM, fair dentition, hearing grossly intact  Chest- symmetrical chest rise, audible wheezing b/l throughout lung fields, no accessory muscle use  Cardiac- RRR, normal s1s2, no RMG appreciated, no LE edema  Abdomen- non distended, non ttp, nabs+  Ext- no clubbing or cyanosis, spontaneoulsy moving alll 4 ext against gravity  Skin- warm, dry, intact  Neuro- AOx3, normal mentation, CN 2-12 grossly intact  Psych- cooperative, a bit anxious appearing as well as stated mood

## 2021-06-08 NOTE — ED PROVIDER NOTE - ATTENDING CONTRIBUTION TO CARE
I personally evaluated the patient. I reviewed the Resident’s or Physician Assistant’s note (as assigned above), and agree with the findings and plan except as documented in my note.    Pt is a 74 y/o female with hx of COPD (on 3 LPM home O2), lung CA s/p chemo/xrt/resection in remission x 9 years, Afib on Coumadin, PPM, presents to ED s/p fall from bed, + head trauma. Unknown LOC. Pt not sure why she fell. Denies headache, neck pain, chest pain, SOB, back pain, abd pain, vomiting.    Constitutional: Well developed, well nourished. NAD.  Head: Normocephalic, atraumatic.  Eyes: PERRL. EOMI. No Raccoon eyes.   ENT: No nasal discharge. No septal hematoma. No Levine sign. Mucous membranes moist.  Neck: Supple. Painless ROM. No midline tenderness, stepoffs.  Cardiovascular: Normal S1, S2. Regular rate and rhythm. No murmurs, rubs, or gallops.  Pulmonary: Mild tachypnea. Bilateral expiratory wheezing.  CHEST: No chest wall tenderness, crepitus.  Abdominal: Soft. Nondistended. Nontender. No rebound, guarding, rigidity.  BACK: No midline T/L/S tenderness, stepoffs. No saddle paresthesia.  Extremities. Pelvis stable. No traumatic deformities, tenderness of extremities.  Skin: No rashes, cyanosis, lacerations, abrasions.  Neuro: AAOx3. Strength 5/5 in all extremities. Sensation intact throughout. No focal neurological deficits.  Psych: Normal mood. Normal affect.

## 2021-06-08 NOTE — CONSULT NOTE ADULT - ATTENDING COMMENTS
75y old f s/p fall from bed. +HT, ?LOC, +Coumadin. She states that she went to bed and when she woke up she was outside of the bed on the floor and had a bump on her head. She is currently denying any pain in her chest, abdomen, or extremities. She was able to get up after her fall and call EMS. no traumatic injuries. CT h/cs/cap    PLAN:    - Cleared from trauma perspective   - Dispo per ED  - Will do tertiary exam if she is admitted to medicine

## 2021-06-08 NOTE — ED CDU PROVIDER INITIAL DAY NOTE - OBJECTIVE STATEMENT
75F with pmh of COPD 3L O2 AF on warfarin s/p PPM (medtronic micra VR), HLD, lung CA in remission x9y BIBEMS for syncope. PT awoke on the floor at 5AM and the last thing she remembers was laying down to sleep. Denies chest pain, nausea, vomiting, diarrhea, dysuria, cough. Ambulates without walker/cane at baseline and lives at home with .

## 2021-06-08 NOTE — ED PROVIDER NOTE - CLINICAL SUMMARY MEDICAL DECISION MAKING FREE TEXT BOX
Pt presented to ED s/p fall out bed, unknown mechanism, ? LOC. Trauma alert called. Labs, panscan obtained and reviewed. No traumatic findings. Admitted to medicine. Pt presented to ED s/p fall out bed, unknown mechanism, ? LOC. Trauma alert called. Labs, panscan obtained and reviewed. No traumatic findings. Discussed with Dr. Joshi, due to frequent admissions, will place pt in obs for syncope w/up.

## 2021-06-08 NOTE — CONSULT NOTE ADULT - SUBJECTIVE AND OBJECTIVE BOX
75y f    TRAUMA ACTIVATION LEVEL:  Alert    MECHANISM OF INJURY:      [] Blunt  	[] MVC	[x] Fall	[] Pedestrian Struck	[] Motorcycle   [] Assault   [] Bicycle collision  [] Sports injury     [] Penetrating  	[] Gun Shot Wound 		[] Stab Wound    GCS: 	E: 4	V: 5	M: 6    HPI:  75F with PMH listed below presented to the ED s/p fall +HT, ?LOC, +Coumadin. She states that she went to bed and when she woke up she was outside of the bed on the floor and had a bump on her head. She is currently denying any pain in her chest, abdomen, or extremities. She normally ambulates with her walker but was able to get up after her fall and call EMS.     PAST MEDICAL & SURGICAL HISTORY:  High cholesterol  Anxiety  Pacemaker  Lung cancer  Afib  COPD (chronic obstructive pulmonary disease)  Artificial cardiac pacemaker  H/O atrioventricular kacie ablation  S/P lobectomy of lung Left  History of tonsillectomy  S/P appendectomy  Cataract RIGHT  6/17 AND  LEFT  7/5/19    Allergies  bacitracin (Other)  carboplatin (Other)  sulfa drugs (Unknown)  sulfonamides (Unknown)  Xanax (Other)      Home Medications:  atorvastatin 20 mg oral tablet: 1 tab(s) orally once a day (05 Jun 2021 22:41)  clonazePAM 1 mg oral tablet: 1 tab(s) orally every 8 hours, As Needed (21 May 2021 12:12)  pantoprazole 40 mg oral delayed release tablet: 1 tab(s) orally once a day (before a meal) (21 May 2021 12:12)  warfarin 1 mg oral tablet: 1 tab(s) orally once a day (05 Jun 2021 22:41)    ROS: 10-system review is otherwise negative except HPI above.      Primary Survey:    A - airway intact  B - bilateral breath sounds and good chest rise  C - palpable pulses in all extremities  D - GCS 15 on arrival, BOONE  Exposure obtained    Vital Signs Last 24 Hrs  T(C): 36.7 (08 Jun 2021 06:38), Max: 36.7 (08 Jun 2021 06:38)  T(F): 98.1 (08 Jun 2021 06:38), Max: 98.1 (08 Jun 2021 06:38)  HR: 72 (08 Jun 2021 06:38) (72 - 72)  BP: 150/71 (08 Jun 2021 06:38) (150/71 - 150/71)  RR: 18 (08 Jun 2021 06:38) (18 - 18)  SpO2: 100% (08 Jun 2021 06:38) (100% - 100%)    Secondary Survey:   General: NAD  HEENT: small posterior scalp hematoma without overlying abrasion, EOMI, PEERLA. no scalp lacerations   Neck: Soft, midline trachea. no cspine tenderness  Chest: No chest wall tenderness. or subq  emphysema   Cardiac: S1, S2, RRR  Respiratory: Bilateral breath sounds, clear and equal bilaterally  Abdomen: Soft, non-distended, non-tender, no rebound,   Groin: Normal appearing, pelvis stable   Ext: palp radial b/l UE, b/l DP palp in Lower Extrem.   Back: no TTP, no palpable runoff/stepoff/deformity    FAST - Negative     LABS:  POCT Blood Glucose.: 172 mg/dL (08 Jun 2021 06:53)                          10.0   7.09  )-----------( 137      ( 08 Jun 2021 07:08 )             31.6       Auto Neutrophil %: 94.1 % (06-08-21 @ 07:08)  Auto Immature Granulocyte %: 0.4 % (06-08-21 @ 07:08)    06-08    143  |  100  |  12  ----------------------------<  192<H>  4.8   |  31  |  0.8      Calcium, Total Serum: 9.6 mg/dL (06-08-21 @ 07:08)      LFTs:             5.9  | 0.7  | 18       ------------------[74      ( 08 Jun 2021 07:08 )  3.9  | x    | 17          Lipase:13       Lactate, Blood: 1.4 mmol/L (06-08-21 @ 07:08)  Blood Gas Venous - Lactate: 0.8 mmoL/L (06-05-21 @ 18:39)  Lactate, Blood: 1.0 mmol/L (06-05-21 @ 18:15)      Coags:     11.40  ----< 0.99    ( 08 Jun 2021 07:08 )     29.0        CARDIAC MARKERS ( 08 Jun 2021 07:08 )  x     / <0.01 ng/mL / 31 U/L / x     / x          Serum Pro-Brain Natriuretic Peptide: 814 pg/mL (06-05-21 @ 18:15)    Alcohol, Blood: <10 mg/dL (06-08-21 @ 07:08)      RADIOLOGY & ADDITIONAL STUDIES:  < from: CT Head No Cont (06.08.21 @ 07:44) >  IMPRESSION:  In comparison with the prior CT scan of the head August 13, 2018:  No acute intracranial hemorrhage.  Mild left posterior parietal extracalvarial soft tissue swelling, otherwise stable examination.    < from: CT Cervical Spine No Cont (06.08.21 @ 07:44) >  IMPRESSION:  1.  No acute displaced fracture demonstrated.  2.  Straightening of normal cervical lordosis may be secondary to patient positioning or muscle spasm.  3.  Mild degenerative changes.  < end of copied text >    < from: CT Chest w/ IV Cont (06.08.21 @ 07:58) >  IMPRESSION:  No evidence of acute traumatic injury to the chest, abdomen, or pelvis.  New solid pulmonary nodules in the right lower lobe above, largest measuring 5 mm. Follow-up in 12 months is recommended.  Chronic findings in the chest and abdomen as above.  < end of copied text >    < from: Xray Pelvis AP only (06.08.21 @ 09:25) >  IMPRESSION:  No acute displaced fracture  < end of copied text >    ---------------------------------------------------------------------------------------

## 2021-06-08 NOTE — ED PROVIDER NOTE - NS ED ROS FT
Constitutional:  (-) fever, (-) chills, (-) lethargy  Eyes:  (-) eye pain (-) visual changes  ENMT: (-) nasal discharge, (-) sore throat. (-) neck pain or stiffness  Cardiac: (-) chest pain (-) palpitations  Respiratory:  (-) cough (-) respiratory distress (+) dyspnea  GI:  (-) nausea (-) vomiting (-) diarrhea (-) abdominal pain.  :  (-) dysuria (-) frequency (-) burning.  MS:  (-) back pain (-) joint pain.  Neuro:  (-) headache (-) numbness (-) tingling (-) focal weakness  Skin:  (-) rash  Except as documented in the HPI,  all other systems are negative

## 2021-06-08 NOTE — CHART NOTE - NSCHARTNOTEFT_GEN_A_CORE
LMSW completed assessment with pt at bedside.    Pt is a 76 yo F, domiciled with spouse, who presents to ED for syncope. PMH = COPD 3L O2 AF on warfarin s/p PPM (medtronic micra VR), HLD, lung CA in remission x9y.  Pt most recent d/c on 6/6/21 for copd exacerbation, also with another recent admission for sob 2 weeks ago. Pt stated she signed paperwork to be DNR/DNI during last hospital visit.  Pt states she uses cane at home during the night, but ambulates independently throughout the day. PT states she has someone come into the home for cleaning, independent on other ADLs but gets helps from spouse.  LMSW spoke with pt about at home safety and potentially going to [facility]/SNF. Pt was adamant about not going to a facility, but was open to going to [facility] for rehab (was [facility] patient 3 yrs ago for hip replacement). Pt likely not a good candidate for [facility]. Attending notified.     PMD: Carmine Spencer    Pt to remain under OBS overnight for monitoring.  PT to be ordered  Pt anticipated d/c 6/9/21 - home w/ home care for PT.    CM will continue to monitor.

## 2021-06-08 NOTE — ED CDU PROVIDER INITIAL DAY NOTE - MEDICAL DECISION MAKING DETAILS
pt with history as above bibems sp syncopal episode, pt unsure how she ended up on the ground. unknown head injury. +generalized weakness. +chronic steorid use. frail appearing, NAD, non toxic. NCAT PERRLA EOMI neck supple non tender normal wob abdomen s nt nd no rebound no guarding WWPx4 neuro non focal. labs imaging ekg reviewed. ppm functioning per EP. cleared by trauma. dw hospitalist. will icnrease coumadin, cont tele monitoring, PT/rehab consult

## 2021-06-08 NOTE — ED PROVIDER NOTE - PHYSICAL EXAMINATION
CONSTITUTIONAL: well-appearing, in NAD  SKIN: Warm dry, normal skin turgor  HEAD: NC; hematoma in posterior occiput 2x2cm   EYES: EOMI, PERRLA, no scleral icterus, conjunctiva pink  ENT: normal pharynx with no erythema or exudates  NECK: Supple; non tender. Full ROM; no c/t/l/s tenderness  CARD: RRR, no murmurs.  RESP: mild b/l expiratory wheezing   ABD: soft, non-tender, non-distended, no rebound or guarding.  EXT: Full ROM, no bony tenderness, no pedal edema, no calf tenderness; radial and DP pulses 2+ b/l  NEURO: normal motor. normal sensory.   PSYCH: Cooperative, appropriate.

## 2021-06-08 NOTE — CONSULT NOTE ADULT - SUBJECTIVE AND OBJECTIVE BOX
75F with pmh of COPD 3L O2 AF on warfarin s/p PPM (medtronic micra VR), HLD, lung CA in remission x9y BIBEMS for syncope.  Pt states that the last thing she remembers is she lied down to sleep and then woke up on the floor. Does not remember how she go ton the floor. Denies any cp, palpitations or LOC prior to the incident.   Denies any  nausea, vomiting, diarrhea, dysuria, cough. The fall was unwitnessed. She does not know if she fainted or not.  The pt states that she has been in and out of the hospital recently and has been having insomnia. Denies any hallucinations. Pt also states that she has been feeling weaker being in and out the hospital.  Pt recently DC on 6/6 for copd exacerbation also with another recent admission for sob 2 weeks ago, was planned to be d.kumar with Hospice but per Pt. no one contacted her since she came home and she no longer wants hospice just to remain Dnr/Dni.      ER course:   Vitals stable., labs stable.   trauma work up has was neg. Cleared by trauma   imaging reviewed   EKG: Vpaced     Pt admitted to obs for monitoring of suspected syncope     Vital Signs (24 Hrs):  T(C): 36.7 (06-08-21 @ 06:38), Max: 36.7 (06-08-21 @ 06:38)  HR: 72 (06-08-21 @ 06:38) (72 - 72)  BP: 150/71 (06-08-21 @ 06:38) (150/71 - 150/71)  RR: 18 (06-08-21 @ 06:38) (18 - 18)  SpO2: 100% (06-08-21 @ 06:38) (100% - 100%)  Wt(kg): --  Daily Height in cm: 154.94 (08 Jun 2021 06:38)    Daily     I&O's Summary    PHYSICAL EXAM:  GENERAL: NAD, frail  HEAD:  Atraumatic, Normocephalic  EYES: EOMI, PERRLA, conjunctiva and sclera clear  NECK: Supple, No JVD  CHEST/LUNG: Clear to auscultation bilaterally; No wheeze  HEART: Regular rate and rhythm; No murmurs, rubs, or gallops  ABDOMEN: Soft, Nontender, Nondistended; Bowel sounds present  EXTREMITIES:  2+ Peripheral Pulses, No clubbing, cyanosis, or edema  PSYCH: AAOx3  NEUROLOGY: weakness upper and lower ext   SKIN: No rashes or lesions    labs and imaging reviewed  75F with pmh of COPD 3L O2 AF on warfarin s/p PPM (medtronic micra VR), HLD, lung CA in remission x9y BIBEMS for syncope.  Pt states that the last thing she remembers is she lied down to sleep and then woke up on the floor. Does not remember how she go ton the floor. Denies any cp, palpitations or LOC prior to the incident.   Denies any  nausea, vomiting, diarrhea, dysuria, cough. The fall was unwitnessed. She does not know if she fainted or not.  The pt states that she has been in and out of the hospital recently and has been having insomnia. Denies any hallucinations. Pt also states that she has been feeling weaker being in and out the hospital.  Pt recently DC on 6/6 for copd exacerbation also with another recent admission for sob 2 weeks ago, was planned to be d.kumar with Hospice but per Pt. no one contacted her since she came home and she no longer wants hospice just to remain Dnr/Dni.      ER course:   Vitals stable., labs stable.   trauma work up has was neg. Cleared by trauma   imaging reviewed   EKG: Vpaced     Pt admitted to obs for monitoring of suspected syncope     Vital Signs (24 Hrs):  T(C): 36.7 (06-08-21 @ 06:38), Max: 36.7 (06-08-21 @ 06:38)  HR: 72 (06-08-21 @ 06:38) (72 - 72)  BP: 150/71 (06-08-21 @ 06:38) (150/71 - 150/71)  RR: 18 (06-08-21 @ 06:38) (18 - 18)  SpO2: 100% (06-08-21 @ 06:38) (100% - 100%)  Wt(kg): --  Daily Height in cm: 154.94 (08 Jun 2021 06:38)    Daily     I&O's Summary    PHYSICAL EXAM:  GENERAL: NAD, frail  HEAD:  Atraumatic, Normocephalic  EYES: EOMI, PERRLA, conjunctiva and sclera clear  NECK: Supple, No JVD  CHEST/LUNG: Clear to auscultation bilaterally; No wheeze  HEART: Regular rate and rhythm; No murmurs, rubs, or gallops  ABDOMEN: Soft, Nontender, Nondistended; Bowel sounds present  EXTREMITIES:  2+ Peripheral Pulses, No clubbing, cyanosis, or edema  PSYCH: AAOx3  NEUROLOGY: weakness upper and lower ext   SKIN: No rashes or lesions    labs and imaging reviewed    MEDICATIONS  (STANDING):  atorvastatin 20 milliGRAM(s) Oral at bedtime  budesonide 160 MICROgram(s)/formoterol 4.5 MICROgram(s) Inhaler 2 Puff(s) Inhalation two times a day  prednisoLONE    3 mG/mL Solution (ORAPRED) 40 milliGRAM(s) Oral once  tiotropium 18 MICROgram(s) Capsule 1 Capsule(s) Inhalation once  warfarin 2 milliGRAM(s) Oral once    MEDICATIONS  (PRN):  clonazePAM  Tablet 1 milliGRAM(s) Oral every 8 hours PRN anxiety    Home Medications:  atorvastatin 20 mg oral tablet: 1 tab(s) orally once a day (05 Jun 2021 22:41)  clonazePAM 1 mg oral tablet: 1 tab(s) orally every 8 hours, As Needed (21 May 2021 12:12)  pantoprazole 40 mg oral delayed release tablet: 1 tab(s) orally once a day (before a meal) (21 May 2021 12:12)  warfarin 1 mg oral tablet: 1 tab(s) orally once a day (05 Jun 2021 22:41)    < from: TTE Echo Complete w/o Contrast w/ Doppler (05.20.21 @ 12:28) >  Summary:   1. Normal global left ventricular systolic function.   2. LV Ejection Fraction by Chappell's Method with a biplane EF of 65 %.   3. Mildly enlarged right ventricle.   4. Mildly enlarged left atrium.   5. Normal right atrial size.   6. Mitral annular calcification.   7. Moderate mitral valve regurgitation.   8. Thickening and calcification of the anterior and posterior mitral valve leaflets.   9. Moderate tricuspid regurgitation.  10. Mild aortic regurgitation.  11. Sclerotic aortic valve with decreased opening.  12. Mild pulmonic valve regurgitation.    < end of copied text >

## 2021-06-08 NOTE — ED ADULT NURSE NOTE - OBJECTIVE STATEMENT
patient BIBA from home s/p fall on to floor while getting out of bed. Unknown LOC, + HT, takes coumadin for a fib. Sustained hematoma to anterior scalp. denies cp/sob/n/v/d.

## 2021-06-08 NOTE — CONSULT NOTE ADULT - ASSESSMENT
ASSESSMENT:  75y old f s/p fall from bed on coumadin, no traumatic injuries     PLAN:    - Cleared from trauma perspective   - Dispo per ED  - Will do tertiary exam if she is admitted to medicine   -  d/w Dr. Jimenez  
 75F with pmh of COPD 3L O2 AF on warfarin s/p PPM (medtronic micra VR), HLD, lung CA in remission x9y BIBEMS for syncope.  Pt states that the last thing she remembers is she lied down to sleep and then woke up on the floor. Placed in obs for suspected syncope. Medicine consulted for syncopal work up.    #s/p unwitnessed fall.  #suspected syncope as pt does not rememeber how she got to the floor  trauma cleared  - continue cardiac telemonitoring   - not on Beta-blocker therapy   - trend trops x3   - Echo 5/2021 ef  wnl, no need to repeat, noted above  - EEG  (does not rember how she go to the floor)      #Afib with PPM   -RC   - continue warfarrin, daily INR 2-3  - given 2 mg coumadin today     #HLD- continue statin    #lung CA  #new nodule on CT  - outpt follow up  - repeat CT scan 1 year    #COPD   - continue steroid don and bronchodilators     #weakness  - PT consult, physiatry   - pt refuses to go to a facility and not likely a candidate for 4A  - SW consulted     Dispo: continue monitor in obs 24 hrs, Medicine to follow up in 6/9, likely DC with home care.   Code status: Pt is DNR/DNI, please call med records for copy of MOLST form from last admission

## 2021-06-08 NOTE — ED ADULT NURSE NOTE - NSIMPLEMENTINTERV_GEN_ALL_ED
Implemented All Fall Risk Interventions:  [place] to call system. Call bell, personal items and telephone within reach. Instruct patient to call for assistance. Room bathroom lighting operational. Non-slip footwear when patient is off stretcher. Physically safe environment: no spills, clutter or unnecessary equipment. Stretcher in lowest position, wheels locked, appropriate side rails in place. Provide visual cue, wrist band, yellow gown, etc. Monitor gait and stability. Monitor for mental status changes and reorient to person, place, and time. Review medications for side effects contributing to fall risk. Reinforce activity limits and safety measures with patient and family.

## 2021-06-08 NOTE — CHART NOTE - NSCHARTNOTEFT_GEN_A_CORE
Medtronic Micra PPM interrogated 6/8/21  Device functioning properly  Unable to access episodes on this particular device    Recall EP as needed [id number]

## 2021-06-08 NOTE — ED PROVIDER NOTE - CARE PLAN
Principal Discharge DX:	Syncope and collapse   Principal Discharge DX:	Syncope and collapse  Secondary Diagnosis:	Closed head injury

## 2021-06-08 NOTE — ED CDU PROVIDER INITIAL DAY NOTE - PROGRESS NOTE DETAILS
no complaints. wants different food. hospitalist note appreciated. pending echo/EEG and rehab consult.

## 2021-06-08 NOTE — ED PROVIDER NOTE - OBJECTIVE STATEMENT
74 yo F with PMH of COPD on 3 L home O2 with recent admission 6/5-6/6, afib on warfarin, HLD, bess CA s/p chemo/xrt/resection in remission x 9 years BIBEMS for syncope and collapse. Patient says she woke up on the floor at 5 am, but is unsure of when she fell or if she had LOC. Patient denies headache, vision changes, dizziness, numbness, weakness, tingling, fever, chills, recent cold/flu symptoms, chest pain, nausea, vomiting, diarrhea, dysuria, hematuria. Patient endorses dyspnea since discharge 2 days ago. Patient ambulates without walker/cane at baseline and lives at home with . 76 yo F with PMH of COPD on 3 L home O2 with multiple admissions, most recently 5/17-5/21 and 6/5-6/6, afib on warfarin, HLD, bess CA s/p chemo/xrt/resection in remission x 9 years BIBEMS for syncope and collapse. Patient says she woke up on the floor at 5 am, but is unsure of when she fell or if she had LOC. Patient denies headache, vision changes, dizziness, numbness, weakness, tingling, fever, chills, recent cold/flu symptoms, chest pain, nausea, vomiting, diarrhea, dysuria, hematuria. Patient endorses dyspnea since discharge 2 days ago. Patient ambulates without walker/cane at baseline and lives at home with . 74 yo F with PMH of COPD on 3 L home O2 with multiple admissions, most recently 5/17-5/21 and 6/5-6/6, afib on warfarin, HLD, lung CA s/p chemo/xrt/resection in remission x 9 years BIBEMS for syncope and collapse. Patient says she woke up on the floor at 5 am, but is unsure of when she fell or if she had LOC. Patient denies headache, vision changes, dizziness, numbness, weakness, tingling, fever, chills, recent cold/flu symptoms, chest pain, nausea, vomiting, diarrhea, dysuria, hematuria. Patient endorses dyspnea since discharge 2 days ago. Patient ambulates without walker/cane at baseline and lives at home with . 76 yo F with PMH of COPD on 3 L home O2 with multiple admissions, most recently 5/17-5/21 and 6/5-6/6, afib on warfarin s/p PPM, HLD, lung CA s/p chemo/xrt/resection in remission x 9 years BIBEMS for syncope and collapse. Patient says she woke up on the floor at 5 am, but is unsure of when she fell or if she had LOC. Patient denies headache, vision changes, dizziness, numbness, weakness, tingling, fever, chills, recent cold/flu symptoms, chest pain, nausea, vomiting, diarrhea, dysuria, hematuria. Patient endorses dyspnea since discharge 2 days ago. Patient ambulates without walker/cane at baseline and lives at home with .

## 2021-06-09 NOTE — PHYSICAL THERAPY INITIAL EVALUATION ADULT - PERTINENT HX OF CURRENT PROBLEM, REHAB EVAL
74 yo F with PMH of COPD on 3 L home O2 with multiple admissions, most recently 5/17-5/21 and 6/5-6/6, afib on warfarin s/p PPM, HLD, lung CA s/p chemo/xrt/resection in remission x 9 years BIBEMS for syncope and collapse. Patient says she woke up on the floor at 5 am, but is unsure of when she fell or if she had LOC

## 2021-06-09 NOTE — ED CDU PROVIDER DISPOSITION NOTE - PATIENT PORTAL LINK FT
You can access the FollowMyHealth Patient Portal offered by [facility] by registering at the following website: [email or web address]. By joining [employer]’s FollowMyHealth portal, you will also be able to view your health information using other applications (apps) compatible with our system.

## 2021-06-09 NOTE — ED ADULT NURSE REASSESSMENT NOTE - NSIMPLEMENTINTERV_GEN_ALL_ED
Implemented All Fall with Harm Risk Interventions:  [place] to call system. Call bell, personal items and telephone within reach. Instruct patient to call for assistance. Room bathroom lighting operational. Non-slip footwear when patient is off stretcher. Physically safe environment: no spills, clutter or unnecessary equipment. Stretcher in lowest position, wheels locked, appropriate side rails in place. Provide visual cue, wrist band, yellow gown, etc. Monitor gait and stability. Monitor for mental status changes and reorient to person, place, and time. Review medications for side effects contributing to fall risk. Reinforce activity limits and safety measures with patient and family. Provide visual clues: red socks.

## 2021-06-09 NOTE — PROGRESS NOTE ADULT - ASSESSMENT
75F with pmh of COPD 3L O2 AF on warfarin s/p PPM (medtronic micra VR), HLD, lung CA in remission x9y BIBEMS for syncope.  Pt states that the last thing she remembers is she lied down to sleep and then woke up on the floor. Placed in obs for suspected syncope. Medicine consulted for syncopal work up.    #s/p unwitnessed fall  #c/f Syncope   trauma cleared  - presumably no events on telemetry ( it was discontinued at the time of my examination )   - not on Beta-blocker therapy   - troponin negative x 3  - Echo 5/2021 ef  wnl, no need to repeat  - Patient refusing EEG     #Afib with PPM   #Subtherapeutic INR   - currently rate controlled   - continue warfarin, daily INR 2-3  - suspect noncompliance, given INR is not therapeutic, no need for bridging AC if there is no prior hx of thromboembolism   - pt will need OP f/u with Coumadin clinic     #HLD- continue statin    #lung CA  #new nodule on CT  - outpt follow up  - repeat CT scan 1 year    #COPD   - continue steroid don and bronchodilators     #weakness  - PT consult, physiatry   - pt refuses to go to a facility and not likely a candidate for 4A  - SW following     Dispo: Given patient is refusing EEG at this time, can likely dc home w/ home care     - Needs OP PCP and Neurology follow up     Code status: Pt is DNR/DNI, please call [facility] records for copy of MOLST form from last admission         Merlene Herring,

## 2021-06-09 NOTE — ED CDU PROVIDER SUBSEQUENT DAY NOTE - PROGRESS NOTE DETAILS
patient resting comfortably. no complaints. will continue to observe patient comfortable, awaiting repeat trop, EEG and PT eval Patient refusing EEG, can dc home per hospitalist. Advised need for f/u CT for pulmonary nodule

## 2021-06-09 NOTE — PROGRESS NOTE ADULT - SUBJECTIVE AND OBJECTIVE BOX
CASEY POWERS  75y, Female  Allergy: bacitracin (Other)  carboplatin (Other)  sulfa drugs (Unknown)  sulfonamides (Unknown)  Xanax (Other)    Hospital Day: 1d    Patient seen and examined earlier today. Extremely frustrated by her length of stay. Endorsing that she does not want to remain in the hospital, and does not want to stay for an EEG at this time. Would prefer to go home with PCP and Neuro follow up.     PMH/PSH:  PAST MEDICAL & SURGICAL HISTORY:  High cholesterol    Anxiety    Pacemaker    Lung cancer    Afib    COPD (chronic obstructive pulmonary disease)    Artificial cardiac pacemaker    H/O atrioventricular kacie ablation    S/P lobectomy of lung  left    History of tonsillectomy    S/P appendectomy    Cataract  RIGHT  6/17 AND  LEFT  7/5/19        LAST 24-Hr EVENTS:    VITALS:  T(F): 96.2 (06-09-21 @ 06:09), Max: 98.1 (06-08-21 @ 23:16)  HR: 69 (06-09-21 @ 06:09)  BP: 165/85 (06-09-21 @ 06:09) (132/72 - 165/85)  RR: 18 (06-09-21 @ 06:09)  SpO2: 99% (06-09-21 @ 06:09)        TESTS & MEASUREMENTS:  Weight (Kg): 54.4 (06-08-21 @ 06:38)  BMI (kg/m2): 22.7 (06-08)                          10.0   7.09  )-----------( 137      ( 08 Jun 2021 07:08 )             31.6     PT/INR - ( 08 Jun 2021 07:08 )   PT: 11.40 sec;   INR: 0.99 ratio         PTT - ( 08 Jun 2021 07:08 )  PTT:29.0 sec  06-08    143  |  100  |  12  ----------------------------<  192<H>  4.8   |  31  |  0.8    Ca    9.6      08 Jun 2021 07:08    TPro  5.9<L>  /  Alb  3.9  /  TBili  0.7  /  DBili  x   /  AST  18  /  ALT  17  /  AlkPhos  74  06-08    LIVER FUNCTIONS - ( 08 Jun 2021 07:08 )  Alb: 3.9 g/dL / Pro: 5.9 g/dL / ALK PHOS: 74 U/L / ALT: 17 U/L / AST: 18 U/L / GGT: x           CARDIAC MARKERS ( 09 Jun 2021 07:29 )  x     / <0.01 ng/mL / x     / x     / x      CARDIAC MARKERS ( 08 Jun 2021 11:48 )  x     / <0.01 ng/mL / x     / x     / x      CARDIAC MARKERS ( 08 Jun 2021 07:08 )  x     / <0.01 ng/mL / 31 U/L / x     / x              Procalcitonin, Serum: 0.25 ng/mL (06-06-21 @ 04:30)        Serum Pro-Brain Natriuretic Peptide: 814 pg/mL (06-05-21 @ 18:15)    COVID-19 PCR: NotDetec (06-08-21 @ 07:08)  COVID-19 PCR: NotDetec (06-05-21 @ 18:15)      RADIOLOGY, ECG, & ADDITIONAL TESTS:  12 Lead ECG:   Ventricular Rate 70 BPM    Atrial Rate 375 BPM    QRS Duration 110 ms    Q-T Interval 426 ms    QTC Calculation(Bazett) 460 ms    R Axis -65 degrees    T Axis 94 degrees    Diagnosis Line Ventricular-paced rhythm  Abnormal ECG    Confirmed by LINDA ALVARADO MD ([id number]) on 6/8/2021 1:11:05 PM (06-08-21 @ 11:52)  12 Lead ECG:   Ventricular Rate 91 BPM    Atrial Rate 60 BPM    QRS Duration 114 ms    Q-T Interval 422 ms    QTC Calculation(Bazett) 519 ms    R Axis -69 degrees    T Axis 98 degrees    Diagnosis Line Ventricular-paced rhythm with frequent Premature ventricular complexes  Abnormal ECG    Confirmed by LINDA ALVARADO MD ([id number]) on 6/8/2021 11:08:18 AM (06-08-21 @ 09:14)      RECENT DIAGNOSTIC ORDERS:  EEG:   Transport: Portable  Hemorrhage:No  Brain Death: No  MI:No  Sickle Cell:No   Stimulants:No  Anti-Convulsants:No   Anti-Depressants:No  Contr Substances:No   Ordering Provider's Pager/Contact #:[phone] (06-08-21 @ 13:05)      MEDICATIONS:  MEDICATIONS  (STANDING):  atorvastatin 20 milliGRAM(s) Oral at bedtime  budesonide 160 MICROgram(s)/formoterol 4.5 MICROgram(s) Inhaler 2 Puff(s) Inhalation two times a day    MEDICATIONS  (PRN):  clonazePAM  Tablet 1 milliGRAM(s) Oral every 8 hours PRN anxiety      HOME MEDICATIONS:  atorvastatin 20 mg oral tablet (06-05)  clonazePAM 1 mg oral tablet (05-21)  pantoprazole 40 mg oral delayed release tablet (05-21)  warfarin 1 mg oral tablet (06-05)      PHYSICAL EXAM:  GENERAL: NAD, frail, anxious  HEAD:  Atraumatic, Normocephalic  EYES: EOMI, PERRLA, conjunctiva and sclera clear  NECK: Supple, No JVD  CHEST/LUNG: bilateral wheeze appreciated, increased respiratory rate   HEART: Regular rate and rhythm; No murmurs, rubs, or gallops  ABDOMEN: Soft, Nontender, Nondistended; Bowel sounds present  EXTREMITIES:  2+ Peripheral Pulses, No clubbing, cyanosis, or edema  PSYCH: AAOx3, agitated  SKIN: No rashes or lesions

## 2021-06-09 NOTE — ED CDU PROVIDER SUBSEQUENT DAY NOTE - MEDICAL DECISION MAKING DETAILS
pt placed in obs for episode of possible syncope. ED w/u negative, no acute traumatic findings. EP c/s placed and device was functioning. recent echo reviewed. pt pending EEG

## 2021-06-09 NOTE — ED CDU PROVIDER DISPOSITION NOTE - ATTENDING CONTRIBUTION TO CARE
Uriah Willingham is a 57 y.o. male returns for     Chief Complaint   Patient presents with   • Left Knee - Follow-up, Pain   • Right Knee - Pain, Follow-up       HISTORY OF PRESENT ILLNESS: f/u supriya. Knee pain. Patient requesting evaluation for steroid injection, last injection done on 2/24/2020.  It did not work as well as it last time we spent a bit of time today talking about the blocks that he had in the lateral geniculate and it did help him only for the short-term.  He states that he felt a little bit weak in the leg after getting it.  I think the plan there is to maybe consider this again to see if he has an ablation of this.  We have spent quite a bit of time talking about the fact that he is not a great candidate for surgery       CONCURRENT MEDICAL HISTORY:    Past Medical History:   Diagnosis Date   • Ankle swelling    • Arthritis    • Back pain    • Diabetes mellitus (CMS/HCC)    • GERD (gastroesophageal reflux disease)    • Heartburn    • History of blood clots    • Hypertension    • Joint pain    • Knee pain    • Muscle pain    • Obesity    • Sleep apnea     c pap       No Known Allergies      Current Outpatient Medications:   •  acetaminophen (TYLENOL) 650 MG 8 hr tablet, Take 650 mg by mouth Every 8 (Eight) Hours As Needed for Mild Pain ., Disp: , Rfl:   •  aspirin 81 MG EC tablet, Take 81 mg by mouth Daily., Disp: , Rfl:   •  ELIQUIS 5 MG tablet tablet, TAKE ONE TABLET BY MOUTH EVERY 12 HOURS, Disp: 180 tablet, Rfl: 2  •  Glucose Blood (FREESTYLE LITE TEST VI), , Disp: , Rfl: 3  •  glucose blood test strip, Qd and prn, Disp: 100 each, Rfl: 4  •  glucose monitor monitoring kit, 1 each Daily. And prn, Disp: 1 each, Rfl: 5  •  Hydrocodone-Acetaminophen (XODOL) 7.5-300 MG per tablet, Take 1 tablet by mouth 2 (Two) Times a Day., Disp: , Rfl:   •  Lancets (FREESTYLE) lancets, Qd and prn, Disp: 100 each, Rfl: 4  •  LISINOPRIL PO, Take 10 mg by mouth 2 (Two) Times a Day., Disp: , Rfl:   •  magnesium oxide  "(MAGOX) 400 (241.3 MG) MG tablet tablet, Take 400 mg by mouth 2 (Two) Times a Day., Disp: , Rfl:   •  Menthol (BIOFREEZE) 10 % aerosol, Apply  topically to the appropriate area as directed., Disp: , Rfl:   •  metFORMIN ER (GLUCOPHAGE-XR) 500 MG 24 hr tablet, TAKE ONE TABLET BY MOUTH DAILY WITH BREAKFAST, Disp: 90 tablet, Rfl: 1  •  metoprolol succinate XL (TOPROL-XL) 25 MG 24 hr tablet, Take 25 mg by mouth 2 (Two) Times a Day., Disp: , Rfl:   •  oxyCODONE-acetaminophen (PERCOCET)  MG per tablet, oxycodone-acetaminophen 10 mg-325 mg tablet, Disp: , Rfl:   •  spironolactone (ALDACTONE) 25 MG tablet, Take 25 mg by mouth Daily., Disp: , Rfl:   •  triamcinolone (KENALOG) 0.5 % cream, Apply  topically to the appropriate area as directed 3 (Three) Times a Day. To legs x 2 wks then prn, Disp: 120 g, Rfl: 3    Past Surgical History:   Procedure Laterality Date   • APPENDECTOMY      lap   • CARDIAC CATHETERIZATION N/A 10/8/2018    Procedure: Left Heart Cath with Right Radial Approach;  Surgeon: Artis Garber MD;  Location: [facility] CATH INVASIVE LOCATION;  Service: Cardiology   • COLONOSCOPY  07/22/2014    Diverticulosis in sigmoid colon. Normal cecum & rectum.   • INJECTION OF MEDICATION  11/28/2016    Kenalog (2)      • INJECTION OF MEDICATION  07/26/2013    Rocephin (1)      • INJECTION OF MEDICATION  11/28/2016    Toradol (2)      • TONSILLECTOMY     • WISDOM TOOTH EXTRACTION             ROS: Review of systems has been updated as of today's date.  All other systems are negative except as noted previously.    PHYSICAL EXAMINATION:       Ht 170.2 cm (67\")   Wt (!) 154 kg (340 lb)   BMI 53.25 kg/m²     Physical Exam   Constitutional: He is oriented to person, place, and time. No distress.   HENT:   Head: Normocephalic and atraumatic.   Eyes: Pupils are equal, round, and reactive to light. EOM are normal.   Neck: Neck supple. No tracheal deviation present.   Pulmonary/Chest: Effort normal.   Musculoskeletal: He " exhibits edema and tenderness. He exhibits no deformity.   Neurological: He is alert and oriented to person, place, and time.   Skin: Skin is warm and dry. He is not diaphoretic. No erythema.   Psychiatric: He has a normal mood and affect.       GAIT:     []  Normal  []  Antalgic    Assistive device: [x]  None  []  Walker     []  Crutches  []  Cane     []  Wheelchair  []  Stretcher    Ortho Exam  Venous stasis changes are present with brawny edema.  Tender medial joint line full extension limited flexion.  Grossly neurologically intact distally.  No results found.      Large Joint Arthrocentesis: R knee  Date/Time: 5/15/2020 8:16 AM  Consent given by: patient  Site marked: site marked  Timeout: Immediately prior to procedure a time out was called to verify the correct patient, procedure, equipment, support staff and site/side marked as required   Supporting Documentation  Indications: pain   Procedure Details  Location: knee - R knee  Preparation: Patient was prepped and draped in the usual sterile fashion  Needle size: 22 G  Approach: anteromedial  Medications administered: 80 mg triamcinolone acetonide 40 MG/ML; 4 mL lidocaine PF 1% 1 %  Patient tolerance: patient tolerated the procedure well with no immediate complications    Large Joint Arthrocentesis: L knee  Date/Time: 5/15/2020 8:16 AM  Consent given by: patient  Site marked: site marked  Timeout: Immediately prior to procedure a time out was called to verify the correct patient, procedure, equipment, support staff and site/side marked as required   Supporting Documentation  Indications: pain   Procedure Details  Location: knee - L knee  Preparation: Patient was prepped and draped in the usual sterile fashion  Needle size: 22 G  Approach: anteromedial  Medications administered: 80 mg triamcinolone acetonide 40 MG/ML; 4 mL lidocaine PF 1% 1 %  Patient tolerance: patient tolerated the procedure well with no immediate  complications            ASSESSMENT:    Diagnoses and all orders for this visit:    Chronic pain of both knees  -     Large Joint Arthrocentesis: R knee  -     Large Joint Arthrocentesis: L knee    Primary osteoarthritis of both knees  -     Large Joint Arthrocentesis: R knee  -     Large Joint Arthrocentesis: L knee          PLAN we again had discussion that he is not a good candidate from surgery his BMI is actually increased a little bit since we saw him.  Also has a history of significant vascular issues with blood clot risk I think he is a greater risk.  We have talked about the process of lateral geniculate ablation he may be a good candidate for this he is still seeing his pain management.  We will go ahead and elect to reinject him again today after discussing this in detail the options and this may be the best option for him at this point.  I will see him back as needed    No follow-ups on file.      This document has been electronically signed by Joie Nguyen MA on May 15, 2020 08:26     Patient independently seen and evaluated by me.

## 2021-06-09 NOTE — ED CDU PROVIDER DISPOSITION NOTE - NSFOLLOWUPCLINICS_GEN_ALL_ED_FT
[facility]  Neurology  [address], [address]  [place] [address]  Phone: [phone]  Fax:   Follow Up Time: 1-3 Days    [facility]  Coumadin  [address] [place] [address]  Phone: [phone]  Fax:   Follow Up Time: 1-3 Days

## 2021-06-09 NOTE — ED ADULT NURSE REASSESSMENT NOTE - NS ED NURSE REASSESS COMMENT FT1
Pt is alert and orientedx3, S/P FALL. Pt slept all night and this morning, non verbal signs of pains is absent. Safety prereductions in place, to continue monitoring.
pt assessed, pt resting comfortable at this time, pt denies any pain, pt currently on cardiac monitor, no skin break down noted, IVL maintained, safety and comfort maintained, will continue to monitor.
Pt is alert and orientedx3, auuda1w pains. she is S/P fall at home, pt is made comfortable in bed, vital signs is WNL. Pt is sleeping now, to continuer monitoring.
Pt assessed, A/OX4 VSS at this time. Pt is resting in bed all scheduled medications administered, m/a on call bell in reach safety and comfort measures maintained. Will continue to monitor and assess.

## 2021-06-09 NOTE — PHYSICAL THERAPY INITIAL EVALUATION ADULT - ADDITIONAL COMMENTS
assists with ADLs.  ambulates with no device around home except at night she uses a cane. 7 steps to get into house.

## 2021-06-11 NOTE — ED PROVIDER NOTE - NSFOLLOWUPINSTRUCTIONS_ED_ALL_ED_FT
Please follow up with your primary care doctor and pulmonologist in 1-7 days   Please follow up with hospice care for further evaluation of your hospice care.   Please be aware of any new or worsening signs or symptoms that should prompt your return to the ER.      Chronic obstructive pulmonary disease (COPD) is a disease that limits the flow of air in and out of your lungs. This makes it harder to breathe. With COPD, you are also more likely to get lung infections. COPD includes chronic bronchitis and emphysema. COPD is most often caused by heavy, long-term cigarette smoking. If you smoke, quit. It is the best thing you can do for your COPD and your overall health. To avoid infections, wash your hands often. Do your best to keep your hands away from your face. Most germs are spread from your hands to your mouth. Get a flu shot every year. Also ask your provider about pneumonia vaccines. To stay healthy, get enough sleep, exercise regularly, and eat a balanced diet.    Take your medicines exactly as directed. Don't skip doses.    Call your provider immediately if you have any of the following:  Shortness of breath, wheezing, or coughing  Increased mucus  Yellow, green, bloody, or smelly mucus  Fever or chills  Tightness in your chest that does not go away with rest or medicine  An irregular heartbeat or a feeling that your heart is beating very fast  Swollen ankles.

## 2021-06-11 NOTE — CONSULT NOTE ADULT - ASSESSMENT
75 year old female, past medical history COPD on 3L @home, HTN, HDL, Afib on Warfarin, Lung CA s/p lobectomy chemo/radiation in remission x9 years, PPM (medtronic), who presents with shortness of breath. patient with multiple presentations to ED for exacerbations. patient discharged x2 days ago on methyprednisolone, currently taking meds with minimal improvement. Known to our team from consult in May at that time plan was Hospice at home. Our  spoke to hospice who described patient's non adherence to visits.     No symptoms to address during encounter except wanting to go to toilet and to have blankets.       Morphine Equivalent Daily Dose (MEDD) na      See Recs below.    Please call   Dee Dee Cueva  or [phone]  with questions or concerns.   We will continue to follow.        75 year old female, past medical history COPD on 3L @home, HTN, HDL, Afib on Warfarin, Lung CA s/p lobectomy chemo/radiation in remission x9 years, PPM (medtronic), who presents with shortness of breath. patient with multiple presentations to ED for exacerbations. patient discharged x2 days ago on methyprednisolone, currently taking meds with minimal improvement. Known to our team from consult in May at that time plan was Hospice at home. Our  spoke to hospice who described patient's non adherence to visits.     No symptoms to address during encounter except wanting to go to toilet and to have blankets.       Morphine Equivalent Daily Dose (MEDD) na      See Recs below.    Please call   Dee Dee Cueva  or [phone]  with questions or concerns.   We will continue to follow.       Discussed with ED physician

## 2021-06-11 NOTE — ED PROVIDER NOTE - NS ED ROS FT
Review of Systems:  	•	CONSTITUTIONAL: no fever, no chills  	•	SKIN: no rash  	•	ENT: no sore throat  	•	RESPIRATORY: +shortness of breath, no cough  	•	CARDIAC: no chest pain, no palpitations  	•	GI: no abd pain, no nausea, no vomiting, no diarrhea  	•	GENITO-URINARY: no discharge, no dysuria; no hematuria, no increased urinary frequency  	•	MUSCULOSKELETAL: no joint paint, no swelling, no redness  	•	NEUROLOGIC: no weakness, no headache, no paresthesias  	•	PSYCH: no anxiety, non suicidal, non homicidal, no hallucination, no depression

## 2021-06-11 NOTE — ED PROVIDER NOTE - OBJECTIVE STATEMENT
75 year old female, past medical history COPD on 3L @home, HTN, HDL, Afib on Warfarin, Lung CA s/p lobectomy chemo/radiation in remission x9 years, PPM (medtronic), who presents with shortness of breath. patient with multiple presentations to ED for exacerbations. patient discharged x2 days ago on methyprednisolone, currently taking meds with minimal improvement. Denies fever, chills, sore throat, difficulty swallowing, cough, chest pain, abd pain, nausea/vomiting, leg pain/swelling, hemoptysis.

## 2021-06-11 NOTE — CONSULT NOTE ADULT - SUBJECTIVE AND OBJECTIVE BOX
NAZANIN POWERS          MRN-[id number]              HPI: 75 year old female, past medical history COPD on 3L @home, HTN, HDL, Afib on Warfarin, Lung CA s/p lobectomy chemo/radiation in remission x9 years, PPM (medtronic), who presents with shortness of breath. patient with multiple presentations to ED for exacerbations. patient discharged x2 days ago on methyprednisolone, currently taking meds with minimal improvement. Denies fever, chills, sore throat, difficulty swallowing, cough, chest pain, abd pain, nausea/vomiting, leg pain/swelling, hemoptysis.    PAST MEDICAL & SURGICAL HISTORY:  High cholesterol    Anxiety    Pacemaker    Lung cancer    Afib    COPD (chronic obstructive pulmonary disease)    Artificial cardiac pacemaker    H/O atrioventricular kacie ablation    S/P lobectomy of lung  left    History of tonsillectomy    S/P appendectomy    Cataract  RIGHT  6/17 AND  LEFT  7/5/19      FAMILY HISTORY:  No pertinent family history in first degree relatives     Reviewed and found non contributory in mother or father    SOCIAL HISTORY:     ROS:    Unable to attain due to:                    Dyspnea (Marcel 0-10): 0      at baseline - no c/o; has fan that relieves                 N/V (Y/N): No                             Secretions (Y/N) : No                                          Agitation(Y/N): No                              Pain (Y/N): No                                 -Provocation/Palliation: N/A  -Quality/Quantity: N/A  -Radiating: N/A  -Severity: No pain  -Timing/Frequency: N/A  -Impact on ADLs: N/A    General:  Denied  HEENT:    Denied  Neck:  Denied  CVS:  Denied  Resp:  Denied  GI:  BM yesterday   :  Denied  Musc:  Denied  Neuro:  Denied  Psych:  anxiety - has recent episodes of insomnia; fears of death  Skin:  Denied  Lymph:  Denied    Allergies    bacitracin (Other)  carboplatin (Other)  sulfa drugs (Unknown)  sulfonamides (Unknown)  Xanax (Other)    Intolerances      Opiate Naive (Y/N): Y  -iStop reviewed (Y/N): Y  Ref#:     Reference #: [id number]    Others' Prescriptions  Patient Name: Nazanin Schaefer Date: 1945  Address: [address] [place], [place] [address]Ndp: Female  Rx Written	Rx Dispensed	Drug	Quantity	Days Supply	Prescriber Name	Prescriber Jolanta #	Payment Method	Dispenser  06/04/2021	06/04/2021	clonazepam 1 mg tablet	28	7	Ravindra Telles (MD)	[id number]	Insurance	[facility]  05/31/2021	05/31/2021	lorazepam 1 mg tablet	28	7	Rigoberto Rebolledo	[id number]	Insurance	[facility]  05/21/2021	05/22/2021	morphine sulf 100 mg/5 ml conc	30ml	10	Kaylee Alexander , [facility]	[id number]	[facility]  05/21/2021	05/22/2021	lorazepam 0.5 mg tablet	10	3	Catherine Kaylee KALA , [facility]	[id number]	[facility]    Patient Name: Nazanin Schaefer Date: 1945  Address: [address] [place] [id number]: Female  Rx Written	Rx Dispensed	Drug	Quantity	Days Supply	Prescriber Name	Prescriber Jolanta #	Payment Method	Dispenser  04/02/2021	04/02/2021	klonopin 0.5 mg tablet	90	30	Taty Kidd	[id number]	Medicare	[id number]  03/24/2021	03/25/2021	lorazepam 0.5 mg tablet	30	30	Penny Griffith MD	[id number]	[facility] #[id number]  12/31/2020	12/31/2020	klonopin 0.5 mg tablet	90	30	Praneeth Kidda	[id number]	Medicare	[id number]  11/04/2020	11/04/2020	klonopin 0.5 mg tablet	90	30	Carmine Spencer MD	[id number]	Medicare	[id number]  08/27/2020	08/28/2020	klonopin 0.5 mg tablet	90	30	Praneeth Kidda	[id number]	Medicare	[id number]  06/24/2020	06/24/2020	klonopin 0.5 mg tablet	90	30	Taty Kidd	[id number]	Medicare	[id number]           Medications:      MEDICATIONS  (STANDING):    Labs:    CBC:                        10.3   6.93  )-----------( 150      ( 11 Jun 2021 11:15 )             32.3     CMP:    06-11    137  |  97<L>  |  13  ----------------------------<  136<H>  3.6   |  32  |  0.7    Ca    9.8      11 Jun 2021 11:15    TPro  6.2  /  Alb  4.1  /  TBili  0.6  /  DBili  x   /  AST  20  /  ALT  14  /  AlkPhos  80  06-11     Albumin, Serum: 4.1 g/dL (06-11-21 @ 11:15)     Imaging:  Reviewed    PEx:  T(C): 36.7 (06-11-21 @ 10:32), Max: 36.7 (06-11-21 @ 10:32)  HR: 76 (06-11-21 @ 10:32) (76 - 76)  BP: 136/71 (06-11-21 @ 10:32) (136/71 - 136/71)  RR: 22 (06-11-21 @ 10:32) (22 - 22)  SpO2: 100% (06-11-21 @ 10:32) (100% - 100%)  Wt(kg): --  Daily Height in cm: 154.94 (11 Jun 2021 10:32)    Daily                            General: AAOx3    found in stretcher in NAD  HEENT:  NCAT PER EOMI Non icteric MOM  CVS: not tachy  Resp: Unlabored Non tachypneic No increased WOB; slight audible wheeze; O2nc  GI:  Soft NT ND   :  Voiding to BR with assist and O2  Musc: No C/C/E    Neuro: Follows commands No focal deficits  Psych: Appears anxious but denies more than usua;  Skin: Non jaundiced   Lymph: Normal    Preadmit Karnofsky:  %  50%         Current Karnofsky: 50%    %  http://www.npcrc.org/files/news/karnofsky_performance_scale.pdf   http://www.npcrc.org/files/news/palliative_performance_scale_PPSv2.pdf  Cachexia (Y/N):   BMI:    Advanced Directives:     Full Code   Last consult in May - DNR/I        Decision maker: The patient is able to participate in complex medical decision making conversations.   Legal surrogate:    GOALS OF CARE DISCUSSION       Palliative care info/counseling provided	             REFERRALS	        Palliative Med        Unit SW/Case Mgmt             NAZANIN POWERS          MRN-[id number]              CC: SOB    HPI: 75 year old female, past medical history COPD on 3L @home, HTN, HDL, Afib on Warfarin, Lung CA s/p lobectomy chemo/radiation in remission x9 years, PPM (medtronic), who presents with shortness of breath. patient with multiple presentations to ED for exacerbations. patient discharged x2 days ago on methyprednisolone, currently taking meds with minimal improvement. Denies fever, chills, sore throat, difficulty swallowing, cough, chest pain, abd pain, nausea/vomiting, leg pain/swelling, hemoptysis.    PAST MEDICAL & SURGICAL HISTORY:  High cholesterol    Anxiety    Pacemaker    Lung cancer    Afib    COPD (chronic obstructive pulmonary disease)    Artificial cardiac pacemaker    H/O atrioventricular kacie ablation    S/P lobectomy of lung  left    History of tonsillectomy    S/P appendectomy    Cataract  RIGHT  6/17 AND  LEFT  7/5/19      FAMILY HISTORY:  No pertinent family history in first degree relatives     Reviewed and found non contributory in mother or father    SOCIAL HISTORY:  Lives at home with     ROS:                    Dyspnea (Marcel 0-10): 0      at baseline - no c/o; has fan that relieves                 N/V (Y/N): No                             Secretions (Y/N) : No                                          Agitation(Y/N): No                              Pain (Y/N): No                                 -Provocation/Palliation: N/A  -Quality/Quantity: N/A  -Radiating: N/A  -Severity: No pain  -Timing/Frequency: N/A  -Impact on ADLs: N/A    General:  Denied  HEENT:    Denied  Neck:  Denied  CVS:  Denied  Resp:  Denied  GI:  BM yesterday   :  Denied  Musc:  Denied  Neuro:  Denied  Psych:  anxiety - has recent episodes of insomnia; fears of death  Skin:  Denied  Lymph:  Denied    Allergies    bacitracin (Other)  carboplatin (Other)  sulfa drugs (Unknown)  sulfonamides (Unknown)  Xanax (Other)    Intolerances      Opiate Naive (Y/N): Y  -iStop reviewed (Y/N): Y  Ref#:     Reference #: [id number]    Others' Prescriptions  Patient Name: Nazanin Schaefer Date: 1945  Address: [address] [place], [place] [address]Xvh: Female  Rx Written	Rx Dispensed	Drug	Quantity	Days Supply	Prescriber Name	Prescriber Jolanta #	Payment Method	Dispenser  06/04/2021	06/04/2021	clonazepam 1 mg tablet	28	7	Ravindra Telles)	[id number]	[facility]urance	[facility]  05/31/2021	05/31/2021	lorazepam 1 mg tablet	28	7	Rigoberto Rebolledo	[id number]	Insurance	[facility]  05/21/2021	05/22/2021	morphine sulf 100 mg/5 ml conc	30ml	10	Kaylee Alexander , [facility]	[id number]	Cash	[facility]  05/21/2021	05/22/2021	lorazepam 0.5 mg tablet	10	3	Catherine Kaylee KALA , [facility]	[id number]	[facility]    Patient Name: Nazanin Schaefer Date: 1945  Address: [address] [place] [id number]: Female  Rx Written	Rx Dispensed	Drug	Quantity	Days Supply	Prescriber Name	Prescriber Jolanta #	Payment Method	Dispenser  04/02/2021	04/02/2021	klonopin 0.5 mg tablet	90	30	Taty Kidd	[id number]	Medicare	[id number]  03/24/2021	03/25/2021	lorazepam 0.5 mg tablet	30	30	Penny Griffith MD	[id number]	[facility] #[id number]  12/31/2020	12/31/2020	klonopin 0.5 mg tablet	90	30	Marti Taty	[id number]	Medicare	[id number]  11/04/2020	11/04/2020	klonopin 0.5 mg tablet	90	30	Carmine Spencer MD	[id number]	Medicare	[id number]  08/27/2020	08/28/2020	klonopin 0.5 mg tablet	90	30	Marti Taty	[id number]	Medicare	[id number]  06/24/2020	06/24/2020	klonopin 0.5 mg tablet	90	30	Marti Taty	[id number]	Medicare	[id number]           Medications:      MEDICATIONS  (STANDING):    Labs reviewed:    CBC:                        10.3   6.93  )-----------( 150      ( 11 Jun 2021 11:15 )             32.3     CMP:    06-11    137  |  97<L>  |  13  ----------------------------<  136<H>  3.6   |  32  |  0.7    Ca    9.8      11 Jun 2021 11:15    TPro  6.2  /  Alb  4.1  /  TBili  0.6  /  DBili  x   /  AST  20  /  ALT  14  /  AlkPhos  80  06-11     Albumin, Serum: 4.1 g/dL (06-11-21 @ 11:15)    Imaging:  Reviewed  EKG interpreted by me: V paced rhythm    PEx:  T(C): 36.7 (06-11-21 @ 10:32), Max: 36.7 (06-11-21 @ 10:32)  HR: 76 (06-11-21 @ 10:32) (76 - 76)  BP: 136/71 (06-11-21 @ 10:32) (136/71 - 136/71)  RR: 22 (06-11-21 @ 10:32) (22 - 22)  SpO2: 100% (06-11-21 @ 10:32) (100% - 100%)  Wt(kg): --  Daily Height in cm: 154.94 (11 Jun 2021 10:32)    Daily                            General: AAOx3    found in stretcher in NAD  Eyes:  EOMI Non icteric  CVS: not tachy  Resp: Unlabored Non tachypneic No increased WOB; slight audible wheeze  GI:  Soft  Musc: No clubbing  Neuro: Follows commands No focal deficits  Psych: Appears anxious but denies more than usual  Skin: Non jaundiced     Preadmit Karnofsky:  %  50%         Current Karnofsky: 50%    %  http://www.npcrc.org/files/news/karnofsky_performance_scale.pdf   http://www.npcrc.org/files/news/palliative_performance_scale_PPSv2.pdf  Cachexia (Y/N):  N  BMI: 25.5    Advanced Directives:     Full Code   Last consult in May - DNR/I        Decision maker: The patient is able to participate in complex medical decision making conversations.     GOALS OF CARE DISCUSSION       Palliative care info/counseling provided	             REFERRALS	        Palliative Med        Unit SW/Case Mgmt

## 2021-06-11 NOTE — ED PROVIDER NOTE - PHYSICAL EXAMINATION
CONSTITUTIONAL: Well-developed; well-nourished; in no acute distress, nontoxic appearing  SKIN: skin exam is warm and dry  HEAD: Normocephalic; atraumatic.  ENT: MMM  NECK: ROM intact.  CARD: S1, S2 normal, no murmur  RESP: +inspiratory/expiratory wheezing, speaking full sentences.   ABD: soft; non-distended; non-tender. No Rebound, No guarding  EXT: Normal ROM. No cyanosis or edema.   NEURO: awake, alert, following commands, oriented, grossly unremarkable. No Focal deficits. GCS 15.   PSYCH: Cooperative, appropriate.

## 2021-06-11 NOTE — ED PROVIDER NOTE - CARE PLAN
Principal Discharge DX:	COPD (chronic obstructive pulmonary disease)  Secondary Diagnosis:	Shortness of breath

## 2021-06-11 NOTE — CONSULT NOTE ADULT - PROBLEM SELECTOR RECOMMENDATION 9
advanced stage; hospice appropriate  continue current medical management advanced stage; hospice appropriate  continue current medical management  treatment of dyspnea as below

## 2021-06-11 NOTE — ED PROVIDER NOTE - PROGRESS NOTE DETAILS
bedside echo with trace pericardial effusion. ATTENDING NOTE: I personally evaluated the patient. I reviewed the Resident’s or Physician Assistant’s note (as assigned above), and agree with the findings and plan except as documented in my note.  74 y/o F with PMHx lung CA (s/p resection), recent hospitalization for multiple COPD exacerbations, Afib (w/ pacemaker), HTN and HLD presents for increased SOB from baseline today. Pt denies fever, cough, CP, GI sxs and other complaints. Pt was seen by palliative care and hospice during recent admission, however upon d/c pt stated she has not been following with these teams. In the ED, pt is well- appearing, VS stable. Eczema as documented with +mild, diffuse wheezing. Pt speaking in full sentences, non-labored. Of note, pt is on a steroid taper. Will check CXR and labs. Will treat sxs and discuss case with palliative care team. NB: discussed case with palliative care team who says pt was supposed to leave her last hospital admission on hospice. At that time, there was concern for family support, per notes. Palliative care team will call back with a plan. patient evaluated by palliative care, will follow up with patient at home. patient agreeable to hospice. VSS. patient reports moderate improvement of symptoms after treatment. patient to be discharged home with close follow up with hospice and pulmonologist. patient currently on steroid taper at home, will continue meds at home.

## 2021-06-11 NOTE — CONSULT NOTE ADULT - ATTENDING COMMENTS
75 year old woman history of advanced COPD presents with SOB.  Patient well known to palliative care team  Patient left last admission with plans for hospice admission, but as above, had concerns about amount of available help for her    Has had multiple ED visits  We again discussed hospice  She was interested in going on hospice, but wanted to establish more help in the home before she started hospice again  We noted that she may come back in the hospital again before all those services are set up, and she acknowledged taht    Patient will be ready for discharge today from ED  We alerted hospice and patient will call hospice again on discharge  All symptom management recommendations above are either home meds or medications that would be started on hospice    Palliative care will follow

## 2021-06-11 NOTE — ED ADULT NURSE NOTE - NSIMPLEMENTINTERV_GEN_ALL_ED
Implemented All Fall with Harm Risk Interventions:  [place] to call system. Call bell, personal items and telephone within reach. Instruct patient to call for assistance. Room bathroom lighting operational. Non-slip footwear when patient is off stretcher. Physically safe environment: no spills, clutter or unnecessary equipment. Stretcher in lowest position, wheels locked, appropriate side rails in place. Provide visual cue, wrist band, yellow gown, etc. Monitor gait and stability. Monitor for mental status changes and reorient to person, place, and time. Review medications for side effects contributing to fall risk. Reinforce activity limits and safety measures with patient and family. Provide visual clues: red socks.

## 2021-06-11 NOTE — CONSULT NOTE ADULT - PROBLEM SELECTOR RECOMMENDATION 4
see above, in sum - patient vacillates between hospice/disease directed care. While she presents as understanding, it may be that she's denying/bargaining her advanced illness. All parties understand that she may continue with frequent hospitalization.   Last month had DNR/I - MOLST in place; will need to follow up as patient is currently full code  plan is DC with hospice to follow-up.   may benefit from focused supportive counseling.   Will remain available/follow.

## 2021-06-11 NOTE — CONSULT NOTE ADULT - PROBLEM SELECTOR RECOMMENDATION 3
Klonopin 1mg PO q12h ATC and 1mg PO q12h PRN for anxiety/agitation -continue home Klonopin 1mg PO q12h ATC and 1mg PO q12h PRN for anxiety/agitation

## 2021-06-11 NOTE — CONSULT NOTE ADULT - CONVERSATION DETAILS
Reintroduced Pal Care; patient had vague recall of our previous consult last month.   She wanted Hospice - but expected more services at home. Not confident with having comfort medication pack - not knowing what to do. She explained just hiring a person to assist with IADLs. Explained that she may have to consider hospice in a facility if unable to set up 24/H supportive care in home or she will continue to be brought to ED - she expressed not being bothered by that, but at the same time feels ok and wants to go back home ASAP. Then goes on to talk about setting up a new Pulmonologist. We reinforced that she had advanced illness - end stage/hospice eligible.

## 2021-06-11 NOTE — CONSULT NOTE ADULT - TREATMENT GUIDELINE COMMENT
d/w Dr. Beaulieu who will C patient with Hospice to follow up. d/w Dr. Beaulieu who will call patient with Hospice to follow up.

## 2021-06-11 NOTE — ED PROVIDER NOTE - CLINICAL SUMMARY MEDICAL DECISION MAKING FREE TEXT BOX
pt with COPD on home 02, partial lung resection., afib ppm, here with SOB. required labs ekg, cxr. also had palliative consult as pt was supposed to be in the hospice service. this was reintroduced for pt. at time of dc pt says she feels better. stable at dc. vss stable and ED workup reassuring for dc.

## 2021-06-11 NOTE — ED PROVIDER NOTE - PATIENT PORTAL LINK FT
You can access the FollowMyHealth Patient Portal offered by [facility] by registering at the following website: [email or web address]. By joining [facility]’s FollowMyHealth portal, you will also be able to view your health information using other applications (apps) compatible with our system.

## 2021-06-11 NOTE — ED ADULT TRIAGE NOTE - CHIEF COMPLAINT QUOTE
patient reports sob onset this am - hx of copd denies increased use of inhalers. patient uses 3l nc base line . denies fever cough

## 2021-06-11 NOTE — ED PROVIDER NOTE - CARE PROVIDER_API CALL
Ankush Perez  CRITICAL CARE MEDICINE  [address] [place] [address], [facility] [address]  [place] [address]  Phone: [phone]  Fax: [phone]  Follow Up Time: 1-3 Days

## 2021-06-11 NOTE — CONSULT NOTE ADULT - PROBLEM SELECTOR RECOMMENDATION 2
at present stable  if dsypnea then concentrated liquid morphine 5mg SL every 4H PRN  if using morphine then senna 2tabs HS No SOB at present  if dyspnea then concentrated liquid morphine 5mg SL every 4H PRN  if using morphine then senna 2tabs HS and miralax 17g q24h PRN for constipation

## 2021-06-15 PROBLEM — I48.91 ATRIAL FIBRILLATION: Status: ACTIVE | Noted: 2017-03-24

## 2021-06-15 PROBLEM — C34.90 LUNG CANCER: Status: ACTIVE | Noted: 2017-03-24

## 2021-06-15 PROBLEM — J44.1 CHRONIC OBSTRUCTIVE PULMONARY DISEASE WITH (ACUTE) EXACERBATION: Status: ACTIVE | Noted: 2020-07-30

## 2021-06-15 PROBLEM — I49.5 SICK SINUS SYNDROME: Status: ACTIVE | Noted: 2017-08-25

## 2021-06-15 NOTE — REVIEW OF SYSTEMS
[Fatigue] : fatigue [Poor Appetite] : poor appetite [Ear Disturbance] : ear disturbance [Cough] : cough [Dyspnea] : dyspnea [Wheezing] : wheezing [Palpitations] : palpitations [SOB on Exertion] : sob on exertion [Negative] : HEENT

## 2021-06-15 NOTE — PHYSICAL EXAM
[No Acute Distress] : no acute distress [Normal Appearance] : normal appearance [Normal Oropharynx] : normal oropharynx [No Neck Mass] : no neck mass [Irregular rate/rhythm] : irregular rate/rhythm [No Resp Distress] : no resp distress [Clear to Auscultation Bilaterally] : clear to auscultation bilaterally [No Abnormalities] : no abnormalities [No Clubbing] : no clubbing [No Cyanosis] : no cyanosis [No Edema] : no edema [TextBox_2] : chr-ill [TextBox_68] : decreased bs

## 2021-06-15 NOTE — REASON FOR VISIT
[Follow-Up] : a follow-up visit [COPD] : COPD [Emphysema] : emphysema [Shortness of Breath] : shortness of breath [Spouse] : spouse [Formal Caregiver] : formal caregiver

## 2021-06-16 NOTE — BEHAVIORAL HEALTH ASSESSMENT NOTE - AXIS III
Last visit 12/9/20 - no follow up 
Afib (on Coumadin) s/p ablation and Micra PPM, COPD on home O2 as needed, left lung ca diagnosed in 2011 (surgery x2 -partial left lobectomy + chemo +RT, oncologist f/up at [facility])

## 2021-06-20 NOTE — CONSULT NOTE ADULT - SUBJECTIVE AND OBJECTIVE BOX
Neurology Consult    Patient is a 75y old  Female who presents with a chief complaint of tremor    HPI:  74 y/o female with a PMH anxiety (takes klonopin), COPD (on 3 L home O2) with multiple admissions for recurrent exacerbations (most recent admission 06/05-06/11), lung CA s/p chemo/radiation/ and partial left lobectomy in remission x 9 years, severe pulmonary hypertension - follows with Dr. Sinha, Afib on Coumadin s/p ablation and micra PPM presents to ED for evaluation of hand tremors x about 4 days. pt reports after she was dc from the hospital 06/11 she was given a prednislone taper and is currently taking 6 tablets of 5mg prednisolone daily. pt reports she took her klonopin this morning.    PAST MEDICAL & SURGICAL HISTORY:  High cholesterol    Anxiety    Pacemaker    Lung cancer    Afib    COPD (chronic obstructive pulmonary disease)    Artificial cardiac pacemaker    H/O atrioventricular kacie ablation    S/P lobectomy of lung  left    History of tonsillectomy    S/P appendectomy    Cataract  RIGHT  6/17 AND  LEFT  7/5/19        FAMILY HISTORY:  No pertinent family history in first degree relatives        Social History: (-) x 3    Allergies    bacitracin (Other)  carboplatin (Other)  sulfa drugs (Unknown)  sulfonamides (Unknown)  Xanax (Other)    Intolerances        MEDICATIONS  (STANDING):    MEDICATIONS  (PRN):      Review of systems:    Constitutional: as per HPI  Eyes: No eye pain or discharge  ENMT:  No difficulty hearing; No sinus or throat pain  Neck: No pain or stiffness  Respiratory: No cough, wheezing, chills or hemoptysis  Cardiovascular: No chest pain, palpitations, shortness of breath, dyspnea on exertion  Gastrointestinal: No abdominal pain, nausea, vomiting or hematemesis; No diarrhea or constipation.   Genitourinary: No dysuria, frequency, hematuria or incontinence  Neurological: As per HPI  Skin: No rashes or lesions   Endocrine: No heat or cold intolerance; No hair loss  Musculoskeletal: No joint pain or swelling  Psychiatric: No depression, anxiety, mood swings  Heme/Lymph: No easy bruising or bleeding gums    Vital Signs Last 24 Hrs  T(C): 35.8 (20 Jun 2021 07:32), Max: 35.8 (20 Jun 2021 07:32)  T(F): 96.4 (20 Jun 2021 07:32), Max: 96.4 (20 Jun 2021 07:32)  HR: 72 (20 Jun 2021 07:32) (72 - 72)  BP: 148/67 (20 Jun 2021 07:32) (148/67 - 148/67)  BP(mean): --  RR: 18 (20 Jun 2021 07:32) (18 - 18)  SpO2: 100% (20 Jun 2021 08:10) (100% - 100%)    Examination:  General:  Appearance is consistent with chronologic age.  No abnormal facies.  Gross skin survey within normal limits.    Cognitive/Language:  The patient is oriented to person, place, time and date.  Recent and remote memory intact.  Fund of knowledge is intact and normal.  Language with normal repetition, comprehension and naming.  Nondysarthric.    Eyes: intact VA, VFF.  EOMI w/o nystagmus, skew or reported double vision.  PERRL.  No ptosis/weakness of eyelid closure.    Face:  Facial sensation normal V1 - 3, no facial asymmetry.    Ears/Nose/Throat:  Hearing grossly intact b/l.  Palate elevates midline.  Tongue and uvula midline.   Motor examination:   Normal tone, bulk and range of motion.  No tenderness, twitching, tremors or involuntary movements.  Formal Muscle Strength Testing: (MRC grade R/L) 5/5 UE; 5/5 LE.  No observable drift.  Reflexes:   2+ b/l pectoralis, biceps, triceps, brachioradialis, patella and Achilles.  Plantar response downgoing b/l.  Jaw jerk, Magan, clonus absent.  Sensory examination:   Intact to light touch and pinprick, pain, temperature and proprioception and vibration in all extremities.  Cerebellum:   FTN/HKS intact with normal DANICA in all limbs.  No dysmetria or dysdiadokinesia.  Gait narrow based and normal.    Respiratory:  no audible wheezing or inspiratory stridor.  no use of accessory muscles.   Cardiac: pulse palpable, no audible bruits  Abdomen: supple, no guarding, no TTP    Labs:   CBC Full  -  ( 20 Jun 2021 07:52 )  WBC Count : 6.56 K/uL  RBC Count : 3.87 M/uL  Hemoglobin : 10.9 g/dL  Hematocrit : 33.5 %  Platelet Count - Automated : 162 K/uL  Mean Cell Volume : 86.6 fL  Mean Cell Hemoglobin : 28.2 pg  Mean Cell Hemoglobin Concentration : 32.5 g/dL  Auto Neutrophil # : 5.40 K/uL  Auto Lymphocyte # : 0.63 K/uL  Auto Monocyte # : 0.40 K/uL  Auto Eosinophil # : 0.04 K/uL  Auto Basophil # : 0.01 K/uL  Auto Neutrophil % : 82.3 %  Auto Lymphocyte % : 9.6 %  Auto Monocyte % : 6.1 %  Auto Eosinophil % : 0.6 %  Auto Basophil % : 0.2 %    06-20    140  |  98  |  13  ----------------------------<  150<H>  4.5   |  30  |  0.8    Ca    9.5      20 Jun 2021 07:52    TPro  6.5  /  Alb  4.3  /  TBili  0.5  /  DBili  x   /  AST  32  /  ALT  21  /  AlkPhos  73  06-20    LIVER FUNCTIONS - ( 20 Jun 2021 07:52 )  Alb: 4.3 g/dL / Pro: 6.5 g/dL / ALK PHOS: 73 U/L / ALT: 21 U/L / AST: 32 U/L / GGT: x           PT/INR - ( 20 Jun 2021 07:52 )   PT: 16.30 sec;   INR: 1.42 ratio         PTT - ( 20 Jun 2021 07:52 )  PTT:28.0 sec        Neuroimaging:  NCHCT: CT Head No Cont:   ******PRELIMINARY REPORT******    ******PRELIMINARY REPORT******          EXAM:  CT BRAIN            PROCEDURE DATE:  06/20/2021          ******PRELIMINARY REPORT******    ******PRELIMINARY REPORT******          INTERPRETATION:  Clinical History / Reason for exam: New onset tremors, on Coumadin.    Technique: Noncontrast head CT.  Contiguous unenhanced CT axial images of the head from the base to the vertex with coronal and sagittal reformats.    Comparison: CT head dated 6/8/2021.    Findings:    The ventricles and cortical sulci are prominent, consistent with parenchymal volume loss.    There are patchy hypodensities throughout the hemispheric white matter without mass effect compatible with chronic microvascular changes.    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    There is no acute territorial infarct, intracranial hemorrhage, extra-axial fluid collection or midline shift.    Calvarium is intact. The visualized paranasal sinuses and mastoids are well-aerated.    IMPRESSION:    No evidence of acute intracranial hemorrhage, mass effect or midline shift.          ******PRELIMINARY REPORT******    ******PRELIMINARY REPORT******          LUCAS GUAJARDO M.D., RESIDENT RADIOLOGIST (06-20-21 @ 09:24)      06-20-21 @ 10:26       Neurology Consult    Patient is a 75y old  Female who presents with a chief complaint of tremor    HPI:  76 y/o female with a PMH anxiety (takes klonopin), COPD (on 3 L home O2) with multiple admissions for recurrent exacerbations (most recent admission 06/05-06/11), lung CA s/p chemo/radiation/ and partial left lobectomy in remission x 9 years, severe pulmonary hypertension - follows with Dr. Sinha, Afib on Coumadin s/p ablation and micra PPM presents to ED for evaluation of hand tremors x about 4 days. pt reports after she was dc from the hospital 06/11 she was given a prednislone taper and is currently taking 6 tablets of 5mg prednisolone daily. pt reports she took her klonopin this morning.    PAST MEDICAL & SURGICAL HISTORY:  High cholesterol    Anxiety    Pacemaker    Lung cancer    Afib    COPD (chronic obstructive pulmonary disease)    Artificial cardiac pacemaker    H/O atrioventricular kacie ablation    S/P lobectomy of lung  left    History of tonsillectomy    S/P appendectomy    Cataract  RIGHT  6/17 AND  LEFT  7/5/19        FAMILY HISTORY:  No pertinent family history in first degree relatives        Social History: (-) x 3    Allergies    bacitracin (Other)  carboplatin (Other)  sulfa drugs (Unknown)  sulfonamides (Unknown)  Xanax (Other)    Intolerances        MEDICATIONS  (STANDING):    MEDICATIONS  (PRN):    Vital Signs Last 24 Hrs  T(C): 35.8 (20 Jun 2021 07:32), Max: 35.8 (20 Jun 2021 07:32)  T(F): 96.4 (20 Jun 2021 07:32), Max: 96.4 (20 Jun 2021 07:32)  HR: 72 (20 Jun 2021 07:32) (72 - 72)  BP: 148/67 (20 Jun 2021 07:32) (148/67 - 148/67)  BP(mean): --  RR: 18 (20 Jun 2021 07:32) (18 - 18)  SpO2: 100% (20 Jun 2021 08:10) (100% - 100%)    Examination:  General:  Appearance is consistent with chronologic age.  No abnormal facies.  Gross skin survey within normal limits.    Cognitive/Language:  The patient is oriented to person, place, time and date.  Recent and remote memory intact.  Fund of knowledge is intact and normal.  Language with normal repetition, comprehension and naming.  Nondysarthric.    Eyes: intact VA, VFF.  EOMI w/o nystagmus, skew or reported double vision.  PERRL.  No ptosis/weakness of eyelid closure.    Face:  Facial sensation normal V1 - 3, no facial asymmetry.    Motor examination:   Central and bilateral UE fine tremor  Formal Muscle Strength Testing: (MRC grade R/L) 5/5 UE; 5/5 LE.  No observable drift.  Sensory examination:   Intact to light touch and pinprick, pain, temperature and proprioception and vibration in all extremities.  Cerebellum:   FTN/HKS intact with normal DANICA in all limbs.  No dysmetria or dysdiadokinesia.  Gait narrow based and normal.    Respiratory:  no audible wheezing or inspiratory stridor.  no use of accessory muscles.   Cardiac: pulse palpable, no audible bruits  Abdomen: supple, no guarding, no TTP    Labs:   CBC Full  -  ( 20 Jun 2021 07:52 )  WBC Count : 6.56 K/uL  RBC Count : 3.87 M/uL  Hemoglobin : 10.9 g/dL  Hematocrit : 33.5 %  Platelet Count - Automated : 162 K/uL  Mean Cell Volume : 86.6 fL  Mean Cell Hemoglobin : 28.2 pg  Mean Cell Hemoglobin Concentration : 32.5 g/dL  Auto Neutrophil # : 5.40 K/uL  Auto Lymphocyte # : 0.63 K/uL  Auto Monocyte # : 0.40 K/uL  Auto Eosinophil # : 0.04 K/uL  Auto Basophil # : 0.01 K/uL  Auto Neutrophil % : 82.3 %  Auto Lymphocyte % : 9.6 %  Auto Monocyte % : 6.1 %  Auto Eosinophil % : 0.6 %  Auto Basophil % : 0.2 %    06-20    140  |  98  |  13  ----------------------------<  150<H>  4.5   |  30  |  0.8    Ca    9.5      20 Jun 2021 07:52    TPro  6.5  /  Alb  4.3  /  TBili  0.5  /  DBili  x   /  AST  32  /  ALT  21  /  AlkPhos  73  06-20    LIVER FUNCTIONS - ( 20 Jun 2021 07:52 )  Alb: 4.3 g/dL / Pro: 6.5 g/dL / ALK PHOS: 73 U/L / ALT: 21 U/L / AST: 32 U/L / GGT: x           PT/INR - ( 20 Jun 2021 07:52 )   PT: 16.30 sec;   INR: 1.42 ratio         PTT - ( 20 Jun 2021 07:52 )  PTT:28.0 sec        Neuroimaging:  NCHCT: CT Head No Cont:   ******PRELIMINARY REPORT******    ******PRELIMINARY REPORT******          EXAM:  CT BRAIN            PROCEDURE DATE:  06/20/2021          ******PRELIMINARY REPORT******    ******PRELIMINARY REPORT******          INTERPRETATION:  Clinical History / Reason for exam: New onset tremors, on Coumadin.    Technique: Noncontrast head CT.  Contiguous unenhanced CT axial images of the head from the base to the vertex with coronal and sagittal reformats.    Comparison: CT head dated 6/8/2021.    Findings:    The ventricles and cortical sulci are prominent, consistent with parenchymal volume loss.    There are patchy hypodensities throughout the hemispheric white matter without mass effect compatible with chronic microvascular changes.    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    There is no acute territorial infarct, intracranial hemorrhage, extra-axial fluid collection or midline shift.    Calvarium is intact. The visualized paranasal sinuses and mastoids are well-aerated.    IMPRESSION:    No evidence of acute intracranial hemorrhage, mass effect or midline shift.          ******PRELIMINARY REPORT******    ******PRELIMINARY REPORT******          LUCAS GUAJARDO M.D., RESIDENT RADIOLOGIST (06-20-21 @ 09:24)      06-20-21 @ 10:26

## 2021-06-20 NOTE — ED ADULT NURSE NOTE - SUICIDE SCREENING QUESTION 3
1.  Patient is to continue his current diet, medication and activity. 2.  Patient is to watch his diet more closely. 3.  I will plan to see the patient back in 3 months with blood tests as ordered. 4.  I will see the patient back sooner as necessary. No

## 2021-06-20 NOTE — ED ADULT NURSE NOTE - INTERVENTIONS DEFINITIONS
[place] to call system/Call bell, personal items and telephone within reach/Instruct patient to call for assistance/Room bathroom lighting operational/Non-slip footwear when patient is off stretcher/Physically safe environment: no spills, clutter or unnecessary equipment/Stretcher in lowest position, wheels locked, appropriate side rails in place/Provide visual cue, wrist band, yellow gown, etc./Monitor gait and stability/Monitor for mental status changes and reorient to person, place, and time/Review medications for side effects contributing to fall risk/Reinforce activity limits and safety measures with patient and family/Provide visual clues: red socks

## 2021-06-20 NOTE — ED PROVIDER NOTE - NS ED ROS FT
CONST: No fever, chills or bodyaches  EYES: No pain, redness, drainage or visual changes.  ENT: No ear pain or discharge, nasal discharge or congestion. No sore throat  CARD: No chest pain, palpitations  RESP: No SOB, cough, hemoptysis. No hx of asthma or COPD  GI: No abdominal pain, N/V/D  : No urinary symptoms  MS: No joint pain, back pain or extremity pain/injury. (+) hand tremors  SKIN: No rashes  NEURO: No headache, dizziness, paresthesias or LOC

## 2021-06-20 NOTE — ED ADULT NURSE REASSESSMENT NOTE - NS ED NURSE REASSESS COMMENT FT1
neuro team spoke to pt. pt explained that needs to wait to be cleared by ed team for dc. patient explained that when discharged transportation will be provided for her. pt verbalizes understanding.

## 2021-06-20 NOTE — ED PROVIDER NOTE - PROGRESS NOTE DETAILS
FF: spoke with neuro np fritz, will consult pt requesting a dose of her home klonopin pt pending transport. no tremor present.

## 2021-06-20 NOTE — CONSULT NOTE ADULT - ATTENDING COMMENTS
Steroid induced tremo.  Patient may reduce steroid to half dose today and is instructed to contact pulmonologist office in am for further advise given recent COPD exacerbation.  Increase steroids immediately and seek medical attention if breathing worsens.

## 2021-06-20 NOTE — ED PROVIDER NOTE - OBJECTIVE STATEMENT
76 y/o female with a PMH anxiety (takes klonopin), COPD (on 3 L home O2) with multiple admissions for recurrent exacerbations (most recent admission 06/05-06/11), lung CA s/p chemo/radiation/ and partial left lobectomy in remission x 9 years, severe pulmonary hypertension - follows with Dr. Sinha, Tyroneib on Coumadin s/p ablation and micra PPM presents to ED for evaluation of hand tremors x about 4 days. pt reports after she was dc from the hospital 06/11 she was given a prednisone taper and is currently taking 60mg of prednisone daily. pt reports she took her klonopin this morning. pt denies fever, chills, headache, visual changes, use of alcohol, dizziness, weaknes, or n/v/d/c. 74 y/o female with a PMH anxiety (takes klonopin), COPD (on 3 L home O2) with multiple admissions for recurrent exacerbations (most recent admission 06/05-06/11), lung CA s/p chemo/radiation/ and partial left lobectomy in remission x 9 years, severe pulmonary hypertension - follows with Dr. Sinha, Afib on Coumadin s/p ablation and micra PPM presents to ED for evaluation of hand tremors x about 4 days. pt reports after she was dc from the hospital 06/11 she was given a prednislone taper and is currently taking 6 tablets of 5mg prednisolone daily. pt reports she took her klonopin this morning. pt denies fever, chills, headache, visual changes, use of alcohol, dizziness, weaknes, or n/v/d/c.

## 2021-06-20 NOTE — ED ADULT NURSE NOTE - CAS ELECT INFOMATION PROVIDED
prednisone regimen reviewed/DC instructions prednisone regimen reviewed; transport arrived 1824/DC instructions

## 2021-06-20 NOTE — ED PROVIDER NOTE - ATTENDING CONTRIBUTION TO CARE
75F PMH COPD o2 NC 3L chronically, severe pulmonary hypertension, lung ca s/p lobectomy, chemo/rad in remission, HTN afib on coumadin, p/w 4-5 days of hand tremors. pt states "im shaking like a leaf" pt states symptoms started after starting prednisolone taper. No ha, numbness, weakness, blurry vision, slurred speech, trouble walking. No fever, cough. No cp, sob. No abd pain, nvdc. No urinary sx. Lives home with . Recent hospitalization for COPD, dc 6/11.    on exam, AFVSS, well kody nad, ncat, eomi, perrla, mmm, lctab, rrr nl s1s2 no mrg, abd soft ntnd, aaox3, CN 2-12 intact, No nystagmus.  5/5 motor x 4 ext, SILT x 4 extremities, No facial droop or slurred speech. No pronator drift.  Normal rapid alternating movement . No midline C/T/L tenderness to palpation or step off. bilateral hand resting tremor, worse when raising hands, no le edema or calf ttp,     a/p; Tremors, unclear cause, will do labs, CT r/o ICH given coumadin, neuro consult re-eval

## 2021-06-20 NOTE — ED PROVIDER NOTE - NSFOLLOWUPINSTRUCTIONS_ED_ALL_ED_FT
BEGINNING TOMORROW PT WILL START TO TAKE 4 TABLETS OF 5MG PREDNISOLONE AND THEN CONTINUE COURSE AS WRITTEN ON MEDICATION SCHEDULE SHE HAS WRITTEN DOWN.       Tremors    WHAT YOU NEED TO KNOW:    A tremor is a movement you cannot control that occurs in a rhythm. Tremors most commonly occur in the hands. Other common places include the head or face, trunk, or legs. Your voice can also have a tremor and sound shaky when you speak. A tremor may be caused by a nerve problem, too much thyroid hormone, or by certain medicines, caffeine, or alcohol. Tremors may be temporary or permanent. The tremor may go away and return, or worsen with stress. Tremors can happen at any age, but they are more common in later years.    DISCHARGE INSTRUCTIONS:    Contact your healthcare provider if:     You have a new or worsening tremor.      You have tremors that make it difficult to do your regular activities.      You have questions or concerns about your condition or care.    Medicines:     Medicines may be used to help control some kinds of tremors.      Take your medicine as directed. Contact your healthcare provider if you think your medicine is not helping or if you have side effects. Tell him or her if you are allergic to any medicine. Keep a list of the medicines, vitamins, and herbs you take. Include the amounts, and when and why you take them. Bring the list or the pill bottles to follow-up visits. Carry your medicine list with you in case of an emergency.    Manage your symptoms:     Do not have caffeine or other chemicals that affect your nerves. Limit or do not have caffeine. Caffeine can make tremors worse. Talk to your healthcare provider about herbal medicines, teas, and supplements. They may also increase tremors. Do not use illegal drugs.      Go to physical and occupational therapy as directed. A physical therapist can teach you exercises to help reduce the tremor and improve muscle control. You may be shown how to hold the body part during a tremor to help control the movement. The therapist can also help you build strength and balance. An occupational therapist can show you how to use adaptive devices to help you move more easily.       Use objects that will help you control movements. You may have more hand control if you add a watch or bracelet to your wrist. It may be easier for you to drink from a straw, or to fill your cup only half full. Cups with lids, such as travel mugs, can also help you drink with more control. Heavy eating utensils can help you eat more easily. A button fastener can help you button clothing if tremors in your hands make this difficult.      Set a regular sleep schedule. Lack of sleep can make tremors worse. Try to go to bed at the same time each night and wake up at the same time each morning.    Follow up with your healthcare provider as directed: Write down your questions so you remember to ask them during your visits.

## 2021-06-20 NOTE — ED PROVIDER NOTE - PATIENT PORTAL LINK FT
You can access the FollowMyHealth Patient Portal offered by [facility] by registering at the following website: [email or web address]. By joining [employer]s FollowMyHealth portal, you will also be able to view your health information using other applications (apps) compatible with our system.

## 2021-06-20 NOTE — ED ADULT NURSE NOTE - CAS EDN DISCHARGE INTERVENTIONS
[facility] -   \"Bronchoscopy with BAL. Previous history of Aspergillious with new RLL infiltrate concerning for recurrence. Need Bronchoscopy with BAL.\"       No blood thinner.     July 2017 - Lung nodule 2.5cm TLL and 1cm RUL nodule - PET avid right hilar lymphadenopathy.   Bronch BAL with biopsies = negative.   Underwent VATS biopsy which was negative for malignancy but showed evidence of aspergillus infection for which he was treated.     December 2020- Patient having weight loss and dyspnea. Had a LDCT - revealing 9mm right apical lung nodule = LUNG RADS 4B.  PET scan 1/9/2021= no focal uptake within small pulm nodules in the right apex.     Discission held ant [facility] - Recommend Bronch BAL.       Records are present on your desk.   Images are in PACS.     Please review and advise?      IV discontinued, cath removed intact

## 2021-06-20 NOTE — CONSULT NOTE ADULT - ASSESSMENT
Impression:74 y/o female with a PMH anxiety (takes klonopin), COPD (on 3 L home O2) with multiple admissions for recurrent exacerbations (most recent admission 06/05-06/11), lung CA s/p chemo/radiation/ and partial left lobectomy in remission x 9 years, severe pulmonary hypertension - follows with Dr. Sinha Afib on Coumadin s/p ablation and micra PPM presents to ED for evaluation of hand tremors x about 4 days. pt reports after she was dc from the hospital 06/11 she was given a prednislone taper. She took 80 mg for one week and is tapered to 70mg per week.     Suggestion:  tremor more likely do to steroids.   Attending to follow

## 2021-06-20 NOTE — ED PROVIDER NOTE - PHYSICAL EXAMINATION
Physical Exam    Vital Signs: I have reviewed the initial vital signs.  Constitutional: well-nourished, appears stated age, no acute distress  Eyes: Conjunctiva pink, Sclera clear  Cardiovascular: S1 and S2, regular rate, regular rhythm, well-perfused extremities, radial pulses equal and 2+ b/l.   Respiratory: unlabored respiratory effort, clear to auscultation bilaterally no wheezing, rales and rhonchi. pt is speaking full sentences. no accessory muscle use.   Gastrointestinal: soft, non-tender, nondistended abdomen, no pulsatile mass, normal bowl sounds, no rebound, no guarding, no organomegaly.   Musculoskeletal: supple neck, pt has a mild intention tremor in the b/l hands.   Integumentary: warm, dry, no rash  Neurologic: awake, alert, cranial nerves II-XII grossly intact, extremities’ motor and sensory functions grossly intact. finger to nose intact. negative pronator drift.   Psychiatric: appropriate mood, appropriate affect

## 2021-06-20 NOTE — ED ADULT NURSE REASSESSMENT NOTE - NS ED NURSE REASSESS COMMENT FT1
patient questions and concerns addressed multiple times. plan of care reviewed with patient numerous times. patient states no one has come in to help her but has rang the call bell over 10 x and bell and pt concerns has been attended to each time.

## 2021-06-20 NOTE — ED ADULT NURSE REASSESSMENT NOTE - NS ED NURSE REASSESS COMMENT FT1
IV removed, pt dc being worked on by md. pt aware. transportation ordered, pt made aware of potential wait times from ems and verbalizes understanding

## 2021-06-24 NOTE — H&P ADULT - NSHPPHYSICALEXAM_GEN_ALL_CORE
General: NAD, resting comfortably in bed,     Heart: S1/S2 appreciated, Irregularly irregular, no murmurs     Lungs: Mild Wheezing b/l     Abdomen: soft, NT, ND, +BS     Musculoskeletal: Atraumatic, no LE edema, no skin color changes     Neuro: AO x 3, no focal changes`

## 2021-06-24 NOTE — H&P ADULT - NSHPLABSRESULTS_GEN_ALL_CORE
LABS:                          10.2   6.23  )-----------( 166      ( 24 Jun 2021 12:20 )             32.2     06-24    141  |  100  |  13  ----------------------------<  144<H>  4.2   |  30  |  0.8    Ca    9.6      24 Jun 2021 12:20    TPro  6.1  /  Alb  4.1  /  TBili  0.5  /  DBili  x   /  AST  15  /  ALT  15  /  AlkPhos  68  06-24           RADIOLOGY:    < from: Xray Chest 1 View- PORTABLE-Urgent (Xray Chest 1 View- PORTABLE-Urgent .) (06.24.21 @ 13:39) >    Impression:    Right lower lobe opacification. Postoperative change of the left lung.

## 2021-06-24 NOTE — ED PROVIDER NOTE - OBJECTIVE STATEMENT
75 year old female, past medical history end stage copd, htn, hdl, who presents with shortness of breath. 75 year old female, past medical history end stage copd on 3-4L @ home, htn, hdl, lung CA, who presents with shortness of breath. patient w multiple admissions for copd exacerbations. denies fever, chills, chest pain, abd pain, n/v/d, urinary symptoms.

## 2021-06-24 NOTE — ED PROVIDER NOTE - PHYSICAL EXAMINATION
CONSTITUTIONAL: Well-developed; well-nourished; in no acute distress, nontoxic appearing  SKIN: skin exam is warm and dry  ENT: MMM  NECK: ROM intact.  CARD: S1, S2 normal, no murmur  RESP: Decreased breath sounds bilaterally, R > L. no increased WOB, speaking full sentences  ABD: soft; non-distended; non-tender. No Rebound, No guarding  EXT: Normal ROM.    NEURO: awake, alert, following commands, oriented, grossly unremarkable. No Focal deficits. GCS 15.   PSYCH: Cooperative, appropriate.

## 2021-06-24 NOTE — H&P ADULT - ASSESSMENT
Endstage COPD// severe Pulm HTN//H/O Lung ca s/p Left Partial Lobectomy presenting with worsening Dyspnea   -CXR:   -  -c/w Inhalers   -Keep O2 sat: 88-92%           AFIB s/p Ablation and Micra PPM  -c/w Coumadin 1mg with daily INR    Anxiety: c/w clonazepam   HLD; c/w statin    DVT PPX- warfarin  GI PPX- PPI  Code Status:  DNR/DNI   Dispo: Pending Clinical Improvement    Endstage COPD// severe Pulm HTN//H/O Lung ca s/p Left Partial Lobectomy presenting with worsening Dyspnea   -Likely Hospital Acquired PNA, sepsis ruled out on admission   -CXR: RLL Opacity, will start ABX  -c/w Inhalers:  currently on Breztri (pts will use own) and Albuterol inhaler    -Diffuse wheezing on exam, Will give IV steroids  -Keep O2 sat: 88-92%   -Pulm Consult     AFIB s/p Ablation and Micra PPM  -c/w Coumadin 1mg with daily INR    Anxiety: c/w clonazepam   HLD; c/w statin    DVT PPX- warfarin  GI PPX- PPI  Code Status:  DNR/DNI   Dispo: Pending Clinical Improvement

## 2021-06-24 NOTE — ED PROVIDER NOTE - ATTENDING CONTRIBUTION TO CARE
74 yo f hx afib on coumadin, copd, htn, hld, lung ca   pt presents for eval of worsening SOB. pt states she was seen in the ER several times for SOB. pt states she was placed on prednisone. pt states she initially felt a little better but now is feeling worse. pt denies cough, fever, chills, black/bloody stools. pt still on 3 L NC.    vss  gen- NAD, aaox3  card-rrr  lungs-mild diffuse wheezing, no resp distress  abd-sntnd, no guarding or rebound  neuro- full str/sensation, cn ii-xii grossly intact, normal coordination    will get basic labs, ekg, cxr, vbg, will provide supportive care, serial exam and ED observation period

## 2021-06-24 NOTE — ED PROVIDER NOTE - CLINICAL SUMMARY MEDICAL DECISION MAKING FREE TEXT BOX
ed w/u showing increasing hypercapnia. copd treated. xr w/ acute RLL PNA. given recent ed visits and obs stay, will treat for hcap, admit for monitoring and treatment

## 2021-06-24 NOTE — H&P ADULT - NSHPREVIEWOFSYSTEMS_GEN_ALL_CORE
CONSTITUTIONAL: No weakness, fevers or chills  EYES/ENT: No visual changes;  No vertigo or throat pain   RESPIRATORY: + SOB, No cough, wheezing, hemoptysis  CARDIOVASCULAR: No chest pain or palpitations  GASTROINTESTINAL: No abdominal or epigastric pain. No nausea, vomiting, or hematemesis; No diarrhea or constipation. No melena or hematochezia.  GENITOURINARY: No dysuria, frequency or hematuria  NEUROLOGICAL: No numbness or weakness CONSTITUTIONAL: No weakness, fevers or chills  EYES/ENT: No visual changes;  No vertigo or throat pain   RESPIRATORY: + SOB, No cough, wheezing, hemoptysis  CARDIOVASCULAR: No chest pain or palpitations  GASTROINTESTINAL: No abdominal or epigastric pain. No nausea, vomiting, or hematemesis;   No diarrhea or constipation. No melena or hematochezia.  GENITOURINARY: No dysuria, frequency or hematuria  NEUROLOGICAL: No numbness or weakness

## 2021-06-24 NOTE — ED ADULT NURSE NOTE - NSIMPLEMENTINTERV_GEN_ALL_ED
Implemented All Universal Safety Interventions:  [place] to call system. Call bell, personal items and telephone within reach. Instruct patient to call for assistance. Room bathroom lighting operational. Non-slip footwear when patient is off stretcher. Physically safe environment: no spills, clutter or unnecessary equipment. Stretcher in lowest position, wheels locked, appropriate side rails in place.

## 2021-06-24 NOTE — ED ADULT NURSE NOTE - NSFALLRSKASSESSDT_ED_ALL_ED
Patient ID:  Memo Landry  [id number]  72 year old, 1948      Admit date: 12/2/2020    Discharge date: 12/03/20    Discharge Physician: Dr. Long     Primary Diagnoses: AAA s/p EVAR    Secondary Diagnoses: Nephrolithiasis     Discharged Condition: good    Hospital Course:     Memo Landry is a 72 year old male with PMH significant for known AAA, CAD s/p NIGEL to RCA 2015, PE 2009 and 2012, hypertension, DM Type II, and tobacco use who presented to the ED on 12/2/2020 for evaluation of low L sided back pain that awoke him from rest that morning. He was scheduled for outpatient AAA repair that same day with Dr. Long. CTA abdomen/pelvis in the ED was significant for slight increase in size of AAA and left renal stone. Work up was otherwise unremarkable and patient was discharge from ED to proceed with scheduled EVAR. He underwent successful EVAR without complications. He was admitted to the hospital overnight for observation. The morning following the procedure, he was stable for discharge. He was advised to follow up with his PCP for findings of renal stone noted in the ED.     On the morning of discharge, patient did complain of back pain which he stated was chronic-pain at patient's baseline. He denied bilateral groin discomfort, abdominal pain, chest pain, and shortness of breath.    Exam:   Vitals:    12/03/20 0922   BP: (!) 153/73   Pulse: 71   Resp: 18   Temp:        TELEMETRY (personal interpretation): NSR    CONSTITUTIONAL: Comfortable, no acute distress.  HEENT: Normocephalic, conjunctivae not pale, no scleral icterus. Moist mucous membranes. Pupils symmetrical.  NECK: No JVD (Jugular venous distension), trachea midline. No carotid bruits bilaterally.  RESPIRATORY: Symmetrical lung expansions. Normal respiratory effort. Lungs clear to auscultation bilaterally.  CARDIOVASCULAR:RRR (regular rate and rhythm). No murmur/rubs. No S3, S4.  ABDOMEN: Flat, soft, nontender. Normoactive bowel  sounds.  EXTREMITIES: Bilateral groin incisions clean, dry and intact 2+ Bilateral DP/PT/femoral pulses.. Warm, well perfused extremities.   NEUROLOGIC: A & O (alert and oriented) x3. No obvious evidence of focal sensory motor deficits.   SKIN: Not diaphoretic, warm. No bruising, no new rash.    LaboratoryResults:  Lab Results   Component Value Date    SODIUM 139 12/03/2020    POTASSIUM 4.3 12/03/2020    BUN 13 12/03/2020    CREATININE 1.04 12/03/2020    WBC 11.7 (H) 12/03/2020    HCT 31.0 (L) 12/03/2020    HGB 10.4 (L) 12/03/2020    INR 1.0 11/30/2020    GLUCOSE 179 (H) 12/03/2020    CHOLESTEROL 97 11/30/2020    HDL 46 11/30/2020    CALCLDL 42 11/30/2020    TRIGLYCERIDE 44 11/30/2020       Disposition: Home    Patient Instructions:   Activity: Physical activity as tolerated. Avoid strenuous activity/exercise and no heavy lifting/pulling/pushing for 1 week.   Diet: resume prior diet  Wound Care: Per Vascular surgery     Follow-up with Dr. oLng. In 2 weeks. Appointment has been scheduled.     Discussed hospital course, clinical findings, treatments and follow-up plan with patient. Emphasized importance of compliance with medications and followup.    Discharge medications:  See associated electronic medication list.    Signed:  Vera Montenegro PA-C  12/03/20 , 10:18 AM  I saw and evaluated the patient personally. I performed the history, exam, reviewed laboratory and diagnostic testing. Medical decision making for this patient's encounter were made by myself. Appropriate changes were made to the above note by myself.  Dr. Jewell Long.     24-Jun-2021 11:37

## 2021-06-24 NOTE — H&P ADULT - ATTENDING COMMENTS
74 YO F with a PMH of anxiety, COPD (3L home O2), lung CA s/p CT/RT and partial left lobectomy (in remission), severe pulmonary HTN, and chronic Afib (Coumadin) s/p ablation and micra PPM who was BIBEMS for eval of SOB for the past x 3 days. Associated with non-productive cough. - increase in sputum production. - change in sputum color. Denies any CP, fevers/chills, sore throat, runny nose, allergies, palpitations, headache, ABD pain, dysuria, or LE swelling.     In the ED, Chest X-ray showed no acute process (pending official read). Started on IV Steroids, Duonebs, and IV ABXs (Cefe/Azithro).     FMHx: Reviewed, not relevant    Physical exam shows pt in NAD. VSS, not hypoxic on 2L NC. Speaking in full sentences. Neuro exam is WNLs. Mild expiratory wheezing noted throughout, good air entry B/L. RRR, no M/G/R. ABD is soft and non-tender to palpation, normoactive BSs. No LE edema. Labs and radiology as above.     Dyspnea from acute COPD exacerbation, rule out infectious process. Send procal and CRP. IV steroids and transition to PO. Duonebs PRN/standing. LABA/ICS inhaler. Cefe/Azithro. Supplemental O2 PRN.  -CXR looks under-penetrated. FU official read. Cover w/ ABXs until work-up is back.     Normocytic anemia, at baseline. Pt denies bleeding symptoms. Replace as necessary.     Hyperglycemia, adverse effect of steroid taper. A1c. FSs. Insulin PRN.     Hx of anxiety, lung CA s/p CT/RT and partial left lobectomy (in remission), severe pulmonary HTN, and chronic Afib (Coumadin) s/p ablation and micra PPM. Restart home meds, except as stated above. DVT PPX. Inform PCP of pt's admission to hospital. My note supersedes the residents note.

## 2021-06-24 NOTE — ED PROVIDER NOTE - NS ED ROS FT
Review of Systems:  	•	CONSTITUTIONAL: no fever, no chills  	•	SKIN: no rash  	•	ENT: +sore throat  	•	RESPIRATORY: +SOB, +cough  	•	CARDIAC: no chest pain, no palpitations  	•	GI: no abd pain, no nausea, no vomiting, no diarrhea  	•	GENITO-URINARY: no discharge, no dysuria; no hematuria  	•	MUSCULOSKELETAL: no joint paint, no swelling, no redness  	•	NEUROLOGIC: no weakness, no headache  	•	PSYCH: no anxiety, non suicidal, non homicidal, no hallucination, no depression

## 2021-06-25 NOTE — PROGRESS NOTE ADULT - SUBJECTIVE AND OBJECTIVE BOX
`CASEY POWERS 75y Female  MRN#: [id number]   Hospital Day: 1d    HPI:  74 yo F with pmh of anxiety, Endstage COPD (on 3 L home O2 ) with multiple admissions for recurrent exacerbations , lung CA s/p chemo/radiation/ and partial left lobectomy in remission x 9 years, severe pulmonary hypertension,  Afib on Coumadin s/p ablation and micra PPM presents to ED for worsening SOB. Patient was discharged two weeks ago and is currently on steroid taper, now on the 30mg pills (however should be on the 20mg based on discharge instructions).  Patient was recently discharged for Same complaints, she was discharged home with hospice care however she states that it was a mix-up and she is not interested  in [facility] right now. denies cough, no sputum production, no fevers    IN ED: Vitals and  labs wnl, ABG with chronic Hypercapnia  CXR: RLL Opacity  (24 Jun 2021 17:32)      SUBJECTIVE  Patient is a 75y old Female who presents with a chief complaint of Dyspnea (24 Jun 2021 17:32)  Currently admitted to medicine with the primary diagnosis of Pneumonia.  + walking to BR, +drinking, + eating, - poop since 3 days ago, +peeing.  denies f/c/n/v  denies SOB/cough/sputum production/hemoptysis      INTERVAL HPI AND OVERNIGHT EVENTS:  Patient was examined and seen at bedside. This morning she is resting comfortably in bed and reports no issues or overnight events.    OBJECTIVE  PAST MEDICAL & SURGICAL HISTORY  High cholesterol    Anxiety    Pacemaker    Lung cancer    Afib    COPD (chronic obstructive pulmonary disease)    Artificial cardiac pacemaker    H/O atrioventricular kacie ablation    S/P lobectomy of lung  left    History of tonsillectomy    S/P appendectomy    Cataract  RIGHT  6/17 AND  LEFT  7/5/19      ALLERGIES:  bacitracin (Other)  carboplatin (Other)  sulfa drugs (Unknown)  sulfonamides (Unknown)  Xanax (Other)    MEDICATIONS:  STANDING MEDICATIONS  ALBUTerol   0.042% 1.25 milliGRAM(s) Nebulizer four times a day  albuterol/ipratropium for Nebulization 3 milliLiter(s) Nebulizer every 6 hours  atorvastatin 20 milliGRAM(s) Oral at bedtime  azithromycin  IVPB 500 milliGRAM(s) IV Intermittent every 24 hours  budesonide 160 MICROgram(s)/formoterol 4.5 MICROgram(s) Inhaler 2 Puff(s) Inhalation two times a day  chlorhexidine 4% Liquid 1 Application(s) Topical <User Schedule>  dextrose 40% Gel 15 Gram(s) Oral once  dextrose 5%. 1000 milliLiter(s) IV Continuous <Continuous>  dextrose 5%. 1000 milliLiter(s) IV Continuous <Continuous>  dextrose 50% Injectable 25 Gram(s) IV Push once  dextrose 50% Injectable 12.5 Gram(s) IV Push once  dextrose 50% Injectable 25 Gram(s) IV Push once  glucagon  Injectable 1 milliGRAM(s) IntraMuscular once  insulin lispro (ADMELOG) corrective regimen sliding scale   SubCutaneous three times a day before meals  methylPREDNISolone sodium succinate Injectable 40 milliGRAM(s) IV Push every 8 hours  pantoprazole    Tablet 40 milliGRAM(s) Oral before breakfast  saccharomyces boulardii 250 milliGRAM(s) Oral two times a day  senna 2 Tablet(s) Oral at bedtime  warfarin 1 milliGRAM(s) Oral at bedtime    PRN MEDICATIONS  clonazePAM  Tablet 1 milliGRAM(s) Oral every 8 hours PRN  polyethylene glycol 3350 17 Gram(s) Oral daily PRN      VITAL SIGNS: Last 24 Hours  T(C): 35.7 (25 Jun 2021 05:20), Max: 36.5 (24 Jun 2021 21:19)  T(F): 96.3 (25 Jun 2021 05:20), Max: 97.7 (24 Jun 2021 21:19)  HR: 71 (25 Jun 2021 05:20) (67 - 71)  BP: 140/69 (25 Jun 2021 05:20) (112/57 - 140/69)  BP(mean): --  RR: 20 (25 Jun 2021 05:20) (20 - 22)  SpO2: 100% (25 Jun 2021 08:32) (92% - 100%)    LABS:                        9.5    6.53  )-----------( 169      ( 25 Jun 2021 05:45 )             29.8     06-25    137  |  99  |  16  ----------------------------<  309<H>  4.9   |  30  |  0.8    Ca    9.5      25 Jun 2021 05:45    TPro  6.1  /  Alb  4.1  /  TBili  0.5  /  DBili  x   /  AST  15  /  ALT  15  /  AlkPhos  68  06-24    PT/INR - ( 25 Jun 2021 05:45 )   PT: 22.20 sec;   INR: 1.93 ratio      Lactate, Blood: 3.2 mmol/L *H* (06-25-21 @ 05:45)    RADIOLOGY:  Xray Chest 1 View- PORTABLE (06.24.21 @ 13:39)  Impression:Right lower lobe opacification. Postoperative change of the left lung.    PHYSICAL EXAM:  CONSTITUTIONAL: No acute distress, well-developed, well-groomed, AAOx3. Patient has difficulty hearing. NC in place  PULMONARY: Clear to auscultation bilaterally; no wheezes, rales, or rhonchi  CARDIOVASCULAR: Regular rate and rhythm; no murmurs, rubs, or gallops  NEURO: moving all extremities  EX: No cyanosis, no edema

## 2021-06-25 NOTE — PROGRESS NOTE ADULT - ATTENDING COMMENTS
Patient is a 74 yo Female  with pmh of anxiety, Endstage COPD (on 3 L home O2 ) with multiple admissions for recurrent exacerbations , lung CA s/p chemo/radiation/ and partial left lobectomy in remission x 9 years, severe pulmonary hypertension,  Afib on Coumadin s/p ablation and micra PPM presents to ED for worsening dyspnea due to recurrent COPD exacerbation.     #Dyspnea from acute on chronic end stage  COPD exacerbation/Acute on chronic hypoxic respiratory failure/Home oxygen 3 L dependent  - give 5 days course of po zithormax  - d/c IV cefepime- no pneumonia  - taper down IV steroids to PO prednisone by tomorrow  - resume on LABA/ICS inhaler.  -CXR - no new infiltrates   - Pulm. eval  -covid PCR - negative     # Normocytic anemia, at baseline. Pt denies bleeding symptoms. Replace as necessary.     # Steroids induced Hyperglycemia A1c. FSs. Insulin PRN.     # Hx of anxiety, lung CA s/p CT/RT and partial left lobectomy (in remission), severe pulmonary HTN, and chronic Afib (Coumadin) s/p ablation and micra PPM. Restarted on  home meds    DVT PPX - patient is on coumadin tx.      #Progress Note Handoff: taper down steroids as tolerated, possibly d/c home in 24/48 hours.  Family discussion: medical plan of care d/w pt. by bedside Disposition: Home___in 24 to 48 hours.

## 2021-06-25 NOTE — CONSULT NOTE ADULT - SUBJECTIVE AND OBJECTIVE BOX
Patient is a 75y old  Female who presents with a chief complaint of Dyspnea (25 Jun 2021 10:07)      HPI:  74 yo F with pmh of anxiety, Endstage COPD (on 3 L home O2 ) with multiple admissions for recurrent exacerbations , lung CA s/p chemo/radiation/ and partial left lobectomy in remission x 9 years, severe pulmonary hypertension,  Afib on Coumadin s/p ablation and micra PPM presents to ED for worsening SOB. Patient was discharged two weeks ago and is currently on steroid taper, now on the 30mg pills (however should be on the 20mg based on discharge instructions).  Patient was recently discharged for Same complaints, she was discharged home with hospice care however she states that it was a mix-up and she is not interested  in [facility] right now. denies cough, no sputum production, no fevers    IN ED: Vitals and  labs wnl, ABG with chronic Hypercapnia  CXR: RLL Opacity  (24 Jun 2021 17:32)      PAST MEDICAL & SURGICAL HISTORY:  High cholesterol    Anxiety    Pacemaker    Lung cancer    Afib    COPD (chronic obstructive pulmonary disease)    Artificial cardiac pacemaker    H/O atrioventricular kacie ablation    S/P lobectomy of lung  left    History of tonsillectomy    S/P appendectomy    Cataract  RIGHT  6/17 AND  LEFT  7/5/19        SOCIAL HX:   Smoking      former smoker, quit 2010, 2ppd for 15 yrs                   ETOH       denies                       Other denies illicit drug use, uses 3L home oxygen    FAMILY HISTORY:  No pertinent family history in first degree relatives    .  No cardiovascular or pulmonary family history     REVIEW OF SYSTEMS:    All ROS are negative except per HPI       Allergies    bacitracin (Other)  carboplatin (Other)  sulfa drugs (Unknown)  sulfonamides (Unknown)  Xanax (Other)    Intolerances          PHYSICAL EXAM  Vital Signs Last 24 Hrs  T(C): 35.7 (25 Jun 2021 05:20), Max: 36.5 (24 Jun 2021 21:19)  T(F): 96.3 (25 Jun 2021 05:20), Max: 97.7 (24 Jun 2021 21:19)  HR: 71 (25 Jun 2021 05:20) (67 - 71)  BP: 140/69 (25 Jun 2021 05:20) (112/57 - 140/69)  RR: 20 (25 Jun 2021 05:20) (20 - 22)  SpO2: 100% (25 Jun 2021 08:32) (92% - 100%)    CONSTITUTIONAL:  Chronically ill-appearing female. Elderly, frail. NAD    ENT:   Airway patent,   No thrush    EYES:   Clear bilaterally,   pupils equal,   round and reactive to light.    CARDIAC:   Normal rate,   regular rhythm.    no edema    RESPIRATORY:   Faint diffuse expiratory wheezing  Normal chest expansion  Not tachypneic,  No use of accessory muscles    GASTROINTESTINAL:  Abdomen soft, non-tender,   No guarding,   Positive BS    MUSCULOSKELETAL:   Range of motion is not limited,  No clubbing, cyanosis    NEUROLOGICAL:   AOx4  Follows commands  Moves all extremities   Alert and oriented   No motor deficits.    SKIN:   Skin normal color for race,   No evidence of rash.          LABS:                          9.5    6.53  )-----------( 169      ( 25 Jun 2021 05:45 )             29.8                                               06-25    137  |  99  |  16  ----------------------------<  309<H>  4.9   |  30  |  0.8    Ca    9.5      25 Jun 2021 05:45    TPro  6.1  /  Alb  4.1  /  TBili  0.5  /  DBili  x   /  AST  15  /  ALT  15  /  AlkPhos  68  06-24      PT/INR - ( 25 Jun 2021 05:45 )   PT: 22.20 sec;   INR: 1.93 ratio                                                                                              LIVER FUNCTIONS - ( 24 Jun 2021 12:20 )  Alb: 4.1 g/dL / Pro: 6.1 g/dL / ALK PHOS: 68 U/L / ALT: 15 U/L / AST: 15 U/L / GGT: x                                                                                                MEDICATIONS  (STANDING):  ALBUTerol   0.042% 1.25 milliGRAM(s) Nebulizer four times a day  albuterol/ipratropium for Nebulization 3 milliLiter(s) Nebulizer every 6 hours  atorvastatin 20 milliGRAM(s) Oral at bedtime  azithromycin  IVPB 500 milliGRAM(s) IV Intermittent every 24 hours  budesonide 160 MICROgram(s)/formoterol 4.5 MICROgram(s) Inhaler 2 Puff(s) Inhalation two times a day  chlorhexidine 4% Liquid 1 Application(s) Topical <User Schedule>  dextrose 40% Gel 15 Gram(s) Oral once  dextrose 5%. 1000 milliLiter(s) (50 mL/Hr) IV Continuous <Continuous>  dextrose 5%. 1000 milliLiter(s) (100 mL/Hr) IV Continuous <Continuous>  dextrose 50% Injectable 25 Gram(s) IV Push once  dextrose 50% Injectable 12.5 Gram(s) IV Push once  dextrose 50% Injectable 25 Gram(s) IV Push once  glucagon  Injectable 1 milliGRAM(s) IntraMuscular once  insulin lispro (ADMELOG) corrective regimen sliding scale   SubCutaneous three times a day before meals  methylPREDNISolone sodium succinate Injectable 40 milliGRAM(s) IV Push every 8 hours  pantoprazole    Tablet 40 milliGRAM(s) Oral before breakfast  saccharomyces boulardii 250 milliGRAM(s) Oral two times a day  senna 2 Tablet(s) Oral at bedtime  warfarin 1 milliGRAM(s) Oral at bedtime    MEDICATIONS  (PRN):  clonazePAM  Tablet 1 milliGRAM(s) Oral every 8 hours PRN Anxiety  polyethylene glycol 3350 17 Gram(s) Oral daily PRN Constipation      X-Rays reviewed:    CXR interpreted by me: standard postop changes with a possibly new RLL opacification Patient is a 75y old  Female who presents with a chief complaint of Dyspnea (25 Jun 2021 10:07)    HPI:  76 yo F with pmh of anxiety, Endstage COPD (on 3 L home O2 ) with multiple admissions for recurrent exacerbations , lung CA s/p chemo/radiation/ and partial left lobectomy in remission x 9 years, severe pulmonary hypertension,  Afib on Coumadin s/p ablation and micra PPM presents to ED for worsening SOB. Patient was discharged two weeks ago and is currently on steroid taper, now on the 30mg pills (however should be on the 20mg based on discharge instructions).  Patient was recently discharged for Same complaints, she was discharged home with hospice care however she states that it was a mix-up and she is not interested  in [facility] now. denies cough, no sputum production, no fevers    IN ED: Vitals and  labs wnl, ABG with chronic Hypercapnia  CXR: RLL Opacity  (24 Jun 2021 17:32)      PAST MEDICAL & SURGICAL HISTORY:  High cholesterol  Anxiety  Pacemaker  Lung cancer  Afib  COPD (chronic obstructive pulmonary disease)  Artificial cardiac pacemaker  H/O atrioventricular kacie ablation  S/P lobectomy of lung  left  History of tonsillectomy  S/P appendectomy  Cataract  RIGHT  6/17 AND  LEFT  7/5/19    SOCIAL HX:   Smoking      former smoker, quit 2010, 2ppd for 15 yrs                   ETOH       denies                       Other denies illicit drug use, uses 3L home oxygen    FAMILY HISTORY:  No pertinent family history in first degree relatives    .  No cardiovascular or pulmonary family history     REVIEW OF SYSTEMS:    All ROS are negative except per HPI       Allergies    bacitracin (Other)  carboplatin (Other)  sulfa drugs (Unknown)  sulfonamides (Unknown)  Xanax (Other)    Intolerances          PHYSICAL EXAM  Vital Signs Last 24 Hrs  T(C): 35.7 (25 Jun 2021 05:20), Max: 36.5 (24 Jun 2021 21:19)  T(F): 96.3 (25 Jun 2021 05:20), Max: 97.7 (24 Jun 2021 21:19)  HR: 71 (25 Jun 2021 05:20) (67 - 71)  BP: 140/69 (25 Jun 2021 05:20) (112/57 - 140/69)  RR: 20 (25 Jun 2021 05:20) (20 - 22)  SpO2: 100% (25 Jun 2021 08:32) (92% - 100%)    CONSTITUTIONAL:  Chronically ill-appearing female. Elderly, frail. NAD    ENT:   Airway patent,   No thrush    EYES:   Clear bilaterally,   pupils equal,   round and reactive to light.    CARDIAC:   Normal rate,   regular rhythm.    no edema    RESPIRATORY:   Faint diffuse expiratory wheezing  Normal chest expansion  Not tachypneic,  No use of accessory muscles    GASTROINTESTINAL:  Abdomen soft, non-tender,   No guarding,   Positive BS    MUSCULOSKELETAL:   Range of motion is not limited,  No clubbing, cyanosis    NEUROLOGICAL:   AOx4  Follows commands  Moves all extremities   Alert and oriented   No motor deficits.    SKIN:   Skin normal color for race,   No evidence of rash.          LABS:                          9.5    6.53  )-----------( 169      ( 25 Jun 2021 05:45 )             29.8                                               06-25    137  |  99  |  16  ----------------------------<  309<H>  4.9   |  30  |  0.8    Ca    9.5      25 Jun 2021 05:45    TPro  6.1  /  Alb  4.1  /  TBili  0.5  /  DBili  x   /  AST  15  /  ALT  15  /  AlkPhos  68  06-24      PT/INR - ( 25 Jun 2021 05:45 )   PT: 22.20 sec;   INR: 1.93 ratio                                                                                              LIVER FUNCTIONS - ( 24 Jun 2021 12:20 )  Alb: 4.1 g/dL / Pro: 6.1 g/dL / ALK PHOS: 68 U/L / ALT: 15 U/L / AST: 15 U/L / GGT: x                                                                                                MEDICATIONS  (STANDING):  ALBUTerol   0.042% 1.25 milliGRAM(s) Nebulizer four times a day  albuterol/ipratropium for Nebulization 3 milliLiter(s) Nebulizer every 6 hours  atorvastatin 20 milliGRAM(s) Oral at bedtime  azithromycin  IVPB 500 milliGRAM(s) IV Intermittent every 24 hours  budesonide 160 MICROgram(s)/formoterol 4.5 MICROgram(s) Inhaler 2 Puff(s) Inhalation two times a day  chlorhexidine 4% Liquid 1 Application(s) Topical <User Schedule>  dextrose 40% Gel 15 Gram(s) Oral once  dextrose 5%. 1000 milliLiter(s) (50 mL/Hr) IV Continuous <Continuous>  dextrose 5%. 1000 milliLiter(s) (100 mL/Hr) IV Continuous <Continuous>  dextrose 50% Injectable 25 Gram(s) IV Push once  dextrose 50% Injectable 12.5 Gram(s) IV Push once  dextrose 50% Injectable 25 Gram(s) IV Push once  glucagon  Injectable 1 milliGRAM(s) IntraMuscular once  insulin lispro (ADMELOG) corrective regimen sliding scale   SubCutaneous three times a day before meals  methylPREDNISolone sodium succinate Injectable 40 milliGRAM(s) IV Push every 8 hours  pantoprazole    Tablet 40 milliGRAM(s) Oral before breakfast  saccharomyces boulardii 250 milliGRAM(s) Oral two times a day  senna 2 Tablet(s) Oral at bedtime  warfarin 1 milliGRAM(s) Oral at bedtime    MEDICATIONS  (PRN):  clonazePAM  Tablet 1 milliGRAM(s) Oral every 8 hours PRN Anxiety  polyethylene glycol 3350 17 Gram(s) Oral daily PRN Constipation      X-Rays reviewed:    CXR interpreted by me: standard postop changes with a possibly new RLL opacification

## 2021-06-25 NOTE — PROGRESS NOTE ADULT - ASSESSMENT
76 yo F with pmh of anxiety, Endstage COPD (on 3 L home O2 ) with multiple admissions for recurrent exacerbations , lung CA s/p chemo/radiation/ and partial left lobectomy in remission x 9 years, severe pulmonary hypertension,  Afib on Coumadin s/p ablation and micra PPM presents to ED for worsening SOBpresenting with worsening Dyspnea.    #COPD exacerbation  -Sepsis ruled out on admission   -CXR: RLL Opacity, will start ABX  -c/w Inhalers: currently on Breztri (pts will use own) and Albuterol inhaler    -Diffuse wheezing on exam, Will give IV steroids  -Keep O2 sat: 88-92%   6/25 decreased NC to 2L as SO2 was 99% on 3L  Plan:  f/u pulm consult  f/u procal   starting symbicort BID and Duonebs PRN (will give Spiriva on d/c)  starting azithro 5 day course and steroid taper.   d/c'd cefepime as not likely pneumonia    AFIB s/p Ablation and Micra PPM  -c/w Coumadin 1mg QHS with daily INR will adjust dose accordingly  f/u daily INR    Anxiety: c/w clonazepam   HLD; c/w statin  DVT PPX- warfarin  GI PPX- PPI  Code Status:  DNR/DNI   Dispo: Pending Clinical Improvement 76 yo F with pmh of anxiety, Endstage COPD (on 3 L home O2 ) with multiple admissions for recurrent exacerbations , lung CA s/p chemo/radiation/ and partial left lobectomy in remission x 9 years, severe pulmonary hypertension,  Afib on Coumadin s/p ablation and micra PPM presents to ED for worsening SOBpresenting with worsening Dyspnea.    #COPD exacerbation  -Sepsis ruled out on admission   -CXR: RLL Opacity, will start ABX  -c/w Inhalers: currently on Breztri (pts will use own) and Albuterol inhaler    -Diffuse wheezing on exam, Will give IV steroids  -Keep O2 sat: 88-92%   6/25 decreased NC to 2L as SO2 was 99% on 3L  Plan:  f/u pulm consult  f/u procal   starting symbicort BID and Duonebs PRN (will give Spiriva on d/c)  azithro 500 mg IV QD 5 day  course and steroid taper.   d/c'd cefepime as not likely pneumonia    AFIB s/p Ablation and Micra PPM  -c/w Coumadin 1mg QHS with daily INR will adjust dose accordingly  f/u daily INR    Anxiety: c/w clonazepam   HLD; c/w statin  DVT PPX- warfarin  GI PPX- PPI  Code Status:  DNR/DNI   Dispo: Pending Clinical Improvement 76 yo F with pmh of anxiety, Endstage COPD (on 3 L home O2 ) with multiple admissions for recurrent exacerbations , lung CA s/p chemo/radiation/ and partial left lobectomy in remission x 9 years, severe pulmonary hypertension,  Afib on Coumadin s/p ablation and micra PPM presents to ED for worsening SOBpresenting with worsening Dyspnea.    #COPD exacerbation  -Sepsis ruled out on admission   -CXR: RLL Opacity, will start ABX  -c/w Inhalers: currently on Breztri (pts will use own) and Albuterol inhaler    -Diffuse wheezing on exam, Will give IV steroids  -Keep O2 sat: 88-92%   6/25 decreased NC to 2L as SO2 was 99% on 3L  Plan:  f/u pulm consult  f/u procal   starting symbicort BID and Duonebs PRN (will give Spiriva on d/c)  azithro 500 mg IV QD 5 day course (today is day 2...stop on 6/29) and steroid taper.   d/c'd cefepime as not likely pneumonia    AFIB s/p Ablation and Micra PPM  -c/w Coumadin 1mg QHS with daily INR will adjust dose accordingly  f/u daily INR    Anxiety: c/w clonazepam   HLD; c/w statin  DVT PPX- warfarin  GI PPX- PPI  Code Status:  DNR/DNI   Dispo: Pending Clinical Improvement

## 2021-06-25 NOTE — CONSULT NOTE ADULT - ASSESSMENT
Impression  COPD exacerbation  Possible aspiration  HO Lung s/p chemo/radiation/ and partial left lobectomy in remission x 9 years  HO Severe pulmonary hypertension  Chronic hypoxemic respiratory failure  Chronic hypercapnic respiratory failure    Recommendations  Target SpO2 88-94%, titrate oxygen as tolerated  Prednisone 40mg x5days, then 20mg x5 days  Speech and swallow eval  Duonebs q4h and PRN  Unasyn  AVAPS PRN/ HS, TV-400, RR-14, EPAP-8, minIPAP-12, maxIPAP-20  HOB at 45  Aspiration precautions  DVT ppx   Overall extremely poor prognosis  GOC needed Impression  COPD exacerbation  Possible RLL aspiration  HO Lung s/p chemo/radiation/ and partial left lobectomy in remission x 9 years  HO Severe pulmonary hypertension  Chronic hypoxemic respiratory failure  Chronic hypercapnic respiratory failure      Recommendations  Target SpO2 88-94%, titrate oxygen as tolerated  Prednisone 40mg x5days, then 20mg x5 days  Speech and swallow eval  Duonebs q4h and PRN  Unasyn  MRSA nares  AVAPS PRN/HS, TV-400, RR-14, EPAP-8, minIPAP-12, maxIPAP-20  HOB at 45  Aspiration precautions  DVT ppx   Overall extremely poor prognosis  GOC needed  Consider Palliative care eval

## 2021-06-25 NOTE — CONSULT NOTE ADULT - ATTENDING COMMENTS
Impression  COPD exacerbation  Possible RLL aspiration  HO Lung s/p chemo/radiation/ and partial left lobectomy in remission x 9 years  HO Severe pulmonary hypertension  Chronic hypoxemic respiratory failure  Chronic hypercapnic respiratory failure    Plan as outlined above.

## 2021-06-26 NOTE — DISCHARGE NOTE PROVIDER - CARE PROVIDER_API CALL
Ankush Perez  CRITICAL CARE MEDICINE  [address] [place] [address], [facility] [address]  [place] [address]  Phone: [phone]  Fax: [phone]  Follow Up Time: 1-3 days

## 2021-06-26 NOTE — DISCHARGE NOTE NURSING/CASE MANAGEMENT/SOCIAL WORK - PATIENT PORTAL LINK FT
You can access the FollowMyHealth Patient Portal offered by [facility] by registering at the following website: [email or web address]. By joining [employer]’s FollowMyHealth portal, you will also be able to view your health information using other applications (apps) compatible with our system.

## 2021-06-26 NOTE — DISCHARGE NOTE PROVIDER - HOSPITAL COURSE
74 yo Female with pmh of anxiety, Endstage COPD (on 3 L home O2 ) with multiple admissions for recurrent exacerbations , lung CA s/p chemo/radiation/ and partial left lobectomy in remission x 9 years, severe pulmonary hypertension,  Afib on Coumadin s/p ablation and micra PPM presents to ED for worsening dyspnea due to recurrent COPD exacerbation.     #Dyspnea from acute on chronic end stage  COPD exacerbation/Acute on chronic hypoxic respiratory failure/Home oxygen 3 L dependent  - complete 5 days course of po zithormax  - prednisone 40mg x 4 days and then 20 mg x 3 days   - continue all home meds     # Normocytic anemia, at baseline. Pt denies bleeding symptoms. Replace as necessary.     # Hx of anxiety, lung CA s/p CT/RT and partial left lobectomy (in remission), severe pulmonary HTN, and chronic Afib (Coumadin) s/p ablation and micra PPM. Restarted on  home meds

## 2021-06-26 NOTE — DISCHARGE NOTE NURSING/CASE MANAGEMENT/SOCIAL WORK - NSDCPEPTCOWADIET_GEN_ALL_CORE
Detailed message left re: need for CT and to call and make sure he received the message.    Pt returned call.  CT scheduled for tomorrow.  Has not passed any stones recently and does not have any questions at this time.    Impression     IMPRESSION:  1. Nonobstructing stone in the lower pole of left kidney similar to the  prior exam.  2. Right UVJ stone on the prior study is no longer seen.  3. Gallstones           Keep your intake of vitamin K regular. The highest amount of vitamin K is found in green and leafy vegetables like broccoli, lettuces, cabbage, and spinach. You can eat these foods but keep the portion size the same. Changes in the amount you eat can affect your PT/INR blood test. Contact your doctor before making any major changes in your diet. Limit your alcohol intake.

## 2021-06-26 NOTE — PROGRESS NOTE ADULT - SUBJECTIVE AND OBJECTIVE BOX
CASEY POWERS  [facility] T2-3A 015 A ([facility] T2-3A)      Patient was evaluated and examined  by bedside, feeling better today, no dyspnea at rest.       REVIEW OF SYSTEMS:  please see pertinent positives mentioned above, all other 12 ROS negative      T(C): , Max: 36.5 (06-25-21 @ 20:10)  HR: 70 (06-26-21 @ 04:49)  BP: 142/64 (06-26-21 @ 04:49)  RR: 18 (06-26-21 @ 04:49)  SpO2: 98% (06-26-21 @ 07:30)  CAPILLARY BLOOD GLUCOSE      POCT Blood Glucose.: 145 mg/dL (26 Jun 2021 11:42)  POCT Blood Glucose.: 280 mg/dL (26 Jun 2021 07:50)  POCT Blood Glucose.: 272 mg/dL (25 Jun 2021 21:23)  POCT Blood Glucose.: 170 mg/dL (25 Jun 2021 16:28)      PHYSICAL EXAM:  General: NAD, AAOX3, patient is laying comfortably in bed  HEENT: AT, NC, Supple, NO JVD, NO CB  Lungs: mild decreased breath sounds B/L, no wheezing, no rhonchi  CVS: normal S1, S2, RRR, NO M/G/R  Abdomen: soft, bowel sounds present, non-tender, non-distended  Extremities: no edema, no clubbing, no cyanosis, positive peripheral pulses b/l  Neuro: no acute focal neurological deficits, mild generalized body weakness  Skin: no rash, no ecchymosis      LAB  CBC  Date: 06-26-21 @ 07:06  Mean cell Hiawivxcuw85.0  Mean cell Hemoglobin Conc31.8  Mean cell Volum 88.1  Platelet count-Automate 169  RBC Count 3.71  Red Cell Distrib Width14.8  WBC Count11.90  % Albumin, Urine--  Hematocrit 32.7  Hemoglobin 10.4  CBC  Date: 06-25-21 @ 05:45  Mean cell Ygacbsflze56.1  Mean cell Hemoglobin Conc31.9  Mean cell Volum 88.2  Platelet count-Automate 169  RBC Count 3.38  Red Cell Distrib Width14.7  WBC Count6.53  % Albumin, Urine--  Hematocrit 29.8  Hemoglobin 9.5  CBC  Date: 06-24-21 @ 12:20  Mean cell Wjjwgveiwo19.9  Mean cell Hemoglobin Conc31.7  Mean cell Volum 88.0  Platelet count-Automate 166  RBC Count 3.66  Red Cell Distrib Width14.7  WBC Count6.23  % Albumin, Urine--  Hematocrit 32.2  Hemoglobin 10.2  CBC  Date: 06-20-21 @ 07:52  Mean cell Dmldmqaevv16.2  Mean cell Hemoglobin Conc32.5  Mean cell Volum 86.6  Platelet count-Automate 162  RBC Count 3.87  Red Cell Distrib Width14.5  WBC Count6.56  % Albumin, Urine--  Hematocrit 33.5  Hemoglobin 10.9    [facility]  06-26-21 @ 07:06  Blood Gas Arterial-Calcium,Ionized--  Blood Urea Nitrogen, Serum 15 mg/dL [10 - 20]  Carbon Dioxide, Serum28 mmol/L [17 - 32]  Chloride, Serum99 mmol/L [98 - 110]  Creatinie, Serum0.7 mg/dL [0.7 - 1.5]  Glucose, Bnisz666 mg/dL<H> [70 - 99]  Potassium, Serum4.6 mmol/L [3.5 - 5.0]  Sodium, Serum 138 mmol/L [135 - 146]  [facility]  06-25-21 @ 05:45  Blood Gas Arterial-Calcium,Ionized--  Blood Urea Nitrogen, Serum 16 mg/dL [10 - 20]  Carbon Dioxide, Serum30 mmol/L [17 - 32]  Chloride, Serum99 mmol/L [98 - 110]  Creatinie, Serum0.8 mg/dL [0.7 - 1.5]  Glucose, Bhscu617 mg/dL<H> [70 - 99]  Potassium, Serum4.9 mmol/L [3.5 - 5.0]  Sodium, Serum 137 mmol/L [135 - 146]  [facility]  06-24-21 @ 12:20  Blood Gas Arterial-Calcium,Ionized--  Blood Urea Nitrogen, Serum 13 mg/dL [10 - 20]  Carbon Dioxide, Serum30 mmol/L [17 - 32]  Chloride, Lpgny549 mmol/L [98 - 110]  Creatinie, Serum0.8 mg/dL [0.7 - 1.5]  Glucose, Gpdso333 mg/dL<H> [70 - 99]  Potassium, Serum4.2 mmol/L [3.5 - 5.0]  Sodium, Serum 141 mmol/L [135 - 146]        PT/INR - ( 25 Jun 2021 05:45 )   PT: 22.20 sec;   INR: 1.93 ratio         Medications:  ALBUTerol   0.042% 1.25 milliGRAM(s) Nebulizer four times a day  albuterol/ipratropium for Nebulization 3 milliLiter(s) Nebulizer every 6 hours  atorvastatin 20 milliGRAM(s) Oral at bedtime  azithromycin  IVPB 500 milliGRAM(s) IV Intermittent every 24 hours  budesonide 160 MICROgram(s)/formoterol 4.5 MICROgram(s) Inhaler 2 Puff(s) Inhalation two times a day  chlorhexidine 4% Liquid 1 Application(s) Topical <User Schedule>  clonazePAM  Tablet 1 milliGRAM(s) Oral every 8 hours PRN  dextrose 40% Gel 15 Gram(s) Oral once  dextrose 5%. 1000 milliLiter(s) IV Continuous <Continuous>  dextrose 5%. 1000 milliLiter(s) IV Continuous <Continuous>  dextrose 50% Injectable 25 Gram(s) IV Push once  dextrose 50% Injectable 12.5 Gram(s) IV Push once  dextrose 50% Injectable 25 Gram(s) IV Push once  glucagon  Injectable 1 milliGRAM(s) IntraMuscular once  insulin lispro (ADMELOG) corrective regimen sliding scale   SubCutaneous three times a day before meals  ondansetron    Tablet 4 milliGRAM(s) Oral once  pantoprazole    Tablet 40 milliGRAM(s) Oral before breakfast  polyethylene glycol 3350 17 Gram(s) Oral daily PRN  predniSONE   Tablet 40 milliGRAM(s) Oral daily  saccharomyces boulardii 250 milliGRAM(s) Oral two times a day  senna 2 Tablet(s) Oral at bedtime        Assessment and Plan:  Patient is a 74 yo Female  with pmh of anxiety, Endstage COPD (on 3 L home O2 ) with multiple admissions for recurrent exacerbations , lung CA s/p chemo/radiation/ and partial left lobectomy in remission x 9 years, severe pulmonary hypertension,  Afib on Coumadin s/p ablation and micra PPM presents to ED for worsening dyspnea due to recurrent COPD exacerbation.     #Dyspnea from acute on chronic end stage  COPD exacerbation/Acute on chronic hypoxic respiratory failure/Home oxygen 3 L dependent  - give 5 days course of po zithormax  - d/c IV cefepime- no pneumonia  - tapered down  steroids to PO prednisone 40 mg po once daily x 7 days  - resume on LABA/ICS inhaler.  -CXR - no new infiltrates   - Pulm. f/up  -covid PCR - negative     # Normocytic anemia, at baseline. Pt denies bleeding symptoms. Replace as necessary.     # Steroids induced Hyperglycemia HgA1c- 7.2  FSs. Insulin PRN.     # Hx of anxiety, lung CA s/p CT/RT and partial left lobectomy (in remission), severe pulmonary HTN, and chronic Afib (Coumadin) s/p ablation and micra PPM. Restarted on  home meds    DVT PPX - patient is on coumadin tx.      #Progress Note Handoff: taper down steroids as tolerated, d/c planning , consult PT to assess ambulation status.   Family discussion: medical plan of care d/w pt. by bedside Disposition: Home__vs STR.       CASEY POWERS  [facility] T2-3A 015 A ([facility] T2-3A)      Patient was evaluated and examined  by bedside, feeling better today, no dyspnea at rest.       REVIEW OF SYSTEMS:  please see pertinent positives mentioned above, all other 12 ROS negative      T(C): , Max: 36.5 (06-25-21 @ 20:10)  HR: 70 (06-26-21 @ 04:49)  BP: 142/64 (06-26-21 @ 04:49)  RR: 18 (06-26-21 @ 04:49)  SpO2: 98% (06-26-21 @ 07:30)  CAPILLARY BLOOD GLUCOSE      POCT Blood Glucose.: 145 mg/dL (26 Jun 2021 11:42)  POCT Blood Glucose.: 280 mg/dL (26 Jun 2021 07:50)  POCT Blood Glucose.: 272 mg/dL (25 Jun 2021 21:23)  POCT Blood Glucose.: 170 mg/dL (25 Jun 2021 16:28)      PHYSICAL EXAM:  General: NAD, AAOX3, patient is laying comfortably in bed  HEENT: AT, NC, Supple, NO JVD, NO CB  Lungs: mild decreased breath sounds B/L, no wheezing, no rhonchi  CVS: normal S1, S2, RRR, NO M/G/R  Abdomen: soft, bowel sounds present, non-tender, non-distended  Extremities: no edema, no clubbing, no cyanosis, positive peripheral pulses b/l  Neuro: no acute focal neurological deficits, mild generalized body weakness  Skin: no rash, no ecchymosis      LAB  CBC  Date: 06-26-21 @ 07:06  Mean cell Lxpoxmssnw70.0  Mean cell Hemoglobin Conc31.8  Mean cell Volum 88.1  Platelet count-Automate 169  RBC Count 3.71  Red Cell Distrib Width14.8  WBC Count11.90  % Albumin, Urine--  Hematocrit 32.7  Hemoglobin 10.4  CBC  Date: 06-25-21 @ 05:45  Mean cell Eplkhbaglq04.1  Mean cell Hemoglobin Conc31.9  Mean cell Volum 88.2  Platelet count-Automate 169  RBC Count 3.38  Red Cell Distrib Width14.7  WBC Count6.53  % Albumin, Urine--  Hematocrit 29.8  Hemoglobin 9.5  CBC  Date: 06-24-21 @ 12:20  Mean cell Twflitqmvb48.9  Mean cell Hemoglobin Conc31.7  Mean cell Volum 88.0  Platelet count-Automate 166  RBC Count 3.66  Red Cell Distrib Width14.7  WBC Count6.23  % Albumin, Urine--  Hematocrit 32.2  Hemoglobin 10.2  CBC  Date: 06-20-21 @ 07:52  Mean cell Esqazanhqk91.2  Mean cell Hemoglobin Conc32.5  Mean cell Volum 86.6  Platelet count-Automate 162  RBC Count 3.87  Red Cell Distrib Width14.5  WBC Count6.56  % Albumin, Urine--  Hematocrit 33.5  Hemoglobin 10.9    [facility]  06-26-21 @ 07:06  Blood Gas Arterial-Calcium,Ionized--  Blood Urea Nitrogen, Serum 15 mg/dL [10 - 20]  Carbon Dioxide, Serum28 mmol/L [17 - 32]  Chloride, Serum99 mmol/L [98 - 110]  Creatinie, Serum0.7 mg/dL [0.7 - 1.5]  Glucose, Kkgex104 mg/dL<H> [70 - 99]  Potassium, Serum4.6 mmol/L [3.5 - 5.0]  Sodium, Serum 138 mmol/L [135 - 146]  [facility]  06-25-21 @ 05:45  Blood Gas Arterial-Calcium,Ionized--  Blood Urea Nitrogen, Serum 16 mg/dL [10 - 20]  Carbon Dioxide, Serum30 mmol/L [17 - 32]  Chloride, Serum99 mmol/L [98 - 110]  Creatinie, Serum0.8 mg/dL [0.7 - 1.5]  Glucose, Pnixe012 mg/dL<H> [70 - 99]  Potassium, Serum4.9 mmol/L [3.5 - 5.0]  Sodium, Serum 137 mmol/L [135 - 146]  [facility]  06-24-21 @ 12:20  Blood Gas Arterial-Calcium,Ionized--  Blood Urea Nitrogen, Serum 13 mg/dL [10 - 20]  Carbon Dioxide, Serum30 mmol/L [17 - 32]  Chloride, Yyhzx285 mmol/L [98 - 110]  Creatinie, Serum0.8 mg/dL [0.7 - 1.5]  Glucose, Zenkw697 mg/dL<H> [70 - 99]  Potassium, Serum4.2 mmol/L [3.5 - 5.0]  Sodium, Serum 141 mmol/L [135 - 146]        PT/INR - ( 25 Jun 2021 05:45 )   PT: 22.20 sec;   INR: 1.93 ratio         Medications:  ALBUTerol   0.042% 1.25 milliGRAM(s) Nebulizer four times a day  albuterol/ipratropium for Nebulization 3 milliLiter(s) Nebulizer every 6 hours  atorvastatin 20 milliGRAM(s) Oral at bedtime  azithromycin  IVPB 500 milliGRAM(s) IV Intermittent every 24 hours  budesonide 160 MICROgram(s)/formoterol 4.5 MICROgram(s) Inhaler 2 Puff(s) Inhalation two times a day  chlorhexidine 4% Liquid 1 Application(s) Topical <User Schedule>  clonazePAM  Tablet 1 milliGRAM(s) Oral every 8 hours PRN  dextrose 40% Gel 15 Gram(s) Oral once  dextrose 5%. 1000 milliLiter(s) IV Continuous <Continuous>  dextrose 5%. 1000 milliLiter(s) IV Continuous <Continuous>  dextrose 50% Injectable 25 Gram(s) IV Push once  dextrose 50% Injectable 12.5 Gram(s) IV Push once  dextrose 50% Injectable 25 Gram(s) IV Push once  glucagon  Injectable 1 milliGRAM(s) IntraMuscular once  insulin lispro (ADMELOG) corrective regimen sliding scale   SubCutaneous three times a day before meals  ondansetron    Tablet 4 milliGRAM(s) Oral once  pantoprazole    Tablet 40 milliGRAM(s) Oral before breakfast  polyethylene glycol 3350 17 Gram(s) Oral daily PRN  predniSONE   Tablet 40 milliGRAM(s) Oral daily  saccharomyces boulardii 250 milliGRAM(s) Oral two times a day  senna 2 Tablet(s) Oral at bedtime        Assessment and Plan:  Patient is a 74 yo Female  with pmh of anxiety, Endstage COPD (on 3 L home O2 ) with multiple admissions for recurrent exacerbations , lung CA s/p chemo/radiation/ and partial left lobectomy in remission x 9 years, severe pulmonary hypertension,  Afib on Coumadin s/p ablation and micra PPM presents to ED for worsening dyspnea due to recurrent COPD exacerbation.     #Dyspnea from acute on chronic end stage  COPD exacerbation/Acute on chronic hypoxic respiratory failure/Home oxygen 3 L dependent  - give 5 days course of po zithormax  - d/c IV cefepime- no pneumonia  - tapered down  steroids to PO prednisone 40 mg po once daily x 7 days  - resume on LABA/ICS inhaler.  -CXR - no new infiltrates   - Pulm. f/up  -covid PCR - negative     # Normocytic anemia, at baseline. Pt denies bleeding symptoms. Replace as necessary.     # Steroids induced Hyperglycemia HgA1c- 7.2  FSs. Insulin PRN.     # Hx of anxiety, lung CA s/p CT/RT and partial left lobectomy (in remission), severe pulmonary HTN, and chronic Afib (Coumadin) s/p ablation and micra PPM. Restarted on  home meds    DVT PPX - patient is on coumadin tx.      #Progress Note Handoff: taper down steroids as tolerated, d/c planning , consult PT to assess ambulation status. Patient lives with her , who is currently hospitalized.   Family discussion: medical plan of care d/w pt. by bedside Disposition: since patient's  is in the hospital , patient most likely will need to be d/c to  STR.       CASEY POWERS  [facility] T2-3A 015 A ([facility] T2-3A)      Patient was evaluated and examined  by bedside, feeling better today, no dyspnea at rest.       REVIEW OF SYSTEMS:  please see pertinent positives mentioned above, all other 12 ROS negative      T(C): , Max: 36.5 (06-25-21 @ 20:10)  HR: 70 (06-26-21 @ 04:49)  BP: 142/64 (06-26-21 @ 04:49)  RR: 18 (06-26-21 @ 04:49)  SpO2: 98% (06-26-21 @ 07:30)  CAPILLARY BLOOD GLUCOSE      POCT Blood Glucose.: 145 mg/dL (26 Jun 2021 11:42)  POCT Blood Glucose.: 280 mg/dL (26 Jun 2021 07:50)  POCT Blood Glucose.: 272 mg/dL (25 Jun 2021 21:23)  POCT Blood Glucose.: 170 mg/dL (25 Jun 2021 16:28)      PHYSICAL EXAM:  General: NAD, AAOX3, patient is laying comfortably in bed  HEENT: AT, NC, Supple, NO JVD, NO CB  Lungs: mild decreased breath sounds B/L, no wheezing, no rhonchi  CVS: normal S1, S2, RRR, NO M/G/R  Abdomen: soft, bowel sounds present, non-tender, non-distended  Extremities: no edema, no clubbing, no cyanosis, positive peripheral pulses b/l  Neuro: no acute focal neurological deficits, mild generalized body weakness  Skin: no rash, no ecchymosis      LAB  CBC  Date: 06-26-21 @ 07:06  Mean cell Cpefmnqnup38.0  Mean cell Hemoglobin Conc31.8  Mean cell Volum 88.1  Platelet count-Automate 169  RBC Count 3.71  Red Cell Distrib Width14.8  WBC Count11.90  % Albumin, Urine--  Hematocrit 32.7  Hemoglobin 10.4  CBC  Date: 06-25-21 @ 05:45  Mean cell Jyrtpwawxe01.1  Mean cell Hemoglobin Conc31.9  Mean cell Volum 88.2  Platelet count-Automate 169  RBC Count 3.38  Red Cell Distrib Width14.7  WBC Count6.53  % Albumin, Urine--  Hematocrit 29.8  Hemoglobin 9.5  CBC  Date: 06-24-21 @ 12:20  Mean cell Xxoaisraid60.9  Mean cell Hemoglobin Conc31.7  Mean cell Volum 88.0  Platelet count-Automate 166  RBC Count 3.66  Red Cell Distrib Width14.7  WBC Count6.23  % Albumin, Urine--  Hematocrit 32.2  Hemoglobin 10.2  CBC  Date: 06-20-21 @ 07:52  Mean cell Gfyvlmrdns02.2  Mean cell Hemoglobin Conc32.5  Mean cell Volum 86.6  Platelet count-Automate 162  RBC Count 3.87  Red Cell Distrib Width14.5  WBC Count6.56  % Albumin, Urine--  Hematocrit 33.5  Hemoglobin 10.9    [facility]  06-26-21 @ 07:06  Blood Gas Arterial-Calcium,Ionized--  Blood Urea Nitrogen, Serum 15 mg/dL [10 - 20]  Carbon Dioxide, Serum28 mmol/L [17 - 32]  Chloride, Serum99 mmol/L [98 - 110]  Creatinie, Serum0.7 mg/dL [0.7 - 1.5]  Glucose, Cpcwy695 mg/dL<H> [70 - 99]  Potassium, Serum4.6 mmol/L [3.5 - 5.0]  Sodium, Serum 138 mmol/L [135 - 146]  [facility]  06-25-21 @ 05:45  Blood Gas Arterial-Calcium,Ionized--  Blood Urea Nitrogen, Serum 16 mg/dL [10 - 20]  Carbon Dioxide, Serum30 mmol/L [17 - 32]  Chloride, Serum99 mmol/L [98 - 110]  Creatinie, Serum0.8 mg/dL [0.7 - 1.5]  Glucose, Pbtxp313 mg/dL<H> [70 - 99]  Potassium, Serum4.9 mmol/L [3.5 - 5.0]  Sodium, Serum 137 mmol/L [135 - 146]  [facility]  06-24-21 @ 12:20  Blood Gas Arterial-Calcium,Ionized--  Blood Urea Nitrogen, Serum 13 mg/dL [10 - 20]  Carbon Dioxide, Serum30 mmol/L [17 - 32]  Chloride, Lvoaq326 mmol/L [98 - 110]  Creatinie, Serum0.8 mg/dL [0.7 - 1.5]  Glucose, Lvjdy687 mg/dL<H> [70 - 99]  Potassium, Serum4.2 mmol/L [3.5 - 5.0]  Sodium, Serum 141 mmol/L [135 - 146]        PT/INR - ( 25 Jun 2021 05:45 )   PT: 22.20 sec;   INR: 1.93 ratio         Medications:  ALBUTerol   0.042% 1.25 milliGRAM(s) Nebulizer four times a day  albuterol/ipratropium for Nebulization 3 milliLiter(s) Nebulizer every 6 hours  atorvastatin 20 milliGRAM(s) Oral at bedtime  azithromycin  IVPB 500 milliGRAM(s) IV Intermittent every 24 hours  budesonide 160 MICROgram(s)/formoterol 4.5 MICROgram(s) Inhaler 2 Puff(s) Inhalation two times a day  chlorhexidine 4% Liquid 1 Application(s) Topical <User Schedule>  clonazePAM  Tablet 1 milliGRAM(s) Oral every 8 hours PRN  dextrose 40% Gel 15 Gram(s) Oral once  dextrose 5%. 1000 milliLiter(s) IV Continuous <Continuous>  dextrose 5%. 1000 milliLiter(s) IV Continuous <Continuous>  dextrose 50% Injectable 25 Gram(s) IV Push once  dextrose 50% Injectable 12.5 Gram(s) IV Push once  dextrose 50% Injectable 25 Gram(s) IV Push once  glucagon  Injectable 1 milliGRAM(s) IntraMuscular once  insulin lispro (ADMELOG) corrective regimen sliding scale   SubCutaneous three times a day before meals  ondansetron    Tablet 4 milliGRAM(s) Oral once  pantoprazole    Tablet 40 milliGRAM(s) Oral before breakfast  polyethylene glycol 3350 17 Gram(s) Oral daily PRN  predniSONE   Tablet 40 milliGRAM(s) Oral daily  saccharomyces boulardii 250 milliGRAM(s) Oral two times a day  senna 2 Tablet(s) Oral at bedtime        Assessment and Plan:  Patient is a 76 yo Female  with pmh of anxiety, Endstage COPD (on 3 L home O2 ) with multiple admissions for recurrent exacerbations , lung CA s/p chemo/radiation/ and partial left lobectomy in remission x 9 years, severe pulmonary hypertension,  Afib on Coumadin s/p ablation and micra PPM presents to ED for worsening dyspnea due to recurrent COPD exacerbation.     #Dyspnea from acute on chronic end stage  COPD exacerbation/Acute on chronic hypoxic respiratory failure/Home oxygen 3 L dependent  - give 5 days course of po zithormax  - d/c IV cefepime- no pneumonia  - tapered down  steroids to PO prednisone 40 mg po once daily x 7 days  - resume on LABA/ICS inhaler.  -CXR - no new infiltrates   - Pulm. f/up  -covid PCR - negative     # Normocytic anemia, at baseline. Pt denies bleeding symptoms. Replace as necessary.     # Steroids induced Hyperglycemia HgA1c- 7.2  FSs. Insulin PRN.     # Hx of anxiety, lung CA s/p CT/RT and partial left lobectomy (in remission), severe pulmonary HTN, and chronic Afib (Coumadin) s/p ablation and micra PPM. Restarted on  home meds    DVT PPX - patient is on coumadin tx.      #Progress Note Handoff: taper down steroids as tolerated, d/c planning , consult PT to assess ambulation status. Patient lives with her , who is currently hospitalized.   Family discussion: medical plan of care d/w pt. by bedside, D/C planning was also d/w patient's Son: Ky [phone]  Disposition: since patient's  is in the hospital , patient most likely will need to be d/c to  [facility].       CASEY POWERS  [facility] T2-3A 015 A ([facility] T2-3A)      Patient was evaluated and examined  by bedside, feeling better today, no dyspnea at rest.       REVIEW OF SYSTEMS:  please see pertinent positives mentioned above, all other 12 ROS negative      T(C): , Max: 36.5 (06-25-21 @ 20:10)  HR: 70 (06-26-21 @ 04:49)  BP: 142/64 (06-26-21 @ 04:49)  RR: 18 (06-26-21 @ 04:49)  SpO2: 98% (06-26-21 @ 07:30)  CAPILLARY BLOOD GLUCOSE      POCT Blood Glucose.: 145 mg/dL (26 Jun 2021 11:42)  POCT Blood Glucose.: 280 mg/dL (26 Jun 2021 07:50)  POCT Blood Glucose.: 272 mg/dL (25 Jun 2021 21:23)  POCT Blood Glucose.: 170 mg/dL (25 Jun 2021 16:28)      PHYSICAL EXAM:  General: NAD, AAOX3, patient is laying comfortably in bed  HEENT: AT, NC, Supple, NO JVD, NO CB  Lungs: mild decreased breath sounds B/L, no wheezing, no rhonchi  CVS: normal S1, S2, RRR, NO M/G/R  Abdomen: soft, bowel sounds present, non-tender, non-distended  Extremities: no edema, no clubbing, no cyanosis, positive peripheral pulses b/l  Neuro: no acute focal neurological deficits, mild generalized body weakness  Skin: no rash, no ecchymosis      LAB  CBC  Date: 06-26-21 @ 07:06  Mean cell Dtxiqlyelr72.0  Mean cell Hemoglobin Conc31.8  Mean cell Volum 88.1  Platelet count-Automate 169  RBC Count 3.71  Red Cell Distrib Width14.8  WBC Count11.90  % Albumin, Urine--  Hematocrit 32.7  Hemoglobin 10.4  CBC  Date: 06-25-21 @ 05:45  Mean cell Typgccwqfj98.1  Mean cell Hemoglobin Conc31.9  Mean cell Volum 88.2  Platelet count-Automate 169  RBC Count 3.38  Red Cell Distrib Width14.7  WBC Count6.53  % Albumin, Urine--  Hematocrit 29.8  Hemoglobin 9.5  CBC  Date: 06-24-21 @ 12:20  Mean cell Vcyvloktit52.9  Mean cell Hemoglobin Conc31.7  Mean cell Volum 88.0  Platelet count-Automate 166  RBC Count 3.66  Red Cell Distrib Width14.7  WBC Count6.23  % Albumin, Urine--  Hematocrit 32.2  Hemoglobin 10.2  CBC  Date: 06-20-21 @ 07:52  Mean cell Xgeexwvgrv25.2  Mean cell Hemoglobin Conc32.5  Mean cell Volum 86.6  Platelet count-Automate 162  RBC Count 3.87  Red Cell Distrib Width14.5  WBC Count6.56  % Albumin, Urine--  Hematocrit 33.5  Hemoglobin 10.9    [facility]  06-26-21 @ 07:06  Blood Gas Arterial-Calcium,Ionized--  Blood Urea Nitrogen, Serum 15 mg/dL [10 - 20]  Carbon Dioxide, Serum28 mmol/L [17 - 32]  Chloride, Serum99 mmol/L [98 - 110]  Creatinie, Serum0.7 mg/dL [0.7 - 1.5]  Glucose, Boepx544 mg/dL<H> [70 - 99]  Potassium, Serum4.6 mmol/L [3.5 - 5.0]  Sodium, Serum 138 mmol/L [135 - 146]  [facility]  06-25-21 @ 05:45  Blood Gas Arterial-Calcium,Ionized--  Blood Urea Nitrogen, Serum 16 mg/dL [10 - 20]  Carbon Dioxide, Serum30 mmol/L [17 - 32]  Chloride, Serum99 mmol/L [98 - 110]  Creatinie, Serum0.8 mg/dL [0.7 - 1.5]  Glucose, Vofti747 mg/dL<H> [70 - 99]  Potassium, Serum4.9 mmol/L [3.5 - 5.0]  Sodium, Serum 137 mmol/L [135 - 146]  [facility]  06-24-21 @ 12:20  Blood Gas Arterial-Calcium,Ionized--  Blood Urea Nitrogen, Serum 13 mg/dL [10 - 20]  Carbon Dioxide, Serum30 mmol/L [17 - 32]  Chloride, Oxwch995 mmol/L [98 - 110]  Creatinie, Serum0.8 mg/dL [0.7 - 1.5]  Glucose, Gqapd653 mg/dL<H> [70 - 99]  Potassium, Serum4.2 mmol/L [3.5 - 5.0]  Sodium, Serum 141 mmol/L [135 - 146]        PT/INR - ( 25 Jun 2021 05:45 )   PT: 22.20 sec;   INR: 1.93 ratio         Medications:  ALBUTerol   0.042% 1.25 milliGRAM(s) Nebulizer four times a day  albuterol/ipratropium for Nebulization 3 milliLiter(s) Nebulizer every 6 hours  atorvastatin 20 milliGRAM(s) Oral at bedtime  azithromycin  IVPB 500 milliGRAM(s) IV Intermittent every 24 hours  budesonide 160 MICROgram(s)/formoterol 4.5 MICROgram(s) Inhaler 2 Puff(s) Inhalation two times a day  chlorhexidine 4% Liquid 1 Application(s) Topical <User Schedule>  clonazePAM  Tablet 1 milliGRAM(s) Oral every 8 hours PRN  dextrose 40% Gel 15 Gram(s) Oral once  dextrose 5%. 1000 milliLiter(s) IV Continuous <Continuous>  dextrose 5%. 1000 milliLiter(s) IV Continuous <Continuous>  dextrose 50% Injectable 25 Gram(s) IV Push once  dextrose 50% Injectable 12.5 Gram(s) IV Push once  dextrose 50% Injectable 25 Gram(s) IV Push once  glucagon  Injectable 1 milliGRAM(s) IntraMuscular once  insulin lispro (ADMELOG) corrective regimen sliding scale   SubCutaneous three times a day before meals  ondansetron    Tablet 4 milliGRAM(s) Oral once  pantoprazole    Tablet 40 milliGRAM(s) Oral before breakfast  polyethylene glycol 3350 17 Gram(s) Oral daily PRN  predniSONE   Tablet 40 milliGRAM(s) Oral daily  saccharomyces boulardii 250 milliGRAM(s) Oral two times a day  senna 2 Tablet(s) Oral at bedtime        Assessment and Plan:  Patient is a 74 yo Female  with pmh of anxiety, Endstage COPD (on 3 L home O2 ) with multiple admissions for recurrent exacerbations , lung CA s/p chemo/radiation/ and partial left lobectomy in remission x 9 years, severe pulmonary hypertension,  Afib on Coumadin s/p ablation and micra PPM presents to ED for worsening dyspnea due to recurrent COPD exacerbation.     #Dyspnea from acute on chronic end stage  COPD exacerbation/Acute on chronic hypoxic respiratory failure/Home oxygen 3 L dependent  - give 5 days course of po zithormax  - d/c IV cefepime- no pneumonia  - tapered down  steroids to PO prednisone 40 mg po once daily x 7 days  - resume on LABA/ICS inhaler.  -CXR - no new infiltrates   - Pulm. f/up  -covid PCR - negative     # Normocytic anemia, at baseline. Pt denies bleeding symptoms. Replace as necessary.     # Steroids induced Hyperglycemia HgA1c- 7.2  FSs. Insulin PRN.     # Hx of anxiety, lung CA s/p CT/RT and partial left lobectomy (in remission), severe pulmonary HTN, and chronic Afib (Coumadin) s/p ablation and micra PPM. Restarted on  home meds    DVT PPX - patient is on coumadin tx.      #Progress Note Handoff: taper down steroids as tolerated, d/c planning , consult PT to assess ambulation status. Patient lives with her , who is currently hospitalized and will be d/c home today.   Family discussion: medical plan of care d/w pt. by bedside, D/C planning was also d/w patient's Son: Ky [phone]  Disposition: d/c home today.

## 2021-06-26 NOTE — PROGRESS NOTE ADULT - TIME BILLING
complete patient's bedside assessment, review medical chart, discuss medical discharge plan of care with covering medical team and case management
complete patient's bedside assessment, review medical chart, discuss medical plan of care with covering medical team

## 2021-06-26 NOTE — DISCHARGE NOTE PROVIDER - NSDCCPCAREPLAN_GEN_ALL_CORE_FT
PRINCIPAL DISCHARGE DIAGNOSIS  Diagnosis: Shortness of breath  Assessment and Plan of Treatment: you were admitted to the hospital with shortness of breath likely secondary to your COPD worsening. You were treated with steroids and antibiotics. Please keep taking your medication as directed and follow up with your pulmonologist and PCP. Please keep using your home inhalers as directed by your pulmonologist   If you experience symptoms again please call 911 or go to the nearest emergency department

## 2021-06-26 NOTE — PHYSICAL THERAPY INITIAL EVALUATION ADULT - GENERAL OBSERVATIONS, REHAB EVAL
1300 - 1310 Pt encountered very upset, semfowler in bed. PT attempted to introduce himself and explain PT POC/role of b/s PT however pt adamantly declined and sternly asked to speak to MD as she is upset that her discharge home as been delayed. PT attempted to explain that PT consult was placed to assess pt mobility and determine if she has any needs but she continued to angrily decline and demand to speak to MD. PT attempted to call MD however no answer on listed numbers ([phone], [phone]). Notified HUBER Page who is fully aware and also trying to contact MD's for pt. Will hold b/s PT as pt is not agreeable at this time.  HUBER Page entered room to speak with pt.

## 2021-06-26 NOTE — DISCHARGE NOTE PROVIDER - NSDCMRMEDTOKEN_GEN_ALL_CORE_FT
albuterol 1.25 mg/3 mL (0.042%) inhalation solution: 3 milliliter(s) inhaled 4 times a day  atorvastatin 20 mg oral tablet: 1 tab(s) orally once a day  azithromycin 500 mg oral tablet: 1 tab(s) orally once a day   clonazePAM 1 mg oral tablet: 1 tab(s) orally every 8 hours, As Needed  pantoprazole 40 mg oral delayed release tablet: 1 tab(s) orally once a day (before a meal)  polyethylene glycol 3350 oral powder for reconstitution: 17 gram(s) orally once a day, As needed, Constipation  predniSONE 20 mg oral tablet: 2 tab(s) orally once a day for 4 days. then 1 tab orally once a day for 3 days  senna oral tablet: 2 tab(s) orally once a day (at bedtime)  warfarin 1 mg oral tablet: 1 tab(s) orally once a day

## 2021-06-27 NOTE — ED PROVIDER NOTE - CARE PROVIDER_API CALL
Carmine Spencer  CARDIOVASCULAR DISEASE  [address]  [place] [address]  Phone: [phone]  Fax: [phone]  Follow Up Time: 1-3 Days

## 2021-06-27 NOTE — ED PROVIDER NOTE - PHYSICAL EXAMINATION
CONST: NAD  EYES: Sclera and conjunctiva clear.   ENT: No nasal discharge. Oropharynx normal appearing, no erythema or exudates. No abscess or swelling. Uvula midline.   NECK: Non-tender, no meningeal signs. normal ROM. supple   CARD: S1 S2; No jvd  RESP: Coarse bs b/l. No distress on 3L nc  GI: Soft, non-tender, non-distended. no cva tenderness. normal BS  MS: Normal ROM in all extremities. pulses 2 +. no calf tenderness or swelling  SKIN: Warm, dry, no acute rashes. Good turgor  NEURO: A&Ox3, No focal deficits. Strength 5/5 with no sensory deficits. Finger to nose intact. Negative pronator drift

## 2021-06-27 NOTE — ED PROVIDER NOTE - PHYSICAL EXAMINATION
Vital Signs: I have reviewed the initial vital signs.  Constitutional: well-nourished, appears stated age, no acute distress  Cardiovascular: regular rate, ***, well-perfused extremities  Respiratory: unlabored respiratory effort, (+) BL wheezing in all lung fields   Gastrointestinal: soft, non-tender abdomen  Musculoskeletal: supple neck, no lower extremity edema. No cervical, thoracic, lumbar midline or paraspinal tenderness. Clavicles intact. No chest wall tenderness. Pelvis stable, non-tender.  Integumentary: warm, dry, no rash  Neurologic: awake, alert, extremities’ motor and sensory functions grossly intact  Psychiatric: appropriate mood, appropriate affect Vital Signs: I have reviewed the initial vital signs.  Constitutional: well-nourished, appears stated age, no acute distress  Cardiovascular: regular rate, (+) irregular rhythm, well-perfused extremities  Respiratory: unlabored respiratory effort, (+) BL wheezing in all lung fields   Gastrointestinal: soft, non-tender abdomen  Musculoskeletal: supple neck, no lower extremity edema. No cervical, thoracic, lumbar midline or paraspinal tenderness. Clavicles intact. No chest wall tenderness. Pelvis stable, non-tender.  Integumentary: warm, dry, no rash  Neurologic: awake, alert, extremities’ motor and sensory functions grossly intact  Psychiatric: appropriate mood, appropriate affect

## 2021-06-27 NOTE — ED ADULT NURSE NOTE - OBJECTIVE STATEMENT
Pt presents to ED c/o weakness. Pt has hx of recurrent falls. Pts most recent ED visit was for fall on coumadin. Pt is awake and alert. Fall precautions maintained. Pt states on PRN 02 at home. Pt is 97% RA>

## 2021-06-27 NOTE — ED PROVIDER NOTE - ATTENDING CONTRIBUTION TO CARE
Patient states that, she has been feeling weak, and with h/o recurrent fall, states has no one to help at home, and feels unable to take care of self, so had come to ED for evaluation.   Vitals reviewed.   Lungs: CTA  abd: +BS, NT, ND, soft,   A/P: Generalized weakness,   labs, reevaluation.

## 2021-06-27 NOTE — ED ADULT NURSE NOTE - CHIEF COMPLAINT QUOTE
BIBA from home because she was unable to get medications from pharmacy. Pt discharged today from hospital. Pt states she is unable to live at home and requesting admission to hospital again.

## 2021-06-27 NOTE — ED PROVIDER NOTE - CLINICAL SUMMARY MEDICAL DECISION MAKING FREE TEXT BOX
.  74 y/o F, pmh as noted, afib on coumadin, COPD on home O2 3L, c/o "not feeling right". Pt has no focal sx or complaint. Pt had mechanical fall last night on dry cat food, unclear if LOC, denies traumatic injury.    Agree w/ exam. Pt is anxious, has no overt sign of trauma.    CTH neg. Labs reviewed. CXR neg. UA + UTI.   Pt given benzo for anxiety w/ some improvement.    IMP: UTI  Discussed all available results with Pt.  Pt understands results, plan of care, outpt follow up as discussed, and signs and symptoms for ED return.  Pt is eager for dc and wants to go home. DC home.

## 2021-06-27 NOTE — ED PROVIDER NOTE - OBJECTIVE STATEMENT
75y F pmh COPD 3L NC, Lung CA s/p chemo/radiation and lobectomy, afib on coumadin presents for eval of weakness. Pt has multiple ED visits over past couple days due to weakness and recurrent falls, today she was discharged home but when she arrived home she was too weak to care for herself. Denies fever, ha, cp, sob, numbness, dysuria, hematuria, n/v/d/c

## 2021-06-27 NOTE — ED PROVIDER NOTE - NS ED ROS FT
Constitutional: (-) fever, (+) fall   Eyes/ENT: (-) blurry vision, (-) epistaxis  Cardiovascular: (-) chest pain, (-) syncope  Respiratory: (-) cough, (+) shortness of breath  Gastrointestinal: (-) vomiting, (-) diarrhea  Musculoskeletal: (-) neck pain, (+) back pain, (-) joint pain  Integumentary: (-) rash, (-) edema  Neurological: (-) headache, (-) altered mental status  Psychiatric: (-) hallucinations  Allergic/Immunologic: (-) pruritus

## 2021-06-27 NOTE — ED PROVIDER NOTE - PROGRESS NOTE DETAILS
Tried contacting  Salvatore Joseph w/ no success. I intend to U/S for FAST.     FAST neg.  Chronic pericardial effusion noted w/ no R heart strain. Pt hemodynamically stable. UA showing infection. Antibiotics sent to pharmacy. Pt asking and ready to be discharged. F/u with PCP Johanna.

## 2021-06-27 NOTE — ED PROVIDER NOTE - PATIENT PORTAL LINK FT
You can access the FollowMyHealth Patient Portal offered by [facility] by registering at the following website: [email or web address]. By joining [employer]s FollowMyHealth portal, you will also be able to view your health information using other applications (apps) compatible with our system.

## 2021-06-27 NOTE — ED PROVIDER NOTE - NSFOLLOWUPINSTRUCTIONS_ED_ALL_ED_FT
Urinary Tract Infection    A urinary tract infection (UTI) is an infection of any part of the urinary tract, which includes the kidneys, ureters, bladder, and urethra. Risk factors include ignoring your need to urinate, wiping back to front if female, being an uncircumcised male, and having diabetes or a weak immune system. Symptoms include frequent urination, pain or burning with urination, foul smelling urine, cloudy urine, pain in the lower abdomen, blood in the urine, and fever. If you were prescribed an antibiotic medicine, take it as told by your health care provider. Do not stop taking the antibiotic even if you start to feel better.    SEEK IMMEDIATE MEDICAL CARE IF YOU HAVE THE FOLLOWING SYMPTOMS: severe back or abdominal pain, inability to keep fluids or medicine down, dizziness/lightheadedness, or a change in mental status.      Fall Prevention in the Home    Falls can cause injuries. They can happen to people of all ages. There are many things you can do to make your home safe and to help prevent falls.     WHAT CAN I DO ON THE OUTSIDE OF MY HOME?  Regularly fix the edges of walkways and driveways and fix any cracks.  Remove anything that might make you trip as you walk through a door, such as a raised step or threshold.  Trim any bushes or trees on the path to your home.  Use bright outdoor lighting.  Clear any walking paths of anything that might make someone trip, such as rocks or tools.  Regularly check to see if handrails are loose or broken. Make sure that both sides of any steps have handrails.  Any raised decks and porches should have guardrails on the edges.  Have any leaves, snow, or ice cleared regularly.  Use sand or salt on walking paths during winter.  Clean up any spills in your garage right away. This includes oil or grease spills.    WHAT CAN I DO IN THE BATHROOM?  Use night lights.  Install grab bars by the toilet and in the tub and shower. Do not use towel bars as grab bars.  Use non-skid mats or decals in the tub or shower.  If you need to sit down in the shower, use a plastic, non-slip stool.  Keep the floor dry. Clean up any water that spills on the floor as soon as it happens.  Remove soap buildup in the tub or shower regularly.  Attach bath mats securely with double-sided non-slip rug tape.  Do not have throw rugs and other things on the floor that can make you trip.    WHAT CAN I DO IN THE BEDROOM?  Use night lights.  Make sure that you have a light by your bed that is easy to reach.  Do not use any sheets or blankets that are too big for your bed. They should not hang down onto the floor.  Have a firm chair that has side arms. You can use this for support while you get dressed.  Do not have throw rugs and other things on the floor that can make you trip.    WHAT CAN I DO IN THE KITCHEN?  Clean up any spills right away.  Avoid walking on wet floors.  Keep items that you use a lot in easy-to-reach places.  If you need to reach something above you, use a strong step stool that has a grab bar.  Keep electrical cords out of the way.  Do not use floor polish or wax that makes floors slippery. If you must use wax, use non-skid floor wax.  Do not have throw rugs and other things on the floor that can make you trip.    WHAT CAN I DO WITH MY STAIRS?  Do not leave any items on the stairs.  Make sure that there are handrails on both sides of the stairs and use them. Fix handrails that are broken or loose. Make sure that handrails are as long as the stairways.  Check any carpeting to make sure that it is firmly attached to the stairs. Fix any carpet that is loose or worn.  Avoid having throw rugs at the top or bottom of the stairs. If you do have throw rugs, attach them to the floor with carpet tape.  Make sure that you have a light switch at the top of the stairs and the bottom of the stairs. If you do not have them, ask someone to add them for you.    WHAT ELSE CAN I DO TO HELP PREVENT FALLS?  Wear shoes that:  Do not have high heels.  Have rubber bottoms.  Are comfortable and fit you well.  Are closed at the toe. Do not wear sandals.  If you use a stepladder:  Make sure that it is fully opened. Do not climb a closed stepladder.  Make sure that both sides of the stepladder are locked into place.  Ask someone to hold it for you, if possible.  Clearly [place] and make sure that you can see:  Any grab bars or handrails.  First and last steps.  Where the edge of each step is.  Use tools that help you move around (mobility aids) if they are needed. These include:  Canes.  Walkers.  Scooters.  Crutches.  Turn on the lights when you go into a dark area. Replace any light bulbs as soon as they burn out.  Set up your furniture so you have a clear path. Avoid moving your furniture around.  If any of your floors are uneven, fix them.  If there are any pets around you, be aware of where they are.  Review your medicines with your doctor. Some medicines can make you feel dizzy. This can increase your chance of falling.    Ask your doctor what other things that you can do to help prevent falls.

## 2021-06-27 NOTE — ED ADULT NURSE NOTE - NSIMPLEMENTINTERV_GEN_ALL_ED
Implemented All Universal Safety Interventions:  [place] to call system. Call bell, personal items and telephone within reach. Instruct patient to call for assistance. Room bathroom lighting operational. Non-slip footwear when patient is off stretcher. Physically safe environment: no spills, clutter or unnecessary equipment. Stretcher in lowest position, wheels locked, appropriate side rails in place.

## 2021-06-27 NOTE — ED PROVIDER NOTE - OBJECTIVE STATEMENT
74 yo F PMH Endstage COPD (on 3 L home O2 ),  lung CA s/p chemo/radiation, partial left lobectomy in remission x 9 years, severe pulmonary hypertension,  Afib on Coumadin s/p ablation and micra PPM, discharged yesterday for COPD exacerbation and dyspnea, BIBEMS for "not feeling right" this morning. No relieving or exacerbating factors. Pt had mechanical slip and fall last night. Unsure LOC. laid on ground 15min, assisted by  to stand, has been ambulatory since. This AM not feeling well. No cp, sob, vomiting, focal neuro deficit.

## 2021-06-27 NOTE — ED PROVIDER NOTE - NS ED ROS FT
Constitutional: (+) weakness, (-) fever  Eyes/ENT: (-) blurry vision, (-) epistaxis  Cardiovascular: (-) chest pain, (-) syncope  Respiratory: (-) cough, (-) shortness of breath  Gastrointestinal: (-) vomiting, (-) diarrhea  : (-) dysuria, (-) hematuria  Musculoskeletal: (-) neck pain, (-) back pain, (-) joint pain  Integumentary: (-) rash, (-) edema  Neurological: (-) headache, (-) altered mental status  Allergic/Immunologic: (-) pruritus

## 2021-06-28 NOTE — H&P ADULT - ATTENDING COMMENTS
75F w/ PMH: Anxiety, Endstage COPD (on 3 L home O2 ) with multiple admissions for recurrent exacerbations , lung CA s/p chemo/radiation/ and partial left lobectomy in remission x 9 years, severe pulmonary hypertension,  Afib on Coumadin s/p ablation and micra PPM presents to ED following recent discharge on 6/26, following mechanical fall at home. Pt was recently admitted with COPD exacerbation and discharged home following improvement of symptoms. She came back to the ER for mechanical fall and inability to take care of herself and she lives at home with her .    I verified above history with the patient.  Patient is very difficult and persistently wants to go home while she is unable to ambulate due to feeling too weak and fell at home.  Pt states her neighbor will help her and she wants to go home today.  Pt is AOx3 but unreasonable in judgement    Vital Signs Last 24 Hrs  T(C): 36.3 (28 Jun 2021 13:37), Max: 37 (28 Jun 2021 00:56)  T(F): 97.4 (28 Jun 2021 13:37), Max: 98.6 (28 Jun 2021 00:56)  HR: 81 (28 Jun 2021 13:37) (69 - 87)  BP: 122/61 (28 Jun 2021 13:37) (112/59 - 150/70)  RR: 17 (28 Jun 2021 13:37) (17 - 20)  SpO2: 96% (28 Jun 2021 08:25) (96% - 100%)     PE / Labs as above    CXR  Postoperative changes left lung / left lung apex, Right opacities/nodules    CTH: No acute findings     IMPRESSION:  Chronic Respiratory Failure 3L NC At home and is at baseline use   Possible COPD Exarcerbation   Severe Anxiety - Possible Underlining MDD but pt has no suicidal ideation or plan   UTI s/p Ceftriaxone x 1 in ER  Recurrent Falls  Difficult Personality     PLAN:  Fall Precautions  Prednisone 40mg po qd  Nebs q6h ATC  Resume Home Oxygen 3L   Ativan 1mg po q8h/prn (watch for respiratory depression)   Ceftriaxone 1g IV qd  f/u Urine and Blood cxs   DVT Proph  GI Proph  PT Consult  Physiatry Consult    Poor Prognosis given Severe PHTN / Chronic Resp Failure home oxyg dependent   Pt not safe at home and w/ recurrent admissions - Need long term solution     Dispo: Acute

## 2021-06-28 NOTE — H&P ADULT - NSHPLABSRESULTS_GEN_ALL_CORE
10.9   7.62  )-----------( 147      ( 27 Jun 2021 22:49 )             33.4       06-27    142  |  101  |  19  ----------------------------<  106<H>  4.3   |  31  |  0.7    Ca    9.4      27 Jun 2021 22:49  Mg     1.8     06-27    TPro  5.9<L>  /  Alb  4.2  /  TBili  0.6  /  DBili  x   /  AST  17  /  ALT  17  /  AlkPhos  75  06-27        Blood Gas Profile - Venous (06.27.21 @ 10:17)   pH, Venous: 7.40   pCO2, Venous: 53 mmHg   pO2, Venous: 35 mmHg   HCO3, Venous: 33 mmoL/L   Base Excess, Venous: 6.5 mmoL/L   Oxygen Saturation, Venous: 68 %       Troponin T, Serum: <0.01 ng/mL (06.27.21 @ 22:49)   Troponin T, Serum: <0.01 ng/mL (06.27.21 @ 10:34)     Creatine Kinase, Serum: 102 U/L (06-27-21 @ 10:37)    Serum Pro-Brain Natriuretic Peptide: 371 pg/mL (06-27-21 @ 10:34)    EKG  Ventricular Rate 71 BPM    Atrial Rate 375 BPM    QRS Duration 118 ms    Q-T Interval 428 ms    QTC Calculation(Bazett) 465 ms    R Axis -13 degrees    T Axis 92 degrees    Diagnosis Line Atrial fibrillation  Anteroseptal infarct , age undetermined  Abnormal ECG    Confirmed by Kadeem Chahal ([id number]) on 6/27/2021 10:56:16 PM    CXR 6/27 @10.47  Impression:  Postoperative changes left lung with left apical opacity.    CXR 6/27 @20.37  Impression:  Postoperative changes left lung/left lung apex. Right opacities/nodules    CT head   IMPRESSION:  No acute intracranial hemorrhage or mass effect. 10.9   7.62  )-----------( 147      ( 27 Jun 2021 22:49 )             33.4       06-27    142  |  101  |  19  ----------------------------<  106<H>  4.3   |  31  |  0.7    Ca    9.4      27 Jun 2021 22:49  Mg     1.8     06-27    TPro  5.9<L>  /  Alb  4.2  /  TBili  0.6  /  DBili  x   /  AST  17  /  ALT  17  /  AlkPhos  75  06-27        Blood Gas Profile - Venous (06.27.21 @ 10:17)   pH, Venous: 7.40   pCO2, Venous: 53 mmHg   pO2, Venous: 35 mmHg   HCO3, Venous: 33 mmoL/L   Base Excess, Venous: 6.5 mmoL/L   Oxygen Saturation, Venous: 68 %       Troponin T, Serum: <0.01 ng/mL (06.27.21 @ 22:49)   Troponin T, Serum: <0.01 ng/mL (06.27.21 @ 10:34)     Creatine Kinase, Serum: 102 U/L (06-27-21 @ 10:37)    Serum Pro-Brain Natriuretic Peptide: 371 pg/mL (06-27-21 @ 10:34)    Urinalysis (06.27.21 @ 12:25)   Blood, Urine: Negative   pH Urine: 6.0   Glucose Qualitative, Urine: >= 1000 mg/dL   Color: Light Yellow   Urine Appearance: Clear   Bilirubin: Negative   Ketone - Urine: Negative   Specific Gravity: 1.011   Protein, Urine: Trace   Urobilinogen: <2 mg/dL   Nitrite: Negative   Leukocyte Esterase Concentration: Large   Urine Microscopic-Add On (NC) (06.27.21 @ 12:25)   Red Blood Cell - Urine: 3 /HPF   White Blood Cell - Urine: 62 /HPF   Hyaline Casts: 4 /LPF   Bacteria: Negative   Epithelial Cells: 5 /HPF       EKG  Ventricular Rate 71 BPM    Atrial Rate 375 BPM    QRS Duration 118 ms    Q-T Interval 428 ms    QTC Calculation(Bazett) 465 ms    R Axis -13 degrees    T Axis 92 degrees    Diagnosis Line Atrial fibrillation  Anteroseptal infarct , age undetermined  Abnormal ECG    Confirmed by Kadeem Chahal ([id number]) on 6/27/2021 10:56:16 PM    CXR 6/27 @10.47  Impression:  Postoperative changes left lung with left apical opacity.    CXR 6/27 @20.37  Impression:  Postoperative changes left lung/left lung apex. Right opacities/nodules    CT head   IMPRESSION:  No acute intracranial hemorrhage or mass effect. 10.9   7.62  )-----------( 147      ( 27 Jun 2021 22:49 )             33.4       06-27    142  |  101  |  19  ----------------------------<  106<H>  4.3   |  31  |  0.7    Ca    9.4      27 Jun 2021 22:49  Mg     1.8     06-27    TPro  5.9<L>  /  Alb  4.2  /  TBili  0.6  /  DBili  x   /  AST  17  /  ALT  17  /  AlkPhos  75  06-27        Blood Gas Profile - Venous (06.27.21 @ 10:17)   pH, Venous: 7.40   pCO2, Venous: 53 mmHg   pO2, Venous: 35 mmHg   HCO3, Venous: 33 mmoL/L   Base Excess, Venous: 6.5 mmoL/L   Oxygen Saturation, Venous: 68 %       Troponin T, Serum: <0.01 ng/mL (06.27.21 @ 22:49)   Troponin T, Serum: <0.01 ng/mL (06.27.21 @ 10:34)     Creatine Kinase, Serum: 102 U/L (06-27-21 @ 10:37)    Serum Pro-Brain Natriuretic Peptide: 371 pg/mL (06-27-21 @ 10:34)    Urinalysis (06.27.21 @ 12:25)   Blood, Urine: Negative   pH Urine: 6.0   Glucose Qualitative, Urine: >= 1000 mg/dL   Color: Light Yellow   Urine Appearance: Clear   Bilirubin: Negative   Ketone - Urine: Negative   Specific Gravity: 1.011   Protein, Urine: Trace   Urobilinogen: <2 mg/dL   Nitrite: Negative   Leukocyte Esterase Concentration: Large   Urine Microscopic-Add On (NC) (06.27.21 @ 12:25)   Red Blood Cell - Urine: 3 /HPF   White Blood Cell - Urine: 62 /HPF   Hyaline Casts: 4 /LPF   Bacteria: Negative   Epithelial Cells: 5 /HPF     EKG  Ventricular Rate 71 BPM    Atrial Rate 375 BPM    QRS Duration 118 ms    Q-T Interval 428 ms    QTC Calculation(Bazett) 465 ms    R Axis -13 degrees    T Axis 92 degrees    Diagnosis Line Atrial fibrillation  Anteroseptal infarct , age undetermined  Abnormal ECG    Confirmed by Kadeem Chahal ([id number]) on 6/27/2021 10:56:16 PM    CXR 6/27 @10.47  Impression:  Postoperative changes left lung with left apical opacity.    CXR 6/27 @20.37  Impression:  Postoperative changes left lung/left lung apex. Right opacities/nodules    CT head   IMPRESSION:  No acute intracranial hemorrhage or mass effect.

## 2021-06-28 NOTE — PROVIDER CONTACT NOTE (OTHER) - SITUATION
pt expressed suicidal thoughts in wanting to kill herself, RN questioned reasoning and states "I hate my life and have nothing going for me" RN questioned pt if she has a plan and pt stated that she

## 2021-06-28 NOTE — H&P ADULT - NSHPSOCIALHISTORY_GEN_ALL_CORE
lives at home with ,   denies any alcohol use, or drugs   former smoker lives at home with ,   denies any alcohol use, or drugs   former smoker, uses home oxygen 3 liters / min

## 2021-06-28 NOTE — H&P ADULT - HISTORY OF PRESENT ILLNESS
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 Pt was recently admitted with COPD exacerbation and discharged home following improvement of symptoms. She reports that she fell at home after slipping on cat food. The impact was on her right upper back, and she reports some tenderness in the area. Denies any LOC. No dizziness, no headache no visual disturbance. She denies any other sxs, including SOB, chest pain, palpitations. No dysuria, urinary frequency or urgency. No abdominal pain, no N/V, no change in bowel movements.     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 Pt was recently admitted with COPD exacerbation and discharged home following improvement of symptoms. She reports that she fell at home after slipping on cat food. The impact was on her right upper back, and she reports some tenderness in the area. Denies any LOC. No dizziness, no headache no visual disturbance. She denies any other sxs, including SOB, chest pain, palpitations. No dysuria, urinary frequency or urgency. No abdominal pain, no N/V, no change in bowel movements.      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 Pt was recently admitted with COPD exacerbation and discharged home following improvement of symptoms. She reports that she fell at home after slipping on cat food. The impact was on her right upper back, and she reports some tenderness in the area. Denies any LOC. No dizziness, no headache no visual disturbance. She denies any other sxs, including SOB, chest pain, palpitations. No dysuria, urinary frequency or urgency. No abdominal pain, no N/V, no change in bowel movements.   in the ER, VS WNL. she was given 1 dose of ceftriaxone   UA + LE and wbc   patient will be admitted for placement, pending PT consult      74 yo F with pmh of anxiety, Endstage COPD (on 3 L home O2 ) with multiple admissions for recurrent exacerbations , lung CA s/p chemo/radiation/ and partial left lobectomy in remission x 9 years, severe pulmonary hypertension,  Afib on Coumadin s/p ablation and micra PPM presents to ED following recent discharge on 6/26, following fall at home. Pt was recently admitted with COPD exacerbation and discharged home following improvement of symptoms. She reports that she fell at home after slipping on cat food. The impact was on her right upper back, and she reports some tenderness in the area. Denies any LOC. No dizziness, no headache no visual disturbance. She denies any other sxs, including SOB, chest pain, palpitations. No dysuria, urinary frequency or urgency. No abdominal pain, no N/V, no change in bowel movements.   in the ER, VS WNL. she was given 1 dose of ceftriaxone   UA + LE and wbc     Patient will be admitted for placement, pending PT consult

## 2021-06-28 NOTE — PROVIDER CONTACT NOTE (OTHER) - BACKGROUND
does not she just doesn't want to be alive anymore, RN immediately notified MD Saeed who verbalized understanding and is going to come see patient, MD to STAT consult psych, 1:1 sit initiated,

## 2021-06-28 NOTE — PHYSICAL THERAPY INITIAL EVALUATION ADULT - PERTINENT HX OF CURRENT PROBLEM, REHAB EVAL
76 yo F with pmh of anxiety, Endstage COPD (on 3 L home O2 ) with multiple admissions for recurrent exacerbations , lung CA s/p chemo/radiation/ and partial left lobectomy in remission x 9 years, severe pulmonary hypertension,  Afib on Coumadin s/p ablation and micra PPM presents to ED following recent discharge on 6/26, following fall at home. Pt was recently admitted with COPD exacerbation and discharged home following improvement of symptoms.

## 2021-06-28 NOTE — H&P ADULT - NSHPPHYSICALEXAM_GEN_ALL_CORE
VITALS:   T(C): 36.2 (06-28-21 @ 05:27), Max: 37 (06-28-21 @ 00:56)  HR: 69 (06-28-21 @ 05:27) (69 - 87)  BP: 117/58 (06-28-21 @ 05:27) (112/59 - 150/70)  RR: 19 (06-28-21 @ 05:27) (18 - 20)  SpO2: 96% (06-28-21 @ 08:25) (96% - 100%) on 3L o2    GENERAL: NAD, lying in bed comfortably  HEAD:  Atraumatic, Normocephalic  EYES: EOMI, PERRLA, conjunctiva and sclera clear  ENT: Moist mucous membranes  NECK: Supple, No JVD  CHEST/LUNG: some wheezing increased in the left lung fields, unlabored respirations  HEART: Regular rate and rhythm; No murmurs, rubs, or gallops  ABDOMEN: BSx4; Soft, nontender, nondistended  EXTREMITIES:  2+ Peripheral Pulses, brisk capillary refill. No clubbing, cyanosis, or edema  NERVOUS SYSTEM:  A&Ox3, no focal deficits   SKIN: No rashes or lesions

## 2021-06-28 NOTE — H&P ADULT - ASSESSMENT
76 yo F with pmh of anxiety, Endstage COPD (on 3 L home O2 ) with multiple admissions for recurrent exacerbations , lung CA s/p chemo/radiation/ and partial left lobectomy in remission x 9 years, severe pulmonary hypertension,  Afib on Coumadin s/p ablation and micra PPM presents to ED following recent discharge on 6/26, following mechanical fall at home. Pt was recently admitted with COPD exacerbation and discharged home following improvement of symptoms.     #mechanical Fall  - denies LOC, CXR showing no acute fractures, no further workup necessary     #COPD  - on 3L of home 02  -  76 yo F with pmh of anxiety, Endstage COPD (on 3 L home O2 ) with multiple admissions for recurrent exacerbations , lung CA s/p chemo/radiation/ and partial left lobectomy in remission x 9 years, severe pulmonary hypertension,  Afib on Coumadin s/p ablation and micra PPM presents to ED following recent discharge on 6/26, following mechanical fall at home. Pt was recently admitted with COPD exacerbation and discharged home following improvement of symptoms.     #mechanical Fall  - denies LOC, CXR showing no acute fractures, no further workup necessary   -PT consult   - ambulate as tolerated     #Endstage COPD// severe Pulm HTN//H/O Lung ca s/p Left Partial Lobectomy   -discharged following recent admission for exacerbation currently at baseline    -CXR: no acute pathology   -c/w home Inhalers:  albuterol 1.25 mg/3 mL (0.042%) inhalation solution: 3 milliliter(s) inhaled 4 times a day  -continue steroid taper 20mg once a day for three days   -continue final dose of Azithromycin 500mg PO once   -Keep O2 sat: 88-92%   -CBC, BMP      #AFIB s/p Ablation and Micra PPM  -EKG showing A fib, ventricular rate of 70s, BP stable   -check INR, goal 2-3, and continue warfarin 1mg daily     #Anxiety  c/w clonazePAM 1 mg oral tablet: 1 tab(s) orally every 8 hours, As Needed    #HLD   c/w atorvastatin 20 mg oral tablet: 1 tab(s) orally once a day      Diet: Dash diet   DVT PPX- warfarin 1mg QOD   GI PPX- pantoprazole 40 mg oral delayed release tablet: 1 tab(s) orally once a day (before a meal)  Code Status:  DNR/DNI   Dispo: pending placement     74 yo F with pmh of anxiety, Endstage COPD (on 3 L home O2 ) with multiple admissions for recurrent exacerbations , lung CA s/p chemo/radiation/ and partial left lobectomy in remission x 9 years, severe pulmonary hypertension,  Afib on Coumadin s/p ablation and micra PPM presents to ED following recent discharge on 6/26, following mechanical fall at home. Pt was recently admitted with COPD exacerbation and discharged home following improvement of symptoms.     #mechanical Fall  - denies LOC, CXR showing no acute fractures, no further workup necessary   -PT consult   - ambulate as tolerated     #Endstage COPD// severe Pulm HTN//H/O Lung ca s/p Left Partial Lobectomy   -discharged following recent admission for exacerbation currently at baseline    -CXR: no acute pathology   -c/w home Inhalers:  albuterol 1.25 mg/3 mL (0.042%) inhalation solution: 3 milliliter(s) inhaled 4 times a day  -continue steroid taper 20mg once a day for three days   -continue final dose of Azithromycin 500mg PO once   -Keep O2 sat: 88-92%   -CBC, BMP am     #AFIB s/p Ablation and Micra PPM  -EKG showing A fib, ventricular rate of 70s, BP stable   -F/u INR @11am, goal 2-3, and continue warfarin 1mg daily     #Anxiety  c/w clonazePAM 1 mg oral tablet: 1 tab(s) orally every 8 hours, As Needed    #HLD   c/w atorvastatin 20 mg oral tablet: 1 tab(s) orally once a day      Diet: Dash /TLC  DVT PPX- warfarin 1mg QOD   GI PPX- pantoprazole 40 mg oral delayed release tablet: 1 tab(s) orally once a day (before a meal)  Code Status:  DNR/DNI   Dispo: pending placement     76 yo F with pmh of anxiety, Endstage COPD (on 3 L home O2 ) with multiple admissions for recurrent exacerbations , lung CA s/p chemo/radiation/ and partial left lobectomy in remission x 9 years, severe pulmonary hypertension,  Afib on Coumadin s/p ablation and micra PPM presents to ED following recent discharge on 6/26, following mechanical fall at home. Pt was recently admitted with COPD exacerbation and discharged home following improvement of symptoms. she came back to the ER for mechanical fall and inability to take care of herself and she lives at home with her      #mechanical Fall  - denies LOC, CXR showing no acute fractures, no further workup necessary   - PT consult   - ambulate as tolerated   - ct head was negative for any fracture or acute IC pathology including bleed     #Endstage COPD// severe Pulm HTN//H/O Lung ca s/p Left Partial Lobectomy   -discharged following recent admission for exacerbation currently at baseline    -CXR: no acute pathology   -c/w home Inhalers:  albuterol 1.25 mg/3 mL (0.042%) inhalation solution: 3 milliliter(s) inhaled 4 times a day  -continue steroid taper 20mg once a day for three days   -continue final dose of Azithromycin 500mg PO once   -Keep O2 sat: 88-92%   -CBC, BMP am     #AFIB s/p Ablation and Micra PPM  -EKG showing A fib, ventricular rate of 70s, BP stable   -F/u INR @11am, goal 2-3, and continue warfarin 1mg daily   -not on any rate control therapy     #Anxiety  c/w clonazePAM 1 mg oral tablet: 1 tab(s) orally every 8 hours, As Needed    #HLD   c/w atorvastatin 20 mg oral tablet: 1 tab(s) orally once a day      Diet: Dash /TLC  DVT PPX- warfarin 1mg QOD   GI PPX- pantoprazole 40 mg oral delayed release tablet: 1 tab(s) orally once a day (before a meal)  Code Status:  DNR/DNI   Dispo: pending placement     76 yo F with pmh of anxiety, Endstage COPD (on 3 L home O2 ) with multiple admissions for recurrent exacerbations , lung CA s/p chemo/radiation/ and partial left lobectomy in remission x 9 years, severe pulmonary hypertension,  Afib on Coumadin s/p ablation and micra PPM presents to ED following recent discharge on 6/26, following mechanical fall at home. Pt was recently admitted with COPD exacerbation and discharged home following improvement of symptoms. she came back to the ER for mechanical fall and inability to take care of herself and she lives at home with her      #Mechanical Fall  - Denies LOC, CXR showing no acute fractures, no further workup necessary   - PT consult   - Ambulate as tolerated   - CT head was negative for any fracture or acute IC pathology including bleed     #Endstage COPD// Severe Pulm HTN//H/O Lung ca s/p Left Partial Lobectomy   -discharged following recent admission for exacerbation currently at baseline    -CXR: no acute pathology   -c/w home Inhalers:  albuterol 1.25 mg/3 mL (0.042%) inhalation solution: 3 milliliter(s) inhaled 4 times a day  -continue steroid taper 20mg once a day for three days   -continue final dose of Azithromycin 500mg PO once   -Keep O2 sat: 88-92%   -CBC, BMP am     #AFIB s/p Ablation and Micra PPM  -EKG showing A fib, ventricular rate of 70s, BP stable   -F/u INR @11am, goal 2-3, and continue warfarin 1mg daily   -not on any rate control therapy     #Anxiety  c/w clonazePAM 1 mg oral tablet: 1 tab(s) orally every 8 hours, As Needed    #HLD   c/w atorvastatin 20 mg oral tablet: 1 tab(s) orally once a day    Diet: Dash /TLC  DVT PPX- warfarin 1mg QOD   GI PPX- pantoprazole 40 mg oral delayed release tablet: 1 tab(s) orally once a day (before a meal)  Code Status:  DNR/DNI   Dispo: pending placement

## 2021-06-29 NOTE — DISCHARGE NOTE PROVIDER - NSDCMRMEDTOKEN_GEN_ALL_CORE_FT
albuterol 1.25 mg/3 mL (0.042%) inhalation solution: 3 milliliter(s) inhaled 4 times a day  atorvastatin 20 mg oral tablet: 1 tab(s) orally once a day  azithromycin 500 mg oral tablet: 1 tab(s) orally once a day   clonazePAM 1 mg oral tablet: 1 tab(s) orally every 8 hours, As Needed  pantoprazole 40 mg oral delayed release tablet: 1 tab(s) orally once a day (before a meal)  polyethylene glycol 3350 oral powder for reconstitution: 17 gram(s) orally once a day, As needed, Constipation  predniSONE 20 mg oral tablet: 2 tab(s) orally once a day for 4 days. then 1 tab orally once a day for 3 days  senna oral tablet: 2 tab(s) orally once a day (at bedtime)  warfarin 1 mg oral tablet: 1 tab(s) orally once a day   albuterol 1.25 mg/3 mL (0.042%) inhalation solution: 3 milliliter(s) inhaled 4 times a day  atorvastatin 20 mg oral tablet: 1 tab(s) orally once a day  cefpodoxime 200 mg oral tablet: 1 tab(s) orally every 12 hours  clonazePAM 1 mg oral tablet: 1 tab(s) orally every 8 hours, As Needed  Eliquis 5 mg oral tablet: 1 tab(s) orally 2 times a day   pantoprazole 40 mg oral delayed release tablet: 1 tab(s) orally once a day (before a meal)  polyethylene glycol 3350 oral powder for reconstitution: 17 gram(s) orally once a day, As needed, Constipation  predniSONE 20 mg oral tablet: 2 tab(s) orally once a day for 4 days. then 1 tab orally once a day for 3 days  senna oral tablet: 2 tab(s) orally once a day (at bedtime)  tiotropium 18 mcg inhalation capsule: 1 cap(s) inhaled once

## 2021-06-29 NOTE — DISCHARGE NOTE NURSING/CASE MANAGEMENT/SOCIAL WORK - PATIENT PORTAL LINK FT
You can access the FollowMyHealth Patient Portal offered by [facility] by registering at the following website: [email or web address]. By joining [facility]’s FollowMyHealth portal, you will also be able to view your health information using other applications (apps) compatible with our system.

## 2021-06-29 NOTE — DISCHARGE NOTE PROVIDER - CARE PROVIDER_API CALL
Carmine Spencer  CARDIOVASCULAR DISEASE  [address]  [place] [address]  Phone: [phone]  Fax: [phone]  Established Patient  Follow Up Time: 1 week    ., Pulmonary Clinic  [address] [place] [address]  Phone: [phone]  Fax: (   )    -  Follow Up Time: 1-3 days

## 2021-06-29 NOTE — DISCHARGE NOTE PROVIDER - PROVIDER TOKENS
PROVIDER:[TOKEN:[[id number]],FOLLOWUP:[1 week],ESTABLISHEDPATIENT:[T]],FREE:[LAST:[.],FIRST:[Pulmonary Clinic],PHONE:[phone]],FAX:[(   )    -],ADDRESS:[[address] [place] [address]],FOLLOWUP:[1-3 days]]

## 2021-06-29 NOTE — DISCHARGE NOTE PROVIDER - HOSPITAL COURSE
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 Pt was recently admitted with COPD exacerbation and discharged home following improvement of symptoms. She reports that she fell at home after slipping on cat food. The impact was on her right upper back, and she reports some tenderness in the area. Denies any LOC. No dizziness, no headache no visual disturbance. She denies any other sxs, including SOB, chest pain, palpitations. No dysuria, urinary frequency or urgency. No abdominal pain, no N/V, no change in bowel movements. In the ER, VS WNL. she was given 1 dose of ceftriaxone. In ED, UA + LE and wbc. She was started on Vantin 200 BID for pos UA. Urine culture is pending. On discharge, we will be adding Spiriva 1 puff q24h in addition to her albuterol to help manage her COPD. PT recommended STR but patient is resistant and wants to return home despite recent falls. She was seen by a  to help assess her living situation and discuss if pt is willing to have HHA,. Patient is medically stable for discharge at this time.    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 Pt was recently admitted with COPD exacerbation and discharged home following improvement of symptoms. She reports that she fell at home after slipping on cat food. The impact was on her right upper back, and she reports some tenderness in the area. Denies any LOC. No dizziness, no headache no visual disturbance. She denies any other sxs, including SOB, chest pain, palpitations. No dysuria, urinary frequency or urgency. No abdominal pain, no N/V, no change in bowel movements. In the ER, VS WNL. she was given 1 dose of ceftriaxone. In ED, UA + LE and wbc. She was started on Vantin 200 BID for pos UA. Urine culture is pending. On discharge, we will be adding Spiriva 1 puff q24h in addition to her albuterol to help manage her COPD. On discharge we will also be prescribing her Eliquis for her afib due to recurrent falls and likely noncompliance with INR testing. PT recommended STR but patient is resistant and wants to return home with HHA despite recent falls. Patient is medically stable for discharge at this time.    74 yo F with pmh of anxiety, Endstage COPD (on 3 L home O2 ) with multiple admissions for recurrent exacerbations , lung CA s/p chemo/radiation/ and partial left lobectomy in remission x 9 years, severe pulmonary hypertension, chronic Afib on Coumadin s/p ablation and micra PPM presents to ED following recent discharge on 6/26, following fall at home. Pt was recently admitted with COPD exacerbation and discharged home following improvement of symptoms. She reports that she fell at home after slipping on cat food. The impact was on her right upper back, and she reports some tenderness in the area. Denies any LOC. No dizziness, no headache no visual disturbance. She denies any other sxs, including SOB, chest pain, palpitations. No dysuria, urinary frequency or urgency. No abdominal pain, no N/V, no change in bowel movements. In the ER, VS WNL. she was given 1 dose of ceftriaxone. In ED, UA + LE and wbc. She was started on Vantin 200 BID for pos UA. Urine culture is pending. On discharge, we will be adding Spiriva 1 puff q24h in addition to her albuterol to help manage her COPD. On discharge we will also be prescribing her Eliquis for her afib due to recurrent falls and likely noncompliance with INR testing. PT recommended STR but patient is resistant and wants to return home with HHA despite recent falls. Patient is medically stable for discharge at this time.

## 2021-06-29 NOTE — CHART NOTE - NSCHARTNOTEFT_GEN_A_CORE
Patient is a 74yo C F with PMH of Afib (on Coumadin) s/p ablation and Micra PPM, COPD on home O2 as needed, left lung ca diagnosed in 2011 (surgery x2 -partial left lobectomy + chemo +RT, oncologist f/up at [facility]), and PPH of anxiety, depression, and nicotine use d/o.  Team initially had concern for overnight event of patient reported being better of dead than alive.  Previously seen by Dr. Lim, during last hospitalization,  patient adamantly denies making this statement as a threat to kill herself, but rather was eager to  be discharged in order to get home on time to take care of her cat.  She is still denying any suicidal ideation and currently not psychotic, not manic. She is agreeable to follow up with [facility] outpatient  [facility] telepsychiatry service with Dr. Lim. This patient will be contacted by the clinic for her date of her outpatient appointment.

## 2021-06-29 NOTE — DISCHARGE NOTE PROVIDER - NSDCCPCAREPLAN_GEN_ALL_CORE_FT
PRINCIPAL DISCHARGE DIAGNOSIS  Diagnosis: Weakness  Assessment and Plan of Treatment: You came to the hospital because you were feeling weak. You were evaluated and treated for a possible urinary tract infection with oral antibiotics. These antibiotics will be sent to your pharmacy and you should continue these outside the hospital.      SECONDARY DISCHARGE DIAGNOSES  Diagnosis: Inadequate social support  Assessment and Plan of Treatment:      PRINCIPAL DISCHARGE DIAGNOSIS  Diagnosis: Weakness  Assessment and Plan of Treatment: You came to the hospital because you were feeling weak. You were evaluated and treated for a possible urinary tract infection with oral antibiotics. These antibiotics will be sent to your pharmacy and you should continue these outside the hospital.      SECONDARY DISCHARGE DIAGNOSES  Diagnosis: Chronic atrial fibrillation  Assessment and Plan of Treatment: While in the hospital, your warfarin (Coumadin) was discontinued and you were started on Eliquis. Eliquis is also a blood thinner, but it is less likely for you to develop a bleed in your head if you fall again. Eliquis also does not require frequent blood tests to monitor. You should follow up with your PMD on discharge for further management.    Diagnosis: Advanced COPD  Assessment and Plan of Treatment: During your hospital stay, we continued with your supplemental oxygen. You were also started on a steroid to help your breathing. We will be sending a steroid (prednisone) prescription to your pharmacy that slowly lowers the dosage over about a week. We will also send a prescription for Albuterol and Spiriva to help manage your COPD symptoms.    Diagnosis: Constipation  Assessment and Plan of Treatment: In the hospital you were given Miralax and Senna to help your bowel movements. If you feel you need to continue these outside the hospital, they are available over-the-counter at your pharmacy.

## 2021-06-29 NOTE — DISCHARGE NOTE NURSING/CASE MANAGEMENT/SOCIAL WORK - NSDCPEELIQUISFU_GEN_ALL_CORE
aware Go for blood tests as directed. Your doctor will do lab tests at regular visits to monitor the effects of this medicine. Please follow up with your doctor and keep your health care provider appointments.

## 2021-06-30 NOTE — PROGRESS NOTE ADULT - ASSESSMENT
ASSESSMENT & PLAN    Past medical history and hospital course   74 yo F with PMH of anxiety, Endstage COPD (on 3 L home O2 ) with multiple admissions for recurrent exacerbations , lung CA s/p chemo/radiation/ and partial left lobectomy in remission x 9 years, severe pulmonary hypertension,  Afib on Coumadin s/p ablation and micra PPM presents to ED following recent discharge on 6/26, following mechanical fall at home. Pt was recently admitted with COPD exacerbation and discharged home following improvement of symptoms. She came back to the ER for mechanical fall and inability to take care of herself and she lives at home with her .    #Mechanical Fall  - Denies LOC, CXR showing no acute fractures, no further workup necessary   - Seen by PT 6/28 - will need rolling walker, PT 3-5x/wk, home vs STR.  - Ambulate as tolerated   - CT head was negative for any fracture or acute IC pathology including bleed     #Endstage COPD// Severe Pulm HTN//H/O Lung ca s/p Left Partial Lobectomy   -discharged following recent admission for exacerbation currently at baseline    -CXR: no acute pathology   -Keep O2 sat: 88-92%   - d/c duoneb, start spiriva 1 puff q24, start albuterol  - f/u with pulm OP     #AFIB s/p Ablation and Micra PPM  -EKG showing A fib, ventricular rate of 70s, BP stable    -now on Eliquis    #Anxiety  -c/w clonazePAM 1 mg PO q8h  -seen by psych 6/29 for suicidal ideation o/n. Cleared for discharge psychiatrically. F/u outpt.     #HLD  -c/w atorvastatin 20 mg PO qd                                                                              ----------------------------------------------------  # DVT prophylaxis - Eliquis    # GI prophylaxis - PPI    # Diet - DASH    # Code status - DNR/DNI    # Disposition - pending HHA, cleared by psych, anticipate for tomorrow, f/u social work
Patient is a 76 yo woman with pmh of anxiety, End-stage COPD (on 3 L home O2 ) with multiple admissions for recurrent exacerbations , Lung CA s/p chemo/radiation/ and partial left lobectomy in remission x 9 years, severe pulmonary hypertension,  Afib on Coumadin s/p ablation and micra PPM presents to ED for worsening dyspnea due to recurrent COPD exacerbation.     # Chronic Resp Failure Home O2 dependent 3L; chr hypercapnia; Acute COPD exacerbation causing Acute on chronic hypoxic respiratory failure; Home oxygen 3L dependent; severe pulm HTN  Nebs q6h prn wheeze  Prednisone 40mg po qd and taper to off over 7-10 days  c/w spiriva  CXR - no new infiltrates  Pulm f/u in Clinic   cont NC O2 - has at home    # Normocytic anemia, at baseline.   chronic dz    # DLD  on statin    # Steroids induced Hyperglycemia; HgA1c- 7.2  diabetic diet  no tx, since pt will be off steroids soon     # Hx of anxiety  should see psych as outpt  cont klonopin  pt is NOT suicidal    # lung CA s/p CT/RT and partial left lobectomy (in remission),     # chronic Afib s/p ablation; hx micra PPM (placed at [facility])  HR OK  coumadin changed to Eliquis (covered by insurance)    # cont bowel regimen    # DVT PPX - on Eliquis    Dispo: d/c home today; will go via ambulance    Pt declined to go to SNF.  At this time no Home Care Agency has accepted her case.

## 2021-06-30 NOTE — CHART NOTE - NSCHARTNOTEFT_GEN_A_CORE
<<<RESIDENT DISCHARGE NOTE>>>     CASEY POWERS  MRN-[id number]    Patient is a 76 yo Female  with pmh of anxiety, Endstage COPD (on 3 L home O2 ) with multiple admissions for recurrent exacerbations, Lung CA s/p chemo/radiation/ and partial left lobectomy in remission x 9 years, severe pulmonary hypertension, Chronic Afib on Coumadin s/p ablation and micra PPM presents to ED for worsening dyspnea due to recurrent COPD exacerbation. She was treated with steroids, albuterol, and tiotropium. Pt will continue albuterol and tiotropium as outpatient and will taper off the steroids. She was also changed from warfarin to Eliquis due to history of recent falls and inability to maintain frequent INR check appointments. She is medically stable for discharge with a home health aid.     VITAL SIGNS:  T(F): 98.2 (06-30-21 @ 05:05), Max: 98.2 (06-30-21 @ 05:05)  HR: 77 (06-30-21 @ 08:13)  BP: 114/56 (06-30-21 @ 05:05)  SpO2: 99% (06-30-21 @ 08:13)      PHYSICAL EXAMINATION:  General:   Head & Neck:  Pulmonary:  Cardiovascular:  Gastrointestinal/Abdomen & Pelvis:  Neurologic/Motor:    TEST RESULTS:                        10.2   8.23  )-----------( 144      ( 30 Jun 2021 06:12 )             31.3       06-30    139  |  99  |  19  ----------------------------<  141<H>  3.8   |  31  |  0.8    Ca    9.5      30 Jun 2021 06:12  Mg     1.8     06-30        FINAL DISCHARGE INTERVIEW:  Resident(s) Present: (Name:_____________), RN Present: (Name:  ___________)    DISCHARGE MEDICATION RECONCILIATION  reviewed with Attending (Name:___________)    DISPOSITION:   [  ] Home,    [  ] Home with Visiting Nursing Services,   [    ]  SNF/ NH,    [   ] Acute Rehab (4A),   [   ] Other (Specify:_________) <<<RESIDENT DISCHARGE NOTE>>>     CASEY POWERS  MRN-[id number]    Patient is a 74 yo Female  with pmh of anxiety, Endstage COPD (on 3 L home O2 ) with multiple admissions for recurrent exacerbations, Lung CA s/p chemo/radiation/ and partial left lobectomy in remission x 9 years, severe pulmonary hypertension, Chronic Afib on Coumadin s/p ablation and micra PPM presents to ED for worsening dyspnea due to recurrent COPD exacerbation. She was treated with steroids, albuterol, and tiotropium. Pt will continue albuterol and tiotropium as outpatient and will taper off the steroids. She was also changed from warfarin to Eliquis due to history of recent falls and inability to maintain frequent INR check appointments. She is medically stable for discharge with a home health aid.     VITAL SIGNS:  T(F): 98.2 (06-30-21 @ 05:05), Max: 98.2 (06-30-21 @ 05:05)  HR: 77 (06-30-21 @ 08:13)  BP: 114/56 (06-30-21 @ 05:05)  SpO2: 99% (06-30-21 @ 08:13)      PHYSICAL EXAMINATION:  General: Seated on the hospital bed. NAD. AAOx3. Wearing nasal canula.   Head & Neck: Atraumatic. Normocephalic. EOMI. PERRL.  Pulmonary: Diffuse wheezing. Otherwise CTA. No rhonchi, rales.  Cardiovascular: Normal S1, S2. RRR. No murmurs, rubs, or gallops. 2+ peripheral pulses. No cyanosis or edema.  Gastrointestinal/Abdomen & Pelvis: Soft, NTND, BSx4q.   Neurologic/Motor: Moving all extremities. Grossly intact.    TEST RESULTS:                        10.2   8.23  )-----------( 144      ( 30 Jun 2021 06:12 )             31.3       06-30    139  |  99  |  19  ----------------------------<  141<H>  3.8   |  31  |  0.8    Ca    9.5      30 Jun 2021 06:12  Mg     1.8     06-30        FINAL DISCHARGE INTERVIEW:  Resident(s) Present: (Name: Dr. Saeed and Dr. Renee), RN Present: (Name: RN_)    DISCHARGE MEDICATION RECONCILIATION  reviewed with Attending (Name: Dr. Ahmadi)    DISPOSITION:   [  ] Home,    [ X ] Home with Visiting Nursing Services,   [    ]  SNF/ NH,    [   ] Acute Rehab (4A),   [   ] Other (Specify:_________)

## 2021-06-30 NOTE — PROGRESS NOTE ADULT - SUBJECTIVE AND OBJECTIVE BOX
CASEY POWERS  75y  Female  ***My note supersedes ALL resident notes that I sign.  My corrections for their notes are in my note.***    I can be reached directly on [id number]. My office number is [phone]. My personal cell number is [phone].    INTERVAL EVENTS: Here for f/u of COPD. Pt is begging to go home today. Says she feels OK. She is very anxious.    T(F): 98.2 (06-30-21 @ 05:05), Max: 98.2 (06-30-21 @ 05:05)  HR: 77 (06-30-21 @ 08:13) (70 - 77)  BP: 114/56 (06-30-21 @ 05:05) (114/56 - 120/61)  RR: 19 (06-30-21 @ 08:13) (18 - 19)  SpO2: 99% (06-30-21 @ 08:13) (98% - 99%)    Gen: NAD; + NC O2  HEENT: PERRL, EOMI, mouth clr, nose clr  Neck: no nodes, no JVD, thyroid nl  lungs: decr BS; no wheeze  hrt: s1 s2 rrr no murmur  abd: soft, NT/ND, no HS megaly  ext: no edema, no c/c  neuro: aa ox3, cn intact, can move all 4 ext    LABS:                      10.2    (    86.9   8.23  )-----------( ---------      144      ( 30 Jun 2021 06:12 )             31.3    (    14.6     139   (   99   (   141      06-30-21 @ 06:12  ----------------------               3.8   (   31   (   19                             -----                        0.8  Ca  9.5   Mg  1.8    P   --     PT/INR - ( 29 Jun 2021 04:30 )   PT: 21.20 sec;   INR: 1.84 ratio      CARDIAC MARKERS ( 28 Jun 2021 18:24 )  x     / x     / 50 U/L / x     / x        Culture - Urine (collected 06-28-21 @ 16:27)  Source: .Urine Clean Catch (Midstream)  Final Report (06-29-21 @ 17:16):    <10,000 CFU/mL Normal Urogenital Berkley    Culture - Urine (collected 06-27-21 @ 12:25)  Source: .Urine Clean Catch (Midstream)  Final Report (06-29-21 @ 00:39):    <10,000 CFU/mL Normal Urogenital Berkley    RADIOLOGY & ADDITIONAL TESTS:  < from: Xray Chest 1 View- PORTABLE-Urgent (Xray Chest 1 View- PORTABLE-Urgent .) (06.27.21 @ 20:37) >  Impression:    Postoperative changes left lung/left lung apex. Right opacities/nodules    < end of copied text >    < from: CT Head No Cont (06.27.21 @ 11:44) >  IMPRESSION:  No acute intracranial hemorrhage or mass effect.    < end of copied text >    MEDICATIONS:  cefpodoxime 200 milliGRAM(s) Oral every 12 hours    ALBUTerol    0.083% 2.5 milliGRAM(s) Nebulizer every 6 hours  ALBUTerol    90 MICROgram(s) HFA Inhaler 2 Puff(s) Inhalation every 4 hours PRN  apixaban 5 milliGRAM(s) Oral two times a day  atorvastatin 20 milliGRAM(s) Oral at bedtime  clonazePAM  Tablet 1 milliGRAM(s) Oral every 8 hours PRN  ondansetron Injectable 4 milliGRAM(s) IV Push every 8 hours PRN  pantoprazole    Tablet 40 milliGRAM(s) Oral before breakfast  polyethylene glycol 3350 17 Gram(s) Oral daily  senna 2 Tablet(s) Oral at bedtime  tiotropium 18 MICROgram(s) Capsule 1 Capsule(s) Inhalation once  
  CASEY POWERS    Subjective: Want to go home today - explained as of now 5p no homecare agency is available to take her case will try again tomorrow    REVIEW OF SYSTEMS:  Chronic Dyspnea  General weakness  All others negative      Vital Signs Last 24 Hrs  T(C): 36 (29 Jun 2021 12:31), Max: 36.2 (28 Jun 2021 20:55)  T(F): 96.8 (29 Jun 2021 12:31), Max: 97.1 (28 Jun 2021 20:55)  HR: 70 (29 Jun 2021 12:31) (69 - 79)  BP: 101/59 (29 Jun 2021 12:31) (95/53 - 112/58)  RR: 20 (29 Jun 2021 12:31) (18 - 20)  SpO2: 97% (29 Jun 2021 07:48) (97% - 97%)      PHYSICAL EXAM:  General: NAD, AAOX3, patient is laying comfortably in bed  HEENT: AT, NC, Supple, NO JVD, NO CB  Lungs: mild decreased breath sounds B/L, no wheezing, no rhonchi  CVS: normal S1, S2, RRR, NO M/G/R  Abdomen: soft, bowel sounds present, non-tender, non-distended  Extremities: no edema, no clubbing, no cyanosis, positive peripheral pulses b/l  Neuro: no acute focal neurological deficits, mild generalized body weakness  Skin: no rash, no ecchymosis                        10.0   6.42  )-----------( 149      ( 29 Jun 2021 04:30 )             31.5     06-29    136  |  96<L>  |  19  ----------------------------<  284<H>  4.5   |  29  |  0.7    Ca    9.5      29 Jun 2021 04:30  Mg     1.8     06-27    TPro  5.9<L>  /  Alb  4.2  /  TBili  0.6  /  DBili  x   /  AST  17  /  ALT  17  /  AlkPhos  75  06-27    MEDICATIONS  (STANDING):  ALBUTerol    0.083% 2.5 milliGRAM(s) Nebulizer every 6 hours  apixaban 5 milliGRAM(s) Oral two times a day  atorvastatin 20 milliGRAM(s) Oral at bedtime  cefpodoxime 200 milliGRAM(s) Oral every 12 hours  pantoprazole    Tablet 40 milliGRAM(s) Oral before breakfast  polyethylene glycol 3350 17 Gram(s) Oral daily  predniSONE   Tablet 60 milliGRAM(s) Oral daily  senna 2 Tablet(s) Oral at bedtime  tiotropium 18 MICROgram(s) Capsule 1 Capsule(s) Inhalation once    MEDICATIONS  (PRN):  ALBUTerol    90 MICROgram(s) HFA Inhaler 2 Puff(s) Inhalation every 4 hours PRN Shortness of Breath  clonazePAM  Tablet 1 milliGRAM(s) Oral every 8 hours PRN anxiety  ondansetron Injectable 4 milliGRAM(s) IV Push every 8 hours PRN Nausea and/or Vomiting    Assessment and Plan:    Patient is a 76 yo Female  with pmh of anxiety, Endstage COPD (on 3 L home O2 ) with multiple admissions for recurrent exacerbations , Lung CA s/p chemo/radiation/ and partial left lobectomy in remission x 9 years, severe pulmonary hypertension,  Afib on Coumadin s/p ablation and micra PPM presents to ED for worsening dyspnea due to recurrent COPD exacerbation.     # Chronic Resp Failure Home O2 dependent 3L   # Acute COPD exacerbation  # Acute on chronic hypoxic respiratory failure/Home oxygen 3L dependent    Complete 3 more days of Zithromax  Nebs q6h  Prednisone 40mg po qd   c/w LABA/ICS inhaler.  CXR - no new infiltrates  Pulm f/u in Clinic (Used to be pt of Dr. Miles now will f/u in same office)   Covid PCR - negative     # Normocytic anemia, at baseline.     # Steroids induced Hyperglycemia HgA1c- 7.2  FSs. Insulin PRN.     # Hx of anxiety, lung CA s/p CT/RT and partial left lobectomy (in remission), severe pulmonary HTN, and chronic Afib (Coumadin) s/p ablation and micra PPM.     DVT PPX - patient is on coumadin tx.      Dispo:   D/C planning discussed w/ pt at bedside and pls reach out to son tomorrow [place] [phone].   Pt needs to go to SNF but refuses and only wants to go home.   At this time no SNF or Home Care Agency has accepted her case.    Pt is very difficult  Pt denies suicidality says she said what she said because she wants to go home. She wants to live and see her grand kids grow up and take care of them.   
CASEY POWERS 75y Female  MRN#: [id number]   CODE STATUS: DNR/DNI    Hospital Day: 1d    Pt is currently admitted with the primary diagnosis of weakness    SUBJECTIVE  Hospital Course  74 yo F with PMH of anxiety, Endstage COPD (on 3 L home O2 ) with multiple admissions for recurrent exacerbations , lung CA s/p chemo/radiation/ and partial left lobectomy in remission x 9 years, severe pulmonary hypertension,  Afib on Coumadin s/p ablation and micra PPM presents to ED following recent discharge on 6/26, following fall at home. Pt was recently admitted with COPD exacerbation and discharged home following improvement of symptoms. She reports that she fell at home after slipping on cat food. The impact was on her right upper back, and she reports some tenderness in the area. Denies any LOC. No dizziness, no headache no visual disturbance. She denies any other sxs, including SOB, chest pain, palpitations. No dysuria, urinary frequency or urgency. No abdominal pain, no N/V, no change in bowel movements. In the ER, VS WNL. She was given 1 dose of ceftriaxone in ED. UA + LE and wbc. Patient admitted for placement, pending PT consult.     Overnight events   Pt expressed suicidal ideation overnight. No intention, plan. 1:1 observation started. Psych consult placed.    Subjective complaints   Patient wants to go home today. Has no complaints this AM. Denies chest pain, SOB, N/V/C/D, abdominal pain, edema, cyanosis, headache.     Present Today:   - Thibodeaux:  No [ X ], Yes [   ] : Indication:     - Type of IV Access:       .. CVC/Piccline:  No [ X ], Yes [   ] : Indication:       .. Midline: No [ X ], Yes [   ] : Indication:                                             ----------------------------------------------------------  OBJECTIVE  PAST MEDICAL & SURGICAL HISTORY  High cholesterol    Anxiety    Pacemaker    Lung cancer    Afib    COPD (chronic obstructive pulmonary disease)    Artificial cardiac pacemaker    H/O atrioventricular kacie ablation    S/P lobectomy of lung  left    History of tonsillectomy    S/P appendectomy    Cataract  RIGHT  6/17 AND  LEFT  7/5/19                                              -----------------------------------------------------------  ALLERGIES:  bacitracin (Other)  carboplatin (Other)  sulfa drugs (Unknown)  sulfonamides (Unknown)  Xanax (Other)                                            ------------------------------------------------------------    HOME MEDICATIONS  Home Medications:  atorvastatin 20 mg oral tablet: 1 tab(s) orally once a day (05 Jun 2021 22:41)  clonazePAM 1 mg oral tablet: 1 tab(s) orally every 8 hours, As Needed (21 May 2021 12:12)  pantoprazole 40 mg oral delayed release tablet: 1 tab(s) orally once a day (before a meal) (21 May 2021 12:12)  warfarin 1 mg oral tablet: 1 tab(s) orally once a day (05 Jun 2021 22:41)                           MEDICATIONS:  STANDING MEDICATIONS  ALBUTerol    0.083% 2.5 milliGRAM(s) Nebulizer every 6 hours  apixaban 5 milliGRAM(s) Oral two times a day  atorvastatin 20 milliGRAM(s) Oral at bedtime  cefpodoxime 200 milliGRAM(s) Oral every 12 hours  pantoprazole    Tablet 40 milliGRAM(s) Oral before breakfast  polyethylene glycol 3350 17 Gram(s) Oral daily  predniSONE   Tablet 60 milliGRAM(s) Oral daily  senna 2 Tablet(s) Oral at bedtime  tiotropium 18 MICROgram(s) Capsule 1 Capsule(s) Inhalation once    PRN MEDICATIONS  ALBUTerol    90 MICROgram(s) HFA Inhaler 2 Puff(s) Inhalation every 4 hours PRN  clonazePAM  Tablet 1 milliGRAM(s) Oral every 8 hours PRN  ondansetron Injectable 4 milliGRAM(s) IV Push every 8 hours PRN                                            ------------------------------------------------------------  VITAL SIGNS: Last 24 Hours  T(C): 36 (29 Jun 2021 12:31), Max: 36.2 (28 Jun 2021 20:55)  T(F): 96.8 (29 Jun 2021 12:31), Max: 97.1 (28 Jun 2021 20:55)  HR: 70 (29 Jun 2021 12:31) (69 - 79)  BP: 101/59 (29 Jun 2021 12:31) (95/53 - 112/58)  BP(mean): --  RR: 20 (29 Jun 2021 12:31) (18 - 20)  SpO2: 97% (29 Jun 2021 07:48) (97% - 97%)                                             --------------------------------------------------------------  LABS:                        10.0   6.42  )-----------( 149      ( 29 Jun 2021 04:30 )             31.5     06-29    136  |  96<L>  |  19  ----------------------------<  284<H>  4.5   |  29  |  0.7    Ca    9.5      29 Jun 2021 04:30  Mg     1.8     06-27    TPro  5.9<L>  /  Alb  4.2  /  TBili  0.6  /  DBili  x   /  AST  17  /  ALT  17  /  AlkPhos  75  06-27    PT/INR - ( 29 Jun 2021 04:30 )   PT: 21.20 sec;   INR: 1.84 ratio          Creatine Kinase, Serum: 50 U/L (06-28-21 @ 18:24)      Culture - Urine (collected 28 Jun 2021 16:27)  Source: .Urine Clean Catch (Midstream)  Final Report (29 Jun 2021 17:16):    <10,000 CFU/mL Normal Urogenital Berkley    Culture - Urine (collected 27 Jun 2021 12:25)  Source: .Urine Clean Catch (Midstream)  Final Report (29 Jun 2021 00:39):    <10,000 CFU/mL Normal Urogenital Berkley      CARDIAC MARKERS ( 28 Jun 2021 18:24 )  x     / x     / 50 U/L / x     / x      CARDIAC MARKERS ( 27 Jun 2021 22:49 )  x     / <0.01 ng/mL / x     / x     / x                                                  -------------------------------------------------------------  RADIOLOGY:  CT Head 6/27 - No acute intracranial hemorrhage or mass effect.  CXR 6/27 - Postoperative changes left lung/left lung apex. Right opacities/nodules                                            --------------------------------------------------------------    PHYSICAL EXAM:  General: Laying on the hospital bed in NAD. AAOx3.   HEENT: Atraumatic. Normocephalic. EOMI. Conjunctiva and sclera clear.   LUNGS: Some wheezing. Otherwise CTA.   HEART: Normal S1, S2. RRR. No murmurs, rubs, or gallops.   ABDOMEN: Soft, NTND, bsx4q.  EXT: 2+ peripheral pulses. moves all extremities. no cyanosis or edema.  NEURO: Grossly intact.  SKIN: No rashes, lesions.                                           --------------------------------------------------------------

## 2021-07-02 NOTE — ED ADULT NURSE NOTE - AS TEMP SITE
oral [FreeTextEntry1] : Comes in for annual physical. [de-identified] : Feeling well.\par Denies covid illness.

## 2021-07-06 NOTE — ED CDU PROVIDER INITIAL DAY NOTE - ATTENDING CONTRIBUTION TO CARE
74 yo female PMH end stage COPD on home oxygen, pulmonary HTN, lung cancer in remission for years , HLD, recently admitted for COPD exacerbation was brought to ED for evaluation after she took 23 tabs of Clonopin this AM.  Patient was evaluated by tox and decision was made to place her in OBS .  Elderly female sitting on a stretcher, she appears  somnolent, but wakes up when her name is called.  She did not  want to answer any of our questions, said " I just want to be left alone"; head AT/NC, no meningeal signs, mmm, no acute respiratory distress, fine crackles b/l, RRR, abdomen soft, NT/ND, no leg edema.  Will continue observation in EDOU, psych consult when fully awake and able to rodolfo on conversation.

## 2021-07-06 NOTE — ED ADULT NURSE NOTE - COVID-19 RESULT
Was the patient seen in the last year in this department? Yes    Does patient have an active prescription for medications requested? No     Received Request Via: Pharmacy     NEGATIVE

## 2021-07-06 NOTE — ED PROVIDER NOTE - PROGRESS NOTE DETAILS
aidee: spoke with tox- recc abg. tachypnea. (possible co-ingestion). vs 136/64, HR-69 RR - 30 EtCo2- 29. Pt is somnolent- will wake up to sternal rub AH - spoke with tox, updated with results, anticipate obs AH - spoke with tox, updated with results, anticipate obs  aidee; 11:43am- pt remains somnolent. Vitals reviewed by me noted to be wnl. Labs reviewed by me, values noted to be within normal limits. abg noted to have retention of pc02

## 2021-07-06 NOTE — ED ADULT NURSE NOTE - ED STAT RN HANDOFF DETAILS
1. Have you had increased asthma symptoms (chest tightness, increased cough, wheezing or felt short of breath) in the past week? No    2. Have you had a marked increase in allergy symptoms(itchy eyes or nose, sneezing, runny nose, post nasal drip or throat clearing) in the past week? No    3. Do you have a cold, respiratory tract infection, or flu-like symptoms? No    4. Did you have any problems such as increased allergy or asthma symptoms, hives or generalized itching within 12 hours of receiving your allergy injection or swelling that persisted into the next day? No    5. Are you on any new medications such as eye drops, blood pressure or migraine medication?  Yes    Please specify: Lisinopril    6. Patient was seen by allergist in the last 12 months?  Yes    7. Are you taking your allergy medicine as prescribed? Yes    8. Do you have your Epi kit with you? Yes    9. Epi expiration date: 3/29/2020     Staff notes:  Patient tolerating injections well    
Report given to RN

## 2021-07-06 NOTE — ED CDU PROVIDER INITIAL DAY NOTE - MEDICAL DECISION MAKING DETAILS
76 yo female with intentional overdose after taking multiple tabs of Clonopin..  As per toxicology service patient placed in OBS unit for observation.  Plan Psych eval when patient is ready to be interviewed.

## 2021-07-06 NOTE — ED ADULT TRIAGE NOTE - CHIEF COMPLAINT QUOTE
pt BIBA for take approximately 27 Klonpnin  , pt lethargic , pt states wants to kill herself ; 1:1 initated in triage; as per EMS pt  is unsure if bottle was fullor not took about 20 minutes -30 minutes ago

## 2021-07-06 NOTE — ED CDU PROVIDER INITIAL DAY NOTE - OBJECTIVE STATEMENT
74 y/o F, PMHx  old female with past medical history significant for lung cancer presents to the Emergency Department after a reported ingestion of clonazepam.  Patient reports that she took 27 tablets of 0.5 mg Clonazepam 30 minutes prior to arrival.  Upon arrival to the Emergency Department, the patient has her empty pill bottle with her.  The patient was only responsive to physical stimuli upon initial presentation.  Co-ingestions are denied. 74 y/o F, PMHx End Stage COPD - on 3L home 02, Atrial Fibrillation (on Eliquis), Lung Cancer - s/p chemo/s/p RT/s/p partial left lobectomy, HLD & Pulmonary HTN, presents to the ED after taking approximately 27 pills of Klonopin 0.5m thirty minutes prior to arrival. Toxicology was consulted in the ED and they recommended observation for monitoring and eventual psychiatric consultation. Patient is somnolent but accusable to verbal and physical stimuli.

## 2021-07-06 NOTE — CONSULT NOTE ADULT - SUBJECTIVE AND OBJECTIVE BOX
MEDICAL TOXICOLOGY CONSULT    HPI: Patient is a 75 year old female with past medical history significant for lung cancer presents to the Emergency Department after a reported ingestion of clonazepam.  Patient reports that she took 27 tablets of 0.5 mg Clonazepam 30 minutes prior to arrival.  Upon arrival to the Emergency Department, the patient has her empty pill bottle with her.  The patient was only responsive to physical stimuli upon initial presentation.  Co-ingestions are denied.      ONSET / TIME of exposure(s): 30 minutes prior to arrival    QUANTITY of exposure(s): 27 tablets of 0.5 mg Clonazepam    ROUTE of exposure:  ingestion    CONTEXT of exposure: at home    ASSOCIATED symptoms: somnolent, arousable only to physical stimuli    PAST MEDICAL & SURGICAL HISTORY:  High cholesterol    Anxiety    Pacemaker    Lung cancer    Afib    COPD (chronic obstructive pulmonary disease)    Artificial cardiac pacemaker    H/O atrioventricular kacie ablation    S/P lobectomy of lung  left    History of tonsillectomy    S/P appendectomy    Cataract  RIGHT  6/17 AND  LEFT  7/5/19        MEDICATION HISTORY:      FAMILY HISTORY:  No pertinent family history in first degree relatives        REVIEW OF SYSTEMS:   unable to perform due to intoxication, dementia, or illness      Vital Signs Last 24 Hrs  T(C): 36.4 (06 Jul 2021 09:49), Max: 36.4 (06 Jul 2021 09:38)  T(F): 97.6 (06 Jul 2021 09:49), Max: 97.6 (06 Jul 2021 09:38)  HR: 69 (06 Jul 2021 12:58) (65 - 76)  BP: 112/62 (06 Jul 2021 12:58) (111/61 - 147/65)  BP(mean): --  RR: 26 (06 Jul 2021 12:58) (15 - 26)  SpO2: 100% (06 Jul 2021 12:58) (100% - 100%)    SIGNIFICANT LABORATORY STUDIES:                        9.8    9.31  )-----------( 142      ( 06 Jul 2021 10:00 )             30.2       07-06    134<L>  |  95<L>  |  20  ----------------------------<  131<H>  4.2   |  29  |  0.9    Ca    9.1      06 Jul 2021 10:00    TPro  5.6<L>  /  Alb  3.8  /  TBili  0.7  /  DBili  x   /  AST  22  /  ALT  16  /  AlkPhos  63  07-06              Anion Gap: 10 07-06 @ 10:00  CK: -- 07-06 @ 10:00  Troponin:  --  07-06 @ 10:00  Pro-BNP:  --  07-06 @ 10:00  VBG:  --  07-06 @ 10:00  Carboxyhemoglobin %:  --  07-06 @ 10:00  Methemoglobin %:  --  07-06 @ 10:00  Osmolality Serum:  --  07-06 @ 10:00  Aspirin Level: <0.3<L>  07-06 @ 10:00  Acetaminophen Level:  <5.0<L>  07-06 @ 10:00  Ethanol Level:  --  07-06 @ 10:00  Digoxin Level:  --  07-06 @ 10:00  Phenytoin Level:  --  07-06 @ 10:00  Carbamazepine level:  --  07-06 @ 10:00  Lamotrigine level:  --  07-06 @ 10:00

## 2021-07-06 NOTE — ED PROVIDER NOTE - PHYSICAL EXAMINATION
CONSTITUTIONAL: somnolent but arousable  SKIN: Warm, dry  HEAD: Normocephalic; atraumatic  EYES: No conjunctival injection. PERRLA. EOMI  ENT: oropharynx nonerythematous; airway clear  NECK: Supple; non tender  CARD:  Regular rate and rhythm  RESP: CTAB; mildly tachypneic  ABD: Soft NTND; No guarding or rebound tenderness  EXT: Normal ROM.  No clubbing or cyanosis.  No edema  NEURO: A&O x0, grossly unremarkable, no focal deficits, moving all extremities, somnolent but arousable  PSYCH: unable to assess

## 2021-07-06 NOTE — ED PROVIDER NOTE - OBJECTIVE STATEMENT
76 yo F with PMH Anxiety on Clonipin/?Xanax, Lung ca, 74 yo F with PMH Anxiety on Klonipin/?Xanax, Lung ca, COPD on 3L home O2, Afib, Pacemaker, HLD who presents s/p taking approximately 27 pills of Klonipin. Around 915 AM.  Presumably a suicide attempt. Pt stating she just wants to be left alone.

## 2021-07-06 NOTE — CONSULT NOTE ADULT - ASSESSMENT
Patient is a 75 year old female presenting after taking 27 tablets of 0.5 mg Clonazepam.  Patient somnolent, but protecting airway in the Emergency Department.    1. Somnolence   - secondary to ingestion of benzodiazepine   - placement in toxicology observation for end-tidal monitoring until mental and clinical status improves Patient is a 75 year old female presenting after taking 27 tablets of 0.5 mg Clonazepam.  Patient somnolent, but protecting airway in the Emergency Department.    1. Somnolence   - secondary to ingestion of benzodiazepine   - placement in toxicology observation for end-tidal monitoring until mental and clinical status improves    I personally evaluated the patient. I reviewed the Resident’s or Physician Assistant’s note (as assigned above), and agree with the findings and plan except as documented in my note.    76 yo woman with intentional overdose of clonazepam.  Sedation with normal vital signs.  Supportive care.  If can avoid mechanical ventilation, this would be an ideal patient to place in tox obs unit for overnight monitoring until psych eval and dispo possible.      --Will continue to follow. Please call with any further questions    Sera    [phone] (pager)

## 2021-07-06 NOTE — ED ADULT NURSE NOTE - NSIMPLEMENTINTERV_GEN_ALL_ED
Implemented All Fall with Harm Risk Interventions:  [place] to call system. Call bell, personal items and telephone within reach. Instruct patient to call for assistance. Room bathroom lighting operational. Non-slip footwear when patient is off stretcher. Physically safe environment: no spills, clutter or unnecessary equipment. Stretcher in lowest position, wheels locked, appropriate side rails in place. Provide visual cue, wrist band, yellow gown, etc. Monitor gait and stability. Monitor for mental status changes and reorient to person, place, and time. Review medications for side effects contributing to fall risk. Reinforce activity limits and safety measures with patient and family. Provide visual clues: red socks.

## 2021-07-07 NOTE — BEHAVIORAL HEALTH ASSESSMENT NOTE - HPI (INCLUDE ILLNESS QUALITY, SEVERITY, DURATION, TIMING, CONTEXT, MODIFYING FACTORS, ASSOCIATED SIGNS AND SYMPTOMS)
Ms. 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 Patient was somnolent and largely non-responsive to verbal questioning--HPI obtained from chart report and RN collateral as unable to obtain consent for family collateral (chart specifically mentions that patient does not want  contacted about this hospitalization).    Upon approach, the patient is somnolent laying supine in bed under the covers. She appears frail with dyed pink disheveled hair. The patient is intermittently rousable to voice, and is able to state that she's at the hospital. She is able to follow commands to squeeze examiner's finger with each hand, but unable or unwilling to visually track finger with her eyes. She has increased work of breathing, and is tachycardic (110 bpm) with dropping O2 saturation into the 40s during the brief exam. Per her RN, her vitals have been otherwise normal with good O2 sats. Per RN, she intermittently converses with her medicine team, can communicate about toileting, but has been mostly somnolent.    Prior to this admission, the patient was discharged on 6/30 after admission for acute COPD exacerbation. During this admission, she reportedly expressed suicidal ideation overnight for which she was assessed by Dr. Lim's who noted that "patient reported being better of dead than alive.  Previously seen by Dr. Lim, during last hospitalization,  patient adamantly denies making this statement as a threat to kill herself, but rather was eager to  be discharged in order to get home on time to take care of her cat.  She is still denying any suicidal ideation and currently not psychotic, not manic. She is agreeable to follow up with [facility] outpatient  [facility] telepsychiatry service with Dr. Lim." Ms. 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 Patient was somnolent and largely non-responsive to verbal questioning--HPI obtained from chart report and RN collateral as unable to obtain consent for family collateral (chart specifically mentions that patient does not want  contacted about this hospitalization).    Upon approach, the patient is somnolent laying supine in bed under the covers. She appears frail with dyed pink disheveled hair. The patient is intermittently rousable to voice, and is able to state that she's at the hospital. She is able to follow commands to squeeze examiner's finger with each hand, but unable or unwilling to visually track finger with her eyes.   She appears to have increased work of breathing, and is tachycardic (110 bpm) with dropping O2 saturation into the 40s during the brief exam. Per her RN, her vitals have been otherwise normal with good O2 sats.   Per RN, she intermittently converses with her medicine team, can communicate about toileting, but has been mostly somnolent.    Prior to this admission, the patient was discharged on 6/30 after admission for acute COPD exacerbation. During this admission, she reportedly expressed suicidal ideation overnight for which she was assessed by the  psychiatrist who noted that "patient reported being better of dead than alive.  Previously seen by Dr. Lim, during last hospitalization,  patient adamantly denies making this statement as a threat to kill herself, but rather was eager to  be discharged in order to get home on time to take care of her cat.  She is still denying any suicidal ideation and currently not psychotic, not manic. She is agreeable to follow up with [facility] outpatient  [facility] telepsychiatry service.    Upon attempt to re-evalaute patient later in the day, patient continued to be sedated and was difficult to arouse despite multiple attempts to wake her up.

## 2021-07-07 NOTE — ED CDU PROVIDER SUBSEQUENT DAY NOTE - PROGRESS NOTE DETAILS
Patient remains somnolent however more arousable than yesterday. Responsive to commands and maintaining airway. Will admit to hospital ; still will need phychiatric eval once more awake.

## 2021-07-07 NOTE — BEHAVIORAL HEALTH ASSESSMENT NOTE - VIOLENCE RISK FACTORS:
IMPRESSION:    Possible LGIB not on A/C  H/O HFpEF and Severe MR  COPD stable  H/O Diverticulosis    PLAN:    CNS: no sedatives    HEENT:  Oral care    PULMONARY:  HOB @ 45 degrees, Cont home inhalers    CARDIOVASCULAR: I=O, repeat echo, cardiac enzymes    GI: PPI drip for now, GI follow up, NPO    RENAL:  F/u  lytes.  Correct as needed. accurate I/O    INFECTIOUS DISEASE: no abx    HEMATOLOGICAL:  DVT prophylaxis- SCD, serial CBC    ENDOCRINE:  Follow up FS.  Insulin protocol if needed.    MUSCULOSKELETAL: bedrest    LINES/FRANCOIS: no feoly 2- 18G peripherals    CODE STATUS: FULL CODE    DISPOSITION: Pt requires continued monitoring in the MICU, possible transfer to telemetry in PM if H/H stable and no active bleeding IMPRESSION:    GIB   H/O HFpEF and Severe MR  H/O Diverticulosis    PLAN:    CNS: no sedatives    HEENT:  Oral care    PULMONARY:  HOB @ 45 degrees, Cont home inhalers    CARDIOVASCULAR: I=O, repeat echo, cardiac enzymes    GI: PPI drip for now, GI follow up, NPO    RENAL:  F/u  lytes.  Correct as needed. accurate I/O    INFECTIOUS DISEASE: no abx    HEMATOLOGICAL:  DVT prophylaxis- SCD, serial CBC    ENDOCRINE:  Follow up FS.  Insulin protocol if needed.    MUSCULOSKELETAL: bedrest    LINES/FRANCOIS: no feoly 2- 18G peripherals    CODE STATUS: FULL CODE    DISPOSITION: MICU for now.  Reassess after GI evaluation and repeat CBC None Known

## 2021-07-07 NOTE — BEHAVIORAL HEALTH ASSESSMENT NOTE - NSBHREFERLPTEAMNAME_PSY_A_CORE_FT
each by Does not apply route daily 4 each 0    Misc. Devices (EXTENDABLE BEDSIDE RAIL) MISC 4 each by Does not apply route daily 4 each 0    Spacer/Aero-Holding Chambers PRICE 1 Device by Does not apply route daily as needed (wheezing) 1 Device 0    fluticasone-salmeterol (ADVAIR) 250-50 MCG/DOSE AEPB Inhale 1 puff into the lungs every 12 hours 60 each 3    Cholecalciferol (VITAMIN D3) 2000 units TABS Take 1 tablet by mouth daily 90 tablet 1    Misc. Devices [facility]) MISC Use as directed 1 each 0    Misc. Devices (WALL GRAB BAR) MISC GRAB BARS FOR BOTH SHOWER AND TOILET, USE AS DIRECTED 6 each 0    Misc. Devices MISC OUTSIDE RAMP THAT INCLUDES RAILINGS, USE AS DIRECTED 1 Device 0    Misc. Devices (RAISED TOILET SEAT) MISC USE AS DIRECTED 1 each 0    hydrocortisone 2.5 % cream apply to affected area twice a day 30 g 1    EPINEPHrine (EPIPEN) 0.3 MG/0.3ML SOAJ injection INJECT AS DIRECTED IF NEEDED  0    neomycin-polymyxin-hydrocortisone (CORTISPORIN) 3.5-74938-8 otic suspension instill 4 drops into affected ear  (S) three times a day  0    loratadine (CLARITIN) 10 MG tablet Take 10 mg by mouth daily  0    fluticasone (FLONASE) 50 MCG/ACT nasal spray 1 spray by Nasal route daily      Antipyrine-Benzocaine (AURALGAN) 54-14 MG/ML otic solution Place 3 drops into the left ear 4 times daily as needed for Pain      albuterol (PROVENTIL HFA) 108 (90 BASE) MCG/ACT inhaler Inhale 2 puffs into the lungs every 4 hours as needed for Wheezing or Shortness of Breath (Space out to every 6 hours as symptoms improve). Space out to every 6 hours as symptoms improve.  1 Inhaler 0    losartan-hydrochlorothiazide (HYZAAR) 100-25 MG per tablet Take 1 tablet by mouth daily 1/2 pill daily      hydrOXYzine (ATARAX) 25 MG tablet Take 25 mg by mouth daily as needed for Itching       Elastic Bandages & Supports (JOBST KNEE HIGH COMPRESSION SM) MISC Dispense Bilateral Jobst Below Knee 20-30mmHg compression stockings 3 each 3  Misc. Devices [facility]) MISC 1 Device by Does not apply route once for 1 dose 1 each 0     No current facility-administered medications on file prior to visit. Review of Systems. Review of Systems:   History obtained from chart review and the patient  General ROS: negative for - chills, fatigue, fever, night sweats or weight gain  Constitutional: Negative for chills, diaphoresis, fatigue, fever and unexpected weight change. Musculoskeletal: Positive for arthralgias, gait problem and joint swelling. Neurological ROS: negative for - behavioral changes, confusion, headaches or seizures. Negative for weakness and numbness. Dermatological ROS: negative for - mole changes, rash  Cardiovascular: Negative for leg swelling. Gastrointestinal: Negative for constipation, diarrhea, nausea and vomiting. Objective:  General: AAO x 3 in NAD. Derm  Toenail Description  Sites of Onychomycosis Involvement (Check affected area)  [x] [x] [x] [x] [x] [x] [x] [x] [x] [x]  5 4 3 2 1 1 2 3 4 5                          Right                                        Left    Thickness  [x] [x] [x] [x] [x] [x] [x] [x] [x] [x]  5 4 3 2 1 1 2 3 4 5                         Right                                        Left    Dystrophic Changes   [x] [x] [x] [x] [x] [x] [x] [x] [x] [x]  5 4 3 2 1 1 2 3 4 5                         Right                                        Left    Color  [x] [x] [x] [x] [x] [x] [x] [x] [x] [x]  5 4 3 2 1 1 2 3 4 5                          Right                                        Left    Incurvation/Ingrowin   [] [] [] [] [] [] [] [] [] []  5 4 3 2 1 1 2 3 4 5                         Right                                        Left    Inflammation/Pain   [x] [x] [x] [x] [x] [x] [x] [x] [x] [x]  5 4 3  2 1 1 2 3 4 5                         Right                                        Left       Nails are painful 1-10 with direct palpation.       Dermatologic:  No skin lesions present. Dry scaly skin noted to the plantar surface of the right and left foot. Musculoskeletal:   POP to the left posterior tibial tendon. 1st MPJ ROM decreased, Bilateral.  Muscle strength 5/5, Bilateral.  Pain present upon palpation of toenails 1-5, Bilateral. decreased medial longitudinal arch, Bilateral.  Ankle ROM decreased,Bilateral.    Dorsally contracted digits present digits 2, Bilateral.     Vascular: DP and PT pulses palpable 1/4, Bilateral.  CFT <5 seconds, Bilateral.  Hair growth absent to the level of the digits, Bilateral.  Edema present, Bilateral.  Varicosities absent, Bilateral. Erythema absent, Bilateral    Integument: Warm, dry, supple, Bilateral.  Open lesion absent, Bilateral.  Interdigital maceration absent to web spaces 4, Bilateral.  Nails 1-5 left and 1-5 right thickened > 3.0 mm, dystrophic and crumbly, discolored with subungual debris. Fissures absent, Bilateral.   Neurological:  Sensation present to light touch to level of digits, Bilateral.      Assessment:  64 y.o. female with:    Diagnosis Orders   1. Pain in both feet  18103 - MO DEBRIDEMENT OF NAILS, 6 OR MORE   2. Trouble walking  00205 - MO DEBRIDEMENT OF NAILS, 6 OR MORE   3. Dermatophytosis of nail  89993 - MO DEBRIDEMENT OF NAILS, 6 OR MORE   4. Peripheral vascular disease ([address]  47920 - MO DEBRIDEMENT OF NAILS, 6 OR MORE         Plan:   Pt was evaluated and examined. Patient was given personalized discharge instructions. Nails 1-10 were debrided in length and thickness sharply with a nail nipper and  without incident. Pt will follow up in 9 weeks or sooner if any problems arise. Diagnosis was discussed with the pt and all of their questions were answered in detail. Proper foot hygiene and care was discussed with the pt. Patient to check feet daily and contact the office with any questions/problems/concerns. Other comorbidity noted and will be managed by PCP. Pain waiver discussed with patient and confirmed. 4/2/2019      Electronically signed by Vidal Talbert DPM on 4/2/2019 at 12:57 PM  4/2/2019 Raymond Mascorro

## 2021-07-07 NOTE — ED CDU PROVIDER SUBSEQUENT DAY NOTE - HISTORY
resting comfortably, no complaints. arousable but tired. wants to sleep. pending arousability for psych consult

## 2021-07-07 NOTE — ED CDU PROVIDER SUBSEQUENT DAY NOTE - MEDICAL DECISION MAKING DETAILS
Patient remains somnolent, on end tidal CO2, no clinical improvement in mental status at this point in time.  Will continue to monitor.  Anticipate admission.

## 2021-07-07 NOTE — BEHAVIORAL HEALTH ASSESSMENT NOTE - OTHER
Discharged 6/30/2021 after end stage COPD exacerbation Somnolent Unable to assess Unable to assess directly, taken from notes no dysarthria noted monosyllabic

## 2021-07-07 NOTE — ED ADULT NURSE REASSESSMENT NOTE - NS ED NURSE REASSESS COMMENT FT1
Patient remains somnolent, responsive to gentle shake, not verbalizing when asked questions. Respirations unlabored, tachypneic, vital signs WNL. Patient remains on 1:1 sit, observation ongoing.
Received patient from critical care. Patient on one to one sit for Klonopin overdose. Patient  on cardiac and capnography monitors, placed on perma fit. Patient received with three IV catheters, RAC 20G, RW 20G, LW 20G.  Patient drowsy/somnolent, is not verbalizing except to complain of feeling cold. Warm blanket provided. Respiration tachypneic.

## 2021-07-07 NOTE — H&P ADULT - HISTORY OF PRESENT ILLNESS
The patient is a 75 year-old female with a PMH of COPD (on 3L O2 home), bronchogenic carcinoma (left lung, s/p left partial lobectomy, in remission x 9 yrs), chronic atrial fibrillation (on Eliquis), anxiety, presenting following a suicide attempt at home. Patient this morning is somnolent; can answer in short sentences; when asked what happened, she states "I didn't want to live anymore;" reports she has been feeling this way for a long time. She endorses overdosing w/ Klonopin; per ED, patient took 27 tabs yesterday morning. At this time she reports feeling well; denies shortness of breath or difficulty breathing, dizziness, chest pain or pressure. She requests her  not be told about her being in the hospital as "he has anxiety."

## 2021-07-07 NOTE — BEHAVIORAL HEALTH ASSESSMENT NOTE - COMMENTS ON SUICIDE RISK/PROTECTIVE FACTORS:
per Dr. Lim's note on 3/23/2021 "Due to the anxiety and insomnia, patient endorses some passive suicidal thoughts and has thought of taking pills transiently in the past, but insists that she has no intention of ending her life. She expresses a fear of death/act of killing herself, noting that she wouldn't likely die but end up like "a vegetable, which would be worse." She also identifies her cats as a reason for living."

## 2021-07-07 NOTE — BEHAVIORAL HEALTH ASSESSMENT NOTE - PAST PSYCHOTROPIC MEDICATION
per Dr. Lim's note from 3/23/21, "Patient has seen a psychiatrist in the past, explains she has tried many antidepressants but could not tolerate most of them due to GI side effects. She continued with Klonopin for the past 15yrs about because she found it helpful but not so much any longer."

## 2021-07-07 NOTE — H&P ADULT - NSHPPHYSICALEXAM_GEN_ALL_CORE
Vital Signs Last 24 Hrs  T(C): --  T(F): --  HR: 70 (07 Jul 2021 10:04) (69 - 76)  BP: 120/58 (07 Jul 2021 10:04) (100/56 - 134/63)  BP(mean): --  RR: 20 (07 Jul 2021 10:04) (20 - 27)  SpO2: 99% (07 Jul 2021 10:04) (99% - 100%)    VITAL SIGNS: I have reviewed nursing notes and confirm.  CONSTITUTIONAL: Well-developed; well-nourished; in no acute distress, somnolent.  SKIN: Skin exam is warm and dry, no acute rash.  HEAD: Normocephalic; atraumatic.  EYES: PERRL, EOM intact; conjunctiva and sclera clear.  ENT: No nasal discharge; airway clear. Throat clear.  NECK: Supple; non tender.  No lymphadenopathy.  CARD: S1, S2 normal; no murmurs, gallops, or rubs. Regular rate and rhythm.  RESP: No wheezes, rales or rhonchi; on NC O2  ABD: Normal bowel sounds; soft; non-distended; non-tender; no hepatosplenomegaly.  EXT: Normal ROM. No clubbing, cyanosis or edema.  NEURO: AAOx3, somnolent, arousable to sternal rub  PSYCH: Minimally verbal

## 2021-07-07 NOTE — H&P ADULT - ATTENDING COMMENTS
HPI: History gathered from chart, she is minimally conversive at this time   75 year-old female with a PMH of end stage COPD (on 3L O2 home), bronchogenic carcinoma (left lung, s/p left partial lobectomy, in remission x 9 yrs), chronic atrial fibrillation (on Eliquis), anxiety, presenting following a suicide attempt at home. She reports haven taken 27 tabs of Klonopin prior to admission. She was evaluated and observed by toxicology. No acute intervention taken, and transferred to medicine when she was less somnolent. Behavioral health evaluated her earlier as well.   On her previous admission 6/30, she was also evaluated for suicidal ideation and deemed not to be actively suicidal at that time.   OF NOTE: she was previously placed on hospice ~1 mo ago but rescinded it due to an issue regarding it being set up at home. Since then she has had recurrent hospitalizations for falls and COPD exacerbations. She was last evaluated by palliative care on 6/11, the intent was to set her up with 24 hour home care with the hope of better managing her comfort/COPD. I spoke with her son August and he is unsure if she was able to receive 24 hour home health care but he does note that her living situation is poor and all of her sons are in agreement that she, nor her  Salvatore, are capable of taking care of themselves given their level of debility. They feel at this time it is best that she goes to a facility but she has remained obstinate against leaving her house.   At the time of my interview she remains increasingly lethargic though arousable from sleep, able to tell me her name but not much else. She is tachypneic, 40 breaths per minute with poor air entry globally. Vitals 99/53, O2: 97% on 2L NC, Temp: 83, T/ 98.3 (axillary).     REVIEW OF SYSTEMS: unable to review given her mental status       PHYSICAL EXAM:  GENERAL: NAD, Non-toxic, chronically ill, older than stated age   HEAD:  Atraumatic, Normocephalic  EYES: EOMI, Sclera White   NECK: Supple, No JVD  CHEST/LUNG: global poor air entry; No wheezing, rhonchi, or crackles. Tachypneic, 40 BPM on 2L NC satting 97%, slight SCM muscle use when breathing.   HEART: Regular rate and irregular rhythm; s1, s2, No murmurs, rubs, or gallops  ABDOMEN: Soft, Nontender, Nondistended; Bowel sounds present, No rebound or guarding noted   EXTREMITIES:  No lower extremity edema or calf tenderness to palpation.  No clubbing or cyanosis  PSYCH: AAOx0 (can utter her name only) Lethargic, arousable from sleep.   NEUROLOGY: non-focal. Lethargic   SKIN: No rashes or lesions      ASSESSMENT AND PLAN:  Metabolic encephalopathy secondary to benzodiazepine overdose, Suicide attempt:  -Toxicology cleared   -Behavioral health following  -Overall, she is at end stage disease COPD and has qualified for hospice.     -I discussed that case with her son and SEAN Chopra (lives in [place]). She likely cannot go back home both after displaying suicidal behaviors compounded by the fact that she and her  are incapable of caring for themselves. Additionally there is a questionable level of homecare services they are receiving.   -Palliative care and hospice re-evaluation. She will likely need hospice at a facility.    Endstage COPD on 3L Home O2: currently in an exacerbation. Tachypneic w/very poor air entry, began wheezing with better air entry following a nebulizer treatement.   Bronchogenic Carcinoma s/p lobectomy   -I suspect she is steroid dependent. (End stage COPD with recurrent exacerbation and hospitalizations)  -Start Solumedrol 60mg q12, stat Duonebs x2, then standing albuterol nebs q4 and PRN. Cont spiriva (if mental status not improved to take inhaler then change to standing duonebs), spiriva q12   -Palliative care eval and hospice eval     Chronic atrial fibrillation: cont with eliquis  -Currently rate controlled w/o antiarrhythmics    Anxiety: previously on benzodiazepines  -Behavioral health to follow up with medication readjustment   -Monitor closely for withdrawal of Benzos    DVT ppx: Lovenox/Heparin  GI ppx: Not indicated  GOC: DNR/DNI, previously MOLST filled out.  is contacting patient records to have it placed in the chart. I confirmed her code status with her son August.     My note supersedes the residents in the event of a discrepancy. Patient seen and examined on 7/7 at 22:17  HPI: History gathered from chart, she is minimally conversive at this time   75 year-old female with a PMH of end stage COPD (on 3L O2 home), bronchogenic carcinoma (left lung, s/p left partial lobectomy, in remission x 9 yrs), chronic atrial fibrillation (on Eliquis), anxiety, presenting following a suicide attempt at home. She reports haven taken 27 tabs of Klonopin prior to admission. She was evaluated and observed by toxicology. No acute intervention taken, and transferred to medicine when she was less somnolent. Behavioral health evaluated her earlier as well.   On her previous admission 6/30, she was also evaluated for suicidal ideation and deemed not to be actively suicidal at that time.   OF NOTE: she was previously placed on hospice ~1 mo ago but rescinded it due to an issue regarding it being set up at home. Since then she has had recurrent hospitalizations for falls and COPD exacerbations. She was last evaluated by palliative care on 6/11, the intent was to set her up with 24 hour home care with the hope of better managing her comfort/COPD. I spoke with her son August and he is unsure if she was able to receive 24 hour home health care but he does note that her living situation is poor and all of her sons are in agreement that she, nor her  Salvatore, are capable of taking care of themselves given their level of debility. They feel at this time it is best that she goes to a facility but she has remained obstinate against leaving her house.   At the time of my interview she remains increasingly lethargic though arousable from sleep, able to tell me her name but not much else. She is tachypneic, 40 breaths per minute with poor air entry globally. Vitals 99/53, O2: 97% on 2L NC, Temp: 83, T/ 98.3 (axillary).     REVIEW OF SYSTEMS: unable to review given her mental status       PHYSICAL EXAM:  GENERAL: NAD, Non-toxic, chronically ill, older than stated age   HEAD:  Atraumatic, Normocephalic  EYES: EOMI, Sclera White   NECK: Supple, No JVD  CHEST/LUNG: global poor air entry; No wheezing, rhonchi, or crackles. Tachypneic, 40 BPM on 2L NC satting 97%, slight SCM muscle use when breathing.   HEART: Regular rate and irregular rhythm; s1, s2, No murmurs, rubs, or gallops  ABDOMEN: Soft, Nontender, Nondistended; Bowel sounds present, No rebound or guarding noted   EXTREMITIES:  No lower extremity edema or calf tenderness to palpation.  No clubbing or cyanosis  PSYCH: AAOx0 (can utter her name only) Lethargic, arousable from sleep.   NEUROLOGY: non-focal. Lethargic   SKIN: No rashes or lesions      ASSESSMENT AND PLAN:  Metabolic encephalopathy secondary to benzodiazepine overdose, Suicide attempt:  -Toxicology cleared   -Behavioral health following  -Overall, she is at end stage disease COPD and has qualified for hospice.     -I discussed that case with her son and SEAN Chopra (lives in [place]). She likely cannot go back home both after displaying suicidal behaviors compounded by the fact that she and her  are incapable of caring for themselves. Additionally there is a questionable level of homecare services they are receiving.   -Palliative care and hospice re-evaluation. She will likely need hospice at a facility.    Endstage COPD on 3L Home O2: currently in an exacerbation. Tachypneic w/very poor air entry, began wheezing with better air entry following a nebulizer treatement.   Bronchogenic Carcinoma s/p lobectomy   -I suspect she is steroid dependent. (End stage COPD with recurrent exacerbation and hospitalizations)  -Start Solumedrol 60mg q12, stat Duonebs x2, then standing albuterol nebs q4 and PRN. Cont spiriva (if mental status not improved to take inhaler then change to standing duonebs), spiriva q12   -Palliative care eval and hospice eval     Chronic atrial fibrillation: cont with eliquis  -Currently rate controlled w/o antiarrhythmics    Anxiety: previously on benzodiazepines  -Behavioral health to follow up with medication readjustment   -Monitor closely for withdrawal of Benzos    DVT ppx: Lovenox/Heparin  GI ppx: Not indicated  GOC: DNR/DNI, previously MOLST filled out.  is contacting patient records to have it placed in the chart. I confirmed her code status with her son August.     My note supersedes the residents in the event of a discrepancy.

## 2021-07-07 NOTE — ED CDU PROVIDER DISPOSITION NOTE - CLINICAL COURSE
The patient is a 75 year-old female with a PMH of COPD (on 3L O2 home), bronchogenic carcinoma (left lung, s/p left partial lobectomy, in remission x 9 yrs), chronic atrial fibrillation (on Eliquis), anxiety, presenting following a suicide attempt at home. Patient this morning is somnolent; can answer in short sentences; not ambulatory overdosing w/ Klonopin;  Will admit for further care.  Patient will need PT/Rehab, possibly palliative.  Tox to follow

## 2021-07-07 NOTE — BEHAVIORAL HEALTH ASSESSMENT NOTE - SUMMARY
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 Patient remains appropriately somnolent at this time and largely non-responsive to verbal questioning. Unable to assess for suicidality at this time. Psychiatry will follow patient and recommends continuing 1:1 supervision at this time.

## 2021-07-07 NOTE — H&P ADULT - ASSESSMENT
The patient is a 75 year-old female with a PMH of COPD (on 3L O2 home), bronchogenic carcinoma (left lung, s/p left partial lobectomy, in remission x 9 yrs), chronic atrial fibrillation (on Eliquis), anxiety, presenting following a suicide attempt at home, admitted for observation.    # S/p benzodiazepine overdose secondary to suicidality  Patient reports has been feeling suicidal for some time  Trops neg in ED; CXR showing right-sided nodules w/o evidence for aspiration  Patient w/o evidence of respiratory depression (O2 sat 100%, no bradypnea)  F/u utox, AM CBC, BMP  Per Toxicology (Dr. Zamora), pt likely out of window of acute decompensation  No need for telemetry at this time  C/s Psychiatry for long-term management, dispo  Hold all benzodiazepines for now (was on Klonopin 1 mg q8h, PRN at home)  Admit to Medicine for observation    # COPD, stable, h/o bronchogenic lung carcinoma  C/w NC O2; on 3L at home  C/w nebs q6h, PRN; Spiriva once daily    # Atrial fibrillation, rate-controlled  C/w Eliquis 5 mg twice daily  No need for beta-blocker at this time  Vitals per routine    # GERD  C/w pantoprazole 20 mg once daily    # HLD  C/w atorvastatin 20 mg once daily    # CHG  # DVT ppx- Eliquis 5 mg twice daily  # GI ppx- pantoprazole 20 mg once daily  # Diet- NPO for now while somnolent  # Activity- out of bed to chair  # Dispo- acute on obs for toxicologic monitoring

## 2021-07-07 NOTE — H&P ADULT - NSHPLABSRESULTS_GEN_ALL_CORE
9.8    9.31  )-----------( 142      ( 06 Jul 2021 10:00 )             30.2   07-06    134<L>  |  95<L>  |  20  ----------------------------<  131<H>  4.2   |  29  |  0.9    Ca    9.1      06 Jul 2021 10:00    TPro  5.6<L>  /  Alb  3.8  /  TBili  0.7  /  DBili  x   /  AST  22  /  ALT  16  /  AlkPhos  63  07-06    < from: Xray Chest 1 View- PORTABLE-Urgent (07.06.21 @ 10:31) >    Impression:    No new consolidation, effusion or pneumothorax. Stable postoperative changes left apex.    Right-sided nodules, unchanged.    --- End of Report ---

## 2021-07-07 NOTE — PATIENT PROFILE ADULT - NSPROGENBLOODRESTRICT_GEN_A_NUR
Tomer Chacon   1/26/2017 3:15 PM   Office Visit    Dept Phone:  [phone]   Encounter #:  [id number]    Provider:  Timothy Osorio MD   Department:  [facility]                Your Full Care Plan              Today's Medication Changes          These changes are accurate as of: 1/26/17  4:30 PM.  If you have any questions, ask your nurse or doctor.               New Medication(s)Ordered:     gabapentin 300 MG capsule   Commonly known as:  NEURONTIN   Take 1 capsule by mouth 3 (Three) Times a Day.   Started by:  Timothy Osorio MD         Stop taking medication(s)listed here:     BUSPIRONE HCL PO   Stopped by:  Timothy Osorio MD           meloxicam 7.5 MG tablet   Commonly known as:  MOBIC   Stopped by:  Timothy Osorio MD           RISPERIDONE PO   Stopped by:  Timothy Osorio MD           sertraline 100 MG tablet   Commonly known as:  ZOLOFT   Stopped by:  Timothy Osorio MD                Where to Get Your Medications      These medications were sent to [facility] [address] [place] [address] AT [facility] & [place] - [phone]  - [phone] [address] [address]     Phone:  [phone]     gabapentin 300 MG capsule    pantoprazole 40 MG EC tablet                  Your Updated Medication List          This list is accurate as of: 1/26/17  4:30 PM.  Always use your most recent med list.                azithromycin 250 MG tablet   Commonly known as:  ZITHROMAX       CloNIDine 0.1 MG tablet   Commonly known as:  CATAPRES       fluticasone 50 MCG/ACT nasal spray   Commonly known as:  FLONASE       gabapentin 300 MG capsule   Commonly known as:  NEURONTIN   Take 1 capsule by mouth 3 (Three) Times a Day.       pantoprazole 40 MG EC tablet   Commonly known as:  PROTONIX   Take 1 tablet by mouth Daily.       venlafaxine 37.5 MG tablet   Commonly known as:  EFFEXOR       ZOLPIDEM TARTRATE PO               We  Performed the Following     Ambulatory Referral to Gynecology     CBC & Differential     CBC Auto Differential     Comprehensive Metabolic Panel     Lipid Panel     Tdap Vaccine Greater Than or Equal To 8yo IM     Vitamin D 25 Hydroxy       You Were Diagnosed With        Codes Comments    Gastroesophageal reflux disease, esophagitis presence not specified    -  Primary ICD-10-CM: K21.9  ICD-9-CM: 530.81     Encounter for immunization     ICD-10-CM: Z23  ICD-9-CM: V03.89     Adult general medical exam     ICD-10-CM: Z00.00  ICD-9-CM: V70.9     Lipid screening     ICD-10-CM: Z13.220  ICD-9-CM: V77.91     Encounter for vitamin deficiency screening     ICD-10-CM: Z13.21  ICD-9-CM: V77.99     Chronic bilateral low back pain with bilateral sciatica     ICD-10-CM: M54.42, M54.41, G89.29  ICD-9-CM: 724.2, 724.3, 338.29     Pelvic pain     ICD-10-CM: R10.2  ICD-9-CM: [id number]       Instructions     None    Patient Instructions History      Upcoming Appointments     Visit Type Date Time Department    NEW PATIENT 1/26/2017  3:15 PM [facility] RESIDENT [facility]    LAB 1/26/2017  4:30 PM  [facility]    OFFICE VISIT 2/22/2017  3:30 PM [facility] RESIDENT [facility]      MyChart Signup     Our records indicate that you have declined [facility] sign[facility]. If you would like to sign up for [employer], please email [email or web address] or call [phone] to obtain an activation code.             Other Info from Your Visit           Your Appointments     Feb 22, 2017  3:30 PM CST   Office Visit with Timothy Osorio MD   [facility] (--)    [address] [place] [address] [phone]           Arrive 15 minutes prior to appointment.              Allergies     Penicillins        Reason for Visit     Establish Care     Med Refill           Vital Signs     Blood Pressure Pulse Height Weight Oxygen Saturation Body Mass Index    132/81 (BP Location: Right arm, Patient Position: Sitting) 81  "65\" (165.1 cm) 170 lb 14.4 oz (77.5 kg) 95% 28.44 kg/m2    Smoking Status                   Never Smoker           Problems and Diagnoses Noted     Acid reflux disease    -  Primary    Encounter for immunization        Adult general medical exam        Screening for cholesterol level        Encounter for vitamin deficiency screening        Chronic bilateral low back pain with bilateral sciatica        Pelvic pain          Immunizations Administered     Name Date    Tdap       Results         " none

## 2021-07-07 NOTE — BEHAVIORAL HEALTH ASSESSMENT NOTE - NSBHCHARTREVIEWINVESTIGATE_PSY_A_CORE FT
Ventricular Rate 70 BPM    Atrial Rate 300 BPM    QRS Duration 140 ms    Q-T Interval 430 ms    QTC Calculation(Bazett) 464 ms    R Axis -63 degrees    T Axis 100 degrees    Diagnosis Line Atrial fibrillation  Left axis deviation  Non-specific intra-ventricular conduction block  Possible Lateral infarct , age undetermined  Abnormal ECG    Confirmed by LINDA ALVARADO MD ([id number]) on 7/6/2021 1:54:39 PM

## 2021-07-07 NOTE — BEHAVIORAL HEALTH ASSESSMENT NOTE - NSBHCHARTREVIEWVS_PSY_A_CORE FT
ICU Vital Signs Last 24 Hrs  T(C): --  T(F): --  HR: 70 (07 Jul 2021 10:47) (69 - 70)  BP: 113/65 (07 Jul 2021 10:47) (100/56 - 134/63)  BP(mean): --  ABP: --  ABP(mean): --  RR: 20 (07 Jul 2021 10:47) (20 - 27)  SpO2: 100% (07 Jul 2021 10:47) (99% - 100%)

## 2021-07-07 NOTE — BEHAVIORAL HEALTH ASSESSMENT NOTE - CASE SUMMARY
Ms Joseph is a 75 year old woman wit a history of depression and Anxiety  who was admitted to the medical floor following an overdose on 27 tablets of Klonopin 0.5mg .   Psychiatry consult was called for the evaluation of suicidal ideations.   At this time, patient continues to be sedated and a complete mental health evaluation can not be done. The  psychiatry team will follow up tomorrow. In the mean time, we recommend that patient should be on constant observation and we also recommend continued monitoring for possible respiratory depression.

## 2021-07-08 NOTE — GOALS OF CARE CONVERSATION - ADVANCED CARE PLANNING - CONVERSATION DETAILS
I discussed Nazanin's current hospitalization and her level of illness.  August is aware of his mothers DNR/DNI and wishes that it remain in place. He understand the level of her debility and that she likely needs hospice.

## 2021-07-08 NOTE — CONSULT NOTE ADULT - PROBLEM SELECTOR RECOMMENDATION 9
end stage  during prior admissions Pulm had recommended hospice   no symptoms to manage at present -end stage  -during prior admissions Pulm had recommended hospice  -patient tachypneic on exam but unable to discuss symptoms  -will not aggressively treat dyspnea with opioids at this time as patient obtunded

## 2021-07-08 NOTE — PROGRESS NOTE ADULT - SUBJECTIVE AND OBJECTIVE BOX
CASEY POWERS  75y  Female  ***My note supersedes ALL resident notes that I sign.  My corrections for their notes are in my note.***    I can be reached directly on [employer] [facility]32. My office number is [phone]. My personal cell number is [phone].    Of note, Dr Ruffin saw, examined and evaluated this pt on  (despite H&P being signed on  @ 12:16 AM) and will bill for . Thus, I am seeing pt on  as a subsequent, follow up visit and will bill for .    INTERVAL EVENTS: Here for f/u of klonopin overdose. Pt is NOT back to her usual MS that I know. She is arousable, but not talking or following commands. She is not eating or drinking or taking her oral meds.    T(F): 96.1 (21 @ 12:45), Max: 98.7 (21 @ 05:24)  HR: 77 (21 @ 12:45) (77 - 94)  BP: 107/54 (21 @ 12:45) (99/57 - 120/72)  RR: 20 (21 @ 12:45) (19 - 24)  SpO2: 97% (21 @ 22:30) (97% - 100%)    Gen: no distress, but obtunded; opens eyes to verbal and touch, but not talking or following commands  HEENT: PERRL, nose clr  Neck: no nodes, no JVD, thyroid nl  lungs: clr; decr BS; no wheeze  hrt: s1 s2 rrr no murmur  abd: soft, NT/ND, no HS megaly  ext: no edema, no c/c  neuro: lethargic; cannot assess    LABS:                      11.5    (    88.6   14.86 )-----------( ---------      187      ( 2021 07:31 )             35.9    (    15.0     WBC Count: 14.86 K/uL (21 @ 07:31) - on steroids  WBC Count: 9.31 K/uL (21 @ 10:00)    138   (   96   (   190      21 @ 07:31  ----------------------               4.6   (   23   (   25                             -----                        0.9  Ca  9.4   Mg  --    P   --     ABG - ( 2021 22:28 )  pH, Arterial: 7.39  pH, Blood: x     /  pCO2: 50    /  pO2: 214   / HCO3: 30    / Base Excess: 4.5   /  SaO2: 100       Urinalysis Basic - ( 2021 13:00 )    Color: Light Yellow / Appearance: Clear / S.013 / pH: x  Gluc: x / Ketone: Small  / Bili: Negative / Urobili: <2 mg/dL   Blood: x / Protein: Negative / Nitrite: Negative   Leuk Esterase: Negative / RBC: x / WBC x   Sq Epi: x / Non Sq Epi: x / Bacteria: x    CAPILLARY BLOOD GLUCOSE  POCT Blood Glucose.: 144 (21 @ 09:46)    RADIOLOGY & ADDITIONAL TESTS:  < from: Xray Chest 1 View AP/PA (21 @ 23:48) >  Impression:    Postoperative change of the left hemithorax.    Without difference.    < end of copied text >    MEDICATIONS:    ALBUTerol    0.083% 2.5 milliGRAM(s) Nebulizer every 4 hours  ALBUTerol    0.083% 2.5 milliGRAM(s) Nebulizer every 4 hours PRN  ALBUTerol    90 MICROgram(s) HFA Inhaler 2 Puff(s) Inhalation every 4 hours PRN  apixaban 5 milliGRAM(s) Oral every 12 hours  atorvastatin 20 milliGRAM(s) Oral at bedtime  chlorhexidine 4% Liquid 1 Application(s) Topical <User Schedule>  pantoprazole    Tablet 40 milliGRAM(s) Oral before breakfast  polyethylene glycol 3350 17 Gram(s) Oral daily PRN  senna 2 Tablet(s) Oral at bedtime  tiotropium 18 MICROgram(s) Capsule 1 Capsule(s) Inhalation once

## 2021-07-08 NOTE — CONSULT NOTE ADULT - PROBLEM SELECTOR RECOMMENDATION 2
psych input appreciated; awaiting time to pass for klonopin half life to clear -psych input appreciated  -awaiting time to pass for klonopin half life to clear

## 2021-07-08 NOTE — PROGRESS NOTE BEHAVIORAL HEALTH - NSBHFUPINTERVALCCFT_PSY_A_CORE
" " Patient was somnolent, not rousable to voice or touch " " Patient was somnolent, not arousable to voice or touch

## 2021-07-08 NOTE — CONSULT NOTE ADULT - SUBJECTIVE AND OBJECTIVE BOX
NAZANIN POWERS          MRN-[id number]              HPI:  The patient is a 75 year-old female with a PMH of COPD (on 3L O2 home), bronchogenic carcinoma (left lung, s/p left partial lobectomy, in remission x 9 yrs), chronic atrial fibrillation (on Eliquis), anxiety, presenting following a suicide attempt at home. Patient this morning is somnolent; can answer in short sentences; when asked what happened, she states "I didn't want to live anymore;" reports she has been feeling this way for a long time. She endorses overdosing w/ Klonopin; per ED, patient took 27 tabs yesterday morning. At this time she reports feeling well; denies shortness of breath or difficulty breathing, dizziness, chest pain or pressure. She requests her  not be told about her being in the hospital as "he has anxiety." (2021 10:31)      PAST MEDICAL & SURGICAL HISTORY:  High cholesterol    Anxiety    Pacemaker    Lung cancer    Afib    COPD (chronic obstructive pulmonary disease)    Artificial cardiac pacemaker    H/O atrioventricular kacie ablation    S/P lobectomy of lung  left    History of tonsillectomy    S/P appendectomy    Cataract  RIGHT   AND  LEFT  19        FAMILY HISTORY:  No pertinent family history in first degree relatives     Reviewed and found non contributory in mother or father    SOCIAL HISTORY:   Tobacco/etoh/illicit drug use use reported. Yes [x ]  tobacco No [ ]  Pt resides at: home [ x]  facility [ ]  other [ ] _______    ROS:	    Unable to attain due to:  INCOMPLETE                    Dyspnea (Marcel 0-10): 0                       N/V (Y/N): No                             Secretions (Y/N) : No                                          Agitation(Y/N): No                              Pain (Y/N): No                                 -Provocation/Palliation: N/A  -Quality/Quantity: N/A  -Radiating: N/A  -Severity: No pain  -Timing/Frequency: N/A  -Impact on ADLs: N/A    General:  Denied  HEENT:    Denied  Neck:  Denied  CVS:  Denied  Resp:  Denied  GI:  Denied    :  Denied  Musc:  Denied  Neuro:  Denied  Psych:  Denied  Skin:  Denied  Lymph:  Denied    Last BM:      Allergies    bacitracin (Other)  carboplatin (Other)  sulfa drugs (Unknown)  sulfonamides (Unknown)  Xanax (Other)    Intolerances      Opiate Naive (Y/N):  Y  -iStop reviewed (Y/N): YT  Ref#: Reference #: [id number]    Others' Prescriptions  Patient Name: Nazanin Schaefer Date: 1945  Address: [address] [place] [id number]: Female  Rx Written	Rx Dispensed	Drug	Quantity	Days Supply	Prescriber Name	Prescriber Jolanta #	Payment Method	Dispenser  2021	clonazepam 0.5 mg tablet	90	30	Marti, Taty	[id number]	[facility] #[id number]  2021	klonopin 0.5 mg tablet	90	30	Marti, Taty	[id number]	Medicare	[id number]  2021	lorazepam 0.5 mg tablet	30	30	Penny Griffith MD	[id number]	[facility] #[id number]  2020	klonopin 0.5 mg tablet	90	30	Marti, Taty	[id number]	Medicare	[facility][id number]  2020	klonopin 0.5 mg tablet	90	30	Carmine Spencer MD	[id number]	Medicare	[facility] #[id number]  2020	klonopin 0.5 mg tablet	90	30	Marti, Taty	[id number]	Medicare	[facility] #[id number]    Patient Name: Nazanin Schaefer Date: 1945  Address: [address] [place], [place] [address]Dfx: Female  Rx Written	Rx Dispensed	Drug	Quantity	Days Supply	Prescriber Name	Prescriber Jolanta #	Payment Method	Dispenser  2021	clonazepam 0.5 mg tablet	12	4	Catherine, Kaylee A , FNP	[id number]	Insurance	[facility]  2021	clonazepam 1 mg tablet	28	7	Ravindra Telels)	[id number]	Insurance	[facility]  2021	lorazepam 1 mg tablet	28	7	Rigoberto Rebolledo	[id number]	Insurance	[facility]  2021	morphine sulf 100 mg/5 ml conc	30ml	10	Catherine, Kaylee A , [facility]	[id number]	[facility]  2021	lorazepam 0.5 mg tablet	10	3	Kaylee Alexander ELLA	[id number]	[facility]  * - Drugs marked with an asterisk are compound drugs. If the compound drug is made up of more than one controlled substance, then each controlled substance will be a separate row in the             Labs:	    CBC:                        11.5   14.86 )-----------( 187      ( 2021 07:31 )             35.9     CMP:        138  |  96<L>  |  25<H>  ----------------------------<  190<H>  4.6   |  23  |  0.9    Ca    9.4      2021 07:31       Albumin, Serum: 3.8 g/dL (21 @ 10:00)    Urinalysis Basic - ( 2021 13:00 )    Color: Light Yellow / Appearance: Clear / S.013 / pH: x  Gluc: x / Ketone: Small  / Bili: Negative / Urobili: <2 mg/dL   Blood: x / Protein: Negative / Nitrite: Negative   Leuk Esterase: Negative / RBC: x / WBC x   Sq Epi: x / Non Sq Epi: x / Bacteria: x    Barbiturates Screen, Urine (21 @ 13:00)    Barbiturates Screen, Urine: Negative    Anion Gap: 10 07-06 @ 10:00  CK: --  @ 10:00  Troponin:  --   @ 10:00  Pro-BNP:  --   @ 10:00  VBG:  --   @ 10:00  Carboxyhemoglobin %:  --   @ 10:00  Methemoglobin %:  --   @ 10:00  Osmolality Serum:  --   @ 10:00  Aspirin Level: <0.3<L>   @ 10:00  Acetaminophen Level:  <5.0<L>   @ 10:00  Ethanol Level:  --   @ 10:00  Digoxin Level:  --   @ 10:00  Phenytoin Level:  --   @ 10:00  Carbamazepine level:  --   @ 10:00  Lamotrigine level:  --   @ 10:00    Radiology:	       EK Lead ECG:   Ventricular Rate 70 BPM    Atrial Rate 300 BPM    QRS Duration 140 ms    Q-T Interval 430 ms    QTC Calculation(Bazett) 464 ms    R Axis -63 degrees    T Axis 100 degrees    Diagnosis Line Atrial fibrillation  Left axis deviation  Non-specific intra-ventricular conduction block  Possible Lateral infarct , age undetermined  Abnormal ECG    Confirmed by LINDA ALVARADO MD ([id number]) on 2021 1:54:39 PM (21 @ 09:53)      Imaging Personally Reviewed:  [ ] YES  [ ] NO    Consultant(s) Notes Reviewed:  [ x] YES  [ ] NO  Care Discussed with Consultants/Other Providers [ x] YES  [ ] NO    PEx:	  T(C): 37.1 (21 @ 05:24), Max: 37.1 (21 @ 05:24)  HR: 93 (21 @ 05:24) (81 - 94)  BP: 104/51 (21 @ 05:24) (99/57 - 120/72)  RR: 19 (21 @ 05:24) (19 - 24)  SpO2: 97% (21 @ 22:30) (97% - 100%)  Wt(kg): --  Daily     Daily     NOT COMPLETE  General:  found in bed in NAD  Eyes:  PERRL EOMI Non icteric MOM  ENMT: no external oral ulcers, MMM, no thrush   CVS: RR S1S2 No M/G/R  Resp: Unlabored Non tachypneic No increased WOB  GI:  Soft NT ND BS+  :  Voiding / [place] / PrimaFit  Musc: No C/C/E    Neuro: Follows commands No focal deficits  Psych: Calm Pleasant, AAOx3    Skin: Non jaundiced , no rash   Lymph: no adenopathy     Preadmit Karnofsky:  %           Current Karnofsky:     %  http://www.npcrc.org/files/news/karnofsky_performance_scale.pdf   http://www.npcrc.org/files/news/palliative_performance_scale_PPSv2.pdf  Cachexia (Y/N):   BMI:    Medications:	      MEDICATIONS  (STANDING):  ALBUTerol    0.083% 2.5 milliGRAM(s) Nebulizer every 4 hours  albuterol/ipratropium for Nebulization 3 milliLiter(s) Nebulizer every 6 hours  apixaban 5 milliGRAM(s) Oral every 12 hours  atorvastatin 20 milliGRAM(s) Oral at bedtime  chlorhexidine 4% Liquid 1 Application(s) Topical <User Schedule>  pantoprazole    Tablet 40 milliGRAM(s) Oral before breakfast  senna 2 Tablet(s) Oral at bedtime  tiotropium 18 MICROgram(s) Capsule 1 Capsule(s) Inhalation once    MEDICATIONS  (PRN):  ALBUTerol    0.083% 2.5 milliGRAM(s) Nebulizer every 4 hours PRN Shortness of Breath and/or Wheezing  polyethylene glycol 3350 17 Gram(s) Oral daily PRN Constipation    Advanced Directives:	     Full Code     DNR/DNI     MOLST     HCP     Living Will     Decision maker: The patient is able to participate in complex medical decision making conversations.   Legal surrogate:    GOALS OF CARE DISCUSSION	       Palliative care info/counseling provided	           Family meeting scheduled         Documentation of GOC/Advanced Care Planning:       See previous Palliative Medicine Note    PSYCHOSOCIAL-SPIRITUAL ASSESSMENT:      See Palliative Care SW/ documentation        	  REFERRALS       Palliative Med        Unit SW/Case Mgmt              Hospice       NAZANIN POWERS          MRN-[id number]              HPI:  The patient is a 75 year-old female with a PMH of COPD (on 3L O2 home), bronchogenic carcinoma (left lung, s/p left partial lobectomy, in remission x 9 yrs), chronic atrial fibrillation (on Eliquis), anxiety, presenting following a suicide attempt at home. Patient this morning is somnolent; can answer in short sentences; when asked what happened, she states "I didn't want to live anymore;" reports she has been feeling this way for a long time. She endorses overdosing w/ Klonopin; per ED, patient took 27 tabs yesterday morning. At this time she reports feeling well; denies shortness of breath or difficulty breathing, dizziness, chest pain or pressure. She requests her  not be told about her being in the hospital as "he has anxiety." (2021 10:31)      PAST MEDICAL & SURGICAL HISTORY:  High cholesterol    Anxiety    Pacemaker    Lung cancer    Afib    COPD (chronic obstructive pulmonary disease)    Artificial cardiac pacemaker    H/O atrioventricular kacie ablation    S/P lobectomy of lung  left    History of tonsillectomy    S/P appendectomy    Cataract  RIGHT   AND  LEFT  19        FAMILY HISTORY:  No pertinent family history in first degree relatives     Reviewed and found non contributory in mother or father    SOCIAL HISTORY:   Tobacco/etoh/illicit drug use use reported. Yes [x ]  tobacco No [ ]  Pt resides at: home [ x]  facility [ ]  other [ ] _______    ROS:	                        Dyspnea (Marcel 0-10): 0                       N/V (Y/N): No                             Secretions (Y/N) : No                                          Agitation(Y/N): No                              Pain (Y/N): No                                 -Provocation/Palliation: N/A  -Quality/Quantity: N/A  -Radiating: N/A  -Severity: No pain  -Timing/Frequency: N/A  -Impact on ADLs: N/A    Cannot fully participate in exam due to obtunded  General:  Denied  Resp:  Denied      Last BM: none documented; unknown      Allergies    bacitracin (Other)  carboplatin (Other)  sulfa drugs (Unknown)  sulfonamides (Unknown)  Xanax (Other)    Intolerances      Opiate Naive (Y/N):  Y  -iStop reviewed (Y/N): YT  Ref#: Reference #: [id number]    Others' Prescriptions  Patient Name: Nazanin Schaefer Date: 1945  Address: [address] [place] [id number]: Female  Rx Written	Rx Dispensed	Drug	Quantity	Days Supply	Prescriber Name	Prescriber Jolanta #	Payment Method	Dispenser  2021	clonazepam 0.5 mg tablet	90	30	Marti, Taty	[id number]	[facility] #[id number]  2021	klonopin 0.5 mg tablet	90	30	Marti, Taty	[id number]	Medicare	[facility][id number]  2021	lorazepam 0.5 mg tablet	30	30	Penny Griffith MD	[id number]	[facility] #[id number]  2020	klonopin 0.5 mg tablet	90	30	Marti, Taty	[id number]	Medicare	[id number]  2020	klonopin 0.5 mg tablet	90	30	Carmine Spencer MD	[id number]	Medicare	[facility] #[id number]  2020	klonopin 0.5 mg tablet	90	30	Marti, Taty	[id number]	Medicare	[facility] #[id number]    Patient Name: Nazanin Schaefer Date: 1945  Address: [address] [place], [place] [address]Dwh: Female  Rx Written	Rx Dispensed	Drug	Quantity	Days Supply	Prescriber Name	Prescriber Jolanta #	Payment Method	Dispenser  2021	clonazepam 0.5 mg tablet	12	4	Kaylee Alexander FNP	[id number]	Insurance	[facility]  2021	clonazepam 1 mg tablet	28	7	Ravindra Telles)	[id number]	Insurance	[facility]  2021	lorazepam 1 mg tablet	28	7	Rigoberto Rebolledo	[id number]	Insurance	[facility]  2021	morphine sulf 100 mg/5 ml conc	30ml	10	Kaylee Alexander FNP	[id number]	Cash	[facility]  2021	lorazepam 0.5 mg tablet	10	3	Kaylee Alexander , [facility]	[id number]	[facility]  * - Drugs marked with an asterisk are compound drugs. If the compound drug is made up of more than one controlled substance, then each controlled substance will be a separate row in the             Labs:	    CBC:                        11.5   14.86 )-----------( 187      ( 2021 07:31 )             35.9     CMP:        138  |  96<L>  |  25<H>  ----------------------------<  190<H>  4.6   |  23  |  0.9    Ca    9.4      2021 07:31       Albumin, Serum: 3.8 g/dL (21 @ 10:00)    Urinalysis Basic - ( 2021 13:00 )    Color: Light Yellow / Appearance: Clear / S.013 / pH: x  Gluc: x / Ketone: Small  / Bili: Negative / Urobili: <2 mg/dL   Blood: x / Protein: Negative / Nitrite: Negative   Leuk Esterase: Negative / RBC: x / WBC x   Sq Epi: x / Non Sq Epi: x / Bacteria: x    Barbiturates Screen, Urine (21 @ 13:00)    Barbiturates Screen, Urine: Negative    Anion Gap: 10  @ 10:00  CK: --  @ 10:00  Troponin:  --   @ 10:00  Pro-BNP:  --   @ 10:00  VBG:  --   @ 10:00  Carboxyhemoglobin %:  --   @ 10:00  Methemoglobin %:  --   @ 10:00  Osmolality Serum:  --   @ 10:00  Aspirin Level: <0.3<L>   @ 10:00  Acetaminophen Level:  <5.0<L>   @ 10:00  Ethanol Level:  --   @ 10:00  Digoxin Level:  --   @ 10:00  Phenytoin Level:  --   @ 10:00  Carbamazepine level:  --   @ 10:00  Lamotrigine level:  --   @ 10:00    Radiology:	       EK Lead ECG:   Ventricular Rate 70 BPM    Atrial Rate 300 BPM    QRS Duration 140 ms    Q-T Interval 430 ms    QTC Calculation(Bazett) 464 ms    R Axis -63 degrees    T Axis 100 degrees    Diagnosis Line Atrial fibrillation  Left axis deviation  Non-specific intra-ventricular conduction block  Possible Lateral infarct , age undetermined  Abnormal ECG    Confirmed by LINDA ALVARADO MD ([id number]) on 2021 1:54:39 PM (21 @ 09:53)      Imaging Personally Reviewed:  [ ] YES  [ ] NO    Consultant(s) Notes Reviewed:  [ x] YES  [ ] NO  Care Discussed with Consultants/Other Providers [ x] YES  [ ] NO    PEx:	  T(C): 37.1 (21 @ 05:24), Max: 37.1 (21 @ 05:24)  HR: 93 (21 @ 05:24) (81 - 94)  BP: 104/51 (21 @ 05:24) (99/57 - 120/72)  RR: 19 (21 @ 05:24) (19 - 24)  SpO2: 97% (21 @ 22:30) (97% - 100%)  Wt(kg): --  Daily     Daily     General:  found in bed in NAD  Eyes:  PERRL  Non icteric  ENMT: no external oral ulcers, MMM, no thrush   CVS:not tachy  Resp: Unlabored Non tachypneic No increased WOB at rest; O2nc in place; during attempts to wake/enage/examine - did become tachypneic with sl ^WOB - that after rest returned to baseling  GI:  Soft NT ND   :  PrimaFit  Musc: No C/C/E  nails not maintained  Neuro: unable to follows commands eg: hand grasps  Psych: Calm     Skin: Non jaundiced ,    Preadmit Karnofsky:  %           Current Karnofsky:  20   %  http://www.npcrc.org/files/news/karnofsky_performance_scale.pdf   http://www.npcrc.org/files/news/palliative_performance_scale_PPSv2.pdf  Cachexia (Y/N): y  BMI: not documented    Medications:	    has not had mental status/ability to take PO  MEDICATIONS  (STANDING):  ALBUTerol    0.083% 2.5 milliGRAM(s) Nebulizer every 4 hours  albuterol/ipratropium for Nebulization 3 milliLiter(s) Nebulizer every 6 hours  apixaban 5 milliGRAM(s) Oral every 12 hours  atorvastatin 20 milliGRAM(s) Oral at bedtime  chlorhexidine 4% Liquid 1 Application(s) Topical <User Schedule>  pantoprazole    Tablet 40 milliGRAM(s) Oral before breakfast  senna 2 Tablet(s) Oral at bedtime  tiotropium 18 MICROgram(s) Capsule 1 Capsule(s) Inhalation once    MEDICATIONS  (PRN):  ALBUTerol    0.083% 2.5 milliGRAM(s) Nebulizer every 4 hours PRN Shortness of Breath and/or Wheezing  polyethylene glycol 3350 17 Gram(s) Oral daily PRN Constipation    Advanced Directives:	          DNR/DNI      Decision maker: The patient is unable to participate in complex medical decision making conversations.   Legal surrogate: chace August    GOALS OF CARE DISCUSSION	       Palliative care info will be provided to chace	        PSYCHOSOCIAL-SPIRITUAL ASSESSMENT:      See Palliative Care SW/ documentation        	  REFERRALS       Palliative Med        Unit SW/Case Mgmt              Hospice       NAZANIN POWERS          MRN-[id number]              CC: suicide attempt    HPI:  The patient is a 75 year-old female with a PMH of COPD (on 3L O2 home), bronchogenic carcinoma (left lung, s/p left partial lobectomy, in remission x 9 yrs), chronic atrial fibrillation (on Eliquis), anxiety, presenting following a suicide attempt at home. Patient this morning is somnolent; can answer in short sentences; when asked what happened, she states "I didn't want to live anymore;" reports she has been feeling this way for a long time. She endorses overdosing w/ Klonopin; per ED, patient took 27 tabs yesterday morning. At this time she reports feeling well; denies shortness of breath or difficulty breathing, dizziness, chest pain or pressure. She requests her  not be told about her being in the hospital as "he has anxiety." (2021 10:31)      PAST MEDICAL & SURGICAL HISTORY:  High cholesterol    Anxiety    Pacemaker    Lung cancer    Afib    COPD (chronic obstructive pulmonary disease)    Artificial cardiac pacemaker    H/O atrioventricular kacie ablation    S/P lobectomy of lung  left    History of tonsillectomy    S/P appendectomy    Cataract  RIGHT   AND  LEFT  19        FAMILY HISTORY:  No pertinent family history in first degree relatives     Reviewed and found non contributory in mother or father    SOCIAL HISTORY:   Tobacco/etoh/illicit drug use use reported. Yes [x ]  tobacco No [ ]  Pt resides at: home [ x]  facility [ ]  other [ ] _______    ROS:	  Unable to determine as patient asleep and did not awake to voice     Last BM: none documented; unknown    Allergies  bacitracin (Other)  carboplatin (Other)  sulfa drugs (Unknown)  sulfonamides (Unknown)  Xanax (Other)    Opiate Naive (Y/N):  Y  -iStop reviewed (Y/N): YT  Ref#: Reference #: [id number]    Others' Prescriptions  Patient Name: Nazanin Schaefer Date: 1945  Address: [address] [place] [id number]: Female  Rx Written	Rx Dispensed	Drug	Quantity	Days Supply	Prescriber Name	Prescriber Jolanta #	Payment Method	Dispenser  2021	clonazepam 0.5 mg tablet	90	30	Taty Kidd	[id number]	[facility] #[id number]  2021	klonopin 0.5 mg tablet	90	30	Marti Taty	[id number]	Medicare	[facility] #[id number]  2021	lorazepam 0.5 mg tablet	30	30	Penny Griffith MD	[id number]	[facility] #[id number]  2020	klonopin 0.5 mg tablet	90	30	Marti Taty	[id number]	Medicare	[facility] #[id number]  2020	klonopin 0.5 mg tablet	90	30	Carmine Spencer MD	[id number]	Medicare	[facility] #[id number]  2020	klonopin 0.5 mg tablet	90	30	Marti, Taty	[id number]	Medicare	[facility] #[id number]    Patient Name: Nazanin Schaefer Date: 1945  Address: [address] [place], [place] [address]Sex: Female  Rx Written	Rx Dispensed	Drug	Quantity	Days Supply	Prescriber Name	Prescriber Jolanta #	Payment Method	Dispenser  2021	clonazepam 0.5 mg tablet	12	4	Catherine, Kaylee A , FNP	[id number]	[facility]  2021	clonazepam 1 mg tablet	28	7	Ravindra Telles)	[id number]	[facility]  2021	lorazepam 1 mg tablet	28	7	Rigoberto Rebolledo	[id number]	[facility]  2021	morphine sulf 100 mg/5 ml conc	30ml	10	Catherine, Kaylee A , FNP	[id number]	[facility]  2021	lorazepam 0.5 mg tablet	10	3	Catherine, Kaylee A , FNP	[id number]	[facility]  * - Drugs marked with an asterisk are compound drugs. If the compound drug is made up of more than one controlled substance, then each controlled substance will be a separate row in the             Labs:	    CBC:                        11.5   14.86 )-----------( 187      ( 2021 07:31 )             35.9     CMP:        138  |  96<L>  |  25<H>  ----------------------------<  190<H>  4.6   |  23  |  0.9    Ca    9.4      2021 07:31       Albumin, Serum: 3.8 g/dL (21 @ 10:00)    Urinalysis Basic - ( 2021 13:00 )    Color: Light Yellow / Appearance: Clear / S.013 / pH: x  Gluc: x / Ketone: Small  / Bili: Negative / Urobili: <2 mg/dL   Blood: x / Protein: Negative / Nitrite: Negative   Leuk Esterase: Negative / RBC: x / WBC x   Sq Epi: x / Non Sq Epi: x / Bacteria: x    Barbiturates Screen, Urine (21 @ 13:00)    Barbiturates Screen, Urine: Negative    Anion Gap: 10  @ 10:00  CK: -- - @ 10:00  Troponin:  --   @ 10:00  Pro-BNP:  --   @ 10:00  VBG:  --   @ 10:00  Carboxyhemoglobin %:  --   @ 10:00  Methemoglobin %:  --  - @ 10:00  Osmolality Serum:  --   @ 10:00  Aspirin Level: <0.3<L>   @ 10:00  Acetaminophen Level:  <5.0<L>   @ 10:00  Ethanol Level:  --  - @ 10:00  Digoxin Level:  --   @ 10:00  Phenytoin Level:  --   @ 10:00  Carbamazepine level:  --   @ 10:00  Lamotrigine level:  --   @ 10:00    Radiology:	   CXR  INTERPRETATION:  Clinical History / Reason for exam: Hypoxia.    Comparison : Chest radiograph prior day.    Technique/Positioning: Frontal portable.    Findings:    Support devices: Loop recorder overlies the heart    Cardiac/mediastinum/hilum: Mediastinal shift to the left    Lung parenchyma/Pleura: Unchanged appearance of the left hemithorax    Skeleton/soft tissues: Unremarkable.    Impression:    Postoperative change of the left hemithorax.    Without difference.      EKG:	interpreted by me: afib without rvr      Imaging Personally Reviewed:  [x] YES  [ ] NO  Consultant(s) Notes Reviewed:  [ x] YES  [ ] NO  Care Discussed with Consultants/Other Providers [ x] YES  [ ] NO    PEx:	  T(C): 37.1 (21 @ 05:24), Max: 37.1 (21 @ 05:24)  HR: 93 (21 @ 05:24) (81 - 94)  BP: 104/51 (21 @ 05:24) (99/57 - 120/72)  RR: 19 (21 @ 05:24) (19 - 24)  SpO2: 97% (21 @ 22:30) (97% - 100%)  Wt(kg): --  Daily     Daily     General:  found in bed in NAD  Eyes:  closed  ENMT: no external oral ulcers, MMM, no thrush   CVS:not tachy  Resp: Unlabored Non tachypneic No increased WOB at rest; O2nc in place; during attempts to wake/enage/examine - did become tachypneic with sl ^WOB - that after rest returned to baseling  GI:  Soft NT ND   :  PrimaFit  Musc: No C/C/E  nails not maintained  Neuro: unable to follows commands eg: hand grasps  Psych: Calm     Skin: Non jaundiced ,    Preadmit Karnofsky:  unknown %           Current Karnofsky:  20   %  Cachexia (Y/N): y  BMI: not documented    Medications:	    has not had mental status/ability to take PO  MEDICATIONS  (STANDING):  ALBUTerol    0.083% 2.5 milliGRAM(s) Nebulizer every 4 hours  albuterol/ipratropium for Nebulization 3 milliLiter(s) Nebulizer every 6 hours  apixaban 5 milliGRAM(s) Oral every 12 hours  atorvastatin 20 milliGRAM(s) Oral at bedtime  chlorhexidine 4% Liquid 1 Application(s) Topical <User Schedule>  pantoprazole    Tablet 40 milliGRAM(s) Oral before breakfast  senna 2 Tablet(s) Oral at bedtime  tiotropium 18 MICROgram(s) Capsule 1 Capsule(s) Inhalation once    MEDICATIONS  (PRN):  ALBUTerol    0.083% 2.5 milliGRAM(s) Nebulizer every 4 hours PRN Shortness of Breath and/or Wheezing  polyethylene glycol 3350 17 Gram(s) Oral daily PRN Constipation    Advanced Directives:	        DNR/DNI      Decision maker: The patient is unable to participate in complex medical decision making conversations.   Legal surrogate: chace August    GOALS OF CARE DISCUSSION	       Palliative care info will be provided to chace	        PSYCHOSOCIAL-SPIRITUAL ASSESSMENT:      See Palliative Care SW/ documentation        	  REFERRALS       Palliative Med        Unit SW/Case Mgmt              Hospice

## 2021-07-08 NOTE — PROGRESS NOTE BEHAVIORAL HEALTH - SUMMARY
Ms. Joseph is a 75 year old female with end stage COPD on home O2 and a history of anxiety and depression who presented to ED after reportedly ingesting 27 tablets of 0.5 mg Klonopin. Klonopin has a long half-life of 20-50 hours and 4 - 5 half-lives need to pass before the effects of the medication will wear off. As only 26 hours have passed since reported ingestion, Ms. Joseph remains appropriately sedated. Ms. Joseph is a 75 year old female with end stage COPD on home O2 and a history of anxiety and depression who presented to ED yesterday after reportedly intentionally ingesting 27 tablets of 0.5 mg Klonopin. Klonopin has a long half-life of 20-50 hours and 4 - 5 half-lives need to pass before the effects of the medication will wear off. As only 26 hours have passed since reported ingestion, Ms. Joseph remains appropriately sedated and unable to participate in psychiatric evaluation. Ms. Joseph is a 75 year old female with end stage COPD on home O2 and a history of anxiety and depression who presented to ED yesterday after reportedly intentionally ingesting 27 tablets of 0.5 mg Klonopin. Klonopin has a long half-life of 20-50 hours and 4 - 5 half-lives need to pass before the effects of the medication will wear off. As only 26 hours have passed since reported ingestion, Ms. Joseph remains  sedated and unable to participate in psychiatric evaluation.

## 2021-07-08 NOTE — CONSULT NOTE ADULT - PROBLEM SELECTOR RECOMMENDATION 3
will follow  if patient continues to lack decision making capacity then will collaborate with psych and primary team re: engaging son in GOC conversation -DNR/DNI  -will follow  -if patient continues to lack decision making capacity then will collaborate with psych and primary team re: engaging son in GOC conversation

## 2021-07-08 NOTE — PROGRESS NOTE ADULT - SUBJECTIVE AND OBJECTIVE BOX
CASEY POWERS 75y Female  MRN#: [id number]   CODE STATUS: FULL  Do Not Resuscitate      SUBJECTIVE  Patient is a 75y old Female who presents with a chief complaint of benzodiazepine overdose (2021 14:30)    The patient is a 75 year-old female with a PMH of COPD (on 3L O2 home), bronchogenic carcinoma (left lung, s/p left partial lobectomy, in remission x 9 yrs), chronic atrial fibrillation (on Eliquis), anxiety, presenting following a suicide attempt at home. Patient this morning is somnolent; can answer in short sentences; when asked what happened, she states "I didn't want to live anymore;" reports she has been feeling this way for a long time. She endorses overdosing w/ Klonopin; per ED, patient took 27 tabs yesterday morning. At this time she reports feeling well; denies shortness of breath or difficulty breathing, dizziness, chest pain or pressure. She requests her  not be told about her being in the hospital as "he has anxiety."    Today is hospital day 1d,   INTERVAL HPI/OVERNIGHT EVENTS:    Examined the patient at bedside this AM. Patient was on 1 to 1 watch. When I tried to intoduce myself, the patient yelled "leave me alone" and "what do you want from me". She was uncooperative.    Present Today:           Thibodeaux Catheter (x)No/ ()Yes?   Indication:             Central Line (x)No/ ()Yes?   Indication:          IV Fluids (x)No/ ()Yes? dextrose 5% + sodium chloride 0.45%. 1000 milliLiter(s) (75 mL/Hr) IV Continuous <Continuous>  Type:  Rate:  Indication:    OBJECTIVE  PAST MEDICAL & SURGICAL HISTORY  High cholesterol    Anxiety    Pacemaker    Lung cancer    Afib    COPD (chronic obstructive pulmonary disease)    Artificial cardiac pacemaker    H/O atrioventricular kacie ablation    S/P lobectomy of lung  left    History of tonsillectomy    S/P appendectomy    Cataract  RIGHT   AND  LEFT  19      ALLERGIES:  bacitracin (Other)  carboplatin (Other)  sulfa drugs (Unknown)  sulfonamides (Unknown)  Xanax (Other)    HOME MEDICATIONS:  Home Medications:  atorvastatin 20 mg oral tablet: 1 tab(s) orally once a day (2021 22:41)  clonazePAM 1 mg oral tablet: 1 tab(s) orally every 8 hours, As Needed (21 May 2021 12:12)  pantoprazole 40 mg oral delayed release tablet: 1 tab(s) orally once a day (before a meal) (21 May 2021 12:12)    MEDICATIONS:  STANDING MEDICATIONS  ALBUTerol    0.083% 2.5 milliGRAM(s) Nebulizer every 4 hours  apixaban 5 milliGRAM(s) Oral every 12 hours  atorvastatin 20 milliGRAM(s) Oral at bedtime  chlorhexidine 4% Liquid 1 Application(s) Topical <User Schedule>  dextrose 5% + sodium chloride 0.45%. 1000 milliLiter(s) (75 mL/Hr) IV Continuous <Continuous>  pantoprazole  Injectable 40 milliGRAM(s) IV Push daily  senna 2 Tablet(s) Oral at bedtime  tiotropium 18 MICROgram(s) Capsule 1 Capsule(s) Inhalation once    PRN MEDICATIONS  ALBUTerol    0.083% 2.5 milliGRAM(s) Nebulizer every 4 hours PRN  ALBUTerol    90 MICROgram(s) HFA Inhaler 2 Puff(s) Inhalation every 4 hours PRN  polyethylene glycol 3350 17 Gram(s) Oral daily PRN      VITAL SIGNS: Last 24 Hours  T(C): 35.6 (2021 12:45), Max: 37.1 (2021 05:24)  T(F): 96.1 (2021 12:45), Max: 98.7 (2021 05:24)  HR: 77 (2021 12:45) (77 - 94)  BP: 107/54 (2021 12:45) (99/57 - 120/72)  BP(mean): --  RR: 20 (2021 12:45) (19 - 24)  SpO2: 97% (2021 22:30) (97% - 97%)    LABS:                        11.5   14.86 )-----------( 187      ( 2021 07:31 )             35.9     07-08    138  |  96<L>  |  25<H>  ----------------------------<  190<H>  4.6   |  23  |  0.9    Ca    9.4      2021 07:31        Urinalysis Basic - ( 2021 13:00 )    Color: Light Yellow / Appearance: Clear / S.013 / pH: x  Gluc: x / Ketone: Small  / Bili: Negative / Urobili: <2 mg/dL   Blood: x / Protein: Negative / Nitrite: Negative   Leuk Esterase: Negative / RBC: x / WBC x   Sq Epi: x / Non Sq Epi: x / Bacteria: x      ABG - ( 2021 22:28 )  pH, Arterial: 7.39  pH, Blood: x     /  pCO2: 50    /  pO2: 214   / HCO3: 30    / Base Excess: 4.5   /  SaO2: 100           RADIOLOGY:    < from: Xray Chest 1 View AP/PA (21 @ 23:48) >  Impression:    Postoperative change of the left hemithorax.    Without difference.    < end of copied text >    PHYSICAL EXAM:    Could not perform physical exam because patient refused.

## 2021-07-08 NOTE — HOSPICE CARE NOTE - CONVESATION DETAILS
Hospice team aware of consult as patient had been in hospice care in the past- Hospice NP to evaluate and team to remain in communication with team.

## 2021-07-08 NOTE — CONSULT NOTE ADULT - ASSESSMENT
76 yo F with pmh of anxiety, COPD (on home O2 ) and  lung CA s/p chemo/radiation/ and partial left lobectomy in remission x 9 years, severe pulmonary hypertension;  Afib on Coumadin s/p ablation and micra PPM. She presented to the hospital with worsening shortness of breath and wheezing and 2 episodes of hemoptysis after discharge from the hospital in April. Deconditioning due to COPD/ severe pulm HTN / cor pulmonale. Our team consulted in May and June - at that time hospice was plan. Now admitted after suicide attempt with Klonopin   During previous admission was on Klonopin 1mg q 12 and also prn; had initiated low dose morphine for dyspnea    being evaluated for      MEDD (morphine equivalent daily dose): na      See Recs below. INCOMPLETE    Please call [phone] with questions or concerns 24/7.   We will continue to follow.    76 yo F with pmh of anxiety, COPD (on home O2 ) and  lung CA s/p chemo/radiation/ and partial left lobectomy in remission x 9 years, severe pulmonary hypertension;  Afib on Coumadin s/p ablation and micra PPM. She presented to the hospital with worsening shortness of breath and wheezing and 2 episodes of hemoptysis after discharge from the hospital in April. Deconditioning due to COPD/ severe pulm HTN / cor pulmonale. Our team consulted in May and June - at that time hospice was plan. Now admitted after suicide attempt with Klonopin   During previous admissions  on Klonopin 1mg q 12 and also prn; had initiated low dose morphine for dyspnea    being evaluated for support with [facility]      MEDD (morphine equivalent daily dose): na      See Recs below. d/w housestaff, psych team and hospice NP    Please call [phone] with questions or concerns 24/7.   We will continue to follow.    76 yo F with pmh of anxiety, COPD (on home O2 ) and  lung CA s/p chemo/radiation/ and partial left lobectomy in remission x 9 years, severe pulmonary hypertension;  Afib on Coumadin s/p ablation and micra PPM. She presented to the hospital previously with worsening shortness of breath and wheezing and 2 episodes of hemoptysis after discharge from the hospital in April. Deconditioning due to COPD/ severe pulm HTN / cor pulmonale. Our team consulted in May and June - at that time hospice was plan. Now admitted after suicide attempt with Klonopin   During previous admissions on Klonopin 1mg q 12 and also prn; had initiated low dose morphine for dyspnea. Palliative care consulted [facility].    MEDD (morphine equivalent daily dose): na      See Recs below. d/w housestaff, psych team and hospice NP    Please call [phone] with questions or concerns 24/7.   We will continue to follow.

## 2021-07-08 NOTE — PROGRESS NOTE ADULT - SUBJECTIVE AND OBJECTIVE BOX
Hospice NP note:  Pt is a 75 [facility]/p intentional Klonipin Overdose, Pt known to [facility], pt revoked hospice services electing aggressive treatment. Pt re-referred to hospice post admission to [facility]. Pt past medical history End stg COPD, Anxiety, Lung Cancer s/p partial left lobectomy, A fib, hyperlipidemia, toxic encephalopathy and hypoxia/respiratory failure. Per discussion with [facility] pt not appropriate for hospice at this time due to unsafe home environment.  Pt currently being followed by psych for possible d/c to inpt psych.

## 2021-07-09 NOTE — PROGRESS NOTE BEHAVIORAL HEALTH - NSBHFUPINTERVALCCFT_PSY_A_CORE
"I sometimes say 'I'm gonna kill myself' and 'I'm gonna kill you' to my cats, but it's just in jest" "I thought it was a dream"

## 2021-07-09 NOTE — PROGRESS NOTE ADULT - PROBLEM SELECTOR PLAN 1
end stage with h/o  Lung CA s/p chemo/radiation/ and partial left lobectomy in remission x 9 years, severe pulmonary hypertension    if dyspnea, then concentrated liquid morphine 5mg PO/SL every 4 H PRN   in past anxiety was treated with Klonopin, given recent overdose - continue as per psych End stage with h/o  Lung CA s/p chemo/radiation/ and partial left lobectomy in remission x 9 years, severe pulmonary hypertension.      -if dyspnea, consider concentrated liquid morphine 5mg PO/SL every 4 H PRN (hold for sedation, confusion, RR<10)  -in past anxiety was treated with Klonopin- given recent overdose, will defer to psych

## 2021-07-09 NOTE — PROGRESS NOTE BEHAVIORAL HEALTH - NSBHFUPINTERVALHXFT_PSY_A_CORE
Ms. Joseph is awake and communicative this morning. On approach, she is sitting up in bed having a conversation with her sitter. She informs me that she is hard of hearing. She is oriented to person and place, but not time or situation. She reports not knowing why she is in the hospital. The last thing she remembers is eating dinner and watching TV. When informed that she's been in the hospital for several days after reportedly ingesting 27 pills of Klonopin, she appears surprised and says "I don't have 27 pills." When asked if she has been having thoughts about killing herself, she denies it and states that "I sometimes say 'I'm gonna kill myself' and 'I'm gonna kill you' to my cats, but it's just in jest." She is unable to spell world backward or recite the months of the year backward. She denies any thoughts of wanting to harm herself or others. She denies seeing or hearing things others can't see. She does endorse currently feeling "lousy" and anxious, saying that it "comes and goes".     Per sitter, she has been awake since 8:30 AM. Ms. Joseph is awake and communicative this morning. On approach, she is sitting up in bed having a conversation with her sitter. She informs me that she is hard of hearing. She is oriented to person and place, but not time or situation. She reports not knowing why she is in the hospital. The last thing she remembers is eating dinner and watching TV. When informed that she's been in the hospital for several days after reportedly ingesting 27 pills of Klonopin, she appears surprised and says "I don't have 27 pills." When asked if she has been having thoughts about killing herself, she denies it and states that "I sometimes say 'I'm gonna kill myself' and 'I'm gonna kill you' to my cats, but it's just in jest." She is unable to spell world backward or recite the months of the year backward. She denies any thoughts of wanting to harm herself or others. She denies seeing or hearing things others can't see. She does endorse currently feeling "lousy" and anxious, saying that it "comes and goes".     Per sitter, she has been awake since 8:30 AM.    When patient was interviewed several hours later with attending and questioned about whether she remembers taking 27 pills, she denies at first and then says "I thought it was a dream." She also says "Why is everyone fixating on the number, how does it matter if it's 27 or 23 or 24?" The patient reports that she takes one pill of Klonopin a day. When asked if she knows what would happen if she were to take 27 pills of Klonopin at one time, she says "I don't know." Ms. Joseph is awake and communicative this morning. On approach, she is sitting up in bed having a conversation with her sitter. She informs me that she is hard of hearing. She is oriented to person and place, but not time or situation. She reports not knowing why she is in the hospital. The last thing she remembers is eating dinner and watching TV. When informed that she's been in the hospital for several days after reportedly ingesting 27 pills of Klonopin, she appears surprised and says "I don't have 27 pills." When asked if she has been having thoughts about killing herself, she denies it and states that "I sometimes say 'I'm gonna kill myself' and 'I'm gonna kill you' to my cats, but it's just in jest." She denies any thoughts of wanting to harm herself or others. She denies seeing or hearing things others can't see. She does endorse currently feeling "lousy" and anxious, saying that it "comes and goes".     Per sitter, she has been awake since 8:30 AM.    When patient was interviewed several hours later with attending psychiatrist and questioned about whether she remembers taking 27 pills, she denies at first and then says "I thought it was a dream." She also says "Why is everyone fixating on the number, how does it matter if it's 27 or 23 or 24?" The patient reports that she takes one pill of Klonopin a day. When asked if she knows what would happen if she were to take 27 pills of Klonopin at one time, she says "I don't know." Ms. Joseph is awake and communicative this morning. On approach, she is sitting up in bed having a conversation with her sitter. She informs me that she is hard of hearing. She is oriented to person and place, but not time or situation. She reports not knowing why she is in the hospital. The last thing she remembers is eating dinner and watching TV. When informed that she's been in the hospital for several days after reportedly ingesting 27 pills of Klonopin, she appears surprised and says "I don't have 27 pills." When asked if she has been having thoughts about killing herself, she denies it and states that "I sometimes say 'I'm gonna kill myself' and 'I'm gonna kill you' to my cats, but it's just in jest." She denies any thoughts of wanting to harm herself or others. She denies seeing or hearing things others can't see. She does endorse currently feeling "lousy" and anxious, saying that it "comes and goes".     Per sitter, she has been awake since 8:30 AM.    When patient was interviewed several hours later with attending psychiatrist and questioned about whether she remembers taking 27 pills, she denies at first and then says "I thought it was a dream." She also says "Why is everyone fixating on the number, how does it matter if it's 27 or 23 or 24?" The patient reports that she takes one pill of Klonopin a day. When asked if she knows what would happen if she were to take 27 pills of Klonopin at one time, she says "I don't know."     Per collateral from  obtained by attending psychiatrist, the morning prior to the overdose, patient was very irritated and angry and persistently complaining about being tired with "life" and wanted to die. She later asked her  for two glasses of water. About 20 minutes later her  found her very obtunded.

## 2021-07-09 NOTE — PROGRESS NOTE BEHAVIORAL HEALTH - SUMMARY
Ms. Joseph is a 75 year old domiciled retired female with end stage COPD on 3L home O2 who presented to ED Ms. Joseph is a 75 year old domiciled retired female with a  PMH of COPD (on 3L O2 home), bronchogenic carcinoma (left lung, s/p left partial lobectomy, in remission x 9 yrs), chronic atrial fibrillation (on Eliquis), anxiety who presented to ED after reportedly intentionally ingesting 27 tablets of 0.5 mg of Klonopin.     The patient is awake today after several days of somnolence. On interview, she appears disoriented by her surroundings and does not remember taking the Klonopin. However, upon re-interview, she states that she does remember the incident but thought that it was a dream. She denies suicidal ideations, stating that her animals are her reason for living and that she is worried that they have been going hungry without her there.    Plan  1. Admit to Inpatient Psychiatry for Ms. Joseph is a 75 year old domiciled retired female with a  PMH of COPD (on 3L O2 home), bronchogenic carcinoma (left lung, s/p left partial lobectomy, in remission x 9 yrs), chronic atrial fibrillation (on Eliquis), anxiety who presented to ED after reportedly intentionally ingesting 27 tablets of 0.5 mg of Klonopin.     The patient is awake today after several days of somnolence. On interview, she is not oriented to time or situation, and intermittently denies remembering that she took the Klonopin. However, upon re-interview, she states that she does remember the incident but thought that it was a dream, which may reflect a dissociative process. She denies suicidal ideations, stating that her animals are her reason for living and that she is worried that they have been going hungry without her there.    Ms. Joseph's presentation is consistent with dementia, her risk for which is elevated in the setting of her advanced age and long-term use of Klonopin, a benzodiazepine with a long-intermediate half-life. She is further at risk for injuring herself--intentionally or unintentionally--in the setting of end-stage COPD on home oxygen therapy. She would benefit from inpatient psychiatric admission at this time.    Plan  -Hold all psychotropic medications.  -Admit to Inpatient Psychiatry  -Continue 1:1 for now. Ms. Joseph is a 75 year old domiciled retired female with a  PMH of COPD (on 3L O2 home), bronchogenic carcinoma (left lung, s/p left partial lobectomy, in remission x 9 yrs), chronic atrial fibrillation (on Eliquis), anxiety who presented to ED after reportedly intentionally ingesting 27 tablets of 0.5 mg of Klonopin.     The patient is awake today after several days of somnolence. On interview, she is not oriented to time or situation, and intermittently denies remembering that she took the Klonopin. However, upon re-interview, she states that she does remember the incident but thought that it was a dream. She denies suicidal ideations, stating that her animals are her reason for living and that she is worried that they have been going hungry without her there. However, collateral from her  indicates that the patient had been expressing that she was tired of living and that she wanted to kill herself the day of her overdose.    Ms. Joseph's current presentation is consistent with delirium overlaid on her long-standing anxiety disorder and suicide attempt. There may also be a component of dementia, her risk for which is elevated in the setting of her advanced age and long-term use of Klonopin. She is in denial of her intentional overdose and would benefit from inpatient psychiatric admission once her delirium is cleared.    Plan  -Hold all psychotropic medications.  -Admit to Inpatient Psychiatry  -Continue 1:1 for now.

## 2021-07-09 NOTE — PROGRESS NOTE ADULT - PROBLEM SELECTOR PLAN 2
as per psych and primary teams - plan for inpatient psych admission as per psych and primary teams - plan for inpatient psych admission  -f/u psychiatry

## 2021-07-09 NOTE — PROGRESS NOTE ADULT - SUBJECTIVE AND OBJECTIVE BOX
CASEY POWERS             MRN-[id number]      Patient is a 75y old Female who presents with a chief complaint of benzodiazepine overdose (09 Jul 2021 15:53)    Currently admitted with the primary diagnosis of: end-stage COPD/bronchogenic carcinoma       SUBJECTIVE:        ROS:    DYSPNEA: N	  NAUS/VOM:  N	  SECRETIONS: N	  AGITATION: N  Pain (Y/N):    n   -Provocation/Palliation:  -Quality/Quantity:  -Radiating:  -Severity:  -Timing/Frequency:  -Impact on ADLs:    General:  feeling better; wants to go home; cannot recall events that led to hospital admission; able to detail that Dr. Comer recommended hospice, but then 'he retired and left her', had hired someone to help at home - Jesus (confirmed by 1:1 sit) who visited today and gave balloon; has  at home - she is not clear on his physical function status  HEENT:    Denied  Neck:  Denied  CVS:  Denied  Resp:  no worse  GI:  Denied    :  Denied  Musc:  Denied  Neuro:  Denied  Psych:  + anxiety but not as bad as in past; no mention of inflicting self harm  Skin:  Denied  Lymph:  Denied      PEx:   T(C): 35.6 (07-09-21 @ 12:32), Max: 36.1 (07-08-21 @ 21:00)  HR: 68 (07-09-21 @ 12:32) (68 - 90)  BP: 102/61 (07-09-21 @ 12:32) (102/61 - 125/58)  RR: 18 (07-09-21 @ 12:32) (18 - 20)  SpO2: 93% (07-09-21 @ 09:02) (93% - 97%)  Wt(kg): --            General:  found in bed in NAD  Eyes:  PER maintining eye contact during encounterI Non icteric   ENMT: no external oral ulcers,  Resp: Unlabored mild tachypneic - RR 24 No increased WOB - shallow; O2 NC  :  PrimaFit  Musc: No C/C/E    Neuro: Follows commands No focal deficits  Psych: Calm Pleasant, AAOx3  not fully able to detail situation  Skin: Non jaundiced , no rash   Lymph: no adenopathy     Last BM: at home per patient    ALLERGIES: bacitracin (Other)  carboplatin (Other)  sulfa drugs (Unknown)  sulfonamides (Unknown)  Xanax (Other)            Labs:	    CBC:                        11.5   14.86 )-----------( 187      ( 08 Jul 2021 07:31 )             35.9     CMP:    07-08    138  |  96<L>  |  25<H>  ----------------------------<  190<H>  4.6   |  23  |  0.9    Ca    9.4      08 Jul 2021 07:31  Mg     1.8     07-09       Albumin, Serum: 3.8 g/dL (07-06-21 @ 10:00)           RADIOLOGY      EKG  12 Lead ECG:   Ventricular Rate 70 BPM    Atrial Rate 300 BPM    QRS Duration 140 ms    Q-T Interval 430 ms    QTC Calculation(Bazett) 464 ms    R Axis -63 degrees    T Axis 100 degrees    Diagnosis Line Atrial fibrillation  Left axis deviation  Non-specific intra-ventricular conduction block  Possible Lateral infarct , age undetermined  Abnormal ECG    Confirmed by LINDA ALVARADO MD ([id number]) on 7/6/2021 1:54:39 PM (07-06-21 @ 09:53)      Imaging Personally Reviewed:  [ x] YES  [ ] NO    Consultant(s) Notes Reviewed:  [ x YES  [ ] NO  Care Discussed with Consultants/Other Providers [ x] YES  [ ] NO    Medications:	      MEDICATIONS  (STANDING):  ALBUTerol    0.083% 2.5 milliGRAM(s) Nebulizer every 4 hours  apixaban 5 milliGRAM(s) Oral every 12 hours  atorvastatin 20 milliGRAM(s) Oral at bedtime  chlorhexidine 4% Liquid 1 Application(s) Topical <User Schedule>  methylPREDNISolone sodium succinate Injectable 20 milliGRAM(s) IV Push daily  pantoprazole    Tablet 40 milliGRAM(s) Oral before breakfast  senna 2 Tablet(s) Oral at bedtime  tiotropium 18 MICROgram(s) Capsule 1 Capsule(s) Inhalation once    MEDICATIONS  (PRN):  ALBUTerol    0.083% 2.5 milliGRAM(s) Nebulizer every 4 hours PRN Shortness of Breath and/or Wheezing  ALBUTerol    90 MICROgram(s) HFA Inhaler 2 Puff(s) Inhalation every 4 hours PRN Shortness of Breath  polyethylene glycol 3350 17 Gram(s) Oral daily PRN Constipation        ADVANCED DIRECTIVES:                  DNR DNI           DECISION MAKER: Patient [ x ]  Family [  ]  Other [  ] _______  LEGAL SURROGATE:      GOALS OF CARE DISCUSSION       Palliative care counseling provided	              PSYCHOSOCIAL-SPIRITUAL ASSESSMENT:       Reviewed       See Palliative Care SW/ documentation      CURRENT DISPO PLAN:         WILL REMAIN IN HOSPITAL           REFERRALS	        Palliative Med        Unit SW/Case Mgmt                 CASEY POWERS             [id number]      Patient is a 75y old Female who presents with a chief complaint of benzodiazepine overdose (09 Jul 2021 15:53)    Currently admitted with the primary diagnosis of: end-stage COPD/bronchogenic carcinoma       SUBJECTIVE:    -Patient seen at bedside  -Denied pain, SOB, nausea  -no nonverbal signs of pain on exam, but patient appeared to have increased work of breathing    ROS:    DYSPNEA: N	  NAUS/VOM:  N	  SECRETIONS: N	  AGITATION: N  Pain (Y/N):    n   -Provocation/Palliation:  -Quality/Quantity:  -Radiating:  -Severity:  -Timing/Frequency:  -Impact on ADLs:    General:  feeling better; wants to go home; cannot recall events that led to hospital admission; able to detail that Dr. Comer recommended hospice, but then 'he retired and left her', had hired someone to help at home - Jesus (confirmed by 1:1 sit) who visited today and gave balloon; has  at home - she is not clear on his physical function status  HEENT:    Denied  Neck:  Denied  CVS:  Denied  Resp:  no worse  GI:  Denied    :  Denied  Musc:  Denied  Neuro:  Denied  Psych:  + anxiety but not as bad as in past; no mention of inflicting self harm  Skin:  Denied  Lymph:  Denied      PEx:   T(C): 35.6 (07-09-21 @ 12:32), Max: 36.1 (07-08-21 @ 21:00)  HR: 68 (07-09-21 @ 12:32) (68 - 90)  BP: 102/61 (07-09-21 @ 12:32) (102/61 - 125/58)  RR: 18 (07-09-21 @ 12:32) (18 - 20)  SpO2: 93% (07-09-21 @ 09:02) (93% - 97%)  Wt(kg): --            General:  found in bed in NAD  Eyes:  PER maintining eye contact during encounterI Non icteric   ENMT: no external oral ulcers,  Resp: Unlabored mild tachypneic - RR 24 No increased WOB - shallow; O2 NC  :  PrimaFit  Musc: No C/C/E    Neuro: Follows commands No focal deficits  Psych: Calm Pleasant, AAOx3  not fully able to detail situation  Skin: Non jaundiced , no rash   Lymph: no adenopathy     Last BM: at home per patient    ALLERGIES: bacitracin (Other)  carboplatin (Other)  sulfa drugs (Unknown)  sulfonamides (Unknown)  Xanax (Other)    Labs:	    CBC:                        11.5   14.86 )-----------( 187      ( 08 Jul 2021 07:31 )             35.9     CMP:    07-08    138  |  96<L>  |  25<H>  ----------------------------<  190<H>  4.6   |  23  |  0.9    Ca    9.4      08 Jul 2021 07:31  Mg     1.8     07-09       Albumin, Serum: 3.8 g/dL (07-06-21 @ 10:00)       RADIOLOGY  CXR  INTERPRETATION:  Clinical History / Reason for exam: Hypoxia.    Comparison : Chest radiograph prior day.    Technique/Positioning: Frontal portable.    Findings:    Support devices: Loop recorder overlies the heart    Cardiac/mediastinum/hilum: Mediastinal shift to the left    Lung parenchyma/Pleura: Unchanged appearance of the left hemithorax    Skeleton/soft tissues: Unremarkable.    Impression:    Postoperative change of the left hemithorax.    Without difference.        EKG  12 Lead ECG:   Ventricular Rate 70 BPM    Atrial Rate 300 BPM    QRS Duration 140 ms    Q-T Interval 430 ms    QTC Calculation(Bazett) 464 ms    R Axis -63 degrees    T Axis 100 degrees    Diagnosis Line Atrial fibrillation  Left axis deviation  Non-specific intra-ventricular conduction block  Possible Lateral infarct , age undetermined  Abnormal ECG    Confirmed by LINDA ALVARADO MD ([id number]) on 7/6/2021 1:54:39 PM (07-06-21 @ 09:53)      Imaging Personally Reviewed:  [ x] YES  [ ] NO    Consultant(s) Notes Reviewed:  [ x YES  [ ] NO  Care Discussed with Consultants/Other Providers [ x] YES  [ ] NO    Medications:	      MEDICATIONS  (STANDING):  ALBUTerol    0.083% 2.5 milliGRAM(s) Nebulizer every 4 hours  apixaban 5 milliGRAM(s) Oral every 12 hours  atorvastatin 20 milliGRAM(s) Oral at bedtime  chlorhexidine 4% Liquid 1 Application(s) Topical <User Schedule>  methylPREDNISolone sodium succinate Injectable 20 milliGRAM(s) IV Push daily  pantoprazole    Tablet 40 milliGRAM(s) Oral before breakfast  senna 2 Tablet(s) Oral at bedtime  tiotropium 18 MICROgram(s) Capsule 1 Capsule(s) Inhalation once    MEDICATIONS  (PRN):  ALBUTerol    0.083% 2.5 milliGRAM(s) Nebulizer every 4 hours PRN Shortness of Breath and/or Wheezing  ALBUTerol    90 MICROgram(s) HFA Inhaler 2 Puff(s) Inhalation every 4 hours PRN Shortness of Breath  polyethylene glycol 3350 17 Gram(s) Oral daily PRN Constipation        ADVANCED DIRECTIVES:                  DNR DNI           DECISION MAKER: Patient [ x ]  Family [  ]  Other [  ] _______  LEGAL SURROGATE:      GOALS OF CARE DISCUSSION       Palliative care counseling provided	              PSYCHOSOCIAL-SPIRITUAL ASSESSMENT:       Reviewed       See Palliative Care SW/ documentation      CURRENT DISPO PLAN:         WILL REMAIN IN HOSPITAL           REFERRALS	        Palliative Med        Unit SW/Case Mgmt

## 2021-07-09 NOTE — PROGRESS NOTE ADULT - PROBLEM SELECTOR PLAN 3
given the complexity of the case with illness that is hospice appropriate, may benefit from interdisciplinary family meeting   will continue to follow -DNR/DNI  -Plan for inpatient psychiatry admission when medically stable  -Patient previously had opted for hospice given end stage COPD diagnosis  -Given the complexity of the case with illness that is hospice appropriate, patient and family may benefit from interdisciplinary meeting when patient medically stable and able to participate in discussion  -will continue to follow

## 2021-07-09 NOTE — PROGRESS NOTE ADULT - SUBJECTIVE AND OBJECTIVE BOX
CASEY POWERS  75y  Female  ***My note supersedes ALL resident notes that I sign.  My corrections for their notes are in my note.***    I can be reached directly on [email or web address] [id number]. My office number is [phone]. My personal cell number is [phone].    INTERVAL EVENTS: Here for f/u of klonopin overdose. Pt is awake and alert today. She is talking and seems back to baseline in that regard. She looks weaker than usual, physically. She has not really eaten yet. Pt, again, denies that she is suicidal; however, she keeps saying this to different people/personnel.  She also denies taking an overdose of klonopin, but this was confirmed by  w/ EMS and ED staff.  Pt does not remember event. She does not remember seeing me yesterday, but does remember me from the last admission.    T(F): 96 (07-09-21 @ 12:32), Max: 96.9 (07-08-21 @ 21:00)  HR: 68 (07-09-21 @ 12:32) (68 - 90)  BP: 102/61 (07-09-21 @ 12:32) (102/61 - 125/58)  RR: 18 (07-09-21 @ 12:32) (18 - 20)  SpO2: 93% (07-09-21 @ 09:02) (93% - 97%)    Gen: no distress, awake now; talking well; following commands; hard of hearing  HEENT: PERRL, nose clr  Neck: no nodes, no JVD, thyroid nl  lungs: clr; decr BS; no wheeze  hrt: s1 s2 rrr no murmur  abd: soft, NT/ND, no HS megaly  ext: no edema, no c/c  neuro: aa and oriented now; CN intact; follows commands; can move all 4 ext, but she is weak    LABS:                      11.5    (    88.6   14.86 )-----------( ---------      187      ( 08 Jul 2021 07:31 )             35.9    (    15.0     --   (   --   (   --      07-09-21 @ 12:55  ----------------------               --   (   --   (   --                             -----                        --  Ca  --   Mg  1.8    P   --     ABG - ( 07 Jul 2021 22:28 )  pH, Arterial: 7.39  pH, Blood: x     /  pCO2: 50    /  pO2: 214   / HCO3: 30    / Base Excess: 4.5   /  SaO2: 100       RADIOLOGY & ADDITIONAL TESTS:    MEDICATIONS:    ALBUTerol    0.083% 2.5 milliGRAM(s) Nebulizer every 4 hours  ALBUTerol    0.083% 2.5 milliGRAM(s) Nebulizer every 4 hours PRN  ALBUTerol    90 MICROgram(s) HFA Inhaler 2 Puff(s) Inhalation every 4 hours PRN  apixaban 5 milliGRAM(s) Oral every 12 hours  atorvastatin 20 milliGRAM(s) Oral at bedtime  chlorhexidine 4% Liquid 1 Application(s) Topical <User Schedule>  dextrose 5% + sodium chloride 0.45%. 1000 milliLiter(s) IV Continuous <Continuous>  methylPREDNISolone sodium succinate Injectable 20 milliGRAM(s) IV Push daily  pantoprazole  Injectable 40 milliGRAM(s) IV Push daily  polyethylene glycol 3350 17 Gram(s) Oral daily PRN  senna 2 Tablet(s) Oral at bedtime  tiotropium 18 MICROgram(s) Capsule 1 Capsule(s) Inhalation once

## 2021-07-09 NOTE — PROGRESS NOTE ADULT - SUBJECTIVE AND OBJECTIVE BOX
CASEY POWERS 75y Female  MRN#: [id number]   CODE STATUS: FULL  Do Not Resuscitate      SUBJECTIVE  The patient is a 75 year-old female with a PMH of COPD (on 3L O2 home), bronchogenic carcinoma (left lung, s/p left partial lobectomy, in remission x 9 yrs), chronic atrial fibrillation (on Eliquis), anxiety, presenting following a suicide attempt at home. Patient this morning is somnolent; can answer in short sentences; when asked what happened, she states "I didn't want to live anymore;" reports she has been feeling this way for a long time. She endorses overdosing w/ Klonopin; per ED, patient took 27 tabs yesterday morning. At this time she reports feeling well; denies shortness of breath or difficulty breathing, dizziness, chest pain or pressure. She requests her  not be told about her being in the hospital as "he has anxiety."    I examined the patient this afternoon and she was AAOx3. She didn't recall why she's here. She is insisting on going home.    Present Today:           Thibodeaux Catheter (x)No/ ()Yes?   Indication:             Central Line (x)No/ ()Yes?   Indication:          IV Fluids (x)No/ ()Yes? Type:  Rate:  Indication:    OBJECTIVE  PAST MEDICAL & SURGICAL HISTORY  High cholesterol    Anxiety    Pacemaker    Lung cancer    Afib    COPD (chronic obstructive pulmonary disease)    Artificial cardiac pacemaker    H/O atrioventricular kacie ablation    S/P lobectomy of lung  left    History of tonsillectomy    S/P appendectomy    Cataract  RIGHT  6/17 AND  LEFT  7/5/19      ALLERGIES:  bacitracin (Other)  carboplatin (Other)  sulfa drugs (Unknown)  sulfonamides (Unknown)  Xanax (Other)    HOME MEDICATIONS:  Home Medications:  atorvastatin 20 mg oral tablet: 1 tab(s) orally once a day (05 Jun 2021 22:41)  clonazePAM 1 mg oral tablet: 1 tab(s) orally every 8 hours, As Needed (21 May 2021 12:12)  pantoprazole 40 mg oral delayed release tablet: 1 tab(s) orally once a day (before a meal) (21 May 2021 12:12)    MEDICATIONS:  STANDING MEDICATIONS  ALBUTerol    0.083% 2.5 milliGRAM(s) Nebulizer every 4 hours  apixaban 5 milliGRAM(s) Oral every 12 hours  atorvastatin 20 milliGRAM(s) Oral at bedtime  chlorhexidine 4% Liquid 1 Application(s) Topical <User Schedule>  methylPREDNISolone sodium succinate Injectable 20 milliGRAM(s) IV Push daily  pantoprazole    Tablet 40 milliGRAM(s) Oral before breakfast  senna 2 Tablet(s) Oral at bedtime  tiotropium 18 MICROgram(s) Capsule 1 Capsule(s) Inhalation once    PRN MEDICATIONS  ALBUTerol    0.083% 2.5 milliGRAM(s) Nebulizer every 4 hours PRN  ALBUTerol    90 MICROgram(s) HFA Inhaler 2 Puff(s) Inhalation every 4 hours PRN  polyethylene glycol 3350 17 Gram(s) Oral daily PRN      VITAL SIGNS: Last 24 Hours  T(C): 35.6 (09 Jul 2021 12:32), Max: 36.1 (08 Jul 2021 21:00)  T(F): 96 (09 Jul 2021 12:32), Max: 96.9 (08 Jul 2021 21:00)  HR: 68 (09 Jul 2021 12:32) (68 - 90)  BP: 102/61 (09 Jul 2021 12:32) (102/61 - 125/58)  BP(mean): --  RR: 18 (09 Jul 2021 12:32) (18 - 20)  SpO2: 93% (09 Jul 2021 09:02) (93% - 97%)    LABS:                        11.5   14.86 )-----------( 187      ( 08 Jul 2021 07:31 )             35.9     07-08    138  |  96<L>  |  25<H>  ----------------------------<  190<H>  4.6   |  23  |  0.9    Ca    9.4      08 Jul 2021 07:31  Mg     1.8     07-09          ABG - ( 07 Jul 2021 22:28 )  pH, Arterial: 7.39  pH, Blood: x     /  pCO2: 50    /  pO2: 214   / HCO3: 30    / Base Excess: 4.5   /  SaO2: 100                       Culture - Urine (collected 07 Jul 2021 13:00)  Source: .Urine Clean Catch (Midstream)  Preliminary Report (08 Jul 2021 18:13):    10,000 - 49,000 CFU/mL Enterococcus species      PHYSICAL EXAM:    GENERAL: NAD, AAOx3, has difficulty hearing  HEENT:  Atraumatic, Normocephalic. EOMI, PERRLA, conjunctiva and sclera clear, No JVD  PULMONARY: Clear to auscultation bilaterally; No wheeze  CARDIOVASCULAR: Regular rate and rhythm; No murmurs, rubs, or gallops  GASTROINTESTINAL: Soft, Nontender, Nondistended; Bowel sounds present  MUSCULOSKELETAL:  2+ Peripheral Pulses, No clubbing, cyanosis, or edema  NEUROLOGY: non-focal  SKIN: No rashes or lesions

## 2021-07-09 NOTE — SWALLOW BEDSIDE ASSESSMENT ADULT - SLP PERTINENT HISTORY OF CURRENT PROBLEM
pt is a 76 y/o F w/ PMHx: COPD (on 3L O2 home), bronchogenic carcinoma (left lung, s/p left partial lobectomy, in remission x9 yrs), chronic a fib, anxiety, presenting following a suicide attempt at home. pt reports taking 27 tablets of Klonopin prior to admission; pt is being treated for metabolic encephalopathy 2' benzodiazepine overdose.

## 2021-07-10 NOTE — PROGRESS NOTE ADULT - SUBJECTIVE AND OBJECTIVE BOX
CASEY POWERS  75y  Female  ***My note supersedes ALL resident notes that I sign.  My corrections for their notes are in my note.***    I can be reached directly on [email or web address] [id number]. My office number is [phone]. My personal cell number is [phone].    INTERVAL EVENTS: Here for f/u of suicide attempt. Pt continues to deny that she wants to die. Does NOT recognize that she took extra pills of klonopin. Seems to lack insight into conflict w/ in herself (seems to be searching to both live and die - has erratic behavior).  Pt not agreeable to [facility], but is agreeable to seeing psych.  I told pt she must go to [facility] and psych agrees. Pt would like to walk w/ staff - OK by me.    T(F): 96.4 (07-09-21 @ 19:43), Max: 96.4 (07-09-21 @ 19:43)  HR: 67 (07-09-21 @ 19:43) (67 - 68)  BP: 122/69 (07-09-21 @ 19:43) (102/61 - 122/69)  RR: 20 (07-09-21 @ 19:43) (18 - 20)  SpO2: --    Gen: no distress, awake now; talking well; following commands; hard of hearing  HEENT: PERRL, nose clr  Neck: no nodes, no JVD, thyroid nl  lungs: clr; decr BS; + wheeze (mostly on lt)  hrt: s1 s2 rrr no murmur  abd: soft, NT/ND, no HS megaly  ext: no edema, no c/c  neuro: aa and oriented now; CN intact; follows commands; can move all 4 ext, but she is weak    LABS:                      10.2    (    87.7   11.10 )-----------( ---------      213      ( 10 Jul 2021 06:00 )             32.0    (    15.0     WBC Count: 11.10 K/uL (07-10-21 @ 06:00)  WBC Count: 14.86 K/uL (07-08-21 @ 07:31)  WBC Count: 9.31 K/uL (07-06-21 @ 10:00)    Hemoglobin: 10.2 g/dL (07-10 @ 06:00) - stable  Hemoglobin: 11.5 g/dL (07-08 @ 07:31)  Hemoglobin: 9.8 g/dL (07-06 @ 10:00)    141   (   99   (   120      07-10-21 @ 06:00  ----------------------               4.2   (   33   (   18                             -----                        0.7  Ca  9.4   Mg  1.9    P   --     --   (   --   (   --      07-09-21 @ 12:55  ----------------------               --   (   --   (   --                             -----                        --  Ca  --   Mg  1.8    P   --     RADIOLOGY & ADDITIONAL TESTS:      MEDICATIONS:    ALBUTerol    0.083% 2.5 milliGRAM(s) Nebulizer every 4 hours  ALBUTerol    0.083% 2.5 milliGRAM(s) Nebulizer every 4 hours PRN  ALBUTerol    90 MICROgram(s) HFA Inhaler 2 Puff(s) Inhalation every 4 hours PRN  apixaban 5 milliGRAM(s) Oral every 12 hours  atorvastatin 20 milliGRAM(s) Oral at bedtime  chlorhexidine 4% Liquid 1 Application(s) Topical <User Schedule>  morphine Concentrate 5 milliGRAM(s) Oral four times a day PRN  pantoprazole    Tablet 40 milliGRAM(s) Oral before breakfast  polyethylene glycol 3350 17 Gram(s) Oral daily PRN  predniSONE   Tablet 40 milliGRAM(s) Oral daily  senna 2 Tablet(s) Oral at bedtime  tiotropium 18 MICROgram(s) Capsule 1 Capsule(s) Inhalation once

## 2021-07-10 NOTE — PROGRESS NOTE BEHAVIORAL HEALTH - NSBHCHARTREVIEWINVESTIGATE_PSY_A_CORE FT
< from: 12 Lead ECG (07.06.21 @ 09:53) >  Ventricular Rate 70 BPM  Atrial Rate 300 BPM  QRS Duration 140 ms  Q-T Interval 430 ms  QTC Calculation(Bazett) 464 ms
Ventricular Rate 70 BPM    Atrial Rate 300 BPM    QRS Duration 140 ms    Q-T Interval 430 ms    QTC Calculation(Bazett) 464 ms    R Axis -63 degrees    T Axis 100 degrees    Diagnosis Line Atrial fibrillation  Left axis deviation  Non-specific intra-ventricular conduction block  Possible Lateral infarct , age undetermined  Abnormal ECG    Confirmed by LINDA ALVARADO MD ([id number]) on 7/6/2021 1:54:39 PM
< from: 12 Lead ECG (07.06.21 @ 09:53) >      Ventricular Rate 70 BPM    Atrial Rate 300 BPM    QRS Duration 140 ms    Q-T Interval 430 ms    QTC Calculation(Bazett) 464 ms    R Axis -63 degrees    T Axis 100 degrees    Diagnosis Line Atrial fibrillation  Left axis deviation  Non-specific intra-ventricular conduction block  Possible Lateral infarct , age undetermined  Abnormal ECG    Confirmed by LINDA ALVARADO MD ([id number]) on 7/6/2021 1:54:39 PM    < end of copied text >

## 2021-07-10 NOTE — PROGRESS NOTE BEHAVIORAL HEALTH - NSBHCHARTREVIEWIMAGING_PSY_A_CORE FT
< from: Xray Chest 1 View AP/PA (07.07.21 @ 23:48) >  Impression:  Postoperative change of the left hemithorax.  Without difference.

## 2021-07-10 NOTE — PROGRESS NOTE ADULT - SUBJECTIVE AND OBJECTIVE BOX
CASEY POWERS 75y Female  MRN#: [id number]   CODE STATUS: FULL  Do Not Resuscitate      SUBJECTIVE  The patient is a 75 year-old female with a PMH of COPD (on 3L O2 home), bronchogenic carcinoma (left lung, s/p left partial lobectomy, in remission x 9 yrs), chronic atrial fibrillation (on Eliquis), anxiety, presenting following a suicide attempt at home. Patient this morning is somnolent; can answer in short sentences; when asked what happened, she states "I didn't want to live anymore;" reports she has been feeling this way for a long time. She endorses overdosing w/ Klonopin; per ED, patient took 27 tabs yesterday morning. At this time she reports feeling well; denies shortness of breath or difficulty breathing, dizziness, chest pain or pressure. She requests her  not be told about her being in the hospital as "he has anxiety."    I examined the patient at bedside this AM. As per nursing, patient kept trying to get out bed last night. She 1:1 watch. Patient kept insisting on going home.    Present Today:           Thibodeaux Catheter (x)No/ ()Yes?   Indication:             Central Line (x)No/ ()Yes?   Indication:          IV Fluids (x)No/ ()Yes? Type:  Rate:  Indication:    OBJECTIVE  PAST MEDICAL & SURGICAL HISTORY  High cholesterol    Anxiety    Pacemaker    Lung cancer    Afib    COPD (chronic obstructive pulmonary disease)    Artificial cardiac pacemaker    H/O atrioventricular kacie ablation    S/P lobectomy of lung  left    History of tonsillectomy    S/P appendectomy    Cataract  RIGHT  6/17 AND  LEFT  7/5/19      ALLERGIES:  bacitracin (Other)  carboplatin (Other)  sulfa drugs (Unknown)  sulfonamides (Unknown)  Xanax (Other)    HOME MEDICATIONS:  Home Medications:  atorvastatin 20 mg oral tablet: 1 tab(s) orally once a day (05 Jun 2021 22:41)  clonazePAM 1 mg oral tablet: 1 tab(s) orally every 8 hours, As Needed (21 May 2021 12:12)  pantoprazole 40 mg oral delayed release tablet: 1 tab(s) orally once a day (before a meal) (21 May 2021 12:12)    MEDICATIONS:  STANDING MEDICATIONS  ALBUTerol    0.083% 2.5 milliGRAM(s) Nebulizer every 4 hours  apixaban 5 milliGRAM(s) Oral every 12 hours  atorvastatin 20 milliGRAM(s) Oral at bedtime  chlorhexidine 4% Liquid 1 Application(s) Topical <User Schedule>  pantoprazole    Tablet 40 milliGRAM(s) Oral before breakfast  predniSONE   Tablet 40 milliGRAM(s) Oral daily  senna 2 Tablet(s) Oral at bedtime  tiotropium 18 MICROgram(s) Capsule 1 Capsule(s) Inhalation once    PRN MEDICATIONS  ALBUTerol    0.083% 2.5 milliGRAM(s) Nebulizer every 4 hours PRN  ALBUTerol    90 MICROgram(s) HFA Inhaler 2 Puff(s) Inhalation every 4 hours PRN  morphine Concentrate 5 milliGRAM(s) Oral four times a day PRN  polyethylene glycol 3350 17 Gram(s) Oral daily PRN      VITAL SIGNS: Last 24 Hours  T(C): 36.6 (10 Jul 2021 13:27), Max: 36.6 (10 Jul 2021 13:27)  T(F): 97.8 (10 Jul 2021 13:27), Max: 97.8 (10 Jul 2021 13:27)  HR: 69 (10 Jul 2021 13:27) (67 - 69)  BP: 121/60 (10 Jul 2021 13:27) (121/60 - 122/69)  BP(mean): --  RR: 19 (10 Jul 2021 13:27) (19 - 20)  SpO2: --    LABS:                        10.2   11.10 )-----------( 213      ( 10 Jul 2021 06:00 )             32.0     07-10    141  |  99  |  18  ----------------------------<  120<H>  4.2   |  33<H>  |  0.7    Ca    9.4      10 Jul 2021 06:00  Mg     1.9     07-10                      PHYSICAL EXAM:  GENERAL: NAD, AAOx3, has difficulty hearing  HEENT:  Atraumatic, Normocephalic. EOMI, PERRLA, conjunctiva and sclera clear, No JVD  PULMONARY: Clear to auscultation bilaterally; No wheeze  CARDIOVASCULAR: Regular rate and rhythm; No murmurs, rubs, or gallops  GASTROINTESTINAL: Soft, Nontender, Nondistended; Bowel sounds present  MUSCULOSKELETAL:  2+ Peripheral Pulses, No clubbing, cyanosis, or edema  NEUROLOGY: non-focal  SKIN: No rashes or lesions

## 2021-07-10 NOTE — PROGRESS NOTE BEHAVIORAL HEALTH - SUMMARY
Ms. Joseph is a 75 year old domiciled retired female with a  PMH of COPD (on 3L O2 home), bronchogenic carcinoma (left lung, s/p left partial lobectomy, in remission x 9 yrs), chronic atrial fibrillation (on Eliquis), anxiety who presented to ED after reportedly intentionally ingesting 27 tablets of 0.5 mg of Klonopin.       On evaluation today, the patient appears to be more alert and oriented than previous assessments. She adamantly denies suicidal ideation or desire to not be alive, and reports her anxiety stems from her hospitalization, shortness of breath, and missing her . She continues to exhibit poor frustration tolerance and impulsivity, as well as poor insight into the events leading to her hospitalization. She remains unable to engage in safety planning, and unable to care for herself, and would benefit from inpatient psychiatric stabilization.     Psychiatry recommends:    #unintentional overdose vs suicide attempt    - Continue to hold all psychotropic medications.  - Plan to admit to Inpatient Psychiatry (likely 7/12)  - Continue 1:1 for now.    #COPD-associated anxiety  - Plan to continue safety planning regarding patient's long-term medical care, which may necessitate use of benzodiazepines for treatment of COPD-induced anxiety and morphine for discomfort, which supplies the patient access to lethal means at home

## 2021-07-10 NOTE — PROGRESS NOTE BEHAVIORAL HEALTH - NSBHFUPINTERVALHXFT_PSY_A_CORE
Patient seen and evaluated with 1:1 at bedside. On approach, patient is noted to be attempting to use phone. When asked, patient responds that she is not making a phone call, and that she just wanted to look at the numbers. Patient with anxious and irritable affect. She reports sleeping well overnight, and that her appetite has returned. She denies wishing she were not alive, and that she wants to go home as "being in the hospital is why I feel bad." She reports feeling safe here, but lonely as her  is "too old to visit." She denies knowledge of the events leading to the hospital, suicidal ideation, homicidal ideation, or hearing or seeing unusual things while in hospital.

## 2021-07-10 NOTE — PROGRESS NOTE BEHAVIORAL HEALTH - DETAILS
"I am a little short of breath"
She does not like taking Ativan but unable to say why, "it made me sick" "it didn't work with me"

## 2021-07-11 NOTE — PROGRESS NOTE ADULT - SUBJECTIVE AND OBJECTIVE BOX
CASEY POWERS  75y  Female  ***My note supersedes ALL resident notes that I sign.  My corrections for their notes are in my note.***    I can be reached directly on [id number]. My office number is [phone]. My personal cell number is [phone].    INTERVAL EVENTS: Here for f/u of suicide attempt. Pt back to baseline MS. Pt walked w/ staff into hallway (about 30-40' x2). Pt is eating and drinking. She is calm today.  No major resp distress. Feels morphine does help a little.    T(F): 95 (07-11-21 @ 04:24), Max: 97.8 (07-10-21 @ 13:27)  HR: 70 (07-11-21 @ 04:24) (69 - 70)  BP: 118/60 (07-11-21 @ 04:24) (118/60 - 127/63)  RR: 16 (07-11-21 @ 04:24) (16 - 19)  SpO2: --    Gen: no distress, awake now; talking well; following commands; hard of hearing  HEENT: PERRL, nose clr  Neck: no nodes, no JVD, thyroid nl  lungs: clr; decr BS; wheeze/rhonchi are gone today  hrt: s1 s2 rrr no murmur  abd: soft, NT/ND, no HS megaly  ext: no edema, no c/c  neuro: aa and oriented x3 now; CN intact; follows commands; can move all 4 ext, but she is weak    LABS:                      9.2     (    89.9   7.48  )-----------( ---------      161      ( 11 Jul 2021 05:38 )             28.5    (    14.7     Hemoglobin: 9.2 g/dL (07-11 @ 05:38)  Hemoglobin: 10.2 g/dL (07-10 @ 06:00)  Hemoglobin: 11.5 g/dL (07-08 @ 07:31)    141   (   100   (   128      07-11-21 @ 05:38  ----------------------               4.4   (   32   (   21                             -----                        0.6  Ca  9.3   Mg  1.8    P   --     CAPILLARY BLOOD GLUCOSE  POCT Blood Glucose.: 172 (07-11-21 @ 07:49)  POCT Blood Glucose.: 320 (07-10-21 @ 20:55)  POCT Blood Glucose.: 298 (07-10-21 @ 16:47)  POCT Blood Glucose.: 363 (07-10-21 @ 11:27)    Culture - Urine (collected 07-07-21 @ 13:00)  Source: .Urine Clean Catch (Midstream)  Final Report (07-09-21 @ 20:25):    10,000 - 49,000 CFU/mL Enterococcus species  Organism: Enterococcus species (07-09-21 @ 20:25)  Organism: Enterococcus species (07-09-21 @ 20:25)      -  Ampicillin: S <=2 Predicts results to ampicillin/sulbactam, amoxacillin-clavulanate and  piperacillin-tazobactam.      -  Ciprofloxacin: S <=1      -  Levofloxacin: S <=1      -  Nitrofurantoin: S <=32 Should not be used to treat pyelonephritis.      -  Tetra/Doxy: R >8      -  Vancomycin: S 2      Method Type: MELISSA    RADIOLOGY & ADDITIONAL TESTS:      MEDICATIONS:    ALBUTerol    0.083% 2.5 milliGRAM(s) Nebulizer every 4 hours  ALBUTerol    0.083% 2.5 milliGRAM(s) Nebulizer every 4 hours PRN  ALBUTerol    90 MICROgram(s) HFA Inhaler 2 Puff(s) Inhalation every 4 hours PRN  apixaban 5 milliGRAM(s) Oral every 12 hours  atorvastatin 20 milliGRAM(s) Oral at bedtime  chlorhexidine 4% Liquid 1 Application(s) Topical <User Schedule>  morphine Concentrate 5 milliGRAM(s) Oral four times a day PRN  pantoprazole    Tablet 40 milliGRAM(s) Oral before breakfast  polyethylene glycol 3350 17 Gram(s) Oral daily PRN  predniSONE   Tablet 40 milliGRAM(s) Oral daily  senna 2 Tablet(s) Oral at bedtime  tiotropium 18 MICROgram(s) Capsule 1 Capsule(s) Inhalation once

## 2021-07-11 NOTE — PROGRESS NOTE BEHAVIORAL HEALTH - SUMMARY
Ms. Joseph is a 75 year old domiciled retired female with a  PMH of COPD (on 3L O2 home), bronchogenic carcinoma (left lung, s/p left partial lobectomy, in remission x 9 yrs), chronic atrial fibrillation (on Eliquis), anxiety who presented to ED after reportedly intentionally ingesting 27 tablets of 0.5 mg of Klonopin.     On evaluation today, the patient appears to be more alert and oriented than previous assessments. She adamantly denies suicidal ideation or desire to not be alive, and reports her anxiety stems from her hospitalization, shortness of breath, and missing her . She continues to exhibit poor frustration tolerance and impulsivity, as well as poor insight into the events leading to her hospitalization. She remains unable to engage in safety planning, and unable to care for herself, and would benefit from inpatient psychiatric stabilization.     Psychiatry recommends:    #unintentional overdose vs suicide attempt    - Continue to hold all psychotropic medications.  - Plan to admit to Inpatient Psychiatry (likely 7/12)  - Continue 1:1 for now.    #COPD-associated anxiety  - Plan to continue safety planning regarding patient's long-term medical care, which may necessitate use of benzodiazepines for treatment of COPD-induced anxiety and morphine for discomfort, which supplies the patient access to lethal means at home.     Risk Assessment (consider static vs modifiable risk factors and protective factors; comment on level of risk for dangerous behavior): Risk factors: end-stage chronic illness, lack of insight into psychiatric illness, access to lethal means (Klonopin, home O2)  Protective factors: Supportive family, residential stability.

## 2021-07-11 NOTE — PROGRESS NOTE BEHAVIORAL HEALTH - NSBHCHARTREVIEWLAB_PSY_A_CORE FT
9.2    7.48  )-----------( 161      ( 11 Jul 2021 05:38 )             28.5   07-11    141  |  100  |  21<H>  ----------------------------<  128<H>  4.4   |  32  |  0.6<L>    Ca    9.3      11 Jul 2021 05:38  Mg     1.8     07-11
Complete Blood Count + Automated Diff (07.08.21 @ 07:31)   WBC Count: 14.86 K/uL   RBC Count: 4.05 M/uL   Hemoglobin: 11.5 g/dL   Hematocrit: 35.9 %   Mean Cell Volume: 88.6 fL   Mean Cell Hemoglobin: 28.4 pg   Mean Cell Hemoglobin Conc: 32.0 g/dL   Red Cell Distrib Width: 15.0 %   Platelet Count - Automated: 187 K/uL   Auto Neutrophil #: 14.29 K/uL   Auto Lymphocyte #: 0.18 K/uL   Auto Monocyte #: 0.24 K/uL   Auto Eosinophil #: 0.01 K/uL   Auto Basophil #: 0.02 K/uL   Auto Neutrophil %: 96.2: Differential percentages must be correlated with absolute numbers for   clinical significance. %   Auto Lymphocyte %: 1.2 %   Auto Monocyte %: 1.6 %   Auto Eosinophil %: 0.1 %   Auto Basophil %: 0.1 %   Auto Immature Granulocyte %: 0.8: (Includes meta, myelo and promyelocytes) %   Nucleated RBC: 0 /100 WBCs       `07-08    138  |  96<L>  |  25<H>  ----------------------------<  190<H>  4.6   |  23  |  0.9    Ca    9.4      08 Jul 2021 07:31  Mg     1.8     07-09
11.5   14.86 )-----------( 187      ( 08 Jul 2021 07:31 )             35.9   07-08    138  |  96<L>  |  25<H>  ----------------------------<  190<H>  4.6   |  23  |  0.9    Ca    9.4      08 Jul 2021 07:31  Mg     1.8     07-09
10.2   11.10 )-----------( 213      ( 10 Jul 2021 06:00 )             32.0     Mg     1.8     07-09

## 2021-07-11 NOTE — PROGRESS NOTE BEHAVIORAL HEALTH - NSBHFUPINTERVALHXFT_PSY_A_CORE
Patient evaluated at bedside, chart reviewed, per nursing staff no overnight events, no PRNs required.      Upon approach, patient is sitting up in bed watching TV.  Appears calm.  NAD however has labored breathing which she states is her baseline; O2 sat is 93% on 2L NC.  Patient states today she feels "fine" but admits to feeling anxious that she is not home taking care of her cats and cannot see her  because he can't visit.   has adequate help at home per the patient.  Patient denies SI, HI, AVH.  Regarding overdose on klonopin, she states that "it was a dream... I have lots of dreams.  I had a dream about broccoli and a giraffe."  She denies hx of sleep walking, REM sleep disorder, or other hx of unusual behaviors while dreaming.  She is unable to give an acount of the events of the overdose, insisting that she did not consciously choose to take the pills and cannot explain how she "accidentally took them."  Denies any intention to hurt herself or end her life at this time, denies passive SI.  Endorses things she wants to live for including her cats, her , and her grandchildren.    Patient denies pain or worsening SOB during the evaluation. Patient is receiving morphine per palliative recommendation.  She is currently refusing hospice care despite end state COPD.

## 2021-07-12 NOTE — H&P ADULT - ASSESSMENT
76 y/o female with a past med hx of ens stage broncogenic carcinoma complaint of benzodiazepine OD.This morning Ms. Joseph is alert, oriented to person, place, time, and situation. She continues to deny suicidal ideation and describes her memory of overdose as "a dream". She is unhappy about not getting to go home today.

## 2021-07-12 NOTE — PATIENT PROFILE BEHAVIORAL HEALTH - REASON FOR ADMISSION
pt took 27 pills of Klonopin in the attempt to end her life. Pt has end stage COPD, states she is a DNR/DNI

## 2021-07-12 NOTE — PROGRESS NOTE BEHAVIORAL HEALTH - NSBHFUPINTERVALHXFT_PSY_A_CORE
This morning Ms. Joseph is alert, oriented to person, place, time, and situation. She continues to deny suicidal ideation and describes her memory of overdose as "a dream". She is unhappy about not getting to go home today.    Nurse manager sent legals to [facility], medicine was asked to put in STAT COVID test in anticipation of transfer to [facility] at [address] [place] [address].

## 2021-07-12 NOTE — PROGRESS NOTE BEHAVIORAL HEALTH - OTHER
began shaking when informed would not be going home today and will be transferred to inpatient psychiatry floor, believes it is due to steroids irritable deferred

## 2021-07-12 NOTE — H&P ADULT - NSHPPHYSICALEXAM_GEN_ALL_CORE
GENERAL:  76y/o Female NAD, resting comfortably.  HEAD:  Atraumatic, Normocephalic  EYES: EOMI, PERRLA, conjunctiva and sclera clear  NECK: Supple, No JVD, no cervical lymphadenopathy, non-tender  CHEST/LUNG: Clear to auscultation bilaterally; No wheeze, rhonchi, or rales  HEART: Regular rate and rhythm; S1&S2  ABDOMEN: Soft, Nontender, Nondistended x 4 quadrants; Bowel sounds present  EXTREMITIES:   Peripheral Pulses Present, No clubbing, no cyanosis, or no edema, no calf tenderness  PSYCH: AAOx3, cooperative, appropriate  NEUROLOGY: WNL  SKIN: WNL

## 2021-07-12 NOTE — PROGRESS NOTE ADULT - SUBJECTIVE AND OBJECTIVE BOX
CASEY POWERS             MRN-[id number]      Patient is a 75y old Female who presents with a chief complaint of benzodiazepine overdose (09 Jul 2021 15:53)    Currently admitted with the primary diagnosis of: end-stage COPD/bronchogenic carcinoma       SUBJECTIVE:    -Patient seen at bedside  -Denied pain, SOB, nausea  -does not want IPP      ROS:    DYSPNEA: N	  NAUS/VOM:  N	  SECRETIONS: N	  AGITATION: N  Pain (Y/N):    n   -Provocation/Palliation:  -Quality/Quantity:  -Radiating:  -Severity:  -Timing/Frequency:  -Impact on ADLs:    General:  feeling better; wants to go home; talking on phone with Sandrita telling her about situatin  HEENT:    Denied  Neck:  Denied  CVS:  Denied  Resp:  no worse  GI:  Denied    :  Denied  Musc:  Denied  Neuro:  Denied  Psych:  + anxiety but not as bad as in past; no mention of inflicting self harm  Skin:  Denied  Lymph:  Denied      PEx:   Vital Signs Last 24 Hrs  T(C): 37.4 (12 Jul 2021 12:10), Max: 37.4 (12 Jul 2021 12:10)  T(F): 99.4 (12 Jul 2021 12:10), Max: 99.4 (12 Jul 2021 12:10)  HR: 77 (12 Jul 2021 12:10) (72 - 77)  BP: 139/63 (12 Jul 2021 12:10) (138/60 - 139/65)  BP(mean): --  RR: 18 (12 Jul 2021 12:10) (18 - 18)  SpO2: --    Labs    07-11    141  |  100  |  21<H>  ----------------------------<  128<H>  4.4   |  32  |  0.6<L>    Ca    9.3      11 Jul 2021 05:38  Mg     1.8     07-11                              9.2    7.48  )-----------( 161      ( 11 Jul 2021 05:38 )             28.5         General:  found in bed in NAD  Eyes:  PER maintaining eye contact during encounter Non icteric   ENMT: no external oral ulcers,  Resp: Unlabored mild tachypneic - RR 24 No increased WOB - shallow; O2 NC;  :  PrimaFit  Musc: No C/C/E    Neuro: Follows commands No focal deficits  Psych: Calm Pleasant, AAOx3; able to detail situation and not liking it  Skin: Non jaundiced , no rash   Lymph: no adenopathy     Last BM: unknown    ALLERGIES: bacitracin (Other)  carboplatin (Other)  sulfa drugs (Unknown)  sulfonamides (Unknown)  Xanax (Other)           Imaging Personally Reviewed:  [ ] YES  [ x] NO    Consultant(s) Notes Reviewed:  [  YES  [x ] NO  Care Discussed with Consultants/Other Providers [ x] YES  [ ] NO    Medications:	      MEDICATIONS  (STANDING):  ALBUTerol    0.083% 2.5 milliGRAM(s) Nebulizer every 4 hours  apixaban 5 milliGRAM(s) Oral every 12 hours  atorvastatin 20 milliGRAM(s) Oral at bedtime  chlorhexidine 4% Liquid 1 Application(s) Topical <User Schedule>  pantoprazole    Tablet 40 milliGRAM(s) Oral before breakfast  predniSONE   Tablet 30 milliGRAM(s) Oral daily  senna 2 Tablet(s) Oral at bedtime  tiotropium 18 MICROgram(s) Capsule 1 Capsule(s) Inhalation once    MEDICATIONS  (PRN):  acetaminophen   Tablet .. 650 milliGRAM(s) Oral every 6 hours PRN Temp greater or equal to 38C (100.4F), Mild Pain (1 - 3), Moderate Pain (4 - 6)  ALBUTerol    0.083% 2.5 milliGRAM(s) Nebulizer every 4 hours PRN Shortness of Breath and/or Wheezing  ALBUTerol    90 MICROgram(s) HFA Inhaler 2 Puff(s) Inhalation every 4 hours PRN Shortness of Breath  morphine Concentrate 5 milliGRAM(s) Oral four times a day PRN Dyspnea  polyethylene glycol 3350 17 Gram(s) Oral daily PRN Constipation          ADVANCED DIRECTIVES:                  DNR DNI         DECISION MAKER: Patient [ x ]  Family [  ]  Other [  ] _______  LEGAL SURROGATE:      GOALS OF CARE DISCUSSION       Palliative care counseling provided	              PSYCHOSOCIAL-SPIRITUAL ASSESSMENT:       Reviewed          CURRENT DISPO PLAN:         anticipating transfer to [facility]           REFERRALS	        Palliative Med        Unit SW/Case Mgmt

## 2021-07-12 NOTE — H&P ADULT - HISTORY OF PRESENT ILLNESS
74 y/o female with a past med hx of ens stage broncogenic carcinoma , afib, copd, constipation  complaint of benzodiazepine OD.This morning Ms. Joseph is alert, oriented to person, place, time, and situation. She continues to deny suicidal ideation and describes her memory of overdose as "a dream". She is unhappy about not getting to go home today.      74 y/o female with a past med hx of ens stage broncogenic carcinoma , afib, copd, constipation  complaint of benzodiazepine OD.This morning Ms. Joseph is alert, oriented to person, place, time, and situation. She continues to deny suicidal ideation and describes her memory of overdose as "a dream". She is unhappy about not getting to go home today.   Note : Pt is very much hard of hearing and that limits the amount of info she can provide to MD or caretaker.

## 2021-07-12 NOTE — PROGRESS NOTE BEHAVIORAL HEALTH - CASE SUMMARY
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   Palliative care is on board, given the extent and the severity of the suicide attempt and persistent denial of the event being and actual overdose this patient is unsafe to be discharged home. She took the medications with the intent to kill herself and specifically asked her  that day for two glasses of water. During this evaluation today, this patient is exhibiting poor insight into the current situation, with notable low level of frustration tolerance, another factor that plays a role in her impulsive behaviors. No psychosis elicited, no manic symptoms. While the presence of COPD exacerbation can worsen her anxiety levels, symptoms needs to be addressed in an inpatient unit. This patient will require a 1:1 observer on the psychiatric floor. For agitation, consider low dose of haldol 2mg po every 8hrs prn along with ativan 1mg po every 8 hrs prn, and monitor for sedation. If patient is not transfer today to inpatient unit, we will start Lexapro 5mg po every morning.
This patient is seen with limited ability to participate in interview due to sedation, s/p overdose on Klonopin. Pending collateral information. High likelihood to be admitted to inpatient psychiatry hospitalization.
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     Patient is in denial of the suicide attempt and currently denying the event as an attempt. Her answers to the questions were always " I don't remember." Per collateral information obtained from patient's , Mr. Joseph, who noted the morning prior to the overdose, patient was very irritated and angry and persistently complaining about being tired with "life" and wanted to die. She later asked her  for two glasses of water. About 20 minutes later her  found her very obtunded. This behavior was an intentional overdose with the intent to kill herself, although she is currently denying it as an attempt.   Patient still sedated, no withdrawal symptoms noted, Plan is to admit to Inpatient psychiatry unit, for stabilization. Legals 2pc in the chart. Continue 1:1.

## 2021-07-12 NOTE — CHART NOTE - NSCHARTNOTEFT_GEN_A_CORE
PALLIATIVE MEDICINE INTERDISCIPLINARY TEAM NOTE    Provider:  [   ]Social Work   [   ]          [   ] Initial visit [   ] Follow up    Family or contact name / phone #   Met with: [   ] Patient  [   ] Family  [   ] Other:    Primary Language: [   ] English [   ] Other*:                      *Interpretation provided by:    SUPPORT DIAGNOSES            (Check all that apply)  [   ] Psychosocial spiritual assessment (PSSA)  [   ] EOL issues  [   ] Cultural / spiritual concerns  [   ] Pain / suffering  [   ] Dementia / AMS  [   ] Other:  [   ] AD issues  [   ] Grief / loss / sadness  [   ] Discharge issues  [   ] Distress / coping    PSYCHOSOCIAL ASSESSMENT OF PATIENT         (Check all that apply)  [   ] Initial Assessment            [   ] Reassessment          [   ] Not Applicable this visit    Pain/suffering acuity:  [   ] None to mild (0-3)           [   ] Moderate (4-6)        [   ] High (7-10)    Mental Status:  [   ] Alert/oriented (x3)          [   ] Confused/Altered(x2/x1)         [   ] Non-resp    Functional status:  [   ] Independent w ADLs      [   ] Needs Assistance             [   ] Bedbound/Full Care    Coping:  [   ] Coping well                     [   ] Coping w/difficulty            [   ] Poor coping    Support system:  [   ] Strong                              [   ] Adequate                        [   ] Inadequate    SPIRITUAL ASSESSMENT  Worship/Spiritual practice: ____Catholic_______________________    Role of organized Tenriism:  [   ] Important                     [   ] Some (fam tradition, cultural)               [ x  ] None    Effects on medical care:  [   ] Yes, _____________________________________                         [   x None    Cultural/Scientology need:  [   ] Yes, _____________________________________                         [   x] None    Refer to Pastoral Care:  [   ] Yes           [   ] No, not at this time    SERVICE PROVIDED  [   ]PSSA                                                                             [   ]Discharge support / facilitation  [   ]AD / goals of care counseling                                  [   ]EOL / death / bereavement counseling  [  x ]Counseling / support                                                [   ] Family meeting  [  x ]Prayer / sacrament / ritual                                      [   ] Referral   [   ]Other                                                                       NOTE and Plan of Care (PoC): Pt seems to becomp. Pt is concerned about a missing watch. I spoke to the nurse who will assist in helping her find her watch. He desire is to go home to her family. I will follow up as needed
patient was pleasant when approached and easily engaged.  Discussed plan for patient to be discharged to [facility].  Patient expressed frustration with regard to same.   Support rendered.   T/C to son, Nav Flores:  son discussed that he will be coming to [place] next week to assist patient.  Son reported that services would need to be in place before patient can be discharged home.  Son is looking into patient's insurance coverage to determine if she has insurance for long term home care.  Patient's  is elderly and forgetful.  son discussed that his step father is not able to assist patient.
PALLIATIVE MEDICINE INTERDISCIPLINARY TEAM NOTE    Provider:  [  x ]Social Work   [   ]          [  x ] Initial visit [   ] Follow up    Family or contact name / phone #   Met with: [ x  ] Patient  [   ] Family  [   ] Other:    Primary Language: [   ] English [   ] Other*:                      *Interpretation provided by:    SUPPORT DIAGNOSES            (Check all that apply)  [   ] Psychosocial spiritual assessment (PSSA)  [ x  ] EOL issues  [   ] Cultural / spiritual concerns  [   ] Pain / suffering  [   ] Dementia / AMS  [   ] Other:  [   ] AD issues  [   ] Grief / loss / sadness  [ x  ] Discharge issues  [  x ] Distress / coping    PSYCHOSOCIAL ASSESSMENT OF PATIENT         (Check all that apply)  [ x  ] Initial Assessment            [   ] Reassessment          [   ] Not Applicable this visit    Pain/suffering acuity:  [  x ] None to mild (0-3)           [   ] Moderate (4-6)        [   ] High (7-10)    Mental Status:  [x   ] Alert/oriented (x3)          [   ] Confused/Altered(x2/x1)         [   ] Non-resp    Functional status:  [   ] Independent w ADLs      [  x ] Needs Assistance             [   ] Bedbound/Full Care    Coping:  [   ] Coping well                     [  x ] Coping w/difficulty            [   ] Poor coping    Support system:  [   ] Strong                              [   ] Adequate                        [ x  ] Inadequate      Past history and medications for:   Patient reported history of anxiety since young adulthood.    [ x] Anxiety       [ ] Depression    [ ] Sleep disorders     SPIRITUAL ASSESSMENT  Roman Catholic/Spiritual practice: ___________________________    Role of organized Jewish:  [   ] Important                     [   ] Some (fam tradition, cultural)               [   ] None    Effects on medical care:  [   ] Yes, _____________________________________                         [   ] None    Cultural/Catholic need:  [   ] Yes, _____________________________________                         [   ] None    Refer to Pastoral Care:  [   ] Yes           [   ] No, not at this time    SERVICE PROVIDED  [   ]PSSA                                                                             [   x]Discharge support / facilitation  [   x]AD / goals of care counseling                                  [  x ]EOL / death / bereavement counseling  [x   ]Counseling / support                                                [   ] Family meeting  [   ]Prayer / sacrament / ritual                                      [   ] Referral   [   ]Other                                                                       NOTE and Plan of Care (PoC):    Patient is a 75 year old  female.  Patient was admitted with end stage COPD and suicide attempt where she overdosed taking 27 tabs of Klonopin.  Patient received Hospice care at home in the past.     Patient was pleasant when approached and easily engaged.  Patient denied suicidal ideations.  Patient discussed that she feels anxious about her prognosis and being in the hospital.  Patient expressed that she would like to return home.  However, patient acknowledges that she has limited support .  Patient's spouse is 85 years old and has health issues and requires assistance of a home health aide.  Patient's son lives in [place] and her two step sons are not involved.  Patient did privately hire a caregiver.  Patient discussed that Hospice did not offer enough support, particularly with regard to medication management.  Supportive counseling rendered with positive effect.

## 2021-07-12 NOTE — PROGRESS NOTE ADULT - REASON FOR ADMISSION
benzodiazepine overdose

## 2021-07-12 NOTE — PROGRESS NOTE ADULT - SUBJECTIVE AND OBJECTIVE BOX
CASEY POWERS  75y  Female  ***My note supersedes ALL resident notes that I sign.  My corrections for their notes are in my note.***    I can be reached directly on [id number]. My office number is [phone]. My personal cell number is [phone].    INTERVAL EVENTS: Here for f/u of suicide attempt. Pt again denying suicidality. Pt upset that she cannot go home. Pt says she has no intention to harm herself. She gets SOB when she is anxious. Pt otherwise back to baseline.    T(F): 99.4 (07-12-21 @ 12:10), Max: 99.4 (07-12-21 @ 12:10)  HR: 77 (07-12-21 @ 12:10) (72 - 77)  BP: 139/63 (07-12-21 @ 12:10) (138/60 - 139/65)  RR: 18 (07-12-21 @ 12:10) (18 - 18)  SpO2: --    Gen: mild resp distress when anxious, awake; talking well; following commands; hard of hearing  HEENT: PERRL, nose clr  Neck: no nodes, no JVD, thyroid nl  lungs: clr; decr BS; + rhonchi b/l  hrt: s1 s2 rrr no murmur  abd: soft, NT/ND, no HS megaly  ext: no edema, no c/c  neuro: aa and oriented x3 now; CN intact; follows commands; can move all 4 ext, but she is weak    LABS:                      9.2     (    89.9   7.48  )-----------( ---------      161      ( 11 Jul 2021 05:38 )             28.5    (    14.7     Hemoglobin: 9.2 g/dL (07-11 @ 05:38) - slow decline; no bleeding  Hemoglobin: 10.2 g/dL (07-10 @ 06:00)  Hemoglobin: 11.5 g/dL (07-08 @ 07:31)    CAPILLARY BLOOD GLUCOSE  POCT Blood Glucose.: 231 (07-12-21 @ 11:38)  POCT Blood Glucose.: 130 (07-12-21 @ 08:02)  POCT Blood Glucose.: 92 (07-11-21 @ 21:17)  POCT Blood Glucose.: 239 (07-11-21 @ 16:35)  POCT Blood Glucose.: 424 (07-11-21 @ 12:36)  POCT Blood Glucose.: 172 (07-11-21 @ 07:49)  POCT Blood Glucose.: 320 (07-10-21 @ 20:55)  POCT Blood Glucose.: 298 (07-10-21 @ 16:47)    RADIOLOGY & ADDITIONAL TESTS:  < from: Xray Chest 1 View AP/PA (07.07.21 @ 23:48) >  Impression:    Postoperative change of the left hemithorax.    Without difference.    < end of copied text >    MEDICATIONS:    acetaminophen   Tablet .. 650 milliGRAM(s) Oral every 6 hours PRN  ALBUTerol    0.083% 2.5 milliGRAM(s) Nebulizer every 4 hours  ALBUTerol    0.083% 2.5 milliGRAM(s) Nebulizer every 4 hours PRN  ALBUTerol    90 MICROgram(s) HFA Inhaler 2 Puff(s) Inhalation every 4 hours PRN  apixaban 5 milliGRAM(s) Oral every 12 hours  atorvastatin 20 milliGRAM(s) Oral at bedtime  chlorhexidine 4% Liquid 1 Application(s) Topical <User Schedule>  morphine Concentrate 5 milliGRAM(s) Oral four times a day PRN  pantoprazole    Tablet 40 milliGRAM(s) Oral before breakfast  polyethylene glycol 3350 17 Gram(s) Oral daily PRN  predniSONE   Tablet 30 milliGRAM(s) Oral daily  senna 2 Tablet(s) Oral at bedtime  tiotropium 18 MICROgram(s) Capsule 1 Capsule(s) Inhalation once

## 2021-07-12 NOTE — DISCHARGE NOTE NURSING/CASE MANAGEMENT/SOCIAL WORK - PATIENT PORTAL LINK FT
You can access the FollowMyHealth Patient Portal offered by [facility] by registering at the following website: [email or web address]. By joining [employer]’s FollowMyHealth portal, you will also be able to view your health information using other applications (apps) compatible with our system.

## 2021-07-12 NOTE — PROGRESS NOTE ADULT - PROBLEM SELECTOR PLAN 3
-DNR/DNI  -Plan for inpatient psychiatry admission when medically stable  -Patient previously had opted for hospice given end stage COPD diagnosis  -Given the complexity of the case with illness that is hospice appropriate, patient and family may benefit from interdisciplinary meeting when patient medically stable and able to participate in discussion; appreciate that this is in primary plan and d/w psychiatry team  -will continue to follow

## 2021-07-12 NOTE — PROGRESS NOTE BEHAVIORAL HEALTH - NSBHATTESTSEENBY_PSY_A_CORE
Attending Psychiatrist supervising NP/Trainee, meeting pt...
Trainee with telephonic supervision from Attending Psychiatrist
Trainee with telephonic supervision from Attending Psychiatrist
Attending Psychiatrist supervising NP/Trainee, meeting pt...
Attending Psychiatrist supervising NP/Trainee, meeting pt...

## 2021-07-12 NOTE — PROGRESS NOTE BEHAVIORAL HEALTH - ORIENTATION OTHER
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
Oriented to person, place, and month but not day
Oriented to person, place, and month but not day
Asleep
Oriented to person, place, and month but not day

## 2021-07-12 NOTE — PROGRESS NOTE BEHAVIORAL HEALTH - RISK ASSESSMENT
Risk factors: end-stage chronic illness, lack of insight into psychiatric illness, access to lethal means (Klonopin, home O2)  Protective factors: Supportive family, residential stability
Patient is at low risk for dangerous behavior as she is currently asleep. Continue 1:1 supervision and monitor for respiratory depression due to altered mentation in setting of chronic respiratory disease.
Risk factors: end-stage chronic illness, lack of insight into psychiatric illness, access to lethal means (Klonopin, home O2)  Protective factors: Supportive family, residential stability    Patient is at high risk for injury to self and should remain on 1:1 observation while on admitted to internal medicine
Risk factors: end-stage chronic illness, lack of insight into psychiatric illness, access to lethal means (Klonopin, home O2)  Protective factors: Supportive family, residential stability    Patient is at high risk for injury to self and should remain on 1:1 observation while on admitted to internal medicine

## 2021-07-12 NOTE — PROGRESS NOTE ADULT - ASSESSMENT
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     # suicide attempt w/ klonopin overdose; major depression; severe gen anxiety disorder  pt was evaluated by psych and med teams during last recent admission for suicidality and at that time pt made it clear to all examiners that she had no intention or plan to commit suicide and that she wanted to live  pt is, again, saying that she did not want to commit suicide and denies taking klonopins  pt seemed to want to live because she did come to hospital multiple times when she needed help  pt did not want nor did she seem to need IPP during last admit (I saw on day of d/c last admit and she was begging me to go home)  however, now, once cleared by IM, she will need IPP for depression and suicide attempt (spoke w/ psych, they agree)  cont 1:1 sit for duration of hospitalization - do NOT d/c  OK to walk pt w/ sitter (and walker)    # toxic encephalopathy 2/2 klonopin overdose - resolved  diet: dys 3 + thin  can take oral meds now    # Chronic hypoxic resp failure on Home O2 3L/min; chr hypercapnia; COPD exacerbation; severe pulm HTN  Nebs q6h prn wheeze  Pred 40mg po q24 - jett can start decr by 10mg every 2 days until off  steroids caused leukocytosis plus reaction to stress - WBC nl today  c/w spiriva  CXR - unchanged  Pulm f/u in Clinic as outpt  cont NC O2 - has at home  agree w/ pall care: morphine 5mg conc liquid po/sl q4 prn pain or dyspnea  hospice noted, but pt telling me that she does NOT want hospice at this time     # Steroid-induced Hyperglycemia; HgA1c- 7.2  diabetic diet   FS qac/hs  will tx if > 200 - might consider metformin    # lung CA s/p CT/RT and partial left lobectomy (in remission)    # Normocytic anemia, at baseline.   chronic dz    # DLD  on statin    # chronic Afib s/p ablation; hx micra PPM (placed at [facility])  HR OK  on Eliquis 5mg po q12 (covered by insurance) - OK to resume    # cont bowel regimen    # Activity: amb w/ asst and walker (and O2); can be OOB to chair    # DVT PPX - Eliquis    # NOTE:  previously spoke w/ pall care: d/c will be tricky once leaving IPP, because pt will need symptomatic tx of anxiety and dyspnea, which will likely require narcotics; however, pt does NOT (at least now) seem like she can be trusted w/ them (given suicide attempt).  we recommend Int Med consult/f/u near d/c date from IPP  we recommend family mtg near d/c date from IPP to illicit family's input and assistance in controlling meds in home environment (and not entrust them to pt herself)  these points were d/w Dr Bañuelos (psych resident) on 7/10    Dispo: supportive care; FS qac/hs; 1:1 sit; OK to walk pt; psych f/u for IPP; stable for d/c (prob to IPP tomorrow)  
Patient is a 76 yo woman with pmh of anxiety, End-stage COPD (on 3 L home O2 ) with multiple admissions for recurrent exacerbations , Lung CA s/p chemo/radiation/ and partial left lobectomy in remission x 9 years, severe pulmonary hypertension,  Afib on Coumadin s/p ablation and micra PPM presents to ED for worsening dyspnea due to recurrent COPD exacerbation.     # suicide attempt w/ klonopin overdose; major depression; severe gen anxiety disorder  pt was evaluated by psych and med teams during last recent admission for suicidality and at that time pt made it clear to all examiners that she had no intention or plan to commit suicide and that she wanted to live  pt is, again, saying that she did not want to commit suicide and denies taking klonopins  pt seemed to want to live because she did come to hospital multiple times when she needed help  pt did not want nor did she seem to need IPP during last admit (I saw on day of d/c last admit and she was begging me to go home)  she needs to be d/c'd to IPP for depression and suicide attempt (spoke w/ psych, they agree)  cont 1:1 sit for duration of hospitalization - do NOT d/c  OK to walk pt w/ sitter (and walker)  per psych  agitation: haldol 2mg po q8 prn (can use w/ ativan)  anxiety: ativan 1mg po q8 prn (can use w/ haldol)  likely to start lexapro 5mg po q24 soon    # toxic encephalopathy 2/2 klonopin overdose - resolved  diet: dys 3 + thin    # Chronic hypoxic resp failure on Home O2 3L/min; chr hypercapnia; end-stage COPD exacerbation; severe pulm HTN  Nebs q6h prn wheeze  Pred 40mg po q24 - can start decr by 10mg every 2 days until off - might need standing dose of Pred on daily basis (say 5-10mg or so) in the near future  steroids caused leukocytosis plus reaction to stress - WBC nl   c/w spiriva  CXR - unchanged  Pulm f/u in Clinic as outpt  cont NC O2 - has at home  agree w/ pall care: morphine 5mg conc liquid po/sl q4 prn pain or dyspnea  hospice noted, but pt telling me that she does NOT want hospice at this time     # Steroid-induced Hyperglycemia; HgA1c- 7.2  diabetic diet   make FS 2x/day - will hopefully improve as steroids decrease  will tx if remains > 200 - could consider metformin    # lung CA s/p CT/RT and partial left lobectomy (in remission)    # Normocytic anemia, at baseline.   chronic dz    # DLD  on statin    # chronic Afib s/p ablation; hx micra PPM (placed at [facility])  HR OK  on Eliquis 5mg po q12 (covered by insurance) - OK to resume    # cont bowel regimen    # Activity: amb w/ asst and walker (and O2); can be OOB to chair    # DVT PPX - Eliquis    # NOTE:  previously spoke w/ pall care: d/c will be tricky once leaving IPP, because pt will need symptomatic tx of anxiety and dyspnea, which will likely require narcotics; however, pt does NOT (at least now) seem like she can be trusted w/ them (given suicide attempt).  we recommend Int Med consult/f/u near d/c date from IPP  we recommend family mtg near d/c date from IPP to illicit family's input and assistance in controlling meds in home environment (and not entrust them to pt herself)  these points were d/w Dr Bañuelos (psych resident) on 7/10    Dispo: supportive care; FS 2x/day; 1:1 sit; OK to walk pt; add haldol prn and ativan prn as above; c/w morphine prn; psych f/u for IPP; stable for d/c - COVID swab  
The patient is a 75 year-old female with a PMH of COPD (on 3L O2 home), bronchogenic carcinoma (left lung, s/p left partial lobectomy, in remission x 9 yrs), chronic atrial fibrillation (on Eliquis), anxiety, presenting following a suicide attempt at home. Patient this morning is somnolent; can answer in short sentences; when asked what happened, she states "I didn't want to live anymore;" reports she has been feeling this way for a long time. She endorses overdosing w/ Klonopin; per ED, patient took 27 tabs yesterday morning. At this time she reports feeling well; denies shortness of breath or difficulty breathing, dizziness, chest pain or pressure. She requests her  not be told about her being in the hospital as "he has anxiety."    # Attempted suicide w/27 klonopin pills; MDD; LAURA  Patient reports not wanting to kill her self and wants to go home.  Continue 1:1   Hold all benzodiazepines for now (was on Klonopin 1 mg q8h, PRN at home)  Patient waiting for medical clearence to be transferred to P  passed sp/sw: Dysphagia 3 + liquids - can start oral meds again  IVFs: d/c    # COPD, stable, h/o bronchogenic lung carcinoma  C/w NC O2; on 3L at home  C/w nebs q6h, PRN; Spiriva once daily    # Atrial fibrillation, rate-controlled  Can resume eliquis  No need for beta-blocker at this time  Vitals per routine    # Steroid-induced Hyperglycemia; HgA1c- 7.2  diabetic diet when able to eat  FS q6 for now - if FS are above 200, will start treatment    # GERD  C/w pantoprazole 20 mg once daily    # HLD  C/w atorvastatin 20 mg once daily    # CHG  # DVT ppx- Eliquis 5 mg twice daily  # GI ppx- pantoprazole 20 mg once daily  # Diet- dysphagia 3 + thin liquids  # Activity- out of bed to chair  # Dispo- acute on obs for toxicologic monitoring      
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     # suicide attempt w/ klonopin overdose; major depression; severe gen anxiety disorder  pt was evaluated by psych and med teams during last recent admission for suicidality and at that time pt made it clear to all examiners that she had no intention or plan to commit suicide and that she wanted to live  pt is, again, saying that she did not want to commit suicide   pt seemed to want to live because she did come to hospital multiple times when she needed help  pt did not want nor did she seem to need IPP during last admit (I saw on day of d/c and she was begging me to go home)  however, now, once cleared by IM, she will need IPP for depression and suicide attempt (spoke w/ psych, they agree)  cont 1:1 sit for duration of hospitalization - do NOT d/c    # toxic encephalopathy 2/2 klonopin overdose - resolving  diet: dys 3 + thin  can take oral meds now  IVFs: d/c    # Chronic hypoxic Resp Failure on Home O2 3L/min; chr hypercapnia; hx COPD not in exacerbation; severe pulm HTN  Nebs q6h prn wheeze  give solumedrol 20mg iv x1 today, then stop steroids  c/w spiriva  CXR - unchanged  Pulm f/u in Clinic as outpt  cont NC O2 - has at home  steroids are causing leukocytosis plus reaction to stress  hospice noted, but pt telling me that she does NOT want hospice at this time     # Steroid-induced Hyperglycemia; HgA1c- 7.2  diabetic diet   FS qac/hs  will tx if > 200    # lung CA s/p CT/RT and partial left lobectomy (in remission)    # Normocytic anemia, at baseline.   chronic dz    # DLD  on statin    # chronic Afib s/p ablation; hx micra PPM (placed at [facility])  HR OK  on Eliquis 5mg po q12 (covered by insurance) - OK to resume    # cont bowel regimen    # DVT PPX - Eliquis    Dispo: support care; FS qac/hs; d/c IVFs; start diet; resume oral meds; 1:1 sit; psych f/u for IPP; stable  upon d/c, will need to go to IPP
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     # suicide attempt w/ klonopin overdose; major depression; severe gen anxiety disorder  pt was evaluated by psych and med teams during last recent admission for suicidality and at that time pt made it clear to all examiners that she had no intention or plan to commit suicide and that she wanted to live  pt seemed to want to live because she did come to hospital multiple times when she needed help  pt did not want nor did she seem to need IPP at that time (I saw on day of d/c and she was begging me to go home)  now, once cleared by IM, she will need IPP for depression and suicide attempt (spoke w/ psych, they agree)  cont 1:1 sit for duration of hospitalization - do NOT d/c    # toxic encephalopathy 2/2 klonopin overdose  diet: NPO  convert meds to nebs or IV for now  IVFs: D51/2NS 75/hr (if sugars go up, then just 1/2NS)    # Chronic hypoxic Resp Failure on Home O2 3L/min; chr hypercapnia; hx COPD not in exacerbation; severe pulm HTN  Nebs q6h prn wheeze  was on Prednisone taper: until awake give solumedrol 20mg iv q24 and cont taper  c/w spiriva (when awake enough to take)  CXR - unchanged  Pulm f/u in Clinic as outpt  cont NC O2 - has at home  steroids are causing leukocytosis plus reaction to stress  hospice can eval pt re lung dz - appreciate consult    # Steroid-induced Hyperglycemia; HgA1c- 7.2  diabetic diet when able to eat  FS q6 for now - will tx if > 200    # lung CA s/p CT/RT and partial left lobectomy (in remission)    # Normocytic anemia, at baseline.   chronic dz    # DLD  on statin    # chronic Afib s/p ablation; hx micra PPM (placed at [facility])  HR OK  on Eliquis (covered by insurance)  since cannot take PO - hold Eliquis, if still NPO in 24-48 hrs, then use therapeutic LMWH 1mg/kg sc q12    # cont bowel regimen    # DVT PPX - a/c as above    Dispo: support care; FS q6; IVFs; convert meds to nebs/IV while NPO; 1:1 sit; psych f/u; NPO til awake/alert  upon d/c, will need to go to IPP
Patient is a 76 yo woman with pmh of anxiety, End-stage COPD (on 3 L home O2 ) with multiple admissions for recurrent exacerbations , Lung CA s/p chemo/radiation/ and partial left lobectomy in remission x 9 years, severe pulmonary hypertension,  Afib on Coumadin s/p ablation and micra PPM presents to ED for worsening dyspnea due to recurrent COPD exacerbation.     # suicide attempt w/ klonopin overdose; major depression; severe gen anxiety disorder  pt was evaluated by psych and med teams during last recent admission for suicidality and at that time pt made it clear to all examiners that she had no intention or plan to commit suicide and that she wanted to live  pt is, again, saying that she did not want to commit suicide and denies taking klonopins  pt seemed to want to live because she did come to hospital multiple times when she needed help  pt did not want nor did she seem to need IPP during last admit (I saw on day of d/c and she was begging me to go home)  however, now, once cleared by IM, she will need IPP for depression and suicide attempt (spoke w/ psych, they agree)  cont 1:1 sit for duration of hospitalization - do NOT d/c  OK to walk pt w/ sitter (and walker)    # toxic encephalopathy 2/2 klonopin overdose - resolving  diet: dys 3 + thin  can take oral meds now  IVFs: d/c    # Chronic hypoxic resp failure on Home O2 3L/min; chr hypercapnia; COPD exacerbation; severe pulm HTN  Nebs q6h prn wheeze  Pred 40mg po q24  steroids are causing leukocytosis plus reaction to stress  c/w spiriva  CXR - unchanged  Pulm f/u in Clinic as outpt  cont NC O2 - has at home  agree w/ pall care: morphine 5mg conc liquid po/sl q4 prn pain or dyspnea  hospice noted, but pt telling me that she does NOT want hospice at this time     # Steroid-induced Hyperglycemia; HgA1c- 7.2  diabetic diet   FS qac/hs  will tx if > 200    # lung CA s/p CT/RT and partial left lobectomy (in remission)    # Normocytic anemia, at baseline.   chronic dz    # DLD  on statin    # chronic Afib s/p ablation; hx micra PPM (placed at [facility])  HR OK  on Eliquis 5mg po q12 (covered by insurance) - OK to resume    # cont bowel regimen    # Activity: amb w/ asst and walker (and O2); can be OOB to chair    # DVT PPX - Eliquis    # NOTE:  spoke w/ pall care: d/c will be tricky once leaving psych, because pt will need symptomatic tx of anxiety and dyspnea, which will likely require narcotics; however, pt does NOT (at least now) seem like she can be trusted w/ them (given suicide attempt).  we recommend Int Med consult near d/c date from IPP  we recommend family mtg near d/c date from IPP to illicit family's input and assistance to controlling meds in home environment (and not entrust it to pt herself)  these points were d/w Dr Bañuelos (psych resident) on 7/10    Dispo: support care; FS qac/hs; 1:1 sit; OK to walk pt; psych f/u for IPP; stable for d/c  upon d/c, will need to go to IPP (prob Mon)
The patient is a 75 year-old female with a PMH of COPD (on 3L O2 home), bronchogenic carcinoma (left lung, s/p left partial lobectomy, in remission x 9 yrs), chronic atrial fibrillation (on Eliquis), anxiety, presenting following a suicide attempt at home. Patient this morning is somnolent; can answer in short sentences; when asked what happened, she states "I didn't want to live anymore;" reports she has been feeling this way for a long time. She endorses overdosing w/ Klonopin; per ED, patient took 27 tabs yesterday morning. At this time she reports feeling well; denies shortness of breath or difficulty breathing, dizziness, chest pain or pressure. She requests her  not be told about her being in the hospital as "he has anxiety."      # Attempted suicide w/27 klonopin pills; MDD; LAURA  Patient reports has been feeling suicidal for some time  Continue 1:1   Hold all benzodiazepines for now (was on Klonopin 1 mg q8h, PRN at home)  Patient waiting for medical clearence to be transferred to IPP  Keep patient NPO  Convert all meds from PO to IV for now    # COPD, stable, h/o bronchogenic lung carcinoma  C/w NC O2; on 3L at home  C/w nebs q6h, PRN; Spiriva once daily    # Atrial fibrillation, rate-controlled  Hold eliquis, if still npo in 24-48 hours, use therapeutic LMWH 1mg/kg sc q12  No need for beta-blocker at this time  Vitals per routine    # Steroid-induced Hyperglycemia; HgA1c- 7.2  diabetic diet when able to eat  FS q6 for now - if FS are above 200, will start treatment    # GERD  C/w pantoprazole 20 mg once daily    # HLD  C/w atorvastatin 20 mg once daily    # CHG  # DVT ppx- Eliquis 5 mg twice daily  # GI ppx- pantoprazole 20 mg once daily  # Diet- NPO for now while somnolent  # Activity- out of bed to chair  # Dispo- acute on obs for toxicologic monitoring
The patient is a 75 year-old female with a PMH of COPD (on 3L O2 home), bronchogenic carcinoma (left lung, s/p left partial lobectomy, in remission x 9 yrs), chronic atrial fibrillation (on Eliquis), anxiety, presenting following a suicide attempt at home. Patient this morning is somnolent; can answer in short sentences; when asked what happened, she states "I didn't want to live anymore;" reports she has been feeling this way for a long time. She endorses overdosing w/ Klonopin; per ED, patient took 27 tabs yesterday morning. At this time she reports feeling well; denies shortness of breath or difficulty breathing, dizziness, chest pain or pressure. She requests her  not be told about her being in the hospital as "he has anxiety."    # Attempted suicide w/27 klonopin pills; MDD; LAURA  Patient reports not wanting to kill her self and wants to go home.  Continue 1:1   Hold all benzodiazepines for now (was on Klonopin 1 mg q8h, PRN at home)  Patient waiting for medical clearence to be transferred to P  passed sp/sw: Dysphagia 3 + liquids - can start oral meds again  IVFs: d/c    # COPD, stable, h/o bronchogenic lung carcinoma  C/w NC O2; on 3L at home  C/w nebs q6h, PRN; Spiriva once daily    # Atrial fibrillation, rate-controlled  Can resume eliquis  No need for beta-blocker at this time  Vitals per routine    # Steroid-induced Hyperglycemia; HgA1c- 7.2  diabetic diet when able to eat  FS q6 for now - if FS are above 200, will start treatment    # GERD  C/w pantoprazole 20 mg once daily    # HLD  C/w atorvastatin 20 mg once daily    # CHG  # DVT ppx- Eliquis 5 mg twice daily  # GI ppx- pantoprazole 20 mg once daily  # Diet- dysphagia 3 + thin liquids  # Activity- out of bed to chair  # Dispo- acute on obs for toxicologic monitoring  
74 yo F with pmh of anxiety, COPD (on home O2 ) and  lung CA s/p chemo/radiation/ and partial left lobectomy in remission x 9 years, severe pulmonary hypertension;  Afib on Coumadin s/p ablation and micra PPM. She presented to the hospital previously with worsening shortness of breath and wheezing and 2 episodes of hemoptysis after discharge from the hospital in April. Deconditioning due to COPD/ severe pulm HTN / cor pulmonale. Our team consulted in May and June - at that time hospice was plan. Now admitted after suicide attempt with Klonopin   During previous admissions on Klonopin 1mg q 12 and also prn; had initiated low dose morphine for dyspnea. Palliative care consulted for [facility].    MEDD (morphine equivalent daily dose): na      See Recs below. d/w nursing palliative,  team    Please call [phone] with questions or concerns 24/7.   We will continue to follow.   
76 yo F with pmh of anxiety, COPD (on home O2 ) and  lung CA s/p chemo/radiation/ and partial left lobectomy in remission x 9 years, severe pulmonary hypertension;  Afib on Coumadin s/p ablation and micra PPM. She presented to the hospital previously with worsening shortness of breath and wheezing and 2 episodes of hemoptysis after discharge from the hospital in April. Deconditioning due to COPD/ severe pulm HTN / cor pulmonale. Our team consulted in May and June - at that time hospice was plan. Now admitted after suicide attempt with Klonopin   During previous admissions on Klonopin 1mg q 12 and also prn; had initiated low dose morphine for dyspnea. Palliative care consulted for [facility].    MEDD (morphine equivalent daily dose): na      See Recs below. d/w housestaff, palliative, psych teams    Please call [phone] with questions or concerns 24/7.   We will continue to follow.

## 2021-07-12 NOTE — PROGRESS NOTE BEHAVIORAL HEALTH - NSBHCHARTREVIEWVS_PSY_A_CORE FT
Vital Signs Last 24 Hrs  T(C): 35.6 (09 Jul 2021 05:13), Max: 36.1 (08 Jul 2021 21:00)  T(F): 96.1 (09 Jul 2021 05:13), Max: 96.9 (08 Jul 2021 21:00)  HR: 90 (09 Jul 2021 09:02) (69 - 90)  BP: 123/79 (09 Jul 2021 05:13) (107/54 - 125/58)  BP(mean): --  RR: 19 (09 Jul 2021 09:02) (18 - 20)  SpO2: 93% (09 Jul 2021 09:02) (93% - 97%)
Vital Signs Last 24 Hrs  T(C): 35 (11 Jul 2021 04:24), Max: 36.6 (10 Jul 2021 13:27)  T(F): 95 (11 Jul 2021 04:24), Max: 97.8 (10 Jul 2021 13:27)  HR: 70 (11 Jul 2021 04:24) (69 - 70)  BP: 118/60 (11 Jul 2021 04:24) (118/60 - 127/63)  BP(mean): --  RR: 16 (11 Jul 2021 04:24) (16 - 19)  SpO2: --
ICU Vital Signs Last 24 Hrs  T(C): 37.1 (08 Jul 2021 05:24), Max: 37.1 (08 Jul 2021 05:24)  T(F): 98.7 (08 Jul 2021 05:24), Max: 98.7 (08 Jul 2021 05:24)  HR: 93 (08 Jul 2021 05:24) (81 - 94)  BP: 104/51 (08 Jul 2021 05:24) (99/57 - 120/72)  BP(mean): --  ABP: --  ABP(mean): --  RR: 19 (08 Jul 2021 05:24) (19 - 24)  SpO2: 97% (07 Jul 2021 22:30) (97% - 100%)
ICU Vital Signs Last 24 Hrs  T(C): 37.4 (12 Jul 2021 12:10), Max: 37.4 (12 Jul 2021 12:10)  T(F): 99.4 (12 Jul 2021 12:10), Max: 99.4 (12 Jul 2021 12:10)  HR: 77 (12 Jul 2021 12:10) (72 - 77)  BP: 139/63 (12 Jul 2021 12:10) (138/60 - 139/65)  BP(mean): --  ABP: --  ABP(mean): --  RR: 18 (12 Jul 2021 12:10) (18 - 18)  SpO2: --
Vital Signs Last 24 Hrs  T(C): 35.8 (09 Jul 2021 19:43), Max: 35.8 (09 Jul 2021 19:43)  T(F): 96.4 (09 Jul 2021 19:43), Max: 96.4 (09 Jul 2021 19:43)  HR: 67 (09 Jul 2021 19:43) (67 - 68)  BP: 122/69 (09 Jul 2021 19:43) (102/61 - 122/69)  BP(mean): --  RR: 20 (09 Jul 2021 19:43) (18 - 20)  SpO2: --

## 2021-07-12 NOTE — PROGRESS NOTE ADULT - PROVIDER SPECIALTY LIST ADULT
Hospice
Internal Medicine
Palliative Care
Palliative Care

## 2021-07-12 NOTE — PROGRESS NOTE BEHAVIORAL HEALTH - GROOMING
Patient : Rajat Farias Age: 89 year old Sex: male   MRN: [id number] Encounter Date: 11/18/2017  E10/10    History     Chief Complaint   Patient presents with   • Shortness of Breath   • Fever   • Cough     HPI   11/18/2017  4:04 PM Rajat Farias is a 89 year old male who presents to the ED via EMS ? SOB and a fever that began Thursday (11/16). The patient also currently c/o a cough. He states, \"its because of a cold that went from the head to the chest.\" He denies sx of CP. The patient also denies a hx of COPD or heart failure as well as the use of home oxygen. The patient verbalizes no further complaints or modifying factors at this time.    PCP: Vince Jameson MD      No Known Allergies    Prior to Admission Medications    ASPIRIN 81 MG CHEWABLE TABLET    Chew 1 tablet by mouth daily.    CLOPIDOGREL (PLAVIX) 75 MG TABLET    Take 1 tablet by mouth daily. Not to be taken together with aspirin.    FLUTICASONE (FLONASE) 50 MCG/ACT NASAL SPRAY    Spray 2 sprays in each nostril daily.    INSULIN NPH (HUMULIN N) 100 UNIT/ML INJECTION    Inject 10 Units into the skin 2 times daily (before meals). 3 units PM   Indications: Type 2 Diabetes    LISINOPRIL (ZESTRIL) 5 MG TABLET    Take 5 mg by mouth daily.    MOMETASONE FURO-FORMOTEROL FUM (DULERA IN)    Inhale 2 inhalations into the lungs 2 times daily.    MUPIROCIN (BACTROBAN) 2 % CREAM    .    PREDNISONE (DELTASONE) 5 MG TABLET    Take 5 mg by mouth as needed.    SILVER SULFADIAZINE (THERMAZENE) 1 % CREAM    Apply 1 application topically daily.    SULFAMETHOXAZOLE-TRIMETHOPRIM (BACTRIM DS) 800-160 MG PER TABLET    Take 1 tablet by mouth 2 times daily.       Past Medical History:   Diagnosis Date   • COPD (chronic obstructive pulmonary disease) (CMS/HCC)    • Diabetes mellitus (CMS/HCC)    • Essential (primary) hypertension    • Foot ulcer, right (CMS/HCC) 2014    side of rt. great toe & second toe   • Fracture     left arm x2   asa child   • [place] (hard of hearing)     
uses [employer] hearing aids   • PAD (peripheral artery disease) (CMS/[facility]) 2014   • RAD (reactive airway disease)    • Sinusitis, chronic    • SOB (shortness of breath) on exertion        Past Surgical History:   Procedure Laterality Date   • PERIPHERAL ANGIOGRAM  12/17/2014    Right lower extremity   • SERVICE TO GASTROENTEROLOGY      colonoscopyx2       Family History   Problem Relation Age of Onset   • Diabetes Brother    • Diabetes Brother        Social History   Substance Use Topics   • Smoking status: Never Smoker   • Smokeless tobacco: Never Used   • Alcohol use No       Review of Systems   Constitutional: Positive for fever. Negative for chills.   HENT: Negative for congestion, postnasal drip, rhinorrhea, sinus pressure, sore throat and trouble swallowing.    Eyes: Negative for pain and visual disturbance.   Respiratory: Positive for cough and shortness of breath (onset Thursday 11/16). Negative for chest tightness.    Cardiovascular: Negative for chest pain, palpitations and leg swelling.   Gastrointestinal: Negative for abdominal pain, constipation, diarrhea, nausea and vomiting.   Genitourinary: Negative for difficulty urinating, dysuria, flank pain, frequency and hematuria.   Musculoskeletal: Negative for back pain, neck pain and neck stiffness.   Skin: Negative for rash.   Neurological: Negative for weakness, light-headedness, numbness and headaches.       Physical Exam     ED Triage Vitals [11/18/17 1604]   ED Triage Vitals Group      Temp 99.6 °F (37.6 °C)      Pulse 121      Resp 22      /89      SpO2 90 %      EtCO2 mmHg       Height 5' 9\" (1.753 m)      Weight 165 lb 5.5 oz (75 kg)      Weight Scale Used ED Estimate       Physical Exam   Constitutional: He is oriented to person, place, and time. He appears well-developed and well-nourished. No distress.   HENT:   Head: Normocephalic and atraumatic.   Eyes: Conjunctivae and EOM are normal. Right eye exhibits no discharge. Left eye exhibits no 
discharge.   Neck: Normal range of motion. Neck supple.   Cardiovascular: Normal rate, regular rhythm, normal heart sounds and intact distal pulses.  Exam reveals no gallop and no friction rub.    No murmur heard.  Pulses:       Radial pulses are 2+ on the right side, and 2+ on the left side.        Dorsalis pedis pulses are 2+ on the right side, and 2+ on the left side.        Posterior tibial pulses are 2+ on the right side, and 2+ on the left side.   Pulmonary/Chest: No stridor. He is in respiratory distress (mild). He has no wheezes. He has rhonchi in the right upper field, the right middle field, the right lower field, the left upper field, the left middle field and the left lower field. He has no rales. He exhibits no tenderness.   Coarse cough.   Abdominal: Soft. Bowel sounds are normal. He exhibits no distension and no mass. There is no tenderness. There is no rebound and no guarding.   Musculoskeletal: Normal range of motion. He exhibits no edema or tenderness.   Neurological: He is alert and oriented to person, place, and time. GCS eye subscore is 4. GCS verbal subscore is 5. GCS motor subscore is 6.   Face symmetric.  Moves all extremities.   Skin: Skin is warm and dry. No rash noted.   Psychiatric: He has a normal mood and affect.   Nursing note and vitals reviewed.      ED Course     Procedures    Lab Results     Results for orders placed or performed during the hospital encounter of 11/18/17   CBC & Auto Differential   Result Value Ref Range    WBC 13.9 (H) 4.2 - 11.0 K/mcL    RBC 5.23 4.50 - 5.90 mil/mcL    HGB 16.5 13.0 - 17.0 g/dL    HCT 49.2 39.0 - 51.0 %    MCV 94.1 78.0 - 100.0 fl    MCH 31.5 26.0 - 34.0 pg    MCHC 33.5 32.0 - 36.5 g/dL    RDW-CV 13.7 11.0 - 15.0 %     140 - 450 K/mcL    DIFF TYPE AUTOMATED DIFFERENTIAL     Neutrophil 77 %    LYMPH 9 %    MONO 14 %    EOSIN 0 %    BASO 0 %    Absolute Neutrophil 10.8 (H) 1.8 - 7.7 K/mcL    Absolute Lymph 1.2 1.0 - 4.0 K/mcL    Absolute 
Mono 1.9 (H) 0.3 - 0.9 K/mcL    Absolute Eos 0.0 (L) 0.1 - 0.5 K/mcL    Absolute Baso 0.0 0.0 - 0.3 K/mcL   Prothrombin Time   Result Value Ref Range    PROTIME 13.4 (H) 9.7 - 11.8 sec    INR 1.2    Partial Thromboplastin Time   Result Value Ref Range    PTT 28 22 - 30 sec   B Type Natriuretic Peptide BNP   Result Value Ref Range    B-TYPE NATRIURETIC PEPTIDE 161 (H) <100 pg/mL   ISTAT8 Venous - Point of Care   Result Value Ref Range    Sodium  135 - 145 mmol/L    Potassium POC 4.5 3.4 - 5.1 mmol/L    Chloride POC 99 98 - 107 mmol/L    CO2 Total 28 (H) 19 - 24 mmol/L    BUN POC 37 (H) 6 - 20 mg/dL    GLUCOSE  (H) 65 - 99 mg/dL    HEMATOCRIT POC 54.0 (H) 39.0 - 51.0 %    PH Venous POC 7.39 7.35 - 7.45 Units    PCO2 Venous 45 41 - 54 mm Hg    HCO3 Venous 27 22 - 28 mmol/L    Base Excess Venous 1 0 - 2 mmol/L    ANION GAP POC 16 mmol/L    Hemoglobin POC 18.4 (H) 13.0 - 17.0 g/dL   Creatinine - Point of Care   Result Value Ref Range    Creatinine POC 1.20 (H) 0.67 - 1.17 mg/dL    Estimated GFR  (POC) 60     Estimated GFR Non- (POC) 51    Troponin I - Point of Care   Result Value Ref Range    Troponin I POC <0.10 <0.10 ng/mL   Lactic Acid Venous - Point of Care   Result Value Ref Range    Lactic Acid Venous 1.8 <2.0 mmol/L   Blood Culture   Result Value Ref Range    Specimen Description BLOOD, PERIPHERAL ARM, RIGHT FOREARM     CULTURE PENDING     REPORT STATUS PENDING    Blood Culture   Result Value Ref Range    Specimen Description BLOOD, PERIPHERAL ANTECUBITAL,LEFT     CULTURE PENDING     REPORT STATUS PENDING        EKG Results     EKG Interpretation  Rate: 103  Rhythm: Atrial fibrillation with rapid ventricular response    Abnormality: Septal infarct, age undetermined. When compared to ECG of 10/24/16, afib has replaced sinus rhythm.     EKG interpreted by ED physician    Radiology Results     Imaging Results          XR Chest AP or PA (Final result)  Result time 11/18/17 
16:40:06    Final result                 Impression:    Impression:    No acute cardiopulmonary disease.               Narrative:    XR CHEST AP OR PA    CLINICAL INDICATION: Shortness of breath and cough.    TECHNIQUE: Portable frontal upright view of the chest obtained on  11/18/2017 4:29 PM.    COMPARISON: Chest radiograph on 3/28/2014.    FINDINGS:    The cardiomediastinal silhouette appears to be within normal limits. The  pulmonary vasculature is normally distributed. The lung and pleural spaces  are clear.                                 ED Medication Orders     Start Ordered     Status Ordering Provider    11/18/17 1734 11/18/17 1733  cefTRIAXone (ROCEPHIN) syringe 2,000 mg  (cefTRIAXone IVPB and azithromycin IVPB)  ONCE      Last MAR action:  Given COCO AUGUSTE    11/18/17 1734 11/18/17 1733  azithromycin (ZITHROMAX) 500 mg in sodium chloride 0.9 % 250 mL IVPB  (cefTRIAXone IVPB and azithromycin IVPB)  ONCE      Last MAR action:  Infusion Continues to Floor COCO AUGUSTE    11/18/17 1609 11/18/17 1608  sodium chloride (PF) 0.9 % injection 2 mL  (Capped IV)  ONCE      Last MAR action:  Given by Other COCO AUGUSTE    11/18/17 1609 11/18/17 1608  albuterol-ipratropium 2.5 mg/0.5 mg (DUONEB) nebulizer solution 3 mL  (albuterol-ipratropium (DUONEB) nebulization)  ONCE      Last MAR action:  Given COCO AUGUSTE    11/18/17 1607 11/18/17 1608  sodium chloride (PF) 0.9 % injection 2 mL  (Capped IV)  PRN      Acknowledged COCO AUGUSTE          [facility]    Vitals  Vitals:    11/18/17 1612 11/18/17 1645 11/18/17 1715 11/18/17 1800   BP: (!) 175/94 (!) 173/96 191/84 168/75   Pulse: 109 121 124 124   Resp: 27 24 28   Temp:       TempSrc:       SpO2: 95% 96% 96% 95%   Weight:       Height:           ED Course    5:32 PM PM Consult I spoke with Dr. Vince Jameson, the patient's PCP, regarding the patient's presentation with a cough and SOB. I informed him of the results of the pt's ED work 
up, noting that his CXR is negative for pneumonia though he has a coarse cough with hypoxia. We further discussed and collaboratively agreed upon a plan of care for further treatment with abx beyond the ED.     5:40 PM I rechecked the patient who is resting comfortably. I informed him of the results of his ED workup, explaining that he likely has pneumonia. I explained that I called Dr. Jameson and spoke with him regarding the patient's results and a plan of care which includes treatment beyond the ED. The patient understands and agrees with the plan. All questions and concerns were addressed.      [facility]    Critical Care time spent on this patient outside of billable procedures:  None     Admit  Clinical Impression:  The primary encounter diagnosis was Pneumonia, organism unspecified(486). A diagnosis of Hypoxemia was also pertinent to this visit.        Pt to be admitted to Dr. Vince Jameson in stable condition.       I have reviewed the information recorded by the scribe for accuracy and agree with its contents.    ____________________________________________________________________    Shyanne Yusuf acting as a scribe for Dr. Daljit Riggs  Dictation # [id number]  Scribe: Shyanne Riggs MD  11/18/17 2345    
Fair

## 2021-07-12 NOTE — PROGRESS NOTE BEHAVIORAL HEALTH - NSBHCONSULTOBSREASON_PSY_A_CORE FT
Patient currently obtunded but remains at high risk for suicidal behavior upon awakening
For safety
Will require 1:1 observation on inpatient due to presence of supplemental O2

## 2021-07-12 NOTE — H&P ADULT - NSHPLABSRESULTS_GEN_ALL_CORE
9.2    7.48  )-----------( 161      ( 11 Jul 2021 05:38 )             28.5       07-11    141  |  100  |  21<H>  ----------------------------<  128<H>  4.4   |  32  |  0.6<L>    Ca    9.3      11 Jul 2021 05:38  Mg     1.8     07-11                        Lactate Trend            CAPILLARY BLOOD GLUCOSE      POCT Blood Glucose.: 321 mg/dL (12 Jul 2021 16:48)        Culture Results:   10,000 - 49,000 CFU/mL Enterococcus species (07-07 @ 13:00)  Culture Results:   <10,000 CFU/mL Normal Urogenital Berkley (06-28 @ 16:27)  Culture Results:   <10,000 CFU/mL Normal Urogenital Berkley (06-27 @ 12:25)

## 2021-07-12 NOTE — DISCHARGE NOTE PROVIDER - NSDCMRMEDTOKEN_GEN_ALL_CORE_FT
acetaminophen 325 mg oral tablet: 2 tab(s) orally every 6 hours, As needed, Temp greater or equal to 38C (100.4F), Mild Pain (1 - 3), Moderate Pain (4 - 6)  albuterol 2.5 mg/3 mL (0.083%) inhalation solution: 2.5 milligram(s) by nebulizer every 6 hours, As Needed  apixaban 5 mg oral tablet: 1 tab(s) orally every 12 hours  atorvastatin 20 mg oral tablet: 1 tab(s) orally once a day (at bedtime)  morphine 20 mg/mL oral concentrate: 0.25 milliliter(s) orally 4 times a day, As Needed  pantoprazole 40 mg oral delayed release tablet: 1 tab(s) orally once a day (before a meal)  polyethylene glycol 3350 oral powder for reconstitution: 17 gram(s) orally once a day, As needed, Constipation  senna oral tablet: 2 tab(s) orally once a day (at bedtime)  tiotropium 18 mcg inhalation capsule: 1 cap(s) inhaled once

## 2021-07-12 NOTE — PROGRESS NOTE BEHAVIORAL HEALTH - SUMMARY
Ms. Joseph is a 75 year old domiciled retired female with a  PMH of COPD (on 3L O2 home), bronchogenic carcinoma (left lung, s/p left partial lobectomy, in remission x 9 yrs), chronic atrial fibrillation (on Eliquis), anxiety who presented to ED after reportedly intentionally ingesting 27 tablets of 0.5 mg of Klonopin.       Ms. Joseph's current presentation is consistent with delirium overlaid on her long-standing anxiety disorder and suicide attempt. There may also be a component of dementia, her risk for which is elevated in the setting of her advanced age and long-term use of Klonopin. She is in denial of her intentional overdose and would benefit from inpatient psychiatric admission once her delirium is cleared.    Plan  -Admit to Inpatient Psychiatry, 2 pc legals are in the chart  -Continue 1:1 for now on the medical floor  -This patient will require a 1:1 observer on the psychiatric floor. For agitation, consider low dose of haldol 2mg po every 8hrs prn along with ativan 1mg po every 8 hrs prn, and monitor for sedation. If patient is not transfer today to inpatient unit, we will start Lexapro 5mg po every morning Ms. Joseph is a 75 year old domiciled retired female with a  PMH of COPD (on 3L O2 home), bronchogenic carcinoma (left lung, s/p left partial lobectomy, in remission x 9 yrs), chronic atrial fibrillation (on Eliquis), anxiety who presented to ED after reportedly intentionally ingesting 27 tablets of 0.5 mg of Klonopin.       Spoke with patient's son,  Mr. Nav Flores and made aware of the plan to admit patient to inpatient psychiatry unit. He was able to express his understanding.     Plan  -Admit to Inpatient Psychiatry, 2 pc legals are in the chart  -Continue 1:1 for now on the medical floor  -This patient will require a 1:1 observer on the psychiatric floor. For agitation, consider low dose of haldol 2mg po every 8hrs prn along with ativan 1mg po every 8 hrs prn, and monitor for sedation. If patient is not transfer today to inpatient unit, we will start Lexapro 5mg po every morning

## 2021-07-12 NOTE — PROGRESS NOTE ADULT - PROBLEM SELECTOR PLAN 1
End stage with h/o  Lung CA s/p chemo/radiation/ and partial left lobectomy in remission x 9 years, severe pulmonary hypertension.      -continue morphine PRN as write by primary team   -noted primary team's plan for anxiety/agitation

## 2021-07-12 NOTE — DISCHARGE NOTE PROVIDER - NSDCCPCAREPLAN_GEN_ALL_CORE_FT
PRINCIPAL DISCHARGE DIAGNOSIS  Diagnosis: Overdose  Assessment and Plan of Treatment: - You took too much klonipin and overdosed on it thish resulted in some malfunction in your mental status  - You will be dischaged to inpatient psych for further management      SECONDARY DISCHARGE DIAGNOSES  Diagnosis: End stage COPD  Assessment and Plan of Treatment: - Please continue taking the medications as prescribed for your lung disease    Diagnosis: Anxiety  Assessment and Plan of Treatment: - Please continue taking your medications for your anxiety    Diagnosis: Atrial fibrillation  Assessment and Plan of Treatment: Please continue taking your blood thinning medication for your heart    Diagnosis: Hyperlipidemia  Assessment and Plan of Treatment: Please continue taking your cholestrol medication    Diagnosis: Depression  Assessment and Plan of Treatment: Please continue taking your medication for your mood disorder

## 2021-07-12 NOTE — DISCHARGE NOTE PROVIDER - HOSPITAL COURSE
75 year-old female with a  End-stage COPD (on 3 L home O2 ) with multiple admissions for recurrent exacerbations (on 3L O2 home), bronchogenic carcinoma (left lung, s/p left partial lobectomy, in remission x 9 yrs), Afib on Coumadin s/p ablation and micra PPM, anxiety, presenting following a suicide attempt at home. Patient this morning is somnolent; can answer in short sentences; when asked what happened, she states "I didn't want to live anymore;" reports she has been feeling this way for a long time. She endorses overdosing w/ Klonopin; per ED, patient took 27 tabs yesterday morning. At this time she reports feeling well; denies shortness of breath or difficulty breathing, dizziness, chest pain or pressure. She requests her  not be told about her being in the hospital as "he has anxiety."    Patient was admitted for the suicide attempt w/ klonopin overdose. She was been managed for toxic encephalopathy 2/2 to the overdose.  Pt was evaluated by psych and med teams during last recent admission for suicidality and at that time pt made it clear to all examiners that she had no intention or plan to commit suicide and that she wanted to live. Patient is saying as that she did not want to commit suicide and denies taking klonopins this admission as well. She had a positive drug screen for benzodiazepines. Patient was placed on 1:1 sit for the duration of the hospitalization. Speech and swallow eval  done on 07/09/2021 and pt was put on dysphagia  3 w/ thin liquids 2' pt preference for soft foods. She was started on morphine 5mg conc liquid po/sl q4 prn pain or dyspnea per palliative care. Patient declined hospice at this time.     Patient is being discharged to [facility] for depression and suicide attempt. She will be started on haldol 2mg po q8 prn (can use w/ ativan) for agitation, ativan 1mg po q8 prn (can use w/ haldol) for anxiety, likely to start lexapro 5mg po q24 soon per psych recommendations.     Patient will need symptomatic tx of anxiety and dyspnea, which will likely require narcotics; however, pt does NOT (at least now) seem like she can be trusted w/ them (given suicide attempt).  Internal Medicine consult// follow-up near d/c date from IPP  Family management near d/c date from IPP to illicit family's input and assistance in controlling meds in home environment (and not entrust them to pt herself) is also recommended.     Patient is medically stable and cleared for discharge.

## 2021-07-13 NOTE — CONSULT NOTE ADULT - SUBJECTIVE AND OBJECTIVE BOX
CASEY POWERS  75y, Female  Allergy: bacitracin (Other)  carboplatin (Other)  sulfa drugs (Unknown)  sulfonamides (Unknown)  Xanax (Other)      CHIEF COMPLAINT: pt took 27 pills of Klonopin in the attempt to end her life. Pt has end stage COPD, states she is a DNR/DNI (12 Jul 2021 19:46)      HPI:   74 y/o female with a past med hx of ens stage broncogenic carcinoma , afib, copd, constipation  complaint of benzodiazepine OD.This morning Ms. Powers is alert, oriented to person, place, time, and situation. She continues to deny suicidal ideation and describes her memory of overdose as "a dream". She is unhappy about not getting to go home today.   Note : Pt is very much hard of hearing and that limits the amount of info she can provide to MD or caretaker.     (12 Jul 2021 19:44)    HPI:    FAMILY HISTORY:  No pertinent family history in first degree relatives      PAST MEDICAL & SURGICAL HISTORY:  High cholesterol    Anxiety    Pacemaker    Lung cancer    Afib    COPD (chronic obstructive pulmonary disease)    Artificial cardiac pacemaker    H/O atrioventricular kacie ablation    S/P lobectomy of lung  left    History of tonsillectomy    S/P appendectomy    Cataract  RIGHT  6/17 AND  LEFT  7/5/19        SOCIAL HISTORY  Social History:  denies but od on benzo (12 Jul 2021 19:44)        ROS  General: Denies fevers, chills, nightsweats, weight loss  HEENT: Denies headache, rhinorrhea, sore throat, eye pain  CV: Denies CP, palpitations  PULM: Denies SOB, cough  GI: Denies abdominal pain, diarrhea  : Denies dysuria, hematuria  MSK: Denies arthralgias  SKIN: Denies rash   NEURO: Denies paresthesias, weakness  PSYCH: Denies depression    VITALS:  T(F): 96.8, Max: 96.9 (07-13-21 @ 06:06)  HR: 68  BP: 131/67  RR: 16Vital Signs Last 24 Hrs  T(C): 36 (13 Jul 2021 08:09), Max: 36.1 (13 Jul 2021 06:06)  T(F): 96.8 (13 Jul 2021 08:09), Max: 96.9 (13 Jul 2021 06:06)  HR: 68 (13 Jul 2021 08:09) (68 - 72)  BP: 131/67 (13 Jul 2021 08:09) (131/67 - 138/70)  BP(mean): --  RR: 16 (13 Jul 2021 08:09) (16 - 20)  SpO2: 98% (12 Jul 2021 19:08) (98% - 98%)    PHYSICAL EXAM:  Gen: NAD, resting in bed  HEENT: Normocephalic, atraumatic  Neck: supple, no lymphadenopathy  CV: Regular rate & regular rhythm  Lungs: decreased BS at bases, no fremitus  Abdomen: Soft, BS present  Ext: Warm, well perfused  Neuro: non focal, awake  Skin: no rash, no erythema  Psych: no SI, HI, Hallucination     TESTS & MEASUREMENTS:    INFECTIOUS DISEASES TESTING  Fungitell: 61 pg/mL (05-18-21 @ 15:44)      RADIOLOGY & ADDITIONAL TESTS:  I have personally reviewed the last Chest xray  CXR      CT      CARDIOLOGY TESTING  12 Lead ECG:   Ventricular Rate 70 BPM    Atrial Rate 300 BPM    QRS Duration 140 ms    Q-T Interval 430 ms    QTC Calculation(Bazett) 464 ms    R Axis -63 degrees    T Axis 100 degrees    Diagnosis Line Atrial fibrillation  Left axis deviation  Non-specific intra-ventricular conduction block  Possible Lateral infarct , age undetermined  Abnormal ECG    Confirmed by LINDA ALVARADO MD ([id number]) on 7/6/2021 1:54:39 PM (07-06-21 @ 09:53)      MEDICATIONS  apixaban 5  atorvastatin 20  pantoprazole    Tablet 40  senna 2  sertraline 25      ANTIBIOTICS:      All available historical data has been reviewed    ASSESSMENT  75y F admitted with suicide attempt/ OD    #copd - end stage  #chronic resp failure on home o2  #chronic afib on AC  #hx of lung CA sp RT/CT many years ago  # Anxiety/ Depression / suicide attempt / OD on klonipin     - cont o2, home meds  - DNR/I - not on hospice  - multiple admissions now that she states she wants to kill herself   - this admission came in with OD  - continues to say to MD that she is not suicidal but says otherwise at times when MD not around  - need close observation and follow up with family to administer meds at home to prevent OD  - social work / case management follow up   - started on zoloft by psych - may need to go back on klonipin and morphine due to her chronic medical conditions with supervised med administration   - seen by palliative at [place]  - discussed with dr alvarado   recall prn

## 2021-07-13 NOTE — PROGRESS NOTE BEHAVIORAL HEALTH - NSBHFUPIPCHARTREVFT_PSY_A_CORE
Ms. 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 Patient was somnolent and largely non-responsive to verbal questioning obtained from chart report and RN collateral as unable to obtain consent for family collateral (chart specifically mentions that patient does not want  contacted about this hospitalization).    Upon approach, the patient is somnolent laying supine in bed under the covers. She appears frail with dyed pink disheveled hair. The patient is intermittently rousable to voice, and is able to state that she's at the hospital. She is able to follow commands to squeeze examiner's finger with each hand, but unable or unwilling to visually track finger with her eyes.   She appears to have increased work of breathing, and is tachycardic (110 bpm) with dropping O2 saturation into the 40s during the brief exam. Per her RN, her vitals have been otherwise normal with good O2 sats.   Per RN, she intermittently converses with her medicine team, can communicate about toileting, but has been mostly somnolent.    Prior to this admission, the patient was discharged on 6/30 after admission for acute COPD exacerbation. During this admission, she reportedly expressed suicidal ideation overnight for which she was assessed by the  psychiatrist who noted that "patient reported being better of dead than alive.  Previously seen by Dr. Lim, during last hospitalization,  patient adamantly denies making this statement as a threat to kill herself, but rather was eager to  be discharged in order to get home on time to take care of her cat.  She is still denying any suicidal ideation and currently not psychotic, not manic. She is agreeable to follow up with [facility] outpatient  [facility] telepsychiatry service.    Upon attempt to re-evalaute patient later in the day, patient continued to be sedated and was difficult to arouse despite multiple attempts to wake her up.

## 2021-07-13 NOTE — PROGRESS NOTE BEHAVIORAL HEALTH - RISK ASSESSMENT
Risk factors: end-stage chronic illness, lack of insight into psychiatric illness, access to lethal means (Klonopin, home O2)  Protective factors: Supportive family, residential stability    Patient is at high risk for injury to self and should remain on 1:1 observation while on admitted to internal medicine.

## 2021-07-13 NOTE — PROGRESS NOTE BEHAVIORAL HEALTH - MODIFICATIONS
seen/discussed with resident.  Pt is without s/h ideation or psychosis. Her mood is pleaant and she insists she has no memory of overdose.  Will start low dose ssri for mood/anxiety and continue d/c planning

## 2021-07-13 NOTE — PROGRESS NOTE BEHAVIORAL HEALTH - NSBHFUPADDHPIFT_PSY_A_CORE
Palliative care is on board, given the extent and the severity of the suicide attempt and persistent denial of the event being and actual overdose this patient is unsafe to be discharged home. During evaluation at [facility] by the CL team the patient exhibited poor insight into the current situation, with notable low level of frustration tolerance, another factor that plays a role in her impulsive behaviors. No psychosis elicited, no manic symptoms. While the presence of COPD exacerbation can worsen her anxiety levels, symptoms needs to be addressed in an inpatient unit. This patient will require a 1:1 observer on the psychiatric floor. For agitation, consider low dose of haldol 2mg po every 8hrs prn along with ativan 1mg po every 8 hrs prn, and monitor for sedation. If patient is not transfer today to inpatient unit, we will start Lexapro 5mg po every morning.

## 2021-07-13 NOTE — PROGRESS NOTE BEHAVIORAL HEALTH - NSBHCHARTREVIEWINVESTIGATE_PSY_A_CORE FT
< from: 12 Lead ECG (07.06.21 @ 09:53) >    Ventricular Rate 70 BPM    Atrial Rate 300 BPM    QRS Duration 140 ms    Q-T Interval 430 ms    QTC Calculation(Bazett) 464 ms    R Axis -63 degrees    T Axis 100 degrees    Diagnosis Line Atrial fibrillation  Left axis deviation  Non-specific intra-ventricular conduction block  Possible Lateral infarct , age undetermined  Abnormal ECG    Confirmed by LINDA ALVARADO MD ([id number]) on 7/6/2021 1:54:39 PM    < end of copied text >

## 2021-07-13 NOTE — PROGRESS NOTE BEHAVIORAL HEALTH - NSBHADDHXMEDFT_PSY_A_CORE
PMH of Afib on Coumadin s/p ablation and Micra PPM, end stage COPD on home O2 of 3L, lung cancer s/p partial left lobectomy + chemo + RT in remission for 9 years.  Patient is DNR/DNI

## 2021-07-13 NOTE — PROGRESS NOTE BEHAVIORAL HEALTH - VIOLENCE RISK FACTORS:
48 yo male, no pmh comes to the ED co Left hand 5th metacarpal fx sustained from fall while skiing about 2 weeks ago . Was advised to come to the ED by Dr calles to have pinned today.  Last meal 8 am.  Will obtain Pre op labs, ekg/cxr, for surgery Impulsivity

## 2021-07-15 NOTE — PROGRESS NOTE BEHAVIORAL HEALTH - NSBHFUPINTERVALHXFT_PSY_A_CORE
Patient evaluated at bedside, chart reviewed, per nursing staff no overnight events, no PRNs required.      Upon approach, patient is sitting in hospital chair wearing oxygen NC.  NAD.  Patient continues to state that she is anxious and needs ot go home to feed her old, sick cats.  She denies SI, HI, AVH, depression.  Continues to endorse that she is anxious about being here and would feel better if discharged home.  She is accepting of HHA services to be set up before discharge.  Denies side effects of medication.
Patient evaluated at bedside, chart reviewed, per nursing staff no overnight events, no PRNs required.      Upon approach, patient is sitting in hospital bed appears calm and comfortable, wearing NC for oxygen.  Patient is very hard of hearing.  has pink dyed hair, appears with fair hygiene and grooming.  Mood is "fine" and affect  appears euthymic.      Patient states that she wants to go home and does not feel that she needs to be in the hospital.  She denies taking klonopin with the intent to overdose and continues to state that she doesn't remember how this happened.  She agrees that she needs to have her medications locked up or needs to stop taking klonopin altogether.  She endorses feeling somewhat anxious on the unit and is agreeable to start medication for anxiety while she is here.  She is anxious about her cats at home and her not being able to take care of them; her  doesn't know how to give the cats their medication.  Patient endorses poor sleep last night due to her anxiety about this situation.  She endorses good appetite.  Endorses that she will work on safety planning while here as well as coping skills so she has a safe discharge back home.    Denies SI,HI, AVH, paranoid thoughts, depressed mood, feeling worthless or hopeless.

## 2021-07-15 NOTE — PROGRESS NOTE BEHAVIORAL HEALTH - SUMMARY
Ms. Joseph is a 75 year old domiciled retired female with a  PMH of COPD (on 3L O2 home), bronchogenic carcinoma (left lung, s/p left partial lobectomy, in remission x 9 yrs), chronic atrial fibrillation (on Eliquis), anxiety who presented to ED after reportedly intentionally ingesting 27 tablets of 0.5 mg of Klonopin.     Patient requires IPP admission for safety planning, observation, starting of medication for anxiety.  Patient requires medication for anxiety as her COPD and breathing difficulties exacerbate her anxiety and she cannot discharge with benzos as this gives her lethal means for another overdose.  Patient is currently denying suicidal ideation or passive death wish, however requires continued treatment and observation for improvement of her poor insight and equivocal judgement.      Plan:    #LAURA  #Recent overdose on klonopin   - c/w zoloft 25mg today   - continue to hold Klonopin/benzos   - Continue 1:1 constant observation for hospital bed protocol     #Toxic metabolic encephalopathy secondary to Klonopin overdose, resolved  - diet: dysphagia 3 soft thin liquids     #Steroid induced hyperglycemia   - FS qac/hs  - monitor closely if restarting prednisone     #COPD  - continuous oxygen via NC  - [facility]  - Medicine re-consulted for starting prednisone here    # Atrial fibrillation, rate-controlled with PPM   - C/w Eliquis  Vitals per routine    # lung CA s/p CT/RT and partial left lobectomy (in remission)  - No treatment indicated at this time    # DLD   on statin     # continue bowel regimen    #For agitation, can give low dose of haldol 2mg po every 8hrs prn and monitor for sedation.     Risk Assessment (consider static vs modifiable risk factors and protective factors; comment on level of risk for dangerous behavior): Risk factors: end-stage chronic illness, lack of insight into psychiatric illness, access to lethal means (Klonopin, home O2)  Protective factors: Supportive family, residential stability    Patient is at high risk for injury to self and should remain on 1:1 observation while on admitted to internal medicine.
Ms. Joseph is a 75 year old domiciled retired female with a  PMH of COPD (on 3L O2 home), bronchogenic carcinoma (left lung, s/p left partial lobectomy, in remission x 9 yrs), chronic atrial fibrillation (on Eliquis), anxiety who presented to ED after reportedly intentionally ingesting 27 tablets of 0.5 mg of Klonopin.     Patient requires IPP admission for safety planning, observation, starting of medication for anxiety.  Patient requires medication for anxiety as her COPD and breathing difficulties exacerbate her anxiety and she cannot discharge with benzos as this gives her lethal means for another overdose.  Patient is currently denying suicidal ideation or passive death wish, however requires continued treatment and observation for improvement of her poor insight and equivocal judgement.      Plan:    #LAURA  #Recent overdose on klonopin   - Start zoloft 25mg today   - continue to hold Klonopin/benzos   - Continue 1:1 constant observation for hospital bed protocol     #Toxic metabolic encephalopathy secondary to Klonopin overdose, resolved  - diet: dysphagia 3 soft thin liquids     #Steroid induced hyperglycemia   - FS qac/hs  - Consider starting antidiabetic medication    #COPD  - continuous oxygen via NC    # Atrial fibrillation, rate-controlled with PPM   - C/w Eliquis  Vitals per routine    # lung CA s/p CT/RT and partial left lobectomy (in remission)  - No treatment indicated at this time    # DLD   on statin     # continue bowel regimen    #For agitation, can give low dose of haldol 2mg po every 8hrs prn and monitor for sedation

## 2021-07-15 NOTE — PROGRESS NOTE BEHAVIORAL HEALTH - NSBHCHARTREVIEWVS_PSY_A_CORE FT
Vital Signs Last 24 Hrs  T(C): 36.5 (15 Jul 2021 15:51), Max: 37.1 (14 Jul 2021 16:42)  T(F): 97.7 (15 Jul 2021 15:51), Max: 98.7 (14 Jul 2021 16:42)  HR: 76 (15 Jul 2021 15:51) (68 - 82)  BP: 118/58 (15 Jul 2021 15:51) (107/54 - 118/58)  BP(mean): --  RR: 16 (15 Jul 2021 15:51) (16 - 18)  SpO2: 99% (15 Jul 2021 11:57) (97% - 99%)
Vital Signs Last 24 Hrs  T(C): 36 (13 Jul 2021 08:09), Max: 37.4 (12 Jul 2021 12:10)  T(F): 96.8 (13 Jul 2021 08:09), Max: 99.4 (12 Jul 2021 12:10)  HR: 68 (13 Jul 2021 08:09) (68 - 77)  BP: 131/67 (13 Jul 2021 08:09) (131/67 - 139/63)  BP(mean): --  RR: 16 (13 Jul 2021 08:09) (16 - 20)  SpO2: 98% (12 Jul 2021 19:08) (98% - 98%)

## 2021-07-15 NOTE — PROGRESS NOTE BEHAVIORAL HEALTH - NSBHFUPINTERVALCCFT_PSY_A_CORE
"I'm ready to go home"
Pt continues to deny s/h ideation, and insists that she is stable for d/c.  Mood neutral; pt is anxious but this appears directly related to her desire to go home and see her  and cats.  No somatic c/o.  SW is looking into home care assistance.      no somatic c/o
"I feel fine."

## 2021-07-16 NOTE — DISCHARGE NOTE BEHAVIORAL HEALTH - NSBHDCACTFT_PSY_A_CORE
patient should transfer with assistance, requires a assistance to ambulate, fall precautions should be in place if using a walker

## 2021-07-16 NOTE — DISCHARGE NOTE BEHAVIORAL HEALTH - MEDICATION SUMMARY - MEDICATIONS TO TAKE
I will START or STAY ON the medications listed below when I get home from the hospital:    apixaban 5 mg oral tablet  -- 1 tab(s) by mouth every 12 hours x 14 days or until otherwise instructed by your provider  -- Indication: For Atrial fibrilliation     sertraline 25 mg oral tablet  -- 1 tab(s) by mouth once a day x 14 days or until otherwise instructed by your provider   -- Indication: For Anxiety     atorvastatin 20 mg oral tablet  -- 1 tab(s) by mouth once a day (at bedtime) x 14 days or until otherwise instructed by your provider  -- Indication: For High cholesterol    hydrOXYzine hydrochloride 25 mg oral tablet  -- 1 tab(s) orally, as needed for anxiety, every 6 hours x 14 days, or until otherwise instructed by your provider  -- Indication: For For Anxiety, as needed    albuterol 2.5 mg/3 mL (0.083%) inhalation solution  -- 2.5 milligram(s) by nebulizer every 6 hours, As Needed or until otherwise instructed by your provider  -- Indication: For COPD (chronic obstructive pulmonary disease)    tiotropium 18 mcg inhalation capsule  -- 1 cap(s) inhaled once x 14 days, As Needed -for shortness of breath and/or wheezing  or until otherwise instructed by your provider  -- Indication: For COPD (chronic obstructive pulmonary disease)    senna oral tablet  -- 2 tab(s) by mouth once a day (at bedtime) or until otherwise instructed by your provider  -- Indication: For COnstipation     pantoprazole 40 mg oral delayed release tablet  -- 1 tab(s) by mouth once a day (before a meal) x 14 days or until otherwise instructed by your provider  -- Indication: For GERD

## 2021-07-16 NOTE — DISCHARGE NOTE BEHAVIORAL HEALTH - NSBHDCRESPONSEFT_PSY_A_CORE
Patient has made significant progress over the course of hospitalization. With continuous psychotherapy from the treatment team and medication, patient reports feeling better, sleeping and eating well. Thought process and insight improved. Pt was calm and cooperative and was in good behavioral control. Patient denied any suicidal or homicidal ideations. Patient denied any auditory or visual hallucinations. Pt was evaluated by treatment team, pt is stable for discharge and patient shows no imminent danger to self, others or property at this time. Pt understands and agrees with treatment plan and follow-up. Psychoeducation provided regarding diagnosis, medications, treatment and follow up. Risks, benefits, alternatives discussed, all questions and concerns addressed and answered. Reviewed crisis intervention with verbalized understanding.  Patient was set up with home health aide services and was agreeable to this.

## 2021-07-16 NOTE — DISCHARGE NOTE BEHAVIORAL HEALTH - PAST PSYCHIATRIC HISTORY
PPH of anxiety, depression, and nicotine use d/o.  Long history of klonopin use (prescribed) PPH of anxiety, depression, and nicotine use d/o.  Long history of Klonopin use (prescribed)

## 2021-07-16 NOTE — DISCHARGE NOTE BEHAVIORAL HEALTH - NSBHDCMEDICALFT_PSY_A_CORE
Patient had steroid induced hyperglycemia upon hospital presentation.  Her glucose levels decreased from upper 200s to mid 100s during IPP stay.  She has not been taking antidiabetic medication and due to improving glucose levels none was started on IPP.  Patient was continued on nebs and O2 via NC for COPD management.  Was continued on Eliquis for afib.    Statin continued for HLD.

## 2021-07-16 NOTE — DISCHARGE NOTE BEHAVIORAL HEALTH - HPI (INCLUDE ILLNESS QUALITY, SEVERITY, DURATION, TIMING, CONTEXT, MODIFYING FACTORS, ASSOCIATED SIGNS AND SYMPTOMS)
Ms. 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 Patient was somnolent and largely non-responsive to verbal questioning obtained from chart report and RN collateral as unable to obtain consent for family collateral (chart specifically mentions that patient does not want  contacted about this hospitalization).    Upon approach, the patient is somnolent laying supine in bed under the covers. She appears frail with dyed pink disheveled hair. The patient is intermittently rousable to voice, and is able to state that she's at the hospital. She is able to follow commands to squeeze examiner's finger with each hand, but unable or unwilling to visually track finger with her eyes.   She appears to have increased work of breathing, and is tachycardic (110 bpm) with dropping O2 saturation into the 40s during the brief exam. Per her RN, her vitals have been otherwise normal with good O2 sats.   Per RN, she intermittently converses with her medicine team, can communicate about toileting, but has been mostly somnolent.    Prior to this admission, the patient was discharged on 6/30 after admission for acute COPD exacerbation. During this admission, she reportedly expressed suicidal ideation overnight for which she was assessed by the  psychiatrist who noted that "patient reported being better of dead than alive.  Previously seen by Dr. Lim, during last hospitalization,  patient adamantly denies making this statement as a threat to kill herself, but rather was eager to  be discharged in order to get home on time to take care of her cat.  She is still denying any suicidal ideation and currently not psychotic, not manic. She is agreeable to follow up with [facility] outpatient  [facility] telepsychiatry service.

## 2021-07-16 NOTE — DISCHARGE NOTE BEHAVIORAL HEALTH - NSBHDCTESTCONTACTFT_PSY_A_CORE
followup A1c in 1 month and followup with PCP regarding possibly starting an antidiabetic medication

## 2021-07-16 NOTE — DISCHARGE NOTE BEHAVIORAL HEALTH - APIXABAN/ELIQUIS EDUCATION
Eliquis/Apixaban – Compliance/Eliquis/Apixaban – Dietary Advice/Eliquis/Apixaban – Follow up Monitoring/Apixaban/Eliquis - Potential for Adverse Drug Reaction and Interactions

## 2021-07-16 NOTE — BH SAFETY PLAN - CLINICIAN PAGER OR EMERGENCY CONTACT # 2
[facility] is located at [address] [place] [address] (inside the main hospital)

## 2021-07-16 NOTE — DISCHARGE NOTE BEHAVIORAL HEALTH - NSBHDCMEDSFT_PSY_A_CORE
Patient was started on zoloft 25mg for management of anxiety and depression.  Patient demonstrated improved symptoms of anxiety and depression during her stay and was engaged in interviewing.  Patient denies any side effects of the medication and endorsed motivation to continue taking it at home.  She denied any suicidal thoughts or passive death wishes.  Patient was agreeable to safety plan and to receiving outpatient treatment upon discharge.

## 2021-07-16 NOTE — DISCHARGE NOTE BEHAVIORAL HEALTH - CARE PROVIDER_API CALL
Taty Kidd  Internal Medicine  [address] [place] [address]  Phone: [phone]  Fax: [phone]  Follow Up Time: 1 week

## 2021-07-16 NOTE — CHART NOTE - NSCHARTNOTEFT_GEN_A_CORE
Social Work Admit Note:    Patient is 75 years of age female who was admitted for evaluation after suicide attempt via ingestion of Klonopin. Patient was transferred from medicine for psychiatric stabilization. Treatment team met with patient at bedside. At time of assessment patient states she regrets what she did and has no desire to end her life. Patient states she wants to go home to her  and family. Patient endorsed depression and anxiety for years but with no prior history of inpatient psychiatric admissions.  This suicide attempt was her first.  She informed this was an impulsive act.  Audio / visual hallucinations denied.  Alcohol or other substance use denied. In the community patient resides in a private residence with her spouse. Patient has adult children out of the home who she reports good relations with.  Patient AOx3, contracts for safety on and off unit. Patient admitted to unit for treatment safety and observation.     Sexual History – patient identifies as heterosexual. 	  Family relationships and history – patient is , reports good relations with family members.    Leisure Activity Assessment – spending time with her family and children.       Community Supports – no known attendance in any self- help groups or other organizations.   Employment – patient is not employed at this time.   Substance Use Assessment – use of alcohol or other substances denied.     History of suicidality or self- injurious behaviors – denies.      Significant Loses – none identified.    Life Goals – patient verbalized goal of going home.       will continue to meet with patient 1:1 and with treatment team daily.      Discharge plan is for continued mental health treatment in outpatient setting.      Please refer to Social Work Psychosocial for additional information.
pt was d/c'd to home today in improved state with home care and f/u.  Pt was not suicidal or psychotic on d/c.
Pt continues to deny s/h ideation and she states adamantly that she is stable and ready for d/c .  Attempting to coordinate d/c with family. Pt is not a danger to herself or others, and is appropriate for home care services.

## 2021-07-16 NOTE — DISCHARGE NOTE BEHAVIORAL HEALTH - NSBHDCDXVALIDYESFT_PSY_A_CORE
patient was admitted to IPP s/p overdose on Klonopin with poor insight and judgement regarding the events that took place and the implusive behaviors that led to her overdose and medical hospitalization at [facility].   During IPP hospitalization patient was started on medication to help with anxiety that will not give her lethal means in the future compared to klonopin.  Patient improved in anxiety symptoms as well as insight and judgement and was able to contract for safety.

## 2021-07-23 NOTE — ED PROVIDER NOTE - NS ED ROS FT
Review of Systems:  	•	CONSTITUTIONAL - no fever, no diaphoresis, no weight change  	•	SKIN - no rash  	•	HEMATOLOGIC - no bleeding, no bruising  	•	EYES - no eye pain, no blurred vision  	•	ENT - no change in hearing, no pain  	•	RESPIRATORY - + shortness of breath, + cough  	•	CARDIAC - no chest pain, no palpitations  	•	GI - no abd pain, no nausea, no vomiting, no diarrhea, no constipation, no bleeding  	•	 - no dysuria, no hematuria, no flank pain, no urinary retention  	•	MUSCULOSKELETAL - no joint paint, no swelling, no redness  	•	NEUROLOGIC - no weakness, no headache, no paresthesias, no dizziness  All other systems negative, unless specified in HPI

## 2021-07-23 NOTE — ED ADULT TRIAGE NOTE - CHIEF COMPLAINT QUOTE
BIBA for c/o SOB and anxiety, denies fever, patient has 3LNC home O2, POX 98% at home Hx COPD, anxiety

## 2021-07-23 NOTE — H&P ADULT - NSHPREVIEWOFSYSTEMS_GEN_ALL_CORE
CONSTITUTIONAL: No fever, weight loss, or fatigue  EYES: No eye pain, visual disturbances, or discharge  RESPIRATORY: No cough, complains wheezing, chills complains hemoptysis; complains shortness of breath  CARDIOVASCULAR: No chest pain, palpitations, dizziness, or leg swelling  GASTROINTESTINAL: No abdominal or epigastric pain. No nausea, vomiting, or hematemesis; No diarrhea or constipation. No melena or hematochezia.  GENITOURINARY: No dysuria, frequency, hematuria, or incontinence  NEUROLOGICAL: No headaches, memory loss, loss of strength, numbness, or tremors  SKIN: No itching, burning, rashes, or lesions   LYMPH NODES: No enlarged glands  ENDOCRINE: No heat or cold intolerance; No hair loss  MUSCULOSKELETAL: No joint pain or swelling; No muscle, back, or extremity pain  PSYCHIATRIC: No depression, complains of mild anxiety,   HEME/LYMPH: No easy bruising, or bleeding gums  ALLERY AND IMMUNOLOGIC: No hives or eczema

## 2021-07-23 NOTE — H&P ADULT - ATTENDING COMMENTS
HPI:  75 year-old female with a  End-stage COPD (on 3 L home O2 ) with multiple admissions for recurrent exacerbations, Suicidal attempt on last admission, bronchogenic carcinoma (left lung, s/p left partial lobectomy, in remission x 9 yrs), Afib on Eliquis ablation and micra PPM, anxiexty, admitted for a COPD exacerbation. She has recurrent admissions for suboptimally controlled COPD. After discussion with her, she reports that non-compliant with her inhalers because she doesn't think they help. She continues to take prednisone. She was to be discharged on morphine to help with her dyspnea, she reported it didn't work. Unclear how frequently her  gave it to her. She is well known to palliative care, has denied hospice multiple time. She still reports not being interested in hospice though wants to remain DNR/DNI.    REVIEW OF SYSTEMS:  CONSTITUTIONAL:  No weakness, fevers, chills, night sweats, weight loss  EYES/ENT: No visual changes. No vertigo or dysphagia  NECK: No neck pain or stiffness  RESPIRATORY: No cough, wheezing, hemoptysis. + shortness of breath  CARDIOVASCULAR: No chest pain or palpitations. No lower extremity edema  GASTROINTESTINAL: No abdominal pain. No nausea, vomiting, diarrhea, or hematemesis  GENITOURINARY: No dysuria or hematuria   NEUROLOGICAL: No focal numbness or weakness  SKIN: No rashes or itching  HEMATOLOGIC: No easy bruising or prolonged bleeding.      PHYSICAL EXAM:  GENERAL: NAD, well-developed, Non-toxic, stated age   HEAD:  Atraumatic, Normocephalic  EYES: EOMI, Sclera White   NECK: Supple, No JVD  CHEST/LUNG: poor global air entry, mild expiratory wheezing and some rhonchi noted; No crackles. Breathing and speaking comfortably on 3L NC, no accessory muscle use, no reported dyspnea   HEART: Regular rate and rhythm; s1, s2, No murmurs, rubs, or gallops  ABDOMEN: Soft, Nontender, Nondistended; Bowel sounds present, No rebound or guarding noted   EXTREMITIES:  No lower extremity edema or calf tenderness to palpation.  No clubbing or cyanosis  PSYCH: AAOx2 (name, , and location correct, date was wrong), pleasant, cooperative, not anxious  NEUROLOGY: non-focal  SKIN: No rashes or lesions      ASSESSMENT AND PLAN:  End stage COPD on 3L O2: in exacerbation due to medication non-compliance  Lung CA s/p CT/RT and partial left lobectomy (in remission)  -Cont Symbicort, Albuterol PRN, and Spiriva. Cont with Solumedrol IV for one more day and transition back to prednisone taper if breathing is stable.        -Discussed the necessity of medication compliance       -It is possible she may be steroid dependent, but difficult to determine due to non-compliance    -She needs out patient pulm follow up   -Palliative Care consult: well known to service.       -cont with Morphine 5mg conc liquid po/sl q4 prn pain or dyspnea from last visit   -She continues to denied hospice at this time     Anxiety / Depression:   Hx of intentional benzodiazepine overdose (suicide attempt)  -Currently denies suicidal ideation  -Cont with Sertraline 25mg and Hydroxyzine 25mg q12     Hyperglycemia: mild, steroid induced  -Trend FSG, if persistently >200 then may need metformin or insulin     Chronic atrial fibrillation w/ micra PPM: cont with eliquis  -Rate controlled without anti-arrhythmics     Anemia of chronic disease: at baseline   HLD: cont with lipitor, takes crestor 5mg at home     DVT ppx: on eliquis   GI ppx: home protonix  GOC: DNR/DNI, MOSLT to be retrieved from patient records       My note supersedes the residents in the event of a discrepancy. HPI:  75 year-old female with a  End-stage COPD (on 3 L home O2 ) with multiple admissions for recurrent exacerbations, Suicidal attempt on last admission, bronchogenic carcinoma (left lung, s/p left partial lobectomy, in remission x 9 yrs), Afib on Eliquis ablation and micra PPM, anxiexty, admitted for a COPD exacerbation. She has recurrent admissions for suboptimally controlled COPD. After discussion with her, she reports that non-compliant with her inhalers because she doesn't think they help. She continues to take prednisone. She was to be discharged on morphine to help with her dyspnea, she reported it didn't work. Unclear how frequently her  gave it to her. She is well known to palliative care, has denied hospice multiple time. She still reports not being interested in hospice though wants to remain DNR/DNI.    REVIEW OF SYSTEMS:  CONSTITUTIONAL:  No weakness, fevers, chills, night sweats, weight loss  EYES/ENT: No visual changes. No vertigo or dysphagia  NECK: No neck pain or stiffness  RESPIRATORY: No cough, wheezing, hemoptysis. + shortness of breath  CARDIOVASCULAR: No chest pain or palpitations. No lower extremity edema  GASTROINTESTINAL: No abdominal pain. No nausea, vomiting, diarrhea, or hematemesis  GENITOURINARY: No dysuria or hematuria   NEUROLOGICAL: No focal numbness or weakness  SKIN: No rashes or itching  HEMATOLOGIC: No easy bruising or prolonged bleeding.      PHYSICAL EXAM:  GENERAL: NAD, well-developed, Non-toxic, stated age   HEAD:  Atraumatic, Normocephalic  EYES: EOMI, Sclera White   NECK: Supple, No JVD  CHEST/LUNG: poor global air entry, mild expiratory wheezing and some rhonchi noted; No crackles. Breathing and speaking comfortably on 3L NC, no accessory muscle use, no reported dyspnea   HEART: Regular rate and rhythm; s1, s2, No murmurs, rubs, or gallops  ABDOMEN: Soft, Nontender, Nondistended; Bowel sounds present, No rebound or guarding noted   EXTREMITIES:  No lower extremity edema or calf tenderness to palpation.  No clubbing or cyanosis  PSYCH: AAOx2 (name, , and location correct, date was wrong), pleasant, cooperative, not anxious  NEUROLOGY: non-focal  SKIN: No rashes or lesions      ASSESSMENT AND PLAN:  End stage COPD on 3L O2: in exacerbation due to medication non-compliance  Lung CA s/p CT/RT and partial left lobectomy (in remission)  -Cont Symbicort, Albuterol PRN, and Spiriva. Cont with Solumedrol IV for one more day and transition back to prednisone taper if breathing is stable.        -Discussed the necessity of medication compliance. SHOULD BE EVALUATED FOR PROPER INHALER USE BY RESPIRATORY THERAPY       -It is possible she may be steroid dependent, but difficult to determine due to non-compliance    -She needs out patient pulm follow up   -Palliative Care consult: well known to service.       -cont with Morphine 5mg conc liquid po/sl q4 prn pain or dyspnea from last visit   -She continues to denied hospice at this time     Anxiety / Depression:   Hx of intentional benzodiazepine overdose (suicide attempt)  -Currently denies suicidal ideation  -Cont with Sertraline 25mg and Hydroxyzine 25mg q12     Hyperglycemia: mild, steroid induced  -Trend FSG, if persistently >200 then may need metformin or insulin     Chronic atrial fibrillation w/ micra PPM: cont with eliquis  -Rate controlled without anti-arrhythmics     Anemia of chronic disease: at baseline   HLD: cont with lipitor, takes crestor 5mg at home     DVT ppx: on eliquis   GI ppx: home protonix  GOC: DNR/DNI, MOSLT to be retrieved from patient records       My note supersedes the residents in the event of a discrepancy. HPI:  75 year-old female with a  End-stage COPD (on 3 L home O2 ) with multiple admissions for recurrent exacerbations, Suicidal attempt on last admission, bronchogenic carcinoma (left lung, s/p left partial lobectomy, in remission x 9 yrs), Afib on Eliquis ablation and micra PPM, anxiexty, admitted for a COPD exacerbation. She has recurrent admissions for suboptimally controlled COPD. After discussion with her, she reports that non-compliant with her inhalers because she doesn't think they help. She continues to take prednisone. She was to be discharged on morphine to help with her dyspnea, she reported it didn't work. Unclear how frequently her  gave it to her. She is well known to palliative care, has denied hospice multiple time. She still reports not being interested in hospice though wants to remain DNR/DNI.    REVIEW OF SYSTEMS:  CONSTITUTIONAL:  No weakness, fevers, chills, night sweats, weight loss  EYES/ENT: No visual changes. No vertigo or dysphagia  NECK: No neck pain or stiffness  RESPIRATORY: No cough, wheezing, hemoptysis. + shortness of breath  CARDIOVASCULAR: No chest pain or palpitations. No lower extremity edema  GASTROINTESTINAL: No abdominal pain. No nausea, vomiting, diarrhea, or hematemesis  GENITOURINARY: No dysuria or hematuria   NEUROLOGICAL: No focal numbness or weakness  SKIN: No rashes or itching  HEMATOLOGIC: No easy bruising or prolonged bleeding.      PHYSICAL EXAM:  GENERAL: NAD, well-developed, Non-toxic, stated age   HEAD:  Atraumatic, Normocephalic  EYES: EOMI, Sclera White   NECK: Supple, No JVD  CHEST/LUNG: poor global air entry, mild expiratory wheezing and some rhonchi noted; No crackles. Breathing and speaking comfortably on 3L NC, no accessory muscle use, no reported dyspnea   HEART: Regular rate and rhythm; s1, s2, No murmurs, rubs, or gallops  ABDOMEN: Soft, Nontender, Nondistended; Bowel sounds present, No rebound or guarding noted   EXTREMITIES:  No lower extremity edema or calf tenderness to palpation.  No clubbing or cyanosis  PSYCH: AAOx2 (name, , and location correct, date was wrong), pleasant, cooperative, not anxious  NEUROLOGY: non-focal  SKIN: No rashes or lesions      ASSESSMENT AND PLAN:  End stage COPD on 3L O2: in exacerbation due to medication non-compliance  Lung CA s/p CT/RT and partial left lobectomy (in remission)  -Cont Symbicort, Albuterol PRN, and Spiriva. Cont with Solumedrol IV for one more day and transition back to prednisone taper if breathing is stable.        -Discussed the necessity of medication compliance. SHOULD BE EVALUATED FOR PROPER INHALER USE BY RESPIRATORY THERAPY       -It is possible she may be steroid dependent, but difficult to determine due to non-compliance    -She needs out patient pulm follow up   -Well known to service Palliative Care       -cont with Morphine 5mg conc liquid po/sl q4 prn pain or dyspnea from last visit   -She continues to denied hospice at this time     Anxiety / Depression:   Hx of intentional benzodiazepine overdose (suicide attempt)  -Currently denies suicidal ideation  -Cont with Sertraline 25mg and Hydroxyzine 25mg q12     Hyperglycemia: mild, steroid induced  -Trend FSG, if persistently >200 then may need metformin or insulin     Chronic atrial fibrillation w/ micra PPM: cont with eliquis  -Rate controlled without anti-arrhythmics     Anemia of chronic disease: at baseline   HLD: cont with lipitor, takes crestor 5mg at home     DVT ppx: on eliquis   GI ppx: home protonix  GOC: DNR/DNI, MOSLT to be retrieved from patient records       My note supersedes the residents in the event of a discrepancy.

## 2021-07-23 NOTE — ED PROVIDER NOTE - CLINICAL SUMMARY MEDICAL DECISION MAKING FREE TEXT BOX
Pt with h/o copd on home O2, here with copd exacerbation, mildly improved in ED with meds, will admit for further eval, pulm consult

## 2021-07-23 NOTE — H&P ADULT - HISTORY OF PRESENT ILLNESS
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  She noted decreased ambulation. She states that since  discharged for the last 5 days she has steadly worsening dyspnea.  Patient non complaint with medications. Patient was  admitted to [facility] on last admission and started on sertraline 25mg on discharge.  At bedside patient sating 98% on 2L NC  92% on RA but showing signs of dyspnea. patient  notes  hemoptysis small amount of during coughing  up phelm mild amounts.

## 2021-07-23 NOTE — ED PROVIDER NOTE - PHYSICAL EXAMINATION
VITAL SIGNS: I have reviewed nursing notes and confirm.  CONSTITUTIONAL: cachectic, in no acute distress.  SKIN: Skin exam is warm and dry, no acute rash.  HEAD: Normocephalic; atraumatic.  EYES: PERRL, EOM intact; conjunctiva and sclera clear.  ENT: No nasal discharge; airway clear. TMs clear.  NECK: Supple; non tender.  CARD: S1, S2 normal; no murmurs, gallops, or rubs. Regular rate and rhythm.  RESP: dec bs R lung field, L basilar ronchi and wheezing  ABD: Normal bowel sounds; soft; non-distended; non-tender;  EXT: Normal ROM. No clubbing, cyanosis or edema.  NEURO: aox3, cn2-12 grossly normal, 5/5 strength all ext, sensation intact, finger to nose normal, romberg neg, normal gait  PSYCH: Cooperative, appropriate.

## 2021-07-23 NOTE — ED ADULT NURSE NOTE - OBJECTIVE STATEMENT
Pt presented with c/o SOB and anxiety. Denies n/v/fever/cp. Wears O2 at home, 3L NC. sp02 99% on 3L NC.

## 2021-07-23 NOTE — H&P ADULT - ASSESSMENT
Acute on Chronic hypoxic Resp failure on Home O2 3L/min, With HO  chr hypercapnia; end-stage COPD exacerbation; severe pulm HTN  please Start Nebs q6h prn wheeze  Please start Solumedrol  60 q 12  c/w spiriva  CXR - improved since last admission   BNP elevated since last admission  Pulm f/u cx  cont NC O2 - has at home  f/u ABG  patient declined hospice in the past    Anxiety and Depression Hx of suicidal attempt    Continue hydroxyzine, sertraline      HO  Steroid-induced Hyperglycemia; HgA1c- 7.2  diabetic diet   monitor finger sticks     Lung CA s/p CT/RT and partial left lobectomy (in remission)    Normocytic anemia, at baseline.     DLD  Continue statin    Chronic Afib s/p ablation; hx micra PPM (placed at [facility])  HR OK  Continue  Eliquis 5mg po q12     # cont bowel regimen     Acute on Chronic hypoxic Resp failure on Home O2 3L/min, With HO  chr hypercapnia; end-stage COPD exacerbation; severe pulm HTN  please Start Nebs q6h prn wheeze  Please start Solumedrol  60 q 12  c/w spiriva  mild cough will blood tinged phelp  starting on 5 days azythro  CXR - improved since last admission   BNP elevated since last admission  Pulm f/u cx  cont NC O2 - has at home  f/u ABG  patient declined hospice in the past    Anxiety and Depression Hx of suicidal attempt    Continue hydroxyzine, sertraline      HO  Steroid-induced Hyperglycemia; HgA1c- 7.2  diabetic diet   monitor finger sticks     Lung CA s/p CT/RT and partial left lobectomy (in remission)    Normocytic anemia, at baseline.     DLD  Continue statin    Chronic Afib s/p ablation; hx micra PPM (placed at [facility])  HR OK  Continue  Eliquis 5mg po q12     # cont bowel regimen

## 2021-07-23 NOTE — ED PROVIDER NOTE - OBJECTIVE STATEMENT
74 yo f with pmh of afib on eliquis, ppm, hld, copd on 3L, lung ca s/p lobectomy, anxiety and depression, biba with c/o sob and cough.  pt denies fever or chills.  pt says has been having worsening sob since dc from hospital (overdose).  pt says she was on steroid taper prior to hospital admission.  pt f/u with pulm after dc and was told to take her inhaler.  pt says she has difficulty with using the inhaler and causes discomfort in her mouth.  no abd pain, no leg swelling or pain.  no n/v/d.

## 2021-07-23 NOTE — H&P ADULT - NSHPPHYSICALEXAM_GEN_ALL_CORE
VITAL SIGNS: AFebrile, vital signs stable  CONSTITUTIONAL:  Thin ; in no acute distress.  SKIN: Skin exam is warm and dry, no acute rash.  HEAD: Normocephalic; atraumatic.  EYES: Pupils equal round reactive to light, Extraocular movements intact; conjunctiva and sclera clear.  ENT: No nasal discharge; airway clear. Moist mucus membranes.  NECK: Supple; non tender. No rigidity  CARD: Regular rate and rhythm. Normal  S2; no murmurs, gallops, or rubs.  RESP: Lungs on auscultation bilaterally notable wheezing . decreased breath sounds in lower lobes. rales or rhonchi.  ABD: Abdomen soft; non-tender; non-distended;  no hepatosplenomegaly. No costovertebral angle tenderness.   EXT: Normal ROM. No clubbing, cyanosis or edema. No calf tenderness to palpation.  NEURO: Alert and oriented x 3. No focal deficits.  PSYCH: Cooperative, appropriate.

## 2021-07-23 NOTE — ED ADULT NURSE NOTE - INTERVENTIONS DEFINITIONS
[place] to call system/Call bell, personal items and telephone within reach/Instruct patient to call for assistance/Non-slip footwear when patient is off stretcher/Physically safe environment: no spills, clutter or unnecessary equipment/Stretcher in lowest position, wheels locked, appropriate side rails in place

## 2021-07-24 NOTE — CHART NOTE - NSCHARTNOTEFT_GEN_A_CORE
Pt seen and examined independently.  She feels better today in terms of dyspnea.  + weakness and difficulty ambulating  no fever, cough, chest pain, SOB, N/V, abdominal pain    + wheezes with rhonchi on exam    CXR, labs reviewed    continue current management for COPD exacerbation - IV steroids today and then prednisone starting 7/25, nebs, spiriva, symbicort, zpack  PT consult  DNR/DNI status  case discussed with residents and RN on rounds today

## 2021-07-24 NOTE — PROGRESS NOTE ADULT - SUBJECTIVE AND OBJECTIVE BOX
CASEY POWERS 75y Female  MRN#: [id number]   CODE STATUS:DNI/DNR    Hospital Day: 1d    Pt is currently admitted with the primary diagnosis of     SUBJECTIVE  Hospital Course    Overnight events     Subjective complaints     Present Today:   - Morelia:  No [  ], Yes [   ] : Indication:     - Type of IV Access:       .. CVC/Piccline:  No [  ], Yes [   ] : Indication:       .. Midline: No [  ], Yes [   ] : Indication:                                             ----------------------------------------------------------  OBJECTIVE  PAST MEDICAL & SURGICAL HISTORY  High cholesterol    Anxiety    Pacemaker    Lung cancer    Afib    COPD (chronic obstructive pulmonary disease)    Artificial cardiac pacemaker    H/O atrioventricular kacie ablation    S/P lobectomy of lung  left    History of tonsillectomy    S/P appendectomy    Cataract  RIGHT  6/17 AND  LEFT  7/5/19                                              -----------------------------------------------------------  ALLERGIES:  bacitracin (Other)  carboplatin (Other)  sulfa drugs (Unknown)  sulfonamides (Unknown)  Xanax (Other)                                            ------------------------------------------------------------    HOME MEDICATIONS  Home Medications:  senna oral tablet: 2 tab(s) orally once a day (at bedtime) or until otherwise instructed by your provider (23 Jul 2021 16:54)                           MEDICATIONS:  STANDING MEDICATIONS  ALBUTerol    90 MICROgram(s) HFA Inhaler 1 Puff(s) Inhalation every 4 hours  apixaban 5 milliGRAM(s) Oral two times a day  atorvastatin 20 milliGRAM(s) Oral at bedtime  azithromycin   Tablet 250 milliGRAM(s) Oral daily  budesonide 160 MICROgram(s)/formoterol 4.5 MICROgram(s) Inhaler - Peds 2 Puff(s) Inhalation two times a day  chlorhexidine 4% Liquid 1 Application(s) Topical <User Schedule>  magnesium sulfate  IVPB 2 Gram(s) IV Intermittent once  methylPREDNISolone sodium succinate Injectable 60 milliGRAM(s) IV Push daily  pantoprazole    Tablet 40 milliGRAM(s) Oral before breakfast  senna 2 Tablet(s) Oral at bedtime  sertraline 25 milliGRAM(s) Oral daily  tiotropium 18 MICROgram(s) Capsule 1 Capsule(s) Inhalation daily    PRN MEDICATIONS  ALBUTerol    0.083% 2.5 milliGRAM(s) Nebulizer every 4 hours PRN  hydrOXYzine  Oral Tab/Cap - Peds 25 milliGRAM(s) Oral every 6 hours PRN  morphine Concentrate 5 milliGRAM(s) Oral four times a day PRN                                            ------------------------------------------------------------  VITAL SIGNS: Last 24 Hours  T(C): 36.3 (24 Jul 2021 12:40), Max: 37.1 (24 Jul 2021 05:15)  T(F): 97.3 (24 Jul 2021 12:40), Max: 98.7 (24 Jul 2021 05:15)  HR: 69 (24 Jul 2021 13:30) (69 - 113)  BP: 182/74 (24 Jul 2021 13:30) (146/65 - 190/84)  BP(mean): --  RR: 20 (24 Jul 2021 12:40) (20 - 20)  SpO2: 100% (23 Jul 2021 21:13) (100% - 100%)      07-23-21 @ 07:01  -  07-24-21 @ 07:00  --------------------------------------------------------  IN: 0 mL / OUT: 1 mL / NET: -1 mL    07-24-21 @ 07:01  -  07-24-21 @ 17:21  --------------------------------------------------------  IN: 0 mL / OUT: 1 mL / NET: -1 mL                                             --------------------------------------------------------------  LABS:                        8.2    4.25  )-----------( 168      ( 24 Jul 2021 06:29 )             25.0     07-24    142  |  100  |  13  ----------------------------<  226<H>  3.6   |  31  |  0.6<L>    Ca    8.4<L>      24 Jul 2021 06:29  Mg     1.6     07-24    TPro  5.0<L>  /  Alb  3.5  /  TBili  0.7  /  DBili  x   /  AST  17  /  ALT  15  /  AlkPhos  71  07-24        ABG - ( 23 Jul 2021 21:41 )  pH, Arterial: 7.42  pH, Blood: x     /  pCO2: 54    /  pO2: 89    / HCO3: 35    / Base Excess: 9.3   /  SaO2: 98                        CARDIAC MARKERS ( 23 Jul 2021 13:33 )  x     / 0.02 ng/mL / x     / x     / x                                                  -------------------------------------------------------------  RADIOLOGY:                                            --------------------------------------------------------------    PHYSICAL EXAM:  General:   HEENT:  LUNGS:  HEART:  ABDOMEN:  EXT:  NEURO:  SKIN:                                           --------------------------------------------------------------    ASSESSMENT & PLAN    Past medical history and hospital course                                                                                                           ----------------------------------------------------  # DVT prophylaxis     # GI prophylaxis     # Diet     # Activity Score (AM-PAC)    # Code status     # Disposition                                                                              --------------------------------------------------------    # Martita      CASEY POWERS 75y Female  MRN#: [id number]   CODE STATUS:DNI/DNR    Hospital Day: 1d    Pt is currently admitted with the primary diagnosis of COPD exacerbation     SUBJECTIVE  Hospital Course:   She feels better today in terms of dyspnea.   + weakness and difficulty ambulating  no fever, cough, chest pain, SOB, N/V, abdominal pain    Overnight events: Patient is agitated at night, likely related to her psychiatric comorbidities     Subjective complaints: Patient reports generalized weakness and difficulty walking due to rapid onset of shortness of breath on exertion.      Present Today:   - [place]ode[place]:  No [x], Yes [   ] : Indication:     - Type of IV Access:       .. CVC/Piccline:  No [x], Yes [   ] : Indication:       .. Midline: No [x], Yes [   ] : Indication:                                             ----------------------------------------------------------  OBJECTIVE  PAST MEDICAL & SURGICAL HISTORY  High cholesterol    Anxiety    Pacemaker    Lung cancer    Afib    COPD (chronic obstructive pulmonary disease)    Artificial cardiac pacemaker    H/O atrioventricular kacie ablation    S/P lobectomy of lung  left    History of tonsillectomy    S/P appendectomy    Cataract  RIGHT  6/17 AND  LEFT  7/5/19                                              -----------------------------------------------------------  ALLERGIES:  bacitracin (Other)  carboplatin (Other)  sulfa drugs (Unknown)  sulfonamides (Unknown)  Xanax (Other)                                            ------------------------------------------------------------    HOME MEDICATIONS  Home Medications:  senna oral tablet: 2 tab(s) orally once a day (at bedtime) or until otherwise instructed by your provider (23 Jul 2021 16:54)                           MEDICATIONS:  STANDING MEDICATIONS  ALBUTerol    90 MICROgram(s) HFA Inhaler 1 Puff(s) Inhalation every 4 hours  apixaban 5 milliGRAM(s) Oral two times a day  atorvastatin 20 milliGRAM(s) Oral at bedtime  azithromycin   Tablet 250 milliGRAM(s) Oral daily  budesonide 160 MICROgram(s)/formoterol 4.5 MICROgram(s) Inhaler - Peds 2 Puff(s) Inhalation two times a day  chlorhexidine 4% Liquid 1 Application(s) Topical <User Schedule>  magnesium sulfate  IVPB 2 Gram(s) IV Intermittent once  methylPREDNISolone sodium succinate Injectable 60 milliGRAM(s) IV Push daily  pantoprazole    Tablet 40 milliGRAM(s) Oral before breakfast  senna 2 Tablet(s) Oral at bedtime  sertraline 25 milliGRAM(s) Oral daily  tiotropium 18 MICROgram(s) Capsule 1 Capsule(s) Inhalation daily    PRN MEDICATIONS  ALBUTerol    0.083% 2.5 milliGRAM(s) Nebulizer every 4 hours PRN  hydrOXYzine  Oral Tab/Cap - Peds 25 milliGRAM(s) Oral every 6 hours PRN  morphine Concentrate 5 milliGRAM(s) Oral four times a day PRN                                            ------------------------------------------------------------  VITAL SIGNS: Last 24 Hours  T(C): 36.3 (24 Jul 2021 12:40), Max: 37.1 (24 Jul 2021 05:15)  T(F): 97.3 (24 Jul 2021 12:40), Max: 98.7 (24 Jul 2021 05:15)  HR: 69 (24 Jul 2021 13:30) (69 - 113)  BP: 182/74 (24 Jul 2021 13:30) (146/65 - 190/84)  BP(mean): --  RR: 20 (24 Jul 2021 12:40) (20 - 20)  SpO2: 100% (23 Jul 2021 21:13) (100% - 100%)      07-23-21 @ 07:01  -  07-24-21 @ 07:00  --------------------------------------------------------  IN: 0 mL / OUT: 1 mL / NET: -1 mL    07-24-21 @ 07:01  -  07-24-21 @ 17:21  --------------------------------------------------------  IN: 0 mL / OUT: 1 mL / NET: -1 mL                                             --------------------------------------------------------------  LABS:                        8.2    4.25  )-----------( 168      ( 24 Jul 2021 06:29 )             25.0     07-24    142  |  100  |  13  ----------------------------<  226<H>  3.6   |  31  |  0.6<L>    Ca    8.4<L>      24 Jul 2021 06:29  Mg     1.6     07-24    TPro  5.0<L>  /  Alb  3.5  /  TBili  0.7  /  DBili  x   /  AST  17  /  ALT  15  /  AlkPhos  71  07-24        ABG - ( 23 Jul 2021 21:41 )  pH, Arterial: 7.42  pH, Blood: x     /  pCO2: 54    /  pO2: 89    / HCO3: 35    / Base Excess: 9.3   /  SaO2: 98                        CARDIAC MARKERS ( 23 Jul 2021 13:33 )  x     / 0.02 ng/mL / x     / x     / x                                                  -------------------------------------------------------------  RADIOLOGY:    EXAM:  XR CHEST PORTABLE IMMED 1V  PROCEDURE DATE:  07/23/2021    Impression:  Stable postoperative appearance of the left lung.                                            --------------------------------------------------------------    PHYSICAL EXAM:  General: Alert, Awake, Oriented, Not in acute distress   HEENT: PERRLA, No scleral icterus   LUNGS: + wheezes with rhonchi on exam  HEART: RRR, Normal S1S2  ABDOMEN: Soft, Nontender   EXT: Weak Pulses in Dorsalis Pedis Bilaterally   NEURO: CN II-XII intact  SKIN: No visible lesions                                            --------------------------------------------------------------    ASSESSMENT & PLAN  1) End stage COPD on 3L O2: in exacerbation due to medication non-compliance  Lung CA s/p CT/RT and partial left lobectomy (in remission)  -continue current management for COPD exacerbation - IV steroids today and then prednisone starting 7/25, nebs, spiriva, symbicort, zpack  -Discussed the necessity of medication compliance. SHOULD BE EVALUATED FOR PROPER INHALER USE BY RESPIRATORY THERAPY  -Out patient pulm follow up   -Well known to service Palliative Care    Anxiety / Depression:   Hx of intentional benzodiazepine overdose (suicide attempt)  -Currently denies suicidal ideation  -Cont with Sertraline 25mg and Hydroxyzine 25mg q12     Hyperglycemia: mild, steroid induced  -Trend FSG, if persistently >200 then may need metformin or insulin     Chronic atrial fibrillation w/ micra PPM: cont with eliquis  -Rate controlled without anti-arrhythmics     Anemia of chronic disease: at baseline   HLD: cont with lipitor, takes crestor 5mg at home     DVT ppx: on eliquis   GI ppx: home protonix

## 2021-07-25 NOTE — PROGRESS NOTE ADULT - ASSESSMENT
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     1. COPD exacerbation in pt with COPD on home O2 and possibly steroid dependent  - continue prednisone 40mg daily x 5 days and then taper  - nebs, spiriva, symbicort, zithromax x 5 days  - respiratory to reinforce inhaler technique  - GI/DVT prophylaxis  - PT consult for weakness  - DNR/DNI status  - per chart, pt not interested in hospice at this time    2. Leukocytosis due to steroids    3. Paroxysmal Afib s/p ablation and Micra placement  - on apixaban    4. H/O anxiety and depression and suicide attempt - on sertraline    5. Hyperglycemia likely due to steroids - carb consistent diet and start insulin to keep FS <180  consider metformin on discharge    6. 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     1. COPD exacerbation in pt with COPD on home O2 and possibly steroid dependent  - continue prednisone 40mg daily x 5 days and then taper  - nebs, spiriva, symbicort, zithromax x 5 days  - respiratory to reinforce inhaler technique  - GI/DVT prophylaxis  - PT consult for weakness  - DNR/DNI status  - per chart, pt not interested in hospice at this time    2. Leukocytosis due to steroids    3. Paroxysmal Afib s/p ablation and Micra placement  - on apixaban    4. H/O anxiety and depression and suicide attempt - on sertraline    5. Hyperglycemia likely due to steroids - carb consistent diet and start insulin to keep FS <180  consider metformin on discharge    6. Hypomagnesemia - repleted    7. Elevated BP - no h/o HTN - may be related to steroid use and anxiety  - can start amlodipine 5mg daily if SBP persistently >160        PROGRESS NOTE HANDOFF    Pending: PT consult, improvement in respiratory status    pt aware of plan of care    Disposition: home with services

## 2021-07-25 NOTE — PHYSICAL THERAPY INITIAL EVALUATION ADULT - GENERAL OBSERVATIONS, REHAB EVAL
[id number] pm. 74 y/o LT-handed F rec'd/left in bed, nad, + 3.5L O2, eating dinner. + WHITAKER with bed mobility/transfers/ambulation. pt is very motivated, pleasant, wishes to go home. pt with + resting tremors (per pt: new-onset, due to steroids). resting vitals /92 HR 95 O2 sats 98% on 3.5L O2. (BP measurement affected by the tremors)

## 2021-07-25 NOTE — HOSPICE CARE NOTE - CONVESATION DETAILS
Patient known to hospice as she was on [facility] services in the past. Case management team please follow  with patient and family as she requires a safe D/C plan in place. Home hospice is not a safe option. Hospice recommends NH placement at this time. Hospice to follow up during the week.

## 2021-07-25 NOTE — PROGRESS NOTE ADULT - SUBJECTIVE AND OBJECTIVE BOX
CASEY POWERS  75y Female    INTERVAL HPI/OVERNIGHT EVENTS:    Pt states that she feels better in terms of dyspnea  still appears dyspneic at rest  talking on the phone constantly  denies chest pain, cough, N/V, abdominal pain  waiting to work with PT    T(F): 97.6 (07-24-21 @ 19:53), Max: 97.6 (07-24-21 @ 19:53)  HR: 70 (07-25-21 @ 04:21) (69 - 74)  BP: 170/74 (07-25-21 @ 04:21) (142/65 - 190/84)  RR: 18 (07-25-21 @ 04:21) (18 - 20)  SpO2: --  I&O's Summary    24 Jul 2021 07:01  -  25 Jul 2021 07:00  --------------------------------------------------------  IN: 0 mL / OUT: 526 mL / NET: -526 mL      CAPILLARY BLOOD GLUCOSE      POCT Blood Glucose.: 326 mg/dL (25 Jul 2021 11:08)  POCT Blood Glucose.: 184 mg/dL (25 Jul 2021 07:13)  POCT Blood Glucose.: 223 mg/dL (24 Jul 2021 21:09)  POCT Blood Glucose.: 213 mg/dL (24 Jul 2021 16:34)        PHYSICAL EXAM:  GENERAL: NAD  HEAD:  Normocephalic  EYES:  conjunctiva and sclera clear  ENMT: Moist mucous membranes  NERVOUS SYSTEM:  Alert, awake, Good concentration, tremor after treatment  CHEST/LUNG:  wheezing b/l  HEART: Regular rate and rhythm  ABDOMEN: Soft, Nontender, Nondistended; Bowel sounds present  EXTREMITIES:   No edema      Consultant(s) Notes Reviewed:  [x ] YES  [ ] NO  Care Discussed with Consultants/Other Providers [ x] YES  [ ] NO    MEDICATIONS  (STANDING):  ALBUTerol    90 MICROgram(s) HFA Inhaler 1 Puff(s) Inhalation every 4 hours  apixaban 5 milliGRAM(s) Oral two times a day  atorvastatin 20 milliGRAM(s) Oral at bedtime  azithromycin   Tablet 250 milliGRAM(s) Oral daily  budesonide 160 MICROgram(s)/formoterol 4.5 MICROgram(s) Inhaler - Peds 2 Puff(s) Inhalation two times a day  chlorhexidine 4% Liquid 1 Application(s) Topical <User Schedule>  pantoprazole    Tablet 40 milliGRAM(s) Oral before breakfast  predniSONE   Tablet 40 milliGRAM(s) Oral daily  senna 2 Tablet(s) Oral at bedtime  sertraline 25 milliGRAM(s) Oral daily  tiotropium 18 MICROgram(s) Capsule 1 Capsule(s) Inhalation daily    MEDICATIONS  (PRN):  ALBUTerol    0.083% 2.5 milliGRAM(s) Nebulizer every 4 hours PRN Shortness of Breath and/or Wheezing  hydrOXYzine  Oral Tab/Cap - Peds 25 milliGRAM(s) Oral every 6 hours PRN anxiety/insomnia  morphine Concentrate 5 milliGRAM(s) Oral four times a day PRN dyspnea or pain      LABS:                        9.9    12.32 )-----------( 190      ( 25 Jul 2021 05:37 )             31.1     07-25    142  |  98  |  14  ----------------------------<  132<H>  3.4<L>   |  31  |  0.7    Ca    8.9      25 Jul 2021 05:37  Mg     1.6     07-24    TPro  5.0<L>  /  Alb  3.5  /  TBili  0.7  /  DBili  x   /  AST  17  /  ALT  15  /  AlkPhos  71  07-24          RADIOLOGY & ADDITIONAL TESTS:    Imaging or report Personally Reviewed:  [ ] YES  [ ] NO    < from: Xray Chest 1 View-PORTABLE IMMEDIATE (07.23.21 @ 13:04) >    Impression:    Stable postoperative appearance of the left lung.    < end of copied text >      Case discussed with RN today    Care discussed with pt

## 2021-07-26 NOTE — PROGRESS NOTE ADULT - ASSESSMENT
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     1. Acute on chronic resp failure/ COPD exacerbation in pt with COPD on home O2 and possibly steroid dependent  - continue prednisone 40mg daily x 5 days and then taper  - nebs, spiriva, symbicort, zithromax x 5 days  - respiratory to reinforce inhaler technique  - GI/DVT prophylaxis  - PT consult for weakness  - DNR/DNI status  - was on hospice previously but as per hospice, pt is not safe at home.    2. Leukocytosis due to steroids    3. Paroxysmal Afib s/p ablation and Micra placement  - on apixaban    4. H/O anxiety and depression and suicide attempt - on sertraline, anxiolytics.    5. Hyperglycemia likely due to steroids - carb consistent diet and start insulin to keep FS <180  consider metformin on discharge    6. Hypomagnesemia - repleted    7. Elevated BP - no h/o HTN - may be related to steroid use and anxiety  - can start amlodipine 5mg daily if SBP persistently >160    #Progress Note Handoff  Pending: SNF auth___Clinical improvement and stability__x______PT____x____  Pt/Family discussion: Pt informed and agrees with the current plan  Disposition: Home______/SNF____x___    My note supersedes the residents note should a discrepancy arise.    Chart and consultant notes personally reviewed.  Care Discussed with Consultants/Other Providers/ Housestaff [ x] YES [ ] NO   Radiology, labs, old records personally reviewed.    d/w Housestaff, nursing, case mgmt

## 2021-07-26 NOTE — PROGRESS NOTE ADULT - SUBJECTIVE AND OBJECTIVE BOX
PRIYASAHARACASEY  75y Female    INTERVAL HPI/OVERNIGHT EVENTS:    Pt states that she feels that since May her WHITAKER/SOB has progressively been getting worse  still appears dyspneic, anxious at rest  talking on the phone constantly  denies chest pain, cough, N/V, abdominal pain  ambulated on her own    InitialHPI:  75 year-old female with a  End-stage COPD (on 3 L home O2 ) with multiple admissions for recurrent exacerbations (on 3L O2 home), Suicidal attempt on last admission, bronchogenic carcinoma (left lung, s/p left partial lobectomy, in remission x 9 yrs), Afib on Eliquis ablation and micra PPM, anxiety, presenting following a worsening SOB at home since discharge.  She noted decreased ambulation. She states that since  discharged for the last 5 days she has steadly worsening dyspnea.  Patient non complaint with medications. Patient was  [facility] and started on sertraline 25mg on discharge.  At bedside patient sating 98% on 2L NC  92% on RA but showing signs of dyspnea. patient  notes  hemoptysis small amount of during coughing  up phelm mild amounts.  (23 Jul 2021 16:43)    PAST MEDICAL & SURGICAL HISTORY:  High cholesterol    Anxiety    Pacemaker    Lung cancer    Afib    COPD (chronic obstructive pulmonary disease)    Artificial cardiac pacemaker    H/O atrioventricular kacie ablation    S/P lobectomy of lung  left    History of tonsillectomy    S/P appendectomy    Cataract  RIGHT  6/17 AND  LEFT  7/5/19        General: NAD, AAO3, anxious, pursed breathing  HEENT:  EOMI, no LAD  CV: S1 S2  Resp: decreased breath sounds b/l  GI: NT/ND/S +BS  MS: no clubbing/cyanosis/edema, + pulses b/l  Neuro: BOONE, +reflexes thruout    MEDICATIONS  (STANDING):  ALBUTerol    90 MICROgram(s) HFA Inhaler 1 Puff(s) Inhalation every 4 hours  apixaban 5 milliGRAM(s) Oral two times a day  atorvastatin 20 milliGRAM(s) Oral at bedtime  azithromycin   Tablet 250 milliGRAM(s) Oral daily  budesonide 160 MICROgram(s)/formoterol 4.5 MICROgram(s) Inhaler - Peds 2 Puff(s) Inhalation two times a day  chlorhexidine 4% Liquid 1 Application(s) Topical <User Schedule>  insulin glargine Injectable (LANTUS) 9 Unit(s) SubCutaneous at bedtime  insulin lispro (ADMELOG) corrective regimen sliding scale   SubCutaneous three times a day before meals  insulin lispro Injectable (ADMELOG) 3 Unit(s) SubCutaneous three times a day before meals  pantoprazole    Tablet 40 milliGRAM(s) Oral before breakfast  predniSONE   Tablet 40 milliGRAM(s) Oral daily  senna 2 Tablet(s) Oral at bedtime  sertraline 25 milliGRAM(s) Oral daily  tiotropium 18 MICROgram(s) Capsule 1 Capsule(s) Inhalation daily    MEDICATIONS  (PRN):  ALBUTerol    0.083% 2.5 milliGRAM(s) Nebulizer every 4 hours PRN Shortness of Breath and/or Wheezing  hydrOXYzine  Oral Tab/Cap - Peds 25 milliGRAM(s) Oral every 6 hours PRN anxiety/insomnia  morphine Concentrate 5 milliGRAM(s) Oral four times a day PRN dyspnea or pain    Vital Signs Last 24 Hrs  T(C): 35.9 (26 Jul 2021 05:33), Max: 36.7 (25 Jul 2021 21:00)  T(F): 96.7 (26 Jul 2021 05:33), Max: 98 (25 Jul 2021 21:00)  HR: 71 (26 Jul 2021 05:33) (71 - 71)  BP: 166/74 (26 Jul 2021 05:33) (153/67 - 166/74)  BP(mean): 109 (26 Jul 2021 05:33) (109 - 109)  RR: 20 (26 Jul 2021 05:33) (20 - 20)  SpO2: --  CAPILLARY BLOOD GLUCOSE      POCT Blood Glucose.: 250 mg/dL (26 Jul 2021 12:25)  POCT Blood Glucose.: 279 mg/dL (26 Jul 2021 11:23)  POCT Blood Glucose.: 175 mg/dL (26 Jul 2021 07:27)  POCT Blood Glucose.: 220 mg/dL (25 Jul 2021 21:25)  POCT Blood Glucose.: 295 mg/dL (25 Jul 2021 16:11)                          9.1    8.66  )-----------( 175      ( 26 Jul 2021 05:33 )             28.9     07-26    143  |  99  |  18  ----------------------------<  138<H>  3.8   |  35<H>  |  0.6<L>    Ca    8.9      26 Jul 2021 05:33                        Chart, Consultant(s) Notes Reviewed:  [x ] YES  [ ] NO  Care Discussed with Consultants/Other Providers/ Housestaff [ x] YES  [ ] NO  Radiology, labs, old available records personally reviewed.

## 2021-07-26 NOTE — PROGRESS NOTE ADULT - SUBJECTIVE AND OBJECTIVE BOX
CASEY POWERS 75y Female  MRN#: [id number]   CODE STATUS:DNI/DNR    Hospital Day: 3d    Pt is currently admitted with the primary diagnosis of COPD exacerbation     SUBJECTIVE  Hospital Course:   74 yo F admitted for COPD exacerbation, currently on 2L O2  + weakness and difficulty ambulating  no fever, cough, chest pain, SOB, N/V, abdominal pain    Overnight events: None     Subjective complaints: Patient reports generalized weakness and difficulty walking due to rapid onset of shortness of breath on exertion.      Present Today:   - Thibodeaux:  No [x], Yes [   ] : Indication:     - Type of IV Access:       .. CVC/Piccline:  No [x], Yes [   ] : Indication:       .. Midline: No [x], Yes [   ] : Indication:                                             ----------------------------------------------------------  OBJECTIVE  PAST MEDICAL & SURGICAL HISTORY  High cholesterol    Anxiety    Pacemaker    Lung cancer    Afib    COPD (chronic obstructive pulmonary disease)    Artificial cardiac pacemaker    H/O atrioventricular kacie ablation    S/P lobectomy of lung  left    History of tonsillectomy    S/P appendectomy    Cataract  RIGHT  6/17 AND  LEFT  7/5/19                                              -----------------------------------------------------------  ALLERGIES:  bacitracin (Other)  carboplatin (Other)  sulfa drugs (Unknown)  sulfonamides (Unknown)  Xanax (Other)                                            ------------------------------------------------------------    HOME MEDICATIONS  Home Medications:  senna oral tablet: 2 tab(s) orally once a day (at bedtime) or until otherwise instructed by your provider (23 Jul 2021 16:54)                           MEDICATIONS:  MEDICATIONS  (STANDING):  ALBUTerol    90 MICROgram(s) HFA Inhaler 1 Puff(s) Inhalation every 4 hours  apixaban 5 milliGRAM(s) Oral two times a day  atorvastatin 20 milliGRAM(s) Oral at bedtime  azithromycin   Tablet 250 milliGRAM(s) Oral daily  budesonide 160 MICROgram(s)/formoterol 4.5 MICROgram(s) Inhaler - Peds 2 Puff(s) Inhalation two times a day  chlorhexidine 4% Liquid 1 Application(s) Topical <User Schedule>  insulin glargine Injectable (LANTUS) 9 Unit(s) SubCutaneous at bedtime  insulin lispro (ADMELOG) corrective regimen sliding scale   SubCutaneous three times a day before meals  insulin lispro Injectable (ADMELOG) 3 Unit(s) SubCutaneous three times a day before meals  pantoprazole    Tablet 40 milliGRAM(s) Oral before breakfast  predniSONE   Tablet 40 milliGRAM(s) Oral daily  senna 2 Tablet(s) Oral at bedtime  sertraline 25 milliGRAM(s) Oral daily  tiotropium 18 MICROgram(s) Capsule 1 Capsule(s) Inhalation daily    MEDICATIONS  (PRN):  ALBUTerol    0.083% 2.5 milliGRAM(s) Nebulizer every 4 hours PRN Shortness of Breath and/or Wheezing  hydrOXYzine  Oral Tab/Cap - Peds 25 milliGRAM(s) Oral every 6 hours PRN anxiety/insomnia  morphine Concentrate 5 milliGRAM(s) Oral four times a day PRN dyspnea or pain                                              ------------------------------------------------------------  VITAL SIGNS: Last 24 Hours  ICU Vital Signs Last 24 Hrs  T(C): 35.9 (26 Jul 2021 05:33), Max: 36.7 (25 Jul 2021 21:00)  T(F): 96.7 (26 Jul 2021 05:33), Max: 98 (25 Jul 2021 21:00)  HR: 71 (26 Jul 2021 05:33) (71 - 71)  BP: 166/74 (26 Jul 2021 05:33) (153/67 - 166/74)  BP(mean): 109 (26 Jul 2021 05:33) (109 - 109)  RR: 20 (26 Jul 2021 05:33) (20 - 20)                                               --------------------------------------------------------------  LABS:    cret                        9.1    8.66  )-----------( 175      ( 26 Jul 2021 05:33 )             28.9     07-26    143  |  99  |  18  ----------------------------<  138<H>  3.8   |  35<H>  |  0.6<L>    Ca    8.9      26 Jul 2021 05:33                                                -------------------------------------------------------------  RADIOLOGY:  < from: Xray Chest 1 View-PORTABLE IMMEDIATE (07.23.21 @ 13:04) >  Stable postoperative appearance of the left lung.    < from: Xray Chest 1 View AP/PA (07.07.21 @ 23:48) >  Postoperative change of the left hemithorax.    Without difference.                                              --------------------------------------------------------------    PHYSICAL EXAM:  General: Alert, Awake, Oriented, in visible distress not using her O2  HEENT: PERRLA, No scleral icterus   LUNGS: + wheezes with rhonchi on exam, increased respirations  HEART: RRR, no murmur  ABDOMEN: Soft, Nontender   EXT: Weak Pulses in Dorsalis Pedis Bilaterally   NEURO: CN II-XII intact  SKIN: No visible lesions                                            --------------------------------------------------------------    ASSESSMENT & PLAN      1) End stage COPD on 3L O2: in exacerbation due to medication non-compliance  Lung CA s/p CT/RT and partial left lobectomy (in remission)  -continue current management for COPD exacerbation - IV steroids today and then prednisone starting 7/25, nebs, spiriva, symbicort, zpack  -Discussed the necessity of medication compliance. SHOULD BE EVALUATED FOR PROPER INHALER USE BY RESPIRATORY THERAPY  -Out patient pulm follow up   -Well known to service Palliative Care    Anxiety / Depression:   Hx of intentional benzodiazepine overdose (suicide attempt)  -Currently denies suicidal ideation  -Cont with Sertraline 25mg and Hydroxyzine 25mg q12     Hyperglycemia: mild, steroid induced  -Trend FSG, if persistently >200 then may need metformin or insulin     Chronic atrial fibrillation w/ micra PPM: cont with eliquis  -Rate controlled without anti-arrhythmics     Anemia of chronic disease: at baseline   HLD: cont with lipitor, takes crestor 5mg at home     DVT ppx: on eliquis   GI ppx: home protonix   CASEY POWERS 75y Female  MRN#: [id number]   CODE STATUS:DNI/DNR    Hospital Day: 3d    Pt is currently admitted with the primary diagnosis of COPD exacerbation     SUBJECTIVE  Hospital Course:   74 yo F admitted for COPD exacerbation, currently on 2L O2  + weakness and difficulty ambulating  no fever, cough, chest pain, SOB, N/V, abdominal pain    Overnight events: None     Subjective complaints: Patient reports generalized weakness and difficulty walking due to rapid onset of shortness of breath on exertion.      Present Today:   - Thibodeaux:  No [x], Yes [   ] : Indication:     - Type of IV Access:       .. CVC/Piccline:  No [x], Yes [   ] : Indication:       .. Midline: No [x], Yes [   ] : Indication:                                             ----------------------------------------------------------  OBJECTIVE  PAST MEDICAL & SURGICAL HISTORY  High cholesterol    Anxiety    Pacemaker    Lung cancer    Afib    COPD (chronic obstructive pulmonary disease)    Artificial cardiac pacemaker    H/O atrioventricular kacie ablation    S/P lobectomy of lung  left    History of tonsillectomy    S/P appendectomy    Cataract  RIGHT  6/17 AND  LEFT  7/5/19                                              -----------------------------------------------------------  ALLERGIES:  bacitracin (Other)  carboplatin (Other)  sulfa drugs (Unknown)  sulfonamides (Unknown)  Xanax (Other)                                            ------------------------------------------------------------    HOME MEDICATIONS  Home Medications:  senna oral tablet: 2 tab(s) orally once a day (at bedtime) or until otherwise instructed by your provider (23 Jul 2021 16:54)                           MEDICATIONS:  MEDICATIONS  (STANDING):  ALBUTerol    90 MICROgram(s) HFA Inhaler 1 Puff(s) Inhalation every 4 hours  apixaban 5 milliGRAM(s) Oral two times a day  atorvastatin 20 milliGRAM(s) Oral at bedtime  azithromycin   Tablet 250 milliGRAM(s) Oral daily  budesonide 160 MICROgram(s)/formoterol 4.5 MICROgram(s) Inhaler - Peds 2 Puff(s) Inhalation two times a day  chlorhexidine 4% Liquid 1 Application(s) Topical <User Schedule>  insulin glargine Injectable (LANTUS) 9 Unit(s) SubCutaneous at bedtime  insulin lispro (ADMELOG) corrective regimen sliding scale   SubCutaneous three times a day before meals  insulin lispro Injectable (ADMELOG) 3 Unit(s) SubCutaneous three times a day before meals  pantoprazole    Tablet 40 milliGRAM(s) Oral before breakfast  predniSONE   Tablet 40 milliGRAM(s) Oral daily  senna 2 Tablet(s) Oral at bedtime  sertraline 25 milliGRAM(s) Oral daily  tiotropium 18 MICROgram(s) Capsule 1 Capsule(s) Inhalation daily    MEDICATIONS  (PRN):  ALBUTerol    0.083% 2.5 milliGRAM(s) Nebulizer every 4 hours PRN Shortness of Breath and/or Wheezing  hydrOXYzine  Oral Tab/Cap - Peds 25 milliGRAM(s) Oral every 6 hours PRN anxiety/insomnia  morphine Concentrate 5 milliGRAM(s) Oral four times a day PRN dyspnea or pain                                              ------------------------------------------------------------  VITAL SIGNS: Last 24 Hours  ICU Vital Signs Last 24 Hrs  T(C): 35.9 (26 Jul 2021 05:33), Max: 36.7 (25 Jul 2021 21:00)  T(F): 96.7 (26 Jul 2021 05:33), Max: 98 (25 Jul 2021 21:00)  HR: 71 (26 Jul 2021 05:33) (71 - 71)  BP: 166/74 (26 Jul 2021 05:33) (153/67 - 166/74)  BP(mean): 109 (26 Jul 2021 05:33) (109 - 109)  RR: 20 (26 Jul 2021 05:33) (20 - 20)                                               --------------------------------------------------------------  LABS:    cret                        9.1    8.66  )-----------( 175      ( 26 Jul 2021 05:33 )             28.9     07-26    143  |  99  |  18  ----------------------------<  138<H>  3.8   |  35<H>  |  0.6<L>    Ca    8.9      26 Jul 2021 05:33                                                -------------------------------------------------------------  RADIOLOGY:  < from: Xray Chest 1 View-PORTABLE IMMEDIATE (07.23.21 @ 13:04) >  Stable postoperative appearance of the left lung.    < from: Xray Chest 1 View AP/PA (07.07.21 @ 23:48) >  Postoperative change of the left hemithorax.    Without difference.                                              --------------------------------------------------------------    PHYSICAL EXAM:  General: Alert, Awake, Oriented, in visible distress not using her O2  HEENT: PERRLA, No scleral icterus   LUNGS: + wheezes with rhonchi on exam, increased respirations  HEART: RRR, no murmur  ABDOMEN: Soft, Nontender   EXT: Weak Pulses in Dorsalis Pedis Bilaterally   NEURO: CN II-XII intact  SKIN: No visible lesions                                            --------------------------------------------------------------    ASSESSMENT & PLAN  74 yo woman with PMH of Endstage COPD on 3L home O2 with multiple admissions for COPD exacerbations, suicide attempt on last admission, 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 She was admitted to medicine and started on azithromycin and prednisone 40mg.    1) End stage COPD on 3L O2: in exacerbation due to medication non-compliance  Lung CA s/p CT/RT and partial left lobectomy (in remission)  -continue current management for COPD exacerbation - IV steroids today and then prednisone starting 7/25, nebs, spiriva, symbicort, zpack  -Discussed the necessity of medication compliance. SHOULD BE EVALUATED FOR PROPER INHALER USE BY RESPIRATORY THERAPY  -Out patient pulm follow up   -Well known to service Palliative Care    Anxiety / Depression:   Hx of intentional benzodiazepine overdose (suicide attempt)  -Currently denies suicidal ideation  -Cont with Sertraline 25mg and Hydroxyzine 25mg q12     Hyperglycemia: mild, steroid induced  -Trend FSG, if persistently >200 then may need metformin or insulin     Chronic atrial fibrillation w/ micra PPM: cont with eliquis  -Rate controlled without anti-arrhythmics     Anemia of chronic disease: at baseline   HLD: cont with lipitor, takes crestor 5mg at home     DNR/DNI  Dash diet  Eliquis for DVT prophylaxis  Disposition: home with services vs facility pending level 2 screen

## 2021-07-27 NOTE — PROGRESS NOTE ADULT - SUBJECTIVE AND OBJECTIVE BOX
CASEY POWERS 75y Female  MRN#: [id number]   CODE STATUS:DNI/DNR    Hospital Day: 4d    Pt is currently admitted with the primary diagnosis of COPD exacerbation     SUBJECTIVE  Hospital Course:   74 yo F admitted for COPD exacerbation, currently on 2L O2  + weakness and difficulty ambulating  no fever, cough, chest pain, SOB, N/V, abdominal pain    Overnight events: None     Subjective complaints: none    Present Today:   - Thibodeaux:  No [x], Yes [   ] : Indication:     - Type of IV Access:       .. CVC/Piccline:  No [x], Yes [   ] : Indication:       .. Midline: No [x], Yes [   ] : Indication:                                             ----------------------------------------------------------  OBJECTIVE  PAST MEDICAL & SURGICAL HISTORY  High cholesterol    Anxiety    Pacemaker    Lung cancer    Afib    COPD (chronic obstructive pulmonary disease)    Artificial cardiac pacemaker    H/O atrioventricular kacie ablation    S/P lobectomy of lung  left    History of tonsillectomy    S/P appendectomy    Cataract  RIGHT  6/17 AND  LEFT  7/5/19                                              -----------------------------------------------------------  ALLERGIES:  bacitracin (Other)  carboplatin (Other)  sulfa drugs (Unknown)  sulfonamides (Unknown)  Xanax (Other)                                            ------------------------------------------------------------    HOME MEDICATIONS  Home Medications:  senna oral tablet: 2 tab(s) orally once a day (at bedtime) or until otherwise instructed by your provider (23 Jul 2021 16:54)                           MEDICATIONS:  MEDICATIONS  (STANDING):  ALBUTerol    90 MICROgram(s) HFA Inhaler 1 Puff(s) Inhalation every 4 hours  apixaban 5 milliGRAM(s) Oral two times a day  atorvastatin 20 milliGRAM(s) Oral at bedtime  azithromycin   Tablet 250 milliGRAM(s) Oral daily  budesonide 160 MICROgram(s)/formoterol 4.5 MICROgram(s) Inhaler - Peds 2 Puff(s) Inhalation two times a day  chlorhexidine 4% Liquid 1 Application(s) Topical <User Schedule>  insulin glargine Injectable (LANTUS) 9 Unit(s) SubCutaneous at bedtime  insulin lispro (ADMELOG) corrective regimen sliding scale   SubCutaneous three times a day before meals  insulin lispro Injectable (ADMELOG) 3 Unit(s) SubCutaneous three times a day before meals  pantoprazole    Tablet 40 milliGRAM(s) Oral before breakfast  predniSONE   Tablet 40 milliGRAM(s) Oral daily  senna 2 Tablet(s) Oral at bedtime  sertraline 25 milliGRAM(s) Oral daily  tiotropium 18 MICROgram(s) Capsule 1 Capsule(s) Inhalation daily    MEDICATIONS  (PRN):  ALBUTerol    0.083% 2.5 milliGRAM(s) Nebulizer every 4 hours PRN Shortness of Breath and/or Wheezing  hydrOXYzine  Oral Tab/Cap - Peds 25 milliGRAM(s) Oral every 6 hours PRN anxiety/insomnia  morphine Concentrate 5 milliGRAM(s) Oral four times a day PRN dyspnea or pain                                              ------------------------------------------------------------  VITAL SIGNS: Last 24 Hours  ICU Vital Signs Last 24 Hrs  T(C): 35.6 (27 Jul 2021 05:16), Max: 36.9 (26 Jul 2021 20:24)  T(F): 96 (27 Jul 2021 05:16), Max: 98.5 (26 Jul 2021 20:24)  HR: 71 (27 Jul 2021 05:16) (69 - 72)  BP: 141/67 (27 Jul 2021 05:16) (141/67 - 183/81)  BP(mean): 96 (26 Jul 2021 14:07) (96 - 96)  ABP: --  ABP(mean): --  RR: 22 (27 Jul 2021 05:16) (20 - 22)  SpO2: 95% (26 Jul 2021 23:12) (94% - 95%)                                                 --------------------------------------------------------------  LABS:      LABS:  cret                        9.1    8.66  )-----------( 175      ( 26 Jul 2021 05:33 )             28.9     07-26    143  |  99  |  18  ----------------------------<  138<H>  3.8   |  35<H>  |  0.6<L>    Ca    8.9      26 Jul 2021 05:33                                                        -------------------------------------------------------------  RADIOLOGY:  < from: Xray Chest 1 View-PORTABLE IMMEDIATE (07.23.21 @ 13:04) >  Stable postoperative appearance of the left lung.    < from: Xray Chest 1 View AP/PA (07.07.21 @ 23:48) >  Postoperative change of the left hemithorax.    Without difference.                                              --------------------------------------------------------------    PHYSICAL EXAM:  General: Alert, Awake, Oriented, on nasal canula O2  HEENT: PERRLA, No scleral icterus   LUNGS: + wheezes with rhonchi on exam, increased respirations  HEART: RRR, no murmur  ABDOMEN: Soft, Nontender   EXT: Weak Pulses in Dorsalis Pedis Bilaterally   NEURO: CN II-XII intact  SKIN: No visible lesions                                            --------------------------------------------------------------    ASSESSMENT & PLAN  74 yo woman with PMH of Endstage COPD on 3L home O2 with multiple admissions for COPD exacerbations, suicide attempt on last admission, 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 She was admitted to medicine and started on azithromycin and prednisone 40mg.    1) End stage COPD on 3L O2: in exacerbation due to medication non-compliance  Lung CA s/p CT/RT and partial left lobectomy (in remission)  -continue current management for COPD exacerbation   - Prednisone 40mg starting 7/25 today is day 3/5 before taper   - nebs, spiriva, symbicort, zpack  -Discussed the necessity of medication compliance  -Out patient pulm follow up   -Well known to service Palliative Care    Anxiety / Depression:   Hx of intentional benzodiazepine overdose (suicide attempt)  -Currently denies suicidal ideation  -Cont with Sertraline 25mg and Hydroxyzine 25mg q12     Hyperglycemia: mild, steroid induced  -Trend FSG, if persistently >200 then may need metformin or insulin     Chronic atrial fibrillation w/ micra PPM: cont with eliquis  -Rate controlled without anti-arrhythmics     Anemia of chronic disease: at baseline   HLD: cont with lipitor, takes crestor 5mg at home     DNR/DNI  Dash diet  Eliquis for DVT prophylaxis  Disposition: home with services vs facility pending level 2 screen

## 2021-07-27 NOTE — PROGRESS NOTE ADULT - SUBJECTIVE AND OBJECTIVE BOX
CASEY POWERS  75y Female    INTERVAL HPI/OVERNIGHT EVENTS:    Reports no change in her breathing. 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  She noted decreased ambulation. She states that since  discharged for the last 5 days she has steadly worsening dyspnea.  Patient non complaint with medications. Patient was  admitted to [facility] on last admission and started on sertraline 25mg on discharge.  At bedside patient sating 98% on 2L NC  92% on RA but showing signs of dyspnea. patient  notes  hemoptysis small amount of during coughing  up phelm mild amounts.  (23 Jul 2021 16:43)    PAST MEDICAL & SURGICAL HISTORY:  High cholesterol    Anxiety    Pacemaker    Lung cancer    Afib    COPD (chronic obstructive pulmonary disease)    Artificial cardiac pacemaker    H/O atrioventricular kacie ablation    S/P lobectomy of lung  left    History of tonsillectomy    S/P appendectomy    Cataract  RIGHT  6/17 AND  LEFT  7/5/19        General: NAD, AAO3, anxious, pursed breathing  HEENT:  EOMI, no LAD  CV: S1 S2  Resp: decreased breath sounds b/l  GI: NT/ND/S +BS  MS: no clubbing/cyanosis/edema, + pulses b/l  Neuro: BOONE, +reflexes thruout            MEDICATIONS  (STANDING):  ALBUTerol    90 MICROgram(s) HFA Inhaler 1 Puff(s) Inhalation every 4 hours  apixaban 5 milliGRAM(s) Oral two times a day  atorvastatin 20 milliGRAM(s) Oral at bedtime  azithromycin   Tablet 250 milliGRAM(s) Oral daily  budesonide 160 MICROgram(s)/formoterol 4.5 MICROgram(s) Inhaler - Peds 2 Puff(s) Inhalation two times a day  chlorhexidine 4% Liquid 1 Application(s) Topical <User Schedule>  insulin glargine Injectable (LANTUS) 9 Unit(s) SubCutaneous at bedtime  insulin lispro (ADMELOG) corrective regimen sliding scale   SubCutaneous three times a day before meals  insulin lispro Injectable (ADMELOG) 3 Unit(s) SubCutaneous three times a day before meals  pantoprazole    Tablet 40 milliGRAM(s) Oral before breakfast  predniSONE   Tablet 40 milliGRAM(s) Oral daily  senna 2 Tablet(s) Oral at bedtime  sertraline 25 milliGRAM(s) Oral daily  tiotropium 18 MICROgram(s) Capsule 1 Capsule(s) Inhalation daily    MEDICATIONS  (PRN):  ALBUTerol    0.083% 2.5 milliGRAM(s) Nebulizer every 4 hours PRN Shortness of Breath and/or Wheezing  hydrOXYzine  Oral Tab/Cap - Peds 25 milliGRAM(s) Oral every 6 hours PRN anxiety/insomnia  morphine Concentrate 5 milliGRAM(s) Oral four times a day PRN dyspnea or pain    Home Medications:  senna oral tablet: 2 tab(s) orally once a day (at bedtime) or until otherwise instructed by your provider (23 Jul 2021 16:54)    Vital Signs Last 24 Hrs  T(C): 35.9 (27 Jul 2021 13:49), Max: 36.9 (26 Jul 2021 20:24)  T(F): 96.6 (27 Jul 2021 13:49), Max: 98.5 (26 Jul 2021 20:24)  HR: 70 (27 Jul 2021 13:49) (69 - 72)  BP: 165/68 (27 Jul 2021 13:49) (141/67 - 183/81)  BP(mean): --  RR: 22 (27 Jul 2021 05:16) (20 - 22)  SpO2: 95% (26 Jul 2021 23:12) (95% - 95%)  CAPILLARY BLOOD GLUCOSE      POCT Blood Glucose.: 126 mg/dL (27 Jul 2021 13:02)  POCT Blood Glucose.: 76 mg/dL (27 Jul 2021 11:12)  POCT Blood Glucose.: 100 mg/dL (27 Jul 2021 07:23)  POCT Blood Glucose.: 141 mg/dL (26 Jul 2021 21:20)  POCT Blood Glucose.: 114 mg/dL (26 Jul 2021 20:37)  POCT Blood Glucose.: 65 mg/dL (26 Jul 2021 16:27)    LABS:                        9.1    8.66  )-----------( 175      ( 26 Jul 2021 05:33 )             28.9     07-26    143  |  99  |  18  ----------------------------<  138<H>  3.8   |  35<H>  |  0.6<L>    Ca    8.9      26 Jul 2021 05:33                        Consultant Notes Reviewed:  [x ] YES  [ ] NO  Care Discussed with Consultants/Other Providers/ Housestaff [ x] YES  [ ] NO  Radiology, labs, new studies personally reviewed.

## 2021-07-27 NOTE — PROGRESS NOTE ADULT - ASSESSMENT
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     1. Acute on chronic resp failure/ COPD exacerbation in pt with COPD on home O2 and possibly steroid dependent  - continue prednisone 40mg daily x 5 days and then taper  - nebs, spiriva, symbicort, zithromax x 5 days  - respiratory to reinforce inhaler technique  - GI/DVT prophylaxis  - PT consult for weakness  - DNR/DNI status  - was on hospice previously but as per hospice, pt is not safe at home.    2. Leukocytosis due to steroids    3. Paroxysmal Afib s/p ablation and Micra placement  - on apixaban    4. H/O anxiety and depression and suicide attempt - on sertraline, anxiolytics.    5. Hyperglycemia likely due to steroids - carb consistent diet and start insulin to keep FS <180  consider metformin on discharge    6. Hypomagnesemia - repleted    7. Elevated BP - no h/o HTN - may be related to steroid use and anxiety  - can start amlodipine 5mg daily if SBP persistently >160    #Progress Note Handoff  Pending: SNF auth___Level 2 Screen  Pt/Family discussion: Pt informed and agrees with the current plan  Disposition: Home______/SNF____x___    My note supersedes the residents note should a discrepancy arise.    Chart and consultant notes personally reviewed.  Care Discussed with Consultants/Other Providers/ Housestaff [ x] YES [ ] NO   Radiology, labs, old records personally reviewed.    d/w Housestaff, nursing, case mgmt

## 2021-07-28 NOTE — DISCHARGE NOTE PROVIDER - NSDCCPCAREPLAN_GEN_ALL_CORE_FT
PRINCIPAL DISCHARGE DIAGNOSIS  Diagnosis: COPD exacerbation  Assessment and Plan of Treatment: Chronic obstructive pulmonary disease (COPD) is a lung condition in which airflow from the lungs is limited. Causes include smoking, secondhand smoke exposure, genetics, or recurrent infections. Take all medicines (inhaled or pills) as directed by your health care provider. Avoid exposure to irritants such as smoke, chemicals, and fumes that aggravate your breathing.  If you are a smoker, the most important thing that you can do is stop smoking. Continuing to smoke will cause further lung damage and breathing trouble. Ask your health care provider for help with quitting smoking.  SEEK IMMEDIATE MEDICAL CARE IF YOU HAVE ANY OF THE FOLLOWING SYMPTOMS: shortness of breath at rest or when talking, bluish discoloration of lips, skin, fever, worsening cough, unexplained chest pain, or lightheadedness/dizziness.

## 2021-07-28 NOTE — DISCHARGE NOTE PROVIDER - HOSPITAL COURSE
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  She noted decreased ambulation. She states that since  discharged for the last 5 days she has steadily worsening dyspnea.  Patient non complaint with medications. Patient was  admitted to [facility] on last admission and started on sertraline 25mg on discharge.  At bedside patient sating 98% on 2L NC  92% on RA but showing signs of dyspnea. patient  notes  hemoptysis small amount of during coughing  up phelm mild amounts. 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  She noted decreased ambulation. She states that since  discharged for the last 5 days she has steadily worsening dyspnea.  Patient non complaint with medications. Patient was  admitted to [facility] on last admission and started on sertraline 25mg on discharge.  At bedside patient sating 98% on 2L NC  92% on RA but showing signs of dyspnea. patient  notes  hemoptysis small amount of during coughing up small amounts of mucus. Pt was initiated on nebulizing treatments, oral steroids. Pt made DNR/DNI and was initially open for hospice placement and then declined. Family decided to discharge pt to SNF.

## 2021-07-28 NOTE — PROGRESS NOTE ADULT - SUBJECTIVE AND OBJECTIVE BOX
CASEY POWERS 75y Female  MRN#: [id number]   CODE STATUS:DNI/DNR    Hospital Day: 5d    Pt is currently admitted with the primary diagnosis of COPD exacerbation     SUBJECTIVE  Hospital Course:   76 yo F admitted for COPD exacerbation, currently on 2L O2  + weakness and difficulty ambulating  no fever, cough, chest pain, SOB, N/V, abdominal pain    Overnight events: None     Subjective complaints: Patient is complaining she wants to go home and wants hospice    Present Today:   - Thibodeaux:  No [x], Yes [   ] : Indication:     - Type of IV Access:       .. CVC/Piccline:  No [x], Yes [   ] : Indication:       .. Midline: No [x], Yes [   ] : Indication:                                             ----------------------------------------------------------  OBJECTIVE  PAST MEDICAL & SURGICAL HISTORY  High cholesterol    Anxiety    Pacemaker    Lung cancer    Afib    COPD (chronic obstructive pulmonary disease)    Artificial cardiac pacemaker    H/O atrioventricular kacie ablation    S/P lobectomy of lung  left    History of tonsillectomy    S/P appendectomy    Cataract  RIGHT  6/17 AND  LEFT  7/5/19                                              -----------------------------------------------------------  ALLERGIES:  bacitracin (Other)  carboplatin (Other)  sulfa drugs (Unknown)  sulfonamides (Unknown)  Xanax (Other)                                            ------------------------------------------------------------    HOME MEDICATIONS  Home Medications:  senna oral tablet: 2 tab(s) orally once a day (at bedtime) or until otherwise instructed by your provider (23 Jul 2021 16:54)                           MEDICATIONS:  MEDICATIONS  (STANDING):  ALBUTerol    90 MICROgram(s) HFA Inhaler 1 Puff(s) Inhalation every 4 hours  apixaban 5 milliGRAM(s) Oral two times a day  atorvastatin 20 milliGRAM(s) Oral at bedtime  azithromycin   Tablet 250 milliGRAM(s) Oral daily  budesonide 160 MICROgram(s)/formoterol 4.5 MICROgram(s) Inhaler - Peds 2 Puff(s) Inhalation two times a day  chlorhexidine 4% Liquid 1 Application(s) Topical <User Schedule>  insulin glargine Injectable (LANTUS) 9 Unit(s) SubCutaneous at bedtime  insulin lispro (ADMELOG) corrective regimen sliding scale   SubCutaneous three times a day before meals  insulin lispro Injectable (ADMELOG) 3 Unit(s) SubCutaneous three times a day before meals  pantoprazole    Tablet 40 milliGRAM(s) Oral before breakfast  predniSONE   Tablet 40 milliGRAM(s) Oral daily  senna 2 Tablet(s) Oral at bedtime  sertraline 25 milliGRAM(s) Oral daily  tiotropium 18 MICROgram(s) Capsule 1 Capsule(s) Inhalation daily    MEDICATIONS  (PRN):  ALBUTerol    0.083% 2.5 milliGRAM(s) Nebulizer every 4 hours PRN Shortness of Breath and/or Wheezing  hydrOXYzine  Oral Tab/Cap - Peds 25 milliGRAM(s) Oral every 6 hours PRN anxiety/insomnia  morphine Concentrate 5 milliGRAM(s) Oral four times a day PRN dyspnea or pain                                                ------------------------------------------------------------  VITAL SIGNS: Last 24 Hours  ICU Vital Signs Last 24 Hrs  T(C): 36.7 (28 Jul 2021 13:30), Max: 36.7 (28 Jul 2021 13:30)  T(F): 98 (28 Jul 2021 13:30), Max: 98 (28 Jul 2021 13:30)  HR: 70 (28 Jul 2021 13:30) (68 - 74)  BP: 133/62 (28 Jul 2021 13:30) (133/62 - 194/86)  RR: 18 (28 Jul 2021 13:30) (18 - 22)  SpO2: 96% (27 Jul 2021 23:00) (96% - 98%)                                                 --------------------------------------------------------------  LABS:      LABS:  cret                        9.1    8.66  )-----------( 175      ( 26 Jul 2021 05:33 )             28.9     07-26    143  |  99  |  18  ----------------------------<  138<H>  3.8   |  35<H>  |  0.6<L>    Ca    8.9      26 Jul 2021 05:33                                              -------------------------------------------------------------  RADIOLOGY:  < from: Xray Chest 1 View-PORTABLE IMMEDIATE (07.23.21 @ 13:04) >  Stable postoperative appearance of the left lung.    < from: Xray Chest 1 View AP/PA (07.07.21 @ 23:48) >  Postoperative change of the left hemithorax.    Without difference.                                              --------------------------------------------------------------    PHYSICAL EXAM:  General: Alert, Awake, Oriented, on nasal canula 3L O2  HEENT: PERRLA, No scleral icterus   LUNGS: + wheezes with rhonchi on exam, increased respirations  HEART: RRR, no murmur  ABDOMEN: Soft, Nontender   EXT: Weak Pulses in Dorsalis Pedis Bilaterally   NEURO: CN II-XII intact  SKIN: No visible lesions                                            --------------------------------------------------------------    ASSESSMENT & PLAN  76 yo woman with PMH of Endstage COPD on 3L home O2 with multiple admissions for COPD exacerbations, suicide attempt on last admission, 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 She was admitted to medicine and started on azithromycin and prednisone 40mg.    1) End stage COPD on 3L O2: in exacerbation due to medication non-compliance  Lung CA s/p CT/RT and partial left lobectomy (in remission)  -continue current management for COPD exacerbation   - Prednisone 40mg starting 7/25 today is day 4/5 before taper   - nebs, spiriva, symbicort, zpack  -Discussed the necessity of medication compliance  -Out patient pulm follow up   -Well known to service Palliative Care    Anxiety / Depression:   Hx of intentional benzodiazepine overdose (suicide attempt)  -Currently denies suicidal ideation  -Cont with Sertraline 25mg and Hydroxyzine 25mg q12     Hyperglycemia: mild, steroid induced  -Trend FSG, if persistently >200 then may need metformin or insulin     Chronic atrial fibrillation w/ micra PPM: cont with eliquis  -Rate controlled without anti-arrhythmics     Anemia of chronic disease: at baseline   HLD: cont with lipitor, takes crestor 5mg at home     DNR/DNI  Dash diet  Eliquis for DVT prophylaxis  Disposition: home with services vs facility pending level 2 screen

## 2021-07-28 NOTE — DISCHARGE NOTE PROVIDER - CARE PROVIDERS DIRECT ADDRESSES
[email or web address]

## 2021-07-28 NOTE — DISCHARGE NOTE PROVIDER - NSDCMRMEDTOKEN_GEN_ALL_CORE_FT
albuterol 2.5 mg/3 mL (0.083%) inhalation solution: 2.5 milligram(s) by nebulizer every 6 hours, As Needed or until otherwise instructed by your provider  apixaban 5 mg oral tablet: 1 tab(s) orally every 12 hours x 14 days or until otherwise instructed by your provider  atorvastatin 20 mg oral tablet: 1 tab(s) orally once a day (at bedtime) x 14 days or until otherwise instructed by your provider  hydrOXYzine hydrochloride 25 mg oral tablet: 1 tab(s) orally, as needed for anxiety, every 6 hours x 14 days, or until otherwise instructed by your provider  pantoprazole 40 mg oral delayed release tablet: 1 tab(s) orally once a day (before a meal) x 14 days or until otherwise instructed by your provider  senna oral tablet: 2 tab(s) orally once a day (at bedtime) or until otherwise instructed by your provider  sertraline 25 mg oral tablet: 1 tab(s) orally once a day x 14 days or until otherwise instructed by your provider   tiotropium 18 mcg inhalation capsule: 1 cap(s) inhaled once x 14 days, As Needed -for shortness of breath and/or wheezing  or until otherwise instructed by your provider   albuterol 2.5 mg/3 mL (0.083%) inhalation solution: 2.5 milligram(s) by nebulizer every 6 hours, As Needed or until otherwise instructed by your provider  apixaban 5 mg oral tablet: 1 tab(s) orally every 12 hours x 14 days or until otherwise instructed by your provider  atorvastatin 20 mg oral tablet: 1 tab(s) orally once a day (at bedtime) x 14 days or until otherwise instructed by your provider  hydrOXYzine hydrochloride 25 mg oral tablet: 1 tab(s) orally, as needed for anxiety, every 6 hours   morphine 20 mg/mL oral concentrate: 0.25 milliliter(s) orally 4 times a day, As needed, dyspnea or pain  pantoprazole 40 mg oral delayed release tablet: 1 tab(s) orally once a day (before a meal) x 14 days or until otherwise instructed by your provider  predniSONE 10 mg oral tablet: 1 tab(s) orally once a day until 8/9  senna oral tablet: 2 tab(s) orally once a day (at bedtime) or until otherwise instructed by your provider  sertraline 25 mg oral tablet: 1 tab(s) orally once a day x 14 days or until otherwise instructed by your provider   tiotropium 18 mcg inhalation capsule: 1 cap(s) inhaled once x 14 days, As Needed -for shortness of breath and/or wheezing  or until otherwise instructed by your provider

## 2021-07-28 NOTE — DISCHARGE NOTE PROVIDER - CARE PROVIDER_API CALL
Ankush Perez  CRITICAL CARE MEDICINE  [address] [place] [address], [facility] [address]  [place] [address]  Phone: [phone]  Fax: [phone]  Follow Up Time: 2 weeks

## 2021-07-29 NOTE — PROGRESS NOTE ADULT - ASSESSMENT
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     1. Acute on chronic resp failure/ COPD exacerbation in pt with COPD on home O2 and possibly steroid dependent  - continue prednison taper  - nebs, spiriva, symbicort, zithromax x 5 days  - respiratory to reinforce inhaler technique  - GI/DVT prophylaxis  - PT consult for weakness  - DNR/DNI status  - was on hospice previously but as per hospice, pt is not safe at home.    2. Leukocytosis due to steroids    3. Paroxysmal Afib s/p ablation and Micra placement  - on apixaban    4. H/O anxiety and depression and suicide attempt - on sertraline, anxiolytics.    5. Hyperglycemia likely due to steroids - carb consistent diet and start insulin to keep FS <180  consider metformin on discharge    6. Hypomagnesemia - repleted    7. Elevated BP - no h/o HTN - may be related to steroid use and anxiety  - can start amlodipine 5mg daily if SBP persistently >160    #Progress Note Handoff  Pending: SNF auth___Level 2 Screen  Pt/Family discussion: Pt informed and agrees with the current plan  Disposition: Home______/SNF____x___    My note supersedes the residents note should a discrepancy arise.    Chart and consultant notes personally reviewed.  Care Discussed with Consultants/Other Providers/ Housestaff [ x] YES [ ] NO   Radiology, labs, old records personally reviewed.    d/w Housestaff, nursing, case mgmt

## 2021-07-29 NOTE — PROGRESS NOTE ADULT - SUBJECTIVE AND OBJECTIVE BOX
CASEY POWERS  75y Female    INTERVAL HPI/OVERNIGHT EVENTS:    Reports no change in her breathing. 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  She noted decreased ambulation. She states that since  discharged for the last 5 days she has steadly worsening dyspnea.  Patient non complaint with medications. Patient was  admitted to [facility] on last admission and started on sertraline 25mg on discharge.  At bedside patient sating 98% on 2L NC  92% on RA but showing signs of dyspnea. patient  notes  hemoptysis small amount of during coughing  up phelm mild amounts.  (23 Jul 2021 16:43)    PAST MEDICAL & SURGICAL HISTORY:  High cholesterol    Anxiety    Pacemaker    Lung cancer    Afib    COPD (chronic obstructive pulmonary disease)    Artificial cardiac pacemaker    H/O atrioventricular kacie ablation    S/P lobectomy of lung  left    History of tonsillectomy    S/P appendectomy    Cataract  RIGHT  6/17 AND  LEFT  7/5/19        General: NAD, AAO3, anxious, pursed breathing  HEENT:  EOMI, no LAD  CV: S1 S2  Resp: decreased breath sounds b/l  GI: NT/ND/S +BS  MS: no clubbing/cyanosis/edema, + pulses b/l  Neuro: BOONE, +reflexes thruout            MEDICATIONS  (STANDING):  ALBUTerol    90 MICROgram(s) HFA Inhaler 1 Puff(s) Inhalation every 4 hours  apixaban 5 milliGRAM(s) Oral two times a day  atorvastatin 20 milliGRAM(s) Oral at bedtime  azithromycin   Tablet 250 milliGRAM(s) Oral daily  budesonide 160 MICROgram(s)/formoterol 4.5 MICROgram(s) Inhaler - Peds 2 Puff(s) Inhalation two times a day  chlorhexidine 4% Liquid 1 Application(s) Topical <User Schedule>  insulin glargine Injectable (LANTUS) 9 Unit(s) SubCutaneous at bedtime  insulin lispro (ADMELOG) corrective regimen sliding scale   SubCutaneous three times a day before meals  insulin lispro Injectable (ADMELOG) 3 Unit(s) SubCutaneous three times a day before meals  pantoprazole    Tablet 40 milliGRAM(s) Oral before breakfast  predniSONE   Tablet 40 milliGRAM(s) Oral daily  senna 2 Tablet(s) Oral at bedtime  sertraline 25 milliGRAM(s) Oral daily  tiotropium 18 MICROgram(s) Capsule 1 Capsule(s) Inhalation daily    MEDICATIONS  (PRN):  ALBUTerol    0.083% 2.5 milliGRAM(s) Nebulizer every 4 hours PRN Shortness of Breath and/or Wheezing  hydrOXYzine  Oral Tab/Cap - Peds 25 milliGRAM(s) Oral every 6 hours PRN anxiety/insomnia  morphine Concentrate 5 milliGRAM(s) Oral four times a day PRN dyspnea or pain    Home Medications:  senna oral tablet: 2 tab(s) orally once a day (at bedtime) or until otherwise instructed by your provider (23 Jul 2021 16:54)    Vital Signs Last 24 Hrs  T(C): 36.6 (29 Jul 2021 05:22), Max: 36.7 (28 Jul 2021 13:30)  T(F): 97.9 (29 Jul 2021 05:22), Max: 98 (28 Jul 2021 13:30)  HR: 70 (29 Jul 2021 05:22) (70 - 70)  BP: 154/70 (29 Jul 2021 05:22) (133/62 - 168/70)  BP(mean): --  RR: 22 (29 Jul 2021 05:22) (18 - 22)  SpO2: 100% (28 Jul 2021 20:40) (100% - 100%)  CAPILLARY BLOOD GLUCOSE      POCT Blood Glucose.: 268 mg/dL (29 Jul 2021 11:07)  POCT Blood Glucose.: 159 mg/dL (29 Jul 2021 07:25)  POCT Blood Glucose.: 89 mg/dL (28 Jul 2021 20:41)  POCT Blood Glucose.: 59 mg/dL (28 Jul 2021 20:28)  POCT Blood Glucose.: 195 mg/dL (28 Jul 2021 16:25)  POCT Blood Glucose.: 163 mg/dL (28 Jul 2021 11:41)    LABS:                            Consultant Notes Reviewed:  [x ] YES  [ ] NO  Care Discussed with Consultants/Other Providers/ Housestaff [ x] YES  [ ] NO  Radiology, labs, new studies personally reviewed.

## 2021-07-29 NOTE — PROGRESS NOTE ADULT - SUBJECTIVE AND OBJECTIVE BOX
CASEY POWERS 75y Female  MRN#: [id number]   CODE STATUS:DNI/DNR    Hospital Day: 6d    Pt is currently admitted with the primary diagnosis of COPD exacerbation     SUBJECTIVE  Hospital Course:   76 yo F admitted for COPD exacerbation, currently on 2L O2  + weakness and difficulty ambulating  no fever, cough, chest pain, SOB, N/V, abdominal pain    Overnight events: None     Subjective complaints: Patient wants to go home    Present Today:   - Thibodeaux:  No [x], Yes [   ] : Indication:     - Type of IV Access:       .. CVC/Piccline:  No [x], Yes [   ] : Indication:       .. Midline: No [x], Yes [   ] : Indication:                                             ----------------------------------------------------------  OBJECTIVE  PAST MEDICAL & SURGICAL HISTORY  High cholesterol    Anxiety    Pacemaker    Lung cancer    Afib    COPD (chronic obstructive pulmonary disease)    Artificial cardiac pacemaker    H/O atrioventricular kacie ablation    S/P lobectomy of lung  left    History of tonsillectomy    S/P appendectomy    Cataract  RIGHT  6/17 AND  LEFT  7/5/19                                              -----------------------------------------------------------  ALLERGIES:  bacitracin (Other)  carboplatin (Other)  sulfa drugs (Unknown)  sulfonamides (Unknown)  Xanax (Other)                                            ------------------------------------------------------------    HOME MEDICATIONS  Home Medications:  senna oral tablet: 2 tab(s) orally once a day (at bedtime) or until otherwise instructed by your provider (23 Jul 2021 16:54)                           MEDICATIONS:  MEDICATIONS  (STANDING):  ALBUTerol    90 MICROgram(s) HFA Inhaler 1 Puff(s) Inhalation every 4 hours  apixaban 5 milliGRAM(s) Oral two times a day  atorvastatin 20 milliGRAM(s) Oral at bedtime  azithromycin   Tablet 250 milliGRAM(s) Oral daily  budesonide 160 MICROgram(s)/formoterol 4.5 MICROgram(s) Inhaler - Peds 2 Puff(s) Inhalation two times a day  chlorhexidine 4% Liquid 1 Application(s) Topical <User Schedule>  insulin glargine Injectable (LANTUS) 9 Unit(s) SubCutaneous at bedtime  insulin lispro (ADMELOG) corrective regimen sliding scale   SubCutaneous three times a day before meals  insulin lispro Injectable (ADMELOG) 3 Unit(s) SubCutaneous three times a day before meals  pantoprazole    Tablet 40 milliGRAM(s) Oral before breakfast  predniSONE   Tablet 40 milliGRAM(s) Oral daily  senna 2 Tablet(s) Oral at bedtime  sertraline 25 milliGRAM(s) Oral daily  tiotropium 18 MICROgram(s) Capsule 1 Capsule(s) Inhalation daily    MEDICATIONS  (PRN):  ALBUTerol    0.083% 2.5 milliGRAM(s) Nebulizer every 4 hours PRN Shortness of Breath and/or Wheezing  hydrOXYzine  Oral Tab/Cap - Peds 25 milliGRAM(s) Oral every 6 hours PRN anxiety/insomnia  morphine Concentrate 5 milliGRAM(s) Oral four times a day PRN dyspnea or pain                                                ------------------------------------------------------------  VITAL SIGNS: Last 24 Hours  Vital Signs Last 24 Hrs  T(C): 36.6 (29 Jul 2021 05:22), Max: 36.7 (28 Jul 2021 13:30)  T(F): 97.9 (29 Jul 2021 05:22), Max: 98 (28 Jul 2021 13:30)  HR: 70 (29 Jul 2021 05:22) (70 - 70)  BP: 154/70 (29 Jul 2021 05:22) (133/62 - 168/70)  RR: 22 (29 Jul 2021 05:22) (18 - 22)  SpO2: 100% (28 Jul 2021 20:40) (100% - 100%)                                                 --------------------------------------------------------------  LABS:      LABS:  cret                        9.1    8.66  )-----------( 175      ( 26 Jul 2021 05:33 )             28.9     07-26    143  |  99  |  18  ----------------------------<  138<H>  3.8   |  35<H>  |  0.6<L>    Ca    8.9      26 Jul 2021 05:33                                              -------------------------------------------------------------  RADIOLOGY:  < from: Xray Chest 1 View-PORTABLE IMMEDIATE (07.23.21 @ 13:04) >  Stable postoperative appearance of the left lung.    < from: Xray Chest 1 View AP/PA (07.07.21 @ 23:48) >  Postoperative change of the left hemithorax.    Without difference.                                              --------------------------------------------------------------    PHYSICAL EXAM:  General: Alert, Awake, Oriented, on nasal canula 3L O2  HEENT: PERRLA, No scleral icterus   LUNGS: + wheezes with rhonchi on exam, increased respirations  HEART: RRR, no murmur  ABDOMEN: Soft, Nontender   EXT: Weak Pulses in Dorsalis Pedis Bilaterally   NEURO: CN II-XII intact  SKIN: No visible lesions                                            --------------------------------------------------------------    ASSESSMENT & PLAN  76 yo woman with PMH of Endstage COPD on 3L home O2 with multiple admissions for COPD exacerbations, suicide attempt on last admission, 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 She was admitted to medicine and started on azithromycin and prednisone 40mg.    1) End stage COPD on 3L O2: in exacerbation due to medication non-compliance  Lung CA s/p CT/RT and partial left lobectomy (in remission)  -continue current management for COPD exacerbation   - Prednisone 40mg starting 7/25 today is day 5/5 before taper   - nebs, spiriva, symbicort, zpack  -Discussed the necessity of medication compliance  -Out patient pulm follow up   -Well known to service Palliative Care    Anxiety / Depression:   Hx of intentional benzodiazepine overdose (suicide attempt)  -Currently denies suicidal ideation  -Cont with Sertraline 25mg and Hydroxyzine 25mg q12     Hyperglycemia: mild, steroid induced  -Trend FSG, if persistently >200 then may need metformin or insulin     Chronic atrial fibrillation w/ micra PPM: cont with eliquis  -Rate controlled without anti-arrhythmics     Anemia of chronic disease: at baseline   HLD: cont with lipitor, takes crestor 5mg at home     DNR/DNI  Dash diet  Eliquis for DVT prophylaxis  Disposition: SNF pending level 2 screen

## 2021-07-30 NOTE — PROGRESS NOTE ADULT - SUBJECTIVE AND OBJECTIVE BOX
CASEY POWERS  75y Female    INTERVAL HPI/OVERNIGHT EVENTS:    Reports no change in her breathing. 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  She noted decreased ambulation. She states that since  discharged for the last 5 days she has steadly worsening dyspnea.  Patient non complaint with medications. Patient was  admitted to P on last admission and started on sertraline 25mg on discharge.  At bedside patient sating 98% on 2L NC  92% on RA but showing signs of dyspnea. patient  notes  hemoptysis small amount of during coughing  up phelm mild amounts.  (23 Jul 2021 16:43)    PAST MEDICAL & SURGICAL HISTORY:  High cholesterol    Anxiety    Pacemaker    Lung cancer    Afib    COPD (chronic obstructive pulmonary disease)    Artificial cardiac pacemaker    H/O atrioventricular kacie ablation    S/P lobectomy of lung  left    History of tonsillectomy    S/P appendectomy    Cataract  RIGHT  6/17 AND  LEFT  7/5/19        General: NAD, AAOx2+, anxious, pursed breathing  HEENT:  EOMI, no LAD  CV: S1 S2  Resp: decreased breath sounds b/l  GI: NT/ND/S +BS  MS: no clubbing/cyanosis/edema, + pulses b/l  Neuro: BOONE, +reflexes thruout            MEDICATIONS  (STANDING):  ALBUTerol    90 MICROgram(s) HFA Inhaler 1 Puff(s) Inhalation every 4 hours  apixaban 5 milliGRAM(s) Oral two times a day  atorvastatin 20 milliGRAM(s) Oral at bedtime  azithromycin   Tablet 250 milliGRAM(s) Oral daily  budesonide 160 MICROgram(s)/formoterol 4.5 MICROgram(s) Inhaler - Peds 2 Puff(s) Inhalation two times a day  chlorhexidine 4% Liquid 1 Application(s) Topical <User Schedule>  insulin glargine Injectable (LANTUS) 9 Unit(s) SubCutaneous at bedtime  insulin lispro (ADMELOG) corrective regimen sliding scale   SubCutaneous three times a day before meals  insulin lispro Injectable (ADMELOG) 3 Unit(s) SubCutaneous three times a day before meals  pantoprazole    Tablet 40 milliGRAM(s) Oral before breakfast  predniSONE   Tablet 20 milliGRAM(s) Oral daily  senna 2 Tablet(s) Oral at bedtime  sertraline 25 milliGRAM(s) Oral daily  tiotropium 18 MICROgram(s) Capsule 1 Capsule(s) Inhalation daily    MEDICATIONS  (PRN):  ALBUTerol    0.083% 2.5 milliGRAM(s) Nebulizer every 4 hours PRN Shortness of Breath and/or Wheezing  hydrOXYzine  Oral Tab/Cap - Peds 25 milliGRAM(s) Oral every 6 hours PRN anxiety/insomnia  morphine Concentrate 5 milliGRAM(s) Oral four times a day PRN dyspnea or pain    Home Medications:  senna oral tablet: 2 tab(s) orally once a day (at bedtime) or until otherwise instructed by your provider (23 Jul 2021 16:54)    Vital Signs Last 24 Hrs  T(C): 36.5 (30 Jul 2021 06:53), Max: 36.5 (30 Jul 2021 06:53)  T(F): 97.7 (30 Jul 2021 06:53), Max: 97.7 (30 Jul 2021 06:53)  HR: 72 (30 Jul 2021 06:53) (69 - 72)  BP: 132/60 (30 Jul 2021 06:53) (132/60 - 185/81)  BP(mean): --  RR: 22 (30 Jul 2021 06:53) (21 - 22)  SpO2: --  CAPILLARY BLOOD GLUCOSE      POCT Blood Glucose.: 118 mg/dL (30 Jul 2021 07:23)  POCT Blood Glucose.: 105 mg/dL (29 Jul 2021 20:58)  POCT Blood Glucose.: 131 mg/dL (29 Jul 2021 16:24)    LABS:                            Consultant Notes Reviewed:  [x ] YES  [ ] NO  Care Discussed with Consultants/Other Providers/ Housestaff [ x] YES  [ ] NO  Radiology, labs, new studies personally reviewed.

## 2021-07-30 NOTE — PROGRESS NOTE ADULT - ASSESSMENT
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     1. Acute on chronic resp failure/ COPD exacerbation in pt with COPD on home O2 and possibly steroid dependent  - continue prednison taper  - nebs, spiriva, symbicort, zithromax x 5 days  - respiratory to reinforce inhaler technique  - GI/DVT prophylaxis  - PT consult for weakness  - DNR/DNI status  - was on hospice previously but as per hospice, pt is not safe at home.    2. Leukocytosis due to steroids    3. Paroxysmal Afib s/p ablation and Micra placement  - on apixaban    4. H/O anxiety and depression and suicide attempt - on sertraline, anxiolytics.    5. Hyperglycemia likely due to steroids - carb consistent diet and start insulin to keep FS <180  consider metformin on discharge    6. Hypomagnesemia - repleted    7. Elevated BP - no h/o HTN - may be related to steroid use and anxiety  - can start amlodipine 5mg daily if SBP persistently >160    Dispo: awaiting level 2 Screen. Pt likely to transition to hospice care when in nsg home.    #Progress Note Handoff  Pending: Level 2 Screen  Pt/Family discussion: Pt informed and agrees with the current plan  Disposition: Home______/SNF____x___    My note supersedes the residents note should a discrepancy arise.    Chart and consultant notes personally reviewed.  Care Discussed with Consultants/Other Providers/ Housestaff [ x] YES [ ] NO   Radiology, labs, old records personally reviewed.    d/w Housestaff, nursing, case mgmt

## 2021-07-30 NOTE — PROGRESS NOTE ADULT - SUBJECTIVE AND OBJECTIVE BOX
CASEY POWERS 75y Female  MRN#: [id number]   CODE STATUS:DNI/DNR    Hospital Day: 7d    Pt is currently admitted with the primary diagnosis of COPD exacerbation     SUBJECTIVE  Hospital Course:   76 yo F admitted for COPD exacerbation, currently on 2L O2  + weakness and difficulty ambulating  no fever, cough, chest pain, SOB, N/V, abdominal pain    Overnight events: None     Subjective complaints: Shortness of breath    Present Today:   - Thibodeaux:  No [x], Yes [   ] : Indication:     - Type of IV Access:       .. CVC/Piccline:  No [x], Yes [   ] : Indication:       .. Midline: No [x], Yes [   ] : Indication:                                             ----------------------------------------------------------  OBJECTIVE  PAST MEDICAL & SURGICAL HISTORY  High cholesterol    Anxiety    Pacemaker    Lung cancer    Afib    COPD (chronic obstructive pulmonary disease)    Artificial cardiac pacemaker    H/O atrioventricular kacie ablation    S/P lobectomy of lung  left    History of tonsillectomy    S/P appendectomy    Cataract  RIGHT  6/17 AND  LEFT  7/5/19                                              -----------------------------------------------------------  ALLERGIES:  bacitracin (Other)  carboplatin (Other)  sulfa drugs (Unknown)  sulfonamides (Unknown)  Xanax (Other)                                            ------------------------------------------------------------    HOME MEDICATIONS  Home Medications:  senna oral tablet: 2 tab(s) orally once a day (at bedtime) or until otherwise instructed by your provider (23 Jul 2021 16:54)                           MEDICATIONS:  MEDICATIONS  (STANDING):  ALBUTerol    90 MICROgram(s) HFA Inhaler 1 Puff(s) Inhalation every 4 hours  apixaban 5 milliGRAM(s) Oral two times a day  atorvastatin 20 milliGRAM(s) Oral at bedtime  azithromycin   Tablet 250 milliGRAM(s) Oral daily  budesonide 160 MICROgram(s)/formoterol 4.5 MICROgram(s) Inhaler - Peds 2 Puff(s) Inhalation two times a day  chlorhexidine 4% Liquid 1 Application(s) Topical <User Schedule>  insulin glargine Injectable (LANTUS) 9 Unit(s) SubCutaneous at bedtime  insulin lispro (ADMELOG) corrective regimen sliding scale   SubCutaneous three times a day before meals  insulin lispro Injectable (ADMELOG) 3 Unit(s) SubCutaneous three times a day before meals  pantoprazole    Tablet 40 milliGRAM(s) Oral before breakfast  predniSONE   Tablet 40 milliGRAM(s) Oral daily  senna 2 Tablet(s) Oral at bedtime  sertraline 25 milliGRAM(s) Oral daily  tiotropium 18 MICROgram(s) Capsule 1 Capsule(s) Inhalation daily    MEDICATIONS  (PRN):  ALBUTerol    0.083% 2.5 milliGRAM(s) Nebulizer every 4 hours PRN Shortness of Breath and/or Wheezing  hydrOXYzine  Oral Tab/Cap - Peds 25 milliGRAM(s) Oral every 6 hours PRN anxiety/insomnia  morphine Concentrate 5 milliGRAM(s) Oral four times a day PRN dyspnea or pain                                                ------------------------------------------------------------  VITAL SIGNS: Last 24 Hours  Vital Signs Last 24 Hrs  T(C): 36.5 (30 Jul 2021 06:53), Max: 36.5 (30 Jul 2021 06:53)  T(F): 97.7 (30 Jul 2021 06:53), Max: 97.7 (30 Jul 2021 06:53)  HR: 72 (30 Jul 2021 06:53) (69 - 72)  BP: 132/60 (30 Jul 2021 06:53) (132/60 - 185/81)  RR: 22 (30 Jul 2021 06:53) (21 - 22)                                                 --------------------------------------------------------------  LABS:      LABS:  cret                        9.1    8.66  )-----------( 175      ( 26 Jul 2021 05:33 )             28.9     07-26    143  |  99  |  18  ----------------------------<  138<H>  3.8   |  35<H>  |  0.6<L>    Ca    8.9      26 Jul 2021 05:33                                              -------------------------------------------------------------  RADIOLOGY:  < from: Xray Chest 1 View-PORTABLE IMMEDIATE (07.23.21 @ 13:04) >  Stable postoperative appearance of the left lung.    < from: Xray Chest 1 View AP/PA (07.07.21 @ 23:48) >  Postoperative change of the left hemithorax.    Without difference.                                              --------------------------------------------------------------    PHYSICAL EXAM:  General: Patient is seen lying in bed, appears visible agitated  HEENT: PERRLA, No scleral icterus   LUNGS: + wheezes with rhonchi on exam, increased respirations  HEART: RRR, no murmur  ABDOMEN: Soft, Nontender   EXT: Weak Pulses in Dorsalis Pedis Bilaterally   NEURO: CN II-XII intact  SKIN: No visible lesions                                            --------------------------------------------------------------    ASSESSMENT & PLAN  76 yo woman with PMH of Endstage COPD on 3L home O2 with multiple admissions for COPD exacerbations, suicide attempt on last admission, 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 She was admitted to medicine and started on azithromycin and prednisone 40mg.    1) End stage COPD on 3L O2: in exacerbation due to medication non-compliance  Lung CA s/p CT/RT and partial left lobectomy (in remission)  -continue current management for COPD exacerbation   - Prednisone 40mg starting 7/25 7/29 was day 5/5, 7/30/21 first day of taper   - nebs, spiriva, symbicort, zpack  -Discussed the necessity of medication compliance  -Out patient pulm follow up   -Well known to service Palliative Care  - Level 2 screen will not be finished until August 4th    Anxiety / Depression:   Hx of intentional benzodiazepine overdose (suicide attempt)  -Currently denies suicidal ideation  -Cont with Sertraline 25mg and Hydroxyzine 25mg q12     Hyperglycemia: mild, steroid induced  -Trend FSG, if persistently >200 then may need metformin or insulin     Chronic atrial fibrillation w/ micra PPM: cont with eliquis  -Rate controlled without anti-arrhythmics     Anemia of chronic disease: at baseline   HLD: cont with lipitor, takes crestor 5mg at home     DNR/DNI  Dash diet  Eliquis for DVT prophylaxis  Disposition: SNF pending level 2 screen

## 2021-07-31 NOTE — PROGRESS NOTE ADULT - SUBJECTIVE AND OBJECTIVE BOX
CASEY POWERS 75y Female  MRN#: [id number]   CODE STATUS:DNI/DNR    Hospital Day: 8d    Pt is currently admitted with the primary diagnosis of COPD exacerbation     SUBJECTIVE  Hospital Course:   76 yo F admitted for COPD exacerbation  + weakness and difficulty ambulating  no fever, cough, chest pain, N/V, abdominal pain    Overnight events: None     Subjective complaints: Shortness of breath    Present Today:   - Thibodeaux:  No [x], Yes [   ] : Indication:     - Type of IV Access:       .. CVC/Piccline:  No [x], Yes [   ] : Indication:       .. Midline: No [x], Yes [   ] : Indication:                                             ----------------------------------------------------------  OBJECTIVE  PAST MEDICAL & SURGICAL HISTORY  High cholesterol    Anxiety    Pacemaker    Lung cancer    Afib    COPD (chronic obstructive pulmonary disease)    Artificial cardiac pacemaker    H/O atrioventricular kacie ablation    S/P lobectomy of lung  left    History of tonsillectomy    S/P appendectomy    Cataract  RIGHT  6/17 AND  LEFT  7/5/19                                              -----------------------------------------------------------  ALLERGIES:  bacitracin (Other)  carboplatin (Other)  sulfa drugs (Unknown)  sulfonamides (Unknown)  Xanax (Other)                                            ------------------------------------------------------------    HOME MEDICATIONS  Home Medications:  senna oral tablet: 2 tab(s) orally once a day (at bedtime) or until otherwise instructed by your provider (23 Jul 2021 16:54)                           MEDICATIONS:  MEDICATIONS  (STANDING):  ALBUTerol    90 MICROgram(s) HFA Inhaler 1 Puff(s) Inhalation every 4 hours  apixaban 5 milliGRAM(s) Oral two times a day  atorvastatin 20 milliGRAM(s) Oral at bedtime  azithromycin   Tablet 250 milliGRAM(s) Oral daily  budesonide 160 MICROgram(s)/formoterol 4.5 MICROgram(s) Inhaler - Peds 2 Puff(s) Inhalation two times a day  chlorhexidine 4% Liquid 1 Application(s) Topical <User Schedule>  insulin glargine Injectable (LANTUS) 9 Unit(s) SubCutaneous at bedtime  insulin lispro (ADMELOG) corrective regimen sliding scale   SubCutaneous three times a day before meals  insulin lispro Injectable (ADMELOG) 3 Unit(s) SubCutaneous three times a day before meals  pantoprazole    Tablet 40 milliGRAM(s) Oral before breakfast  predniSONE   Tablet 40 milliGRAM(s) Oral daily  senna 2 Tablet(s) Oral at bedtime  sertraline 25 milliGRAM(s) Oral daily  tiotropium 18 MICROgram(s) Capsule 1 Capsule(s) Inhalation daily    MEDICATIONS  (PRN):  ALBUTerol    0.083% 2.5 milliGRAM(s) Nebulizer every 4 hours PRN Shortness of Breath and/or Wheezing  hydrOXYzine  Oral Tab/Cap - Peds 25 milliGRAM(s) Oral every 6 hours PRN anxiety/insomnia  morphine Concentrate 5 milliGRAM(s) Oral four times a day PRN dyspnea or pain                                                ------------------------------------------------------------  VITAL SIGNS: Last 24 Hours  Vital Signs Last 24 Hrs  T(C): 36 (31 Jul 2021 05:06), Max: 37 (30 Jul 2021 13:30)  T(F): 96.8 (31 Jul 2021 05:06), Max: 98.6 (30 Jul 2021 13:30)  HR: 71 (31 Jul 2021 05:06) (71 - 72)  BP: 151/70 (31 Jul 2021 05:06) (134/63 - 152/67)  BP(mean): 100 (31 Jul 2021 05:06) (89 - 100)  RR: 20 (31 Jul 2021 05:06) (20 - 22)                                               --------------------------------------------------------------  LABS:    LABS:  cret                        9.1    8.66  )-----------( 175      ( 26 Jul 2021 05:33 )             28.9     07-26    143  |  99  |  18  ----------------------------<  138<H>  3.8   |  35<H>  |  0.6<L>    Ca    8.9      26 Jul 2021 05:33                                              -------------------------------------------------------------  RADIOLOGY:  < from: Xray Chest 1 View-PORTABLE IMMEDIATE (07.23.21 @ 13:04) >  Stable postoperative appearance of the left lung.    < from: Xray Chest 1 View AP/PA (07.07.21 @ 23:48) >  Postoperative change of the left hemithorax.    Without difference.                                              --------------------------------------------------------------    PHYSICAL EXAM:  General: Patient is seen lying in bed, appears visible agitated  HEENT: PERRLA, No scleral icterus   LUNGS: + wheezes with rhonchi on exam, increased respirations  HEART: RRR, no murmur  ABDOMEN: Soft, Nontender   EXT: Weak Pulses in Dorsalis Pedis Bilaterally   NEURO: CN II-XII intact  SKIN: No visible lesions                                            --------------------------------------------------------------    ASSESSMENT & PLAN  76 yo woman with PMH of Endstage COPD on 3L home O2 with multiple admissions for COPD exacerbations, suicide attempt on last admission, 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 She was admitted to medicine and started on azithromycin and prednisone 40mg.    1) End stage COPD on 3L O2: in exacerbation due to medication non-compliance  Lung CA s/p CT/RT and partial left lobectomy (in remission)  -continue current management for COPD exacerbation   - Prednisone 5 day course of 40mg complete, now on taper, 7/30/21 first day of taper   - nebs, spiriva, symbicort, zpack  -Discussed the necessity of medication compliance  -Out patient pulm follow up   -Well known to service Palliative Care  - Level 2 screen will not be finished until August 4th    Anxiety / Depression:   Hx of intentional benzodiazepine overdose (suicide attempt)  -Currently denies suicidal ideation  -Cont with Sertraline 25mg and Hydroxyzine 25mg q12     Hyperglycemia: mild, steroid induced  -Trend FSG, if persistently >200 then may need metformin or insulin     Chronic atrial fibrillation w/ micra PPM: cont with eliquis  -Rate controlled without anti-arrhythmics     Anemia of chronic disease: at baseline   HLD: cont with lipitor, takes crestor 5mg at home     DNR/DNI  Dash diet  Eliquis for DVT prophylaxis  Disposition: SNF pending level 2 screen will be completed August 4th

## 2021-07-31 NOTE — PROGRESS NOTE ADULT - ATTENDING COMMENTS
patient seen and examined earlier today on rounds with resident and nurse.  patient is not oriented x 3.  at first was saying she wanted hospice, then later had changed her mind.  denies SOB or chest pain.  Vital Signs Last 24 Hrs  T(C): 36.7 (28 Jul 2021 13:30), Max: 36.7 (28 Jul 2021 13:30)  T(F): 98 (28 Jul 2021 13:30), Max: 98 (28 Jul 2021 13:30)  HR: 70 (28 Jul 2021 13:30) (68 - 74)  BP: 133/62 (28 Jul 2021 13:30) (133/62 - 194/86)  RR: 18 (28 Jul 2021 13:30) (18 - 22)  SpO2: 96% (27 Jul 2021 23:00) (96% - 98%)  conj pale, no jaundice  pos. pursed lip breathing.  pos. using accessory muscles.  neck supple  lungs decreased BS bilaterally.  increased AP diameter.  no wheezing heard.  heart sounds distant.  regular rate and rhythm.  abd. soft nontender. BS pos.  extremities no edema. no cyanosis noted.    CAPILLARY BLOOD GLUCOSE      POCT Blood Glucose.: 163 mg/dL (28 Jul 2021 11:41)  POCT Blood Glucose.: 67 mg/dL (28 Jul 2021 11:17)  POCT Blood Glucose.: 83 mg/dL (28 Jul 2021 07:15)  POCT Blood Glucose.: 80 mg/dL (28 Jul 2021 00:19)  POCT Blood Glucose.: 64 mg/dL (27 Jul 2021 20:44)  POCT Blood Glucose.: 125 mg/dL (27 Jul 2021 16:23)    A: Severe COPD with multiple admissions.  patient had been on hospice at home but brought to hospital, unable to manage at home.and came with COPD exacerbation.  patient and family at this point have decided rather than home hospice she will need SNF.    P:  patient medically stable for DC.  patient awaiting insurance authorization.  continue prednisone/tioptropium/azithromycin/rescue inhalers/oxygen
Patient agitated and confused this AM - per team, this is her neuro / psych baseline.  Speaking rapidly with vitals stable and chest exam showing mild exp wheezing.  To continue tx for acute on chronic copd exacerbation - meds as ordered.  Currently she is awaiting for level 2 screening for NH placement.

## 2021-08-01 NOTE — PROGRESS NOTE ADULT - ASSESSMENT
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 She was admitted to medicine and started on azithromycin and prednisone 40mg."  Prior hx of intentional benzodiazepine OD as suicidal  attempt with hx of anxiety and depression.  To continue Sertraline and Hydroxyzine and to titrate dose prn.  Currently improved and plan is for placement pending level 2 clearance.  To continue txs and monitoring as ordered.

## 2021-08-01 NOTE — PROGRESS NOTE ADULT - SUBJECTIVE AND OBJECTIVE BOX
RADUSAI MENDOZAA  75y, Female  Allergy: bacitracin (Other)  carboplatin (Other)  sulfa drugs (Unknown)  sulfonamides (Unknown)  Xanax (Other)      OVERNIGHT EVENTS:  None noted.  Pt gets agitated and anxious when examined.    PAST MEDICAL & SURGICAL HISTORY:  High cholesterol  Anxiety  Pacemaker  Lung cancer  Afib  COPD (chronic obstructive pulmonary disease)  Artificial cardiac pacemaker  H/O atrioventricular kacie ablation  S/P lobectomy of lung left  History of tonsillectomy  S/P appendectomy  Cataract RIGHT  6/17 AND  LEFT  7/5/19    VITALS:  T(F): 97.6 (08-01-21 @ 14:06), Max: 97.7 (07-31-21 @ 21:10)  HR: 71 (08-01-21 @ 14:06)  BP: 145/65 (08-01-21 @ 14:06) (142/78 - 181/88)  RR: 20 (08-01-21 @ 14:06)  SpO2: 97% (08-01-21 @ 14:06)    Chest scattered wheezing - mild.  Cor irreg s1S2.  Abd soft and NT.    MEDICATIONS:  MEDICATIONS  (STANDING):  ALBUTerol    90 MICROgram(s) HFA Inhaler 1 Puff(s) Inhalation every 4 hours  apixaban 5 milliGRAM(s) Oral two times a day  atorvastatin 20 milliGRAM(s) Oral at bedtime  azithromycin   Tablet 250 milliGRAM(s) Oral daily  budesonide 160 MICROgram(s)/formoterol 4.5 MICROgram(s) Inhaler - Peds 2 Puff(s) Inhalation two times a day  chlorhexidine 4% Liquid 1 Application(s) Topical <User Schedule>  insulin glargine Injectable (LANTUS) 9 Unit(s) SubCutaneous at bedtime  insulin lispro (ADMELOG) corrective regimen sliding scale   SubCutaneous three times a day before meals  insulin lispro Injectable (ADMELOG) 3 Unit(s) SubCutaneous three times a day before meals  pantoprazole    Tablet 40 milliGRAM(s) Oral before breakfast  predniSONE   Tablet 20 milliGRAM(s) Oral daily  senna 2 Tablet(s) Oral at bedtime  sertraline 25 milliGRAM(s) Oral daily  tiotropium 18 MICROgram(s) Capsule 1 Capsule(s) Inhalation daily    MEDICATIONS  (PRN):  ALBUTerol    0.083% 2.5 milliGRAM(s) Nebulizer every 4 hours PRN Shortness of Breath and/or Wheezing  hydrOXYzine  Oral Tab/Cap - Peds 25 milliGRAM(s) Oral every 6 hours PRN anxiety/insomnia  morphine Concentrate 5 milliGRAM(s) Oral four times a day PRN dyspnea or pain

## 2021-08-02 NOTE — PROGRESS NOTE ADULT - ASSESSMENT
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     1. Acute on chronic resp failure/ COPD exacerbation in pt with COPD on home O2 and possibly steroid dependent  - continue prednison taper  - nebs, spiriva, symbicort, zithromax x 5 days  - respiratory to reinforce inhaler technique  - GI/DVT prophylaxis  - PT consult for weakness  - DNR/DNI status  - was on hospice previously but as per hospice, pt is not safe at home. Will be transitioned to hospice and nsg home    2. Leukocytosis due to steroids    3. Paroxysmal Afib s/p ablation and Micra placement  - on apixaban    4. H/O anxiety and depression and suicide attempt - on sertraline, anxiolytics.    5. Hyperglycemia likely due to steroids - carb consistent diet and start insulin to keep FS <180  consider metformin on discharge    6. Hypomagnesemia - repleted    7. Elevated BP - no h/o HTN - may be related to steroid use and anxiety  - can start amlodipine 5mg daily if SBP persistently >160    Very poor Px.    #Progress Note Handoff  Pending: SNF auth___Level 2 Screen  Pt/Family discussion: Pt informed and agrees with the current plan  Disposition: Home______/SNF____x___    My note supersedes the residents note should a discrepancy arise.    Chart and consultant notes personally reviewed.  Care Discussed with Consultants/Other Providers/ Housestaff [ x] YES [ ] NO   Radiology, labs, old records personally reviewed.    d/w Housestaff, nursing, case mgmt

## 2021-08-02 NOTE — PROGRESS NOTE ADULT - SUBJECTIVE AND OBJECTIVE BOX
CASEY POWERS 75y Female  MRN#: [id number]   CODE STATUS: DNR/DNI    Hospital Day: 10d    Pt is currently admitted with the primary diagnosis of shortness of breath    SUBJECTIVE  Hospital Course  76 yo F admitted for COPD exacerbation  + weakness and difficulty ambulating  no fever, cough, chest pain, N/V, abdominal pain  Patient is pending level 2 so she can go to SNF  Overnight events   None  Subjective complaints   None  Present Today:   - Thibodeaux:  No [ X ], Yes [   ] : Indication:     - Type of IV Access:       .. CVC/Piccline:  No [ X ], Yes [   ] : Indication:       .. Midline: No [ X ], Yes [   ] : Indication:                                             ----------------------------------------------------------  OBJECTIVE  PAST MEDICAL & SURGICAL HISTORY  High cholesterol    Anxiety    Pacemaker    Lung cancer    Afib    COPD (chronic obstructive pulmonary disease)    Artificial cardiac pacemaker    H/O atrioventricular kacie ablation    S/P lobectomy of lung  left    History of tonsillectomy    S/P appendectomy    Cataract  RIGHT  6/17 AND  LEFT  7/5/19                                              -----------------------------------------------------------  ALLERGIES:  bacitracin (Other)  carboplatin (Other)  sulfa drugs (Unknown)  sulfonamides (Unknown)  Xanax (Other)                                            ------------------------------------------------------------    HOME MEDICATIONS  Home Medications:  senna oral tablet: 2 tab(s) orally once a day (at bedtime) or until otherwise instructed by your provider (23 Jul 2021 16:54)                           MEDICATIONS:  STANDING MEDICATIONS  ALBUTerol    90 MICROgram(s) HFA Inhaler 1 Puff(s) Inhalation every 4 hours  apixaban 5 milliGRAM(s) Oral two times a day  atorvastatin 20 milliGRAM(s) Oral at bedtime  azithromycin   Tablet 250 milliGRAM(s) Oral daily  budesonide 160 MICROgram(s)/formoterol 4.5 MICROgram(s) Inhaler - Peds 2 Puff(s) Inhalation two times a day  chlorhexidine 4% Liquid 1 Application(s) Topical <User Schedule>  insulin lispro (ADMELOG) corrective regimen sliding scale   SubCutaneous three times a day before meals  pantoprazole    Tablet 40 milliGRAM(s) Oral before breakfast  senna 2 Tablet(s) Oral at bedtime  sertraline 25 milliGRAM(s) Oral daily  tiotropium 18 MICROgram(s) Capsule 1 Capsule(s) Inhalation daily    PRN MEDICATIONS  ALBUTerol    0.083% 2.5 milliGRAM(s) Nebulizer every 4 hours PRN  hydrOXYzine  Oral Tab/Cap - Peds 25 milliGRAM(s) Oral every 6 hours PRN  morphine Concentrate 5 milliGRAM(s) Oral four times a day PRN                                            ------------------------------------------------------------  VITAL SIGNS: Last 24 Hours  T(C): 35.6 (02 Aug 2021 05:30), Max: 36.4 (01 Aug 2021 14:06)  T(F): 96.1 (02 Aug 2021 05:30), Max: 97.6 (01 Aug 2021 14:06)  HR: 71 (02 Aug 2021 07:56) (70 - 71)  BP: 134/63 (02 Aug 2021 07:56) (134/63 - 171/70)  BP(mean): --  RR: 18 (02 Aug 2021 07:56) (18 - 22)  SpO2: 98% (02 Aug 2021 07:56) (97% - 100%)                                             --------------------------------------------------------------  LABS:                                                                    -------------------------------------------------------------  RADIOLOGY:  < from: Xray Chest 1 View-PORTABLE IMMEDIATE (07.23.21 @ 13:04) >  Stable postoperative appearance of the left lung.    < from: Xray Chest 1 View AP/PA (07.07.21 @ 23:48) >  Postoperative change of the left hemithorax.    Without difference.                                            --------------------------------------------------------------    PHYSICAL EXAM:  General: Patient is seen lying in hospital bed, no acute distress  HEENT: PERRLA, EOMI, no scleral icterus  LUNGS: Wheezing and rhonchi notable bilaterally  HEART: RRR, no murmur  ABDOMEN: Nontender  NEURO: CN II-XII intact, AAOx3  SKIN: No rashes                                           --------------------------------------------------------------    ASSESSMENT & PLAN    Past medical history and hospital course     76 yo woman with PMH of Endstage COPD on 3L home O2 with multiple admissions for COPD exacerbations, suicide attempt on last admission, 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 She was admitted to medicine and started on azithromycin and prednisone 40mg. Patient has been cleared medically for discharge, but is pending level 2 screen for prior history of suicide attempt.    1) End stage COPD on 3L O2: in exacerbation due to medication non-compliance  Lung CA s/p CT/RT and partial left lobectomy (in remission)  -continue current management for COPD exacerbation   - Prednisone 5 day course of 40mg complete, now on taper, 7/30/21 first day of taper   - nebs, spiriva, symbicort, zpack  -Discussed the necessity of medication compliance  -Out patient pulm follow up   -Well known to service Palliative Care  - Level 2 screen will not be finished until August 4th    Anxiety / Depression:   Hx of intentional benzodiazepine overdose (suicide attempt)  -Currently denies suicidal ideation  -Cont with Sertraline 25mg and Hydroxyzine 25mg q12     Hyperglycemia: mild, steroid induced  -Trend FSG, if persistently >200 then may need metformin or insulin     Chronic atrial fibrillation w/ micra PPM: cont with eliquis  -Rate controlled without anti-arrhythmics     Anemia of chronic disease: at baseline   HLD: cont with lipitor, takes crestor 5mg at home                                                                                 ----------------------------------------------------  # DVT prophylaxis Eliquis  # GI prophylaxis Protonix  # Diet DASH  # Code status DNR/DNI  # Disposition SNF pending level 2 screen will be completed August 4th                                                                           --------------------------------------------------------  # Handoff   pending level 2 screen

## 2021-08-02 NOTE — PROGRESS NOTE ADULT - SUBJECTIVE AND OBJECTIVE BOX
CASEY POWERS  75y Female    INTERVAL HPI/OVERNIGHT EVENTS:    Reports no change in her breathing. 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  She noted decreased ambulation. She states that since  discharged for the last 5 days she has steadly worsening dyspnea.  Patient non complaint with medications. Patient was  admitted to [facility] on last admission and started on sertraline 25mg on discharge.  At bedside patient sating 98% on 2L NC  92% on RA but showing signs of dyspnea. patient  notes  hemoptysis small amount of during coughing  up phelm mild amounts.  (23 Jul 2021 16:43)    PAST MEDICAL & SURGICAL HISTORY:  High cholesterol    Anxiety    Pacemaker    Lung cancer    Afib    COPD (chronic obstructive pulmonary disease)    Artificial cardiac pacemaker    H/O atrioventricular kacie ablation    S/P lobectomy of lung  left    History of tonsillectomy    S/P appendectomy    Cataract  RIGHT  6/17 AND  LEFT  7/5/19        General: NAD, AAO3, anxious, pursed breathing  HEENT:  EOMI, no LAD  CV: S1 S2  Resp: decreased breath sounds b/l  GI: NT/ND/S +BS  MS: no clubbing/cyanosis/edema, + pulses b/l  Neuro: BOONE, +reflexes thruout            MEDICATIONS  (STANDING):  ALBUTerol    90 MICROgram(s) HFA Inhaler 1 Puff(s) Inhalation every 4 hours  apixaban 5 milliGRAM(s) Oral two times a day  atorvastatin 20 milliGRAM(s) Oral at bedtime  azithromycin   Tablet 250 milliGRAM(s) Oral daily  budesonide 160 MICROgram(s)/formoterol 4.5 MICROgram(s) Inhaler - Peds 2 Puff(s) Inhalation two times a day  chlorhexidine 4% Liquid 1 Application(s) Topical <User Schedule>  insulin lispro (ADMELOG) corrective regimen sliding scale   SubCutaneous three times a day before meals  pantoprazole    Tablet 40 milliGRAM(s) Oral before breakfast  senna 2 Tablet(s) Oral at bedtime  sertraline 25 milliGRAM(s) Oral daily  tiotropium 18 MICROgram(s) Capsule 1 Capsule(s) Inhalation daily    MEDICATIONS  (PRN):  ALBUTerol    0.083% 2.5 milliGRAM(s) Nebulizer every 4 hours PRN Shortness of Breath and/or Wheezing  hydrOXYzine  Oral Tab/Cap - Peds 25 milliGRAM(s) Oral every 6 hours PRN anxiety/insomnia  morphine Concentrate 5 milliGRAM(s) Oral four times a day PRN dyspnea or pain    Home Medications:  senna oral tablet: 2 tab(s) orally once a day (at bedtime) or until otherwise instructed by your provider (23 Jul 2021 16:54)    Vital Signs Last 24 Hrs  T(C): 37.4 (02 Aug 2021 14:44), Max: 37.4 (02 Aug 2021 14:44)  T(F): 99.3 (02 Aug 2021 14:44), Max: 99.3 (02 Aug 2021 14:44)  HR: 70 (02 Aug 2021 14:44) (70 - 71)  BP: 134/64 (02 Aug 2021 14:44) (134/63 - 171/70)  BP(mean): --  RR: 18 (02 Aug 2021 14:44) (18 - 22)  SpO2: 98% (02 Aug 2021 07:56) (98% - 100%)  CAPILLARY BLOOD GLUCOSE      POCT Blood Glucose.: 89 mg/dL (02 Aug 2021 07:11)  POCT Blood Glucose.: 85 mg/dL (01 Aug 2021 21:24)    LABS:                            Consultant Notes Reviewed:  [x ] YES  [ ] NO  Care Discussed with Consultants/Other Providers/ Housestaff [ x] YES  [ ] NO  Radiology, labs, new studies personally reviewed.

## 2021-08-03 NOTE — PROGRESS NOTE ADULT - NSICDXPILOT_GEN_ALL_CORE
[place]

## 2021-08-03 NOTE — PROGRESS NOTE ADULT - TIME-BASED BILLING (NON-CRITICAL CARE)
Time-based billing (NON-critical care)
(4) no limitation

## 2021-08-03 NOTE — PROGRESS NOTE ADULT - SUBJECTIVE AND OBJECTIVE BOX
CASEY POWERS 75y Female  MRN#: [id number]   CODE STATUS: DNR/DNI    Hospital Day: 11d    Pt is currently admitted with the primary diagnosis of shortness of breath    SUBJECTIVE  Hospital Course  74 yo F admitted for COPD exacerbation  + weakness and difficulty ambulating  no fever, cough, chest pain, N/V, abdominal pain  Patient is pending level 2 so she can go to SNF  Overnight events   None  Subjective complaints   None  Present Today:   - Thibodeaux:  No [ X ], Yes [   ] : Indication:     - Type of IV Access:       .. CVC/Piccline:  No [ X ], Yes [   ] : Indication:       .. Midline: No [ X ], Yes [   ] : Indication:                                             ----------------------------------------------------------  OBJECTIVE  PAST MEDICAL & SURGICAL HISTORY  High cholesterol    Anxiety    Pacemaker    Lung cancer    Afib    COPD (chronic obstructive pulmonary disease)    Artificial cardiac pacemaker    H/O atrioventricular kacie ablation    S/P lobectomy of lung  left    History of tonsillectomy    S/P appendectomy    Cataract  RIGHT  6/17 AND  LEFT  7/5/19                                              -----------------------------------------------------------  ALLERGIES:  bacitracin (Other)  carboplatin (Other)  sulfa drugs (Unknown)  sulfonamides (Unknown)  Xanax (Other)                                            ------------------------------------------------------------    HOME MEDICATIONS  Home Medications:  senna oral tablet: 2 tab(s) orally once a day (at bedtime) or until otherwise instructed by your provider (23 Jul 2021 16:54)                           MEDICATIONS:  STANDING MEDICATIONS  ALBUTerol    90 MICROgram(s) HFA Inhaler 1 Puff(s) Inhalation every 4 hours  apixaban 5 milliGRAM(s) Oral two times a day  atorvastatin 20 milliGRAM(s) Oral at bedtime  azithromycin   Tablet 250 milliGRAM(s) Oral daily  budesonide 160 MICROgram(s)/formoterol 4.5 MICROgram(s) Inhaler - Peds 2 Puff(s) Inhalation two times a day  chlorhexidine 4% Liquid 1 Application(s) Topical <User Schedule>  insulin lispro (ADMELOG) corrective regimen sliding scale   SubCutaneous three times a day before meals  pantoprazole    Tablet 40 milliGRAM(s) Oral before breakfast  senna 2 Tablet(s) Oral at bedtime  sertraline 25 milliGRAM(s) Oral daily  tiotropium 18 MICROgram(s) Capsule 1 Capsule(s) Inhalation daily    PRN MEDICATIONS  ALBUTerol    0.083% 2.5 milliGRAM(s) Nebulizer every 4 hours PRN  hydrOXYzine  Oral Tab/Cap - Peds 25 milliGRAM(s) Oral every 6 hours PRN  morphine Concentrate 5 milliGRAM(s) Oral four times a day PRN                                            ------------------------------------------------------------  VITAL SIGNS: Last 24 Hours  Vital Signs Last 24 Hrs  T(C): 37.2 (03 Aug 2021 05:47), Max: 37.4 (02 Aug 2021 14:44)  T(F): 99 (03 Aug 2021 05:47), Max: 99.3 (02 Aug 2021 14:44)  HR: 76 (03 Aug 2021 05:47) (70 - 76)  BP: 122/57 (03 Aug 2021 05:47) (117/54 - 134/64)  RR: 18 (03 Aug 2021 05:47) (18 - 18)                                           --------------------------------------------------------------  LABS:                                                -------------------------------------------------------------  RADIOLOGY:  < from: Xray Chest 1 View-PORTABLE IMMEDIATE (07.23.21 @ 13:04) >  Stable postoperative appearance of the left lung.    < from: Xray Chest 1 View AP/PA (07.07.21 @ 23:48) >  Postoperative change of the left hemithorax.    Without difference.                                            --------------------------------------------------------------    PHYSICAL EXAM:  General: Patient is seen lying in hospital bed, no acute distress  HEENT: PERRLA, EOMI, no scleral icterus  LUNGS: Wheezing and rhonchi notable bilaterally  HEART: RRR, no murmur  ABDOMEN: Nontender  NEURO: CN II-XII intact, AAOx3  SKIN: No rashes                                           --------------------------------------------------------------    ASSESSMENT & PLAN    Past medical history and hospital course     74 yo woman with PMH of Endstage COPD on 3L home O2 with multiple admissions for COPD exacerbations, suicide attempt on last admission, 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 She was admitted to medicine and started on azithromycin and prednisone 40mg. Patient has been cleared medically for discharge, but is pending level 2 screen for prior history of suicide attempt.    1) End stage COPD on 3L O2: in exacerbation due to medication non-compliance  Lung CA s/p CT/RT and partial left lobectomy (in remission)  -continue current management for COPD exacerbation   - Prednisone 5 day course of 40mg complete, now on taper, 7/30/21 first day of taper   - nebs, spiriva, symbicort, zpack  -Discussed the necessity of medication compliance  -Out patient pulm follow up   -Well known to service Palliative Care  - Level 2 screen will not be finished until August 4th  - Rapid covid swab ordered 8/3/21 for possible DC tomorrow    Anxiety / Depression:   Hx of intentional benzodiazepine overdose (suicide attempt)  -Currently denies suicidal ideation  -Cont with Sertraline 25mg and Hydroxyzine 25mg q12     Hyperglycemia: mild, steroid induced  -Trend FSG, if persistently >200 then may need metformin or insulin     Chronic atrial fibrillation w/ micra PPM: cont with eliquis  -Rate controlled without anti-arrhythmics     Anemia of chronic disease: at baseline   HLD: cont with lipitor, takes crestor 5mg at home                                                                                 ----------------------------------------------------  # DVT prophylaxis Eliquis  # GI prophylaxis Protonix  # Diet DASH  # Code status DNR/DNI  # Disposition SNF pending level 2 screen will be completed August 4th                                                                           --------------------------------------------------------  # Handoff   pending level 2 screen

## 2021-08-03 NOTE — DISCHARGE NOTE NURSING/CASE MANAGEMENT/SOCIAL WORK - NSDCPNDISPN_GEN_ALL_CORE
Patient again at SpO2 at 70% due to high flow being off. Restraints remain in place high flow is placed back on. Patient is stating she cannot breath SpO2 at 90% with cannula back on. Education provided on the pain management plan of care/Side effects of pain management treatment/Activities of daily living, including home environment that might     exacerbate pain or reduce effectiveness of the pain management plan of care as well as strategies to address these issues/Safe use, storage and disposal of opioids when prescribed

## 2021-08-03 NOTE — PROGRESS NOTE ADULT - SUBJECTIVE AND OBJECTIVE BOX
CASEY POWERS  75y Female    INTERVAL HPI/OVERNIGHT EVENTS:    Reports no change in her breathing. 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  She noted decreased ambulation. She states that since  discharged for the last 5 days she has steadly worsening dyspnea.  Patient non complaint with medications. Patient was  admitted to [facility] on last admission and started on sertraline 25mg on discharge.  At bedside patient sating 98% on 2L NC  92% on RA but showing signs of dyspnea. patient  notes  hemoptysis small amount of during coughing  up phelm mild amounts.  (23 Jul 2021 16:43)    PAST MEDICAL & SURGICAL HISTORY:  High cholesterol    Anxiety    Pacemaker    Lung cancer    Afib    COPD (chronic obstructive pulmonary disease)    Artificial cardiac pacemaker    H/O atrioventricular kacie ablation    S/P lobectomy of lung  left    History of tonsillectomy    S/P appendectomy    Cataract  RIGHT  6/17 AND  LEFT  7/5/19        General: NAD, AAO3, anxious, pursed breathing  HEENT:  EOMI, no LAD  CV: S1 S2  Resp: decreased breath sounds b/l  GI: NT/ND/S +BS  MS: no clubbing/cyanosis/edema, + pulses b/l  Neuro: BOONE, +reflexes thruout            MEDICATIONS  (STANDING):  ALBUTerol    90 MICROgram(s) HFA Inhaler 1 Puff(s) Inhalation every 4 hours  apixaban 5 milliGRAM(s) Oral two times a day  atorvastatin 20 milliGRAM(s) Oral at bedtime  budesonide 160 MICROgram(s)/formoterol 4.5 MICROgram(s) Inhaler - Peds 2 Puff(s) Inhalation two times a day  chlorhexidine 4% Liquid 1 Application(s) Topical <User Schedule>  insulin lispro (ADMELOG) corrective regimen sliding scale   SubCutaneous three times a day before meals  pantoprazole    Tablet 40 milliGRAM(s) Oral before breakfast  predniSONE   Tablet 10 milliGRAM(s) Oral daily  senna 2 Tablet(s) Oral at bedtime  sertraline 25 milliGRAM(s) Oral daily  tiotropium 18 MICROgram(s) Capsule 1 Capsule(s) Inhalation daily    MEDICATIONS  (PRN):  ALBUTerol    0.083% 2.5 milliGRAM(s) Nebulizer every 4 hours PRN Shortness of Breath and/or Wheezing  hydrOXYzine  Oral Tab/Cap - Peds 25 milliGRAM(s) Oral every 6 hours PRN anxiety/insomnia  morphine Concentrate 5 milliGRAM(s) Oral four times a day PRN dyspnea or pain    Home Medications:  senna oral tablet: 2 tab(s) orally once a day (at bedtime) or until otherwise instructed by your provider (23 Jul 2021 16:54)    Vital Signs Last 24 Hrs  T(C): 36.5 (03 Aug 2021 13:33), Max: 37.2 (03 Aug 2021 05:47)  T(F): 97.7 (03 Aug 2021 13:33), Max: 99 (03 Aug 2021 05:47)  HR: 70 (03 Aug 2021 13:33) (70 - 76)  BP: 113/52 (03 Aug 2021 13:33) (113/52 - 122/57)  BP(mean): --  RR: 18 (03 Aug 2021 13:33) (18 - 18)  SpO2: --  CAPILLARY BLOOD GLUCOSE      POCT Blood Glucose.: 194 mg/dL (03 Aug 2021 11:24)  POCT Blood Glucose.: 281 mg/dL (03 Aug 2021 08:01)  POCT Blood Glucose.: 176 mg/dL (02 Aug 2021 21:48)    LABS:                            Consultant Notes Reviewed:  [x ] YES  [ ] NO  Care Discussed with Consultants/Other Providers/ Housestaff [ x] YES  [ ] NO  Radiology, labs, new studies personally reviewed.

## 2021-08-03 NOTE — DISCHARGE NOTE NURSING/CASE MANAGEMENT/SOCIAL WORK - PATIENT PORTAL LINK FT
You can access the FollowMyHealth Patient Portal offered by [facility] by registering at the following website: [email or web address]. By joining [employer]’s FollowMyHealth portal, you will also be able to view your health information using other applications (apps) compatible with our system.

## 2021-08-03 NOTE — PROGRESS NOTE ADULT - PROVIDER SPECIALTY LIST ADULT
Internal Medicine
Hospitalist
Internal Medicine
Hospitalist

## 2021-08-03 NOTE — PROGRESS NOTE ADULT - TIME BILLING
time spent on review of labs and imaging studies, obtaining history, personally examining patient, counselling and communicating with patient/ family, ordering medications/tests, discussion with other health care providers, documentation in electronic health records, independent interpretation of labs, imaging results and procedure results and care coordination.

## 2021-08-06 NOTE — ED ADULT TRIAGE NOTE - ACCOMPANIED BY
[facility]  Discharge- Micki Gill 1937, 80 y o  male MRN: [id number]  Unit/Bed#: E2 -01 Encounter: [id number]  Primary Care Provider: Elizabeth Ewing,    Date and time admitted to hospital: 8/1/2021 12:39 PM    Admitting Provider:  Jagdish Castro MD  Discharge Provider:  Talon Deleon DO  Admission Date: 8/1/2021       Discharge Date: 08/06/21   LOS: 5  Primary Care Physician at Discharge: Elizabeth Ewing, [address]:  Micki Gill is a 80 y o  male who presented with shortness of breath and weakness in the setting of recent [place]  He has a history of 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  The patient subsequently was admitted to the general medical floor, he was evaluated in consultation by Cardiology  His shortness of breath was found to be multifactorial, combination of malignancy as well as heart failure  He was diuresed with intravenous diuretics, and weaned to room air  CT pulmonary embolism study was performed with V/Q scan which was low probability for pulmonary embolism  He however was found to have DVT and started on Eliquis  Patient did subsequently improve, he was evaluated in consultation by the palliative care service  There were significant discussions on whether he would he would want to continue care  The patient was subsequently discharged home with planned outpatient follow-up  He was arranged for visiting nurses, as well as wound care  At the time of discharge the patient was tolerating oral diet he was without acute complaint and medically cleared for discharge      All questions were answered to the patient's satisfaction and he was in agreement with the discharge plan    DISCHARGE DIAGNOSES  Suture of skin wound  Assessment & Plan  Removed by Plastic surgery and fully evaluated    Deep vein thrombosis (DVT) of popliteal vein of right lower extremity (HCC)  Assessment & EMT/paramedic Plan  Will be discharged on Eliquis    Pressure injury of skin of sacral region  Assessment & Plan  Wound sacral ulcer present on admission  Wound care consulted    Shortness of breath  Assessment & Plan  Requiring 2 L nasal cannula oxygen supplementation on admission, is now on room air  Multifactorial as above      Severe protein-calorie malnutrition ([address] )  Assessment & Plan  Malnutrition Findings:   Adult Malnutrition type: Acute illness  BMI Findings: Body mass index is 26 28 kg/m²       Continue dysphagia mechanical soft diet, thin liquids and nutritional supplements    Cancer related pain  Assessment & Plan  PDMP reviewed, continue oxycodone and MS Contin    Malignant neoplasm of upper lobe of right lung [facility])  Assessment & Plan  Patient has a history of stage IV lung cancer with metastasis to the brain and bones; 6/2/2021 Initial Diagnosis   Follows  Oncology, receiving palliative chemo on keytruda and radiation  Will ultimately get gamma knife for brain meds per [facility] records  Received 2nd dose was this past weekend  Outpatient follow-up with Oncology - message sent to the office    Tremor, essential  Assessment & Plan  Continue primidone    Hypertension  Assessment & Plan  Home regimen carvedilol 3 125 mg twice a day  Demadex 10 mg daily    Discontinue amlodipine, discontinued clonidinem Demadex reduced to 20 mg    * Acute on chronic diastolic (congestive) heart failure (HCC)  Assessment & Plan  Wt Readings from Last 3 Encounters:   08/06/21 78 4 kg (172 lb 13 5 oz)   07/16/21 80 2 kg (176 lb 12 9 oz)   07/02/21 84 6 kg (186 lb 6 4 oz)     · 70-year-old male with past history of lung cancer, diastolic heart failure, hypertension presented with shortness of breath  · Patient previously was admitted here several weeks prior with similar complaints discharged on 7/17  · 2D echo from last month reviewed,  EF with grade 1 diastolic dysfunction  He has reached dry weight of a 172 lbs      CONSULTING PROVIDERS   IP CONSULT TO NUTRITION SERVICES  IP CONSULT TO CARDIOLOGY  IP CONSULT TO ONCOLOGY  IP CONSULT TO PLASTIC SURGERY  IP CONSULT TO PALLIATIVE CARE    PROCEDURES PERFORMED  * No surgery found *    RADIOLOGY RESULTS  Echo complete with contrast if indicated    Result Date: 2021  Narrative: [address]  [place], [address] [phone] Transthoracic Echocardiogram 2D, M-mode, Doppler, and Color Doppler Study date:  2021 Patient: Adalnguyen Necessary MR number: [id number] Account number: [de-identified] : 1937 Age: 80 years Gender: Male Status: Inpatient Location: Bedside Height: 68 in Weight: 177 5 lb BP: 129/ 61 mmHg Indications: Shortness of breath  Diagnoses: R06 02 - Shortness of breath Sonographer:  Darryl Reynolds RDCS Primary Physician:  Markus Giles DO Referring Physician:  Sharmila Marinelli DO Group:  [facility] Interpreting Physician:  Chuyita Vasquez DO SUMMARY SUMMARY: 1  This is a technically difficult study 2  Left ventricle is normal size and function  Left ventricular ejection fraction is estimated at 58%  3  Grade 1 diastolic dysfunction  4  There are no obvious high-grade regional wall motion abnormalities  Endocardium still mildly limited due to poor acoustic windows despite use of echo contrast 5  Right atrium is mildly dilated 6  Aortic valve is sclerotic with adequate separation 7  Trace mitral regurgitation  Mild mitral annular calcification 8  Trace tricuspid regurgitation pulmonary artery systolic pressure is estimated at 25-30 mm Hg 9  Aortic root is mildly dilated 3 8 cm 10  There is no study for comparison SUMMARY MEASUREMENTS 2D measurements: Unspecified Anatomy:   %FS was 36 3 %  Ao Diam was 3 8 cm  Ao asc was 3 4 cm   EDV(Teich) was 101 2 ml   EF(Teich) was 66 %  ESV(Teich) was 34 4 ml  IVSd was 0 9 cm  LA Area was 17 cm2  LA Diam was 2 9 cm  LVIDd was 4 7 cm  LVIDs was 3 cm    LVPWd was 0 9 cm   RA Area was 20 cm2  RVIDd was 4 5 cm  RWT was 0 4    SV(Teich) was 66 7 ml  CW measurements: Unspecified Anatomy:   TR Vmax was 2 4 m/s  TR maxPG was 23 8 mmHg  MM measurements: Unspecified Anatomy:   TAPSE was 2 4 cm  PW measurements: Unspecified Anatomy:   E' Sept was 0 1 m/s  E/E' Sept was 10 9   MV A Ivan was 1 m/s  MV Dec Iron was 5 m/s2  MV DecT was 135 1 ms   MV E Ivan was 0 7 m/s  MV E/A Ratio was 0 7   MV PHT was 39 2 ms  MVA By PHT was 5 6 cm2  HISTORY: PRIOR HISTORY: Cancer  Hypertension  Hyperlipidemia  Hypothyroid  Asthma  PROCEDURE: The procedure was performed at the bedside  This was a routine study  The transthoracic approach was used  The study included complete 2D imaging, M-mode, complete spectral Doppler, and color Doppler  The heart rate was 97 bpm, at the start of the study  Images were obtained from the parasternal, apical, subcostal, and suprasternal notch acoustic windows  Intravenous contrast ( 0 4 ml Definity in NSS) was administered to opacify the left ventricle  Echocardiographic views were limited due to poor acoustic window availability, decreased penetration, and lung interference  This was a technically difficult study  LEFT VENTRICLE: Left ventricle is normal in size and function  Left ventricular ejection fraction is estimated at 58%  Normal wall thickness  Grade 1 diastolic dysfunction  There are no obvious high-grade regional wall motion abnormalities  RIGHT VENTRICLE: Right ventricle is mildly dilated with normal systolic function  LEFT ATRIUM: Left atrium is normal in size  ATRIAL SEPTUM: The interatrial septum appears to be grossly normal without evidence of shunting by color-flow Doppler  RIGHT ATRIUM: Right atrium is mildly dilated  MITRAL VALVE: Mitral valve opens well  Fibrocalcific changes of the mitral valve  Mild mitral annular calcification  No evidence of mitral stenosis   Trace mitral regurgitation AORTIC VALVE: Aortic valve is sclerotic, but not well visualized  No evidence of aortic stenosis or aortic regurgitation  TRICUSPID VALVE: Tricuspid valve opens well  Trace tricuspid regurgitation  Pulmonary systolic pressures estimated at 25-30 mm Hg PULMONIC VALVE: Pulmonic valve is not well visualized  No evidence of pulmonic stenosis or pulmonic regurgitation  PERICARDIUM: There is no evidence of significant pericardial effusion  AORTA: Aortic root is mildly dilated 3 8 cm  Ascending aorta is normal in size  SYSTEMIC VEINS: IVC: IVC is normal size with greater than 50% respirophasic collapse  SYSTEM MEASUREMENT TABLES 2D %FS: 36 3 % Ao Diam: 3 8 cm Ao asc: 3 4 cm EDV(Teich): 101 2 ml EF(Teich): 66 % ESV(Teich): 34 4 ml IVSd: 0 9 cm LA Area: 17 cm2 LA Diam: 2 9 cm LVIDd: 4 7 cm LVIDs: 3 cm LVPWd: 0 9 cm LVd Mass: 155 5 g LVd Mass Index: 79 7 g/m2 RA Area: 20 cm2 RVIDd: 4 5 cm RWT: 0 4 SV(Teich): 66 7 ml CW TR Vmax: 2 4 m/s TR maxP 8 mmHg MM TAPSE: 2 4 cm PW E' Sept: 0 1 m/s E/E' Sept: 10 9 MV A Ivan: 1 m/s MV Dec Person: 5 m/s2 MV DecT: 135 1 ms MV E Ivan: 0 7 m/s MV E/A Ratio: 0 7 MV PHT: 39 2 ms MVA By PHT: 5 6 cm2 [facility] Accredited Echocardiography Laboratory Prepared and electronically signed by Tatiana Mcleod DO Signed 2021 17:20:58     XR chest 1 view portable    Result Date: 2021  Narrative: CHEST INDICATION:   sob  COMPARISON:  Chest radiograph 4/3/2007 EXAM PERFORMED/VIEWS:  XR CHEST PORTABLE FINDINGS:  There is a left chest wall port with catheter tip in the region of the cavoatrial junction  Cardiomediastinal silhouette appears unremarkable  Masslike opacity in the right upper lung is redemonstrated extending towards the right hilum  There is prominence of interstitial markings  There is no large pleural effusion or pneumothorax  Osseous structures appear within normal limits for patient age  Impression: 1  Right upper lobe mass extending towards the right hilum in keeping with history of cancer   2   Prominence of interstitial markings suspicious for pulmonary interstitial edema  Lymphangitic spread of disease is also in the differential but is less likely given bilateral findings  Workstation performed: [id number]     XR chest pa & lateral    Result Date: 8/3/2021  Narrative: CHEST INDICATION:   Right upper lobe lung tumor, congestive heart failure?   COMPARISON:  8/1/2021 EXAM PERFORMED/VIEWS:  XR CHEST PA & LATERAL FINDINGS: The tip of the left jugular port catheter projects at the superior cavoatrial junction  Cardiomediastinal silhouette appears unremarkable  Redemonstrated right upper lobe mass  No pneumothorax or pleural effusion  Osseous structures appear unchanged  Impression: No acute cardiopulmonary disease  Workstation performed: [id number]     CT soft tissue neck with contrast    Result Date: 7/14/2021  Narrative: CT NECK WITH CONTRAST INDICATION:   Shortness of breath, hoarseness and lung cancer  COMPARISON:  5/13/2019 and 7/14/2021  TECHNIQUE:  Axial, sagittal, and coronal 2D reformatted images were created from the axial source data and submitted for interpretation  Radiation dose length product (DLP) for this visit:  778 mGy-cm   This examination, like all CT scans performed in the [facility], was performed utilizing techniques to minimize radiation dose exposure, including the use of iterative reconstruction and automated exposure control  IV Contrast:  100 mL of iohexol (OMNIPAQUE) IMAGE QUALITY:  Diagnostic  FINDINGS: VISUALIZED BRAIN PARENCHYMA:  Normal enhancement  VISUALIZED ORBITS AND PARANASAL SINUSES:  Partially visualized globes and orbits and paranasal sinuses, unremarkable  NASAL CAVITY AND NASOPHARYNX:  No nasopharyngeal inflammation  Patent nasal cavity  SUPRAHYOID NECK:  Intact oral cavity  No parapharyngeal retropharyngeal inflammation  INFRAHYOID NECK:  Epiglottis, aryepiglottic folds and piriform sinuses are normal   Normal glottis and subglottic airway  THYROID GLAND:  Unremarkable  PAROTID AND SUBMANDIBULAR GLANDS:  No sialoadenitis  LYMPH NODES:  No lymphadenopathy in the neck  VASCULAR STRUCTURES:  Calcified plaque at the right ICA origin resulting in moderate (50-69%) stenosis  THORACIC INLET:  Right upper lobe spiculated 5 x 7 x 5 cm mass extending to the hilum and encasing the  upper lobe proximal segmental bronchi  Pulmonary nodules measuring 3 to 4 mm in the apical region of the right upper lobe  High right paratracheal low-attenuation 1 5 cm lesion may represent a necrotic lymph node BONY STRUCTURES: Lytic or blastic lesion  Disc and facet osteophytosis in the cervical spine resulting in mild to moderate central canal stenosis and moderate to severe neural foraminal narrowing  Impression: 1  No evidence of mass or lymphadenopathy in the neck  2   Right upper lobe and hilar mass consistent with primary lung malignancy, further evaluated on the accompanying chest CT report  Workstation performed: [id number]     CT chest with contrast    Result Date: 7/14/2021  Narrative: CT CHEST WITH IV CONTRAST INDICATION:   Hoarseness and shortness of breath  COMPARISON:  11/17/2006  TECHNIQUE: CT examination of the chest was performed  Axial, sagittal, and coronal 2D reformatted images were created from the source data and submitted for interpretation  Radiation dose length product (DLP) for this visit:  756 mGy-cm   This examination, like all CT scans performed in the [facility], was performed utilizing techniques to minimize radiation dose exposure, including the use of iterative reconstruction and automated exposure control  IV Contrast:  100 mL of iohexol (OMNIPAQUE) FINDINGS: LUNGS:  Spiculated right upper lobe mass extending to the major minor fissures, right paratracheal region suprahilar region measuring 8 2 x 5 1 x 7 6 cm  Mass extends to the pleura laterally    Satellite pulmonary nodules in the apical region of the right upper lobe measuring 2 to 3 mm and adjacent scarring  Calcified granuloma in the left lower lobe  Encasement of the right mainstem bronchus and proximal upper lobe segmental bronchi  Encasement of the origins of the right middle and lower lobe segmental bronchi  Narrowing of segmental and subsegmental bronchi without occlusion or endobronchial lesion  PLEURA:  No effusion  HEART/GREAT VESSELS:  Normal heart size  No thoracic aortic aneurysm  MEDIASTINUM AND GAEL:  Low-attenuation high right paratracheal 1 6 cm lymph node  Right upper lobe mass extends into the right hilum and aortopulmonary window, possibly representing confluent lymphadenopathy  Subcarinal 2 cm lymph node  CHEST WALL AND LOWER NECK:   Unremarkable  VISUALIZED STRUCTURES IN THE UPPER ABDOMEN:  Unremarkable  OSSEOUS STRUCTURES:  No acute fracture or destructive osseous lesion  Impression: 1  Right upper lobe, hilar and paratracheal mass measuring 8 2 x 5 1 x 7 6 cm consistent with primary lung malignancy  Encasement of the right mainstem bronchus and proximal segmental bronchi without evidence of endobronchial mass or occlusion  2   Satellite 2 to 3 mm pulmonary nodules in the apical region of the right upper lobe  3   High right paratracheal 1 6 cm lymph node, possibly necrotic  Subcarinal 2 cm lymph node  Workstation performed: [id number]     NM lung perfusion imaging (particulate)    Result Date: 8/3/2021  Narrative: LUNG PERFUSION SCAN INDICATION: Shortness of breath  COMPARISON:  Chest radiograph  same date, chest CT, 7/14/2021  TECHNIQUE:  Multiplanar perfusion imaging was performed following the intravenous administration of 4 2 mCi Tc-99m labeled MAA  No ventilation imaging was performed as per current Covid-19 protocol  FINDINGS:  Perfusion imaging demonstrates lack of perfusion in the right upper lung field including the apex    Review of prior CT demonstrates the right upper lobe mass lesion which compromises pulmonary arterial flow to the upper lobe  This is not appear to be related to intraluminal thrombus but rather occlusion from the lung mass  Elsewhere in the lungs, there is a mildly heterogeneous distribution of radiopharmaceutical activity  There is no other segmental or subsegmental defect  Impression: Absent right upper/apical perfusion felt to be related to occlusion of the right upper lobe pulmonary artery secondary to the patient's known history of right upper lobe mass  Low probability for pulmonary thromboembolism  Workstation performed: [id number]     FL barium swallow video w speech    Result Date: 7/16/2021  Narrative: A video barium swallow study was performed by the Department of Speech Pathology  Please refer to the report for the official interpretation  The images are stored for archival purposes only  Study images were not formally reviewed by the Radiology Department  VAS lower limb venous duplex study, complete bilateral    Result Date: 8/2/2021  Narrative:  [facility] REPORT CLINICAL: Indications: Patient presents with bilateral lower extremity edema  Risk Factors The patient has history of HTN  FINDINGS:  Right      Impression            Popliteal  Thrombosed (Chronic)     CONCLUSION:  Impression: RIGHT LOWER LIMB: There is evidence of non-occlusive deep vein thrombosis noted in 1 of 2 popliteal veins  No evidence of superficial thrombophlebitis noted  Doppler evaluation shows a normal response to augmentation maneuvers  Popliteal, posterior tibial and anterior tibial arterial Doppler waveforms are triphasic  LEFT LOWER LIMB: No evidence of acute or chronic deep vein thrombosis  No evidence of superficial thrombophlebitis noted  Doppler evaluation shows a normal response to augmentation maneuvers  Popliteal, posterior tibial and anterior tibial arterial Doppler waveforms are triphasic    SIGNATURE: Electronically Signed Deepak Persaud on 2021-08-02 09:58:12 PM      LABS  Results from last 7 days   Lab Units 08/02/21  0619 08/01/21  1659 08/01/21  1253   WBC Thousand/uL 7 14  --  8 60   HEMOGLOBIN g/dL 11 5*  --  10 7*   HEMATOCRIT % 35 0*  --  32 1*   MCV fL 95  --  93   PLATELETS Thousands/uL 195 202 192   INR   --   --  1 47*     Results from last 7 days   Lab Units 08/05/21  0450 08/04/21  0530 08/03/21  0431 08/02/21  0619 08/01/21  1253   SODIUM mmol/L 134* 138 138 138 140   POTASSIUM mmol/L 4 6 4 4 3 3* 4 2 3 4*   CHLORIDE mmol/L 95* 98* 96* 98* 98*   CO2 mmol/L 29 32 36* 32 34*   BUN mg/dL 51* 54* 57* 65* 68*   CREATININE mg/dL 1 27 1 29 1 44* 1 58* 1 76*   CALCIUM mg/dL 9 1 9 2 9 0 9 1 8 5   ALBUMIN g/dL  --   --   --   --  1 8*   TOTAL BILIRUBIN mg/dL  --   --   --   --  0 33   ALK PHOS U/L  --   --   --   --  100   ALT U/L  --   --   --   --  151*   AST U/L  --   --   --   --  49*   EGFR ml/min/1 73sq m 52 51 44 40 35   GLUCOSE RANDOM mg/dL 112 137 123 182* 147*     Results from last 7 days   Lab Units 08/01/21  1253   TROPONIN I ng/mL <0 02     Results from last 7 days   Lab Units 08/01/21  1253   NT-PRO BNP pg/mL 1,618*                                Cultures:         Invalid input(s): URIBILINOGEN              PHYSICAL EXAM:  Vitals:   Blood Pressure: 148/56 (08/06/21 1500)  Pulse: 93 (08/06/21 1500)  Temperature: 99 1 °F (37 3 °C) (08/06/21 1500)  Temp Source: Temporal (08/06/21 1500)  Respirations: 16 (08/06/21 1500)  Height: 5' 8" (172 7 cm) (08/05/21 0538)  Weight - Scale: 78 4 kg (172 lb 13 5 oz) (08/06/21 0540)  SpO2: 94 % (08/06/21 1500)      General: well appearing, no acute distress  HEENT: atraumatic, PERRLA, moist mucosa, normal pharynx, normal tonsils and adenoids, normal tongue, no fluid in sinuses  Neck: Trachea midline, no carotid bruit, no masses  Respiratory: normal chest wall expansion, CTA B, no r/r/w, no rubs  Cardiovascular: RRR, no m/r/g, Normal S1 and S2  Abdomen: Soft, non-tender, non-distended, normal bowel sounds in all quadrants, no hepatosplenomegaly, no tympany  Rectal: deferred  Musculoskeletal: normal ROM in upper and lower extremities  Integumentary: warm, dry, and pink, with no rash, purpura, or petechia  Heme/Lymph: no lymphadenopathy, no bruises  Neurological: Cranial Nerves II-XII grossly intact, no tics, normal sensation to pressure and light touch  Psychiatric: cooperative with normal mood, affect, and cognition      Discharge Disposition: Home with [facility]      Test Results Pending at Discharge:           Medications   · Summary of Medication Adjustments made as a result of this hospitalization:  See discharge list, carvedilol reduced to 3 125 mg, Eliquis started for DVT, and torsemide reduced to 10 mg  · Medication Dosing Tapers - Please refer to Discharge Medication List for details on any medication dosing tapers (if applicable to patient)  · Discharge Medication List: See after visit summary for reconciled discharge medications  Diet restrictions: Activity restrictions: No strenuous activity  Discharge Condition: good    Outpatient Follow-Up and Discharge Instructions  See after visit summary section titled Discharge Instructions for information provided to patient and family  Code Status: Prior  Discharge Statement   I spent 35 minutes discharging the patient  This time was spent on the day of discharge  Greater than 50% of total time was spent with the patient and / or family counseling and / or coordination of care  ** Please Note: This note was completed in part utilizing [employer] Fluency Direct Software  Grammatical errors, random word insertions, spelling mistakes, and incomplete sentences may be an occasional consequence of this system secondary to software limitations, ambient noise, and hardware issues  If you have any questions or concerns about the content, text, or information contained within the body of this dictation, please contact the provider for clarification  **

## 2021-08-25 NOTE — CONSULT NOTE ADULT - SUBJECTIVE AND OBJECTIVE BOX
Stroke Consult Note:    CASEY POWERS    1. Chief Complaint: AMS    HPI: 74 y/o F h/o COPD (on 3 L home O2 ), bronchogenic carcinoma (left lung, s/p left partial lobectomy, in remission x 9 yrs), Afib on Eliquis ablation and micra PPM, anxiety presented as a stroke code from nursing home due to poor PO intake and not speaking. LWN per facility is 3pm      2. Relevant PMH:   Prior ischemic stroke/TIA[ ], Afib [x ], CAD [ ], HTN [x ], DLD [ ], DM [x ], PVD [ ], Obesity [ ],   Sedentary lifestyle [ ], CHF [ ], MARIMAR [ ], Cancer Hx [x ].    3. Social History: Smoking [ ], Drug Use [ ], Alcohol Use [ ], Other [ ]    4. Possible Location of Stroke:    5. Relevant Brain Tissue Imagin. Relevant Cerebrovascular Imaging:   CT Angio Neck w/ IV Cont:   EXAM:  CT ANGIO BRAIN (W)AW IC        EXAM:  CT ANGIO NECK (W)AW IC            PROCEDURE DATE:  2021            INTERPRETATION:  CLINICAL HISTORY / REASON FOR EXAM: Stroke code    TECHNIQUE/CT ANGIOGRAM OF THE HEAD AND NECK: Axial contiguous images were obtained of the head and neck following the intravenous administration of contrast. Reformatted images in the coronal and sagittal planes were acquired, as well as 3DMIP reconstructed images.    Reference per NASCET criteria for degree of stenosis: Mild: less than 50% stenosis. Moderate: 50-69% stenosis. Severe: 70-94% stenosis. Near occlusion: 95-99% stenosis.      COMPARISON: Chest CT 2021.    FINDINGS:    NECK CTA:    The visualized aortic arch and great vessel origins are patent. There is no stenosis at the origin of the right brachiocephalic, right and left common carotid, left subclavian, and right and left vertebral arteries. The left common carotid artery arises from a common trunk with the right brachiocephalic artery (anatomic variation).    The right and left common, internal and external carotid arteries are patent.    There is mild calcification and no flow-limiting stenosis at the common carotid artery bifurcations.  There is no evidence of significant stenosis or occlusion of the common carotid arteries, the carotid artery bifurcations, or along the course of the distal cervical portions of the right and left internal carotid arteries.    Normal branching pattern is noted of the bilateral external carotidarteries.    The vertebral arteries are patent.    HEAD CTA:    The distal right and left internal carotid arteries are patent with mild-to moderate calcification but no flow-limiting stenosis in the region of the cavernous and supraclinoid portions of the ICAs. There are prominent infundibula (less likely small aneurysms) at the origins of the left and right posterior communicating arteries; 2.8 mm on the left side (14/107; 10/111), and 1.8 mm on the right side (10/127; 14/124).    The ophthalmic arteries are patent.    There is normal contrast filling of the middle cerebral arteries and the anterior cerebral arteries bilaterally. No evidence of stenosis, occlusion or aneurysm.    The distal vertebral arteries are patent. The basilar artery is patent. There is normal filling of the PCAs, SCAs, PICAs and AICAs bilaterally. No evidence of stenosis, occlusion or aneurysm.      No venous abnormalities are noted. No evidence of filling defects within the venous sinuses.    OTHER: Postoperative change and opacification again noted in the left apex.      IMPRESSION:    No evidence of major vascular stenosis or occlusion.    There are prominent infundibula (less likely small aneurysms) at the origins of the left and right posterior communicating arteries; 2.8 mm on the left side (14/107; 10/111), and 1.8 mm on the right side (10/127; 14/124).    --- End of Report ---        ANANDA GILES MD; Attending Interventional Radiologist  This document has been electronically signed. Aug 25 2021  8:41PM (21 @ 20:01)     CT Angio Head w/ IV Cont:   EXAM:  CT ANGIO BRAIN (W)AW IC        EXAM:  CT ANGIO NECK (W)AW IC            PROCEDURE DATE:  2021            INTERPRETATION:  CLINICAL HISTORY / REASON FOR EXAM: Stroke code    TECHNIQUE/CT ANGIOGRAM OF THE HEAD AND NECK: Axial contiguous images were obtained of the head and neck following the intravenous administration of contrast. Reformatted images in the coronal and sagittal planes were acquired, as well as 3DMIP reconstructed images.    Reference per NASCET criteria for degree of stenosis: Mild: less than 50% stenosis. Moderate: 50-69% stenosis. Severe: 70-94% stenosis. Near occlusion: 95-99% stenosis.      COMPARISON: Chest CT 2021.    FINDINGS:    NECK CTA:    The visualized aortic arch and great vessel origins are patent. There is no stenosis at the origin of the right brachiocephalic, right and left common carotid, left subclavian, and right and left vertebral arteries. The left common carotid artery arises from a common trunk with the right brachiocephalic artery (anatomic variation).    The right and left common, internal and external carotid arteries are patent.    There is mild calcification and no flow-limiting stenosis at the common carotid artery bifurcations.  There is no evidence of significant stenosis or occlusion of the common carotid arteries, the carotid artery bifurcations, or along the course of the distal cervical portions of the right and left internal carotid arteries.    Normal branching pattern is noted of the bilateral external carotidarteries.    The vertebral arteries are patent.    HEAD CTA:    The distal right and left internal carotid arteries are patent with mild-to moderate calcification but no flow-limiting stenosis in the region of the cavernous and supraclinoid portions of the ICAs. There are prominent infundibula (less likely small aneurysms) at the origins of the left and right posterior communicating arteries; 2.8 mm on the left side (14/107; 10/111), and 1.8 mm on the right side (10/127; 14/124).    The ophthalmic arteries are patent.    There is normal contrast filling of the middle cerebral arteries and the anterior cerebral arteries bilaterally. No evidence of stenosis, occlusion or aneurysm.    The distal vertebral arteries are patent. The basilar artery is patent. There is normal filling of the PCAs, SCAs, PICAs and AICAs bilaterally. No evidence of stenosis, occlusion or aneurysm.      No venous abnormalities are noted. No evidence of filling defects within the venous sinuses.    OTHER: Postoperative change and opacification again noted in the left apex.      IMPRESSION:    No evidence of major vascular stenosis or occlusion.    There are prominent infundibula (less likely small aneurysms) at the origins of the left and right posterior communicating arteries; 2.8 mm on the left side (14/107; 10/111), and 1.8 mm on the right side (10/127; 14/124).    --- End of Report ---      ANANDA GILES MD; Attending Interventional Radiologist  This document has been electronically signed. Aug 25 2021  8:41PM (21 @ 20:01)         7. Relevant blood tests:      8. Relevant cardiac rhythm monitorin. Relevant Cardiac Structure: (TTE/JASPAL +/-):[ ]No intracardiac thrombus/[ ] no vegetation/[ ]no [place]/EF:    Home Medications:  morphine 20 mg/mL oral concentrate: 0.25 milliliter(s) orally 4 times a day, As needed, dyspnea or pain (03 Aug 2021 16:05)  predniSONE 10 mg oral tablet: 1 tab(s) orally once a day until 8/9 (03 Aug 2021 16:05)  senna oral tablet: 2 tab(s) orally once a day (at bedtime) or until otherwise instructed by your provider (2021 16:54)      MEDICATIONS  (STANDING):      10. PT/OT/Speech/Rehab/S&Sw/ Cognitive eval results and recommendations:    11. Exam:    Vital Signs Last 24 Hrs  T(C): --  T(F): --  HR: 82 (25 Aug 2021 20:18) (71 - 82)  BP: 110/56 (25 Aug 2021 20:18) (110/56 - 127/59)  BP(mean): 75 (25 Aug 2021 20:18) (75 - 85)  RR: 18 (25 Aug 2021 20:18) (18 - 18)  SpO2: 99% (25 Aug 2021 20:18) (95% - 99%)    12.   NIH STROKE SCALE  Item	                                                        Score  1 a.	Level of Consciousness	               	0  1 b. LOC Questions	                                0  1 c.	LOC Commands	                               	0  2.	Best Gaze	                                        0  3.	Visual	                                                0  4.	Facial Palsy	                                        0  5 a.	Motor Arm - Left	                                0  5 b.	Motor Arm - Right	                        0  6 a.	Motor Leg - Left	                                0  6 b.	Motor Leg - Right	                                0  7.	Limb Ataxia	                                        0  8.	Sensory	                                                0  9.	Language	                                        0  10.	Dysarthria	                                        0  11.	Extinction and Inattention  	        0  ______________________________________  TOTAL	                                                        0    Total NIHSS on admission:      NIHSS yesterday:      NIHSS today: 10    mRS:  0 No symptoms at all  1 No significant disability despite symptoms; able to carry out all usual duties and activities without assistance  2 Slight disability; unable to carry out all previous activities, but able to look after own affairs  3 Moderate disability; requiring some help, but able to walk without assistance  4 Moderately severe disability; unable to walk without assistance and unable to attend to own bodily needs without assistance  5 Severe disability; bedridden, incontinent and requiring constant nursing care and attention  6 Dead      13. Impression:      14. Probable cause/s of Stroke:      15. Suggestions:   Routine stroke workup includin. Disposition:   Stroke Consult Note:    CASEY POWERS    1. Chief Complaint: AMS    HPI: 74 y/o F h/o COPD (on 3 L home O2 ), bronchogenic carcinoma (left lung, s/p left partial lobectomy, in remission x 9 yrs), Afib on Eliquis ablation and micra PPM, anxiety presented as a stroke code from nursing home due to poor PO intake and not speaking. LWN per facility is 3pm. In ED patient was able to answer some questions and follow commands with some generalized weakness.  But then had episodes of starring of fand not responding.  CTH i snegative for acute pathology , CTA is negative for major vascular stenosis or hypoperfusion.   She is outside of the window  for IV tpa and would not be a candidate for neurointervention due to high MRS.      2. Relevant PMH:   Prior ischemic stroke/TIA[ ], Afib [x ], CAD [ ], HTN [x ], DLD [ ], DM [x ], PVD [ ], Obesity [ ],   Sedentary lifestyle [ ], CHF [ ], MARIMAR [ ], Cancer Hx [x ].    3. Social History: Smoking [ ], Drug Use [ ], Alcohol Use [ ], Other [ ]    4. Possible Location of Stroke:    5. Relevant Brain Tissue Imagin. Relevant Cerebrovascular Imaging:   CT Angio Neck w/ IV Cont:   EXAM:  CT ANGIO BRAIN (W)AW IC        EXAM:  CT ANGIO NECK (W)AW IC            PROCEDURE DATE:  2021            INTERPRETATION:  CLINICAL HISTORY / REASON FOR EXAM: Stroke code    TECHNIQUE/CT ANGIOGRAM OF THE HEAD AND NECK: Axial contiguous images were obtained of the head and neck following the intravenous administration of contrast. Reformatted images in the coronal and sagittal planes were acquired, as well as 3DMIP reconstructed images.    Reference per NASCET criteria for degree of stenosis: Mild: less than 50% stenosis. Moderate: 50-69% stenosis. Severe: 70-94% stenosis. Near occlusion: 95-99% stenosis.      COMPARISON: Chest CT 2021.    FINDINGS:    NECK CTA:    The visualized aortic arch and great vessel origins are patent. There is no stenosis at the origin of the right brachiocephalic, right and left common carotid, left subclavian, and right and left vertebral arteries. The left common carotid artery arises from a common trunk with the right brachiocephalic artery (anatomic variation).    The right and left common, internal and external carotid arteries are patent.    There is mild calcification and no flow-limiting stenosis at the common carotid artery bifurcations.  There is no evidence of significant stenosis or occlusion of the common carotid arteries, the carotid artery bifurcations, or along the course of the distal cervical portions of the right and left internal carotid arteries.    Normal branching pattern is noted of the bilateral external carotidarteries.    The vertebral arteries are patent.    HEAD CTA:    The distal right and left internal carotid arteries are patent with mild-to moderate calcification but no flow-limiting stenosis in the region of the cavernous and supraclinoid portions of the ICAs. There are prominent infundibula (less likely small aneurysms) at the origins of the left and right posterior communicating arteries; 2.8 mm on the left side (14/107; 10/111), and 1.8 mm on the right side (10/127; /124).    The ophthalmic arteries are patent.    There is normal contrast filling of the middle cerebral arteries and the anterior cerebral arteries bilaterally. No evidence of stenosis, occlusion or aneurysm.    The distal vertebral arteries are patent. The basilar artery is patent. There is normal filling of the PCAs, SCAs, PICAs and AICAs bilaterally. No evidence of stenosis, occlusion or aneurysm.      No venous abnormalities are noted. No evidence of filling defects within the venous sinuses.    OTHER: Postoperative change and opacification again noted in the left apex.      IMPRESSION:    No evidence of major vascular stenosis or occlusion.    There are prominent infundibula (less likely small aneurysms) at the origins of the left and right posterior communicating arteries; 2.8 mm on the left side (/107; 10/111), and 1.8 mm on the right side (10/127; /124).    --- End of Report ---        ANANDA GILES MD; Attending Interventional Radiologist  This document has been electronically signed. Aug 25 2021  8:41PM (21 @ 20:01)     CT Angio Head w/ IV Cont:   EXAM:  CT ANGIO BRAIN (W)AW IC        EXAM:  CT ANGIO NECK (W)AW IC            PROCEDURE DATE:  2021            INTERPRETATION:  CLINICAL HISTORY / REASON FOR EXAM: Stroke code    TECHNIQUE/CT ANGIOGRAM OF THE HEAD AND NECK: Axial contiguous images were obtained of the head and neck following the intravenous administration of contrast. Reformatted images in the coronal and sagittal planes were acquired, as well as 3DMIP reconstructed images.    Reference per NASCET criteria for degree of stenosis: Mild: less than 50% stenosis. Moderate: 50-69% stenosis. Severe: 70-94% stenosis. Near occlusion: 95-99% stenosis.      COMPARISON: Chest CT 2021.    FINDINGS:    NECK CTA:    The visualized aortic arch and great vessel origins are patent. There is no stenosis at the origin of the right brachiocephalic, right and left common carotid, left subclavian, and right and left vertebral arteries. The left common carotid artery arises from a common trunk with the right brachiocephalic artery (anatomic variation).    The right and left common, internal and external carotid arteries are patent.    There is mild calcification and no flow-limiting stenosis at the common carotid artery bifurcations.  There is no evidence of significant stenosis or occlusion of the common carotid arteries, the carotid artery bifurcations, or along the course of the distal cervical portions of the right and left internal carotid arteries.    Normal branching pattern is noted of the bilateral external carotidarteries.    The vertebral arteries are patent.    HEAD CTA:    The distal right and left internal carotid arteries are patent with mild-to moderate calcification but no flow-limiting stenosis in the region of the cavernous and supraclinoid portions of the ICAs. There are prominent infundibula (less likely small aneurysms) at the origins of the left and right posterior communicating arteries; 2.8 mm on the left side (14/107; 10/111), and 1.8 mm on the right side (10/127; 14/124).    The ophthalmic arteries are patent.    There is normal contrast filling of the middle cerebral arteries and the anterior cerebral arteries bilaterally. No evidence of stenosis, occlusion or aneurysm.    The distal vertebral arteries are patent. The basilar artery is patent. There is normal filling of the PCAs, SCAs, PICAs and AICAs bilaterally. No evidence of stenosis, occlusion or aneurysm.      No venous abnormalities are noted. No evidence of filling defects within the venous sinuses.    OTHER: Postoperative change and opacification again noted in the left apex.      IMPRESSION:    No evidence of major vascular stenosis or occlusion.    There are prominent infundibula (less likely small aneurysms) at the origins of the left and right posterior communicating arteries; 2.8 mm on the left side (14/107; 10/111), and 1.8 mm on the right side (10/127; 14/124).    --- End of Report ---      ANANDA GILES MD; Attending Interventional Radiologist  This document has been electronically signed. Aug 25 2021  8:41PM (21 @ 20:01)         7. Relevant blood tests:      8. Relevant cardiac rhythm monitorin. Relevant Cardiac Structure: (TTE/JASPAL +/-):[ ]No intracardiac thrombus/[ ] no vegetation/[ ]no [place]/EF:    Home Medications:  morphine 20 mg/mL oral concentrate: 0.25 milliliter(s) orally 4 times a day, As needed, dyspnea or pain (03 Aug 2021 16:05)  predniSONE 10 mg oral tablet: 1 tab(s) orally once a day until  (03 Aug 2021 16:05)  senna oral tablet: 2 tab(s) orally once a day (at bedtime) or until otherwise instructed by your provider (2021 16:54)      MEDICATIONS  (STANDING):      10. PT/OT/Speech/Rehab/S&Sw/ Cognitive eval results and recommendations:    11. Exam:    Vital Signs Last 24 Hrs  T(C): --  T(F): --  HR: 82 (25 Aug 2021 20:18) (71 - 82)  BP: 110/56 (25 Aug 2021 20:18) (110/56 - 127/59)  BP(mean): 75 (25 Aug 2021 20:18) (75 - 85)  RR: 18 (25 Aug 2021 20:18) (18 - 18)  SpO2: 99% (25 Aug 2021 20:18) (95% - 99%)    12.   NIH STROKE SCALE  Item	                                                        Score  1 a.	Level of Consciousness	               	0  1 b. LOC Questions	                                1  1 c.	LOC Commands	                               	0  2.	Best Gaze	                                        0  3.	Visual	                                                0  4.	Facial Palsy	                                        0  5 a.	Motor Arm - Left	                                1  5 b.	Motor Arm - Right	                        1  6 a.	Motor Leg - Left	                                3  6 b.	Motor Leg - Right	                                3  7.	Limb Ataxia	                                        0  8.	Sensory	                                                0  9.	Language	                                        1  10.	Dysarthria	                                        0  11.	Extinction and Inattention  	        0  ______________________________________  TOTAL	                                                        0    Total NIHSS on admission:      NIHSS yesterday:      NIHSS today: 10 (non ambulatory at baseline)    mRS:  0 No symptoms at all  1 No significant disability despite symptoms; able to carry out all usual duties and activities without assistance  2 Slight disability; unable to carry out all previous activities, but able to look after own affairs  3 Moderate disability; requiring some help, but able to walk without assistance  4 Moderately severe disability; unable to walk without assistance and unable to attend to own bodily needs without assistance  5 Severe disability; bedridden, incontinent and requiring constant nursing care and attention  6 Dead      13. Impression: 74 y/o F h/o COPD (on 3 L home O2 ), bronchogenic carcinoma (left lung, s/p left partial lobectomy, in remission x 9 yrs), Afib on Eliquis ablation and micra PPM, anxiety presented as a stroke code from nursing home due to poor PO intake and not speaking. LWN per facility is 3pm. In ED patient was able to answer some questions and follow commands with some generalized weakness. NIHSS is 10.  But then had episodes of starring off and not responding.  CTH is negative for acute pathology , CTA is negative for major vascular stenosis or hypoperfusion.   Hypoxic with elevated troponins.  She is outside of the window  for IV tpa and would not be a candidate for neurointervention due to high MRS.        15. Suggestions:   REEG  MRI brain NC or repeat CTH in 24 hrs  Continue Eliquis and other home meds  Trend troponins  R/o infectious and metabolic etiologies      Neuroattending note will follow   Stroke Consult Note:    CASEY POWERS    1. Chief Complaint: AMS    HPI: 74 y/o F h/o COPD (on 3 L home O2 ), bronchogenic carcinoma (left lung, s/p left partial lobectomy, in remission x 9 yrs), Afib on Eliquis ablation and micra PPM, anxiety presented as a stroke code from nursing home due to poor PO intake and not speaking. LWN per facility is 3pm. In ED patient was able to answer some questions and follow commands with some generalized weakness.  But then had episodes of starring of fand not responding.  CTH i snegative for acute pathology , CTA is negative for major vascular stenosis or hypoperfusion.   She is outside of the window  for IV tpa and would not be a candidate for neurointervention due to high MRS.      2. Relevant PMH:   Prior ischemic stroke/TIA[ ], Afib [x ], CAD [ ], HTN [x ], DLD [ ], DM [x ], PVD [ ], Obesity [ ],   Sedentary lifestyle [ ], CHF [ ], MARIMAR [ ], Cancer Hx [x ].    3. Social History: Smoking [ ], Drug Use [ ], Alcohol Use [ ], Other [ ]    4. Possible Location of Stroke:    5. Relevant Brain Tissue Imagin. Relevant Cerebrovascular Imaging:   CT Angio Neck w/ IV Cont:   EXAM:  CT ANGIO BRAIN (W)AW IC        EXAM:  CT ANGIO NECK (W)AW IC            PROCEDURE DATE:  2021            INTERPRETATION:  CLINICAL HISTORY / REASON FOR EXAM: Stroke code    TECHNIQUE/CT ANGIOGRAM OF THE HEAD AND NECK: Axial contiguous images were obtained of the head and neck following the intravenous administration of contrast. Reformatted images in the coronal and sagittal planes were acquired, as well as 3DMIP reconstructed images.    Reference per NASCET criteria for degree of stenosis: Mild: less than 50% stenosis. Moderate: 50-69% stenosis. Severe: 70-94% stenosis. Near occlusion: 95-99% stenosis.      COMPARISON: Chest CT 2021.    FINDINGS:    NECK CTA:    The visualized aortic arch and great vessel origins are patent. There is no stenosis at the origin of the right brachiocephalic, right and left common carotid, left subclavian, and right and left vertebral arteries. The left common carotid artery arises from a common trunk with the right brachiocephalic artery (anatomic variation).    The right and left common, internal and external carotid arteries are patent.    There is mild calcification and no flow-limiting stenosis at the common carotid artery bifurcations.  There is no evidence of significant stenosis or occlusion of the common carotid arteries, the carotid artery bifurcations, or along the course of the distal cervical portions of the right and left internal carotid arteries.    Normal branching pattern is noted of the bilateral external carotidarteries.    The vertebral arteries are patent.    HEAD CTA:    The distal right and left internal carotid arteries are patent with mild-to moderate calcification but no flow-limiting stenosis in the region of the cavernous and supraclinoid portions of the ICAs. There are prominent infundibula (less likely small aneurysms) at the origins of the left and right posterior communicating arteries; 2.8 mm on the left side (14/107; 10/111), and 1.8 mm on the right side (10/127; /124).    The ophthalmic arteries are patent.    There is normal contrast filling of the middle cerebral arteries and the anterior cerebral arteries bilaterally. No evidence of stenosis, occlusion or aneurysm.    The distal vertebral arteries are patent. The basilar artery is patent. There is normal filling of the PCAs, SCAs, PICAs and AICAs bilaterally. No evidence of stenosis, occlusion or aneurysm.      No venous abnormalities are noted. No evidence of filling defects within the venous sinuses.    OTHER: Postoperative change and opacification again noted in the left apex.      IMPRESSION:    No evidence of major vascular stenosis or occlusion.    There are prominent infundibula (less likely small aneurysms) at the origins of the left and right posterior communicating arteries; 2.8 mm on the left side (/107; 10/111), and 1.8 mm on the right side (10/127; /124).    --- End of Report ---        ANANDA GILES MD; Attending Interventional Radiologist  This document has been electronically signed. Aug 25 2021  8:41PM (21 @ 20:01)     CT Angio Head w/ IV Cont:   EXAM:  CT ANGIO BRAIN (W)AW IC        EXAM:  CT ANGIO NECK (W)AW IC            PROCEDURE DATE:  2021            INTERPRETATION:  CLINICAL HISTORY / REASON FOR EXAM: Stroke code    TECHNIQUE/CT ANGIOGRAM OF THE HEAD AND NECK: Axial contiguous images were obtained of the head and neck following the intravenous administration of contrast. Reformatted images in the coronal and sagittal planes were acquired, as well as 3DMIP reconstructed images.    Reference per NASCET criteria for degree of stenosis: Mild: less than 50% stenosis. Moderate: 50-69% stenosis. Severe: 70-94% stenosis. Near occlusion: 95-99% stenosis.      COMPARISON: Chest CT 2021.    FINDINGS:    NECK CTA:    The visualized aortic arch and great vessel origins are patent. There is no stenosis at the origin of the right brachiocephalic, right and left common carotid, left subclavian, and right and left vertebral arteries. The left common carotid artery arises from a common trunk with the right brachiocephalic artery (anatomic variation).    The right and left common, internal and external carotid arteries are patent.    There is mild calcification and no flow-limiting stenosis at the common carotid artery bifurcations.  There is no evidence of significant stenosis or occlusion of the common carotid arteries, the carotid artery bifurcations, or along the course of the distal cervical portions of the right and left internal carotid arteries.    Normal branching pattern is noted of the bilateral external carotidarteries.    The vertebral arteries are patent.    HEAD CTA:    The distal right and left internal carotid arteries are patent with mild-to moderate calcification but no flow-limiting stenosis in the region of the cavernous and supraclinoid portions of the ICAs. There are prominent infundibula (less likely small aneurysms) at the origins of the left and right posterior communicating arteries; 2.8 mm on the left side (14/107; 10/111), and 1.8 mm on the right side (10/127; 14/124).    The ophthalmic arteries are patent.    There is normal contrast filling of the middle cerebral arteries and the anterior cerebral arteries bilaterally. No evidence of stenosis, occlusion or aneurysm.    The distal vertebral arteries are patent. The basilar artery is patent. There is normal filling of the PCAs, SCAs, PICAs and AICAs bilaterally. No evidence of stenosis, occlusion or aneurysm.      No venous abnormalities are noted. No evidence of filling defects within the venous sinuses.    OTHER: Postoperative change and opacification again noted in the left apex.      IMPRESSION:    No evidence of major vascular stenosis or occlusion.    There are prominent infundibula (less likely small aneurysms) at the origins of the left and right posterior communicating arteries; 2.8 mm on the left side (14/107; 10/111), and 1.8 mm on the right side (10/127; 14/124).    --- End of Report ---      ANANDA GILES MD; Attending Interventional Radiologist  This document has been electronically signed. Aug 25 2021  8:41PM (21 @ 20:01)         7. Relevant blood tests:      8. Relevant cardiac rhythm monitorin. Relevant Cardiac Structure: (TTE/JASPAL +/-):[ ]No intracardiac thrombus/[ ] no vegetation/[ ]no [place]/EF:    Home Medications:  morphine 20 mg/mL oral concentrate: 0.25 milliliter(s) orally 4 times a day, As needed, dyspnea or pain (03 Aug 2021 16:05)  predniSONE 10 mg oral tablet: 1 tab(s) orally once a day until  (03 Aug 2021 16:05)  senna oral tablet: 2 tab(s) orally once a day (at bedtime) or until otherwise instructed by your provider (2021 16:54)      MEDICATIONS  (STANDING):      10. PT/OT/Speech/Rehab/S&Sw/ Cognitive eval results and recommendations:    11. Exam:    Vital Signs Last 24 Hrs  T(C): --  T(F): --  HR: 82 (25 Aug 2021 20:18) (71 - 82)  BP: 110/56 (25 Aug 2021 20:18) (110/56 - 127/59)  BP(mean): 75 (25 Aug 2021 20:18) (75 - 85)  RR: 18 (25 Aug 2021 20:18) (18 - 18)  SpO2: 99% (25 Aug 2021 20:18) (95% - 99%)    12.   NIH STROKE SCALE  Item	                                                        Score  1 a.	Level of Consciousness	               	0  1 b. LOC Questions	                                1  1 c.	LOC Commands	                               	0  2.	Best Gaze	                                        0  3.	Visual	                                                0  4.	Facial Palsy	                                        0  5 a.	Motor Arm - Left	                                1  5 b.	Motor Arm - Right	                        1  6 a.	Motor Leg - Left	                                3  6 b.	Motor Leg - Right	                                3  7.	Limb Ataxia	                                        0  8.	Sensory	                                                0  9.	Language	                                        1  10.	Dysarthria	                                        0  11.	Extinction and Inattention  	        0  ______________________________________  TOTAL	                                                        0    Total NIHSS on admission:      NIHSS yesterday:      NIHSS today: 10 (non ambulatory at baseline)    mRS:  0 No symptoms at all  1 No significant disability despite symptoms; able to carry out all usual duties and activities without assistance  2 Slight disability; unable to carry out all previous activities, but able to look after own affairs  3 Moderate disability; requiring some help, but able to walk without assistance  4 Moderately severe disability; unable to walk without assistance and unable to attend to own bodily needs without assistance  5 Severe disability; bedridden, incontinent and requiring constant nursing care and attention  6 Dead      13. Impression: 74 y/o F h/o COPD (on 3 L home O2 ), bronchogenic carcinoma (left lung, s/p left partial lobectomy, in remission x 9 yrs), Afib on Eliquis ablation and micra PPM, anxiety presented as a stroke code from nursing home due to poor PO intake and not speaking. LWN per facility is 3pm. In ED patient was able to answer some questions and follow commands with some generalized weakness. NIHSS is 10.  But then had episodes of starring off and not responding.  CTH is negative for acute pathology , CTA is negative for major vascular stenosis or hypoperfusion.   Hypoxic with elevated troponins.  She is outside of the window  for IV tpa and would not be a candidate for neurointervention due to high MRS.        15. Suggestions:   MRI brain NC or repeat CTH in 24 hrs  Echo  Continue Eliquis and other home meds  Trend troponins        Neuroattending note will follow

## 2021-08-25 NOTE — ED PROVIDER NOTE - CLINICAL SUMMARY MEDICAL DECISION MAKING FREE TEXT BOX
admitted for acute hypoxic and hypercapnic respiratory failure 2/2 to large pleural effusion. started on abx for treatment and given bipap for management.

## 2021-08-25 NOTE — ED PROVIDER NOTE - OBJECTIVE STATEMENT
74 y/o F h/o COPD (on 3 L home O2 ), bronchogenic carcinoma (left lung, s/p left partial lobectomy, in remission x 9 yrs), Afib on Eliquis ablation and micra PPM, anxiety p/w acute onset. BIBEMS from nursing home- Last known well at 3pm when she was having difficulty eating, and was rechecked at 530 as nonverbal

## 2021-08-25 NOTE — ED PROVIDER NOTE - CARE PLAN
Principal Discharge DX:	Acute respiratory failure with hypoxia   1 Principal Discharge DX:	Acute respiratory failure with hypoxia and hypercapnia

## 2021-08-25 NOTE — ED ADULT TRIAGE NOTE - CHIEF COMPLAINT QUOTE
PRE-NOTE - BIBA from NH with reports from staff that patient with increased lethargy, AMS and difficulty swallowing. Stroke Code Activated PTA

## 2021-08-25 NOTE — ED PROVIDER NOTE - PHYSICAL EXAMINATION
CONSTITUTIONAL: NAD  SKIN: Warm dry  HEAD: NCAT  EYES: NL inspection  ENT: MMM  NECK: Supple; non tender.  CARD: RRR  RESP: CTAB  ABD: S/NT no R/G  EXT: no pedal edema  NEURO: GSC15- able to verbalize and follow commands  +Weakness in LEFT upper arm, b/l LE weakness, sensation intact b/l  +UE drift LT>RT   PSYCH: Cooperative

## 2021-08-25 NOTE — ED PROVIDER NOTE - ATTENDING CONTRIBUTION TO CARE
patient with AMS from nursing home, last known well was 3 pm at 5 pm was noted to be non verbal so brought to the ed. on our exam she has intermittent epsiodes where she is speakign and follows commands with movement of all ext spontaneously. her lungs bilaterally are with crackles and rhonchi, abd is soft, heart is nml. plan is to obtain imaging and labs. she is found to be hypoxic on vss patient with AMS from nursing home, last known well was 3 pm at 5 pm was noted to be non verbal so brought to the ed. on our exam she has intermittent epsiodes where she is speakign and follows commands with movement of all ext spontaneously. her lungs bilaterally are with crackles and rhonchi with decreased sounds on the left compared to right. abd is soft, heart is nml. plan is to obtain imaging and labs. She is requiring nrb at 7 L for respiratory support as she is found to be hypoxic to 80s on ra

## 2021-08-26 NOTE — PROCEDURE NOTE - NSINDICATIONS_GEN_A_CORE
critical illness/emergency venous access/hypertonic/irritant infusion/venous access/volume resuscitation

## 2021-08-26 NOTE — PHARMACOTHERAPY INTERVENTION NOTE - COMMENTS
pt now intubated, recommended changing pantoprazole to suspension 40mg daily & adding chlorhexidene 0.12% q12h oral care

## 2021-08-26 NOTE — H&P ADULT - ASSESSMENT
IMPRESSION:  white left lung 2/2 atelectasis  hypercapnic respiratory failure     PLAN:    CNS: Avoid Sedatives,     HEENT: Oral care, HOB at 45, speech and swallow evaluation    PULMONARY: , inhalers, Duoneb, Repeat CXR, Repeat ABG, bipap,    CARDIOVASCULAR: trend troponin, hold eliquis and switch to lovenox      GI: GI prophylaxis,      RENAL: I=0, monitor electrolytes and replete as needed,     INFECTIOUS DISEASE: Antibiotics, cefepime, mrsa swab    HEMATOLOGICAL:  DVT prophylaxis, , Keep Hb above 7,     ENDOCRINE:  Follow up FS.  Insulin protocol if needed.         IMPRESSION:  white left lung 2/2 atelectasis  hypercapnic respiratory failure     PLAN:    CNS: Avoid Sedatives,     HEENT: Oral care, HOB at 45, speech and swallow evaluation    PULMONARY: , inhalers, Duoneb, Repeat CXR, Repeat ABG, bipap, ct chest    CARDIOVASCULAR: trend troponin, hold eliquis and switch to lovenox      GI: GI prophylaxis,      RENAL: I=0, monitor electrolytes and replete as needed,     INFECTIOUS DISEASE: Antibiotics, cefepime, mrsa swab    HEMATOLOGICAL:  DVT prophylaxis, , Keep Hb above 7,     ENDOCRINE:  Follow up FS.  Insulin protocol if needed.         IMPRESSION:    Acute on chornic hypercapnic resp failure  COPD exacerbation  white left lung 2/2 atelectasis/ mucus plug/ malignancy  a fib     PLAN:    CNS: Avoid Sedatives,     HEENT: Oral care, HOB at 45, speech and swallow evaluation    PULMONARY: , inhalers, Duoneb, Repeat CXR, steroids, Mucomyst    CARDIOVASCULAR: trend troponin, ce    GI: GI prophylaxis,      RENAL: I=0, monitor electrolytes and replete as needed,     INFECTIOUS DISEASE: Antibiotics, cefepime, mrsa swab    HEMATOLOGICAL:  DVT prophylaxis, , Keep Hb above 7,     ENDOCRINE:  Follow up FS.  Insulin protocol if needed.    very poor prognosis  GOC

## 2021-08-26 NOTE — H&P ADULT - NSHPPHYSICALEXAM_GEN_ALL_CORE
VITALS:   T(C): --  HR: 107 (08-26-21 @ 02:04) (71 - 107)  BP: 124/58 (08-26-21 @ 02:04) (97/64 - 127/59)  RR: 24 (08-26-21 @ 02:04) (18 - 24)  SpO2: 100% (08-26-21 @ 02:04) (95% - 100%)    GENERAL: NAD, lying in bed comfortably  HEAD:  Atraumatic, normocephalic  EYES: EOMI, PERRLA, conjunctiva and sclera clear  ENT: Moist mucous membranes  NECK: Supple, no JVD  HEART: Regular rate and rhythm, no murmurs, rubs, or gallops  LUNGS:  rhonchi, decreased left air entry   ABDOMEN: Soft, nontender, nondistended, +BS  EXTREMITIES: 2+ peripheral pulses bilaterally. No clubbing, cyanosis, or edema  NERVOUS SYSTEM:  A&Ox3, no focal deficits   SKIN: No rashes or lesions VITALS:   T(C): --  HR: 107 (08-26-21 @ 02:04) (71 - 107)  BP: 124/58 (08-26-21 @ 02:04) (97/64 - 127/59)  RR: 24 (08-26-21 @ 02:04) (18 - 24)  SpO2: 100% (08-26-21 @ 02:04) (95% - 100%)    GENERAL: ill looking  HEAD:  Atraumatic, normocephalic  EYES: EOMI, PERRLA, conjunctiva and sclera clear  ENT: Moist mucous membranes  NECK: Supple, no JVD  HEART: RAÚL 3/6  LUNGS:  rhonchi, decreased left air entry   ABDOMEN: Soft, nontender, nondistended, +BS  EXTREMITIES: 2+ peripheral pulses bilaterally. No clubbing, cyanosis, or edema  NERVOUS SYSTEM:  A&Ox3, no focal deficits   SKIN: No rashes or lesions

## 2021-08-26 NOTE — CHART NOTE - NSCHARTNOTEFT_GEN_A_CORE
DATE OF PROCEDURE: 08/26/2021    PREOPERATIVE DIAGNOSIS: Left lung collapse    POSTOPERATIVE DIAGNOSES: Left lung collapse      PROCEDURE PERFORMED:  Bronchoscopy and  washing.         Attending: Dr. Don Zendejas         ASSISTANT: Dr. Yen, Pulmonary and Critical Care Fellow         ANESTHESIA: as per RN record    CONSENT: After explaining the risk and benefit the consent was obtained from       The patient electively intubated and placed on mechanical ventilatory support. She was started on sedation and was continued and adjusted during the procedure. His FiO2 was increased to 100% during the procedure. The fiberoptic bronchoscope was introduced through an endotracheal tube adaptor and the tip of the endotracheal tube was adjusted to have in good position above the oswald.   The Right tracheobronchial tree was inspected closely to the level of the subsegmental bronchi. All bronchi are patent with no endobronchial lesions and no mucosal lesions noted.   The Left tracheobronchial tree revealed possible blood clot and endobronchial mass.   The bronchoscope was then introduced to the left lobe and was taken from that area.  The procedure was completed and all samples were submitted for appropriate studies  After adequate clearing of secretions was accomplished, the bronchoscope was removed from the patient and the procedure was ended.   The patient tolerated the procedure well and there were no complications. DATE OF PROCEDURE: 08/26/2021    PREOPERATIVE DIAGNOSIS: Left lung collapse    POSTOPERATIVE DIAGNOSES: Left lung collapse      PROCEDURE PERFORMED:  Bronchoscopy and  washing.         Attending: Dr. Don Zendejas         ASSISTANT: Dr. Yen, Pulmonary and Critical Care Fellow         ANESTHESIA: as per RN record    CONSENT: After explaining the risk and benefit the consent was obtained from       The patient electively intubated and placed on mechanical ventilatory support. She was started on sedation and was continued and adjusted during the procedure. His FiO2 was increased to 100% during the procedure. The fiberoptic bronchoscope was introduced through an endotracheal tube adaptor and the tip of the endotracheal tube was adjusted to have in good position above the oswald.   The Right tracheobronchial tree was inspected closely to the level of the subsegmental bronchi. All bronchi are patent with no endobronchial lesions and no mucosal lesions noted.   The Left tracheobronchial tree revealed possible blood clot and endobronchial mass unable to pass scope  The bronchoscope was then introduced to the left lobe and was taken from that area.  The procedure was completed and all samples were submitted for appropriate studies  After adequate clearing of secretions was accomplished, the bronchoscope was removed from the patient and the procedure was ended.   The patient tolerated the procedure well and there were no complications.    I was present throughout the procedure

## 2021-08-26 NOTE — PROGRESS NOTE ADULT - ASSESSMENT
76 y/o F h/o COPD (on 3 L home O2 ), bronchogenic carcinoma (left lung, s/p left partial lobectomy, in remission x 9 yrs), Afib on Eliquis ablation and micra PPM, anxiety p/w acute onset. BIBEMS from nursing home- Last known well at 3pm when she was having difficulty eating, and was rechecked at 530 as nonverbal and not following commands.  In the ED the patient was confused and disoriented and was found to have respiratory acidosis and left white lung.     Today patient's vitals were unstable, MICU team placed a central line. WBC elevated to 17 from 11. After reviewing imaging and seeing patient's clinical status the cause of her AMS is more likely to be toxic metabolic than stroke due to exam being nonfocal and exam more concerning for delirium/agitation, most likely secondary to infection/sepsis. Would obtain EEG to rule out seizures    Plan:  - Plan for EEG to rule out seizures   - MRI when able   - Medical management of infection per primary team     Case discussed with Dr. Bran

## 2021-08-26 NOTE — PROCEDURE NOTE - NSPROCDETAILS_GEN_ALL_CORE
guidewire recovered/lumen(s) aspirated and flushed/sterile technique, catheter placed/ultrasound guidance with use of sterile gel and probe cove

## 2021-08-26 NOTE — PROGRESS NOTE ADULT - SUBJECTIVE AND OBJECTIVE BOX
Neurology Progress Note    Interval History:  Vitals unstable today, medical team placed central line and is working on A line when neurology team accessed patient. WBC increased to 17 from 11 overnight.     HPI:  74 y/o F h/o COPD (on 3 L home O2 ), bronchogenic carcinoma (left lung, s/p left partial lobectomy, in remission x 9 yrs), Afib on Eliquis ablation and micra PPM, anxiety p/w acute onset. BIBEMS from nursing home- Last known well at 3pm when she was having difficulty eating, and was rechecked at 530 as nonverbal and not following commands.  In the ED the patient was confused and disoriented and was found to have respiratory acidosis and left white lung.     Today patient's vitals were unstable, MICU team placed a central line. WBC elevated to 17 from 11. After reviewing imaging and seeing patient's clinical status the cause of her AMS is more likely to be toxic metabolic than stroke.     PAST MEDICAL & SURGICAL HISTORY:  High cholesterol  Anxiety  Pacemaker  Lung cancer  Afib  COPD (chronic obstructive pulmonary disease)  Artificial cardiac pacemaker  H/O atrioventricular kacie ablation  S/P lobectomy of lung  left  History of tonsillectomy  S/P appendectomy  Cataract  RIGHT   AND  LEFT  19      Medications:  acetylcysteine 20%  Inhalation 4 milliLiter(s) Inhalation three times a day  albuterol/ipratropium for Nebulization 3 milliLiter(s) Nebulizer every 4 hours  atorvastatin 20 milliGRAM(s) Oral at bedtime  cefepime   IVPB 1000 milliGRAM(s) IV Intermittent every 8 hours  chlorhexidine 0.12% Liquid 15 milliLiter(s) Oral Mucosa every 12 hours  dexMEDEtomidine Infusion 0.2 MICROgram(s)/kG/Hr IV Continuous <Continuous>  enoxaparin Injectable 40 milliGRAM(s) SubCutaneous every 12 hours  fentaNYL   Infusion. 0.5 MICROgram(s)/kG/Hr IV Continuous <Continuous>  methylPREDNISolone sodium succinate Injectable 60 milliGRAM(s) IV Push every 8 hours  norepinephrine Infusion 0.05 MICROgram(s)/kG/Min IV Continuous <Continuous>  pantoprazole   Suspension 40 milliGRAM(s) Oral daily  sertraline 25 milliGRAM(s) Oral daily  sodium chloride 0.9% Bolus 250 milliLiter(s) IV Bolus once  sodium chloride 0.9% Bolus 500 milliLiter(s) IV Bolus once  sodium chloride 0.9%. 1000 milliLiter(s) IV Continuous <Continuous>      Vital Signs Last 24 Hrs  T(C): 37.7 (26 Aug 2021 12:00), Max: 37.7 (26 Aug 2021 12:00)  T(F): 99.8 (26 Aug 2021 12:00), Max: 99.8 (26 Aug 2021 12:00)  HR: 68 (26 Aug 2021 15:00) (68 - 112)  BP: 85/49 (26 Aug 2021 15:00) (78/46 - 127/67)  BP(mean): 59 (26 Aug 2021 15:00) (53 - 87)  RR: 24 (26 Aug 2021 15:00) (18 - 41)  SpO2: 100% (26 Aug 2021 15:00) (95% - 100%)    Neurological Exam:   Awake, Intubated, unable to purposefully follow commands  PERRL, No gaze preference noted, face grossly symmetric  All extremities antigravity and equal in strength.     Labs:  CBC Full  -  ( 26 Aug 2021 04:30 )  WBC Count : 17.35 K/uL  RBC Count : 3.13 M/uL  Hemoglobin : 8.7 g/dL  Hematocrit : 29.1 %  Platelet Count - Automated : 244 K/uL  Mean Cell Volume : 93.0 fL  Mean Cell Hemoglobin : 27.8 pg  Mean Cell Hemoglobin Concentration : 29.9 g/dL  Auto Neutrophil # : x  Auto Lymphocyte # : x  Auto Monocyte # : x  Auto Eosinophil # : x  Auto Basophil # : x  Auto Neutrophil % : x  Auto Lymphocyte % : x  Auto Monocyte % : x  Auto Eosinophil % : x  Auto Basophil % : x        140  |  98  |  29<H>  ----------------------------<  153<H>  5.2<H>   |  27  |  1.0    Ca    8.7      26 Aug 2021 04:30  Mg     2.0         TPro  5.4<L>  /  Alb  3.5  /  TBili  0.4  /  DBili  x   /  AST  16  /  ALT  14  /  AlkPhos  98  08-    LIVER FUNCTIONS - ( 26 Aug 2021 04:30 )  Alb: 3.5 g/dL / Pro: 5.4 g/dL / ALK PHOS: 98 U/L / ALT: 14 U/L / AST: 16 U/L / GGT: x           PT/INR - ( 25 Aug 2021 19:38 )   PT: 18.10 sec;   INR: 1.58 ratio         PTT - ( 25 Aug 2021 19:38 )  PTT:34.2 sec  Urinalysis Basic - ( 25 Aug 2021 19:27 )    Color: Yellow / Appearance: Clear / S.031 / pH: x  Gluc: x / Ketone: Negative  / Bili: Negative / Urobili: 3 mg/dL   Blood: x / Protein: Trace / Nitrite: Negative   Leuk Esterase: Negative / RBC: x / WBC x   Sq Epi: x / Non Sq Epi: x / Bacteria: x

## 2021-08-26 NOTE — H&P ADULT - HISTORY OF PRESENT ILLNESS
76 y/o F h/o COPD (on 3 L home O2 ), bronchogenic carcinoma (left lung, s/p left partial lobectomy, in remission x 9 yrs), Afib on Eliquis ablation and micra PPM, anxiety p/w acute onset. BIBEMS from nursing home- Last known well at 3pm when she was having difficulty eating, and was rechecked at 530 as nonverbal and not following commands.  In the ED the patient was confused and disoriented,she was found to have respiratory acidosis and left white lung.   She was placed on bipap and she improved.  The patient has difficulty hearing. 76 y/o F h/o COPD (on 3 L home O2 ), bronchogenic carcinoma (left lung, s/p left partial lobectomy, in remission x 9 yrs), Afib on Eliquis ablation and micra PPM, anxiety p/w acute onset. BIBEMS from nursing home- Last known well at 3pm when she was having difficulty eating, and was rechecked at 530 as nonverbal and not following commands.  In the ED the patient was confused and disoriented,she was found to have respiratory acidosis and left white lung.   She was placed on bipap and she improved.  The patient has difficulty hearing.

## 2021-08-26 NOTE — H&P ADULT - NSHPLABSRESULTS_GEN_ALL_CORE
LABS:                          9.9    11.88 )-----------( 300      ( 25 Aug 2021 19:38 )             33.0     Hb Trend: 9.9<--  WBC Trend: 11.88<--  Plt Trend: 300<--              140  |  98  |  24<H>  ----------------------------<  159<H>  5.5<H>   |  33<H>  |  0.9    Ca    9.2      25 Aug 2021 19:38    TPro  5.9<L>  /  Alb  3.8  /  TBili  0.4  /  DBili  x   /  AST  20  /  ALT  17  /  AlkPhos  103      Troponin T, Serum: 0.06 ng/mL *HH* (21 @ 19:38)    PT/INR - ( 25 Aug 2021 19:38 )   PT: 18.10 sec;   INR: 1.58 ratio         PTT - ( 25 Aug 2021 19:38 )  PTT:34.2 sec  Urinalysis Basic - ( 25 Aug 2021 19:27 )    Color: Yellow / Appearance: Clear / S.031 / pH: x  Gluc: x / Ketone: Negative  / Bili: Negative / Urobili: 3 mg/dL   Blood: x / Protein: Trace / Nitrite: Negative   Leuk Esterase: Negative / RBC: x / WBC x   Sq Epi: x / Non Sq Epi: x / Bacteria: x      CAPILLARY BLOOD GLUCOSE  147 (25 Aug 2021 21:06)      POCT Blood Glucose.: 147 mg/dL (25 Aug 2021 19:26)          IMAGING:

## 2021-08-27 NOTE — CONSULT NOTE ADULT - PROBLEM SELECTOR RECOMMENDATION 2
- Pt s/p extubation, now DNR. No further escalation of oxygen/no pulse ox monitoring. Symptom management as above. - Pt s/p extubation, now DNR. No further escalation of oxygen/no pulse ox monitoring. Symptom management as above.  - Morphine as above for symptoms.

## 2021-08-27 NOTE — CONSULT NOTE ADULT - CONVERSATION DETAILS
Palliative Care team spoke to son August    Reviewed current condition and treatment. He is aware of her end stage lung cancer and current decline in condition. He has made her DNR/DNI and is considering full comfort care    Palliative team explained comfort measures only. He voiced that his mother has wanted this for some time and he had been conflicted. But he does understand that she has only a short time left and he wants her to be comfortable.     He spoke to Dr Patino the pulmonary fellow and confirmed DNR/DNI/CMO

## 2021-08-27 NOTE — GOALS OF CARE CONVERSATION - ADVANCED CARE PLANNING - CONVERSATION DETAILS
Spoke with the patients son over the phone about the patients overall condition and the patient not wanting further intervention preformed. The patient's son August stated that he would like to transition to comfort measures only and wants the patient to be liberated from the vent. He acknowledged knowing that liberation from the ventilator will likely result in the patient's death. He verbally gave consent over the phone with charge RN as witness. The molst form was filled out and palliative care consult placed. Spoke with the patients son, August ([phone]) over the phone about the patients overall condition and the patient not wanting further intervention preformed. The patient's son August stated that he would like to transition to comfort measures only and wants the patient to be liberated from the vent. He acknowledged knowing that liberation from the ventilator will likely result in the patient's death. He verbally gave consent over the phone with charge RN as witness. The molst form was filled out and palliative care consult placed. Spoke with the patients son, August ([phone]) over the phone about the patients overall condition and the patient not wanting further intervention preformed. The patient's son August stated that he would like to transition to comfort measures only and wants the patient to be liberated from the vent. He acknowledged knowing that liberation from the ventilator will likely result in the patient's death. He verbally gave consent over the phone with charge RN Virgen as witness to transition the patient to DNR/DNI/CMO. The molst form was filled out and palliative care consult placed.

## 2021-08-27 NOTE — PROGRESS NOTE ADULT - SUBJECTIVE AND OBJECTIVE BOX
OVERNIGHT EVENTS: events noted, on precedex, fentanyl, NS 50 cc/h, low grade fever    Vital Signs Last 24 Hrs  T(C): 37.8 (27 Aug 2021 04:00), Max: 38.4 (26 Aug 2021 18:00)  T(F): 100.1 (27 Aug 2021 04:00), Max: 101.2 (26 Aug 2021 18:00)  HR: 68 (27 Aug 2021 07:00) (62 - 88)  BP: 126/65 (27 Aug 2021 07:00) (65/40 - 145/74)  BP(mean): 93 (27 Aug 2021 07:00) (45 - 110)  RR: 30 (27 Aug 2021 07:00) (15 - 42)  SpO2: 90% (27 Aug 2021 07:) (90% - 100%)    PHYSICAL EXAMINATION:    GENERAL: ILL LOOKING    HEENT: Head is normocephalic and atraumatic.    NECK: Supple.    LUNGS: dec bs l base    HEART: Regular rate and rhythm without murmur.    ABDOMEN: Soft, nontender, and nondistended.      EXTREMITIES: Without any cyanosis, clubbing, rash, lesions or edema.    NEUROLOGIC: Grossly intact.    SKIN: No ulceration or induration present.      LABS:                        6.9    7.36  )-----------( 163      ( 27 Aug 2021 04:30 )             22.2         139  |  101  |  30<H>  ----------------------------<  189<H>  4.5   |  23  |  0.7    Ca    7.9<L>      27 Aug 2021 04:30  Mg     1.8         TPro  4.4<L>  /  Alb  2.9<L>  /  TBili  0.5  /  DBili  x   /  AST  15  /  ALT  12  /  AlkPhos  83      PT/INR - ( 25 Aug 2021 19:38 )   PT: 18.10 sec;   INR: 1.58 ratio         PTT - ( 25 Aug 2021 19:38 )  PTT:34.2 sec  Urinalysis Basic - ( 25 Aug 2021 19:27 )    Color: Yellow / Appearance: Clear / S.031 / pH: x  Gluc: x / Ketone: Negative  / Bili: Negative / Urobili: 3 mg/dL   Blood: x / Protein: Trace / Nitrite: Negative   Leuk Esterase: Negative / RBC: x / WBC x   Sq Epi: x / Non Sq Epi: x / Bacteria: x      ABG - ( 27 Aug 2021 03:12 )  pH, Arterial: 7.51  pH, Blood: x     /  pCO2: 31    /  pO2: 136   / HCO3: 25    / Base Excess: 2.3   /  SaO2: 99.7        16/400/40/5      CARDIAC MARKERS ( 25 Aug 2021 19:38 )  x     / 0.06 ng/mL / x     / x     / x                Procalcitonin, Serum: 0.41 ng/mL (21 @ 12:08)        21 @ 07:01  -  21 @ 07:00  --------------------------------------------------------  IN: 2760.9 mL / OUT: 860 mL / NET: 1900.9 mL        MICROBIOLOGY:  Culture Results:   Testing in progress ( @ 19:39)      MEDICATIONS  (STANDING):  acetylcysteine 20%  Inhalation 4 milliLiter(s) Inhalation three times a day  albuterol/ipratropium for Nebulization 3 milliLiter(s) Nebulizer every 4 hours  atorvastatin 20 milliGRAM(s) Oral at bedtime  cefepime   IVPB 1000 milliGRAM(s) IV Intermittent every 8 hours  chlorhexidine 0.12% Liquid 15 milliLiter(s) Oral Mucosa every 12 hours  chlorhexidine 4% Liquid 1 Application(s) Topical <User Schedule>  dexMEDEtomidine Infusion 0.2 MICROgram(s)/kG/Hr (2.28 mL/Hr) IV Continuous <Continuous>  enoxaparin Injectable 40 milliGRAM(s) SubCutaneous every 12 hours  fentaNYL   Infusion. 0.5 MICROgram(s)/kG/Hr (2.28 mL/Hr) IV Continuous <Continuous>  methylPREDNISolone sodium succinate Injectable 60 milliGRAM(s) IV Push every 8 hours  norepinephrine Infusion 0.05 MICROgram(s)/kG/Min (4.28 mL/Hr) IV Continuous <Continuous>  pantoprazole   Suspension 40 milliGRAM(s) Oral daily  sertraline 25 milliGRAM(s) Oral daily  sodium chloride 0.9% Bolus 250 milliLiter(s) IV Bolus once  sodium chloride 0.9% Bolus 500 milliLiter(s) IV Bolus once  sodium chloride 0.9%. 1000 milliLiter(s) (50 mL/Hr) IV Continuous <Continuous>    MEDICATIONS  (PRN):  acetaminophen   Tablet .. 650 milliGRAM(s) Oral every 6 hours PRN Temp greater or equal to 38.5C (101.3F), Moderate Pain (4 - 6)      RADIOLOGY & ADDITIONAL STUDIES:

## 2021-08-27 NOTE — CHART NOTE - NSCHARTNOTEFT_GEN_A_CORE
PALLIATIVE MEDICINE INTERDISCIPLINARY TEAM NOTE    Provider:  [ x  ]Social Work   [   ]          [  x ] Initial visit [   ] Follow up    Family or contact name / phone #   Met with: [ x  ] Patient  [  x ] Family: spouse and step son  [   ] Other:    Primary Language: [   ] English [   ] Other*:                      *Interpretation provided by:    SUPPORT DIAGNOSES            (Check all that apply)  [   ] Psychosocial spiritual assessment (PSSA)  [   ] EOL issues  [   ] Cultural / spiritual concerns  [  x ] Pain / suffering  [   ] Dementia / AMS  [   ] Other:  [   ] AD issues  [   ] Grief / loss / sadness  [ x  ] Discharge issues  [  x ] Distress / coping    PSYCHOSOCIAL ASSESSMENT OF PATIENT         (Check all that apply)  [  x ] Initial Assessment            [   ] Reassessment          [   ] Not Applicable this visit    Pain/suffering acuity:  [   ] None to mild (0-3)           [ x  ] Moderate (4-6)        [   ] High (7-10)    Mental Status:  [  x ] Alert/oriented (x3)          [   ] Confused/Altered(x2/x1)         [   ] Non-resp    Functional status:  [   ] Independent w ADLs      [   ] Needs Assistance             [  x ] Bedbound/Full Care    Coping:  [   ] Coping well                     [  x ] Coping w/difficulty            [   ] Poor coping    Support system:  [ x  ] Strong                              [   ] Adequate                        [   ] Inadequate      Past history and medications for:     [ x] Anxiety       [ ] Depression    [ ] Sleep disorders     SPIRITUAL ASSESSMENT  Evangelical/Spiritual practice: ___________________________    Role of organized Anabaptism:  [   ] Important                     [   ] Some (fam tradition, cultural)               [   ] None    Effects on medical care:  [   ] Yes, _____________________________________                         [   ] None    Cultural/Rastafari need:  [   ] Yes, _____________________________________                         [   ] None    Refer to Pastoral Care:  [   ] Yes           [   ] No, not at this time    SERVICE PROVIDED  [   ]PSSA                                                                             [   ]Discharge support / facilitation  [ x  ]AD / goals of care counseling                                  [   ]EOL / death / bereavement counseling  [   ]Counseling / support                                                [   ] Family meeting  [ x  ]Prayer / sacrament / ritual                                      [   ] Referral   [   ]Other                                                                       NOTE and Plan of Care (PoC):    Patient is a 75 year old, , female.  Patient was admitted due to respiratory failure, ES COPD.  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    Patient is known to Palliative Care from previous admission.  Patient expressed concerns about being discharged.  Patient discussed that she wants to return to [place] (SNF).  Family discussed with patient CMO.  Patient expressed understanding of same.

## 2021-08-27 NOTE — PROGRESS NOTE ADULT - SUBJECTIVE AND OBJECTIVE BOX
Neurology Progress Note    Interval History:  patient is examined at the bedside, she is doing well on BIPAP, PRBC transfusing due to Hb of 6.9, answers questions and follows commands, no focal weakness.    HPI:  74 y/o F h/o COPD (on 3 L home O2 ), bronchogenic carcinoma (left lung, s/p left partial lobectomy, in remission x 9 yrs), Afib on Eliquis ablation and micra PPM, anxiety p/w acute onset. BIBEMS from nursing home- Last known well at 3pm when she was having difficulty eating, and was rechecked at 530 as nonverbal and not following commands.  In the ED the patient was confused and disoriented,she was found to have respiratory acidosis and left white lung.   She was placed on bipap and she improved.  The patient has difficulty hearing.   (26 Aug 2021 02:22)      PAST MEDICAL & SURGICAL HISTORY:  High cholesterol    Anxiety    Pacemaker    Lung cancer    Afib    COPD (chronic obstructive pulmonary disease)    Artificial cardiac pacemaker    H/O atrioventricular kacie ablation    S/P lobectomy of lung  left    History of tonsillectomy    S/P appendectomy    Cataract  RIGHT   AND  LEFT  19            Medications:  chlorhexidine 0.12% Liquid 15 milliLiter(s) Oral Mucosa every 12 hours  chlorhexidine 4% Liquid 1 Application(s) Topical <User Schedule>  LORazepam   Injectable 1 milliGRAM(s) IV Push every 4 hours PRN  morphine  - Injectable 2 milliGRAM(s) IV Push every 4 hours PRN      Vital Signs Last 24 Hrs  T(C): 37.6 (27 Aug 2021 12:00), Max: 38.4 (26 Aug 2021 18:00)  T(F): 99.7 (27 Aug 2021 12:00), Max: 101.2 (26 Aug 2021 18:00)  HR: 68 (27 Aug 2021 12:00) (50 - 79)  BP: 138/70 (27 Aug 2021 12:00) (65/40 - 145/74)  BP(mean): 95 (27 Aug 2021 12:00) (45 - 110)  RR: 26 (27 Aug 2021 12:00) (15 - 42)  SpO2: 91% (27 Aug 2021 12:00) (84% - 100%)    Neurological Exam:   Mental status: Awake, alert, on BIBAP, follows commands, appropriate  Cranial nerves: Pupils equally round and reactive to light, visual fields full, no nystagmus, extraocular muscles intact, V1 through V3 intact bilaterally and symmetric, face symmetric, hearing intact to finger rub, palate elevation symmetric, tongue was midline.  Motor:  MRC grading 5/5 b/l UE/LE.   strength 5/5.  Normal tone and bulk.  No abnormal movements.    Sensation: Intact to light touch    Labs:  CBC Full  -  ( 27 Aug 2021 04:30 )  WBC Count : 7.36 K/uL  RBC Count : 2.43 M/uL  Hemoglobin : 6.9 g/dL  Hematocrit : 22.2 %  Platelet Count - Automated : 163 K/uL  Mean Cell Volume : 91.4 fL  Mean Cell Hemoglobin : 28.4 pg  Mean Cell Hemoglobin Concentration : 31.1 g/dL  Auto Neutrophil # : 7.09 K/uL  Auto Lymphocyte # : 0.16 K/uL  Auto Monocyte # : 0.06 K/uL  Auto Eosinophil # : 0.00 K/uL  Auto Basophil # : 0.00 K/uL  Auto Neutrophil % : 96.3 %  Auto Lymphocyte % : 2.2 %  Auto Monocyte % : 0.8 %  Auto Eosinophil % : 0.0 %  Auto Basophil % : 0.0 %        139  |  101  |  30<H>  ----------------------------<  189<H>  4.5   |  23  |  0.7    Ca    7.9<L>      27 Aug 2021 04:30  Mg     1.8         TPro  4.4<L>  /  Alb  2.9<L>  /  TBili  0.5  /  DBili  x   /  AST  15  /  ALT  12  /  AlkPhos  83      LIVER FUNCTIONS - ( 27 Aug 2021 04:30 )  Alb: 2.9 g/dL / Pro: 4.4 g/dL / ALK PHOS: 83 U/L / ALT: 12 U/L / AST: 15 U/L / GGT: x           PT/INR - ( 25 Aug 2021 19:38 )   PT: 18.10 sec;   INR: 1.58 ratio         PTT - ( 25 Aug 2021 19:38 )  PTT:34.2 sec  Urinalysis Basic - ( 25 Aug 2021 19:27 )    Color: Yellow / Appearance: Clear / S.031 / pH: x  Gluc: x / Ketone: Negative  / Bili: Negative / Urobili: 3 mg/dL   Blood: x / Protein: Trace / Nitrite: Negative   Leuk Esterase: Negative / RBC: x / WBC x   Sq Epi: x / Non Sq Epi: x / Bacteria: x        Assessment:  This is a 75y Female w/ h/o     Plan: Neurology Progress Note    Interval History:  patient is examined at the bedside, she is doing well on BIPAP, PRBC transfusing due to Hb of 6.9, answers questions and follows commands, no focal weakness.    HPI:  74 y/o F h/o COPD (on 3 L home O2 ), bronchogenic carcinoma (left lung, s/p left partial lobectomy, in remission x 9 yrs), Afib on Eliquis ablation and micra PPM, anxiety p/w acute onset. BIBEMS from nursing home- Last known well at 3pm when she was having difficulty eating, and was rechecked at 530 as nonverbal and not following commands.  In the ED the patient was confused and disoriented,she was found to have respiratory acidosis and left white lung.   She was placed on bipap and she improved.  The patient has difficulty hearing.   (26 Aug 2021 02:22)      PAST MEDICAL & SURGICAL HISTORY:  High cholesterol    Anxiety    Pacemaker    Lung cancer    Afib    COPD (chronic obstructive pulmonary disease)    Artificial cardiac pacemaker    H/O atrioventricular kacie ablation    S/P lobectomy of lung  left    History of tonsillectomy    S/P appendectomy    Cataract  RIGHT   AND  LEFT  19            Medications:  chlorhexidine 0.12% Liquid 15 milliLiter(s) Oral Mucosa every 12 hours  chlorhexidine 4% Liquid 1 Application(s) Topical <User Schedule>  LORazepam   Injectable 1 milliGRAM(s) IV Push every 4 hours PRN  morphine  - Injectable 2 milliGRAM(s) IV Push every 4 hours PRN      Vital Signs Last 24 Hrs  T(C): 37.6 (27 Aug 2021 12:00), Max: 38.4 (26 Aug 2021 18:00)  T(F): 99.7 (27 Aug 2021 12:00), Max: 101.2 (26 Aug 2021 18:00)  HR: 68 (27 Aug 2021 12:00) (50 - 79)  BP: 138/70 (27 Aug 2021 12:00) (65/40 - 145/74)  BP(mean): 95 (27 Aug 2021 12:00) (45 - 110)  RR: 26 (27 Aug 2021 12:00) (15 - 42)  SpO2: 91% (27 Aug 2021 12:00) (84% - 100%)    Neurological Exam:   Mental status: Awake, alert, on BIBAP, follows commands, appropriate  Cranial nerves: Pupils equally round and reactive to light, visual fields full, no nystagmus, extraocular muscles intact, V1 through V3 intact bilaterally and symmetric, face symmetric, hearing intact to finger rub, palate elevation symmetric, tongue was midline.  Motor:  MRC grading 5/5 b/l UE/LE.   strength 5/5.  Normal tone and bulk.  No abnormal movements.    Sensation: Intact to light touch    Labs:  CBC Full  -  ( 27 Aug 2021 04:30 )  WBC Count : 7.36 K/uL  RBC Count : 2.43 M/uL  Hemoglobin : 6.9 g/dL  Hematocrit : 22.2 %  Platelet Count - Automated : 163 K/uL  Mean Cell Volume : 91.4 fL  Mean Cell Hemoglobin : 28.4 pg  Mean Cell Hemoglobin Concentration : 31.1 g/dL  Auto Neutrophil # : 7.09 K/uL  Auto Lymphocyte # : 0.16 K/uL  Auto Monocyte # : 0.06 K/uL  Auto Eosinophil # : 0.00 K/uL  Auto Basophil # : 0.00 K/uL  Auto Neutrophil % : 96.3 %  Auto Lymphocyte % : 2.2 %  Auto Monocyte % : 0.8 %  Auto Eosinophil % : 0.0 %  Auto Basophil % : 0.0 %        139  |  101  |  30<H>  ----------------------------<  189<H>  4.5   |  23  |  0.7    Ca    7.9<L>      27 Aug 2021 04:30  Mg     1.8         TPro  4.4<L>  /  Alb  2.9<L>  /  TBili  0.5  /  DBili  x   /  AST  15  /  ALT  12  /  AlkPhos  83      LIVER FUNCTIONS - ( 27 Aug 2021 04:30 )  Alb: 2.9 g/dL / Pro: 4.4 g/dL / ALK PHOS: 83 U/L / ALT: 12 U/L / AST: 15 U/L / GGT: x           PT/INR - ( 25 Aug 2021 19:38 )   PT: 18.10 sec;   INR: 1.58 ratio         PTT - ( 25 Aug 2021 19:38 )  PTT:34.2 sec  Urinalysis Basic - ( 25 Aug 2021 19:27 )    Color: Yellow / Appearance: Clear / S.031 / pH: x  Gluc: x / Ketone: Negative  / Bili: Negative / Urobili: 3 mg/dL   Blood: x / Protein: Trace / Nitrite: Negative   Leuk Esterase: Negative / RBC: x / WBC x   Sq Epi: x / Non Sq Epi: x / Bacteria: x

## 2021-08-27 NOTE — CONSULT NOTE ADULT - ASSESSMENT
75yFemale being evaluated for      MEDD (morphine equivalent daily dose):      See Recs below.    Please call [phone] with questions or concerns 24/7.   We will continue to follow.    75yFemale being evaluated for goals of care and symptom management. Pt known to palliative care team from previous hospitalizations. Pt has stage 4 lung cancer, no active treatment. Now brought in from SNF with hypoxia. Pt with tumor invading main bronchus. Pt has expressed wish to be comfortable. Son who is in [place] and is her HCP has now agreed to comfort care.     Pt seen and examined and is alert off BIPAP on nasal cannula. She has dyspnea and anxiety, denies pain. Starting Morphine and Ativan and will titrate until pt is comfortable. Palliative team providing support for patient. Also called son August back left palliative care service number for call back to provide support as well. And to discuss hospice if pt sustains.       MEDD (morphine equivalent daily dose): 0      See Recs below.    Please call [phone] with questions or concerns 24/7.   We will continue to follow.    75yFemale being evaluated for goals of care and symptom management. Pt known to palliative care team from previous hospitalizations. Pt has stage 4 lung cancer, no active treatment. Now brought in from SNF with hypoxia. Pt with tumor invading main bronchus. Pt has expressed wish to be comfortable. Son who is in [place] and is her HCP has now agreed to comfort care.     Pt seen and examined and is alert off BIPAP on nasal cannula. She has dyspnea and anxiety, denies pain. Starting Morphine and Ativan and will titrate until pt is comfortable. Palliative team providing support for patient. Also called son August back left palliative care service number for call back to provide support as well. And to discuss hospice if pt sustains.       MEDD (morphine equivalent daily dose): 0      See Recs below.    Please call [phone] with questions or concerns 24/7.   We will continue to follow.  Discussed with MD and fellow Dr Marrero

## 2021-08-27 NOTE — CHART NOTE - NSCHARTNOTEFT_GEN_A_CORE
HPI:    74 y/o F h/o COPD (on 3 L home O2 ), bronchogenic carcinoma (left lung, s/p left partial lobectomy, in remission x 9 yrs), Afib on Eliquis ablation and micra PPM, anxiety p/w acute onset. BIBEMS from nursing home- Last known well at 3pm when she was having difficulty eating, and was rechecked at 530 as nonverbal and not following commands.  In the ED the patient was confused and disoriented,she was found to have respiratory acidosis and left white lung.   She was placed on bipap and she improved.  The patient has difficulty hearing.    MICU course 8/26-8/27:    Patient was intubated and underwent therapeutic bronchoscopy at bedside on 8/26. Her hemoglobin dropped overnight and she was tranfused 1 unit RBC. She was extubated on 8/27 and was breathing fine on room air with improved mental status. Patient stable for downgrade to general [facility] floor and made comfort measures only by son who is her healthcare proxy. Palliative care is following (patient was previously on home hospice).       To do:    -comfort measures only  -f/u recs per pall care   ativan 1mg IV push q6h prn for agitation   morphine 2mg IV push q4h prn for pain HPI:    74 y/o F h/o COPD (on 3 L home O2 ), bronchogenic carcinoma (left lung, s/p left partial lobectomy, in remission x 9 yrs), Afib on Eliquis ablation and micra PPM, anxiety p/w acute onset. BIBEMS from nursing home- Last known well at 3pm when she was having difficulty eating, and was rechecked at 530 as nonverbal and not following commands.  In the ED the patient was confused and disoriented was found to have respiratory acidosis and left white lung.   She was placed on bipap and she improved. The patient has difficulty hearing.    MICU course 8/26-8/27:    Patient was intubated in the MICU and underwent therapeutic bronchoscopy at bedside on 8/26. Her hemoglobin dropped overnight and she was transfused 1 unit RBC. She was extubated on 8/27 and was breathing fine on nasal canula with improved mental status. Patient stable for downgrade to general [facility] floor and made DNR/DNI and comfort measures only by son who is her healthcare proxy. Palliative care is following (patient was previously on home hospice).     To do:    -comfort measures only  -f/u recs per pall care:   ativan 1mg IV push q6h prn for agitation   morphine 2mg IV push q4h for pain   morphine 2mg IV push q1h PRN for dyspnea/pain

## 2021-08-27 NOTE — PROGRESS NOTE ADULT - ASSESSMENT
IMPRESSION:    Acute on chornic hypercapnic resp failure  COPD exacerbation  white left lung 2/2 atelectasis/ blood clots/ malignancy sp intubation, bronch  a fib     PLAN:    CNS: SAT    HEENT: Oral care, HOB at 4    PULMONARY: , inhalers, Duoneb, Repeat CXR, steroids, sbt, decrease solumedrol    CARDIOVASCULAR: keep equal balance    GI: GI prophylaxis,      RENAL: I=0, monitor electrolytes and replete as needed,     INFECTIOUS DISEASE: Antibiotics, cefepime, mrsa swab - f/up cx, cyto    HEMATOLOGICAL:  DVT prophylaxis, , Keep Hb above 7,     ENDOCRINE:  Follow up FS.  Insulin protocol if needed.    very poor prognosis  [facility]    RIJ 8/26  KYLE Thibodeaux

## 2021-08-27 NOTE — CONSULT NOTE ADULT - SUBJECTIVE AND OBJECTIVE BOX
NAZANIN POWERS          MRN-[id number]              HPI:  76 y/o F h/o COPD (on 3 L home O2 ), bronchogenic carcinoma (left lung, s/p left partial lobectomy, in remission x 9 yrs), Afib on Eliquis ablation and micra PPM, anxiety p/w acute onset. BIBEMS from nursing home- Last known well at 3pm when she was having difficulty eating, and was rechecked at 530 as nonverbal and not following commands.  In the ED the patient was confused and disoriented,she was found to have respiratory acidosis and left white lung.   She was placed on bipap and she improved.  The patient has difficulty hearing.   (26 Aug 2021 02:22)      PAST MEDICAL & SURGICAL HISTORY:  High cholesterol    Anxiety    Pacemaker    Lung cancer    Afib    COPD (chronic obstructive pulmonary disease)    Artificial cardiac pacemaker    H/O atrioventricular kacie ablation    S/P lobectomy of lung  left    History of tonsillectomy    S/P appendectomy    Cataract  RIGHT   AND  LEFT  19        FAMILY HISTORY:   Reviewed and found non contributory in mother or father    SOCIAL HISTORY:   Tobacco/etoh/illicit drug use use reported. Yes [ ]  _________  No [x ]  Pt resides at: home [ ]  facility [x ]  other [ ] _______        ROS:	                        Dyspnea (Marcel 0-10): 8                       N/V (Y/N): No                             Secretions (Y/N) : No                                          Agitation(Y/N): No                              Pain (Y/N): No                                     General:  Denied  HEENT:    Denied  Neck:  Denied  CVS:  Denied  Resp:  Denied  GI:  constipation   :  Denied  Musc:  Denied  Neuro:  Denied  Psych:  Denied  Skin:  Denied  Lymph:  Denied    Last BM: ?      Allergies    bacitracin (Other)  carboplatin (Other)  sulfa drugs (Unknown)  sulfonamides (Unknown)  Xanax (Other)    Intolerances      Opiate Naive (Y/N):   -iStop reviewed (Y/N):   Ref#:     Patient Name: Nazanin Schaefer Date: 1945  Address: [address] [place] [id number]: Female  Rx Written	Rx Dispensed	Drug	Quantity	Days Supply	Prescriber Name	Prescriber Jolanta #	Payment Method	Dispenser  2021	clonazepam 0.5 mg tablet	90	30	Taty Kidd	[id number]	[facility] #[id number]  2021	klonopin 0.5 mg tablet	90	30	Taty Kidd	[id number]	Medicare	[facility] #[id number]  2021	lorazepam 0.5 mg tablet	30	30	Penny Griffith MD	[id number]	[facility] #[id number]  2020	klonopin 0.5 mg tablet	90	30	Marti Taty	[id number]	Medicare	[facility] #[id number]  2020	klonopin 0.5 mg tablet	90	30	Carmine Spencer MD	[id number]	Medicare	[facility] #[id number]  2020	klonopin 0.5 mg tablet	90	30	Marti Taty	[id number]	Medicare	[facility] #[id number]    Patient Name: Nazanin Schaefer Date: 1945  Address: [address] [place] [id number]: Female  Rx Written	Rx Dispensed	Drug	Quantity	Days Supply	Prescriber Name	Prescriber Jolanta #	Payment Method	Dispenser  2021	morphine sulf 100 mg/5 ml conc	15ml	10	Aroldo Vogt MD	[id number]	Insurance	[facility]  2021	morphine sulf 100 mg/5 ml conc	15ml	10	Manda Claros	[id number]	Insurance	[facility]    Patient Name: Nazanin Schaefer Date: 1945  Address: [address] [place], [place] [address]Qig: Female  Rx Written	Rx Dispensed	Drug	Quantity	Days Supply	Prescriber Name	Prescriber Jolanta #	Payment Method	Dispenser  2021	clonazepam 0.5 mg tablet	12	4	Kaylee Alexander FNP	[id number]	Insurance	[facility]  2021	clonazepam 1 mg tablet	28	7	Ravindra Telles)	[id number]	Insurance	[facility]  2021	lorazepam 1 mg tablet	28	7	Rigoberto Rebolledo	[id number]	Insurance	[facility]  2021	morphine sulf 100 mg/5 ml conc	30ml	10	Kaylee Alexander ELLA	[id number]	[facility]  2021	lorazepam 0.5 mg tablet	10	3	Kaylee Alexander FNP	[id number]	[facility]             Labs:	    CBC:                        6.9    7.36  )-----------( 163      ( 27 Aug 2021 04:30 )             22.2     CMP:        139  |  101  |  30<H>  ----------------------------<  189<H>  4.5   |  23  |  0.7    Ca    7.9<L>      27 Aug 2021 04:30  Mg     1.8         TPro  4.4<L>  /  Alb  2.9<L>  /  TBili  0.5  /  DBili  x   /  AST  15  /  ALT  12  /  AlkPhos  83         PT/INR - ( 25 Aug 2021 19:38 )   PT: 18.10 sec;   INR: 1.58 ratio         PTT - ( 25 Aug 2021 19:38 )  PTT:34.2 sec     Urinalysis Basic - ( 25 Aug 2021 19:27 )    Color: Yellow / Appearance: Clear / S.031 / pH: x  Gluc: x / Ketone: Negative  / Bili: Negative / Urobili: 3 mg/dL   Blood: x / Protein: Trace / Nitrite: Negative   Leuk Esterase: Negative / RBC: x / WBC x   Sq Epi: x / Non Sq Epi: x / Bacteria: x          Radiology:	       EK Lead ECG:   Ventricular Rate 72 BPM    Atrial Rate 50 BPM    QRS Duration 140 ms    Q-T Interval 446 ms    QTC Calculation(Bazett) 488 ms    P Axis 52 degrees    R Axis 140 degrees    T Axis 89 degrees    Diagnosis Line Ventricular-paced rhythm  Abnormal ECG    Confirmed by Kadeem Chahal ([id number]) on 2021 10:46:08 PM (21 @ 19:46)      Imaging Personally Reviewed:  [x ] YES  [ ] NO    Consultant(s) Notes Reviewed:  [ x] YES  [ ] NO  Care Discussed with Consultants/Other Providers [x ] YES  [ ] NO    PEx:	  T(C): 37.2 (21 @ 08:00), Max: 38.4 (21 @ 18:00)  HR: 68 (21 @ 10:00) (50 - 88)  BP: 114/68 (21 @ 10:00) (65/40 - 145/74)  RR: 32 (21 @ 10:00) (15 - 42)  SpO2: 100% (21 @ 10:00) (90% - 100%)  Wt(kg): --  Daily     Daily Weight in k.2 (27 Aug 2021 05:15)    General: elderly female found in bed in distress   Eyes:  PERRL EOMI Non icteric MOM  ENMT: no external oral ulcers, MMM, no thrush   CVS: RR S1S2 No M/G/R  Resp: Labored tachypneic breathing on BIPAP   GI:  Soft tender diffusely ND BS+  :   Thibodeaux   Musc: No C/C/E    Neuro: Follows commands No focal deficits  Psych: Calm Pleasant, AAOx3    Skin: Non jaundiced , no rash   Lymph: no adenopathy     Preadmit Karnofsky:  %           Current Karnofsky:     %  http://www.npcrc.org/files/news/karnofsky_performance_scale.pdf   http://www.npcrc.org/files/news/palliative_performance_scale_PPSv2.pdf  Cachexia (Y/N): yes   BMI:      Medications:	      MEDICATIONS  (STANDING):  acetylcysteine 20%  Inhalation 4 milliLiter(s) Inhalation three times a day  albuterol/ipratropium for Nebulization 3 milliLiter(s) Nebulizer every 4 hours  atorvastatin 20 milliGRAM(s) Oral at bedtime  cefepime   IVPB 1000 milliGRAM(s) IV Intermittent every 8 hours  chlorhexidine 0.12% Liquid 15 milliLiter(s) Oral Mucosa every 12 hours  chlorhexidine 4% Liquid 1 Application(s) Topical <User Schedule>  enoxaparin Injectable 40 milliGRAM(s) SubCutaneous every 12 hours  methylPREDNISolone sodium succinate Injectable 60 milliGRAM(s) IV Push every 12 hours  norepinephrine Infusion 0.05 MICROgram(s)/kG/Min (4.28 mL/Hr) IV Continuous <Continuous>  pantoprazole   Suspension 40 milliGRAM(s) Oral daily  sertraline 25 milliGRAM(s) Oral daily  sodium chloride 0.9%. 1000 milliLiter(s) (50 mL/Hr) IV Continuous <Continuous>    MEDICATIONS  (PRN):  acetaminophen   Tablet .. 650 milliGRAM(s) Oral every 6 hours PRN Temp greater or equal to 38.5C (101.3F), Moderate Pain (4 - 6)        Advanced Directives:	  DNR/DNI   Decision maker: The patient is able to participate in complex medical decision making conversations.   Legal surrogate: Nav mas     GOALS OF CARE DISCUSSION	       Palliative care info/counseling provided	           Family meeting          Documentation of GOC/Advanced Care Planning:       See previous Palliative Medicine Note    PSYCHOSOCIAL-SPIRITUAL ASSESSMENT:       Reviewed       See Palliative Care SW/ documentation        	    REFERRALS       Palliative Med        Unit SW/Case Mgmt              Hospice     NAZANIN POWERS          MRN-[id number]              HPI:  76 y/o F h/o COPD (on 3 L home O2 ), bronchogenic carcinoma (left lung, s/p left partial lobectomy, in remission x 9 yrs), Afib on Eliquis ablation and micra PPM, anxiety p/w acute onset. BIBEMS from nursing home- Last known well at 3pm when she was having difficulty eating, and was rechecked at 530 as nonverbal and not following commands.  In the ED the patient was confused and disoriented,she was found to have respiratory acidosis and left white lung.   She was placed on bipap and she improved.  The patient has difficulty hearing.   (26 Aug 2021 02:22)      PAST MEDICAL & SURGICAL HISTORY:  High cholesterol    Anxiety    Pacemaker    Lung cancer    Afib    COPD (chronic obstructive pulmonary disease)    Artificial cardiac pacemaker    H/O atrioventricular kacie ablation    S/P lobectomy of lung  left    History of tonsillectomy    S/P appendectomy    Cataract  RIGHT   AND  LEFT  19        FAMILY HISTORY:   Reviewed and found non contributory in mother or father  No hx of stroke.     SOCIAL HISTORY:   Tobacco/etoh/illicit drug use use reported. Yes [ ]  _________  No [x ]  Pt resides at: home [ ]  facility [x ]  other [ ] _______      ROS:	                        Dyspnea (Marcel 0-10): 8                       N/V (Y/N): No                             Secretions (Y/N) : No                                          Agitation(Y/N): No                              Pain (Y/N): No                                     General:  Denied  HEENT:    Denied  Neck:  Denied  CVS:  Denied  Resp:  Denied  GI:  constipation   :  Denied  Musc:  Denied  Neuro:  Denied  Psych:  Denied  Skin:  Denied  Lymph:  Denied    Last BM: none for the past 2 days.       Allergies    bacitracin (Other)  carboplatin (Other)  sulfa drugs (Unknown)  sulfonamides (Unknown)  Xanax (Other)    Intolerances      Opiate Naive (Y/N): y  -iStop reviewed (Y/N): y  Ref#:     Patient Name: Nazanin Schaefer Date: 1945  Address: [address] [place] [id number]: Female  Rx Written	Rx Dispensed	Drug	Quantity	Days Supply	Prescriber Name	Prescriber Jolanta #	Payment Method	Dispenser  2021	clonazepam 0.5 mg tablet	90	30	Marti, Taty	[id number]	[facility] #[id number]  2021	klonopin 0.5 mg tablet	90	30	Marti, Taty	[id number]	Medicare	[facility] #[id number]  2021	lorazepam 0.5 mg tablet	30	30	Penny Griffith MD	[id number]	[facility] #[id number]  2020	klonopin 0.5 mg tablet	90	30	Marti, Taty	[id number]	Medicare	[facility] #[id number]  2020	klonopin 0.5 mg tablet	90	30	Carmine Spencer MD	[id number]	Medicare	[facility] #[id number]  2020	klonopin 0.5 mg tablet	90	30	Marti, Taty	[id number]	Medicare	[facility] #[id number]    Patient Name: Nazanin Schaefer Date: 1945  Address: [address] [place] [id number]: Female  Rx Written	Rx Dispensed	Drug	Quantity	Days Supply	Prescriber Name	Prescriber Jolanta #	Payment Method	Dispenser  2021	morphine sulf 100 mg/5 ml conc	15ml	10	Aroldo Vogt MD	[id number]	Insurance	[facility]  2021	morphine sulf 100 mg/5 ml conc	15ml	10	Manda Claros	[id number]	Insurance	[facility]    Patient Name: Nazanin Schaefer Date: 1945  Address: [address] [place], [place] [address]Ppy: Female  Rx Written	Rx Dispensed	Drug	Quantity	Days Supply	Prescriber Name	Prescriber Jolanta #	Payment Method	Dispenser  2021	clonazepam 0.5 mg tablet	12	4	Kaylee Alexander FNP	[id number]	Insurance	[facility]  2021	clonazepam 1 mg tablet	28	7	Ravindra Telles)	[id number]	Insurance	[facility]  2021	2021	lorazepam 1 mg tablet	28	7	Rigoberto Rebolledo	[id number]	Insurance	[facility]  2021	morphine sulf 100 mg/5 ml conc	30ml	10	Kaylee Alexander [facility]	[id number]	[facility]  2021	lorazepam 0.5 mg tablet	10	3	Kaylee Alexander [facility]	[id number]	[facility]             Labs:	    CBC:                        6.9    7.36  )-----------( 163      ( 27 Aug 2021 04:30 )             22.2     CMP:        139  |  101  |  30<H>  ----------------------------<  189<H>  4.5   |  23  |  0.7    Ca    7.9<L>      27 Aug 2021 04:30  Mg     1.8         TPro  4.4<L>  /  Alb  2.9<L>  /  TBili  0.5  /  DBili  x   /  AST  15  /  ALT  12  /  AlkPhos  83         PT/INR - ( 25 Aug 2021 19:38 )   PT: 18.10 sec;   INR: 1.58 ratio         PTT - ( 25 Aug 2021 19:38 )  PTT:34.2 sec     Urinalysis Basic - ( 25 Aug 2021 19:27 )    Color: Yellow / Appearance: Clear / S.031 / pH: x  Gluc: x / Ketone: Negative  / Bili: Negative / Urobili: 3 mg/dL   Blood: x / Protein: Trace / Nitrite: Negative   Leuk Esterase: Negative / RBC: x / WBC x   Sq Epi: x / Non Sq Epi: x / Bacteria: x          Radiology:	       EK Lead ECG:   Ventricular Rate 72 BPM    Atrial Rate 50 BPM    QRS Duration 140 ms    Q-T Interval 446 ms    QTC Calculation(Bazett) 488 ms    P Axis 52 degrees    R Axis 140 degrees    T Axis 89 degrees    Diagnosis Line Ventricular-paced rhythm  Abnormal ECG    Confirmed by Kadeem Chahal ([id number]) on 2021 10:46:08 PM (21 @ 19:46)      Imaging Personally Reviewed:  [x ] YES  [ ] NO    Consultant(s) Notes Reviewed:  [ x] YES  [ ] NO  Care Discussed with Consultants/Other Providers [x ] YES  [ ] NO    PEx:	  T(C): 37.2 (21 @ 08:00), Max: 38.4 (21 @ 18:00)  HR: 68 (21 @ 10:00) (50 - 88)  BP: 114/68 (21 @ 10:00) (65/40 - 145/74)  RR: 32 (21 @ 10:00) (15 - 42)  SpO2: 100% (21 @ 10:00) (90% - 100%)  Wt(kg): --  Daily     Daily Weight in k.2 (27 Aug 2021 05:15)    General: elderly female found in bed in distress   Eyes:  PERRL EOMI Non icteric MOM  ENMT: no external oral ulcers, MMM, no thrush   CVS: RR S1S2 No M/G/R  Resp: Labored tachypneic breathing on BIPAP   GI:  Soft tender diffusely ND BS+  :   Thibodeaux   Musc: No C/C/E    Neuro: Follows commands No focal deficits  Psych: Calm Pleasant, AAOx3    Skin: Non jaundiced , no rash   Lymph: no adenopathy     Preadmit Karnofsky:  %           Current Karnofsky:     %  http://www.npcrc.org/files/news/karnofsky_performance_scale.pdf   http://www.npcrc.org/files/news/palliative_performance_scale_PPSv2.pdf  Cachexia (Y/N): yes   BMI:      Medications:	      MEDICATIONS  (STANDING):  acetylcysteine 20%  Inhalation 4 milliLiter(s) Inhalation three times a day  albuterol/ipratropium for Nebulization 3 milliLiter(s) Nebulizer every 4 hours  atorvastatin 20 milliGRAM(s) Oral at bedtime  cefepime   IVPB 1000 milliGRAM(s) IV Intermittent every 8 hours  chlorhexidine 0.12% Liquid 15 milliLiter(s) Oral Mucosa every 12 hours  chlorhexidine 4% Liquid 1 Application(s) Topical <User Schedule>  enoxaparin Injectable 40 milliGRAM(s) SubCutaneous every 12 hours  methylPREDNISolone sodium succinate Injectable 60 milliGRAM(s) IV Push every 12 hours  norepinephrine Infusion 0.05 MICROgram(s)/kG/Min (4.28 mL/Hr) IV Continuous <Continuous>  pantoprazole   Suspension 40 milliGRAM(s) Oral daily  sertraline 25 milliGRAM(s) Oral daily  sodium chloride 0.9%. 1000 milliLiter(s) (50 mL/Hr) IV Continuous <Continuous>    MEDICATIONS  (PRN):  acetaminophen   Tablet .. 650 milliGRAM(s) Oral every 6 hours PRN Temp greater or equal to 38.5C (101.3F), Moderate Pain (4 - 6)        Advanced Directives:	  DNR/DNI   Decision maker: The patient is able to participate in complex medical decision making conversations.   Legal surrogate: Nav mas     GOALS OF CARE DISCUSSION	       Palliative care info/counseling provided	           Family meeting          Documentation of GOC/Advanced Care Planning:       See previous Palliative Medicine Note    PSYCHOSOCIAL-SPIRITUAL ASSESSMENT:       Reviewed       See Palliative Care SW/ documentation        	    REFERRALS       Palliative Med        Unit SW/Case Mgmt              Hospice     NAZANIN POWERS          [id number]              HPI:  76 y/o F h/o COPD (on 3 L home O2 ), bronchogenic carcinoma (left lung, s/p left partial lobectomy, in remission x 9 yrs), Afib on Eliquis ablation and micra PPM, anxiety p/w acute onset. BIBEMS from nursing home- Last known well at 3pm when she was having difficulty eating, and was rechecked at 530 as nonverbal and not following commands.  In the ED the patient was confused and disoriented,she was found to have respiratory acidosis and left white lung.   She was placed on bipap and she improved.  The patient has difficulty hearing.   (26 Aug 2021 02:22)      PAST MEDICAL & SURGICAL HISTORY:  High cholesterol    Anxiety    Pacemaker    Lung cancer    Afib    COPD (chronic obstructive pulmonary disease)    Artificial cardiac pacemaker    H/O atrioventricular kacie ablation    S/P lobectomy of lung  left    History of tonsillectomy    S/P appendectomy    Cataract  RIGHT   AND  LEFT  19        FAMILY HISTORY:   Reviewed and found non contributory in mother or father  No hx of stroke.     SOCIAL HISTORY:   Tobacco/etoh/illicit drug use use reported. Yes [ ]  _________  No [x ]  Pt resides at: home [ ]  facility [x ]  other [ ] _______      ROS:	                        Dyspnea (Marcel 0-10): 8                       N/V (Y/N): No                             Secretions (Y/N) : No                                          Agitation(Y/N): No                              Pain (Y/N): No                                     General:  Denied  HEENT:    Denied  Neck:  Denied  CVS:  Denied  Resp:  Denied  GI:  constipation   :  Denied  Musc:  Denied  Neuro:  Denied  Psych:  Denied  Skin:  Denied  Lymph:  Denied    Last BM: none for the past 2 days.       Allergies    bacitracin (Other)  carboplatin (Other)  sulfa drugs (Unknown)  sulfonamides (Unknown)  Xanax (Other)    Intolerances      Opiate Naive (Y/N): y  -iStop reviewed (Y/N): y  Ref#:     Patient Name: Nazanin Schaefer Date: 1945  Address: [address] [place] [id number]: Female  Rx Written	Rx Dispensed	Drug	Quantity	Days Supply	Prescriber Name	Prescriber Jolanta #	Payment Method	Dispenser  2021	clonazepam 0.5 mg tablet	90	30	Marti, Taty	[id number]	[facility] #[id number]  2021	klonopin 0.5 mg tablet	90	30	Marti, Taty	[id number]	Medicare	[facility] #[id number]  2021	lorazepam 0.5 mg tablet	30	30	Penny Griffith MD	[id number]	[facility] #[id number]  2020	klonopin 0.5 mg tablet	90	30	Marti, Taty	[id number]	Medicare	[facility] #[id number]  2020	klonopin 0.5 mg tablet	90	30	Carmine Spencer MD	[id number]	Medicare	[facility] #[id number]  2020	klonopin 0.5 mg tablet	90	30	Marti, Taty	[id number]	Medicare	[facility] #[id number]    Patient Name: Nazanin Schaefer Date: 1945  Address: [address] [place] [phone]: Female  Rx Written	Rx Dispensed	Drug	Quantity	Days Supply	Prescriber Name	Prescriber Jolanta #	Payment Method	Dispenser  2021	morphine sulf 100 mg/5 ml conc	15ml	10	Aroldo Vogt MD	[id number]	Insurance	[facility]  2021	morphine sulf 100 mg/5 ml conc	15ml	10	Manda Claros	[id number]	Insurance	[facility]    Patient Name: Nazanin Schaefer Date: 1945  Address: [address] [place], [place] [address]Pgk: Female  Rx Written	Rx Dispensed	Drug	Quantity	Days Supply	Prescriber Name	Prescriber Jolanta #	Payment Method	Dispenser  2021	clonazepam 0.5 mg tablet	12	4	Kaylee Alexander FNP	[id number]	Insurance	[facility]  2021	clonazepam 1 mg tablet	28	7	Ravindra Telles)	[id number]	Insurance	[facility]  2021	2021	lorazepam 1 mg tablet	28	7	Rigoberto Rebolledo	[id number]	[facility]  2021	morphine sulf 100 mg/5 ml conc	30ml	10	Kaylee Alexander [facility]	[id number]	[facility]  2021	lorazepam 0.5 mg tablet	10	3	Kaylee Alexander SALMA	[id number]	[facility]             Labs:	    CBC:                        6.9    7.36  )-----------( 163      ( 27 Aug 2021 04:30 )             22.2     CMP:        139  |  101  |  30<H>  ----------------------------<  189<H>  4.5   |  23  |  0.7    Ca    7.9<L>      27 Aug 2021 04:30  Mg     1.8         TPro  4.4<L>  /  Alb  2.9<L>  /  TBili  0.5  /  DBili  x   /  AST  15  /  ALT  12  /  AlkPhos  83         PT/INR - ( 25 Aug 2021 19:38 )   PT: 18.10 sec;   INR: 1.58 ratio         PTT - ( 25 Aug 2021 19:38 )  PTT:34.2 sec     Urinalysis Basic - ( 25 Aug 2021 19:27 )    Color: Yellow / Appearance: Clear / S.031 / pH: x  Gluc: x / Ketone: Negative  / Bili: Negative / Urobili: 3 mg/dL   Blood: x / Protein: Trace / Nitrite: Negative   Leuk Esterase: Negative / RBC: x / WBC x   Sq Epi: x / Non Sq Epi: x / Bacteria: x          Radiology:	   CXR   Stable left hemithorax opacification with volume loss and pleural effusion.        EK Lead ECG:   Ventricular Rate 72 BPM    Atrial Rate 50 BPM    QRS Duration 140 ms    Q-T Interval 446 ms    QTC Calculation(Bazett) 488 ms    P Axis 52 degrees    R Axis 140 degrees    T Axis 89 degrees    Diagnosis Line Ventricular-paced rhythm  Abnormal ECG        Imaging Personally Reviewed:  [x ] YES  [ ] NO    Consultant(s) Notes Reviewed:  [ x] YES  [ ] NO  Care Discussed with Consultants/Other Providers [x ] YES  [ ] NO    PEx:	  T(C): 37.2 (21 @ 08:00), Max: 38.4 (21 @ 18:00)  HR: 68 (21 @ 10:00) (50 - 88)  BP: 114/68 (21 @ 10:00) (65/40 - 145/74)  RR: 32 (21 @ 10:00) (15 - 42)  SpO2: 100% (21 @ 10:00) (90% - 100%)  Wt(kg): --  Daily     Daily Weight in k.2 (27 Aug 2021 05:15)    General: elderly female found in bed in distress   Eyes:  PERRL EOMI Non icteric MOM  ENMT: no external oral ulcers, MMM, no thrush   CVS: RR S1S2 No M/G/R  Resp: Labored tachypneic breathing on BIPAP   GI:  Soft tender diffusely ND BS+  :   Thibodeaux   Musc: No C/C/E    Neuro: Follows commands No focal deficits  Psych: Calm Pleasant, AAOx3    Skin: Non jaundiced , no rash   Lymph: no adenopathy     Preadmit Karnofsky:  %           Current Karnofsky:     %  http://www.npcrc.org/files/news/karnofsky_performance_scale.pdf   http://www.npcrc.org/files/news/palliative_performance_scale_PPSv2.pdf  Cachexia (Y/N): yes   BMI:      Medications:	      MEDICATIONS  (STANDING):  acetylcysteine 20%  Inhalation 4 milliLiter(s) Inhalation three times a day  albuterol/ipratropium for Nebulization 3 milliLiter(s) Nebulizer every 4 hours  atorvastatin 20 milliGRAM(s) Oral at bedtime  cefepime   IVPB 1000 milliGRAM(s) IV Intermittent every 8 hours  chlorhexidine 0.12% Liquid 15 milliLiter(s) Oral Mucosa every 12 hours  chlorhexidine 4% Liquid 1 Application(s) Topical <User Schedule>  enoxaparin Injectable 40 milliGRAM(s) SubCutaneous every 12 hours  methylPREDNISolone sodium succinate Injectable 60 milliGRAM(s) IV Push every 12 hours  norepinephrine Infusion 0.05 MICROgram(s)/kG/Min (4.28 mL/Hr) IV Continuous <Continuous>  pantoprazole   Suspension 40 milliGRAM(s) Oral daily  sertraline 25 milliGRAM(s) Oral daily  sodium chloride 0.9%. 1000 milliLiter(s) (50 mL/Hr) IV Continuous <Continuous>    MEDICATIONS  (PRN):  acetaminophen   Tablet .. 650 milliGRAM(s) Oral every 6 hours PRN Temp greater or equal to 38.5C (101.3F), Moderate Pain (4 - 6)        Advanced Directives:	  DNR/DNI   Decision maker: The patient is able to participate in complex medical decision making conversations.   Legal surrogate: Nav mas     GOALS OF CARE DISCUSSION	       Palliative care info/counseling provided	           Family meeting          Documentation of GOC/Advanced Care Planning: as below     PSYCHOSOCIAL-SPIRITUAL ASSESSMENT:       Reviewed       See Palliative Care SW/ documentation        	    REFERRALS       Palliative Med        Unit SW/Case Mgmt              Hospice

## 2021-08-27 NOTE — CONSULT NOTE ADULT - TREATMENT GUIDELINES
DNI/DNR Order/Comfort measures only/Do not re-hospitalize/No blood draws/No artificial nutrition/No antibiotics

## 2021-08-27 NOTE — CONSULT NOTE ADULT - ATTENDING COMMENTS
Patient seen and examined with NP.  Spoke to son over the phone and explained CMO.  he spoke to ICU fellow Dr Marrero later and confirmed CMO.  Met with patient, she confirmed that she wants to be CMO.  Please see recommendations as above.  Will continue to follow.

## 2021-08-27 NOTE — PROGRESS NOTE ADULT - SUBJECTIVE AND OBJECTIVE BOX
CASEY POWERS 75y Female  MRN#: [id number]   CODE STATUS: DNR    Hospital Day:     Pt is currently admitted with the primary diagnosis of acute on chronic hypercapnic resp failure    SUBJECTIVE  Overnight/Interval Events: Patient on BiPAP overnight and uncomfortable this morning turned over on side. Minimally responsive but could say name and knows she is in the hospital. MICU team spoke with son, patient is DNR and will be intubated for bronchoscopy today.                                            ----------------------------------------------------------  OBJECTIVE  PAST MEDICAL & SURGICAL HISTORY  High cholesterol  Anxiety  Pacemaker  Lung cancer  Afib  COPD (chronic obstructive pulmonary disease)  Artificial cardiac pacemaker  H/O atrioventricular kacie ablation  S/P lobectomy of lung  left  History of tonsillectomy  S/P appendectomy    Cataract  RIGHT   AND  LEFT  19                                              -----------------------------------------------------------  ALLERGIES:  bacitracin (Other)  carboplatin (Other)  sulfa drugs (Unknown)  sulfonamides (Unknown)  Xanax (Other)                                            ------------------------------------------------------------    HOME MEDICATIONS  Home Medications:  morphine 20 mg/mL oral concentrate: 0.25 milliliter(s) orally 4 times a day, As needed, dyspnea or pain (03 Aug 2021 16:05)  predniSONE 10 mg oral tablet: 1 tab(s) orally once a day until 8/ (03 Aug 2021 16:05)  senna oral tablet: 2 tab(s) orally once a day (at bedtime) or until otherwise instructed by your provider (2021 16:54)                           MEDICATIONS:  STANDING MEDICATIONS  albuterol/ipratropium for Nebulization 3 milliLiter(s) Nebulizer every 4 hours  atorvastatin 20 milliGRAM(s) Oral at bedtime  cefepime   IVPB 1000 milliGRAM(s) IV Intermittent every 8 hours  enoxaparin Injectable 40 milliGRAM(s) SubCutaneous every 12 hours  pantoprazole    Tablet 40 milliGRAM(s) Oral before breakfast  sertraline 25 milliGRAM(s) Oral daily  sodium chloride 0.9%. 1000 milliLiter(s) IV Continuous <Continuous>    PRN MEDICATIONS                                            ------------------------------------------------------------  VITAL SIGNS: Last 24 Hours  T(C): 37.3 (26 Aug 2021 05:00), Max: 37.3 (26 Aug 2021 05:00)  T(F): 99.2 (26 Aug 2021 05:00), Max: 99.2 (26 Aug 2021 05:00)  HR: 94 (26 Aug 2021 06:00) (71 - 112)  BP: 95/73 (26 Aug 2021 06:00) (95/73 - 127/67)  BP(mean): 87 (26 Aug 2021 06:00) (73 - 87)  RR: 41 (26 Aug 2021 06:00) (18 - 41)  SpO2: 100% (26 Aug 2021 06:00) (95% - 100%)      21 @ 07:01  -  21 @ 07:00  --------------------------------------------------------  IN: 450 mL / OUT: 300 mL / NET: 150 mL                                             --------------------------------------------------------------  LABS:                        9.9    11.88 )-----------( 300      ( 25 Aug 2021 19:38 )             33.0     08-25    140  |  98  |  24<H>  ----------------------------<  159<H>  5.5<H>   |  33<H>  |  0.9    Ca    9.2      25 Aug 2021 19:38    TPro  5.9<L>  /  Alb  3.8  /  TBili  0.4  /  DBili  x   /  AST  20  /  ALT  17  /  AlkPhos  103      PT/INR - ( 25 Aug 2021 19:38 )   PT: 18.10 sec;   INR: 1.58 ratio         PTT - ( 25 Aug 2021 19:38 )  PTT:34.2 sec  Urinalysis Basic - ( 25 Aug 2021 19:27 )    Color: Yellow / Appearance: Clear / S.031 / pH: x  Gluc: x / Ketone: Negative  / Bili: Negative / Urobili: 3 mg/dL   Blood: x / Protein: Trace / Nitrite: Negative   Leuk Esterase: Negative / RBC: x / WBC x   Sq Epi: x / Non Sq Epi: x / Bacteria: x        Troponin T, Serum: 0.06 ng/mL *HH* (21 @ 19:38)          CARDIAC MARKERS ( 25 Aug 2021 19:38 )  x     / 0.06 ng/mL / x     / x     / x                                                  -------------------------------------------------------------  RADIOLOGY:  CXR: Complete opacification left hemithorax with effusion, postoperative changes and volume loss.                                          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 SHe was found to have white out of left lung and placed on BiPAP in the ED.    #acute on chronic hypercapnic respiratory failure  #COPD exacerbation  presentation likely consistent with mucus plug based on trachial deviation on CXR  CXR: Complete opacification left hemithorax with effusion, postoperative changes and volume loss.    abuterol/ipratropium nebs  mucomyst  methylprednisone 60mg IV q8h  intubation for bronch (patient DNR)  cefepime 1g q8h  ABG: pH 7.4, pCO2 59, pO2 84. HCO3- 36    #atrial fibrillation  -troponin  -c/w atorvastatin  -f/u BNP    #anxiety  #depression  -sertraline 25mg oral  -precedex sedation    #stage 2 ulcer on butt  -f/u recs per wound care      Diet: npo  DVT ppx: lovenox 40mg q12h  Dispo: acute  Code: DNR CASEY POWERS 75y Female  MRN#: [id number]   CODE STATUS: DNR    Hospital Day: 1    Pt is currently admitted with the primary diagnosis of acute on chronic hypercapnic resp failure    SUBJECTIVE  Overnight/Interval Events: Overnight patient's hemoglobin dropped but she refused blood transfusion. This morning, she       on BiPAP overnight and uncomfortable this morning turned over on side. Minimally responsive but could say name and knows she is in the hospital. MICU team spoke with son, patient is DNR and will be intubated for bronchoscopy today.                                            ----------------------------------------------------------  OBJECTIVE  PAST MEDICAL & SURGICAL HISTORY  High cholesterol  Anxiety  Pacemaker  Lung cancer  Afib  COPD (chronic obstructive pulmonary disease)  Artificial cardiac pacemaker  H/O atrioventricular kacie ablation  S/P lobectomy of lung  left  History of tonsillectomy  S/P appendectomy    Cataract  RIGHT   AND  LEFT  19                                              -----------------------------------------------------------  ALLERGIES:  bacitracin (Other)  carboplatin (Other)  sulfa drugs (Unknown)  sulfonamides (Unknown)  Xanax (Other)                                            ------------------------------------------------------------    HOME MEDICATIONS  Home Medications:  morphine 20 mg/mL oral concentrate: 0.25 milliliter(s) orally 4 times a day, As needed, dyspnea or pain (03 Aug 2021 16:05)  predniSONE 10 mg oral tablet: 1 tab(s) orally once a day until 8/9 (03 Aug 2021 16:05)  senna oral tablet: 2 tab(s) orally once a day (at bedtime) or until otherwise instructed by your provider (2021 16:54)                           MEDICATIONS:  STANDING MEDICATIONS  albuterol/ipratropium for Nebulization 3 milliLiter(s) Nebulizer every 4 hours  atorvastatin 20 milliGRAM(s) Oral at bedtime  cefepime   IVPB 1000 milliGRAM(s) IV Intermittent every 8 hours  enoxaparin Injectable 40 milliGRAM(s) SubCutaneous every 12 hours  pantoprazole    Tablet 40 milliGRAM(s) Oral before breakfast  sertraline 25 milliGRAM(s) Oral daily  sodium chloride 0.9%. 1000 milliLiter(s) IV Continuous <Continuous>    PRN MEDICATIONS                                            ------------------------------------------------------------  VITAL SIGNS: Last 24 Hours  T(C): 37.3 (26 Aug 2021 05:00), Max: 37.3 (26 Aug 2021 05:00)  T(F): 99.2 (26 Aug 2021 05:00), Max: 99.2 (26 Aug 2021 05:00)  HR: 94 (26 Aug 2021 06:00) (71 - 112)  BP: 95/73 (26 Aug 2021 06:00) (95/73 - 127/67)  BP(mean): 87 (26 Aug 2021 06:00) (73 - 87)  RR: 41 (26 Aug 2021 06:00) (18 - 41)  SpO2: 100% (26 Aug 2021 06:00) (95% - 100%)      21 @ 07:01  -  21 @ 07:00  --------------------------------------------------------  IN: 450 mL / OUT: 300 mL / NET: 150 mL                                             --------------------------------------------------------------  LABS:                        9.9    11.88 )-----------( 300      ( 25 Aug 2021 19:38 )             33.0         140  |  98  |  24<H>  ----------------------------<  159<H>  5.5<H>   |  33<H>  |  0.9    Ca    9.2      25 Aug 2021 19:38    TPro  5.9<L>  /  Alb  3.8  /  TBili  0.4  /  DBili  x   /  AST  20  /  ALT  17  /  AlkPhos  103      PT/INR - ( 25 Aug 2021 19:38 )   PT: 18.10 sec;   INR: 1.58 ratio         PTT - ( 25 Aug 2021 19:38 )  PTT:34.2 sec  Urinalysis Basic - ( 25 Aug 2021 19:27 )    Color: Yellow / Appearance: Clear / S.031 / pH: x  Gluc: x / Ketone: Negative  / Bili: Negative / Urobili: 3 mg/dL   Blood: x / Protein: Trace / Nitrite: Negative   Leuk Esterase: Negative / RBC: x / WBC x   Sq Epi: x / Non Sq Epi: x / Bacteria: x        Troponin T, Serum: 0.06 ng/mL *HH* (21 @ 19:38)          CARDIAC MARKERS ( 25 Aug 2021 19:38 )  x     / 0.06 ng/mL / x     / x     / x                                                  -------------------------------------------------------------  RADIOLOGY:  CXR: Complete opacification left hemithorax with effusion, postoperative changes and volume loss.                                          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 SHe was found to have white out of left lung and placed on BiPAP in the ED.    #acute on chronic hypercapnic respiratory failure  #COPD exacerbation  presentation likely consistent with mucus plug based on trachial deviation on CXR  CXR: Complete opacification left hemithorax with effusion, postoperative changes and volume loss.    abuterol/ipratropium nebs  mucomyst  methylprednisone 60mg IV q8h  intubation for bronch (patient DNR)  cefepime 1g q8h  ABG: pH 7.4, pCO2 59, pO2 84. HCO3- 36    #atrial fibrillation  -troponin  -c/w atorvastatin  -f/u BNP    #anxiety  #depression  -sertraline 25mg oral  -precedex sedation    #stage 2 ulcer on butt  -f/u recs per wound care      Diet: npo  DVT ppx: lovenox 40mg q12h  Dispo: acute  Code: DNR CASEY POWERS 75y Female  MRN#: [id number]   CODE STATUS: DNR    Hospital Day: 1  Pt is currently admitted with the primary diagnosis of acute on chronic hypercapnic resp failure    SUBJECTIVE  Overnight/Interval Events: Overnight patient's hemoglobin dropped but she refused blood transfusion. This morning, pulm fellow spoke to son and got consent for blood transfusion. She was extubated this morning per pulm fellow and is tolerating AVAPS. Patient is DNR/DNI and palliative care to follow.                                            ----------------------------------------------------------  OBJECTIVE  PAST MEDICAL & SURGICAL HISTORY  High cholesterol  Anxiety  Pacemaker  Lung cancer  Afib  COPD (chronic obstructive pulmonary disease)  Artificial cardiac pacemaker  H/O atrioventricular kacie ablation  S/P lobectomy of lung  left  History of tonsillectomy  S/P appendectomy    Cataract  RIGHT   AND  LEFT  19                                            -----------------------------------------------------------  ALLERGIES:  bacitracin (Other)  carboplatin (Other)  sulfa drugs (Unknown)  sulfonamides (Unknown)  Xanax (Other)                                          ------------------------------------------------------------  HOME MEDICATIONS  Home Medications:  morphine 20 mg/mL oral concentrate: 0.25 milliliter(s) orally 4 times a day, As needed, dyspnea or pain (03 Aug 2021 16:05)  predniSONE 10 mg oral tablet: 1 tab(s) orally once a day until 8/ (03 Aug 2021 16:05)  senna oral tablet: 2 tab(s) orally once a day (at bedtime) or until otherwise instructed by your provider (2021 16:54)                         MEDICATIONS:  STANDING MEDICATIONS  albuterol/ipratropium for Nebulization 3 milliLiter(s) Nebulizer every 4 hours  atorvastatin 20 milliGRAM(s) Oral at bedtime  cefepime   IVPB 1000 milliGRAM(s) IV Intermittent every 8 hours  enoxaparin Injectable 40 milliGRAM(s) SubCutaneous every 12 hours  pantoprazole    Tablet 40 milliGRAM(s) Oral before breakfast  sertraline 25 milliGRAM(s) Oral daily  sodium chloride 0.9%. 1000 milliLiter(s) IV Continuous <Continuous>    PRN MEDICATIONS                                          ------------------------------------------------------------  VITAL SIGNS: Last 24 Hours  T(C): 37.3 (26 Aug 2021 05:00), Max: 37.3 (26 Aug 2021 05:00)  T(F): 99.2 (26 Aug 2021 05:00), Max: 99.2 (26 Aug 2021 05:00)  HR: 94 (26 Aug 2021 06:00) (71 - 112)  BP: 95/73 (26 Aug 2021 06:00) (95/73 - 127/67)  BP(mean): 87 (26 Aug 2021 06:00) (73 - 87)  RR: 41 (26 Aug 2021 06:00) (18 - 41)  SpO2: 100% (26 Aug 2021 06:00) (95% - 100%)      21 @ 07:01  -  21 @ 07:00  --------------------------------------------------------  IN: 450 mL / OUT: 300 mL / NET: 150 mL                                             --------------------------------------------------------------  LABS:                        9.9    11.88 )-----------( 300      ( 25 Aug 2021 19:38 )             33.0     08-25    140  |  98  |  24<H>  ----------------------------<  159<H>  5.5<H>   |  33<H>  |  0.9    Ca    9.2      25 Aug 2021 19:38    TPro  5.9<L>  /  Alb  3.8  /  TBili  0.4  /  DBili  x   /  AST  20  /  ALT  17  /  AlkPhos  103      PT/INR - ( 25 Aug 2021 19:38 )   PT: 18.10 sec;   INR: 1.58 ratio         PTT - ( 25 Aug 2021 19:38 )  PTT:34.2 sec  Urinalysis Basic - ( 25 Aug 2021 19:27 )    Color: Yellow / Appearance: Clear / S.031 / pH: x  Gluc: x / Ketone: Negative  / Bili: Negative / Urobili: 3 mg/dL   Blood: x / Protein: Trace / Nitrite: Negative   Leuk Esterase: Negative / RBC: x / WBC x   Sq Epi: x / Non Sq Epi: x / Bacteria: x    Troponin T, Serum: 0.06 ng/mL *HH* (21 @ 19:38)    CARDIAC MARKERS ( 25 Aug 2021 19:38 )  x     / 0.06 ng/mL / x     / x     / x                                                -------------------------------------------------------------  RADIOLOGY:  CXR: Complete opacification left hemithorax with effusion, postoperative changes and volume loss.                                          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 She was found to have white out of left lung and placed on BiPAP in the ED.    #acute on chronic hypercapnic respiratory failure  #COPD exacerbation  per pulm fellow, rationale for bronchoscopy on  presentation likely consistent with mucus plug based on tracheal deviation on CXR      CXR: Complete opacification left hemithorax with effusion, postoperative changes and volume loss.    abuterol/ipratropium nebs  mucomyst  methylprednisone 60mg IV q8h  intubation for bronch (patient DNR)  cefepime 1g q8h  ABG: pH 7.4, pCO2 59, pO2 84. HCO3- 36    #atrial fibrillation  -troponin  -c/w atorvastatin  -f/u BNP    #anxiety  #depression  -sertraline 25mg oral  -precedex sedation    #stage 2 ulcer on butt  -f/u recs per wound care    Diet: npo  DVT ppx: lovenox 40mg q12h  Dispo: acute  Code: DNR CASEY POWERS 75y Female  MRN#: [id number]   CODE STATUS: DNR    Hospital Day: 1  Pt is currently admitted with the primary diagnosis of acute on chronic hypercapnic resp failure    SUBJECTIVE  Overnight/Interval Events: Overnight patient's hemoglobin dropped but she refused blood transfusion. This morning, pulm fellow spoke to son and got consent for blood transfusion. She was extubated this morning per pulm fellow and is tolerating AVAPS. Patient is DNR/DNI and palliative care to follow.                                            ----------------------------------------------------------  OBJECTIVE  PAST MEDICAL & SURGICAL HISTORY  High cholesterol  Anxiety  Pacemaker  Lung cancer  Afib  COPD (chronic obstructive pulmonary disease)  Artificial cardiac pacemaker  H/O atrioventricular kacie ablation  S/P lobectomy of lung  left  History of tonsillectomy  S/P appendectomy    Cataract  RIGHT   AND  LEFT  19                                            -----------------------------------------------------------  ALLERGIES:  bacitracin (Other)  carboplatin (Other)  sulfa drugs (Unknown)  sulfonamides (Unknown)  Xanax (Other)                                          ------------------------------------------------------------  HOME MEDICATIONS  Home Medications:  morphine 20 mg/mL oral concentrate: 0.25 milliliter(s) orally 4 times a day, As needed, dyspnea or pain (03 Aug 2021 16:05)  predniSONE 10 mg oral tablet: 1 tab(s) orally once a day until 8/ (03 Aug 2021 16:05)  senna oral tablet: 2 tab(s) orally once a day (at bedtime) or until otherwise instructed by your provider (2021 16:54)                         MEDICATIONS:  STANDING MEDICATIONS  albuterol/ipratropium for Nebulization 3 milliLiter(s) Nebulizer every 4 hours  atorvastatin 20 milliGRAM(s) Oral at bedtime  cefepime   IVPB 1000 milliGRAM(s) IV Intermittent every 8 hours  enoxaparin Injectable 40 milliGRAM(s) SubCutaneous every 12 hours  pantoprazole    Tablet 40 milliGRAM(s) Oral before breakfast  sertraline 25 milliGRAM(s) Oral daily  sodium chloride 0.9%. 1000 milliLiter(s) IV Continuous <Continuous>    PRN MEDICATIONS                                          ------------------------------------------------------------  VITAL SIGNS: Last 24 Hours  T(C): 37.3 (26 Aug 2021 05:00), Max: 37.3 (26 Aug 2021 05:00)  T(F): 99.2 (26 Aug 2021 05:00), Max: 99.2 (26 Aug 2021 05:00)  HR: 94 (26 Aug 2021 06:00) (71 - 112)  BP: 95/73 (26 Aug 2021 06:00) (95/73 - 127/67)  BP(mean): 87 (26 Aug 2021 06:00) (73 - 87)  RR: 41 (26 Aug 2021 06:00) (18 - 41)  SpO2: 100% (26 Aug 2021 06:00) (95% - 100%)      21 @ 07:01  -  21 @ 07:00  --------------------------------------------------------  IN: 450 mL / OUT: 300 mL / NET: 150 mL                                             --------------------------------------------------------------  LABS:                        9.9    11.88 )-----------( 300      ( 25 Aug 2021 19:38 )             33.0     08-25    140  |  98  |  24<H>  ----------------------------<  159<H>  5.5<H>   |  33<H>  |  0.9    Ca    9.2      25 Aug 2021 19:38    TPro  5.9<L>  /  Alb  3.8  /  TBili  0.4  /  DBili  x   /  AST  20  /  ALT  17  /  AlkPhos  103      PT/INR - ( 25 Aug 2021 19:38 )   PT: 18.10 sec;   INR: 1.58 ratio         PTT - ( 25 Aug 2021 19:38 )  PTT:34.2 sec  Urinalysis Basic - ( 25 Aug 2021 19:27 )    Color: Yellow / Appearance: Clear / S.031 / pH: x  Gluc: x / Ketone: Negative  / Bili: Negative / Urobili: 3 mg/dL   Blood: x / Protein: Trace / Nitrite: Negative   Leuk Esterase: Negative / RBC: x / WBC x   Sq Epi: x / Non Sq Epi: x / Bacteria: x    Troponin T, Serum: 0.06 ng/mL *HH* (21 @ 19:38)    CARDIAC MARKERS ( 25 Aug 2021 19:38 )  x     / 0.06 ng/mL / x     / x     / x                                                -------------------------------------------------------------  RADIOLOGY:  CXR: Complete opacification left hemithorax with effusion, postoperative changes and volume loss.                                          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 She was found to have white out of left lung and placed on BiPAP in the ED.    #acute on chronic hypercapnic respiratory failure  #COPD exacerbation  per pulm fellow, dr allen, rationale for bronchoscopy on  was to address any reversible causes of lung collapse such as a mucus plug     CXR: Complete opacification left hemithorax with effusion, postoperative changes and volume loss.    abuterol/ipratropium nebs  mucomyst  methylprednisone 60mg IV q12h  patient extubated and tolerating AVAPs  cefepime 1g q8h    #atrial fibrillation  -troponin  -c/w atorvastatin  -f/u BNP    #anxiety  #depression  -sertraline 25mg oral  -d/c precedex sedation    #stage 2 ulcer on butt  -f/u recs per wound care    Diet: npo  DVT ppx: lovenox 40mg q12h  Dispo: acute  Code: DNR CASEY POWERS 75y Female  MRN#: [id number]   CODE STATUS: DNR    Hospital Day: 1  Pt is currently admitted with the primary diagnosis of acute on chronic hypercapnic resp failure    SUBJECTIVE  Overnight/Interval Events: Overnight patient's hemoglobin dropped but she refused blood transfusion. This morning, pulm fellow spoke to son and got consent for blood transfusion. She had bronchoscopy at bedside yesterday and was intubated, sedated, and on levophed. She was extubated this morning and is tolerating AVAPS. Patient is DNR/DNI and palliative care to follow.                                            ----------------------------------------------------------  OBJECTIVE  PAST MEDICAL & SURGICAL HISTORY  High cholesterol  Anxiety  Pacemaker  Lung cancer  Afib  COPD (chronic obstructive pulmonary disease)  Artificial cardiac pacemaker  H/O atrioventricular kacie ablation  S/P lobectomy of lung  left  History of tonsillectomy  S/P appendectomy    Cataract  RIGHT   AND  LEFT  19                                            -----------------------------------------------------------  ALLERGIES:  bacitracin (Other)  carboplatin (Other)  sulfa drugs (Unknown)  sulfonamides (Unknown)  Xanax (Other)                                          ------------------------------------------------------------  HOME MEDICATIONS  Home Medications:  morphine 20 mg/mL oral concentrate: 0.25 milliliter(s) orally 4 times a day, As needed, dyspnea or pain (03 Aug 2021 16:05)  predniSONE 10 mg oral tablet: 1 tab(s) orally once a day until 8/9 (03 Aug 2021 16:05)  senna oral tablet: 2 tab(s) orally once a day (at bedtime) or until otherwise instructed by your provider (2021 16:54)                         MEDICATIONS:  STANDING MEDICATIONS  albuterol/ipratropium for Nebulization 3 milliLiter(s) Nebulizer every 4 hours  atorvastatin 20 milliGRAM(s) Oral at bedtime  cefepime   IVPB 1000 milliGRAM(s) IV Intermittent every 8 hours  enoxaparin Injectable 40 milliGRAM(s) SubCutaneous every 12 hours  pantoprazole    Tablet 40 milliGRAM(s) Oral before breakfast  sertraline 25 milliGRAM(s) Oral daily  sodium chloride 0.9%. 1000 milliLiter(s) IV Continuous <Continuous>    PRN MEDICATIONS                                          ------------------------------------------------------------  VITAL SIGNS: Last 24 Hours  T(C): 37.3 (26 Aug 2021 05:00), Max: 37.3 (26 Aug 2021 05:00)  T(F): 99.2 (26 Aug 2021 05:00), Max: 99.2 (26 Aug 2021 05:00)  HR: 94 (26 Aug 2021 06:00) (71 - 112)  BP: 95/73 (26 Aug 2021 06:00) (95/73 - 127/67)  BP(mean): 87 (26 Aug 2021 06:00) (73 - 87)  RR: 41 (26 Aug 2021 06:00) (18 - 41)  SpO2: 100% (26 Aug 2021 06:00) (95% - 100%)      21 @ 07:01  -  21 @ 07:00  --------------------------------------------------------  IN: 450 mL / OUT: 300 mL / NET: 150 mL                                             --------------------------------------------------------------  LABS:                        9.9    11.88 )-----------( 300      ( 25 Aug 2021 19:38 )             33.0     08-    140  |  98  |  24<H>  ----------------------------<  159<H>  5.5<H>   |  33<H>  |  0.9    Ca    9.2      25 Aug 2021 19:38    TPro  5.9<L>  /  Alb  3.8  /  TBili  0.4  /  DBili  x   /  AST  20  /  ALT  17  /  AlkPhos  103      PT/INR - ( 25 Aug 2021 19:38 )   PT: 18.10 sec;   INR: 1.58 ratio         PTT - ( 25 Aug 2021 19:38 )  PTT:34.2 sec  Urinalysis Basic - ( 25 Aug 2021 19:27 )    Color: Yellow / Appearance: Clear / S.031 / pH: x  Gluc: x / Ketone: Negative  / Bili: Negative / Urobili: 3 mg/dL   Blood: x / Protein: Trace / Nitrite: Negative   Leuk Esterase: Negative / RBC: x / WBC x   Sq Epi: x / Non Sq Epi: x / Bacteria: x    Troponin T, Serum: 0.06 ng/mL *HH* (21 @ 19:38)    CARDIAC MARKERS ( 25 Aug 2021 19:38 )  x     / 0.06 ng/mL / x     / x     / x                                                -------------------------------------------------------------  RADIOLOGY:  CXR: Complete opacification left hemithorax with effusion, postoperative changes and volume loss.                                          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 She was found to have white out of left lung and placed on BiPAP in the ED.    #acute on chronic hypercapnic respiratory failure  #COPD exacerbation  #bronchogenic carcinoma of left lung  CXR : Complete opacification left hemithorax with effusion, postoperative changes and volume loss  per pulm fellow, dr allen, rationale for bronchoscopy on  was to address any reversible causes of lung collapse such as a mucus plug   patient extubated and tolerating AVAPs today    abuterol/ipratropium nebs  mucomyst  methylprednisone 60mg IV q12h  cefepime 1g q8h  f/u bronch sample  f/u recs per palliative care regarding comfort measures    #atrial fibrillation  -troponin 0.06 on   -c/w atorvastatin    #anxiety  #depression  -sertraline 25mg oral  -d/c precedex sedation    #stage 2 ulcer on butt  -f/u recs per wound care    Diet: npo  DVT ppx: lovenox 40mg q12h  Dispo: acute  Code: DNR/DNI CASEY POWERS 75y Female  MRN#: [id number]   CODE STATUS: DNR    Hospital Day: 1  Pt is currently admitted with the primary diagnosis of acute on chronic hypercapnic resp failure    SUBJECTIVE  Overnight/Interval Events: Overnight patient's hemoglobin dropped but she refused blood transfusion. This morning, pulm fellow spoke to son and got consent for blood transfusion. She had bronchoscopy at bedside yesterday and was intubated, sedated, and on levophed. She was extubated this morning and is tolerating AVAPS. Patient is DNR/DNI and palliative care to follow.                                            ----------------------------------------------------------  OBJECTIVE  PAST MEDICAL & SURGICAL HISTORY  High cholesterol  Anxiety  Pacemaker  Lung cancer  Afib  COPD (chronic obstructive pulmonary disease)  Artificial cardiac pacemaker  H/O atrioventricular kacie ablation  S/P lobectomy of lung  left  History of tonsillectomy  S/P appendectomy    Cataract  RIGHT   AND  LEFT  19                                            -----------------------------------------------------------  ALLERGIES:  bacitracin (Other)  carboplatin (Other)  sulfa drugs (Unknown)  sulfonamides (Unknown)  Xanax (Other)                                          ------------------------------------------------------------  HOME MEDICATIONS  Home Medications:  morphine 20 mg/mL oral concentrate: 0.25 milliliter(s) orally 4 times a day, As needed, dyspnea or pain (03 Aug 2021 16:05)  predniSONE 10 mg oral tablet: 1 tab(s) orally once a day until 8/9 (03 Aug 2021 16:05)  senna oral tablet: 2 tab(s) orally once a day (at bedtime) or until otherwise instructed by your provider (2021 16:54)                         MEDICATIONS:  STANDING MEDICATIONS  albuterol/ipratropium for Nebulization 3 milliLiter(s) Nebulizer every 4 hours  atorvastatin 20 milliGRAM(s) Oral at bedtime  cefepime   IVPB 1000 milliGRAM(s) IV Intermittent every 8 hours  enoxaparin Injectable 40 milliGRAM(s) SubCutaneous every 12 hours  pantoprazole    Tablet 40 milliGRAM(s) Oral before breakfast  sertraline 25 milliGRAM(s) Oral daily  sodium chloride 0.9%. 1000 milliLiter(s) IV Continuous <Continuous>    PRN MEDICATIONS                                          ------------------------------------------------------------  VITAL SIGNS: Last 24 Hours  T(C): 37.3 (26 Aug 2021 05:00), Max: 37.3 (26 Aug 2021 05:00)  T(F): 99.2 (26 Aug 2021 05:00), Max: 99.2 (26 Aug 2021 05:00)  HR: 94 (26 Aug 2021 06:00) (71 - 112)  BP: 95/73 (26 Aug 2021 06:00) (95/73 - 127/67)  BP(mean): 87 (26 Aug 2021 06:00) (73 - 87)  RR: 41 (26 Aug 2021 06:00) (18 - 41)  SpO2: 100% (26 Aug 2021 06:00) (95% - 100%)      21 @ 07:01  -  21 @ 07:00  --------------------------------------------------------  IN: 450 mL / OUT: 300 mL / NET: 150 mL                                             --------------------------------------------------------------  LABS:                        9.9    11.88 )-----------( 300      ( 25 Aug 2021 19:38 )             33.0     08-    140  |  98  |  24<H>  ----------------------------<  159<H>  5.5<H>   |  33<H>  |  0.9    Ca    9.2      25 Aug 2021 19:38    TPro  5.9<L>  /  Alb  3.8  /  TBili  0.4  /  DBili  x   /  AST  20  /  ALT  17  /  AlkPhos  103      PT/INR - ( 25 Aug 2021 19:38 )   PT: 18.10 sec;   INR: 1.58 ratio         PTT - ( 25 Aug 2021 19:38 )  PTT:34.2 sec  Urinalysis Basic - ( 25 Aug 2021 19:27 )    Color: Yellow / Appearance: Clear / S.031 / pH: x  Gluc: x / Ketone: Negative  / Bili: Negative / Urobili: 3 mg/dL   Blood: x / Protein: Trace / Nitrite: Negative   Leuk Esterase: Negative / RBC: x / WBC x   Sq Epi: x / Non Sq Epi: x / Bacteria: x    Troponin T, Serum: 0.06 ng/mL *HH* (21 @ 19:38)    CARDIAC MARKERS ( 25 Aug 2021 19:38 )  x     / 0.06 ng/mL / x     / x     / x                                                -------------------------------------------------------------  RADIOLOGY:  CXR: Complete opacification left hemithorax with effusion, postoperative changes and volume loss.                                          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 She was found to have white out of left lung and placed on BiPAP in the ED.    #acute on chronic hypercapnic respiratory failure  #COPD exacerbation  #bronchogenic carcinoma of left lung  CXR : Complete opacification left hemithorax with effusion, postoperative changes and volume loss  per pulm fellow, dr allen, rationale for bronchoscopy on  was to address any reversible causes of lung collapse such as a mucus plug   patient extubated and tolerating AVAPs today    abuterol/ipratropium nebs  mucomyst  methylprednisone 60mg IV q12h  cefepime 1g q8h  f/u bronch sample  f/u recs per palliative care regarding comfort measures    #anemia  -hgb 6.9 in AM  -transfuse 1 unit pRBC; f/u CBC in PM    #atrial fibrillation  -troponin 0.06 on   -c/w atorvastatin    #anxiety  #depression  -sertraline 25mg oral  -d/c precedex sedation    #stage 2 ulcer on butt  -f/u recs per wound care    Diet: npo  DVT ppx: lovenox 40mg q12h  Dispo: acute  Code: DNR/DNI

## 2021-08-27 NOTE — PROGRESS NOTE ADULT - ASSESSMENT
74 y/o F h/o COPD (on 3 L home O2 ), bronchogenic carcinoma (left lung, s/p left partial lobectomy, in remission x 9 yrs), Afib on Eliquis ablation and micra PPM, anxiety p/w acute onset. BIBEMS from nursing home- Last known well at 3pm when she was having difficulty eating, and was rechecked at 530 as nonverbal and not following commands.  In the ED the patient was confused and disoriented and was found to have respiratory acidosis and left white lung.        76 y/o F h/o COPD (on 3 L home O2 ), bronchogenic carcinoma (left lung, s/p left partial lobectomy, in remission x 9 yrs), Afib on Eliquis ablation and micra PPM, anxiety p/w acute onset. BIBEMS from nursing home- Last known well at 3pm when she was having difficulty eating, and was rechecked at 530 as nonverbal and not following commands.  In the ED the patient was confused and disoriented and was found to have respiratory acidosis and left white lung.   Currently much improved neurologically on BIPAP.    No need for further neuro w/u at this time      Discussed with neuroattending DR. Dubois       Impression and Suggestions:  74 y/o F h/o COPD (on 3 L home O2 ), bronchogenic carcinoma (left lung, s/p left partial lobectomy, in remission x 9 yrs), Afib on Eliquis ablation and micra PPM, anxiety p/w acute onset. BIBEMS from nursing home- Last known well at 3pm when she was having difficulty eating, and was rechecked at 530 as nonverbal and not following commands.  In the ED the patient was confused and disoriented and was found to have respiratory acidosis and left white lung.   Currently much improved neurologically on BIPAP.    May d/c MRI brain, EEG pending- will follow results peripherally.       Discussed with neuro attending DR. Dubois

## 2021-08-27 NOTE — CONSULT NOTE ADULT - PROBLEM SELECTOR RECOMMENDATION 9
DNR/DNI now CMO. Goals of care discussed. Palliative care recs for symptom management as follows.     - Morphine 4mg IV q 4 hrs  - Morphine 4mg IV q 1 hr PRN for pain/dyspnea    - Ativan 1mg IV Q 6 HRS RTC  - Ativan 1mg IV Q 4 Hrs PRN for breakthrough anxiety  ***Private Room when pt is downgraded*** DNR/DNI now CMO. Goals of care discussed. Palliative care recs for symptom management as follows.     - Morphine 2mg IV q 4 hrs  - Morphine 2mg IV q 1 hr PRN for pain/dyspnea    - Ativan 1mg IV Q 6 HRS RTC  - Ativan 1mg IV Q 4 Hrs PRN for breakthrough anxiety  ***Private Room when pt is downgraded***

## 2021-08-27 NOTE — CHART NOTE - NSCHARTNOTEFT_GEN_A_CORE
Was called by lab for AM Hgb 6.9 which dropped from previous 8/26 4am Hgb 8.7. The patient was seen at bedside and was in not acute distress. The patient was awake and alert at the time and RN was present at bedside, although I was unable to assess orientation 2/2 intubation. The patient was explained these new lab results and that she would likely need a unit of blood but the patient shook her head no and when asked if she wanted to the receive the unit of blood the patient state she just wishes to die and does not want the blood. The patient was intubated at the time and per charge RN, the next of kin needs to make this decision because the patient is intubated and cannot verbalize her wishes. I tried contacting the patients son August and the spouse listed on the chart with no answer. Was called by lab for AM Hgb 6.9 which dropped from previous 8/26 4am Hgb 8.7. The patient was seen at bedside and was in not acute distress. The patient was awake and alert at the time and RN Rosaura was present at bedside, although I was unable to assess orientation 2/2 intubation. The patient was explained these new lab results and that she would likely need a unit of blood but the patient shook her head no and when asked if she wanted to the receive the unit of blood the patient state she just wishes to die and does not want the blood. The patient was intubated at the time and per charge RN, the next of kin needs to make this decision because the patient is intubated and cannot verbalize her wishes. I tried contacting the patients son August and the spouse listed on the chart with no answer. Will signout to the day team. Discussed with senior resident Aguilar. Was called by lab for AM Hgb 6.9 which dropped from previous 8/26 4am Hgb 8.7. The patient was seen at bedside and was in no acute distress. The patient was awake and alert at the time and RN Rosaura was present at bedside, although I was unable to assess orientation 2/2 intubation. The patient was explained these new lab results and that she would likely need a unit of blood but the patient shook her head no and when asked if she wanted to the receive the unit of blood the patient state "she just wishes to die and does not want the blood". The patient was intubated at the time and per charge RN, the next of kin needs to make this decision because the patient is intubated and cannot verbalize her wishes. I tried contacting the patient's son August and the spouse listed on the chart with no answer. Will signout to the day team. Discussed with senior resident Aguilar. Was called by lab for AM Hgb 6.9 which dropped from previous 8/26 4am Hgb 8.7. The patient was seen at bedside and was in no acute distress. The patient was awake and alert at the time and RN Rosaura was present at bedside, although I was unable to assess orientation 2/2 intubation. The patient was explained these new lab results and that she would likely need a unit of blood but the patient shook her head no and when asked if she wanted to the receive the unit of blood the patient state "she just wishes to die and does not want the blood". The patient was explained the risks of not receiving the blood include death, but stood by her decision of not wanting the blood. The patient was intubated at the time and per charge RN, the next of kin needs to make this decision because the patient is intubated and cannot verbalize her wishes. I tried contacting the patient's son August and the spouse listed on the chart with no answer. Will signout to the day team. Discussed with senior resident Aguilar. Was called by lab for AM Hgb 6.9 which dropped from previous 8/26 4am Hgb 8.7. The patient was seen at bedside and was in no acute distress. The patient was awake and alert at the time and RN Rosaura was present at bedside, although I was unable to assess orientation 2/2 intubation. The patient was explained these new lab results and that she would likely need a unit of blood but the patient shook her head no and when asked if she wanted to the receive the unit of blood the patient states she wants no further intervention preformed. The patient was explained the risks of not receiving the blood include death, but stood by her decision of not wanting the blood. The patient was intubated at the time and per charge RN, the next of kin needs to make this decision because the patient is intubated and cannot verbalize her wishes. I tried contacting the patient's son August and the spouse listed on the chart with no answer. Will signout to the day team. Discussed with senior resident Aguilar.

## 2021-08-27 NOTE — CONSULT NOTE ADULT - PROBLEM SELECTOR RECOMMENDATION 5
- no labs  - no IVF  - no artificial feeding  - no vitals  - no pulse ox  - no 02 supplementation  - no injections  - no FS  - comfort feedings OK  - Morphine 4mg IV q 4 hrs  - Morphine 4mg IV q 1 hr PRN for pain/dyspnea    - Ativan 1mg IV Q 6 HRS RTC  - Ativan 1mg IV Q 4 Hrs PRN for breakthrough anxiety - no labs  - no IVF  - no artificial feeding  - no vitals  - no pulse ox  - no 02 supplementation  - no injections  - no FS  - comfort feedings OK  - Morphine 2mg IV q 4 hrs  - Morphine 2mg IV q 1 hr PRN for pain/dyspnea    - Ativan 1mg IV Q 6 HRS RTC  - Ativan 1mg IV Q 4 Hrs PRN for breakthrough anxiety

## 2021-08-29 NOTE — PROGRESS NOTE ADULT - SUBJECTIVE AND OBJECTIVE BOX
SUBJECTIVE:    Patient is a 75y old Female who presents with a chief complaint of altered mental status (27 Aug 2021 13:18)    Currently admitted to medicine with the primary diagnosis of Acute respiratory failure with hypoxia and hypercapnia       Today is hospital day 3d. This morning she is resting comfortably in bed and reports no new issues or overnight events.     PAST MEDICAL & SURGICAL HISTORY  High cholesterol    Anxiety    Pacemaker    Lung cancer    Afib    COPD (chronic obstructive pulmonary disease)    Artificial cardiac pacemaker    H/O atrioventricular kacie ablation    S/P lobectomy of lung  left    History of tonsillectomy    S/P appendectomy    Cataract  RIGHT  6/17 AND  LEFT  7/5/19      SOCIAL HISTORY:  Negative for smoking/alcohol/drug use.     ALLERGIES:  bacitracin (Other)  carboplatin (Other)  sulfa drugs (Unknown)  sulfonamides (Unknown)  Xanax (Other)    MEDICATIONS:  STANDING MEDICATIONS  chlorhexidine 4% Liquid 1 Application(s) Topical <User Schedule>  LORazepam   Injectable 1 milliGRAM(s) IV Push every 6 hours  morphine  - Injectable 2 milliGRAM(s) IV Push every 4 hours    PRN MEDICATIONS  morphine  - Injectable 2 milliGRAM(s) IV Push every 1 hour PRN    VITALS:   T(F): --  HR: --  BP: --  RR: --  SpO2: --    LABS:                    Culture - Fungal, Bronchial (collected 26 Aug 2021 19:39)  Source: .Bronchial None  Preliminary Report (27 Aug 2021 06:36):    Testing in progress    Culture - Acid Fast - Bronchial w/Smear (collected 26 Aug 2021 19:39)  Source: .Bronchial None  Preliminary Report (28 Aug 2021 15:04):    Culture is being performed.    Culture - Bronchial (collected 26 Aug 2021 19:39)  Source: .Bronchial None  Gram Stain (27 Aug 2021 06:56):    Numerous polymorphonuclear leukocytes seen per low power field    Rare Squamous epithelial cells seen per low power field    Few Gram positive cocci in pairs seen per oil power field    Rare Gram Variable Rods seen per oil power field  Final Report (28 Aug 2021 19:17):    Normal Respiratory Berkley present          RADIOLOGY:    PHYSICAL EXAM:  GEN: No acute distress  LUNGS: Clear to auscultation bilaterally   HEART: Regular  ABD: Soft, non-tender, non-distended.  EXT: NC/NC/NE/2+PP/BOONE/Skin Intact.   NEURO: nonverbal    Intravenous access:   NG tube:   Thibodeaux Catheter:

## 2021-08-29 NOTE — PROGRESS NOTE ADULT - ASSESSMENT
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 She was found to have white out of left lung and placed on BiPAP in the ED.    -comfort measures only  -f/u recs per pall care:   ativan 1mg IV push q6h prn for agitation   morphine 2mg IV push q4h for pain   morphine 2mg IV push q1h PRN for dyspnea/pain.    #acute on chronic hypercapnic respiratory failure  #COPD exacerbation  #bronchogenic carcinoma of left lung  CXR 8/26: Complete opacification left hemithorax with effusion, postoperative changes and volume loss  per pulm fellow, dr allen, rationale for bronchoscopy on 8/26 was to address any reversible causes of lung collapse such as a mucus plug  pt extubated   - On CMO        #anemia  #atrial fibrillation  #anxiety  #depression  #stage 2 ulcer on butt  -on CMO      Diet: CMO  DVT ppx: CMO  Dispo: acute  Code: DNR/DNI

## 2021-08-30 NOTE — PROGRESS NOTE ADULT - PROBLEM SELECTOR PLAN 4
Ativan 1mg IV q 6 hrs RTC, anxiety better controlled. Monitor for verbal and non verbal signs of anxiety. Provide calm environment. Ativan 1mg IV q 6 hrs RTC, anxiety better controlled. Monitor for verbal and non verbal signs of anxiety. Provide calm environment.  -recommend adding ativan 1mg IV q6h PRN for breakthrough anxiety

## 2021-08-30 NOTE — PROGRESS NOTE ADULT - ASSESSMENT
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 She was found to have white out of left lung and placed on BiPAP in the ED.    -comfort measures only  -f/u recs per pall care:   ativan 1mg IV push q6h prn for agitation   morphine 2mg IV push q4h for pain   morphine 2mg IV push q1h PRN for dyspnea/pain.    #acute on chronic hypercapnic respiratory failure  #COPD exacerbation  #bronchogenic carcinoma of left lung  CXR 8/26: Complete opacification left hemithorax with effusion, postoperative changes and volume loss  per pulm fellow, dr allen, rationale for bronchoscopy on 8/26 was to address any reversible causes of lung collapse such as a mucus plug  pt extubated   - On CMO        #anemia  #atrial fibrillation  #anxiety  #depression  #stage 2 ulcer on butt  -on CMO      Diet: CMO  DVT ppx: CMO  Dispo: acute  Code: DNR/DNI

## 2021-08-30 NOTE — PROGRESS NOTE ADULT - SUBJECTIVE AND OBJECTIVE BOX
CASEY POWERS             [id number]      Patient is a 75y old Female who presents with a chief complaint of altered mental status (30 Aug 2021 07:32)    Currently admitted with the primary diagnosis of: AMS        SUBJECTIVE:      ROS:  UNABLE TO OBTAIN  due to:    DYSPNEA: Y / N	  NAUS/VOM: Y / N	  SECRETIONS: Y / N	  AGITATION: Y / N  Pain (Y/N):       -Provocation/Palliation:  -Quality/Quantity:  -Radiating:  -Severity:  -Timing/Frequency:  -Impact on ADLs:    General:  Denied  HEENT:    Denied  Neck:  Denied  CVS:  Denied  Resp:  Denied  GI:  Denied    :  Denied  Musc:  Denied  Neuro:  Denied  Psych:  Denied  Skin:  Denied  Lymph:  Denied      PEx:   T(C): --  HR: --  BP: --  RR: --  SpO2: --  Wt(kg): --            General:  found in bed in NAD  Eyes:  PERRL EOMI Non icteric MOM  ENMT: no external oral ulcers, MMM, no thrush   CVS: RR S1S2 No M/G/R  Resp: Unlabored Non tachypneic No increased WOB  GI:  Soft NT ND BS+  :  Voiding / Thibodeaux / PrimaFit  Musc: No C/C/E    Neuro: Follows commands No focal deficits  Psych: Calm Pleasant, AAOx3    Skin: Non jaundiced , no rash   Lymph: no adenopathy     Last BM:     ALLERGIES: bacitracin (Other)  carboplatin (Other)  sulfa drugs (Unknown)  sulfonamides (Unknown)  Xanax (Other)            Labs:	    CBC:    CMP:                    RADIOLOGY      EKG  12 Lead ECG:   Ventricular Rate 72 BPM    Atrial Rate 50 BPM    QRS Duration 140 ms    Q-T Interval 446 ms    QTC Calculation(Bazett) 488 ms    P Axis 52 degrees    R Axis 140 degrees    T Axis 89 degrees    Diagnosis Line Ventricular-paced rhythm  Abnormal ECG    Confirmed by Kadeem Chahal ([id number]) on 8/25/2021 10:46:08 PM (08-25-21 @ 19:46)      Imaging Personally Reviewed:  [ ] YES  [ ] NO    Consultant(s) Notes Reviewed:  [ ] YES  [ ] NO  Care Discussed with Consultants/Other Providers [ ] YES  [ ] NO    Medications:	      MEDICATIONS  (STANDING):  chlorhexidine 4% Liquid 1 Application(s) Topical <User Schedule>  LORazepam   Injectable 1 milliGRAM(s) IV Push every 6 hours  morphine  - Injectable 2 milliGRAM(s) IV Push every 4 hours    MEDICATIONS  (PRN):  morphine  - Injectable 2 milliGRAM(s) IV Push every 1 hour PRN dyspnea/pain        ADVANCED DIRECTIVES:         DNR/DNI/CMO    DECISION MAKER: Patient [  ]  Family [x  ]  Other [  ] _______  LEGAL SURROGATE: Son August       GOALS OF CARE DISCUSSION       Palliative care info/counseling provided	           Family meeting scheduled 	           Documentation of GOC/Advanced Care Planning:       See previous Palliative Medicine Note    PSYCHOSOCIAL-SPIRITUAL ASSESSMENT:       Reviewed       See Palliative Care SW/ documentation      CURRENT DISPO PLAN:         WILL REMAIN IN HOSPITAL      [facility]      Hospice      Home      REFERRALS	        Palliative Med        Unit SW/Case Mgmt              Hospice        CASEY POWERS             [id number]      Patient is a 75y old Female who presents with a chief complaint of altered mental status (30 Aug 2021 07:32)    Currently admitted with the primary diagnosis of: AMS        SUBJECTIVE:      ROS:  UNABLE TO OBTAIN  due to:    DYSPNEA: YES   NAUS/VOM: NO	  SECRETIONS: NO	  AGITATION: NO  Pain (Y/N):  NO       General:  Denied  HEENT:    Denied  Neck:  Denied  CVS:  Denied  Resp:  Dyspnea relieved with higher dose of Morphine   GI:  Denied    :  Denied  Musc:  Denied  Neuro:  Denied  Psych:  Denied  Skin:  Denied  Lymph:  Denied      PEx:   T(C): --  HR: --  BP: --  RR: --  SpO2: --  Wt(kg): --            General: elderly female found in bed in distress   Eyes:  PERRL EOMI Non icteric MOM  ENMT: no external oral ulcers, MMM, no thrush   CVS: RR S1S2 No M/G/R  Resp: Labored mildly tachypneic no cough no wheeze noted   GI:  Soft tender diffusely ND BS+  :   Thibodeaux   Musc: No C/C/E    Neuro: awakens slowly, verbalizes simple needs   Psych: Calm  Skin: Non jaundiced , no rash   Lymph: no adenopathy     Last BM: 8/29    ALLERGIES: bacitracin (Other)  carboplatin (Other)  sulfa drugs (Unknown)  sulfonamides (Unknown)  Xanax (Other)            Labs:	    CBC:    CMP:                    RADIOLOGY      EKG  12 Lead ECG:   Ventricular Rate 72 BPM    Atrial Rate 50 BPM    QRS Duration 140 ms    Q-T Interval 446 ms    QTC Calculation(Bazett) 488 ms    P Axis 52 degrees    R Axis 140 degrees    T Axis 89 degrees    Diagnosis Line Ventricular-paced rhythm  Abnormal ECG    Confirmed by Kadeem Chahal ([id number]) on 8/25/2021 10:46:08 PM (08-25-21 @ 19:46)      Imaging Personally Reviewed:  [ x] YES  [ ] NO    Consultant(s) Notes Reviewed:  [ x] YES  [ ] NO  Care Discussed with Consultants/Other Providers [ x] YES  [ ] NO    Medications:	      MEDICATIONS  (STANDING):  chlorhexidine 4% Liquid 1 Application(s) Topical <User Schedule>  LORazepam   Injectable 1 milliGRAM(s) IV Push every 6 hours  morphine  - Injectable 2 milliGRAM(s) IV Push every 4 hours    MEDICATIONS  (PRN):  morphine  - Injectable 2 milliGRAM(s) IV Push every 1 hour PRN dyspnea/pain        ADVANCED DIRECTIVES:         DNR/DNI/CMO    DECISION MAKER: Patient [  ]  Family [x  ]  Other [  ] _______  LEGAL SURROGATE: Son August       GOALS OF CARE DISCUSSION       Palliative care info/counseling provided	           Family meeting scheduled 	           Documentation of GOC/Advanced Care Planning:       See previous Palliative Medicine Note    PSYCHOSOCIAL-SPIRITUAL ASSESSMENT:       Reviewed       See Palliative Care SW/ documentation      CURRENT DISPO PLAN:         WILL REMAIN IN HOSPITAL      [facility]      Hospice      Home      REFERRALS	        Palliative Med        Unit SW/Case Mgmt              Hospice        CASEY POWERS             [id number]      Patient is a 75y old Female who presents with a chief complaint of altered mental status (30 Aug 2021 07:32)    Currently admitted with the primary diagnosis of: AMS        SUBJECTIVE:  -Patient seen at bedside  -She looked to have some increased work of breathing on exam  -She denied pain or SOB on exam shortly after a dose of morphine; Some reported dyspnea prior to that    ROS:  DYSPNEA: YES   NAUS/VOM: NO	  SECRETIONS: NO	  AGITATION: NO  Pain (Y/N):  NO       General:  Denied  HEENT:    Denied  Neck:  Denied  CVS:  Denied  Resp:  Dyspnea relieved with higher dose of Morphine   GI:  Denied    :  Denied  Musc:  Denied  Neuro:  Denied  Psych:  Denied  Skin:  Denied  Lymph:  Denied      PEx:   T(C): --  HR: --  BP: --  RR: --  SpO2: --  Wt(kg): --            General: elderly female found in bed in distress   Eyes:  PERRL EOMI Non icteric MOM  ENMT: no external oral ulcers, MMM, no thrush   CVS: RR S1S2 No M/G/R  Resp: Labored mildly tachypneic no cough no wheeze noted   GI:  Soft tender diffusely ND BS+  :   Thibodeaux   Musc: No C/C/E    Neuro: awakens slowly, verbalizes simple needs   Psych: Calm  Skin: Non jaundiced , no rash   Lymph: no adenopathy     Last BM: 8/29    ALLERGIES: bacitracin (Other)  carboplatin (Other)  sulfa drugs (Unknown)  sulfonamides (Unknown)  Xanax (Other)      Labs:	    Previous labs reviewed    RADIOLOGY  CXR  Impression:    Left hemithorax complete opacification, unchanged. Right opacities      EKG  12 Lead ECG:   Ventricular Rate 72 BPM    Atrial Rate 50 BPM    QRS Duration 140 ms    Q-T Interval 446 ms    QTC Calculation(Bazett) 488 ms    P Axis 52 degrees    R Axis 140 degrees    T Axis 89 degrees    Diagnosis Line Ventricular-paced rhythm  Abnormal ECG    Confirmed by Kadeem Chahal ([id number]) on 8/25/2021 10:46:08 PM (08-25-21 @ 19:46)      Imaging Personally Reviewed:  [ x] YES  [ ] NO    Consultant(s) Notes Reviewed:  [ x] YES  [ ] NO  Care Discussed with Consultants/Other Providers [ x] YES  [ ] NO    Medications:	      MEDICATIONS  (STANDING):  chlorhexidine 4% Liquid 1 Application(s) Topical <User Schedule>  LORazepam   Injectable 1 milliGRAM(s) IV Push every 6 hours  morphine  - Injectable 2 milliGRAM(s) IV Push every 4 hours    MEDICATIONS  (PRN):  morphine  - Injectable 2 milliGRAM(s) IV Push every 1 hour PRN dyspnea/pain        ADVANCED DIRECTIVES:         DNR/DNI/CMO    DECISION MAKER: Patient [  ]  Family [x  ]  Other [  ] _______  LEGAL SURROGATE: Son August       GOALS OF CARE DISCUSSION       Palliative care info/counseling provided	           Family meeting scheduled 	        PSYCHOSOCIAL-SPIRITUAL ASSESSMENT:       Reviewed    REFERRALS	        Palliative Med        Unit SW/Case Mgmt

## 2021-08-30 NOTE — CHART NOTE - NSCHARTNOTEFT_GEN_A_CORE
PALLIATIVE MEDICINE INTERDISCIPLINARY TEAM NOTE    Provider:  [   ]Social Work   [   ]          [   ] Initial visit [   ] Follow up    Family or contact name / phone #   Met with: [   ] Patient  [   ] Family  [   ] Other:    Primary Language: [   ] English [   ] Other*:                      *Interpretation provided by:    SUPPORT DIAGNOSES            (Check all that apply)  [   ] Psychosocial spiritual assessment (PSSA)  [   ] EOL issues  [   ] Cultural / spiritual concerns  [   ] Pain / suffering  [   ] Dementia / AMS  [   ] Other:  [   ] AD issues  [   ] Grief / loss / sadness  [   ] Discharge issues  [   ] Distress / coping    PSYCHOSOCIAL ASSESSMENT OF PATIENT         (Check all that apply)  [   ] Initial Assessment            [   ] Reassessment          [   ] Not Applicable this visit    Pain/suffering acuity:  [   ] None to mild (0-3)           [   ] Moderate (4-6)        [   ] High (7-10)    Mental Status:  [   ] Alert/oriented (x3)          [   ] Confused/Altered(x2/x1)         [   ] Non-resp    Functional status:  [   ] Independent w ADLs      [   ] Needs Assistance             [   ] Bedbound/Full Care    Coping:  [   ] Coping well                     [   ] Coping w/difficulty            [   ] Poor coping    Support system:  [   ] Strong                              [   ] Adequate                        [   ] Inadequate    SPIRITUAL ASSESSMENT  Tenriism/Spiritual practice: ____Catholic_______________________    Role of organized Cheondoism:  [   ] Important                     [   ] Some (fam tradition, cultural)               [   ] None    Effects on medical care:  [   ] Yes, _____________________________________                         [   ] None    Cultural/Denominational need:  [   ] Yes, _____________________________________                         [   ] None    Refer to Pastoral Care:  [   ] Yes           [   ] No, not at this time    SERVICE PROVIDED  [   ]PSSA                                                                             [   ]Discharge support / facilitation  [   ]AD / goals of care counseling                                  [   ]EOL / death / bereavement counseling  [   ]Counseling / support                                                [   ] Family meeting  [  x ]Prayer / sacrament / ritual                                      [   ] Referral   [   ]Other                                                                       NOTE and Plan of Care (PoC): IU made an attempt to see Pt today. Pt was asleep, no family members were present. I said a silent prayer. I will follow up tomorrow.

## 2021-08-30 NOTE — PROGRESS NOTE ADULT - PROBLEM SELECTOR PLAN 2
Pt on nasal cannula as her baseline oxygen, no pulse ox monitoring. Morphine increased for worsening dyspnea. Pt on nasal cannula as her baseline oxygen, no pulse ox monitoring. Dyspnea noted on exam  -increase standing morphine to 4mg IV q4h ATC for dyspnea  -change PRN morphine to 4mg q1h PRN for dyspnea/pain

## 2021-08-30 NOTE — PROGRESS NOTE ADULT - PROBLEM SELECTOR PLAN 3
No disease directed care, pulmonary attempted bronchoscopy but pt had too much tumor burden to stent. No disease directed care, pulmonary attempted bronchoscopy but pt had too much tumor burden to stent.  -No additional disease directed therapy  -patient comfort measures only

## 2021-08-30 NOTE — PROGRESS NOTE ADULT - ASSESSMENT
75yFemale being evaluated for      MEDD (morphine equivalent daily dose): 36mg/24hrs       See Recs below.    Please call [phone] with questions or concerns 24/7.   We will continue to follow.    75yFemale being evaluated for goals of care and symptom management. Pt with stage 4 lung cancer, tumor invading main bronchus. Pt is DNR/DNI/CMO, and assessed for comfort. Pt mostly sleeping, opens eyes and can have brief conversation. Pt symptomatic despite RTC Morphine and Ativan, gave 1 PRN Morphine and pt improved. Anxiety appears controlled.    Palliative team will titrate up medications. Pt was in [facility] and would need placement for hospice care. Today palliative team adjusting symptom management.       MEDD (morphine equivalent daily dose): 36mg/24hrs       See Recs below.    Please call [phone] with questions or concerns 24/7.   We will continue to follow.

## 2021-08-30 NOTE — PROGRESS NOTE ADULT - PROBLEM SELECTOR PLAN 1
DNR/DNI/CMO   Increase symptom management for dyspnea. Hospice consult DNR/DNI/CMO   Increase symptom management for dyspnea.  If patient stable for discharge and symptoms well controlled tomorrow, will place hospice consult

## 2021-08-30 NOTE — PROGRESS NOTE ADULT - PROBLEM SELECTOR PLAN 5
-increase Morphine 4mg IV q 4 hrs RTC  -Morphine 4mg IV q 1 hr PRN for dyspnea  -Ativan 1mg IV q 6 hrs RTC  -Hospice if possible, pt nearing end of life.

## 2021-08-30 NOTE — PROGRESS NOTE ADULT - SUBJECTIVE AND OBJECTIVE BOX
LENGTH OF HOSPITAL STAY: 4d    CHIEF COMPLAINT:    Patient is a 75y old  Female who presents with a chief complaint of altered mental status (29 Aug 2021 19:16)    OVERNIGHT EVENTS:      FOLLOW UP:    Pt is CMO    HOSPITAL COURSE:    HPI:    HPI:  74 y/o F h/o COPD (on 3 L home O2 ), bronchogenic carcinoma (left lung, s/p left partial lobectomy, in remission x 9 yrs), Afib on Eliquis s/p ablation and micra PPM, anxiety; BIBEMS from nursing home- Last known well at 3pm when she was having difficulty eating, and was rechecked at 530 as nonverbal and not following commands.  In the ED the patient was confused and disoriented, she was found to have respiratory acidosis and complete opacification left hemithorax with effusion, postoperative changes and volume loss.  She was placed on bipap and she improved.  The patient has difficulty hearing.   (26 Aug 2021 02:22)      ALLERGIES:    bacitracin (Other); carboplatin (Other); sulfa drugs (Unknown); sulfonamides (Unknown); Xanax (Other)    PMHx:    High cholesterol; Anxiety; Pacemaker; Lung cancer; Afib; COPD (chronic obstructive pulmonary disease); Artificial cardiac pacemaker; H/O atrioventricular kacie ablation; S/P lobectomy of lung left;  History of tonsillectomy; S/P appendectomy; Cataract RIGHT  6/17 AND  LEFT  7/5/19    SOCIAL Hx:    Pt CMO    PHYSICAL EXAM:    Gen: NAD, resting in bed  HEENT: Normocephalic, atraumatic  Neck: supple, no lymphadenopathy  CV: Regular rate & regular rhythm  Lungs: decreased BS at bases, no fremitus  Abdomen: Soft, BS present  Ext: Warm, well perfused  Neuro: non focal, awake  Skin: no rash, no erythema   LENGTH OF HOSPITAL STAY: 4d    CHIEF COMPLAINT:    Patient is a 75y old  Female who presents with a chief complaint of altered mental status (29 Aug 2021 19:16)    OVERNIGHT EVENTS:    - none    FOLLOW UP:    Pt is CMO, palliative note    HOSPITAL COURSE:    HPI:    HPI:  74 y/o F h/o COPD (on 3 L home O2 ), bronchogenic carcinoma (left lung, s/p left partial lobectomy, in remission x 9 yrs), Afib on Eliquis s/p ablation and micra PPM, anxiety; BIBEMS from nursing home- Last known well at 3pm when she was having difficulty eating, and was rechecked at 530 as nonverbal and not following commands.  In the ED the patient was confused and disoriented, she was found to have respiratory acidosis and complete opacification left hemithorax with effusion, postoperative changes and volume loss.  She was placed on bipap and she improved.  The patient has difficulty hearing.   (26 Aug 2021 02:22)      ALLERGIES:    bacitracin (Other); carboplatin (Other); sulfa drugs (Unknown); sulfonamides (Unknown); Xanax (Other)    PMHx:    High cholesterol; Anxiety; Pacemaker; Lung cancer; Afib; COPD (chronic obstructive pulmonary disease); Artificial cardiac pacemaker; H/O atrioventricular kacie ablation; S/P lobectomy of lung left;  History of tonsillectomy; S/P appendectomy; Cataract RIGHT  6/17 AND  LEFT  7/5/19    SOCIAL Hx:    Pt CMO    PHYSICAL EXAM:    Gen: NAD, resting in bed  HEENT: Normocephalic, atraumatic  Neck: supple, no lymphadenopathy  CV: Regular rate & regular rhythm  Lungs: decreased BS at bases, no fremitus  Abdomen: Soft, BS present  Ext: Warm, well perfused  Neuro: non focal, awake  Skin: no rash, no erythema

## 2021-08-31 NOTE — PROGRESS NOTE ADULT - SUBJECTIVE AND OBJECTIVE BOX
CASEY POWERS             [id number]      Patient is a 75y old Female who presents with a chief complaint of altered mental status (31 Aug 2021 11:54)    Currently admitted with the primary diagnosis of: hypoxia/AMS        SUBJECTIVE:      ROS:      DYSPNEA: Yes  NAUS/VOM: no	  SECRETIONS: NO	  AGITATION:  No  Pain (Y/N):     NO      General:  Denied  HEENT:    Denied  Neck:  Denied  CVS:  Denied  Resp:  Denied  GI:  Denied    :  Denied  Musc:  Denied  Neuro:  Denied  Psych: anxiety   Skin:  Denied  Lymph:  Denied      PEx:   T(C): --  HR: --  BP: --  RR: --  SpO2: --  Wt(kg): --              General: elderly female found in bed calm, cyanotic around mouth/fingers    Eyes:  PERRL EOMI Non icteric MOM  ENMT: no external oral ulcers, MMM, no thrush   CVS: RR S1S2 No M/G/R  Resp: Labored mildly tachypneic (+) dry cough no wheeze noted   GI:  Soft ND BS+  :   Thibodeaux   Musc: No C/C/E    Neuro: awakens slowly, verbalizes simple needs   Psych: Calm  Skin: Non jaundiced , no rash, warm to touch     Last BM:     ALLERGIES: bacitracin (Other)  carboplatin (Other)  sulfa drugs (Unknown)  sulfonamides (Unknown)  Xanax (Other)            Labs:	    CBC:    CMP:                    RADIOLOGY      EKG  12 Lead ECG:   Ventricular Rate 72 BPM    Atrial Rate 50 BPM    QRS Duration 140 ms    Q-T Interval 446 ms    QTC Calculation(Bazett) 488 ms    P Axis 52 degrees    R Axis 140 degrees    T Axis 89 degrees    Diagnosis Line Ventricular-paced rhythm  Abnormal ECG    Confirmed by Kadeem Chahal ([id number]) on 8/25/2021 10:46:08 PM (08-25-21 @ 19:46)      Imaging Personally Reviewed:  [ ] YES  [ ] NO    Consultant(s) Notes Reviewed:  [ x] YES  [ ] NO  Care Discussed with Consultants/Other Providers [ x] YES  [ ] NO    Medications:	      MEDICATIONS  (STANDING):  chlorhexidine 4% Liquid 1 Application(s) Topical <User Schedule>  LORazepam   Injectable 1 milliGRAM(s) IV Push every 6 hours  morphine Concentrate 20 milliGRAM(s) SubLingual every 4 hours  scopolamine 1 mG/72 Hr(s) Patch 1 Patch Transdermal every 72 hours    MEDICATIONS  (PRN):  morphine Concentrate 20 milliGRAM(s) SubLingual every 1 hour PRN pain or dyspnea        ADVANCED DIRECTIVES:        DNR/DNI/CMO    DECISION MAKER: Patient [  ]  Family [x  ]  Other [  ] _______  LEGAL SURROGATE: son and        GOALS OF CARE DISCUSSION       Palliative care info/counseling provided	           Family meeting scheduled 	           Documentation of GOC/Advanced Care Planning:       See previous Palliative Medicine Note    PSYCHOSOCIAL-SPIRITUAL ASSESSMENT:       Reviewed       See Palliative Care SW/ documentation      CURRENT DISPO PLAN:         WILL REMAIN IN HOSPITAL      [facility]      Hospice      Home      REFERRALS	        Palliative Med        Unit SW/Case Mgmt              Hospice        CASEY POWERS             [id number]      Patient is a 75y old Female who presents with a chief complaint of altered mental status (31 Aug 2021 11:54)    Currently admitted with the primary diagnosis of: hypoxia/AMS        SUBJECTIVE:  Patient seen and examined at bedside.  She appears more comfortable today.  Denies any symptoms.    ROS:  DYSPNEA: Yes  NAUS/VOM: no	  SECRETIONS: NO	  AGITATION:  No  Pain (Y/N):     NO    General:  Fatigue and weakness   HEENT:    Denied  Neck:  Denied  CVS:  Denied  Resp:  Denied  GI:  Denied    :  Denied  Musc:  Denied  Neuro:  Denied  Psych: anxiety   Skin:  Denied  Lymph:  Denied      PEx:   T(C): --  HR: --  BP: --  RR: --  SpO2: --  Wt(kg): --              General: elderly female found in bed calm, cyanotic around mouth/fingers    Eyes:  PERRL EOMI Non icteric MOM  ENMT: no external oral ulcers, MMM, no thrush   CVS: RR S1S2 No M/G/R  Resp: Labored mildly tachypneic (+) dry cough no wheeze noted   GI:  Soft ND BS+  :   Thibodeaux   Musc: No C/C/E    Neuro: awakens slowly, verbalizes simple needs   Psych: Calm  Skin: Non jaundiced , no rash, warm to touch     Last BM: undocumented     ALLERGIES: bacitracin (Other)  carboplatin (Other)  sulfa drugs (Unknown)  sulfonamides (Unknown)  Xanax (Other)    Labs:	    CBC:    CMP:          RADIOLOGY  CXR  Left hemithorax complete opacification, unchanged. Right opacities      EKG  12 Lead ECG:   Ventricular Rate 72 BPM    Atrial Rate 50 BPM    QRS Duration 140 ms    Q-T Interval 446 ms    QTC Calculation(Bazett) 488 ms    P Axis 52 degrees    R Axis 140 degrees    T Axis 89 degrees    Diagnosis Line Ventricular-paced rhythm  Abnormal ECG    Confirmed by Kadeem Chahal ([id number]) on 8/25/2021 10:46:08 PM (08-25-21 @ 19:46)      Imaging Personally Reviewed:  [ ] YES  [ ] NO    Consultant(s) Notes Reviewed:  [ x] YES  [ ] NO  Care Discussed with Consultants/Other Providers [ x] YES  [ ] NO    Medications:	      MEDICATIONS  (STANDING):  chlorhexidine 4% Liquid 1 Application(s) Topical <User Schedule>  LORazepam   Injectable 1 milliGRAM(s) IV Push every 6 hours  morphine Concentrate 20 milliGRAM(s) SubLingual every 4 hours  scopolamine 1 mG/72 Hr(s) Patch 1 Patch Transdermal every 72 hours    MEDICATIONS  (PRN):  morphine Concentrate 20 milliGRAM(s) SubLingual every 1 hour PRN pain or dyspnea    ADVANCED DIRECTIVES:        DNR/DNI/CMO    DECISION MAKER: Patient [  ]  Family [x  ]  Other [  ] _______  LEGAL SURROGATE: son and        GOALS OF CARE DISCUSSION       Palliative care info/counseling provided	          See previous Palliative Medicine Note    PSYCHOSOCIAL-SPIRITUAL ASSESSMENT:       Reviewed       See Palliative Care SW/ documentation      CURRENT DISPO PLAN:         hospice       REFERRALS	        Palliative Med        Unit SW/Case Mgmt              Hospice

## 2021-08-31 NOTE — PROGRESS NOTE ADULT - ATTENDING COMMENTS
Pt is a 76 yo F with PMhx of COPD< bronchogenic Ca s/p partial left lobectomy, afib on eliquis, who was initially brought in due to difficulty eating. Stroke alert called for altered mentation/disorientation. Found to have respiratory acidosis. Pt examined this morning at bedside. She is on BiPAP, awakens easily to verbal stimuli, answered questions appropriately, followed all commands. Moving all limbs antigravity. CT head without acute process. Low suspicion for AIS, more likely metabolic encephalopathy due to respiratory acidosis, improving. rEEG pending, will follow peripherally. Please call with any questions.
Patient seen at bedside  She had increased work of breathing partially improved with low dose morphine 2mg IV  As above, increased around the clock morphine to 4mg IV q4h ATC and increased PRN to morphine 4mg IV q1h PRN for pain/dyspnea  Will follow up for symptom management tomorrow  If patient's symptom controlled tomorrow, will place hospice consult
Patient seen and examined at bedside with NP.  Dyspnea is improved today.   Switched her to roxanol for discharge to hospice.    Hospice consult placed.  Please see recs above.  Will continue to follow.

## 2021-08-31 NOTE — PROGRESS NOTE ADULT - PROBLEM SELECTOR PLAN 2
Pt on nasal cannula as her baseline oxygen, no pulse ox monitoring. Dyspnea noted on exam  -standing morphine to 4mg IV q4h ATC for dyspnea   -PRN morphine to 4mg q1h PRN for dyspnea/pain Pt on nasal cannula as her baseline oxygen, no pulse ox monitoring. Dyspnea noted on exam.  - Roxanol 20 mg Q4h ATC  - Roxanol 20 mg Q1h PRN

## 2021-08-31 NOTE — PROGRESS NOTE ADULT - SUBJECTIVE AND OBJECTIVE BOX
CASEY POWERS  75y  [facility] [id number]      Patient is a 75y old  Female who presents with a chief complaint of altered mental status (31 Aug 2021 12:44)      INTERVAL HPI/OVERNIGHT EVENTS:  no acute events overnight , resting comfortably      REVIEW OF SYSTEMS:  ROS neg  FAMILY HISTORY:    T(C): --  HR: --  BP: --  RR: --  SpO2: --  Wt(kg): --Vital Signs Last 24 Hrs  T(C): --  T(F): --  HR: --  BP: --  BP(mean): --  RR: --  SpO2: --    PHYSICAL EXAM:    GEN Lying in no acute distress  HEENT Pupils equal and reactive to light and accommodationSupple Neck  PULM bilateral air entry  CV s1s2  GI + bowel sounds nontnender  EXT no cyanosis or edema  INTEG No Lesions  NEURO Moves all extremities                    chlorhexidine 4% Liquid 1 Application(s) Topical <User Schedule>  LORazepam   Injectable 1 milliGRAM(s) IV Push every 6 hours  morphine Concentrate 20 milliGRAM(s) SubLingual every 4 hours  morphine Concentrate 20 milliGRAM(s) SubLingual every 1 hour PRN  scopolamine 1 mG/72 Hr(s) Patch 1 Patch Transdermal every 72 hours      HEALTH ISSUES - PROBLEM Dx:  Palliative care by specialist    Chronic respiratory failure with hypoxia    Stage 4 lung cancer    Anxiety    Comfort measures only status            Case Discussed with House Staff   Spectra [phone]

## 2021-08-31 NOTE — PROGRESS NOTE ADULT - PROBLEM SELECTOR PLAN 3
No disease directed care, pulmonary attempted bronchoscopy but pt had too much tumor burden to stent.  -No additional disease directed therapy  -patient comfort measures only

## 2021-08-31 NOTE — PROGRESS NOTE ADULT - ASSESSMENT
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 She was found to have white out of left lung and placed on BiPAP in the ED. Patient is CMO/DNR/DNI. Patient is pending hospice consult for placement in hospice facility.    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 She was found to have white out of left lung and placed on BiPAP in the ED. Patient is CMO/DNR/DNI. Patient is pending hospice consult for placement in hospice facility.    #Acute on chronic hypercapnic respiratory failure seocndary to copd exacerbation secondary to bronchogenic carcinoma of left lung  on comfort care  palliative care following for sympotm management  hospice for disposition    #anemia    #chronic atrial fibrillation    #anxiety    #depression    #stage 2 ulcer on buttock    PROGRESS NOTE HANDOFF    Pending:  hospice disposition    Family discussion: family aware of care working with hospice  [phone] left message at 5:41 PM 8/31/2021     Disposition: Hospice arrangement

## 2021-08-31 NOTE — PROGRESS NOTE ADULT - ASSESSMENT
75yFemale being evaluated for goals of care and symptom management. Pt is DNR/DNI/CMO, and requiring symptom management for respiratory failure r/t stage 4 lung cancer with tumor invading main bronchus. Pt on nasal cannula 2liters for comfort. Pt still arousabile taking sips of fluids and bites of soft foods with 1;1 assist. Pt had opioids increased yesterday due to tachypnea and dyspnea.     Since pt continues to sustain plan to consult hospice. Son and family have struggled with hospice placement in the past. Home was not an option due to lack of support.  Son August made aware of hospice consult. He will work with case management as needed.       MEDD (morphine equivalent daily dose): 66mg/24hrs       See Recs below.    Please call [phone] with questions or concerns 24/7.   We will continue to follow.    75yFemale being evaluated for goals of care and symptom management. Pt is DNR/DNI/CMO, and requiring symptom management for respiratory failure r/t stage 4 lung cancer with tumor invading main bronchus. Pt on nasal cannula 2liters for comfort. Pt still arousabile taking sips of fluids and bites of soft foods with 1;1 assist. Pt had opioids increased yesterday due to tachypnea and dyspnea.     Since pt continues to sustain plan to consult hospice. Son and family have struggled with hospice placement in the past. Home was not an option due to lack of support.  Son August made aware of hospice consult. He will work with case management as needed.       MEDD (morphine equivalent daily dose): 66mg/24hrs       See Recs below.    Please call [phone] with questions or concerns 24/7.   We will continue to follow.   Discussed with primary MD.

## 2021-08-31 NOTE — PROGRESS NOTE ADULT - PROBLEM SELECTOR PLAN 1
DNR/DNI/CMO   Increase symptom management for dyspnea.  Hospice consult placed DNR/DNI/CMO   Increase symptom management for dyspnea.  Hospice consult placed  - No IVF, no blood draws, no vitals, no pulse ox.  - No escalation of oxygen.

## 2021-08-31 NOTE — PROGRESS NOTE ADULT - SUBJECTIVE AND OBJECTIVE BOX
24H events:    Patient is a 75y old Female who presents with a chief complaint of altered mental status (30 Aug 2021 09:14)    Primary diagnosis of Acute respiratory failure with hypoxia and hypercapnia     Today is hospital day 5d. This morning patient was seen and examined at bedside, resting comfortably in bed. Does not endorse any complaints.    Patient seen by Palliative care, pain medication uptitrated to improve dyspnea. Patient is currently asymptomatic and is pending hospice placement.    Patient is CMO/DNR/DNI    PAST MEDICAL & SURGICAL HISTORY  High cholesterol    Anxiety    Pacemaker    Lung cancer    Afib    COPD (chronic obstructive pulmonary disease)    Artificial cardiac pacemaker    H/O atrioventricular kacie ablation    S/P lobectomy of lung  left    History of tonsillectomy    S/P appendectomy    Cataract  RIGHT  6/17 AND  LEFT  7/5/19      SOCIAL HISTORY:  Social History:  non smoker   non alcoholic (26 Aug 2021 02:22)      ALLERGIES:  bacitracin (Other)  carboplatin (Other)  sulfa drugs (Unknown)  sulfonamides (Unknown)  Xanax (Other)    MEDICATIONS:  STANDING MEDICATIONS  chlorhexidine 4% Liquid 1 Application(s) Topical <User Schedule>  LORazepam   Injectable 1 milliGRAM(s) IV Push every 6 hours  morphine  - Injectable 4 milliGRAM(s) IV Push every 4 hours  scopolamine 1 mG/72 Hr(s) Patch 1 Patch Transdermal every 72 hours    PRN MEDICATIONS  morphine  - Injectable 4 milliGRAM(s) IV Push every 1 hour PRN    VITALS:   T(F): --  HR: --  BP: --  RR: --  SpO2: --    PHYSICAL EXAM:  GENERAL: NAD, cachectic appearing  NERVOUS SYSTEM:  Alert & Oriented X3, non focal   CHEST/LUNG: Clear to auscultation bilaterally; No rales, rhonchi, wheezing, or rubs  HEART: Regular rate and rhythm; No murmurs, rubs, or gallops  ABDOMEN: Soft, Nontender, Nondistended; Bowel sounds present  EXTREMITIES: No clubbing, cyanosis, or edema.  SKIN: No rashes or lesions. Ecchymoses over b/l UE and LE      LABS:                        RADIOLOGY:

## 2021-08-31 NOTE — PROGRESS NOTE ADULT - ASSESSMENT
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 She was found to have white out of left lung and placed on BiPAP in the ED. Patient is CMO/DNR/DNI. Patient is pending hospice consult for placement in hospice facility.    -comfort measures only  -f/u recs per pall care:   ativan 1mg IV push q6h prn for agitation   morphine 2mg IV push q4h for pain   morphine 2mg IV push q1h PRN for dyspnea/pain.    #acute on chronic hypercapnic respiratory failure  #COPD exacerbation  #bronchogenic carcinoma of left lung  CXR 8/26: Complete opacification left hemithorax with effusion, postoperative changes and volume loss  per pulm fellow, dr allen, rationale for bronchoscopy on 8/26 was to address any reversible causes of lung collapse such as a mucus plug  pt extubated   - On CMO    #anemia  #atrial fibrillation  #anxiety  #depression  #stage 2 ulcer on buttock  -on CMO      Diet: CMO  DVT ppx: CMO  Dispo: hospice  Code: DNR/DNI

## 2021-08-31 NOTE — PROGRESS NOTE ADULT - PROBLEM SELECTOR PLAN 4
Ativan 1mg IV q 6 hrs RTC, anxiety better controlled. Monitor for verbal and non verbal signs of anxiety. Provide calm environment.  -recommend adding ativan 1mg IV q6h PRN for breakthrough anxiety Ativan 1mg IV q 6 hrs RTC, anxiety better controlled. Monitor for verbal and non verbal signs of anxiety. Provide calm environment.  - Recommend adding ativan 1mg IV q6h PRN for breakthrough anxiety  - Switch to ativan concentrate on discharge.

## 2021-09-01 NOTE — PROGRESS NOTE ADULT - PROBLEM SELECTOR PLAN 1
DNR/DNI/CMO   Increase symptom management for dyspnea.  Hospice consult placed  - No IVF, no blood draws, no vitals, no pulse ox.  - No escalation of oxygen. DNR/DNI/CMO   Increase symptom management for dyspnea.  Hospice consult placed - will speak with them to hold off on discharge planning   - No IVF, no blood draws, no vitals, no pulse ox.  - No escalation of oxygen.

## 2021-09-01 NOTE — PROGRESS NOTE ADULT - PROBLEM SELECTOR PLAN 4
Ativan 1mg IV q 6 hrs RTC, anxiety better controlled. Monitor for verbal and non verbal signs of anxiety. Provide calm environment.  - Recommend adding ativan 1mg IV q6h PRN for breakthrough anxiety  - Switch to ativan concentrate on discharge. Ativan 1mg IV q 6 hrs RTC, anxiety better controlled. Monitor for verbal and non verbal signs of anxiety. Provide calm environment.  - ativan 1mg IV q6h PRN for breakthrough anxiety  - Switch to ativan concentrate on discharge. Ativan 1mg IV q 6 hrs RTC, anxiety better controlled. Monitor for verbal and non verbal signs of anxiety. Provide calm environment.  - ativan 1mg IV q6h PRN for breakthrough anxiety

## 2021-09-01 NOTE — PROGRESS NOTE ADULT - SUBJECTIVE AND OBJECTIVE BOX
24H events:    Patient is a 75y old Female who presents with a chief complaint of altered mental status (01 Sep 2021 12:01)    Primary diagnosis of Acute respiratory failure with hypoxia and hypercapnia       Today is hospital day 6d. This morning patient was seen and examined at bedside. Unable to assess complaints.    PAST MEDICAL & SURGICAL HISTORY  High cholesterol    Anxiety    Pacemaker    Lung cancer    Afib    COPD (chronic obstructive pulmonary disease)    Artificial cardiac pacemaker    H/O atrioventricular kacie ablation    S/P lobectomy of lung  left    History of tonsillectomy    S/P appendectomy    Cataract  RIGHT  6/17 AND  LEFT  7/5/19      SOCIAL HISTORY:  Social History:  non smoker   non alcoholic (26 Aug 2021 02:22)      ALLERGIES:  bacitracin (Other)  carboplatin (Other)  sulfa drugs (Unknown)  sulfonamides (Unknown)  Xanax (Other)    MEDICATIONS:  STANDING MEDICATIONS  chlorhexidine 4% Liquid 1 Application(s) Topical <User Schedule>  LORazepam   Injectable 1 milliGRAM(s) IV Push every 6 hours  morphine  Infusion 4 mG/Hr IV Continuous <Continuous>  scopolamine 1 mG/72 Hr(s) Patch 1 Patch Transdermal every 72 hours    PRN MEDICATIONS  morphine  - Injectable 8 milliGRAM(s) IV Push every 30 minutes PRN    VITALS:   T(F): --  HR: --  BP: --  RR: --  SpO2: --    PHYSICAL EXAM:  GENERAL: NAD, well-groomed, well-developed  HEAD:  Atraumatic, Normocephalic  EYES: EOMI  NECK: Supple  NERVOUS SYSTEM:  Alert & Oriented X3, non focal   CHEST/LUNG: Clear to auscultation bilaterally; No rales, rhonchi, wheezing, or rubs  HEART: Regular rate and rhythm; No murmurs, rubs, or gallops  ABDOMEN: Soft, Nontender, Nondistended; Bowel sounds present  EXTREMITIES:  2+ Peripheral Pulses, No clubbing, cyanosis, or edema  LYMPH: No lymphadenopathy noted  SKIN: No rashes or lesions  LABS:                        RADIOLOGY:

## 2021-09-01 NOTE — PROGRESS NOTE ADULT - PROBLEM SELECTOR PLAN 2
Pt on nasal cannula as her baseline oxygen, no pulse ox monitoring. Dyspnea noted on exam.  - Roxanol 20 mg Q4h ATC  - Roxanol 20 mg Q1h PRN Pt on nasal cannula as her baseline oxygen, no pulse ox monitoring. Dyspnea noted on exam.  D/C Roxanol  Start morphine infusion 4 mg / H  Start morphine 8 mg IVP Q 30 min PRN for dyspnea (one stat dose was given)

## 2021-09-01 NOTE — PROGRESS NOTE ADULT - SUBJECTIVE AND OBJECTIVE BOX
CASEY POWERS             [id number]      Patient is a 75y old Female who presents with a chief complaint of altered mental status (31 Aug 2021 11:54)    Currently admitted with the primary diagnosis of: hypoxia/AMS        SUBJECTIVE:  Patient seen and examined at bedside.  She appears to be in increasing respiratory distress despite receiving 20 mg SL morphine Q 4 H ATC.  Unable to review ROS due to mental status. Son at bedside.     ROS:  DYSPNEA: Yes  NAUS/VOM: no	  SECRETIONS: NO	  AGITATION:  No  Pain (Y/N):     NO    General:  Fatigue and weakness   HEENT:    Denied  Neck:  Denied  CVS:  Denied  Resp:  Denied  GI:  Denied    :  Denied  Musc:  Denied  Neuro:  Denied  Psych: anxiety   Skin:  Denied  Lymph:  Denied      PEx:   General: elderly female found in bed calm, cyanotic around mouth/fingers    Eyes:  PERRL EOMI Non icteric MOM  ENMT: no external oral ulcers, MMM, no thrush   CVS: RR, no edema   Resp: Labored mildly tachypneic (+) dry cough no wheeze noted   GI:  Soft ND   :   Thibodeaux   Musc: No C/C/E    Neuro: awakens slowly, intermittently able to speak and communicate   Psych: Calm  Skin: Non jaundiced , no rash, warm to touch     Last BM: undocumented     ALLERGIES: bacitracin (Other)  carboplatin (Other)  sulfa drugs (Unknown)  sulfonamides (Unknown)  Xanax (Other)    Labs:	    CBC:    CMP:          RADIOLOGY  CXR  Left hemithorax complete opacification, unchanged. Right opacities      EKG  12 Lead ECG:   Ventricular Rate 72 BPM    Atrial Rate 50 BPM    QRS Duration 140 ms    Q-T Interval 446 ms    QTC Calculation(Bazett) 488 ms    P Axis 52 degrees    R Axis 140 degrees    T Axis 89 degrees    Diagnosis Line Ventricular-paced rhythm  Abnormal ECG    Confirmed by Kadeem Chahal ([id number]) on 8/25/2021 10:46:08 PM (08-25-21 @ 19:46)      Imaging Personally Reviewed:  [ ] YES  [ ] NO    Consultant(s) Notes Reviewed:  [ x] YES  [ ] NO  Care Discussed with Consultants/Other Providers [ x] YES  [ ] NO    Medications:	      MEDICATIONS  (STANDING):  chlorhexidine 4% Liquid 1 Application(s) Topical <User Schedule>  LORazepam   Injectable 1 milliGRAM(s) IV Push every 6 hours  morphine Concentrate 20 milliGRAM(s) SubLingual every 4 hours  scopolamine 1 mG/72 Hr(s) Patch 1 Patch Transdermal every 72 hours    MEDICATIONS  (PRN):  morphine Concentrate 20 milliGRAM(s) SubLingual every 1 hour PRN pain or dyspnea    ADVANCED DIRECTIVES:        DNR/DNI/CMO    DECISION MAKER: Patient [  ]  Family [x  ]  Other [  ] _______  LEGAL SURROGATE: son and        GOALS OF CARE DISCUSSION       Palliative care info/counseling provided	          See previous Palliative Medicine Note    PSYCHOSOCIAL-SPIRITUAL ASSESSMENT:       Reviewed       See Palliative Care SW/ documentation      CURRENT DISPO PLAN:         hospice       REFERRALS	        Palliative Med        Unit SW/Case Mgmt              Hospice        CASEY POWERS             [id number]      Patient is a 75y old Female who presents with a chief complaint of altered mental status (31 Aug 2021 11:54)    Currently admitted with the primary diagnosis of: hypoxia/AMS        SUBJECTIVE:  Patient seen and examined at bedside.  She appears to be in increasing respiratory distress despite receiving 20 mg SL morphine Q 4 H ATC.  Unable to review ROS due to mental status. Son at bedside.     ROS:  DYSPNEA: Yes  NAUS/VOM: no	  SECRETIONS: NO	  AGITATION:  No  Pain (Y/N):     NO    General:  Fatigue and weakness   HEENT:    Denied  Neck:  Denied  CVS:  Denied  Resp:  Denied  GI:  Denied    :  Denied  Musc:  Denied  Neuro:  Denied  Psych: anxiety   Skin:  Denied  Lymph:  Denied      PEx:   General: elderly female found in bed calm, cyanotic around mouth/fingers    Eyes:  PERRL EOMI Non icteric MOM  ENMT: no external oral ulcers, MMM, no thrush   CVS: RR, no edema   Resp: Labored mildly tachypneic (+) dry cough no wheeze noted   GI:  Soft ND   :   Thibodeaux   Musc: No C/C/E    Neuro: awakens slowly, intermittently able to speak and communicate   Psych: Calm  Skin: Non jaundiced , no rash, warm to touch     Last BM: undocumented     ALLERGIES: bacitracin (Other)  carboplatin (Other)  sulfa drugs (Unknown)  sulfonamides (Unknown)  Xanax (Other)    Labs:	    no new labs, comfort care     RADIOLOGY  CXR  Left hemithorax complete opacification, unchanged. Right opacities      EKG  12 Lead ECG:   Ventricular Rate 72 BPM    Atrial Rate 50 BPM    QRS Duration 140 ms    Q-T Interval 446 ms    QTC Calculation(Bazett) 488 ms    P Axis 52 degrees    R Axis 140 degrees    T Axis 89 degrees    Diagnosis Line Ventricular-paced rhythm  Abnormal ECG    Confirmed by Kadeem Chahal ([id number]) on 8/25/2021 10:46:08 PM (08-25-21 @ 19:46)      Imaging Personally Reviewed:  [ ] YES  [ ] NO    Consultant(s) Notes Reviewed:  [ x] YES  [ ] NO  Care Discussed with Consultants/Other Providers [ x] YES  [ ] NO    Medications:	      MEDICATIONS  (STANDING):  chlorhexidine 4% Liquid 1 Application(s) Topical <User Schedule>  LORazepam   Injectable 1 milliGRAM(s) IV Push every 6 hours  morphine Concentrate 20 milliGRAM(s) SubLingual every 4 hours  scopolamine 1 mG/72 Hr(s) Patch 1 Patch Transdermal every 72 hours    MEDICATIONS  (PRN):  morphine Concentrate 20 milliGRAM(s) SubLingual every 1 hour PRN pain or dyspnea    ADVANCED DIRECTIVES:        DNR/DNI/CMO    DECISION MAKER: Patient [  ]  Family [x  ]  Other [  ] _______  LEGAL SURROGATE: son and        GOALS OF CARE DISCUSSION       Palliative care info/counseling provided	          See previous Palliative Medicine Note    PSYCHOSOCIAL-SPIRITUAL ASSESSMENT:       Reviewed       See Palliative Care SW/ documentation      CURRENT DISPO PLAN:         hospice - patient no longer stable for hospice discharge, requiring high doses of morphine for respiratory distress      REFERRALS	        Palliative Med        Unit SW/Case Mgmt                      CASEY POWERS             [id number]      Patient is a 75y old Female who presents with a chief complaint of altered mental status (31 Aug 2021 11:54)    Currently admitted with the primary diagnosis of: hypoxia/AMS        SUBJECTIVE:  Patient seen and examined at bedside.  She appears to be in increasing respiratory distress despite receiving 20 mg SL morphine Q 4 H ATC.  Unable to review ROS due to mental status. Son at bedside.     ROS:  DYSPNEA: Yes  NAUS/VOM: no	  SECRETIONS: NO	  AGITATION:  No  Pain (Y/N):     NO    General:  Fatigue and weakness   HEENT:    Denied  Neck:  Denied  CVS:  Denied  Resp:  Denied  GI:  Denied    :  Denied  Musc:  Denied  Neuro:  Denied  Psych: anxiety   Skin:  Denied  Lymph:  Denied      PEx:   General: elderly female found in bed calm, cyanotic around mouth/fingers    Eyes:  PERRL EOMI Non icteric MOM  ENMT: no external oral ulcers, MMM, no thrush   CVS: RR, no edema   Resp: Labored + tachypneic (+) dry cough no wheeze noted   GI:  Soft ND   :   Thibodeaux   Musc: No C/C/E    Neuro: awakens slowly, intermittently able to speak and communicate   Psych: Calm  Skin: Non jaundiced , no rash, warm to touch     Last BM: undocumented     ALLERGIES: bacitracin (Other)  carboplatin (Other)  sulfa drugs (Unknown)  sulfonamides (Unknown)  Xanax (Other)    Labs:	    no new labs, comfort care     RADIOLOGY  CXR  Left hemithorax complete opacification, unchanged. Right opacities      EKG  12 Lead ECG:   Ventricular Rate 72 BPM    Atrial Rate 50 BPM    QRS Duration 140 ms    Q-T Interval 446 ms    QTC Calculation(Bazett) 488 ms    P Axis 52 degrees    R Axis 140 degrees    T Axis 89 degrees    Diagnosis Line Ventricular-paced rhythm  Abnormal ECG    Confirmed by Kadeem Chahal ([id number]) on 8/25/2021 10:46:08 PM (08-25-21 @ 19:46)      Imaging Personally Reviewed:  [ ] YES  [ ] NO    Consultant(s) Notes Reviewed:  [ x] YES  [ ] NO  Care Discussed with Consultants/Other Providers [ x] YES  [ ] NO    Medications:	      MEDICATIONS  (STANDING):  chlorhexidine 4% Liquid 1 Application(s) Topical <User Schedule>  LORazepam   Injectable 1 milliGRAM(s) IV Push every 6 hours  morphine Concentrate 20 milliGRAM(s) SubLingual every 4 hours  scopolamine 1 mG/72 Hr(s) Patch 1 Patch Transdermal every 72 hours    MEDICATIONS  (PRN):  morphine Concentrate 20 milliGRAM(s) SubLingual every 1 hour PRN pain or dyspnea    ADVANCED DIRECTIVES:        DNR/DNI/CMO    DECISION MAKER: Patient [  ]  Family [x  ]  Other [  ] _______  LEGAL SURROGATE: son and        GOALS OF CARE DISCUSSION       Palliative care info/counseling provided	          See previous Palliative Medicine Note    PSYCHOSOCIAL-SPIRITUAL ASSESSMENT:       Reviewed       See Palliative Care SW/ documentation      CURRENT DISPO PLAN:         hospice - patient no longer stable for hospice discharge, requiring high doses of morphine for respiratory distress      REFERRALS	        Palliative Med        Unit SW/Case Mgmt

## 2021-09-01 NOTE — PROGRESS NOTE ADULT - ASSESSMENT
76 y/o F h/o COPD (on 3 L home O2 ), bronchogenic carcinoma (left lung, s/p left partial lobectomy, in remission x 9 yrs), Afib on Eliquis ablation and micra PPM, anxiety p/w acute onset sob .     # Acute on chronic hypercapnic respiratory failure secondary to copd exacerbation   # Bronchogenic carcinoma of left lung  #chronic atrial fibrillation  #anxiety/ depression  #stage 2 ulcer on buttock  # Anemia    Pt is DNR/DNI/CMO  -c/w comfort care measures  - palliative team following

## 2021-09-01 NOTE — PROGRESS NOTE ADULT - ASSESSMENT
75yFemale being evaluated for goals of care and symptom management. Pt is DNR/DNI/CMO, and requiring symptom management for respiratory failure r/t stage 4 lung cancer with tumor invading main bronchus. Pt on nasal cannula 2liters, morphine, and ativan for comfort. Patient today was noted to be in significant respiratory distress. Morphine dose increased. Patient is no longer candidate for hospice transfer given respiratory distress.     Problem/Plan - 1:  ·  Problem: Palliative care by specialist.   ·  Plan: DNR/DNI/CMO   Increase symptom management for dyspnea.  Hospice consult placed - will speak with them to hold off on discharge planning   - No IVF, no blood draws, no vitals, no pulse ox.  - No escalation of oxygen.     Problem/Plan - 2:  ·  Problem: Chronic respiratory failure with hypoxia.   ·  Plan: Pt on nasal cannula as her baseline oxygen, no pulse ox monitoring. Dyspnea noted on exam.  D/C Roxanol  Start morphine infusion 4 mg / H  Start morphine 8 mg IVP Q 30 min PRN for dyspnea (one stat dose was given).     Problem/Plan - 3:  ·  Problem: Stage 4 lung cancer.   ·  Plan: No disease directed care, pulmonary attempted bronchoscopy but pt had too much tumor burden to stent.  -No additional disease directed therapy  -patient comfort measures only.     Problem/Plan - 4:  ·  Problem: Anxiety.   ·  Plan: Ativan 1mg IV q 6 hrs RTC, anxiety better controlled. Monitor for verbal and non verbal signs of anxiety. Provide calm environment.  - ativan 1mg IV q6h PRN for breakthrough anxiety. 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 Patient is CMO/DNR/DNI. Patient is on morphine drip for respiratory distress.    # Acute on chronic hypercapnic respiratory failure  # Bronchogenic carcinoma of left lung  # Chronic atrial fibrillation  # Anxiety  # Anemia  #functional quadriplegia  - comfort measures only  - DNR/DNI  - follow palliative recs       - morphine drip 4 mg /hr and 8 mg push prn for dyspnea      - ativan for anxiety

## 2021-09-01 NOTE — PROGRESS NOTE ADULT - ASSESSMENT
75yFemale being evaluated for goals of care and symptom management. Pt is DNR/DNI/CMO, and requiring symptom management for respiratory failure r/t stage 4 lung cancer with tumor invading main bronchus. Pt on nasal cannula 2liters for comfort. Pt still arousabile taking sips of fluids and bites of soft foods with 1;1 assist. Pt had opioids increased yesterday due to tachypnea and dyspnea.     Since pt continues to sustain plan to consult hospice. Son and family have struggled with hospice placement in the past. Home was not an option due to lack of support.  Son August made aware of hospice consult. He will work with case management as needed.       MEDD (morphine equivalent daily dose): 66mg/24hrs       See Recs below.    Please call [phone] with questions or concerns 24/7.   We will continue to follow.   Discussed with primary MD.   75yFemale being evaluated for goals of care and symptom management. Pt is DNR/DNI/CMO, and requiring symptom management for respiratory failure r/t stage 4 lung cancer with tumor invading main bronchus. Pt on nasal cannula 2liters for comfort.     Today met with son and patient at bedside. Patient having increasing respiratory distress despite around the clock morphine dosing. Will start morphine infusion to control respiratory distress. Not stable for discharge onto hospice.       MEDD (morphine equivalent daily dose): 120 mg       See Recs below.    Please call [phone] with questions or concerns 24/7.   We will continue to follow.   Discussed with primary MD, bedside RN.    75yFemale being evaluated for goals of care and symptom management. Pt is DNR/DNI/CMO, and requiring symptom management for respiratory failure r/t stage 4 lung cancer with tumor invading main bronchus. Pt on nasal cannula 2liters for comfort.     Today met with son and patient at bedside. Patient having increasing respiratory distress despite around the clock morphine dosing. Will start morphine infusion to control respiratory distress. Not stable for discharge to hospice.       MEDD (morphine equivalent daily dose): 120 mg       See Recs below.    Please call [phone] with questions or concerns 24/7.   We will continue to follow.   Discussed with primary MD, bedside RN.

## 2021-09-01 NOTE — DISCHARGE NOTE PROVIDER - NSDCCPCAREPLAN_GEN_ALL_CORE_FT
PRINCIPAL DISCHARGE DIAGNOSIS  Diagnosis: Acute respiratory failure with hypoxia and hypercapnia  Assessment and Plan of Treatment: Patient was intubated in ICU  Bronchoscopy scheduled but not performed after goals of care conversation  Patient extubated and made comfort measures only  No intervention, DNR/DNI      SECONDARY DISCHARGE DIAGNOSES  Diagnosis: Palliative care status  Assessment and Plan of Treatment: Comfort measures only  Given morphine and ativan as needed

## 2021-09-01 NOTE — DISCHARGE NOTE PROVIDER - NSDCMRMEDTOKEN_GEN_ALL_CORE_FT
morphine 20 mg/mL oral concentrate: 0.25 milliliter(s) orally 4 times a day, As needed, dyspnea or pain

## 2021-09-01 NOTE — CHART NOTE - NSCHARTNOTEFT_GEN_A_CORE
Patient was observed to experience labored breathing.  Writer reported same to staff.  Son, August, was at bedside.  Son expressed expressed sadness with regard to patient's deterioration.

## 2021-09-01 NOTE — PROGRESS NOTE ADULT - SUBJECTIVE AND OBJECTIVE BOX
Patient is a 75y old  Female who presents with a chief complaint of altered mental status (01 Sep 2021 13:39)    Patient was seen and examined.  Pt on comfort measures  Was started on IV morphine for resp distress by palliative care team    PAST MEDICAL & SURGICAL HISTORY:  High cholesterol  Anxiety  Pacemaker  Lung cancer  Afib  COPD (chronic obstructive pulmonary disease)  Artificial cardiac pacemaker  H/O atrioventricular kacie ablation  S/P lobectomy of lung, left  History of tonsillectomy  S/P appendectomy  Cataract, RIGHT  6/17 AND  LEFT  7/5/19    Allergies  bacitracin (Other)  carboplatin (Other)  sulfa drugs (Unknown)  sulfonamides (Unknown)  Xanax (Other)    MEDICATIONS  (STANDING):  chlorhexidine 4% Liquid 1 Application(s) Topical <User Schedule>  LORazepam   Injectable 1 milliGRAM(s) IV Push every 6 hours  morphine  Infusion 4 mG/Hr (4 mL/Hr) IV Continuous <Continuous>  scopolamine 1 mG/72 Hr(s) Patch 1 Patch Transdermal every 72 hours    MEDICATIONS  (PRN):  morphine  - Injectable 8 milliGRAM(s) IV Push every 30 minutes PRN Dyspnea    Vital Signs Last 24 Hrs  T(C): --  T(F): --  HR: --  BP: --  BP(mean): --  RR: --  SpO2: --    O/E: sedated  HEENT: atraumatic, EOMI.  Chest: decreased breath sounds  CVS: SIS2 +, no murmur.  P/A: Soft, BS+

## 2021-09-01 NOTE — DISCHARGE NOTE PROVIDER - HOSPITAL COURSE
74 yo F with history of COPD on 3L home O2, bronchogenic carcinoma of L lung (s/p L partial lobectomy, in remission x 9 years), Afib on eliquis s/b ablation and micra PPM, anxiety, BIBEMS from nursing home for difficulty eating and AMS. Patient was in her usual state of health earlier that afternoon but was found to have difficulty eating and was nonverbal and not following commands. In the ED the patient was found to be confused and disoriented and had a respiratory acidosis and complete opacification of L hemithorax with effusion. She was placed on BIPAP and improved. The patient thereafter deteriorated in the ICU and was intubated. After extensive GOC with the patient's son, it was determined that she should be transferred to comfort measures only. The patient was provided with palliative interventions only to minimize anxiety and pain.

## 2021-09-02 NOTE — PROGRESS NOTE ADULT - PROBLEM SELECTOR PLAN 2
Pt on nasal cannula as her baseline oxygen, no pulse ox monitoring. Dyspnea noted on exam.  D/C Roxanol  Continue morphine infusion 4 mg / H  Continue morphine 8 mg IVP Q 30 min PRN for dyspnea (one stat dose was given) Pt on nasal cannula as her baseline oxygen, no pulse ox monitoring.   Appears comfortable today   Continue morphine infusion 4 mg / H  Continue morphine 8 mg IVP Q 30 min PRN for dyspnea (one stat dose was given)

## 2021-09-02 NOTE — PROGRESS NOTE ADULT - TIME BILLING
Discussed on rounds with Housestaff
Discussed on rounds with HouseStaff
Review of imaging and chart; obtaining history; examination of pt; discussion and coordination of care.

## 2021-09-02 NOTE — CHART NOTE - NSCHARTNOTESELECT_GEN_ALL_CORE
Bronchoscopy/Event Note
Palliative Care SW Note/Event Note
Palliative Care SW Note/Event Note
Transfer Note
Event Note
Event Note
Palliative Care SW Initial Assessment/Event Note
Palliative Care SW Note/Event Note
Palliative Care SW Note/Event Note

## 2021-09-02 NOTE — PROGRESS NOTE ADULT - SUBJECTIVE AND OBJECTIVE BOX
Patient is a 75y old  Female who presents with a chief complaint of altered mental status (01 Sep 2021 13:39)    Patient was seen and examined.  Pt on comfort measures  Was started on IV morphine , not in distress    PAST MEDICAL & SURGICAL HISTORY:  High cholesterol  Anxiety  Pacemaker  Lung cancer  Afib  COPD (chronic obstructive pulmonary disease)  Artificial cardiac pacemaker  H/O atrioventricular kacie ablation  S/P lobectomy of lung, left  History of tonsillectomy  S/P appendectomy  Cataract, RIGHT  6/17 AND  LEFT  7/5/19    Allergies  bacitracin (Other)  carboplatin (Other)  sulfa drugs (Unknown)  sulfonamides (Unknown)  Xanax (Other)    MEDICATIONS  (STANDING):  chlorhexidine 4% Liquid 1 Application(s) Topical <User Schedule>  morphine  Infusion 4 mG/Hr (4 mL/Hr) IV Continuous <Continuous>  scopolamine 1 mG/72 Hr(s) Patch 1 Patch Transdermal every 72 hours    MEDICATIONS  (PRN):  LORazepam   Injectable 1 milliGRAM(s) IV Push every 6 hours PRN Agitation  morphine  - Injectable 8 milliGRAM(s) IV Push every 30 minutes PRN Dyspnea    Vital Signs Last 24 Hrs  T(C): --  T(F): --  HR: --  BP: --  BP(mean): --  RR: --  SpO2: --    O/E: sedated  HEENT: atraumatic, EOMI.  Chest: decreased breath sounds  CVS: SIS2 +, no murmur.  P/A: Soft, BS+

## 2021-09-02 NOTE — PROGRESS NOTE ADULT - PROBLEM SELECTOR PLAN 1
DNR/DNI/CMO   Increase symptom management for dyspnea.  Hospice consult placed - will speak with them to hold off on discharge planning   - No IVF, no blood draws, no vitals, no pulse ox.  - No escalation of oxygen.

## 2021-09-02 NOTE — PROGRESS NOTE ADULT - ASSESSMENT
74 y/o F h/o COPD (on 3 L home O2 ), bronchogenic carcinoma (left lung, s/p left partial lobectomy, in remission x 9 yrs), Afib on Eliquis ablation and micra PPM, anxiety p/w acute onset sob .     # Acute on chronic hypercapnic respiratory failure secondary to copd exacerbation   # Bronchogenic carcinoma of left lung  #chronic atrial fibrillation  #anxiety/ depression  #stage 2 ulcer on buttock  # Anemia    Pt is DNR/DNI/CMO  -c/w comfort care measures  - palliative team following

## 2021-09-02 NOTE — PROGRESS NOTE ADULT - PROBLEM SELECTOR PLAN 4
Ativan 1mg IV q 6 hrs RTC, patient with no signs of anxiety, altered mental status. Monitor for verbal and non verbal signs of anxiety. Provide calm environment.  - ativan 1mg IV q6h PRN for anxiety No apparent anxiety on exam today  Ativan 1mg IV q 6 hrs RTC, patient with no signs of anxiety, altered mental status. Monitor for verbal and non verbal signs of anxiety. Provide calm environment.  - ativan 1mg IV q6h PRN for anxiety

## 2021-09-02 NOTE — PROGRESS NOTE ADULT - SUBJECTIVE AND OBJECTIVE BOX
24H events:    Patient is a 75y old Female who presents with a chief complaint of altered mental status (01 Sep 2021 13:39)    Primary diagnosis of Acute respiratory failure with hypoxia and hypercapnia    Today is hospital day 7d. This morning patient was seen and examined at bedside, resting comfortably in bed.     Patient is for comfort measures only, on morphine drip and prn push    PAST MEDICAL & SURGICAL HISTORY  High cholesterol    Anxiety    Pacemaker    Lung cancer    Afib    COPD (chronic obstructive pulmonary disease)    Artificial cardiac pacemaker    H/O atrioventricular kacie ablation    S/P lobectomy of lung  left    History of tonsillectomy    S/P appendectomy    Cataract  RIGHT  6/17 AND  LEFT  7/5/19      SOCIAL HISTORY:  Social History:  non smoker   non alcoholic (26 Aug 2021 02:22)      ALLERGIES:  bacitracin (Other)  carboplatin (Other)  sulfa drugs (Unknown)  sulfonamides (Unknown)  Xanax (Other)    MEDICATIONS:  STANDING MEDICATIONS  chlorhexidine 4% Liquid 1 Application(s) Topical <User Schedule>  LORazepam   Injectable 1 milliGRAM(s) IV Push every 6 hours  morphine  Infusion 4 mG/Hr IV Continuous <Continuous>  scopolamine 1 mG/72 Hr(s) Patch 1 Patch Transdermal every 72 hours    PRN MEDICATIONS  morphine  - Injectable 8 milliGRAM(s) IV Push every 30 minutes PRN    VITALS:   T(F): --  HR: --  BP: --  RR: --  SpO2: --    PHYSICAL EXAM:  GENERAL: NAD, sedated, resting  NERVOUS SYSTEM:  Alert & Oriented X3, non focal   CHEST/LUNG: Soft shallow breathing, Clear to auscultation bilaterally  HEART: Regular rate and rhythm; No murmurs, rubs, or gallops  ABDOMEN: Soft, Nontender, Nondistended; Bowel sounds present  EXTREMITIES:  No clubbing, cyanosis, or edema    LABS:    N/A       24H events:    Patient is a 75y old Female who presents with a chief complaint of altered mental status (01 Sep 2021 13:39)    Primary diagnosis of Acute respiratory failure with hypoxia and hypercapnia    Today is hospital day 7d. This morning patient was seen and examined at bedside, resting comfortably in bed.     Patient is for comfort measures only, on morphine drip and prn push    PAST MEDICAL & SURGICAL HISTORY  High cholesterol    Anxiety    Pacemaker    Lung cancer    Chronic Afib    COPD (chronic obstructive pulmonary disease)    Artificial cardiac pacemaker    H/O atrioventricular kacie ablation    S/P lobectomy of lung  left    History of tonsillectomy    S/P appendectomy    Cataract  RIGHT  6/17 AND  LEFT  7/5/19      SOCIAL HISTORY:  Social History:  non smoker   non alcoholic (26 Aug 2021 02:22)      ALLERGIES:  bacitracin (Other)  carboplatin (Other)  sulfa drugs (Unknown)  sulfonamides (Unknown)  Xanax (Other)    MEDICATIONS:  STANDING MEDICATIONS  chlorhexidine 4% Liquid 1 Application(s) Topical <User Schedule>  LORazepam   Injectable 1 milliGRAM(s) IV Push every 6 hours  morphine  Infusion 4 mG/Hr IV Continuous <Continuous>  scopolamine 1 mG/72 Hr(s) Patch 1 Patch Transdermal every 72 hours    PRN MEDICATIONS  morphine  - Injectable 8 milliGRAM(s) IV Push every 30 minutes PRN    VITALS:   T(F): --  HR: --  BP: --  RR: --  SpO2: --    PHYSICAL EXAM:  GENERAL: NAD, sedated, resting  NERVOUS SYSTEM:  Alert & Oriented X3, non focal   CHEST/LUNG: Soft shallow breathing, Clear to auscultation bilaterally  HEART: Regular rate and rhythm; No murmurs, rubs, or gallops  ABDOMEN: Soft, Nontender, Nondistended; Bowel sounds present  EXTREMITIES:  No clubbing, cyanosis, or edema    LABS:    N/A

## 2021-09-02 NOTE — PROGRESS NOTE ADULT - SUBJECTIVE AND OBJECTIVE BOX
CASEY POWERS             [id number]      Patient is a 75y old Female who presents with a chief complaint of altered mental status (31 Aug 2021 11:54)    Currently admitted with the primary diagnosis of: hypoxia/AMS        SUBJECTIVE:  Patient seen and examined at bedside. On mor[phine infusion since yesterday.   She appears to be comfortable, no agitation or respiratory distress at this time.   Unable to review ROS due to mental status.     ROS:  DYSPNEA: No   NAUS/VOM: no	  SECRETIONS: NO	  AGITATION:  No  Pain (Y/N):     NO    General:  Fatigue and weakness   HEENT:    Denied  Neck:  Denied  CVS:  Denied  Resp:  Denied  GI:  Denied    :  Denied  Musc:  Denied  Neuro:  Denied  Psych: anxiety   Skin:  Denied  Lymph:  Denied      PEx:   General: elderly female found in bed calm, cyanotic around mouth/fingers    Eyes:  PERRL EOMI Non icteric MOM  ENMT: no external oral ulcers, MMM, no thrush   CVS: RR, no edema   Resp: Labored not tachypneic  GI:  Soft ND   :   [place]ibodeaux   Musc: No C/C/E    Neuro: awakens slowly, unable to speak and communicate   Psych: Calm  Skin: Non jaundiced , no rash, warm to touch     Last BM: undocumented     ALLERGIES: bacitracin (Other)  carboplatin (Other)  sulfa drugs (Unknown)  sulfonamides (Unknown)  Xanax (Other)    Labs:	    no new labs, comfort care     RADIOLOGY  CXR  Left hemithorax complete opacification, unchanged. Right opacities      EKG  12 Lead ECG:   Ventricular Rate 72 BPM    Atrial Rate 50 BPM    QRS Duration 140 ms    Q-T Interval 446 ms    QTC Calculation(Bazett) 488 ms    P Axis 52 degrees    R Axis 140 degrees    T Axis 89 degrees    Diagnosis Line Ventricular-paced rhythm  Abnormal ECG    Confirmed by Kadeem Chahal ([id number]) on 8/25/2021 10:46:08 PM (08-25-21 @ 19:46)      Imaging Personally Reviewed:  [ ] YES  [ ] NO    Consultant(s) Notes Reviewed:  [ x] YES  [ ] NO  Care Discussed with Consultants/Other Providers [ x] YES  [ ] NO    Medications:	      MEDICATIONS  (STANDING):  chlorhexidine 4% Liquid 1 Application(s) Topical <User Schedule>  LORazepam   Injectable 1 milliGRAM(s) IV Push every 6 hours  morphine Concentrate 20 milliGRAM(s) SubLingual every 4 hours  scopolamine 1 mG/72 Hr(s) Patch 1 Patch Transdermal every 72 hours    MEDICATIONS  (PRN):  morphine Concentrate 20 milliGRAM(s) SubLingual every 1 hour PRN pain or dyspnea    ADVANCED DIRECTIVES:        DNR/DNI/CMO    DECISION MAKER: Patient [  ]  Family [x  ]  Other [  ] _______  LEGAL SURROGATE: son and        GOALS OF CARE DISCUSSION       Palliative care info/counseling provided	          See previous Palliative Medicine Note    PSYCHOSOCIAL-SPIRITUAL ASSESSMENT:       Reviewed       See Palliative Care SW/ documentation      CURRENT DISPO PLAN:         hospice - patient no longer stable for hospice discharge, requiring high doses of morphine for respiratory distress      REFERRALS	        Palliative Med        Unit SW/Case Mgmt                      CASEY POWERS             [id number]      Patient is a 75y old Female who presents with a chief complaint of altered mental status (31 Aug 2021 11:54)    Currently admitted with the primary diagnosis of: hypoxia/AMS        SUBJECTIVE:  Patient seen and examined at bedside. On morphine infusion since yesterday.   She appears to be comfortable, no agitation or respiratory distress at this time.   Unable to review ROS due to mental status.     ROS:  DYSPNEA: No   NAUS/VOM: no	  SECRETIONS: NO	  AGITATION:  No  Pain (Y/N):     NO    General:  Fatigue and weakness   HEENT:    Denied  Neck:  Denied  CVS:  Denied  Resp:  Denied  GI:  Denied    :  Denied  Musc:  Denied  Neuro:  Denied  Psych: anxiety   Skin:  Denied  Lymph:  Denied      PEx:   General: elderly female found in bed calm, cyanotic around mouth/fingers    Eyes:  PERRL EOMI Non icteric MOM  ENMT: no external oral ulcers, MMM, no thrush   CVS: RR, no edema   Resp: Labored not tachypneic  GI:  Soft ND   :   [place]ibodeaux   Musc: No C/C/E    Neuro: awakens slowly, unable to speak and communicate   Psych: Calm  Skin: Non jaundiced , no rash, warm to touch     Last BM: undocumented     ALLERGIES: bacitracin (Other)  carboplatin (Other)  sulfa drugs (Unknown)  sulfonamides (Unknown)  Xanax (Other)    Labs:	    no new labs, comfort care     RADIOLOGY  CXR  Left hemithorax complete opacification, unchanged. Right opacities      EKG  12 Lead ECG:   Ventricular Rate 72 BPM    Atrial Rate 50 BPM    QRS Duration 140 ms    Q-T Interval 446 ms    QTC Calculation(Bazett) 488 ms    P Axis 52 degrees    R Axis 140 degrees    T Axis 89 degrees    Diagnosis Line Ventricular-paced rhythm  Abnormal ECG    Confirmed by Kadeem Chahal ([id number]) on 8/25/2021 10:46:08 PM (08-25-21 @ 19:46)      Imaging Personally Reviewed:  [ ] YES  [ ] NO    Consultant(s) Notes Reviewed:  [ x] YES  [ ] NO  Care Discussed with Consultants/Other Providers [ x] YES  [ ] NO    Medications:	      MEDICATIONS  (STANDING):  chlorhexidine 4% Liquid 1 Application(s) Topical <User Schedule>  LORazepam   Injectable 1 milliGRAM(s) IV Push every 6 hours  morphine Concentrate 20 milliGRAM(s) SubLingual every 4 hours  scopolamine 1 mG/72 Hr(s) Patch 1 Patch Transdermal every 72 hours    MEDICATIONS  (PRN):  morphine Concentrate 20 milliGRAM(s) SubLingual every 1 hour PRN pain or dyspnea    ADVANCED DIRECTIVES:        DNR/DNI/CMO    DECISION MAKER: Patient [  ]  Family [x  ]  Other [  ] _______  LEGAL SURROGATE: son and        GOALS OF CARE DISCUSSION       Palliative care info/counseling provided	          See previous Palliative Medicine Note    PSYCHOSOCIAL-SPIRITUAL ASSESSMENT:       Reviewed       See Palliative Care SW/ documentation      CURRENT DISPO PLAN:         hospice - patient no longer stable for hospice discharge, requiring high doses of morphine for respiratory distress      REFERRALS	        Palliative Med        Unit SW/Case Mgmt

## 2021-09-02 NOTE — PROGRESS NOTE ADULT - ASSESSMENT
75yFemale being evaluated for goals of care and symptom management. Pt is DNR/DNI/CMO, and requiring symptom management for respiratory failure r/t stage 4 lung cancer with tumor invading main bronchus. Pt on nasal cannula 2liters for comfort.     Patient now on morphine continuous infusion and very comfortable. Not stable for discharge to hospice given need for morphine infusion.       MEDD (morphine equivalent daily dose): 288 mg      See Recs below.    Please call [phone] with questions or concerns 24/7.   We will continue to follow.   Discussed with primary MD, bedside RN.

## 2021-09-02 NOTE — PROGRESS NOTE ADULT - ASSESSMENT
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 Patient is CMO/DNR/DNI. Patient is on morphine drip for respiratory distress.    # Acute on chronic hypercapnic respiratory failure  # Bronchogenic carcinoma of left lung  # Atrial fibrillation  # Anxiety  # Anemia  - comfort measures only  - DNR/DNI  - follow palliative recs       - morphine drip 4 mg /hr and 8 mg push prn for dyspnea      - ativan for anxiety     76 yo female w/PMHx COPD (on 3L home oxygen), bronchogenic carcinoma of left lung s/p partial lobectomy (remission 9 years), atrial fibrillation, anxiety who presents with altered mental status. Patient is CMO/DNR/DNI. Patient is on morphine drip for respiratory distress.    # Acute on chronic hypercapnic respiratory failure  # Bronchogenic carcinoma of left lung  # Chronic atrial fibrillation  # Anxiety  # Anemia  - comfort measures only  - DNR/DNI  - follow palliative recs       - morphine drip 4 mg /hr and 8 mg push prn for dyspnea      - ativan for anxiety     74 yo female w/PMHx COPD (on 3L home oxygen), bronchogenic carcinoma of left lung s/p partial lobectomy (remission 9 years), atrial fibrillation, anxiety who presents with altered mental status. Patient is CMO/DNR/DNI. Patient is on morphine drip for respiratory distress.    # Acute on chronic hypercapnic respiratory failure  # Bronchogenic carcinoma of left lung  # Chronic atrial fibrillation  # Anxiety  # Anemia  #functional quadriplegia  - comfort measures only  - DNR/DNI  - follow palliative recs       - morphine drip 4 mg /hr and 8 mg push prn for dyspnea      - ativan for anxiety

## 2021-09-03 NOTE — PROGRESS NOTE ADULT - PROBLEM SELECTOR PLAN 4
No apparent anxiety on exam today  Ativan 1mg IV q 6 hrs RTC, patient with no signs of anxiety, altered mental status. Monitor for verbal and non verbal signs of anxiety. Provide calm environment.  - ativan 1mg IV q6h PRN for anxiety

## 2021-09-03 NOTE — PROGRESS NOTE ADULT - PROBLEM SELECTOR PLAN 2
Pt on nasal cannula as her baseline oxygen, no pulse ox monitoring.   Appears comfortable today   Continue morphine infusion 4 mg / H  Continue morphine 8 mg IVP Q 30 min PRN for dyspnea

## 2021-09-03 NOTE — PROGRESS NOTE ADULT - SUBJECTIVE AND OBJECTIVE BOX
CASEY POWERS             [id number]      Patient is a 75y old Female who presents with a chief complaint of altered mental status (31 Aug 2021 11:54)    Currently admitted with the primary diagnosis of: hypoxia/AMS        SUBJECTIVE:  Patient seen and examined at bedside. On morphine infusion.  She appears to be comfortable, no agitation or respiratory distress at this time.   Actively dying     ROS:  unable to review       PEx:   General: elderly female found in bed calm, cyanotic around mouth/fingers    Eyes:  PERRL EOMI Non icteric MOM  ENMT: no external oral ulcers, MMM, no thrush   CVS: RR, no edema   Resp: Labored not tachypneic  GI:  Soft ND   :   Thibodeaux   Musc: No C/C/E    Neuro: awakens slowly, unable to speak and communicate   Psych: Calm  Skin: Non jaundiced , no rash, warm to touch     Last BM: undocumented     ALLERGIES: bacitracin (Other)  carboplatin (Other)  sulfa drugs (Unknown)  sulfonamides (Unknown)  Xanax (Other)    Labs:	    no new labs, comfort care     RADIOLOGY  CXR  Left hemithorax complete opacification, unchanged. Right opacities      EKG  12 Lead ECG:   Ventricular Rate 72 BPM    Atrial Rate 50 BPM    QRS Duration 140 ms    Q-T Interval 446 ms    QTC Calculation(Bazett) 488 ms    P Axis 52 degrees    R Axis 140 degrees    T Axis 89 degrees    Diagnosis Line Ventricular-paced rhythm  Abnormal ECG    Confirmed by Kaedem Chahal ([id number]) on 8/25/2021 10:46:08 PM (08-25-21 @ 19:46)      Imaging Personally Reviewed:  [ ] YES  [ ] NO    Consultant(s) Notes Reviewed:  [ x] YES  [ ] NO  Care Discussed with Consultants/Other Providers [ x] YES  [ ] NO    Medications:	      MEDICATIONS  (STANDING):  chlorhexidine 4% Liquid 1 Application(s) Topical <User Schedule>  morphine  Infusion 4 mG/Hr (4 mL/Hr) IV Continuous <Continuous>  scopolamine 1 mG/72 Hr(s) Patch 1 Patch Transdermal every 72 hours    MEDICATIONS  (PRN):  LORazepam   Injectable 1 milliGRAM(s) IV Push every 6 hours PRN Agitation  morphine  - Injectable 8 milliGRAM(s) IV Push every 30 minutes PRN Dyspnea    ADVANCED DIRECTIVES:        DNR/DNI/CMO    DECISION MAKER: Patient [  ]  Family [x  ]  Other [  ] _______  LEGAL SURROGATE: son and        GOALS OF CARE DISCUSSION       Palliative care info/counseling provided	          See previous Palliative Medicine Note    PSYCHOSOCIAL-SPIRITUAL ASSESSMENT:       Reviewed       See Palliative Care SW/ documentation    CURRENT DISPO PLAN:         hospice - patient no longer stable for hospice discharge, requiring high doses of morphine for respiratory distress      REFERRALS	        Palliative Med        Unit SW/Case Mgmt

## 2021-09-03 NOTE — PROGRESS NOTE ADULT - PROVIDER SPECIALTY LIST ADULT
Hospitalist
Internal Medicine
Palliative Care
Critical Care
Hospitalist
Neurology
Pulmonology
Critical Care
Internal Medicine
Hospitalist
Internal Medicine
Neurology
Palliative Care
Clear bilaterally, pupils equal, round and reactive to light.

## 2021-09-03 NOTE — PROGRESS NOTE ADULT - PROBLEM SELECTOR PLAN 1
DNR/DNI/CMO   Hospice consult placed - will speak with them to hold off on discharge planning   - No IVF, no blood draws, no vitals, no pulse ox.  - No escalation of oxygen.

## 2021-09-03 NOTE — PROGRESS NOTE ADULT - SUBJECTIVE AND OBJECTIVE BOX
LENGTH OF HOSPITAL STAY: 8d    CHIEF COMPLAINT:    Patient is a 75y old  Female who presents with a chief complaint of altered mental status (29 Aug 2021 19:16)    OVERNIGHT EVENTS:    - none    FOLLOW UP:    Pt is CMO, palliative note    HOSPITAL COURSE:    Pt was admitted for acute hypoxic respiratory failure 2/2 terminal lung malignancy. Family and pt have decided to prioritize pt comfort in her care.     HPI:    HPI:  74 y/o F h/o COPD (on 3 L home O2 ), bronchogenic carcinoma (left lung, s/p left partial lobectomy, in remission x 9 yrs), Afib on Eliquis s/p ablation and micra PPM, anxiety; BIBEMS from nursing home- Last known well at 3pm when she was having difficulty eating, and was rechecked at 530 as nonverbal and not following commands.  In the ED the patient was confused and disoriented, she was found to have respiratory acidosis and complete opacification left hemithorax with effusion, postoperative changes and volume loss.  She was placed on bipap and she improved.  The patient has difficulty hearing.   (26 Aug 2021 02:22)      ALLERGIES:    bacitracin (Other); carboplatin (Other); sulfa drugs (Unknown); sulfonamides (Unknown); Xanax (Other)    PMHx:    High cholesterol; Anxiety; Pacemaker; Lung cancer; Afib; COPD (chronic obstructive pulmonary disease); Artificial cardiac pacemaker; H/O atrioventricular kacie ablation; S/P lobectomy of lung left;  History of tonsillectomy; S/P appendectomy; Cataract RIGHT  6/17 AND  LEFT  7/5/19    SOCIAL Hx:    Pt CMO    MEDICATIONS:  STANDING MEDICATIONS  chlorhexidine 4% Liquid 1 Application(s) Topical <User Schedule>  morphine  Infusion 4 mG/Hr IV Continuous <Continuous>  scopolamine 1 mG/72 Hr(s) Patch 1 Patch Transdermal every 72 hours    PRN MEDICATIONS  LORazepam   Injectable 1 milliGRAM(s) IV Push every 6 hours PRN  morphine  - Injectable 8 milliGRAM(s) IV Push every 30 minutes PRN    VITALS:   T(F): --  HR: --  BP: --  RR: --  SpO2: --    PHYSICAL EXAM:    Gen: NAD, resting in bed;   HEENT: Normocephalic, atraumatic  Neck: supple, no lymphadenopathy; TLC bandages removed, no thrombophlebitis   CV: Regular rate & regular rhythm  Lungs: decreased BS at bases, mild stridor;   Abdomen: Soft, BS present  Ext: Warm, well perfused  Neuro: lethargic  Skin: no rash, no erythema

## 2021-09-03 NOTE — DISCHARGE NOTE FOR THE EXPIRED PATIENT - HOSPITAL COURSE
Patient was intubated in the MICU and underwent therapeutic bronchoscopy at bedside on 8/26. Her hemoglobin dropped overnight and she was transfused 1 unit RBC. She was extubated on 8/27 and was breathing fine on nasal canula with improved mental status. Patient stable for downgrade to general [facility] floor and made DNR/DNI and comfort measures only by son who is her healthcare proxy. Palliative care is following (patient was previously on home hospice).     Pt followed by palliative, pain and anxiety were managed.  Pt was comfortable.      HPI:    HPI:  74 y/o F h/o COPD (on 3 L home O2 ), bronchogenic carcinoma (left lung, s/p left partial lobectomy, in remission x 9 yrs), Afib on Eliquis s/p ablation and micra PPM, anxiety; BIBEMS from nursing home- Last known well at 3pm when she was having difficulty eating, and was rechecked at 530 as nonverbal and not following commands.  In the ED the patient was confused and disoriented, she was found to have respiratory acidosis and complete opacification left hemithorax with effusion, postoperative changes and volume loss.  She was placed on bipap and she improved.  The patient has difficulty hearing.   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 Pt was admitted to MICU, intubated in the MICU and underwent therapeutic bronchoscopy at bedside on . She was extubated on .    Patient was diagnosed and treated for:   # Acute on chronic hypercapnic respiratory failure secondary to copd exacerbation   # Bronchogenic carcinoma of left lung  #chronic atrial fibrillation  #anxiety/ depression  #stage 2 ulcer on buttock  # Anemia    Patient's son decided on  DNR/DNI/CMO  Pt was on comfort measures.  Patient  on 9/3/21 at 11: 47 AM

## 2021-09-03 NOTE — PROGRESS NOTE ADULT - PROBLEM SELECTOR PROBLEM 2
Chronic respiratory failure with hypoxia

## 2021-11-03 NOTE — CDI QUERY NOTE - NSCDIOTHERTXTBX_GEN_ALL_CORE_HH
DOCUMENTATION CLARIFICATION FORM     Encounter #: [id number]                            Patient’s Name:  Nazanin Joseph  Medical Record #: [id number]                  Admit Date: 8-  CDI Specialist/: Dino                  Contact #: [phone]    Dear Dr. Carrillo,               Date: 11-3-2021                 The Physician’s or Provider’s documentation of the patient’s presentation, evaluation and  medical management, as identified below, may support a diagnosis that is not documented in the medical record.  In order to accurately capture all diagnoses to the greatest degree of specificity reflecting the patient’s actual severity of illness, the documentation in this patient’s medical record requires additional clarification.  Please include more specific documentation, either known or suspected, of a corresponding diagnosis associated with the clinical information described below in your Progress Note and/or Discharge Summary.    CLINICAL INDICATORS   Documentation  •	8-26 Bronchoscopy Report: ... The Left tracheobronchial tree revealed possible blood clot and endobronchial mass unable to pass scope  The bronchoscope was then introduced to the left lobe and was taken from that area.  The procedure was completed and all samples were submitted for appropriate studies  After adequate clearing of secretions was accomplished, the bronchoscope was removed from the patient and the procedure was ended.    QUERY  Based on your professional judgment and the procedure report, please clarify if the bronchoscopy findings and procedure can be further specified as:  •	Bronchoscope was advanced into the left lung lobe with removal of semi-solid material  •	Bronchoscope was advanced into the left lung lobe with removal of liquid material  •	Bronchoscope was advanced into the left lobe bronchus with removal of semi-solid material  •	Bronchoscope was advanced into the left lobe bronchus with removal of liquid material  •	Other (please specify):      Documentation clarification is required for compliance, accuracy in coding and billing, and reporting severity of illness, quality data and risk of mortality.  --------------------------------------------------------------------------------------------------------------------------------------------  DO NOT REMOVE THIS RECORD WITHOUT FIRST NOTIFYING THE CDI SPECIALIS  This form is NOT a part of the permanent Medical Record.

## 2021-11-10 NOTE — PATIENT PROFILE ADULT - HISTORY OF COVID-19 VACCINATION
[employer] Employee Health Letter for COVID Symptoms Testing    11/10/2021    Please bring a copy of this letter and your work ID badge with you when picking up your supplies.    You will be receiving one test to complete your testing (yellow sticker).  Observation is not required.  You must display this letter to receive your test.    Dear Attila Bedoya,     You have been set up with COVID testing due to having symptoms consistent with a possible COVID-19 infection. You have been removed from work and you may NOT report to work if your test result is pending or your test results are positive.    If your test result is negative, you may return to work as long as all of the return to work criteria have been met.  (See below for [id number] criteria).  If you feel sick, regardless of test results, please stay home and contact your supervisor and follow Guidance for [facility] Sick Healthcare Workers located on the COVID-19 Information Center.    Off Work Start Date: 11/10   location: [facility]- Emergency Room    Team Member to do list:  • Complete a COVID-19 test by: 11/10  Report your results by: 11/11    Test Collection Instructions:  • QuickVue SARS Antigen Specimen Collection Instructions  • QuickVue SARS Resulting Instructions  • QuickVue SARS Antigen Fact Sheet for Patients  • Nasal Swab Collection Instructions: English  • Nasal Swab Collection Instructions:  Polish    Self-Reporting Results Instructions:    • Safe Check   [email or web address]  • Hotline: [phone]    Return to Work Criteria (all criteria must be met):  • Negative test result  • Your temperature has remained less than 100.0F for at least 24 hours without using any fever reducing medications (ibuprofen, acetaminophen, etc.)  • Significant improvement in respiratory symptoms and cough  • No new or worsening symptoms    Symptoms of COVID-19 Infection   • Fever or Chills  • Cough  • Shortness of breath or difficulty  breathing  • Fatigue  • Muscle or body aches  • Headache  • New loss of taste or smell  • Sore throat  • Congestion or runny nose   • Nausea or vomiting  • Diarrhea    [facility] [employer] [facility]  [email or web address]  Hotline: [phone]  Hours (M-F: 7616-5240, Sat-Sun: 12). Please answer your phone if an outside line is calling. Regardless of time of day, we work 7 days a week and will follow up as appropriate.    CC:  Manager    [email or web address] Results Reporting:    • Team Members are required to include ALL necessary information in the email, or the results will not be accepted. By submitting an email with these results, you are attesting that the information submitted is accurate and that you are submitting your own test results.  • Information needed   ? Name   ?    ? Employee ID   ? Date Test was performed   ? Antigen or PCR   ? Reason for Covid Test: Symptoms or Exposure   ? Test Result   • Once the information is received to the [employer] inbox, "[employer] will enter your results into your EPIC chart, and they will display in your [employer] nain.     Yes

## 2021-11-11 NOTE — PROGRESS NOTE BEHAVIORAL HEALTH - NS ED [facility] REVIEW OF ED CHART AVAILABLE INVESTIGATIONS REVIEWED
Yes
None available
Clindamycin Pregnancy And Lactation Text: This medication can be used in pregnancy if certain situations. Clindamycin is also present in breast milk.

## 2022-01-09 NOTE — ED ADULT NURSE NOTE - NSFALLRSKINDICATORS_ED_ALL_ED
1. Pt admitted to inpatient psychiatry on a 9.39 emergency legal status for safety and ongoing evaluation   2.Informed consent obtained from the pt for a trial of Lithium to be titrated to 300 mg po q am and 600 mg po q hs  to alleviate pt's severe bipolar d/o  3.Informed consent obtained from the pt for a trial of Buspar  to be increased to 5 mg po qid ( severe anxiety)  4.Collateral pt clinical information from pt's family with pt permission (given)  5.Pt encouraged to attend therapy groups as tolerated  6.To check Lithium level, CBC with diff and CMP on 1/12/2022  7..Disposition planning ongoing no 1. Pt admitted to inpatient psychiatry on a 9.39 emergency legal status for safety and ongoing evaluation   2.Informed consent obtained from the pt for a trial of Lithium  mg po bid with slow titration as clinically indicated  3.Informed consent obtained from the pt for a trial of Buspar 5 mg po tid ( severe anxiety)  4.Collateral pt clinical information from pt's family with pt permission (given)  5.Pt encouraged to attend therapy groups as tolerated  6.Disposition planning ongoing 1. Pt admitted to inpatient psychiatry on a 9.39 emergency legal status for safety and ongoing evaluation   2.Informed consent obtained from the pt for a trial of Lithium to be titrated to 300 mg po q am and 600 mg po q hs  to alleviate pt's severe bipolar d/o  3.Informed consent obtained from the pt for a trial of Buspar  to be increased to 5 mg po qid ( severe anxiety)  4.Collateral pt clinical information from pt's family with pt permission (given)  5.Pt encouraged to attend therapy groups as tolerated  6.To check Lithium level, CBC with diff and CMP on 1/12/2022  7..Disposition planning ongoing 1. Pt admitted to inpatient psychiatry on a 9.39 emergency legal status for safety and ongoing evaluation   2.Informed consent obtained from the pt for a trial of Lithium  mg po bid with slow titration as clinically indicated  3.Informed consent obtained from the pt for a trial of Buspar 5 mg po tid ( severe anxiety)  4.Collateral pt clinical information from pt's family with pt permission (given)  5.Pt encouraged to attend therapy groups as tolerated  6.Disposition planning ongoing 1. Pt admitted to inpatient psychiatry on a 9.39 emergency legal status for safety and ongoing evaluation   2.Informed consent obtained from the pt for a trial of Lithium to be titrated to 300 mg po q am and 600 mg po q hs  to alleviate pt's severe bipolar d/o  3.Informed consent obtained from the pt for a trial of Buspar  to be increased to 5 mg po qid ( severe anxiety)  4.Collateral pt clinical information from pt's family with pt permission (given)  5.Pt encouraged to attend therapy groups as tolerated  6.To check Lithium level, CBC with diff and CMP on 1/12/2022  7..Disposition planning ongoing

## 2022-02-10 NOTE — PATIENT PROFILE ADULT - PATIENT REPRESENTATIVE: ( YOU CAN CHOOSE ANY PERSON THAT CAN ASSIST YOU WITH YOUR HEALTH CARE PREFERENCES, DOES NOT HAVE TO BE A SPOUSE, IMMEDIATE FAMILY OR SIGNIFICANT OTHER/PARTNER)
Skin normal color for race, warm, dry and intact. No evidence of rash. information could not be obtained

## 2022-03-10 NOTE — PHYSICAL THERAPY INITIAL EVALUATION ADULT - PATIENT PROFILE REVIEW, REHAB EVAL
yes Abdominal Pain, Pediatric    Abdominal pain is one of the most common complaints in pediatrics. Many things can cause abdominal pain, and the causes change as your child grows. Usually, abdominal pain is not serious and will improve without treatment. It can often be observed and treated at home. Your child's health care provider will take a careful history and do a physical exam to help diagnose the cause of your child's pain. The health care provider may order blood tests and X-rays to help determine the cause or seriousness of your child's pain. However, in many cases, more time must pass before a clear cause of the pain can be found. Until then, your child's health care provider may not know if your child needs more testing or further treatment.    HOME CARE INSTRUCTIONS  Monitor your child's abdominal pain for any changes.  Give medicines only as directed by your child's health care provider.  Do not give your child laxatives unless directed to do so by the health care provider.  Try giving your child a clear liquid diet (broth, tea, or water) if directed by the health care provider. Slowly move to a bland diet as tolerated. Make sure to do this only as directed.  Have your child drink enough fluid to keep his or her urine clear or pale yellow.  Keep all follow-up visits as directed by your child's health care provider.    SEEK MEDICAL CARE IF:  Your child's abdominal pain changes.  Your child does not have an appetite or begins to lose weight.  Your child is constipated or has diarrhea that does not improve over 2–3 days.  Your child's pain seems to get worse with meals, after eating, or with certain foods.  Your child develops urinary problems like bedwetting or pain with urinating.  Pain wakes your child up at night.  Your child begins to miss school.  Your child's mood or behavior changes.  Your child who is older than 3 months has a fever.    SEEK IMMEDIATE MEDICAL CARE IF:  Your child's pain does not go away or the pain increases.  Your child's pain stays in one portion of the abdomen. Pain on the right side could be caused by appendicitis.  Your child's abdomen is swollen or bloated.  Your child who is younger than 3 months has a fever of 100°F (38°C) or higher.  Your child vomits repeatedly for 24 hours or vomits blood or green bile.  There is blood in your child's stool (it may be bright red, dark red, or black).  Your child is dizzy.  Your child pushes your hand away or screams when you touch his or her abdomen.  Your infant is extremely irritable.  Your child has weakness or is abnormally sleepy or sluggish (lethargic).  Your child develops new or severe problems.  Your child becomes dehydrated. Signs of dehydration include:  Extreme thirst.  Cold hands and feet.  Blotchy (mottled) or bluish discoloration of the hands, lower legs, and feet.  Not able to sweat in spite of heat.  Rapid breathing or pulse.  Confusion.  Feeling dizzy or feeling off-balance when standing.  Difficulty being awakened.  Minimal urine production.  No tears.    MAKE SURE YOU:  Understand these instructions.  Will watch your child's condition.  Will get help right away if your child is not doing well or gets worse.    ADDITIONAL NOTES AND INSTRUCTIONS    Please follow up with your Primary MD in 24-48 hr.  Seek immediate medical care for any new/worsening signs or symptoms.
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
## 2022-03-29 NOTE — PROGRESS NOTE ADULT - SUBJECTIVE AND OBJECTIVE BOX
CHIEF COMPLAINT:  Patient is a 72y old Female who presents with a chief complaint of Right hip pain (13 Aug 2018 23:16)    Currently admitted to medicine with the primary diagnosis of Hip fracture     Today is hospital day 4d. This morning she is resting comfortably in bed and reports no new issues or overnight events.     PAST MEDICAL & SURGICAL HISTORY  COPD (chronic obstructive pulmonary disease)  Afib  Lung cancer  Pacemaker  Anxiety  High cholesterol  S/P appendectomy  History of tonsillectomy  S/P lobectomy of lung: left  H/O atrioventricular kacie ablation  Artificial cardiac pacemaker    SOCIAL HISTORY:  Negative for smoking/alcohol/drug use.     ALLERGIES:  bacitracin (Other)  carboplatin (Other)  sulfa drugs (Unknown)  sulfonamides (Unknown)  Xanax (Other)    MEDICATIONS:  STANDING MEDICATIONS  atorvastatin 20 milliGRAM(s) Oral at bedtime  calcium carbonate 1250 mG  + Vitamin D (OsCal 500 + D) 1 Tablet(s) Oral three times a day  docusate sodium 100 milliGRAM(s) Oral three times a day  enoxaparin Injectable 40 milliGRAM(s) SubCutaneous two times a day  gabapentin 100 milliGRAM(s) Oral three times a day  multivitamin 1 Tablet(s) Oral daily  pantoprazole    Tablet 40 milliGRAM(s) Oral before breakfast  tiotropium 18 MICROgram(s) Capsule 1 Capsule(s) Inhalation daily    PRN MEDICATIONS  acetaminophen   Tablet. 650 milliGRAM(s) Oral every 6 hours PRN  aluminum hydroxide/magnesium hydroxide/simethicone Suspension 30 milliLiter(s) Oral every 4 hours PRN  chlorhexidine 4% Liquid 1 Application(s) Topical <User Schedule> PRN  clonazePAM  Oral Tab/Cap - Peds 0.5 milliGRAM(s) Oral two times a day PRN  morphine  - Injectable 2 milliGRAM(s) IV Push four times a day PRN  ondansetron Injectable 4 milliGRAM(s) IV Push every 6 hours PRN  senna 2 Tablet(s) Oral at bedtime PRN    VITALS:   T(F): 97.8  HR: 79  BP: 105/55  RR: 18  SpO2: --    LABS:               8.3    6.38  )-----------( 169      ( 17 Aug 2018 07:12 )             26.6     08-17  140  |  99         |  16  ----------------------------<  119<H>  4.5   |  35<H>  |  0.8    Ca    9.7      17 Aug 2018 07:12    PT/INR - ( 17 Aug 2018 07:12 )   PT: 13.40 sec;   INR: 1.24 ratio    PTT - ( 17 Aug 2018 07:12 )  PTT:31.9 sec    RADIOLOGY:  < from: CT Pelvis Bony Only No Cont (08.13.18 @ 16:21) >  1. Valgus impacted subcapital right femoral neck fracture.  2. Degenerative change as above.  < end of copied text >    < from: CT Head No Cont (08.13.18 @ 15:43) >  1.  No evidence of acute intracranial pathology.  Stable exam since 2/23/2018.    2.  Stable moderate chronic microvascular changes and chronic lacunar infarcts within the left basal ganglia.  < end of copied text >    < from: Xray Shoulder 2 Views, Right (08.13.18 @ 14:56) >  No acute fracture with glenohumeral and AC joint degenerative change. Greater tubercle sclerosis. No soft tissue calcifications  < end of copied text >    < from: Xray Chest 1 View AP/PA (08.13.18 @ 14:55) >  No radiographic evidence of acute cardiopulmonary disease. Stable left hemithorax and mediastinal changes.  < end of copied text >    < from: Xray Knee 3 Views, Right (08.13.18 @ 16:54) >  No evidence of acute fracture or dislocation. Osteoarthritis involving patellofemoral joint and medial articular joint. There is no evidence of suprapatellar effusion.  < end of copied text >    PHYSICAL EXAM:  GEN: No acute distress  PULM: Clear to auscultation bilaterally   CARD: S1/S2 present. RRR.   GI: Soft, non-tender, non-distended. Bowel sounds present  SKIN: Surgical access clean and dry  NEURO: AAOX3 Booster status unknown

## 2022-04-22 NOTE — PHYSICAL THERAPY INITIAL EVALUATION ADULT - BED MOBILITY TRAINING, PT EVAL
04/22/22 1216   Child Life   Location Surgery  (Cardiac CT)   Intervention Family Support;Supportive Check In  (Introduced self and CFL services.  Pt appeared alert and playful.  Reviewed pt's plan of care with pt's parents.  Parents reported this was pt's first time under anesthesia.  Parents appropriately nervous, but felt prepared.)   Family Support Comment Pt's mother and father present today.  Accompanied pt's mother for a PPI.  Mother appropriately emotional after pt's induction.  Supported mother during the transition out of OR to the family waiting room.   Anxiety Appropriate   Major Change/Loss/Stressor/Fears surgery/procedure;environment   Techniques to [place] with Loss/Stress/Change exercise/play;family presence     
supine <-> sit indep by d/c

## 2022-09-07 NOTE — CONSULT NOTE ADULT - PROBLEM SELECTOR RECOMMENDATION 2
agree with klonopin 1mg every 12H standing  add klonopin 1mg every 12H PRN agree with klonopin 1mg every 12H standing  add klonopin 1mg every 12H PRN for anxiety/agitation Retention Suture Text: Retention sutures were placed to support the closure and prevent dehiscence.

## 2022-11-11 NOTE — PROGRESS NOTE ADULT - SUBJECTIVE AND OBJECTIVE BOX
Your current Orthopedic problem we are working together to treat is:  Left shoulder pain.      ~ IMAGING  Please schedule the following:  a Bone Scan     Stop at reception to schedule or call [facility] Central Scheduling for Imaging - [phone] . Due to the sensitive nature of our practice, we do request that an appointment be scheduled to discuss test results.      PLEASE NOTE: The insurance authorization process can take up to, and at times exceed 7 days. Your appointment may not be scheduled until that authorization has been obtained.     It is recommended you schedule a follow-up appointment with Govind Kapadia MD call with results.      Office hours are 8:00 am to 5:00 pm Monday through Friday.  If it is urgent that you speak with someone outside of these hours, our [facility] Call Center will be able to assist you.  You can reach the office by calling the [phone] ([place]). Please call and ask for Amina DE LA GARZA Coordinator at any time with questions.     ** Please be aware of our Orthopedic Department refill policy in which you must allow 3 clinic days for a refill to be completed.  Pain medications will not be refilled after business hours nor on weekends. **    Please feel free to visit our websites that may allow you to have a more thorough understanding of our goals of treatment, and allow interaction with your physician.      We do highly recommend [facility], if you do not already have this.  You can request access via the internet or by simply talking with a  at any of the clinics.     [email or web address]    Thank you for choosing [place] as your Orthopedic provider!       What is a Bone Scan?  A bone scan is a test that can find damage to the bones, such as stress fractures that are not clearly seen on plain x-ray, infection or other conditions. During a bone scan, a radioactive substance called a tracer is injected into a vein in your arm. The tracer  travels through your bloodstream and into your bones.You generally will have to wait 1 to 3 hours after the tracer is injected before your bone scan is done. Then a special camera takes pictures of the tracer in your bones. The actual bone scan takes about 1 hour.    The radioactive material (tracer) that is used for this exam was ordered specifically for your procedure and cannot be used for another patient.  Please let us know at least 1 business day in advance if you cannot keep your scheduled appointment.    We cannot give you any medications to help you relax or for pain.  If you are claustrophobic or unable to lie on your back for 30-45 minutes, you will need to bring medications from home or get a prescription from your physician.  If you choose to take a medication to help you relax or for pain, you will need to make arrangements for a friend or family member to take you home after your exam.    If you are currently breast feeding, contact the Nuclear Medicine department at least 4 days before your appointment for instructions.    There are no dietary restrictions for this exam.    There are no medication restrictions for the exam.      Day of Your Appointment    Wear comfortable clothing that does not include metal.     There are two appointments for your bone scan. The first appointment is to receive the injection of the tracer and takes 15-30 minutes.  The second appointment is for the scan and is scheduled 3-5 hours after the injection.  The technologist will let you know the exact time you will need to return for the scan appointment.  The scan will last 45-90 minutes.  If the radiologist requests additional images it may take longer.    Following the injection appointment, you will be asked to drink 2-3 glasses of liquids (water, coffee, tea, soda, juice) before you return for the scan.    When you return for your scan you will be asked to empty your bladder.    Please do not bring children to your  CASEY POWERS 75y Female  MRN#: [id number]   CODE STATUS:________    Hospital Day:     Pt is currently admitted with the primary diagnosis of     SUBJECTIVE  Overnight/Interval Events:                                              ----------------------------------------------------------  OBJECTIVE  PAST MEDICAL & SURGICAL HISTORY  High cholesterol  Anxiety  Pacemaker  Lung cancer  Afib  COPD (chronic obstructive pulmonary disease)  Artificial cardiac pacemaker  H/O atrioventricular kacie ablation  S/P lobectomy of lung  left  History of tonsillectomy  S/P appendectomy    Cataract  RIGHT   AND  LEFT  19                                              -----------------------------------------------------------  ALLERGIES:  bacitracin (Other)  carboplatin (Other)  sulfa drugs (Unknown)  sulfonamides (Unknown)  Xanax (Other)                                            ------------------------------------------------------------    HOME MEDICATIONS  Home Medications:  morphine 20 mg/mL oral concentrate: 0.25 milliliter(s) orally 4 times a day, As needed, dyspnea or pain (03 Aug 2021 16:05)  predniSONE 10 mg oral tablet: 1 tab(s) orally once a day until  (03 Aug 2021 16:05)  senna oral tablet: 2 tab(s) orally once a day (at bedtime) or until otherwise instructed by your provider (2021 16:54)                           MEDICATIONS:  STANDING MEDICATIONS  albuterol/ipratropium for Nebulization 3 milliLiter(s) Nebulizer every 4 hours  atorvastatin 20 milliGRAM(s) Oral at bedtime  cefepime   IVPB 1000 milliGRAM(s) IV Intermittent every 8 hours  enoxaparin Injectable 40 milliGRAM(s) SubCutaneous every 12 hours  pantoprazole    Tablet 40 milliGRAM(s) Oral before breakfast  sertraline 25 milliGRAM(s) Oral daily  sodium chloride 0.9%. 1000 milliLiter(s) IV Continuous <Continuous>    PRN MEDICATIONS                                            ------------------------------------------------------------  VITAL SIGNS: Last 24 Hours  T(C): 37.3 (26 Aug 2021 05:00), Max: 37.3 (26 Aug 2021 05:00)  T(F): 99.2 (26 Aug 2021 05:00), Max: 99.2 (26 Aug 2021 05:00)  HR: 94 (26 Aug 2021 06:) (71 - 112)  BP: 95/73 (26 Aug 2021 06:00) (95/73 - 127/67)  BP(mean): 87 (26 Aug 2021 06:) (73 - 87)  RR: 41 (26 Aug 2021 06:) (18 - 41)  SpO2: 100% (26 Aug 2021 06:00) (95% - 100%)      21 @ 07:01  -  21 @ 07:00  --------------------------------------------------------  IN: 450 mL / OUT: 300 mL / NET: 150 mL                                             --------------------------------------------------------------  LABS:                        9.9    11.88 )-----------( 300      ( 25 Aug 2021 19:38 )             33.0     08-    140  |  98  |  24<H>  ----------------------------<  159<H>  5.5<H>   |  33<H>  |  0.9    Ca    9.2      25 Aug 2021 19:38    TPro  5.9<L>  /  Alb  3.8  /  TBili  0.4  /  DBili  x   /  AST  20  /  ALT  17  /  AlkPhos  103      PT/INR - ( 25 Aug 2021 19:38 )   PT: 18.10 sec;   INR: 1.58 ratio         PTT - ( 25 Aug 2021 19:38 )  PTT:34.2 sec  Urinalysis Basic - ( 25 Aug 2021 19:27 )    Color: Yellow / Appearance: Clear / S.031 / pH: x  Gluc: x / Ketone: Negative  / Bili: Negative / Urobili: 3 mg/dL   Blood: x / Protein: Trace / Nitrite: Negative   Leuk Esterase: Negative / RBC: x / WBC x   Sq Epi: x / Non Sq Epi: x / Bacteria: x        Troponin T, Serum: 0.06 ng/mL *HH* (21 @ 19:38)          CARDIAC MARKERS ( 25 Aug 2021 19:38 )  x     / 0.06 ng/mL / x     / x     / x                                                  -------------------------------------------------------------  RADIOLOGY:                                            --------------------------------------------------------------    PHYSICAL EXAM:  General:   HEENT:  LUNGS:  HEART:  ABDOMEN:  EXT:  NEURO:  SKIN:                                           --------------------------------------------------------------    ASSESSMENT & PLAN    Past medical history and hospital course                                                                                                           ----------------------------------------------------  # DVT prophylaxis     # GI prophylaxis     # Diet     # Activity Score (AM-PAC)    # Code status     # Disposition                                                                              --------------------------------------------------------    # Martita      appointment unless you have someone to stay with them in the waiting area. For safety reasons, children are not allowed in the nuclear medicine procedure room and should not be left in the waiting room unattended.    CASEY POWERS 75y Female  MRN#: [id number]   CODE STATUS: DNR    Hospital Day:     Pt is currently admitted with the primary diagnosis of acute on chronic hypercapnic resp failure    SUBJECTIVE  Overnight/Interval Events: Patient on BiPAP over night and uncomfortable this morning turned over on side. Minimally responsive but could say name and knows she is in the hospital. MICU team spoke with son, patient is DNR and will be intubated for bronchoscopy today.                                            ----------------------------------------------------------  OBJECTIVE  PAST MEDICAL & SURGICAL HISTORY  High cholesterol  Anxiety  Pacemaker  Lung cancer  Afib  COPD (chronic obstructive pulmonary disease)  Artificial cardiac pacemaker  H/O atrioventricular kacie ablation  S/P lobectomy of lung  left  History of tonsillectomy  S/P appendectomy    Cataract  RIGHT   AND  LEFT  19                                              -----------------------------------------------------------  ALLERGIES:  bacitracin (Other)  carboplatin (Other)  sulfa drugs (Unknown)  sulfonamides (Unknown)  Xanax (Other)                                            ------------------------------------------------------------    HOME MEDICATIONS  Home Medications:  morphine 20 mg/mL oral concentrate: 0.25 milliliter(s) orally 4 times a day, As needed, dyspnea or pain (03 Aug 2021 16:05)  predniSONE 10 mg oral tablet: 1 tab(s) orally once a day until 8/ (03 Aug 2021 16:05)  senna oral tablet: 2 tab(s) orally once a day (at bedtime) or until otherwise instructed by your provider (2021 16:54)                           MEDICATIONS:  STANDING MEDICATIONS  albuterol/ipratropium for Nebulization 3 milliLiter(s) Nebulizer every 4 hours  atorvastatin 20 milliGRAM(s) Oral at bedtime  cefepime   IVPB 1000 milliGRAM(s) IV Intermittent every 8 hours  enoxaparin Injectable 40 milliGRAM(s) SubCutaneous every 12 hours  pantoprazole    Tablet 40 milliGRAM(s) Oral before breakfast  sertraline 25 milliGRAM(s) Oral daily  sodium chloride 0.9%. 1000 milliLiter(s) IV Continuous <Continuous>    PRN MEDICATIONS                                            ------------------------------------------------------------  VITAL SIGNS: Last 24 Hours  T(C): 37.3 (26 Aug 2021 05:00), Max: 37.3 (26 Aug 2021 05:00)  T(F): 99.2 (26 Aug 2021 05:00), Max: 99.2 (26 Aug 2021 05:00)  HR: 94 (26 Aug 2021 06:00) (71 - 112)  BP: 95/73 (26 Aug 2021 06:00) (95/73 - 127/67)  BP(mean): 87 (26 Aug 2021 06:00) (73 - 87)  RR: 41 (26 Aug 2021 06:00) (18 - 41)  SpO2: 100% (26 Aug 2021 06:00) (95% - 100%)      21 @ 07:01  -  - @ 07:00  --------------------------------------------------------  IN: 450 mL / OUT: 300 mL / NET: 150 mL                                             --------------------------------------------------------------  LABS:                        9.9    11.88 )-----------( 300      ( 25 Aug 2021 19:38 )             33.0     08-25    140  |  98  |  24<H>  ----------------------------<  159<H>  5.5<H>   |  33<H>  |  0.9    Ca    9.2      25 Aug 2021 19:38    TPro  5.9<L>  /  Alb  3.8  /  TBili  0.4  /  DBili  x   /  AST  20  /  ALT  17  /  AlkPhos  103      PT/INR - ( 25 Aug 2021 19:38 )   PT: 18.10 sec;   INR: 1.58 ratio         PTT - ( 25 Aug 2021 19:38 )  PTT:34.2 sec  Urinalysis Basic - ( 25 Aug 2021 19:27 )    Color: Yellow / Appearance: Clear / S.031 / pH: x  Gluc: x / Ketone: Negative  / Bili: Negative / Urobili: 3 mg/dL   Blood: x / Protein: Trace / Nitrite: Negative   Leuk Esterase: Negative / RBC: x / WBC x   Sq Epi: x / Non Sq Epi: x / Bacteria: x        Troponin T, Serum: 0.06 ng/mL *HH* (21 @ 19:38)          CARDIAC MARKERS ( 25 Aug 2021 19:38 )  x     / 0.06 ng/mL / x     / x     / x                                                  -------------------------------------------------------------  RADIOLOGY:                                            --------------------------------------------------------------    PHYSICAL EXAM:  General: ill appearing  HEENT:  LUNGS:  HEART:  ABDOMEN:  EXT:  NEURO:  SKIN:                                           --------------------------------------------------------------    ASSESSMENT & PLAN    Past medical history and hospital course                                                                                                           ----------------------------------------------------  # DVT prophylaxis     # GI prophylaxis     # Diet     # Activity Score (AM-PAC)    # Code status     # Disposition                                                                              --------------------------------------------------------    # Handoff      CASEY POWERS 75y Female  MRN#: [id number]   CODE STATUS: DNR    Hospital Day:     Pt is currently admitted with the primary diagnosis of acute on chronic hypercapnic resp failure    SUBJECTIVE  Overnight/Interval Events: Patient on BiPAP overnight and uncomfortable this morning turned over on side. Minimally responsive but could say name and knows she is in the hospital. MICU team spoke with son, patient is DNR and will be intubated for bronchoscopy today.                                            ----------------------------------------------------------  OBJECTIVE  PAST MEDICAL & SURGICAL HISTORY  High cholesterol  Anxiety  Pacemaker  Lung cancer  Afib  COPD (chronic obstructive pulmonary disease)  Artificial cardiac pacemaker  H/O atrioventricular kacie ablation  S/P lobectomy of lung  left  History of tonsillectomy  S/P appendectomy    Cataract  RIGHT   AND  LEFT  19                                              -----------------------------------------------------------  ALLERGIES:  bacitracin (Other)  carboplatin (Other)  sulfa drugs (Unknown)  sulfonamides (Unknown)  Xanax (Other)                                            ------------------------------------------------------------    HOME MEDICATIONS  Home Medications:  morphine 20 mg/mL oral concentrate: 0.25 milliliter(s) orally 4 times a day, As needed, dyspnea or pain (03 Aug 2021 16:05)  predniSONE 10 mg oral tablet: 1 tab(s) orally once a day until 8/ (03 Aug 2021 16:05)  senna oral tablet: 2 tab(s) orally once a day (at bedtime) or until otherwise instructed by your provider (2021 16:54)                           MEDICATIONS:  STANDING MEDICATIONS  albuterol/ipratropium for Nebulization 3 milliLiter(s) Nebulizer every 4 hours  atorvastatin 20 milliGRAM(s) Oral at bedtime  cefepime   IVPB 1000 milliGRAM(s) IV Intermittent every 8 hours  enoxaparin Injectable 40 milliGRAM(s) SubCutaneous every 12 hours  pantoprazole    Tablet 40 milliGRAM(s) Oral before breakfast  sertraline 25 milliGRAM(s) Oral daily  sodium chloride 0.9%. 1000 milliLiter(s) IV Continuous <Continuous>    PRN MEDICATIONS                                            ------------------------------------------------------------  VITAL SIGNS: Last 24 Hours  T(C): 37.3 (26 Aug 2021 05:00), Max: 37.3 (26 Aug 2021 05:00)  T(F): 99.2 (26 Aug 2021 05:00), Max: 99.2 (26 Aug 2021 05:00)  HR: 94 (26 Aug 2021 06:00) (71 - 112)  BP: 95/73 (26 Aug 2021 06:00) (95/73 - 127/67)  BP(mean): 87 (26 Aug 2021 06:00) (73 - 87)  RR: 41 (26 Aug 2021 06:00) (18 - 41)  SpO2: 100% (26 Aug 2021 06:00) (95% - 100%)      21 @ 07:01  -  21 @ 07:00  --------------------------------------------------------  IN: 450 mL / OUT: 300 mL / NET: 150 mL                                             --------------------------------------------------------------  LABS:                        9.9    11.88 )-----------( 300      ( 25 Aug 2021 19:38 )             33.0     08-25    140  |  98  |  24<H>  ----------------------------<  159<H>  5.5<H>   |  33<H>  |  0.9    Ca    9.2      25 Aug 2021 19:38    TPro  5.9<L>  /  Alb  3.8  /  TBili  0.4  /  DBili  x   /  AST  20  /  ALT  17  /  AlkPhos  103      PT/INR - ( 25 Aug 2021 19:38 )   PT: 18.10 sec;   INR: 1.58 ratio         PTT - ( 25 Aug 2021 19:38 )  PTT:34.2 sec  Urinalysis Basic - ( 25 Aug 2021 19:27 )    Color: Yellow / Appearance: Clear / S.031 / pH: x  Gluc: x / Ketone: Negative  / Bili: Negative / Urobili: 3 mg/dL   Blood: x / Protein: Trace / Nitrite: Negative   Leuk Esterase: Negative / RBC: x / WBC x   Sq Epi: x / Non Sq Epi: x / Bacteria: x        Troponin T, Serum: 0.06 ng/mL *HH* (21 @ 19:38)          CARDIAC MARKERS ( 25 Aug 2021 19:38 )  x     / 0.06 ng/mL / x     / x     / x                                                  -------------------------------------------------------------  RADIOLOGY:  CXR: Complete opacification left hemithorax with effusion, postoperative changes and volume loss.                                          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 SHe was found to have white out of left lung and placed on BiPAP in the ED.    #acute on chronic hypercapnic respiratory failure  #COPD exacerbation  presentation likely consistent with mucus plug based on trachial deviation on CXR  CXR: Complete opacification left hemithorax with effusion, postoperative changes and volume loss.    abuterol/ipratropium nebs  mucomyst  methylprednisone 60mg IV q8h  intubation for bronch (patient DNR)  cefepime 1g q8h  ABG: pH 7.4, pCO2 59, pO2 84. HCO3- 36    #atrial fibrillation  -troponin  -c/w atorvastatin  -f/u BNP    #anxiety  #depression  -sertraline 25mg oral  -precedex sedation    #stage 2 ulcer on butt  -f/u recs per wound care      Diet: npo  DVT ppx: lovenox 40mg q12h  Dispo: acute  Code: DNR

## 2022-11-16 NOTE — ED PROVIDER NOTE - IV ALTEPLASE INCLUSION HIDDEN
Case Management Discharge Note      Final Note: Patient discharged home.  No needs identified for CM.  Has transport.                   Final Discharge Disposition Code: 01 - home or self-care   show

## 2022-12-01 NOTE — PHYSICAL THERAPY INITIAL EVALUATION ADULT - WEIGHT-BEARING RESTRICTIONS, REHAB EVAL
Physical Therapy Visit    Visit Type: Daily Treatment Note  Visit: 2  Referring Provider: Gregorio Campbell PA-C  Medical Diagnosis (from order): Diagnosis Information    Diagnosis  724.03 (ICD-9-CM) - M48.062 (ICD-10-CM) - Lumbar stenosis with neurogenic claudication  721.3 (ICD-9-CM) - M47.816 (ICD-10-CM) - Lumbar spondylosis         SUBJECTIVE                                                                                                               She has been using ice packs, pelvic tilts, muscle relaxer, position changes. Constantly changing positions. Presents with 6/10 pain across the low back and upper buttocks.       OBJECTIVE                                                                                                                                       Treatment     Therapeutic Exercise  - discussed her activities and HEP.  Better, worse, or same was discussed.   - hooklying pelvic tilts   - SKTC  - LTR  - seated pelvic tilts     Added small range LAQ to add additional challenge to the seated TA contraction  - sit to stand with TA focus   Education throughout on relaxation techniques and the importance of not only movement, but focused, purposeful movement.     Home Exercise Program  Access Code: [email or web address]  URL: [email or web address]  Date: 12/01/2022  Prepared by: Bryson Tucker    Exercises  Hooklying Single Knee to Chest Stretch - 2-3 x daily - 3-5 reps - 15-20 hold  Supine Lower Trunk Rotation - 2 x daily - 5-10 reps - 15 hold  Pelvic Tilt - 2-3 x daily - 10-15 reps - 3-5 hold  Seated Long Arc Quad (90-45 Degree Range) - 1 x daily - 2-3 sets - 10-15 reps - 2-3 hold  Sit to Stand - 1 x daily - 2-3 sets - 10-15 reps  Seated Forward Bending - 15-20 hold        ASSESSMENT                                                                                                            Pain decreased to 3-4/10 by the end of the session. Knee to chest and trunk rotation ROM gradually  increased with repetition.     PLAN                                                                                                                           Suggestions for next session as indicated: Progress per plan of care. Modalities as needed and as effective but focus on self directed treatments and use of movement that she can do on her own to relieve her pain. If she continues to improve on her own as she did in here, consider progression to standing core stabilization exercises like standing hip abduction and balance activities next session.        Therapy procedure time and total treatment time can be found documented on the Time Entry flowsheet     full weight-bearing

## 2022-12-19 NOTE — ED ADULT NURSE REASSESSMENT NOTE - NS ED NURSE REASSESS COMMENT FT1
Blood pressure goal is < 120/80.  Blood pressure should be checked at each healthcare encounter and pediatric nephrology should be notified if blood pressure is repeatedly above goal.  School to check blood pressure 2-3 times per week and forward readings monthly. We will reach out to nurse and ask her to do this measurement manually.  Try HIIT work out ever other day.  La should follow up with HUSAM Olmos, on 3/6/22 at 8:30am.  La has an appointment with Dr. Kari Aaron, of pediatric preventative cardiology on 1/30/22 at 2:45pm  Repeat sleep study due in summer 2023.   aniket arias reminded for medication order, pt made aware

## 2023-01-23 NOTE — ED ADULT TRIAGE NOTE - SPO2 (%)
Preventive Care Visit  [facility]  Michelle Car MD, Pediatrics  Jan 23, 2023    Assessment & Plan   2 year old 0 month old, here for preventive care.    Louann was seen today for well child.    Diagnoses and all orders for this visit:    Encounter for routine child health examination w/o abnormal findings  -     M-CHAT Development Testing  -     Lead Capillary; Future  -     sodium fluoride (VANISH) 5% white varnish 1 packet  -     CO APPLICATION TOPICAL FLUORIDE VARNISH BY [facility]      Patient has been advised of split billing requirements and indicates understanding: Yes  Growth      Normal OFC, height and weight    Immunizations   Vaccines up to date.    Anticipatory Guidance    Reviewed age appropriate anticipatory guidance.       Referrals/Ongoing Specialty Care  None  Verbal Dental Referral: Verbal dental referral was given  Dental Fluoride Varnish: Yes, fluoride varnish application risks and benefits were discussed, and verbal consent was received.    Follow Up      Return in 6 months (on 7/23/2023) for Preventive Care visit.    Subjective   Had COVID in Nov. 2022, at which time her nodes were enlarged in her neck and groin. She did have Augmentin for 10 days for sinusitis 1/3/23, and her symptoms improved.     Additional Questions 7/21/2022   Accompanied by Ronald Tim   Questions for today's visit Yes   Questions Sleeping   Surgery, major illness, or injury since last physical No     Social 1/16/2023   Lives with Parent(s), Grandparent(s)   Who takes care of your child? Parent(s), Grandparent(s),    Recent potential stressors (!) OTHER   History of trauma No   Family Hx mental health challenges No   Lack of transportation has limited access to appts/meds No   Difficulty paying mortgage/rent on time No   Lack of steady place to sleep/has slept in a shelter No     Health Risks/Safety 1/16/2023   What type of car seat does your child use? Car seat with harness   Is your  child's car seat forward or rear facing? (!) FORWARD FACING   Where does your child sit in the car?  Back seat   Do you use space heaters, wood stove, or a fireplace in your home? (!) YES   Are poisons/cleaning supplies and medications kept out of reach? Yes   Do you have a swimming pool? No   Helmet use? N/A   Do you have guns/firearms in the home? (!) YES   Are the guns/firearms secured in a safe or with a trigger lock? Yes   Is ammunition stored separately from guns? Yes     TB Screening 1/16/2023   Was your child born outside of the [place]? No     TB Screening: Consider immunosuppression as a risk factor for TB 1/16/2023   Recent TB infection or positive TB test in family/close contacts No   Recent travel outside [place] (child/family/close contacts) No   Recent residence in high-risk group setting (correctional facility/health care facility/homeless shelter/refugee camp) No      Dyslipidemia 1/16/2023   FH: premature cardiovascular disease No (stroke, heart attack, angina, heart surgery) are not present in my child's biologic parents, grandparents, aunt/uncle, or sibling   FH: hyperlipidemia No   Personal risk factors for heart disease NO diabetes, high blood pressure, obesity, smokes cigarettes, kidney problems, heart or kidney transplant, history of Kawasaki disease with an aneurysm, lupus, rheumatoid arthritis, or HIV       No results for input(s): CHOL, HDL, LDL, TRIG, CHOLHDLRATIO in the last 69920 hours.  Dental Screening 1/16/2023   Has your child seen a dentist? (!) NO   Has your child had cavities in the last 2 years? Unknown   Have parents/caregivers/siblings had cavities in the last 2 years? No     Diet 1/16/2023   Do you have questions about feeding your child? No   How does your child eat?  Sippy cup, Spoon feeding by caregiver, Self-feeding   What type of milk?  Whole   What type of water? Tap   Please specify: Pedialyte   How often does your family eat meals together? Every day   How many  "snacks does your child eat per day 2-3   Are there types of foods your child won't eat? (!) YES   Please specify: Meat   In past 12 months, concerned food might run out Never true   In past 12 months, food has run out/couldn't afford more Never true     Elimination 1/16/2023   Bowel or bladder concerns? No concerns   Please specify: -   Toilet training status: Not interested in toilet training yet     Media Use 1/16/2023   Hours per day of screen time (for entertainment) 8   Screen in bedroom No     Sleep 1/16/2023   Do you have any concerns about your child's sleep? (!) WAKING AT NIGHT, (!) SLEEP RESISTANCE   Please specify: -     Vision/Hearing 1/16/2023   Vision or hearing concerns No concerns     Development/ Social-Emotional Screen 1/16/2023   Does your child receive any special services? No     Development - M-CHAT required for C&TC  Screening tool used, reviewed with parent/guardian:  Electronic M-CHAT-R   MCHAT-R Total Score 1/16/2023   M-Chat Score 0 (Low-risk)      Follow-up:  LOW-RISK: Total Score is 0-2. No follow up necessary, LOW-RISK: Total Score is 0-2. No followup necessary    Milestones (by observation/ exam/ report) 75-90% ile   PERSONAL/ SOCIAL/COGNITIVE:    Removes garment    Emerging pretend play    Shows sympathy/ comforts others  LANGUAGE:    2 word phrases    Points to / names pictures    Follows 2 step commands  GROSS MOTOR:    Runs    Walks up steps    Kicks ball  FINE MOTOR/ ADAPTIVE:    Uses spoon/fork    [place] of 4 blocks    Opens door by turning knob         Objective     Exam  Pulse 116   Temp 97.4  F (36.3  C) (Tympanic)   Ht 2' 9.25\" (0.845 m)   Wt 25 lb (11.3 kg)   HC 19.29\" (49 cm)   BMI 15.90 kg/m    86 %ile (Z= 1.10) based on CDC (Girls, 0-36 Months) head circumference-for-age based on Head Circumference recorded on 1/23/2023.  28 %ile (Z= -0.59) based on CDC (Girls, 2-20 Years) weight-for-age data using vitals from 1/23/2023.  44 %ile (Z= -0.16) based on CDC (Girls, 2-20 " Years) Stature-for-age data based on Stature recorded on 1/23/2023.  33 %ile (Z= -0.45) based on [facility] (Girls, 2-20 Years) weight-for-recumbent length data based on body measurements available as of 1/23/2023.    Physical Exam  GENERAL: Alert, well appearing, no distress  SKIN: Clear. No significant rash, abnormal pigmentation or lesions  HEAD: Normocephalic.  EYES:  Symmetric light reflex and no eye movement on cover/uncover test. Normal conjunctivae.  EARS: Normal canals. Tympanic membranes are normal; gray and translucent.  NOSE: Normal without discharge.  MOUTH/THROAT: Clear. No oral lesions. Teeth without obvious abnormalities.  NECK: Supple, no masses.  No thyromegaly.  LYMPH NODES: No adenopathy  LUNGS: Clear. No rales, rhonchi, wheezing or retractions  HEART: Regular rhythm. Normal S1/S2. No murmurs. Normal pulses.  ABDOMEN: Soft, non-tender, not distended, no masses or hepatosplenomegaly. Bowel sounds normal.   GENITALIA: Normal female external genitalia. Tian stage I,  No inguinal herniae are present.  EXTREMITIES: Full range of motion, no deformities  NEUROLOGIC: No focal findings. Cranial nerves grossly intact: DTR's normal. Normal gait, strength and tone        Michelle Car MD  [facility]   100

## 2023-01-31 NOTE — ED PROVIDER NOTE - NS_EDPROVIDERDISPOUSERTYPE_ED_A_ED
Attending Attestation (For Attendings USE Only)...
pt states he has been having chest pain x 2 months, nauseous for a few days, reports vomiting today

## 2023-02-02 NOTE — ED CDU PROVIDER DISPOSITION NOTE - PRINCIPAL DIAGNOSIS
Asaf was in today and forgot to mention to Dr Valverde that he would like a referral made to [facility] to get physical therapy for his back and muscle tone.  Please call Asaf back when this is done. [phone]   Call to pt.   He is going to pick this up at clinic.  Referral at the            Ordered    Pt checking on the referral. For [facility].     Referrals Dept not responding to see if this is OK.     Will route to Dr Finch.        Will route this to referrals to make sure this is OK.     To [facility].    Syncope no redness

## 2023-03-06 NOTE — ED ADULT NURSE NOTE - COVID-19 ORDERING FACILITY
Abdominal Pain HPI





- General


Chief Complaint: Abdominal Pain


Stated Complaint: Appendix


Time Seen by Provider: 03/06/23 19:06


Source: patient, RN notes reviewed, old records reviewed


Mode of arrival: ambulatory





- History of Present Illness


Initial Comments: 





This is a well-appearing 19-year-old male sitting on the side of bed complaining

of abdominal pain since being diagnosed with appendicitis in January.  He was 

discharged from the hospital March 1 on antibiotics to treat an abscess with 

phlegmon and directed to follow up with Dr Kirby for outpatient surgery for 

appendectomy.  Father at bedside states patient continues to have pain and he 

wants the appendix removed today concerned that it is going to kill him.  

Patient denies any fevers.  Denies nausea but states he is having diarrhea.  Is 

taking probiotics.


MD Complaint: abdominal pain


-: month(s) (2)


Severity scale (1-10): 9


Consistency: constant


Improves With: nothing


Associated Symptoms: diarrhea


Treatments Prior to Arrival: other (antibiotics)





- Related Data


                                Home Medications











 Medication  Instructions  Recorded  Confirmed


 


ARIPiprazole [Abilify] 20 mg PO HS 01/05/23 03/01/23


 


Benztropine Mesylate [Cogentin] 1 mg PO BID 01/05/23 03/01/23


 


Escitalopram Oxalate [Lexapro] 10 mg PO DAILY 03/01/23 03/01/23








                                  Previous Rx's











 Medication  Instructions  Recorded


 


Nicotine 7Mg/24Hr Patch [Habitrol] 1 patch TRANSDERM DAILY #14 patch 03/03/23


 


cefUROXime axetiL [Ceftin] 500 mg PO BID 10 Days #20 tab 03/03/23


 


metroNIDAZOLE [Flagyl] 500 mg PO TID #30 tab 03/03/23











                                    Allergies











Allergy/AdvReac Type Severity Reaction Status Date / Time


 


haloperidol [From Haldol] Allergy  Swelling Verified 03/06/23 19:04


 


shellfish derived [Shellfish] Allergy  Swelling Verified 03/06/23 19:04


 


seafood Allergy  Swelling Uncoded 03/01/23 09:59














Review of Systems


ROS Statement: 


Those systems with pertinent positive or pertinent negative responses have been 

documented in the HPI.





ROS Other: All systems not noted in ROS Statement are negative.





Past Medical History


Past Medical History: Asthma


Additional Past Medical History / Comment(s): appendicitis with rupture 1/23 

sent home with RUPERTO drain. unable to do appendectomy at that time due to 

infection. Planned for March, but pain became too severe so came [facility]


History of Any Multi-Drug Resistant Organisms: None Reported


Additional Past Surgical History / Comment(s): Right knee surgery


Past Anesthesia/Blood Transfusion Reactions: No Reported Reaction


Past Psychological History: No Psychological Hx Reported


Smoking Status: Current every day smoker


Past Alcohol Use History: Occasional


Past Drug Use History: Marijuana





- Past Family History


  ** Father


History Unknown: Yes





  ** Mother


History Unknown: Yes





General Exam


Limitations: no limitations


General appearance: alert, in no apparent distress


Head exam: Present: atraumatic


Eye exam: Present: normal appearance.  Absent: scleral icterus, conjunctival 

injection, periorbital swelling


ENT exam: Present: mucous membranes moist


Neck exam: Absent: meningismus


Respiratory exam: Absent: respiratory distress, accessory muscle use


Cardiovascular Exam: Present: regular rate


GI/Abdominal exam: Present: soft, tenderness (left and right lower quadrants).  

Absent: distended, rigid


Extremities exam: Present: normal capillary refill


Neurological exam: Present: alert, oriented X3


Psychiatric exam: Present: normal affect, normal mood


Skin exam: Present: warm, dry, normal color.  Absent: cyanosis, diaphoretic, 

petechiae, pallor





Course


                                   Vital Signs











  03/06/23 03/06/23





  18:06 20:25


 


Temperature 98.3 F 


 


Pulse Rate 101 H 


 


Respiratory 18 16





Rate  


 


Blood Pressure 130/83 


 


O2 Sat by Pulse 98 





Oximetry  














- Reevaluation(s)


Reevaluation #1: 





03/06/23 20:21


Discussed with Dr. Jamison who was agreeable to patient being discharged and f

ollowing up with Dr. Kirby tomorrow.


Time: 20:21





Medical Decision Making





- Medical Decision Making





Patient seen by Dr. Cifuentes on March 1 after he was hospitalized in January for a 

gangrenous ruptured appendicitis with appendiceal abscess and phlegmon.  He 

underwent laparoscopic robotic-assisted drainage and was placed on antibiotics


He was discharged on March 3 on Ceftin and Flagyl and directed to follow-up for 

a laparoscopic appendectomy with Dr. Kirby.


I reviewed the CT abdomen and pelvis which was performed initially on January 5 

and repeated on March 1 showing redemonstration of acute uncomplicated appen

dicitis no evidence of perforation on this exam nor on prior 01/05/2023.





At this time patient denies any nausea vomiting.  Denies any fevers.  Abdomen is

 soft and minimally tender.  Patient is hemodynamically stable.  Nontoxic-

appearing.  


Labs today show no evidence of leukocytosis, lactic acid within normal limits at

 1.7, electrolytes are unremarkable.  


He is continuing his antibiotics and is scheduled to see Dr. Kirby on the 

28th. I did speak with Dr Jamison who recommends discharge, continue with 

antibiotics and call Dr Kirby in the morning. 


Patient was directed to continue with previous discharge instructions. They are 

agreeable to this plan. Strict return parameters discussed.


Case discussed with Dr. Salvador.





Was pt. sent in by a medical professional or institution (, PA, NP, urgent 

care, hospital, or nursing home...) When possible be specific


@  -No


Did you speak to anyone other than the patient for history (EMS, parent, family,

 police, friend...)? What history was obtained from this source 


@  -father


Did you review nursing and triage notes (agree or disagree)?  Why? 


@  -I reviewed and agree with nursing and triage notes


Were old charts reviewed (outside hosp., previous admission, EMS record, old 

EKG, old radiological studies, urgent care reports/EKG's, nursing home records)?

 Report findings 


@  -yes as above


Differential Diagnosis (chest pain, altered mental status, abdominal pain women,

 abdominal pain men, vaginal bleeding, weakness, fever, dyspnea, syncope, 

headache, dizziness, GI bleed, back pain, seizure, CVA, palpatations, mental 

health, musculoskeletal)? 


@  -Differential Abdominal Pain Men:


Appendicitis, cholecystitis, diverticulosis, ischemic bowel, pancreatitis, 

hepatitis, UTI, gastroenteritis, AAA, incarcerated hernia, bowel obstruction, 

constipation, inflammatory bowel, hepatitis, peptic ulcer disease, splenic 

infarction, perforated viscus, testicular torsion, this is not meant to be an 

all-inclusive list


EKG interpreted by me (3pts min.).


@  -n/a


X-rays interpreted by me (1pt min.).


@  -None done


CT interpreted by me (1pt min.).


@  -None done


U/S interpreted by me (1pt. min.).


@  -None done


What testing was considered but not performed or refused? (CT, X-rays, U/S, 

labs)? Why?


@  -CT considered however recently performed.  Case discussed with Dr Jamison 

who did not recommend repeating CT.  No leukocytosis, no fever, no nausea 

vomiting, abdomen is soft and minimally tender.


What meds were considered but not given or refused? Why?


@  -None


Did you discuss the management of the patient with other professionals 

(professionals i.e. , PA, NP, lab, RT, psych nurse, , , 

teacher, , )? Give summary


@  -surgery Dr Jamison


Was smoking cessation discussed for >3mins.?


@  -No


Was critical care preformed (if so, how long)?


@  -No


Were there social determinants of health that impacted care today? How? 

(Homelessness, low income, unemployed, alcoholism, drug addiction, 

transportation, low edu. Level, literacy, decrease access to med. care, half-way, 

rehab)?


@  -No


Was there de-escalation of care discussed even if they declined (Discuss DNR or 

withdrawal of care, Hospice)? DNR status


@  -No


What co-morbidities impacted this encounter? (DM, HTN, Smoking, COPD, CAD, Canc

er, CVA, ARF, Chemo, Hep., AIDS, mental health diagnosis, sleep apnea, morbid 

obesity)?


@  -asthma


Was patient admitted / discharged? Hospital course, mention meds given and 

route, prescriptions, significant lab abnormalities, going to OR and other 

pertinent info.


@  -discharged 


Undiagnosed new problem with uncertain prognosis?


@  -No


Drug Therapy requiring intensive monitoring for toxicity (Heparin, Nitro, 

Insulin, Cardizem)?


@  -No


Were any procedures done?


@  -No


Diagnosis/symptom?


@  -abdominal pain


Acute, or Chronic, or Acute on Chronic?


@  -acute


Uncomplicated (without systemic symptoms) or Complicated (systemic symptoms)?


@  -uncomplicated


Side effects of treatment?


@  -No


Exacerbation, Progression, or Severe Exacerbation?


@  -No


Poses a threat to life or bodily function? How? (Chest pain, [place], MI, pneumonia,

 PE, COPD, DKA, ARF, appy, cholecystitis, CVA, Diverticulitis, Homicidal, 

Suicidal, threat to staff... and all critical care pts)


@  -No





- Lab Data


Result diagrams: 


                                 03/06/23 19:23





                                 03/06/23 19:23


                                   Lab Results











  03/06/23 03/06/23 03/06/23 Range/Units





  19:23 19:23 19:23 


 


WBC  9.4    (4.0-11.0)  k/uL


 


RBC  4.84    (4.30-5.90)  m/uL


 


Hgb  14.7    (13.0-17.5)  gm/dL


 


Hct  41.7    (39.0-53.0)  %


 


MCV  86.3    (80.0-100.0)  fL


 


MCH  30.4    (25.0-35.0)  pg


 


MCHC  35.2    (31.0-37.0)  g/dL


 


RDW  13.8    (11.5-15.5)  %


 


Plt Count  368    (150-450)  k/uL


 


MPV  8.5    


 


Neutrophils %  69    %


 


Lymphocytes %  24    %


 


Monocytes %  3    %


 


Eosinophils %  2    %


 


Basophils %  1    %


 


Neutrophils #  6.5    (1.3-7.7)  k/uL


 


Lymphocytes #  2.3    (1.0-4.8)  k/uL


 


Monocytes #  0.3    (0-1.0)  k/uL


 


Eosinophils #  0.2    (0-0.7)  k/uL


 


Basophils #  0.0    (0-0.2)  k/uL


 


Sodium   140   (137-145)  mmol/L


 


Potassium   4.6   (3.5-5.1)  mmol/L


 


Chloride   104   ()  mmol/L


 


Carbon Dioxide   25   (22-30)  mmol/L


 


Anion Gap   11   mmol/L


 


BUN   14   (9-20)  mg/dL


 


Creatinine   0.98   (0.66-1.25)  mg/dL


 


Est GFR (CKD-EPI)AfAm   >90   (>60 ml/min/1.73 sqM)  


 


Est GFR (CKD-EPI)NonAf   >90   (>60 ml/min/1.73 sqM)  


 


Glucose   135 H   (74-99)  mg/dL


 


Plasma Lactic Acid Kenneth    1.7  (0.7-2.0)  mmol/L


 


Calcium   9.5   (8.4-10.2)  mg/dL


 


Total Bilirubin   0.2   (0.2-1.3)  mg/dL


 


AST   29   (17-59)  U/L


 


ALT   26   (4-49)  U/L


 


Alkaline Phosphatase   64   ()  U/L


 


Total Protein   8.0   (6.3-8.2)  g/dL


 


Albumin   4.5   (3.5-5.0)  g/dL


 


Amylase   72   ()  U/L


 


Lipase   131   ()  U/L














Disposition


Clinical Impression: 


 Abdominal pain





Disposition: HOME SELF-CARE


Condition: Good


Instructions (If sedation given, give patient instructions):  Abdominal Pain 

(ED)


Additional Instructions: 


Continue taking the antibiotics as previously prescribed.  Tylenol and or Motrin

 as needed for pain or discomfort.  Follow-up with Dr. Kirby tomorrow 

morning.  Return to the emergency room with any new or concerning symptoms 

including fever or persistent nausea vomiting.


Is patient prescribed a controlled substance at d/c from ED?: No


Referrals: 


David Harp MD [Primary Care Provider] - 1-2 days


Jaylin Kirby MD [STAFF PHYSICIAN] - 1-2 days


Time of Disposition: 20:21
[facility]

## 2023-05-01 NOTE — PROGRESS NOTE ADULT - SUBJECTIVE AND OBJECTIVE BOX
Patient is a 75y old  Female who presents with a chief complaint of       SUBJECTIVE:  No events overnight. Afebrile.      PHYSICAL EXAM  Vital Signs Last 24 Hrs  T(C): 35.6 (20 May 2021 05:42), Max: 36.1 (19 May 2021 21:25)  T(F): 96 (20 May 2021 05:42), Max: 97 (19 May 2021 21:25)  HR: 96 (20 May 2021 10:35) (71 - 96)  BP: 146/67 (20 May 2021 10:35) (135/62 - 169/72)  BP(mean): 93 (20 May 2021 05:42) (93 - 93)  RR: 22 (20 May 2021 10:35) (20 - 22)  SpO2: 97% (20 May 2021 10:35) (97% - 97%)    CONSTITUTIONAL:  Chronically ill appearing. NAD    ENT:   Airway patent,   No thrush    EYES:   Clear bilaterally,   pupils equal, round and reactive to light.    CARDIAC:   Normal rate,   regular rhythm.    no edema    CAROTID:   normal systolic impulse  no bruits    RESPIRATORY:   No wheezing  Normal chest expansion  Not tachypneic,  No use of accessory muscles    GASTROINTESTINAL:  Abdomen soft,   non-tender,   no guarding,   + BS    MUSCULOSKELETAL:   range of motion is not limited,  no clubbing, cyanosis    NEUROLOGICAL:   Alert and oriented   no motor  deficits.    SKIN:   Skin normal color for race,   No evidence of rash.    PSYCHIATRIC:   normal mood and affect.   no apparent risk to self or others.    05-19-21 @ 07:01  -  05-20-21 @ 07:00  --------------------------------------------------------  IN:    Oral Fluid: 350 mL  Total IN: 350 mL    OUT:    Voided (mL): 2 mL  Total OUT: 2 mL    Total NET: 348 mL          LABS:                          9.5    8.81  )-----------( 133      ( 20 May 2021 05:15 )             30.4                                               05-20    138  |  99  |  21<H>  ----------------------------<  225<H>  4.6   |  29  |  0.8    Ca    9.2      20 May 2021 05:15  Mg     1.9     05-20    TPro  5.7<L>  /  Alb  4.0  /  TBili  0.3  /  DBili  x   /  AST  12  /  ALT  11  /  AlkPhos  72  05-20      PT/INR - ( 19 May 2021 12:14 )   PT: 21.80 sec;   INR: 1.90 ratio         PTT - ( 19 May 2021 12:14 )  PTT:35.0 sec                                                                                     LIVER FUNCTIONS - ( 20 May 2021 05:15 )  Alb: 4.0 g/dL / Pro: 5.7 g/dL / ALK PHOS: 72 U/L / ALT: 11 U/L / AST: 12 U/L / GGT: x                                                                                                MEDICATIONS  (STANDING):  albuterol/ipratropium for Nebulization 3 milliLiter(s) Nebulizer every 6 hours  atorvastatin 20 milliGRAM(s) Oral at bedtime  azithromycin  IVPB      azithromycin  IVPB 500 milliGRAM(s) IV Intermittent every 24 hours  budesonide 160 MICROgram(s)/formoterol 4.5 MICROgram(s) Inhaler 2 Puff(s) Inhalation two times a day  chlorhexidine 4% Liquid 1 Application(s) Topical <User Schedule>  cholecalciferol 2000 Unit(s) Oral daily  guaiFENesin ER 1200 milliGRAM(s) Oral every 12 hours  methylPREDNISolone sodium succinate Injectable 60 milliGRAM(s) IV Push two times a day  pantoprazole    Tablet 40 milliGRAM(s) Oral before breakfast  warfarin 1 milliGRAM(s) Oral once    MEDICATIONS  (PRN):  ALBUTerol    90 MICROgram(s) HFA Inhaler 1 Puff(s) Inhalation every 3 hours PRN Bronchospasm  clonazePAM  Tablet 0.5 milliGRAM(s) Oral two times a day PRN anxiety      X-Rays reviewed    CXR interpreted by me: 12:40
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
## 2023-07-11 NOTE — H&P ADULT - NSHPLABSRESULTS_GEN_ALL_CORE
10.2   6.73  )-----------( 139      ( 05 Jun 2021 20:31 )             31.8     06-05    140  |  97<L>  |  9<L>  ----------------------------<  131<H>  3.6   |  37<H>  |  0.7    Ca    9.4      05 Jun 2021 18:15    TPro  5.7<L>  /  Alb  3.8  /  TBili  0.6  /  DBili  x   /  AST  17  /  ALT  14  /  AlkPhos  73  06-05    < from: TTE Echo Complete w/o Contrast w/ Doppler (05.20.21 @ 12:28) >     1. Normal global left ventricular systolic function.   2. LV Ejection Fraction by Chappell's Method with a biplane EF of 65 %.   3. Mildly enlarged right ventricle.   4. Mildly enlarged left atrium.   5. Normal right atrial size.   6. Mitral annular calcification.   7. Moderate mitral valve regurgitation.   8. Thickening and calcification of the anterior and posterior mitral valve leaflets.   9. Moderate tricuspid regurgitation.  10. Mild aortic regurgitation.  11. Sclerotic aortic valve with decreased opening.  12. Mild pulmonic valve regurgitation.    < end of copied text >
yes

## 2023-07-18 NOTE — ED ADULT TRIAGE NOTE - INTERNATIONAL TRAVEL
No
GEN: NAD, Resting comfortably in bed, cooperative, elderly  PULM: Clear to auscultation bilaterally, No wheezing, rales, rhonchi, no respiratory distress  CVS: Regular rate and rhythm, S1-S2, no murmurs, heaves, thrills  ABD: Soft, non-tender, non-distended, no guarding, BS +  EXT: No edema/cyanosis, extremities warm/dry, peripheral pulses palpable   NEURO: A&Ox3, no focal deficits, follows commands, appropriate tone/strength all extremities

## 2023-08-27 NOTE — H&P ADULT - NSHPPOAPULMEMBOLUS_GEN_A_CORE
Patient from [facility] poitive for covid this morning. CXR positive for pneumonia, patient admits to SOB and headache. Denies chest pain, nausea or vomiting. Patient has a history of anxiety and is extremely claustrophobic.    A&Ox4
no

## 2023-10-01 PROBLEM — Z92.3 HISTORY OF RADIATION THERAPY: Status: RESOLVED | Noted: 2020-07-30 | Resolved: 2023-10-01

## 2023-10-17 NOTE — ED ADULT NURSE NOTE - CAS EDN DISCHARGE ASSESSMENT
Confirmed patient is on for thoracic conference on 10/18 Alert and oriented to person, place and time

## 2023-11-24 NOTE — ED ADULT TRIAGE NOTE - CHIEF COMPLAINT QUOTE
BIBA from home because she was unable to get medications from pharmacy. Pt discharged today from hospital. Pt states she is unable to live at home and requesting admission to hospital again.
24-Nov-2023

## 2023-12-13 NOTE — DISCHARGE NOTE NURSING/CASE MANAGEMENT/SOCIAL WORK - PATIENT PORTAL LINK FT
You can access the FollowMyHealth Patient Portal offered by [facility] by registering at the following website: [email or web address]. By joining [facility]’s FollowMyHealth portal, you will also be able to view your health information using other applications (apps) compatible with our system. Statement Selected

## 2024-01-08 NOTE — PROGRESS NOTE ADULT - NSICDXPILOT_GEN_ALL_CORE
[place]
firm

## 2024-03-22 NOTE — ED ADULT TRIAGE NOTE - ISOLATION TYPE:
None

## 2025-02-13 NOTE — CONSULT NOTE ADULT - TREATMENT GUIDELINE COMMENT
continue current medical plan for now pt will speak with the surgeon and the  anesthesiologist preop

## 2025-05-08 NOTE — ED PROVIDER NOTE - MDM PATIENT STATEMENT FOR ADDL TREATMENT
No known recent seizures.    Staff to monitor and notify for any recurrence of seizure activity     Patient with one or more new problems requiring additional work-up/treatment.

## 2025-07-02 NOTE — PROGRESS NOTE BEHAVIORAL HEALTH - MOOD
(l*w) -160 07/02/25 0932   Wound Volume (cm^3) 1.56 cm^3 07/02/25 0932   Wound Healing % -160 07/02/25 0932   Post-Procedure Length (cm) 2.6 cm 07/02/25 0932   Post-Procedure Width (cm) 3 cm 07/02/25 0932   Post-Procedure Depth (cm) 0.2 cm 07/02/25 0932   Post-Procedure Surface Area (cm^2) 7.8 cm^2 07/02/25 0932   Post-Procedure Volume (cm^3) 1.56 cm^3 07/02/25 0932   Wound Assessment Slough 07/02/25 0932   Drainage Amount Moderate (25-50%) 07/02/25 0932   Drainage Description Yellow 07/02/25 0932   Odor None 07/02/25 0932   Rosa-wound Assessment Intact 07/02/25 0932   Margins Defined edges 07/02/25 0932   Wound Thickness Description not for Pressure Injury Full thickness 07/02/25 0932   Number of days: 14          Percent of Wound(s)/Ulcer(s) Debrided: 100%    Total Surface Area Debrided:  77.5 sq cm     Estimated Blood Loss:  Minimal    Hemostasis Achieved:  by pressure    Procedural Pain:  2  / 10     Post Procedural Pain:  1 / 10     Response to treatment:  Well tolerated by patient.       Plan:     CTA pending. Continue santyl. A1C pending.    Treatment Note please see Discharge Instructions    Written patient dismissal instructions given to patient and signed by patient or POA.             Electronically signed by VAMSI GARZA MD on 7/2/2025 at 10:25 AM     
Anxious/Other
Anxious
Anxious/Other
Anxious
Other